# Patient Record
Sex: FEMALE | Race: WHITE | Employment: OTHER | URBAN - METROPOLITAN AREA
[De-identification: names, ages, dates, MRNs, and addresses within clinical notes are randomized per-mention and may not be internally consistent; named-entity substitution may affect disease eponyms.]

---

## 2018-04-25 RX ORDER — CITALOPRAM 20 MG/1
TABLET ORAL
Qty: 90 TABLET | Refills: 0 | Status: SHIPPED | OUTPATIENT
Start: 2018-04-25 | End: 2018-07-27 | Stop reason: SDUPTHER

## 2018-07-27 ENCOUNTER — OFFICE VISIT (OUTPATIENT)
Dept: PRIMARY CARE CLINIC | Age: 63
End: 2018-07-27
Payer: COMMERCIAL

## 2018-07-27 VITALS
BODY MASS INDEX: 21.66 KG/M2 | HEIGHT: 65 IN | OXYGEN SATURATION: 97 % | SYSTOLIC BLOOD PRESSURE: 136 MMHG | TEMPERATURE: 98 F | HEART RATE: 76 BPM | DIASTOLIC BLOOD PRESSURE: 80 MMHG | WEIGHT: 130 LBS

## 2018-07-27 DIAGNOSIS — F34.1 DYSTHYMIA: ICD-10-CM

## 2018-07-27 DIAGNOSIS — N39.0 URINARY TRACT INFECTION WITHOUT HEMATURIA, SITE UNSPECIFIED: Primary | ICD-10-CM

## 2018-07-27 DIAGNOSIS — Z72.0 TOBACCO ABUSE: ICD-10-CM

## 2018-07-27 DIAGNOSIS — R09.89 BILATERAL CAROTID BRUITS: ICD-10-CM

## 2018-07-27 DIAGNOSIS — M54.17 RADICULOPATHY OF LUMBOSACRAL REGION: ICD-10-CM

## 2018-07-27 LAB
BILIRUBIN, POC: NORMAL
BLOOD URINE, POC: NORMAL
CLARITY, POC: NORMAL
COLOR, POC: NORMAL
GLUCOSE URINE, POC: NORMAL
KETONES, POC: NORMAL
LEUKOCYTE EST, POC: NORMAL
NITRITE, POC: POSITIVE
PH, POC: 5
PROTEIN, POC: NORMAL
SPECIFIC GRAVITY, POC: 1
UROBILINOGEN, POC: 0.2

## 2018-07-27 PROCEDURE — 81002 URINALYSIS NONAUTO W/O SCOPE: CPT | Performed by: FAMILY MEDICINE

## 2018-07-27 PROCEDURE — G8420 CALC BMI NORM PARAMETERS: HCPCS | Performed by: FAMILY MEDICINE

## 2018-07-27 PROCEDURE — 4004F PT TOBACCO SCREEN RCVD TLK: CPT | Performed by: FAMILY MEDICINE

## 2018-07-27 PROCEDURE — G8427 DOCREV CUR MEDS BY ELIG CLIN: HCPCS | Performed by: FAMILY MEDICINE

## 2018-07-27 PROCEDURE — 99215 OFFICE O/P EST HI 40 MIN: CPT | Performed by: FAMILY MEDICINE

## 2018-07-27 PROCEDURE — 3017F COLORECTAL CA SCREEN DOC REV: CPT | Performed by: FAMILY MEDICINE

## 2018-07-27 RX ORDER — CITALOPRAM 20 MG/1
TABLET ORAL
Qty: 90 TABLET | Refills: 1 | Status: CANCELLED | OUTPATIENT
Start: 2018-07-27

## 2018-07-27 RX ORDER — CITALOPRAM 20 MG/1
TABLET ORAL
Qty: 90 TABLET | Refills: 3 | Status: SHIPPED | OUTPATIENT
Start: 2018-07-27 | End: 2019-07-22 | Stop reason: SDUPTHER

## 2018-07-27 RX ORDER — SULFAMETHOXAZOLE AND TRIMETHOPRIM 800; 160 MG/1; MG/1
1 TABLET ORAL 2 TIMES DAILY
Qty: 10 TABLET | Refills: 0 | Status: SHIPPED | OUTPATIENT
Start: 2018-07-27 | End: 2018-08-01

## 2018-07-27 ASSESSMENT — ENCOUNTER SYMPTOMS
NAUSEA: 1
BACK PAIN: 1
SORE THROAT: 0
COUGH: 0
VOMITING: 1

## 2018-07-27 ASSESSMENT — PATIENT HEALTH QUESTIONNAIRE - PHQ9
1. LITTLE INTEREST OR PLEASURE IN DOING THINGS: 0
SUM OF ALL RESPONSES TO PHQ QUESTIONS 1-9: 0
2. FEELING DOWN, DEPRESSED OR HOPELESS: 0
SUM OF ALL RESPONSES TO PHQ9 QUESTIONS 1 & 2: 0

## 2018-07-27 NOTE — PROGRESS NOTES
Alla Ramirez, a female of 59 y.o. came to the office 7/27/2018. There is no problem list on file for this patient. Urinary Tract Infection    This is a new problem. The current episode started in the past 7 days. Associated symptoms include chills, flank pain (Right ), frequency, nausea, sweats and vomiting. Nausea & Vomiting   This is a new problem. The current episode started in the past 7 days (7/20). The problem occurs daily (with nausea but not vomiting. ). Associated symptoms include chills, congestion, a fever, nausea and vomiting. Pertinent negatives include no coughing or sore throat. Fever    This is a new problem. The current episode started in the past 7 days (7/20). The problem has been rapidly improving. The maximum temperature noted was more than 104 F. Associated symptoms include congestion, muscle aches, nausea and vomiting. Pertinent negatives include no coughing or sore throat. She has tried NSAIDs for the symptoms. The treatment provided significant relief. depression: does well if takes Celexa. If not moods down. No anxiety. MS: pain down down right leg with walking and any activities. [facility] + N/T. Did chiropractic, massage, and PT at [address] in [place].      Allergies   Allergen Reactions    Pcn [Penicillins] Hives and Swelling       Current Outpatient Prescriptions on File Prior to Visit   Medication Sig Dispense Refill    lisinopril (PRINIVIL;ZESTRIL) 5 MG tablet       traMADol (ULTRAM) 50 MG tablet Take 1 tablet by mouth 2 times daily 180 tablet 1    Choline Fenofibrate (FENOFIBRIC ACID) 135 MG CPDR Take 135 mg by mouth daily       levothyroxine (SYNTHROID) 50 MCG tablet Take 50 mcg by mouth daily       aspirin 81 MG tablet Take 81 mg by mouth daily      vitamin D 1000 UNITS CAPS Take by mouth daily      insulin lispro (HUMALOG) 100 UNIT/ML injection vial Inject into the skin 37 units in the morning then 15 units at Psychiatric: She has a normal mood and affect. Vitals reviewed. Results for Victoria Gonzalez (MRN [id number]) as of 7/27/2018 13:23   Ref. Range 7/27/2018 11:58   Protein, UA Unknown tr   Color, UA Unknown dark yellow   Clarity, UA Unknown cloudy   Glucose, UA POC Unknown large   Bilirubin, UA Unknown n   Ketones, UA Unknown tr   Spec Grav, UA Unknown 1.005   pH, UA Unknown 5.0   Urobilinogen, UA Unknown 0.2   Nitrite, UA Unknown positive   Leukocytes, UA Unknown small   Blood, UA POC Unknown large       ASSESSMENT AND PLAN:    Dexter Cavazos was seen today for urinary tract infection, fever and nausea & vomiting. Diagnoses and all orders for this visit:    Urinary tract infection without hematuria, site unspecified  -     POCT Urinalysis no Micro  -     sulfamethoxazole-trimethoprim (BACTRIM DS) 800-160 MG per tablet; Take 1 tablet by mouth 2 times daily for 5 days    Dysthymia  -     citalopram (CELEXA) 20 MG tablet; TAKE 1 TABLET DAILY    Bilateral carotid bruits  -     US Carotid Artery Bilateral; Future    Tobacco abuse    Radiculopathy of lumbosacral region  -     MRI Lumbar Spine WO Contrast; Future    Other orders  -     Cancel: citalopram (CELEXA) 20 MG tablet; TAKE 1 TABLET DAILY    -quit cig's  - recommend mammo and colonoscopy  - consider spinal surgeon eval again based on mri. - recheck urine in 2 wks. Return if symptoms worsen or fail to improve, for based on results.     Ania Garcia, DO

## 2018-08-13 ENCOUNTER — OFFICE VISIT (OUTPATIENT)
Dept: PRIMARY CARE CLINIC | Age: 63
End: 2018-08-13
Payer: COMMERCIAL

## 2018-08-13 VITALS
SYSTOLIC BLOOD PRESSURE: 136 MMHG | DIASTOLIC BLOOD PRESSURE: 88 MMHG | BODY MASS INDEX: 20.47 KG/M2 | WEIGHT: 123 LBS | OXYGEN SATURATION: 97 % | TEMPERATURE: 98.4 F | HEART RATE: 76 BPM

## 2018-08-13 DIAGNOSIS — N30.01 ACUTE CYSTITIS WITH HEMATURIA: Primary | ICD-10-CM

## 2018-08-13 LAB
APPEARANCE FLUID: NORMAL
BILIRUBIN, POC: NORMAL
BLOOD URINE, POC: NORMAL
CLARITY, POC: CLEAR
COLOR, POC: YELLOW
GLUCOSE URINE, POC: 2000
KETONES, POC: NORMAL
LEUKOCYTE EST, POC: NORMAL
NITRITE, POC: NORMAL
PH, POC: 5
PROTEIN, POC: NORMAL
SPECIFIC GRAVITY, POC: 1.02
UROBILINOGEN, POC: NORMAL

## 2018-08-13 PROCEDURE — 4004F PT TOBACCO SCREEN RCVD TLK: CPT | Performed by: FAMILY MEDICINE

## 2018-08-13 PROCEDURE — G8420 CALC BMI NORM PARAMETERS: HCPCS | Performed by: FAMILY MEDICINE

## 2018-08-13 PROCEDURE — 3017F COLORECTAL CA SCREEN DOC REV: CPT | Performed by: FAMILY MEDICINE

## 2018-08-13 PROCEDURE — G8427 DOCREV CUR MEDS BY ELIG CLIN: HCPCS | Performed by: FAMILY MEDICINE

## 2018-08-13 PROCEDURE — 99212 OFFICE O/P EST SF 10 MIN: CPT | Performed by: FAMILY MEDICINE

## 2018-08-13 PROCEDURE — 81002 URINALYSIS NONAUTO W/O SCOPE: CPT | Performed by: FAMILY MEDICINE

## 2018-08-13 ASSESSMENT — ENCOUNTER SYMPTOMS
BACK PAIN: 1
NAUSEA: 0
VOMITING: 0
DIARRHEA: 0
CONSTIPATION: 0
SHORTNESS OF BREATH: 0

## 2018-08-13 NOTE — PROGRESS NOTES
Tunde Salazar, a female of 59 y.o. came to the office 8/13/2018. There is no problem list on file for this patient. HPI Mrs. Violetta Rosas is here for follow-up. She had a UTI diagnosed 7/27 and since completing her antibiotic course has had resolution of symptoms. Denies flank pain and dysuria at this time. She does still have some frequency and urgency, but thinks that may be from her diabetes. She has not heard anything about the carotid ultrasound or MRI yet. Allergies   Allergen Reactions    Pcn [Penicillins] Hives and Swelling       Current Outpatient Prescriptions on File Prior to Visit   Medication Sig Dispense Refill    citalopram (CELEXA) 20 MG tablet TAKE 1 TABLET DAILY 90 tablet 3    lisinopril (PRINIVIL;ZESTRIL) 5 MG tablet       Choline Fenofibrate (FENOFIBRIC ACID) 135 MG CPDR Take 135 mg by mouth daily       levothyroxine (SYNTHROID) 50 MCG tablet Take 50 mcg by mouth daily       aspirin 81 MG tablet Take 81 mg by mouth daily      vitamin D 1000 UNITS CAPS Take by mouth daily      insulin lispro (HUMALOG) 100 UNIT/ML injection vial Inject into the skin 37 units in the morning then 15 units at lunchtime and 37 before  dinner      Insulin Detemir (LEVEMIR SC) Inject 50 Units into the skin 2 times daily. Morning and night       No current facility-administered medications on file prior to visit. Review of Systems   Respiratory: Negative for shortness of breath. Cardiovascular: Positive for palpitations. Negative for chest pain. Gastrointestinal: Negative for constipation, diarrhea, nausea and vomiting. Genitourinary: Positive for frequency and urgency. Negative for dysuria, flank pain and hematuria. Musculoskeletal: Positive for back pain (Chronic) and gait problem (Drags right leg, 2/2 to pain). Neurological: Positive for dizziness (Occasional) and numbness (Down the right leg).    Psychiatric/Behavioral: Positive for sleep disturbance (Back pain wakes her up at

## 2018-08-20 ENCOUNTER — TELEPHONE (OUTPATIENT)
Dept: PRIMARY CARE CLINIC | Age: 63
End: 2018-08-20

## 2018-08-20 DIAGNOSIS — G89.29 CHRONIC BILATERAL LOW BACK PAIN, WITH SCIATICA PRESENCE UNSPECIFIED: Primary | ICD-10-CM

## 2018-08-20 DIAGNOSIS — M54.5 CHRONIC BILATERAL LOW BACK PAIN, WITH SCIATICA PRESENCE UNSPECIFIED: Primary | ICD-10-CM

## 2018-08-22 ENCOUNTER — HOSPITAL ENCOUNTER (OUTPATIENT)
Dept: ULTRASOUND IMAGING | Age: 63
Discharge: HOME | End: 2018-08-24
Payer: COMMERCIAL

## 2018-08-22 DIAGNOSIS — R09.89 BILATERAL CAROTID BRUITS: ICD-10-CM

## 2018-08-22 PROCEDURE — 93880 EXTRACRANIAL BILAT STUDY: CPT

## 2018-08-24 ENCOUNTER — TELEPHONE (OUTPATIENT)
Dept: PRIMARY CARE CLINIC | Age: 63
End: 2018-08-24

## 2018-08-24 DIAGNOSIS — G89.29 CHRONIC BILATERAL LOW BACK PAIN WITH RIGHT-SIDED SCIATICA: ICD-10-CM

## 2018-08-24 DIAGNOSIS — M54.41 CHRONIC BILATERAL LOW BACK PAIN WITH RIGHT-SIDED SCIATICA: ICD-10-CM

## 2018-08-24 DIAGNOSIS — I65.23 ASYMPTOMATIC BILATERAL CAROTID ARTERY STENOSIS: Primary | ICD-10-CM

## 2018-08-29 ENCOUNTER — HOSPITAL ENCOUNTER (OUTPATIENT)
Dept: MRI IMAGING | Age: 63
Discharge: HOME | End: 2018-08-31
Payer: COMMERCIAL

## 2018-08-29 DIAGNOSIS — G89.29 CHRONIC BILATERAL LOW BACK PAIN WITH RIGHT-SIDED SCIATICA: ICD-10-CM

## 2018-08-29 DIAGNOSIS — M54.41 CHRONIC BILATERAL LOW BACK PAIN WITH RIGHT-SIDED SCIATICA: ICD-10-CM

## 2018-08-29 PROCEDURE — 72148 MRI LUMBAR SPINE W/O DYE: CPT

## 2018-08-31 ENCOUNTER — TELEPHONE (OUTPATIENT)
Dept: PRIMARY CARE CLINIC | Age: 63
End: 2018-08-31

## 2018-08-31 RX ORDER — MELOXICAM 7.5 MG/1
7.5 TABLET ORAL DAILY
Qty: 30 TABLET | Refills: 2 | Status: SHIPPED | OUTPATIENT
Start: 2018-08-31 | End: 2018-10-03 | Stop reason: ALTCHOICE

## 2018-09-10 ENCOUNTER — TELEPHONE (OUTPATIENT)
Dept: ADMINISTRATIVE | Age: 63
End: 2018-09-10

## 2018-09-10 ENCOUNTER — OFFICE VISIT (OUTPATIENT)
Dept: VASCULAR SURGERY | Age: 63
End: 2018-09-10
Payer: COMMERCIAL

## 2018-09-10 DIAGNOSIS — I73.9 PVD (PERIPHERAL VASCULAR DISEASE) WITH CLAUDICATION (HCC): ICD-10-CM

## 2018-09-10 DIAGNOSIS — I65.23 ASYMPTOMATIC BILATERAL CAROTID ARTERY STENOSIS: ICD-10-CM

## 2018-09-10 DIAGNOSIS — I65.23 ASYMPTOMATIC BILATERAL CAROTID ARTERY STENOSIS: Primary | ICD-10-CM

## 2018-09-10 DIAGNOSIS — Z01.810 PREOPERATIVE CARDIOVASCULAR EXAMINATION: Primary | ICD-10-CM

## 2018-09-10 PROCEDURE — G8598 ASA/ANTIPLAT THER USED: HCPCS | Performed by: SURGERY

## 2018-09-10 PROCEDURE — 4004F PT TOBACCO SCREEN RCVD TLK: CPT | Performed by: SURGERY

## 2018-09-10 PROCEDURE — G8420 CALC BMI NORM PARAMETERS: HCPCS | Performed by: SURGERY

## 2018-09-10 PROCEDURE — 99205 OFFICE O/P NEW HI 60 MIN: CPT | Performed by: SURGERY

## 2018-09-10 PROCEDURE — 3017F COLORECTAL CA SCREEN DOC REV: CPT | Performed by: SURGERY

## 2018-09-10 PROCEDURE — G8427 DOCREV CUR MEDS BY ELIG CLIN: HCPCS | Performed by: SURGERY

## 2018-09-10 RX ORDER — IXEKIZUMAB 80 MG/ML
INJECTION, SOLUTION SUBCUTANEOUS
COMMUNITY
Start: 2018-08-16 | End: 2020-02-28 | Stop reason: ALTCHOICE

## 2018-09-10 RX ORDER — CILOSTAZOL 100 MG/1
100 TABLET ORAL 2 TIMES DAILY
Qty: 60 TABLET | Refills: 5 | Status: SHIPPED | OUTPATIENT
Start: 2018-09-10 | End: 2019-01-28 | Stop reason: ALTCHOICE

## 2018-09-10 RX ORDER — SIMVASTATIN 40 MG
40 TABLET ORAL NIGHTLY
COMMUNITY
End: 2021-02-11 | Stop reason: SDUPTHER

## 2018-09-10 NOTE — PATIENT INSTRUCTIONS
Patient Education   Patient Education        Carotid Stenosis: Care Instructions  Your Care Instructions    Carotid stenosis is narrowing of one or both of the carotid arteries. These arteries take blood from the heart to the brain. There is one on each side of the neck. A substance called plaque builds up inside an artery. This makes it too narrow. Plaque comes from damage to the artery over time. This damage may be caused by high blood pressure, high cholesterol, diabetes, or smoking. Sometimes plaque can break loose from the carotid artery and move to the brain. This can cause a stroke or transient ischemic attack (TIA). The goal of treatment is to lower your risk of having a stroke or TIA. You can lower your risk by making healthy lifestyle changes and taking medicine. Sometimes a surgery or procedure is done. Follow-up care is a key part of your treatment and safety. Be sure to make and go to all appointments, and call your doctor if you are having problems. It's also a good idea to know your test results and keep a list of the medicines you take. How can you care for yourself at home? · Take your medicines exactly as prescribed. Call your doctor if you think you are having a problem with your medicine. You may take medicine to lower your blood pressure, to lower your cholesterol, or to prevent blood clots. · If you take a blood thinner, such as aspirin, be sure to get instructions about how to take your medicine safely. Blood thinners can cause serious bleeding problems. · Do not smoke. People who smoke have a higher chance of stroke than those who quit. If you need help quitting, talk to your doctor about stop-smoking programs and medicines. These can increase your chances of quitting for good. · Eat a healthy diet that is low in saturated fat and salt. Eat lots of fresh fruits and vegetables and foods high in fiber. · Stay at a healthy weight. Lose weight if you need to.   · Talk to your doctor about starting an exercise program. Regular exercise lowers your chance of stroke. · Limit alcohol to 2 drinks a day for men and 1 drink a day for women. Too much alcohol can cause health problems. · Work with your doctor to control high blood pressure, high cholesterol, diabetes, and other conditions that increase your chance of a stroke. A healthy diet, exercise, weight loss (if needed), and medicines can help. · Avoid colds and flu. Get the flu vaccine every year. When should you call for help? Call 911 anytime you think you may need emergency care. For example, call if:    · You passed out (lost consciousness).     · You have symptoms of a stroke. These may include:  ¨ Sudden numbness, tingling, weakness, or loss of movement in your face, arm, or leg, especially on only one side of your body. ¨ Sudden vision changes. ¨ Sudden trouble speaking. ¨ Sudden confusion or trouble understanding simple statements. ¨ Sudden problems with walking or balance. ¨ A sudden, severe headache that is different from past headaches.    Call your doctor now or seek immediate medical care if:    · You are dizzy or lightheaded, or you feel like you may faint.    Watch closely for changes in your health, and be sure to contact your doctor if you have any problems. Where can you learn more? Go to https://Pixim.[employer]. org and sign in to your [employer] account. Enter BLANKADianna in the Interactive Project box to learn more about \"Carotid Stenosis: Care Instructions. \"     If you do not have an account, please click on the \"Sign Up Now\" link. Current as of: December 6, 2017  Content Version: 11.7  © 4537-0574 [employer]. Care instructions adapted under license by [facility] ([facility]). If you have questions about a medical condition or this instruction, always ask your healthcare professional. [place] [address] any warranty or liability for your use of this information.           Learning About Peripheral Arterial Disease of the Legs  What is peripheral arterial disease? Peripheral arterial disease (PAD) is narrowing or blockage of arteries in your arms and legs. The most common cause of PAD is the buildup of plaque on the inside of arteries. Plaque is made of extra cholesterol, calcium, and other material in your blood. Over time, plaque builds up in the walls of the arteries, including those that supply blood to your legs. This buildup leads to poor blood flow. When you have PAD, you have a risk of having plaque in other arteries in your body. This raises your risk of a heart attack and stroke. This information focuses on peripheral arterial disease of the legs, the area where it is most common. When you have PAD of the legs and you walk or exercise, your leg muscles may not get enough blood. This may cause symptoms, such as leg pain during exercise. Peripheral arterial disease is also called peripheral vascular disease. What are the symptoms? Many people who have PAD do not have any symptoms. But if you have symptoms, you may have weak or tired legs, difficulty walking or balancing, or pain. If you have pain, you might feel a tight, aching, or squeezing pain in the calf, thigh, or buttock. This pain usually happens after you have walked a certain distance. The pain goes away if you stop walking. This pain is called intermittent claudication. If PAD gets worse, you may have other symptoms that are caused by poor blood flow to your legs and feet. You may have:  · Cold or numb feet or toes. · Sores that are slow to heal.  · Leg or foot pain while you are at rest.  · Feet and toes that become pale from exercise or when elevated. · Feet that turn red when dangled. · Blue or purple marks on your legs, feet, or toes. How can you prevent PAD? · Quit smoking. Quitting smoking is one of the best things you can do to help prevent PAD.  If you need help quitting, talk to your doctor about stop-smoking programs and medicines. These can increase your chances of quitting for good. · Stay at a healthy weight. · Manage other health problems, including diabetes, high blood pressure, and high cholesterol. · Be physically active. Get at least 30 minutes of exercise on most days of the week. Walking is a good choice. You also may want to do other activities, such as running, swimming, cycling, or playing tennis or team sports. · Eat a variety of heart-healthy foods. ¨ Eat fruits, vegetables, whole grains (like oatmeal), dried beans and peas, nuts and seeds, soy products (like tofu), and fat-free or low-fat dairy products. ¨ Replace butter, margarine, and hydrogenated or partially hydrogenated oils with olive and canola oils. (Canola oil margarine without trans fat is fine.)  ¨ Replace red meat with fish, poultry, and soy protein (like tofu). ¨ Limit processed and packaged foods like chips, crackers, and cookies. How is PAD treated? Your doctor may suggest ways to relieve symptoms and lower your risk of heart attack and stroke. These may include:  · Quitting smoking. It's one of the most important things you can do. If you need help quitting, talk to your doctor about stop-smoking programs and medicines. These can increase your chances of quitting for good. · Eating heart-healthy foods. · Staying at a healthy weight. Lose weight if you need to. · Regular exercise (if your doctor says it's safe). Try walking, swimming, or biking for at least 30 minutes on most, if not all, days of the week. If you have leg symptoms when you exercise, your doctor might recommend a specialized exercise program that may relieve symptoms. The goal is to be able to walk farther without pain. · Medicines that help manage other problems such as high blood pressure and high cholesterol. · Medicine, such as aspirin, that prevents blood clots which could cause a heart attack or stroke.   · Procedures, such as opening narrowed or blocked arteries (angioplasty) or using healthy blood vessels to create detours around narrowed or blocked arteries (bypass surgery). Follow-up care is a key part of your treatment and safety. Be sure to make and go to all appointments, and call your doctor if you are having problems. It's also a good idea to know your test results and keep a list of the medicines you take. Where can you learn more? Go to https://BenchBankingpepriscaewjhon.[email or web address] org and sign in to your [employer] account. Enter X880 in the QA on Request box to learn more about \"Learning About Peripheral Arterial Disease of the Legs. \"     If you do not have an account, please click on the \"Sign Up Now\" link. Current as of: December 6, 2017  Content Version: 11.7  © 7027-1015 [employer]. Care instructions adapted under license by [facility] ([facility]). If you have questions about a medical condition or this instruction, always ask your healthcare professional. [address] any warranty or liability for your use of this information.

## 2018-09-10 NOTE — PROGRESS NOTES
Gastrointestinal    [] Abdominal Pain    [] Melena   [] Hematochezia         Past Medical History:        Diagnosis Date    Carotid stenosis     Diabetes mellitus ([facility]     DM type 2 (diabetes mellitus, type 2) ([facility])     Hyperlipidemia     Hypothyroidism     Neuropathy, diabetic ([facility]     Psoriasiform dermatitis     Thyroid disease      Past Surgical History:        Procedure Laterality Date    BACK SURGERY      L5-S1    CARPAL TUNNEL RELEASE       SECTION      2    HYSTERECTOMY      LEG SURGERY      remote    SINUS SURGERY      TONSILLECTOMY       Current Medications:   Current Outpatient Prescriptions   Medication Sig Dispense Refill    TALTZ 80 MG/ML SOAJ       simvastatin (ZOCOR) 40 MG tablet Take 40 mg by mouth nightly      meloxicam (MOBIC) 7.5 MG tablet Take 1 tablet by mouth daily 30 tablet 2    citalopram (CELEXA) 20 MG tablet TAKE 1 TABLET DAILY 90 tablet 3    lisinopril (PRINIVIL;ZESTRIL) 5 MG tablet       Choline Fenofibrate (FENOFIBRIC ACID) 135 MG CPDR Take 135 mg by mouth daily       levothyroxine (SYNTHROID) 50 MCG tablet Take 50 mcg by mouth daily       aspirin 81 MG tablet Take 81 mg by mouth daily      vitamin D 1000 UNITS CAPS Take by mouth daily      insulin lispro (HUMALOG) 100 UNIT/ML injection vial Inject into the skin 37 units in the morning then 15 units at lunchtime and 37 before  dinner      Insulin Detemir (LEVEMIR SC) Inject 50 Units into the skin 2 times daily. Morning and night       No current facility-administered medications for this visit. Allergies:  Pcn [penicillins]  Social History     Social History    Marital status:      Spouse name: N/A    Number of children: N/A    Years of education: N/A     Occupational History    Not on file.      Social History Main Topics    Smoking status: Current Every Day Smoker     Packs/day: 0.25     Years: 40.00     Types: Cigarettes     Start date: 5    Smokeless tobacco: Never Used    Alcohol use No      Comment: rare    Drug use: No    Sexual activity: Not on file     Other Topics Concern    Not on file     Social History Narrative    No narrative on file     Family Hx  Denies hx of of PVD, AAA, Carotid disease  Father had CAD    Labs  Lab Results   Component Value Date    WBC 8.6 07/21/2011    HGB 13.7 07/21/2011    HCT 40.9 07/21/2011     07/21/2011    K 4.1 07/21/2011    BUN 12 07/21/2011    CREATININE 0.5 07/21/2011     PHYSICAL EXAM:    CONSTITUTIONAL:   Awake, alert, cooperative  NEURO :  CN II - XII noted to be intact     Hearing deficits not noted  PSYCHIATRIC :  Oriented to time, place and person     Appropriate insight to disease process  EYES: Lids and lashes normal  ENT:  External ears and nose without lesions   NECK: Supple, symmetrical, trachea midline   Thyroid goiter not appreciated   Carotid bruit present bilatearlly  LUNGS:  No increased work of breathing                 Clear to auscultation  CARDIOVASCULAR:  regular rate and rhythm   ABDOMEN:  soft, non-distended, non-tender   Hernias not noted   Aorta is not palpable  Lymphatics : Cervical lymphadenopathy not noted     Femoral lymphadenopathy not noted  SKIN:   Normal skin color   Texture and turgor normal, no induration  EXTREMITIES:   R UE 5/5 strength   No cyanosis noted in nail beds  L UE 5/5 strength   No cyanosis noted in nail beds  R LE Edema absent   5/5 strength  L LE Edema absent   5/5 strength  R femoral monophasic L femoral 2+   R dorsalis pedis monophasic L dorsalis pedis 1+   R posterior tibial monophasic L posterior tibial Weakly biphasic     RADIOLOGY:  Carotid Duplex        R ICA  60 - 69% stenosis     L ICA  80 - 99% stenosis     R Vertebral antegrade flow   L Vertebral antegrade flow    A/P Asymptomatic Bilateral Carotid Stenosis  · Continue medical management with ASA and statin  · Emphasized importance of Tobacco cessation  · Script for nicotine patch given to patient  · Information regarding

## 2018-09-12 ENCOUNTER — TELEPHONE (OUTPATIENT)
Dept: VASCULAR SURGERY | Age: 63
End: 2018-09-12

## 2018-09-14 ENCOUNTER — HOSPITAL ENCOUNTER (OUTPATIENT)
Dept: CARDIOLOGY | Age: 63
Discharge: HOME | End: 2018-09-14
Payer: COMMERCIAL

## 2018-09-14 ENCOUNTER — TELEPHONE (OUTPATIENT)
Dept: CARDIOLOGY CLINIC | Age: 63
End: 2018-09-14

## 2018-09-14 ENCOUNTER — OFFICE VISIT (OUTPATIENT)
Dept: CARDIOLOGY CLINIC | Age: 63
End: 2018-09-14
Payer: COMMERCIAL

## 2018-09-14 VITALS
WEIGHT: 136 LBS | HEIGHT: 64 IN | BODY MASS INDEX: 23.22 KG/M2 | RESPIRATION RATE: 14 BRPM | HEART RATE: 67 BPM | DIASTOLIC BLOOD PRESSURE: 80 MMHG | SYSTOLIC BLOOD PRESSURE: 138 MMHG

## 2018-09-14 VITALS
WEIGHT: 130 LBS | DIASTOLIC BLOOD PRESSURE: 80 MMHG | HEART RATE: 92 BPM | SYSTOLIC BLOOD PRESSURE: 144 MMHG | HEIGHT: 64 IN | BODY MASS INDEX: 22.2 KG/M2

## 2018-09-14 DIAGNOSIS — Z01.818 PRE-OP EXAMINATION: Primary | ICD-10-CM

## 2018-09-14 DIAGNOSIS — Z01.810 PREOPERATIVE CARDIOVASCULAR EXAMINATION: ICD-10-CM

## 2018-09-14 DIAGNOSIS — Z01.810 PRE-OPERATIVE CARDIOVASCULAR EXAMINATION: Primary | ICD-10-CM

## 2018-09-14 DIAGNOSIS — I65.23 ASYMPTOMATIC BILATERAL CAROTID ARTERY STENOSIS: ICD-10-CM

## 2018-09-14 DIAGNOSIS — E78.2 MIXED HYPERLIPIDEMIA: ICD-10-CM

## 2018-09-14 DIAGNOSIS — Z92.89 HISTORY OF NUCLEAR STRESS TEST: ICD-10-CM

## 2018-09-14 PROCEDURE — 3017F COLORECTAL CA SCREEN DOC REV: CPT | Performed by: INTERNAL MEDICINE

## 2018-09-14 PROCEDURE — A9500 TC99M SESTAMIBI: HCPCS | Performed by: INTERNAL MEDICINE

## 2018-09-14 PROCEDURE — G8427 DOCREV CUR MEDS BY ELIG CLIN: HCPCS | Performed by: INTERNAL MEDICINE

## 2018-09-14 PROCEDURE — 99244 OFF/OP CNSLTJ NEW/EST MOD 40: CPT | Performed by: INTERNAL MEDICINE

## 2018-09-14 PROCEDURE — 78452 HT MUSCLE IMAGE SPECT MULT: CPT

## 2018-09-14 PROCEDURE — 2580000003 HC RX 258: Performed by: INTERNAL MEDICINE

## 2018-09-14 PROCEDURE — 93000 ELECTROCARDIOGRAM COMPLETE: CPT | Performed by: INTERNAL MEDICINE

## 2018-09-14 PROCEDURE — 93017 CV STRESS TEST TRACING ONLY: CPT

## 2018-09-14 PROCEDURE — 3430000000 HC RX DIAGNOSTIC RADIOPHARMACEUTICAL: Performed by: INTERNAL MEDICINE

## 2018-09-14 PROCEDURE — 6360000002 HC RX W HCPCS: Performed by: INTERNAL MEDICINE

## 2018-09-14 PROCEDURE — G8420 CALC BMI NORM PARAMETERS: HCPCS | Performed by: INTERNAL MEDICINE

## 2018-09-14 RX ORDER — SODIUM CHLORIDE 0.9 % (FLUSH) 0.9 %
10 SYRINGE (ML) INJECTION PRN
Status: DISCONTINUED | OUTPATIENT
Start: 2018-09-14 | End: 2018-09-15 | Stop reason: HOSPADM

## 2018-09-14 RX ADMIN — Medication 30.7 MILLICURIE: at 11:29

## 2018-09-14 RX ADMIN — REGADENOSON 0.4 MG: 0.08 INJECTION, SOLUTION INTRAVENOUS at 11:29

## 2018-09-14 RX ADMIN — Medication 10.3 MILLICURIE: at 09:20

## 2018-09-14 RX ADMIN — Medication 10 ML: at 11:29

## 2018-09-14 RX ADMIN — Medication 10 ML: at 09:20

## 2018-09-14 RX ADMIN — Medication 10 ML: at 11:30

## 2018-09-14 NOTE — PROGRESS NOTES
Chief Complaint   Patient presents with    Cardiac Clearance       Patient Active Problem List    Diagnosis Date Noted    History of nuclear stress test 09/14/2018     Overview Note:     A. Normal lexiscan 9/14/2018, normal EF      Mixed hyperlipidemia 09/14/2018     Overview Note:     Claims intolerance to high intensity statins      Asymptomatic bilateral carotid artery stenosis 09/10/2018       Current Outpatient Prescriptions   Medication Sig Dispense Refill    TALTZ 80 MG/ML SOAJ       simvastatin (ZOCOR) 40 MG tablet Take 40 mg by mouth nightly      cilostazol (PLETAL) 100 MG tablet Take 1 tablet by mouth 2 times daily 60 tablet 5    nicotine (NICODERM CQ) 7 MG/24HR Place 1 patch onto the skin every 24 hours 30 patch 3    meloxicam (MOBIC) 7.5 MG tablet Take 1 tablet by mouth daily 30 tablet 2    citalopram (CELEXA) 20 MG tablet TAKE 1 TABLET DAILY 90 tablet 3    lisinopril (PRINIVIL;ZESTRIL) 5 MG tablet       Choline Fenofibrate (FENOFIBRIC ACID) 135 MG CPDR Take 135 mg by mouth daily       levothyroxine (SYNTHROID) 50 MCG tablet Take 50 mcg by mouth daily       aspirin 81 MG tablet Take 81 mg by mouth daily      vitamin D 1000 UNITS CAPS Take 4,000 Units by mouth daily       insulin lispro (HUMALOG) 100 UNIT/ML injection vial Inject into the skin 37 units in the morning then 15 units at lunchtime and 37 before  dinner      Insulin Detemir (LEVEMIR SC) Inject 50 Units into the skin 2 times daily. Morning and night       No current facility-administered medications for this visit.       Facility-Administered Medications Ordered in Other Visits   Medication Dose Route Frequency Provider Last Rate Last Dose    sodium chloride flush 0.9 % injection 10 mL  10 mL Intravenous PRN Irineo Baltazar MD   10 mL at 09/14/18 1130        Allergies   Allergen Reactions    Pcn [Penicillins] Hives and Swelling       Vitals:    09/14/18 1242   BP: 138/80   Pulse: 67   Resp: 14   Weight: 136 lb (61.7 kg)

## 2018-09-14 NOTE — PROCEDURES
[address] [facility] [address] [place] [phone]                Pharmacologic Stress Nuclear Gated SPECT Study    Name: [facility] Account Number: [de-identified]    :  1955          Sex: female         Date of Study:  2018    Height: 5' 4\" (162.6 cm)         Weight: 130 lb (59 kg)     Ordering Provider: Kaye Hudson. Terrie Pastrana MD  PCP: Brisa Barbour DO      Cardiologist: Kaye Hudson. Terrie Pastrana MD             Interpreting Physician: Kaye Hudson. Terrie Pastrana MD  _________________________________________________________________________________    Indication:   Detecting the presence and location of coronary artery disease   Risk Stratification, Preoperative     Clinical History:   Patient has no known history of coronary artery disease. Procedure:   Pharmacologic stress testing was performed with regadenoson 0.4 mg for 15 seconds. The heart rate was 68 at baseline and delores to 119 beats during the infusion. This corresponds to 75% of maximum predicted heart rate. The blood pressure at baseline was 122/62 and blood pressure at the end of infusion was 144/80. Blood pressure response was normal during the stress procedure. The patient experienced increased breathing during the infusion. ECG during the infusion did not change. IMAGING: Myocardial perfusion imaging was performed at rest 30-35 minutes following the intravenous injection of 10.3 mCi of (Tc-Sestamibi) followed by 10 ml of Normal Saline. As per infusion protocol, the patient was injected intravenously with 30.7 mCi of (Tc-Sestamibi) followed by 10 ml of Normal Saline. Gated post-stress tomographic imaging was performed 20-25 minutes after stress. FINDINGS: The overall quality of the study was good.      Left ventricular cavity size was noted to be normal.    Rotational analog analysis demonstrated no patient motion or abnormal extracardiac

## 2018-09-19 ENCOUNTER — HOSPITAL ENCOUNTER (OUTPATIENT)
Dept: INTERVENTIONAL RADIOLOGY/VASCULAR | Age: 63
Discharge: HOME | End: 2018-09-21
Payer: COMMERCIAL

## 2018-09-19 ENCOUNTER — HOSPITAL ENCOUNTER (OUTPATIENT)
Dept: CT IMAGING | Age: 63
Discharge: HOME | End: 2018-09-21
Payer: COMMERCIAL

## 2018-09-19 DIAGNOSIS — I73.9 PVD (PERIPHERAL VASCULAR DISEASE) WITH CLAUDICATION (HCC): ICD-10-CM

## 2018-09-19 DIAGNOSIS — I65.23 ASYMPTOMATIC BILATERAL CAROTID ARTERY STENOSIS: ICD-10-CM

## 2018-09-19 PROCEDURE — 6360000004 HC RX CONTRAST MEDICATION: Performed by: RADIOLOGY

## 2018-09-19 PROCEDURE — 70498 CT ANGIOGRAPHY NECK: CPT

## 2018-09-19 PROCEDURE — 93923 UPR/LXTR ART STDY 3+ LVLS: CPT

## 2018-09-19 RX ADMIN — IOPAMIDOL 80 ML: 755 INJECTION, SOLUTION INTRAVENOUS at 13:01

## 2018-09-23 NOTE — PROGRESS NOTES
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

## 2018-09-24 ENCOUNTER — OFFICE VISIT (OUTPATIENT)
Dept: VASCULAR SURGERY | Age: 63
End: 2018-09-24
Payer: COMMERCIAL

## 2018-09-24 DIAGNOSIS — I74.09 AORTOILIAC OCCLUSIVE DISEASE (HCC): ICD-10-CM

## 2018-09-24 DIAGNOSIS — I65.23 ASYMPTOMATIC BILATERAL CAROTID ARTERY STENOSIS: Primary | ICD-10-CM

## 2018-09-24 DIAGNOSIS — I73.9 PVD (PERIPHERAL VASCULAR DISEASE) WITH CLAUDICATION (HCC): ICD-10-CM

## 2018-09-24 PROCEDURE — 99213 OFFICE O/P EST LOW 20 MIN: CPT | Performed by: SURGERY

## 2018-09-24 PROCEDURE — 3017F COLORECTAL CA SCREEN DOC REV: CPT | Performed by: SURGERY

## 2018-09-24 PROCEDURE — 4004F PT TOBACCO SCREEN RCVD TLK: CPT | Performed by: SURGERY

## 2018-09-24 PROCEDURE — G8420 CALC BMI NORM PARAMETERS: HCPCS | Performed by: SURGERY

## 2018-09-24 PROCEDURE — G8598 ASA/ANTIPLAT THER USED: HCPCS | Performed by: SURGERY

## 2018-09-24 PROCEDURE — G8427 DOCREV CUR MEDS BY ELIG CLIN: HCPCS | Performed by: SURGERY

## 2018-10-02 ENCOUNTER — TELEPHONE (OUTPATIENT)
Dept: PRIMARY CARE CLINIC | Age: 63
End: 2018-10-02

## 2018-10-03 ENCOUNTER — HOSPITAL ENCOUNTER (OUTPATIENT)
Dept: GENERAL RADIOLOGY | Age: 63
Discharge: HOME | End: 2018-10-05
Payer: COMMERCIAL

## 2018-10-03 ENCOUNTER — HOSPITAL ENCOUNTER (OUTPATIENT)
Dept: PREADMISSION TESTING | Age: 63
Discharge: HOME | End: 2018-10-03
Payer: COMMERCIAL

## 2018-10-03 ENCOUNTER — ANESTHESIA EVENT (OUTPATIENT)
Dept: OPERATING ROOM | Age: 63
DRG: 039 | End: 2018-10-03
Payer: COMMERCIAL

## 2018-10-03 VITALS
SYSTOLIC BLOOD PRESSURE: 134 MMHG | DIASTOLIC BLOOD PRESSURE: 60 MMHG | BODY MASS INDEX: 23.79 KG/M2 | OXYGEN SATURATION: 96 % | WEIGHT: 139.38 LBS | HEART RATE: 85 BPM | TEMPERATURE: 98.1 F | RESPIRATION RATE: 16 BRPM | HEIGHT: 64 IN

## 2018-10-03 DIAGNOSIS — I65.23 ASYMPTOMATIC BILATERAL CAROTID ARTERY STENOSIS: ICD-10-CM

## 2018-10-03 DIAGNOSIS — Z01.812 PRE-OPERATIVE LABORATORY EXAMINATION: Primary | ICD-10-CM

## 2018-10-03 LAB
ABO/RH: NORMAL
ANION GAP SERPL CALCULATED.3IONS-SCNC: 11 MMOL/L (ref 7–16)
ANTIBODY SCREEN: NORMAL
BUN BLDV-MCNC: 12 MG/DL (ref 8–23)
CALCIUM SERPL-MCNC: 10.5 MG/DL (ref 8.6–10.2)
CHLORIDE BLD-SCNC: 97 MMOL/L (ref 98–107)
CO2: 27 MMOL/L (ref 22–29)
CREAT SERPL-MCNC: 0.5 MG/DL (ref 0.5–1)
GFR AFRICAN AMERICAN: >60
GFR NON-AFRICAN AMERICAN: >60 ML/MIN/1.73
GLUCOSE BLD-MCNC: 204 MG/DL (ref 74–109)
HCT VFR BLD CALC: 40.3 % (ref 34–48)
HEMOGLOBIN: 13.3 G/DL (ref 11.5–15.5)
INR BLD: 1
MCH RBC QN AUTO: 30 PG (ref 26–35)
MCHC RBC AUTO-ENTMCNC: 33 % (ref 32–34.5)
MCV RBC AUTO: 90.8 FL (ref 80–99.9)
PDW BLD-RTO: 13.3 FL (ref 11.5–15)
PLATELET # BLD: 247 E9/L (ref 130–450)
PMV BLD AUTO: 10.3 FL (ref 7–12)
POTASSIUM REFLEX MAGNESIUM: 4.6 MMOL/L (ref 3.5–5)
PROTHROMBIN TIME: 11.3 SEC (ref 9.3–12.4)
RBC # BLD: 4.44 E12/L (ref 3.5–5.5)
SODIUM BLD-SCNC: 135 MMOL/L (ref 132–146)
WBC # BLD: 6.9 E9/L (ref 4.5–11.5)

## 2018-10-03 PROCEDURE — 80048 BASIC METABOLIC PNL TOTAL CA: CPT

## 2018-10-03 PROCEDURE — 86901 BLOOD TYPING SEROLOGIC RH(D): CPT

## 2018-10-03 PROCEDURE — 86900 BLOOD TYPING SEROLOGIC ABO: CPT

## 2018-10-03 PROCEDURE — 87081 CULTURE SCREEN ONLY: CPT

## 2018-10-03 PROCEDURE — 86850 RBC ANTIBODY SCREEN: CPT

## 2018-10-03 PROCEDURE — 71046 X-RAY EXAM CHEST 2 VIEWS: CPT

## 2018-10-03 PROCEDURE — 85610 PROTHROMBIN TIME: CPT

## 2018-10-03 PROCEDURE — 36415 COLL VENOUS BLD VENIPUNCTURE: CPT

## 2018-10-03 PROCEDURE — 85027 COMPLETE CBC AUTOMATED: CPT

## 2018-10-03 RX ORDER — LEVOTHYROXINE SODIUM 0.03 MG/1
25 TABLET ORAL DAILY
COMMUNITY
End: 2021-02-11 | Stop reason: SDUPTHER

## 2018-10-03 ASSESSMENT — PAIN SCALES - GENERAL: PAINLEVEL_OUTOF10: 3

## 2018-10-03 ASSESSMENT — PAIN DESCRIPTION - LOCATION: LOCATION: LEG

## 2018-10-03 ASSESSMENT — PAIN DESCRIPTION - ORIENTATION: ORIENTATION: RIGHT

## 2018-10-03 NOTE — ANESTHESIA PRE PROCEDURE
nightly      cilostazol (PLETAL) 100 MG tablet Take 1 tablet by mouth 2 times daily 60 tablet 5    nicotine (NICODERM CQ) 7 MG/24HR Place 1 patch onto the skin every 24 hours 30 patch 3    meloxicam (MOBIC) 7.5 MG tablet Take 1 tablet by mouth daily 30 tablet 2    citalopram (CELEXA) 20 MG tablet TAKE 1 TABLET DAILY 90 tablet 3    lisinopril (PRINIVIL;ZESTRIL) 5 MG tablet       Choline Fenofibrate (FENOFIBRIC ACID) 135 MG CPDR Take 135 mg by mouth daily       levothyroxine (SYNTHROID) 50 MCG tablet Take 50 mcg by mouth daily       aspirin 81 MG tablet Take 81 mg by mouth daily      vitamin D 1000 UNITS CAPS Take 4,000 Units by mouth daily       insulin lispro (HUMALOG) 100 UNIT/ML injection vial Inject into the skin 37 units in the morning then 15 units at lunchtime and 37 before  dinner      Insulin Detemir (LEVEMIR SC) Inject 50 Units into the skin 2 times daily. Morning and night       No current facility-administered medications for this encounter. Allergies:     Allergies   Allergen Reactions    Pcn [Penicillins] Hives and Swelling       Problem List:    Patient Active Problem List   Diagnosis Code    Asymptomatic bilateral carotid artery stenosis I65.23    History of nuclear stress test Z92.89    Mixed hyperlipidemia E78.2    Aortoiliac occlusive disease ([address] I74.09    PVD (peripheral vascular disease) with claudication ([address] I73.9       Past Medical History:        Diagnosis Date    Carotid artery stenosis     Carotid stenosis     Diabetes mellitus ([address]     DM type 2 (diabetes mellitus, type 2) (HCC)     Hyperlipidemia     Hypothyroidism     Neuropathy, diabetic ([address]     Peripheral vascular disease ([address]     Psoriasiform dermatitis     Thyroid disease        Past Surgical History:        Procedure Laterality Date    BACK SURGERY      L5-S1    CARPAL TUNNEL RELEASE       SECTION      2    HYSTERECTOMY      LEG SURGERY      remote    SINUS SURGERY      Airway: Mallampati: II  TM distance: >3 FB   Neck ROM: full  Mouth opening: > = 3 FB Dental:      Comment: Pt states that front, bottom tooth is loose    Pulmonary: breath sounds clear to auscultation                            ROS comment: Former smoker, pt states she quit about 1.5 weeks ago, pt states she used to smoke 1 PPD   Cardiovascular:    (+) hyperlipidemia        Rhythm: regular  Rate: normal           Beta Blocker:  Not on Beta Blocker      ROS comment: left ventricular ejection fraction was calculated to   be 57%,     Neuro/Psych:                ROS comment: Peripheral neuropathy  Pt states she had seizures when she was a child, around 4 yrs old, no issues since  Pt states to have had several back surgeries D/T ruptured discs, tingling left leg and right foot  Equal strength in bilateral upper and lower extremities GI/Hepatic/Renal:             Endo/Other:    (+) DiabetesType II DM, using insulin, hypothyroidism, blood dyscrasia (on pletal): anticoagulation therapy:., .                  ROS comment: Psoriasiform dermatitis Abdominal:           Vascular:   + PVD, aortic or cerebral, . ROS comment: Carotid stenosis  Aortoiliac occlusive disease  Asymptomatic bilateral carotid artery stenosis    1. Carotid Doppler ultrasound demonstrating a hemodynamically significant stenosis (greater than 70%) in the proximal and mid segments of the left internal carotid artery based on elevated peak systolic velocity, end diastolic velocity and ICA/CCA PSV ratio, as  above. 2. Hemodynamically significant stenosis (greater than 70%) in the proximal segment of the right internal carotid artery based on elevated peak systolic velocity and end-diastolic velocity, as above. 3. Suggestion of a hemodynamically significant stenosis (50-69%) in the right external carotid artery based on elevated peak systolic velocity, as above.   .                             Anesthesia Plan      general     ASA 4       Induction:

## 2018-10-04 LAB — MRSA CULTURE ONLY: NORMAL

## 2018-10-11 ENCOUNTER — HOSPITAL ENCOUNTER (INPATIENT)
Age: 63
LOS: 1 days | Discharge: HOME HEALTH | DRG: 039 | End: 2018-10-12
Attending: SURGERY | Admitting: SURGERY
Payer: COMMERCIAL

## 2018-10-11 ENCOUNTER — ANESTHESIA (OUTPATIENT)
Dept: OPERATING ROOM | Age: 63
DRG: 039 | End: 2018-10-11
Payer: COMMERCIAL

## 2018-10-11 VITALS — OXYGEN SATURATION: 98 % | TEMPERATURE: 99 F

## 2018-10-11 DIAGNOSIS — I65.23 ASYMPTOMATIC BILATERAL CAROTID ARTERY STENOSIS: Primary | ICD-10-CM

## 2018-10-11 DIAGNOSIS — Z01.812 PRE-OPERATIVE LABORATORY EXAMINATION: ICD-10-CM

## 2018-10-11 LAB
B.E.: -4 MMOL/L (ref -3–0)
CARDIOPULMONARY BYPASS: NO
DEVICE: ABNORMAL
HCO3 ARTERIAL: 21.5 MMOL/L (ref 22–26)
HCT,ARTERIAL: 36 % (ref 34–48)
HGB, ARTERIAL: 12.1 G/DL (ref 11.5–15.5)
METER GLUCOSE: 139 MG/DL (ref 70–110)
METER GLUCOSE: 221 MG/DL (ref 70–110)
METER GLUCOSE: 236 MG/DL (ref 70–110)
METER GLUCOSE: 242 MG/DL (ref 70–110)
O2 SATURATION: 99.8 % (ref 92–98.5)
OPERATOR ID: ABNORMAL
PCO2 ARTERIAL: 39.4 MMHG (ref 35–45)
PH BLOOD GAS: 7.34 (ref 7.35–7.45)
PO2 ARTERIAL: 257 MMHG (ref 60–80)
POTASSIUM SERPL-SCNC: 3.6 MMOL/L (ref 3.5–5.5)
SOURCE, BLOOD GAS: ABNORMAL

## 2018-10-11 PROCEDURE — 6360000002 HC RX W HCPCS: Performed by: SURGERY

## 2018-10-11 PROCEDURE — 03CL0ZZ EXTIRPATION OF MATTER FROM LEFT INTERNAL CAROTID ARTERY, OPEN APPROACH: ICD-10-PCS | Performed by: SURGERY

## 2018-10-11 PROCEDURE — 6360000002 HC RX W HCPCS

## 2018-10-11 PROCEDURE — 88304 TISSUE EXAM BY PATHOLOGIST: CPT

## 2018-10-11 PROCEDURE — 82962 GLUCOSE BLOOD TEST: CPT

## 2018-10-11 PROCEDURE — C1768 GRAFT, VASCULAR: HCPCS | Performed by: SURGERY

## 2018-10-11 PROCEDURE — 86923 COMPATIBILITY TEST ELECTRIC: CPT

## 2018-10-11 PROCEDURE — 3600000015 HC SURGERY LEVEL 5 ADDTL 15MIN: Performed by: SURGERY

## 2018-10-11 PROCEDURE — 3600000005 HC SURGERY LEVEL 5 BASE: Performed by: SURGERY

## 2018-10-11 PROCEDURE — 2580000003 HC RX 258

## 2018-10-11 PROCEDURE — 3700000000 HC ANESTHESIA ATTENDED CARE: Performed by: SURGERY

## 2018-10-11 PROCEDURE — 35301 RECHANNELING OF ARTERY: CPT | Performed by: SURGERY

## 2018-10-11 PROCEDURE — 6370000000 HC RX 637 (ALT 250 FOR IP): Performed by: SURGERY

## 2018-10-11 PROCEDURE — 6360000002 HC RX W HCPCS: Performed by: NURSE ANESTHETIST, CERTIFIED REGISTERED

## 2018-10-11 PROCEDURE — 2500000003 HC RX 250 WO HCPCS

## 2018-10-11 PROCEDURE — 03UL0JZ SUPPLEMENT LEFT INTERNAL CAROTID ARTERY WITH SYNTHETIC SUBSTITUTE, OPEN APPROACH: ICD-10-PCS | Performed by: SURGERY

## 2018-10-11 PROCEDURE — 3700000001 HC ADD 15 MINUTES (ANESTHESIA): Performed by: SURGERY

## 2018-10-11 PROCEDURE — 2580000003 HC RX 258: Performed by: SURGERY

## 2018-10-11 PROCEDURE — 2709999900 HC NON-CHARGEABLE SUPPLY: Performed by: SURGERY

## 2018-10-11 PROCEDURE — 2000000000 HC ICU R&B

## 2018-10-11 PROCEDURE — 2500000003 HC RX 250 WO HCPCS: Performed by: NURSE ANESTHETIST, CERTIFIED REGISTERED

## 2018-10-11 PROCEDURE — 82803 BLOOD GASES ANY COMBINATION: CPT

## 2018-10-11 DEVICE — XENOSURE BIOLOGIC PATCH, 0.8CM X 8CM, EIFU
Type: IMPLANTABLE DEVICE | Site: NECK | Status: FUNCTIONAL
Brand: XENOSURE BIOLOGIC PATCH

## 2018-10-11 RX ORDER — SODIUM CHLORIDE 0.9 % (FLUSH) 0.9 %
10 SYRINGE (ML) INJECTION PRN
Status: DISCONTINUED | OUTPATIENT
Start: 2018-10-11 | End: 2018-10-11 | Stop reason: HOSPADM

## 2018-10-11 RX ORDER — SODIUM CHLORIDE 0.9 % (FLUSH) 0.9 %
10 SYRINGE (ML) INJECTION EVERY 12 HOURS SCHEDULED
Status: DISCONTINUED | OUTPATIENT
Start: 2018-10-11 | End: 2018-10-11 | Stop reason: HOSPADM

## 2018-10-11 RX ORDER — SODIUM CHLORIDE 0.9 % (FLUSH) 0.9 %
10 SYRINGE (ML) INJECTION PRN
Status: DISCONTINUED | OUTPATIENT
Start: 2018-10-11 | End: 2018-10-12 | Stop reason: HOSPADM

## 2018-10-11 RX ORDER — DEXAMETHASONE SODIUM PHOSPHATE 10 MG/ML
INJECTION, SOLUTION INTRAMUSCULAR; INTRAVENOUS PRN
Status: DISCONTINUED | OUTPATIENT
Start: 2018-10-11 | End: 2018-10-11 | Stop reason: SDUPTHER

## 2018-10-11 RX ORDER — OXYCODONE HYDROCHLORIDE AND ACETAMINOPHEN 5; 325 MG/1; MG/1
2 TABLET ORAL EVERY 4 HOURS PRN
Status: DISCONTINUED | OUTPATIENT
Start: 2018-10-11 | End: 2018-10-12 | Stop reason: HOSPADM

## 2018-10-11 RX ORDER — LISINOPRIL 5 MG/1
5 TABLET ORAL DAILY
Status: DISCONTINUED | OUTPATIENT
Start: 2018-10-12 | End: 2018-10-12 | Stop reason: HOSPADM

## 2018-10-11 RX ORDER — DEXTROSE MONOHYDRATE 50 MG/ML
100 INJECTION, SOLUTION INTRAVENOUS PRN
Status: DISCONTINUED | OUTPATIENT
Start: 2018-10-11 | End: 2018-10-11 | Stop reason: SDUPTHER

## 2018-10-11 RX ORDER — DEXTROSE MONOHYDRATE 50 MG/ML
100 INJECTION, SOLUTION INTRAVENOUS PRN
Status: DISCONTINUED | OUTPATIENT
Start: 2018-10-11 | End: 2018-10-12 | Stop reason: HOSPADM

## 2018-10-11 RX ORDER — HEPARIN SODIUM 1000 [USP'U]/ML
INJECTION, SOLUTION INTRAVENOUS; SUBCUTANEOUS PRN
Status: DISCONTINUED | OUTPATIENT
Start: 2018-10-11 | End: 2018-10-11 | Stop reason: SDUPTHER

## 2018-10-11 RX ORDER — OXYCODONE HYDROCHLORIDE AND ACETAMINOPHEN 5; 325 MG/1; MG/1
1 TABLET ORAL EVERY 6 HOURS PRN
Qty: 28 TABLET | Refills: 0 | Status: SHIPPED | OUTPATIENT
Start: 2018-10-11 | End: 2018-10-18

## 2018-10-11 RX ORDER — SIMVASTATIN 40 MG
40 TABLET ORAL NIGHTLY
Status: DISCONTINUED | OUTPATIENT
Start: 2018-10-11 | End: 2018-10-12 | Stop reason: HOSPADM

## 2018-10-11 RX ORDER — DEXTROSE MONOHYDRATE 25 G/50ML
12.5 INJECTION, SOLUTION INTRAVENOUS PRN
Status: DISCONTINUED | OUTPATIENT
Start: 2018-10-11 | End: 2018-10-11 | Stop reason: SDUPTHER

## 2018-10-11 RX ORDER — CITALOPRAM 20 MG/1
20 TABLET ORAL DAILY
Status: DISCONTINUED | OUTPATIENT
Start: 2018-10-12 | End: 2018-10-12 | Stop reason: HOSPADM

## 2018-10-11 RX ORDER — SODIUM CHLORIDE 0.9 % (FLUSH) 0.9 %
10 SYRINGE (ML) INJECTION EVERY 12 HOURS SCHEDULED
Status: DISCONTINUED | OUTPATIENT
Start: 2018-10-11 | End: 2018-10-12 | Stop reason: HOSPADM

## 2018-10-11 RX ORDER — LABETALOL HYDROCHLORIDE 5 MG/ML
INJECTION, SOLUTION INTRAVENOUS PRN
Status: DISCONTINUED | OUTPATIENT
Start: 2018-10-11 | End: 2018-10-11 | Stop reason: SDUPTHER

## 2018-10-11 RX ORDER — CILOSTAZOL 100 MG/1
100 TABLET ORAL 2 TIMES DAILY
Status: DISCONTINUED | OUTPATIENT
Start: 2018-10-11 | End: 2018-10-12 | Stop reason: HOSPADM

## 2018-10-11 RX ORDER — SODIUM CHLORIDE 9 MG/ML
INJECTION, SOLUTION INTRAVENOUS CONTINUOUS
Status: DISCONTINUED | OUTPATIENT
Start: 2018-10-11 | End: 2018-10-12

## 2018-10-11 RX ORDER — FENTANYL CITRATE 50 UG/ML
INJECTION, SOLUTION INTRAMUSCULAR; INTRAVENOUS PRN
Status: DISCONTINUED | OUTPATIENT
Start: 2018-10-11 | End: 2018-10-11 | Stop reason: SDUPTHER

## 2018-10-11 RX ORDER — NICOTINE POLACRILEX 4 MG
15 LOZENGE BUCCAL PRN
Status: DISCONTINUED | OUTPATIENT
Start: 2018-10-11 | End: 2018-10-12 | Stop reason: HOSPADM

## 2018-10-11 RX ORDER — VECURONIUM BROMIDE 1 MG/ML
INJECTION, POWDER, LYOPHILIZED, FOR SOLUTION INTRAVENOUS PRN
Status: DISCONTINUED | OUTPATIENT
Start: 2018-10-11 | End: 2018-10-11 | Stop reason: SDUPTHER

## 2018-10-11 RX ORDER — OXYCODONE HYDROCHLORIDE AND ACETAMINOPHEN 5; 325 MG/1; MG/1
1 TABLET ORAL EVERY 4 HOURS PRN
Status: DISCONTINUED | OUTPATIENT
Start: 2018-10-11 | End: 2018-10-12 | Stop reason: HOSPADM

## 2018-10-11 RX ORDER — GLYCOPYRROLATE 1 MG/5 ML
SYRINGE (ML) INTRAVENOUS PRN
Status: DISCONTINUED | OUTPATIENT
Start: 2018-10-11 | End: 2018-10-11 | Stop reason: SDUPTHER

## 2018-10-11 RX ORDER — ONDANSETRON 2 MG/ML
INJECTION INTRAMUSCULAR; INTRAVENOUS PRN
Status: DISCONTINUED | OUTPATIENT
Start: 2018-10-11 | End: 2018-10-11 | Stop reason: SDUPTHER

## 2018-10-11 RX ORDER — ACETAMINOPHEN 325 MG/1
650 TABLET ORAL EVERY 4 HOURS PRN
Status: DISCONTINUED | OUTPATIENT
Start: 2018-10-11 | End: 2018-10-12 | Stop reason: HOSPADM

## 2018-10-11 RX ORDER — SODIUM CHLORIDE 9 MG/ML
INJECTION, SOLUTION INTRAVENOUS CONTINUOUS PRN
Status: DISCONTINUED | OUTPATIENT
Start: 2018-10-11 | End: 2018-10-11 | Stop reason: SDUPTHER

## 2018-10-11 RX ORDER — ONDANSETRON 2 MG/ML
4 INJECTION INTRAMUSCULAR; INTRAVENOUS EVERY 6 HOURS PRN
Status: DISCONTINUED | OUTPATIENT
Start: 2018-10-11 | End: 2018-10-12 | Stop reason: HOSPADM

## 2018-10-11 RX ORDER — NICOTINE POLACRILEX 4 MG
15 LOZENGE BUCCAL PRN
Status: DISCONTINUED | OUTPATIENT
Start: 2018-10-11 | End: 2018-10-11 | Stop reason: SDUPTHER

## 2018-10-11 RX ORDER — MIDAZOLAM HYDROCHLORIDE 1 MG/ML
INJECTION INTRAMUSCULAR; INTRAVENOUS PRN
Status: DISCONTINUED | OUTPATIENT
Start: 2018-10-11 | End: 2018-10-11 | Stop reason: SDUPTHER

## 2018-10-11 RX ORDER — DEXTROSE MONOHYDRATE 25 G/50ML
12.5 INJECTION, SOLUTION INTRAVENOUS PRN
Status: DISCONTINUED | OUTPATIENT
Start: 2018-10-11 | End: 2018-10-12 | Stop reason: HOSPADM

## 2018-10-11 RX ORDER — INSULIN GLARGINE 100 [IU]/ML
55 INJECTION, SOLUTION SUBCUTANEOUS 2 TIMES DAILY
Status: DISCONTINUED | OUTPATIENT
Start: 2018-10-12 | End: 2018-10-12 | Stop reason: HOSPADM

## 2018-10-11 RX ORDER — ASPIRIN 81 MG/1
81 TABLET, CHEWABLE ORAL DAILY
Status: DISCONTINUED | OUTPATIENT
Start: 2018-10-11 | End: 2018-10-12 | Stop reason: HOSPADM

## 2018-10-11 RX ORDER — NEOSTIGMINE METHYLSULFATE 0.5 MG/ML
INJECTION, SOLUTION INTRAVENOUS PRN
Status: DISCONTINUED | OUTPATIENT
Start: 2018-10-11 | End: 2018-10-11 | Stop reason: SDUPTHER

## 2018-10-11 RX ORDER — PROTAMINE SULFATE 10 MG/ML
INJECTION, SOLUTION INTRAVENOUS PRN
Status: DISCONTINUED | OUTPATIENT
Start: 2018-10-11 | End: 2018-10-11 | Stop reason: SDUPTHER

## 2018-10-11 RX ORDER — PROPOFOL 10 MG/ML
INJECTION, EMULSION INTRAVENOUS PRN
Status: DISCONTINUED | OUTPATIENT
Start: 2018-10-11 | End: 2018-10-11 | Stop reason: SDUPTHER

## 2018-10-11 RX ORDER — FENOFIBRATE 160 MG/1
160 TABLET ORAL DAILY
Status: DISCONTINUED | OUTPATIENT
Start: 2018-10-11 | End: 2018-10-12 | Stop reason: HOSPADM

## 2018-10-11 RX ORDER — LEVOTHYROXINE SODIUM 0.03 MG/1
25 TABLET ORAL DAILY
Status: DISCONTINUED | OUTPATIENT
Start: 2018-10-11 | End: 2018-10-12 | Stop reason: HOSPADM

## 2018-10-11 RX ADMIN — FENOFIBRATE 160 MG: 160 TABLET ORAL at 21:02

## 2018-10-11 RX ADMIN — MIDAZOLAM 2 MG: 1 INJECTION INTRAMUSCULAR; INTRAVENOUS at 14:16

## 2018-10-11 RX ADMIN — HYDROMORPHONE HYDROCHLORIDE 0.5 MG: 1 INJECTION, SOLUTION INTRAMUSCULAR; INTRAVENOUS; SUBCUTANEOUS at 23:11

## 2018-10-11 RX ADMIN — Medication 10 ML: at 21:03

## 2018-10-11 RX ADMIN — HEPARIN SODIUM 4000 UNITS: 1000 INJECTION, SOLUTION INTRAVENOUS; SUBCUTANEOUS at 16:04

## 2018-10-11 RX ADMIN — INSULIN LISPRO 4 UNITS: 100 INJECTION, SOLUTION INTRAVENOUS; SUBCUTANEOUS at 18:25

## 2018-10-11 RX ADMIN — HEPARIN SODIUM 2000 UNITS: 1000 INJECTION, SOLUTION INTRAVENOUS; SUBCUTANEOUS at 15:26

## 2018-10-11 RX ADMIN — Medication 2 G: at 14:22

## 2018-10-11 RX ADMIN — LIDOCAINE HYDROCHLORIDE 60 MG: 20 INJECTION, SOLUTION INTRAVENOUS at 14:18

## 2018-10-11 RX ADMIN — DEXAMETHASONE SODIUM PHOSPHATE 10 MG: 10 INJECTION, SOLUTION INTRAMUSCULAR; INTRAVENOUS at 14:45

## 2018-10-11 RX ADMIN — HEPARIN SODIUM 8000 UNITS: 1000 INJECTION, SOLUTION INTRAVENOUS; SUBCUTANEOUS at 15:22

## 2018-10-11 RX ADMIN — CILOSTAZOL 100 MG: 100 TABLET ORAL at 21:02

## 2018-10-11 RX ADMIN — VECURONIUM BROMIDE 3 MG: 1 INJECTION, POWDER, LYOPHILIZED, FOR SOLUTION INTRAVENOUS at 15:39

## 2018-10-11 RX ADMIN — PROTAMINE SULFATE 50 MG: 10 INJECTION, SOLUTION INTRAVENOUS at 16:32

## 2018-10-11 RX ADMIN — Medication 0.6 MG: at 16:56

## 2018-10-11 RX ADMIN — VECURONIUM BROMIDE 7 MG: 1 INJECTION, POWDER, LYOPHILIZED, FOR SOLUTION INTRAVENOUS at 14:18

## 2018-10-11 RX ADMIN — FENTANYL CITRATE 50 MCG: 50 INJECTION, SOLUTION INTRAMUSCULAR; INTRAVENOUS at 14:35

## 2018-10-11 RX ADMIN — Medication 2 G: at 14:15

## 2018-10-11 RX ADMIN — PROPOFOL 50 MG: 10 INJECTION, EMULSION INTRAVENOUS at 14:18

## 2018-10-11 RX ADMIN — ONDANSETRON 4 MG: 2 INJECTION INTRAMUSCULAR; INTRAVENOUS at 16:36

## 2018-10-11 RX ADMIN — NEOSTIGMINE METHYLSULFATE 3 MG: 0.5 INJECTION INTRAVENOUS at 16:56

## 2018-10-11 RX ADMIN — CEFAZOLIN SODIUM 2 G: 10 POWDER, FOR SOLUTION INTRAVENOUS at 22:30

## 2018-10-11 RX ADMIN — ASPIRIN 81 MG CHEWABLE TABLET 81 MG: 81 TABLET CHEWABLE at 21:02

## 2018-10-11 RX ADMIN — SIMVASTATIN 40 MG: 40 TABLET, FILM COATED ORAL at 21:02

## 2018-10-11 RX ADMIN — SODIUM CHLORIDE: 9 INJECTION, SOLUTION INTRAVENOUS at 19:52

## 2018-10-11 RX ADMIN — LABETALOL HYDROCHLORIDE 2.5 MG: 5 INJECTION, SOLUTION INTRAVENOUS at 16:38

## 2018-10-11 RX ADMIN — PROPOFOL 50 MG: 10 INJECTION, EMULSION INTRAVENOUS at 14:25

## 2018-10-11 RX ADMIN — OXYCODONE AND ACETAMINOPHEN 2 TABLET: 5; 325 TABLET ORAL at 19:47

## 2018-10-11 RX ADMIN — PHENYLEPHRINE HYDROCHLORIDE 25 MCG/MIN: 10 INJECTION INTRAMUSCULAR; INTRAVENOUS; SUBCUTANEOUS at 14:34

## 2018-10-11 RX ADMIN — PHENYLEPHRINE HYDROCHLORIDE 200 MCG: 10 INJECTION INTRAVENOUS at 14:37

## 2018-10-11 RX ADMIN — SODIUM CHLORIDE: 9 INJECTION, SOLUTION INTRAVENOUS at 14:04

## 2018-10-11 RX ADMIN — HYDROMORPHONE HYDROCHLORIDE 0.25 MG: 1 INJECTION, SOLUTION INTRAMUSCULAR; INTRAVENOUS; SUBCUTANEOUS at 17:44

## 2018-10-11 RX ADMIN — MIDAZOLAM 100 MG: 1 INJECTION INTRAMUSCULAR; INTRAVENOUS at 14:18

## 2018-10-11 RX ADMIN — FENTANYL CITRATE 50 MCG: 50 INJECTION, SOLUTION INTRAMUSCULAR; INTRAVENOUS at 14:43

## 2018-10-11 RX ADMIN — INSULIN LISPRO 4 UNITS: 100 INJECTION, SOLUTION INTRAVENOUS; SUBCUTANEOUS at 21:11

## 2018-10-11 ASSESSMENT — PULMONARY FUNCTION TESTS
PIF_VALUE: 22
PIF_VALUE: 21
PIF_VALUE: 0
PIF_VALUE: 21
PIF_VALUE: 23
PIF_VALUE: 23
PIF_VALUE: 21
PIF_VALUE: 24
PIF_VALUE: 21
PIF_VALUE: 26
PIF_VALUE: 0
PIF_VALUE: 25
PIF_VALUE: 2
PIF_VALUE: 25
PIF_VALUE: 23
PIF_VALUE: 21
PIF_VALUE: 21
PIF_VALUE: 1
PIF_VALUE: 23
PIF_VALUE: 21
PIF_VALUE: 1
PIF_VALUE: 1
PIF_VALUE: 19
PIF_VALUE: 21
PIF_VALUE: 26
PIF_VALUE: 22
PIF_VALUE: 23
PIF_VALUE: 26
PIF_VALUE: 0
PIF_VALUE: 1
PIF_VALUE: 26
PIF_VALUE: 22
PIF_VALUE: 26
PIF_VALUE: 22
PIF_VALUE: 21
PIF_VALUE: 25
PIF_VALUE: 20
PIF_VALUE: 17
PIF_VALUE: 23
PIF_VALUE: 4
PIF_VALUE: 24
PIF_VALUE: 23
PIF_VALUE: 20
PIF_VALUE: 1
PIF_VALUE: 20
PIF_VALUE: 22
PIF_VALUE: 25
PIF_VALUE: 28
PIF_VALUE: 25
PIF_VALUE: 25
PIF_VALUE: 22
PIF_VALUE: 23
PIF_VALUE: 19
PIF_VALUE: 26
PIF_VALUE: 6
PIF_VALUE: 26
PIF_VALUE: 23
PIF_VALUE: 27
PIF_VALUE: 21
PIF_VALUE: 25
PIF_VALUE: 25
PIF_VALUE: 26
PIF_VALUE: 26
PIF_VALUE: 24
PIF_VALUE: 25
PIF_VALUE: 24
PIF_VALUE: 25
PIF_VALUE: 26
PIF_VALUE: 1
PIF_VALUE: 1
PIF_VALUE: 0
PIF_VALUE: 22
PIF_VALUE: 21
PIF_VALUE: 25
PIF_VALUE: 20
PIF_VALUE: 26
PIF_VALUE: 23
PIF_VALUE: 25
PIF_VALUE: 24
PIF_VALUE: 23
PIF_VALUE: 25
PIF_VALUE: 21
PIF_VALUE: 25
PIF_VALUE: 26
PIF_VALUE: 23
PIF_VALUE: 24
PIF_VALUE: 2
PIF_VALUE: 26
PIF_VALUE: 25
PIF_VALUE: 26
PIF_VALUE: 1
PIF_VALUE: 3
PIF_VALUE: 25
PIF_VALUE: 26
PIF_VALUE: 21
PIF_VALUE: 23
PIF_VALUE: 21
PIF_VALUE: 23
PIF_VALUE: 26
PIF_VALUE: 26
PIF_VALUE: 1
PIF_VALUE: 25
PIF_VALUE: 25
PIF_VALUE: 23
PIF_VALUE: 21
PIF_VALUE: 25
PIF_VALUE: 26
PIF_VALUE: 24
PIF_VALUE: 25
PIF_VALUE: 23
PIF_VALUE: 25
PIF_VALUE: 26
PIF_VALUE: 26
PIF_VALUE: 21
PIF_VALUE: 24
PIF_VALUE: 23
PIF_VALUE: 22
PIF_VALUE: 25
PIF_VALUE: 26
PIF_VALUE: 22
PIF_VALUE: 25
PIF_VALUE: 24
PIF_VALUE: 25
PIF_VALUE: 23
PIF_VALUE: 25
PIF_VALUE: 26
PIF_VALUE: 22
PIF_VALUE: 48
PIF_VALUE: 25
PIF_VALUE: 22
PIF_VALUE: 23
PIF_VALUE: 19
PIF_VALUE: 25
PIF_VALUE: 21
PIF_VALUE: 25
PIF_VALUE: 24
PIF_VALUE: 23
PIF_VALUE: 20
PIF_VALUE: 25
PIF_VALUE: 26
PIF_VALUE: 23
PIF_VALUE: 23
PIF_VALUE: 1
PIF_VALUE: 21
PIF_VALUE: 25
PIF_VALUE: 24
PIF_VALUE: 17
PIF_VALUE: 23
PIF_VALUE: 21
PIF_VALUE: 25
PIF_VALUE: 21
PIF_VALUE: 1
PIF_VALUE: 19
PIF_VALUE: 23
PIF_VALUE: 26
PIF_VALUE: 25
PIF_VALUE: 2
PIF_VALUE: 25
PIF_VALUE: 26
PIF_VALUE: 25
PIF_VALUE: 22
PIF_VALUE: 25
PIF_VALUE: 23
PIF_VALUE: 23
PIF_VALUE: 25

## 2018-10-11 ASSESSMENT — PAIN DESCRIPTION - ORIENTATION
ORIENTATION: LEFT
ORIENTATION: LEFT

## 2018-10-11 ASSESSMENT — PAIN DESCRIPTION - PAIN TYPE
TYPE: ACUTE PAIN;SURGICAL PAIN
TYPE: ACUTE PAIN;SURGICAL PAIN

## 2018-10-11 ASSESSMENT — PAIN SCALES - GENERAL
PAINLEVEL_OUTOF10: 8
PAINLEVEL_OUTOF10: 7
PAINLEVEL_OUTOF10: 8
PAINLEVEL_OUTOF10: 0

## 2018-10-11 ASSESSMENT — PAIN DESCRIPTION - LOCATION
LOCATION: NECK
LOCATION: NECK

## 2018-10-11 ASSESSMENT — PAIN DESCRIPTION - DESCRIPTORS: DESCRIPTORS: ACHING;DISCOMFORT;NAGGING

## 2018-10-11 ASSESSMENT — PAIN - FUNCTIONAL ASSESSMENT: PAIN_FUNCTIONAL_ASSESSMENT: 0-10

## 2018-10-11 NOTE — H&P
Vascular Surgery History & Physical Exam    Chief Complaint: Hx Carotid disease    HISTORY OF PRESENT ILLNESS:    The patient is a 59 y.o. female who presents to the hospital for elective left carotid endarterectomy.   There is not a hx of CVA/TIA in the recent past.      ROS : All others Negative if blank [], Positive if [x]  General   [] Fevers   [] Chills   [] Weight Loss   Skin   [] Tissue Loss   Eyes   [x] Wears Glasses/Contacts   [] Vision Changes   Respiratory    [] Shortness of breath   Cardiovascular   [] Chest Pain   [] Shortness of breath with exertion   Gastrointestinal   [] Abdominal Pain     Past Medical History:   Diagnosis Date    Carotid artery stenosis     Carotid stenosis     [facility]     DM type 2 (diabetes mellitus, type 2) ([facility]     Hyperlipidemia     Hypothyroidism     Neuropathy, diabetic [facility]     Peripheral vascular disease ([facility]     Psoriasiform dermatitis     Thyroid disease      Past Surgical History:   Procedure Laterality Date    BACK SURGERY      L5-S1    CARPAL TUNNEL RELEASE       SECTION      2    HYSTERECTOMY      LEG SURGERY      remote    SINUS SURGERY      TONSILLECTOMY       Current Medications:     Current Facility-Administered Medications:     sodium chloride flush 0.9 % injection 10 mL, 10 mL, Intravenous, 2 times per day, Alicia Cleary MD    sodium chloride flush 0.9 % injection 10 mL, 10 mL, Intravenous, PRN, Alicia Cleary MD    ceFAZolin (ANCEF) 2 g in dextrose 5 % 50 mL IVPB, 2 g, Intravenous, On Call to OR, Alicia Cleary MD  Allergies:  Pcn [penicillins]  Social History     Social History    Marital status:      Spouse name: N/A    Number of children: N/A    Years of education: N/A     Occupational History     bookeeper      Social History Main Topics    Smoking status: Light Tobacco Smoker     Packs/day: 0.25     Years: 40.00     Types: Cigarettes     Start date:    

## 2018-10-12 ENCOUNTER — TELEPHONE (OUTPATIENT)
Dept: PRIMARY CARE CLINIC | Age: 63
End: 2018-10-12

## 2018-10-12 VITALS
SYSTOLIC BLOOD PRESSURE: 133 MMHG | HEART RATE: 80 BPM | DIASTOLIC BLOOD PRESSURE: 54 MMHG | BODY MASS INDEX: 25.1 KG/M2 | OXYGEN SATURATION: 96 % | RESPIRATION RATE: 21 BRPM | TEMPERATURE: 98.5 F | WEIGHT: 147.05 LBS | HEIGHT: 64 IN

## 2018-10-12 LAB
ANION GAP SERPL CALCULATED.3IONS-SCNC: 15 MMOL/L (ref 7–16)
BUN BLDV-MCNC: 9 MG/DL (ref 8–23)
CALCIUM SERPL-MCNC: 8.9 MG/DL (ref 8.6–10.2)
CHLORIDE BLD-SCNC: 100 MMOL/L (ref 98–107)
CO2: 21 MMOL/L (ref 22–29)
CREAT SERPL-MCNC: 0.5 MG/DL (ref 0.5–1)
GFR AFRICAN AMERICAN: >60
GFR NON-AFRICAN AMERICAN: >60 ML/MIN/1.73
GLUCOSE BLD-MCNC: 244 MG/DL (ref 74–109)
HBA1C MFR BLD: 8.9 % (ref 4–5.6)
HCT VFR BLD CALC: 35.6 % (ref 34–48)
HEMOGLOBIN: 11.7 G/DL (ref 11.5–15.5)
MCH RBC QN AUTO: 29.6 PG (ref 26–35)
MCHC RBC AUTO-ENTMCNC: 32.9 % (ref 32–34.5)
MCV RBC AUTO: 90.1 FL (ref 80–99.9)
METER GLUCOSE: 201 MG/DL (ref 70–110)
METER GLUCOSE: 268 MG/DL (ref 70–110)
PDW BLD-RTO: 13.4 FL (ref 11.5–15)
PLATELET # BLD: 206 E9/L (ref 130–450)
PMV BLD AUTO: 10 FL (ref 7–12)
POTASSIUM REFLEX MAGNESIUM: 4.4 MMOL/L (ref 3.5–5)
RBC # BLD: 3.95 E12/L (ref 3.5–5.5)
SODIUM BLD-SCNC: 136 MMOL/L (ref 132–146)
WBC # BLD: 7.5 E9/L (ref 4.5–11.5)

## 2018-10-12 PROCEDURE — 85027 COMPLETE CBC AUTOMATED: CPT

## 2018-10-12 PROCEDURE — 6360000002 HC RX W HCPCS: Performed by: SURGERY

## 2018-10-12 PROCEDURE — 80048 BASIC METABOLIC PNL TOTAL CA: CPT

## 2018-10-12 PROCEDURE — 36415 COLL VENOUS BLD VENIPUNCTURE: CPT

## 2018-10-12 PROCEDURE — 99024 POSTOP FOLLOW-UP VISIT: CPT | Performed by: SURGERY

## 2018-10-12 PROCEDURE — 2580000003 HC RX 258: Performed by: SURGERY

## 2018-10-12 PROCEDURE — 6370000000 HC RX 637 (ALT 250 FOR IP): Performed by: SURGERY

## 2018-10-12 PROCEDURE — 82962 GLUCOSE BLOOD TEST: CPT

## 2018-10-12 PROCEDURE — 83036 HEMOGLOBIN GLYCOSYLATED A1C: CPT

## 2018-10-12 RX ADMIN — ENOXAPARIN SODIUM 40 MG: 40 INJECTION SUBCUTANEOUS at 08:48

## 2018-10-12 RX ADMIN — CITALOPRAM 20 MG: 20 TABLET, FILM COATED ORAL at 08:48

## 2018-10-12 RX ADMIN — HYDROMORPHONE HYDROCHLORIDE 0.5 MG: 1 INJECTION, SOLUTION INTRAMUSCULAR; INTRAVENOUS; SUBCUTANEOUS at 02:29

## 2018-10-12 RX ADMIN — FENOFIBRATE 160 MG: 160 TABLET ORAL at 08:47

## 2018-10-12 RX ADMIN — LEVOTHYROXINE SODIUM 25 MCG: 25 TABLET ORAL at 06:40

## 2018-10-12 RX ADMIN — INSULIN LISPRO 6 UNITS: 100 INJECTION, SOLUTION INTRAVENOUS; SUBCUTANEOUS at 04:17

## 2018-10-12 RX ADMIN — CEFAZOLIN SODIUM 2 G: 10 POWDER, FOR SOLUTION INTRAVENOUS at 06:40

## 2018-10-12 RX ADMIN — INSULIN GLARGINE 55 UNITS: 100 INJECTION, SOLUTION SUBCUTANEOUS at 08:48

## 2018-10-12 RX ADMIN — OXYCODONE AND ACETAMINOPHEN 2 TABLET: 5; 325 TABLET ORAL at 06:41

## 2018-10-12 RX ADMIN — CILOSTAZOL 100 MG: 100 TABLET ORAL at 08:47

## 2018-10-12 RX ADMIN — VITAMIN D, TAB 1000IU (100/BT) 1000 UNITS: 25 TAB at 08:47

## 2018-10-12 RX ADMIN — ASPIRIN 81 MG CHEWABLE TABLET 81 MG: 81 TABLET CHEWABLE at 08:48

## 2018-10-12 RX ADMIN — INSULIN LISPRO 35 UNITS: 100 INJECTION, SOLUTION INTRAVENOUS; SUBCUTANEOUS at 08:49

## 2018-10-12 RX ADMIN — INSULIN LISPRO 4 UNITS: 100 INJECTION, SOLUTION INTRAVENOUS; SUBCUTANEOUS at 08:49

## 2018-10-12 RX ADMIN — SODIUM CHLORIDE: 9 INJECTION, SOLUTION INTRAVENOUS at 02:31

## 2018-10-12 RX ADMIN — LISINOPRIL 5 MG: 5 TABLET ORAL at 08:47

## 2018-10-12 ASSESSMENT — PAIN SCALES - GENERAL
PAINLEVEL_OUTOF10: 0
PAINLEVEL_OUTOF10: 7
PAINLEVEL_OUTOF10: 0
PAINLEVEL_OUTOF10: 10

## 2018-10-12 ASSESSMENT — PAIN DESCRIPTION - DESCRIPTORS: DESCRIPTORS: DISCOMFORT;NAGGING;SORE

## 2018-10-12 ASSESSMENT — PAIN DESCRIPTION - LOCATION: LOCATION: NECK

## 2018-10-12 ASSESSMENT — PAIN DESCRIPTION - PAIN TYPE: TYPE: ACUTE PAIN;SURGICAL PAIN

## 2018-10-12 ASSESSMENT — PAIN DESCRIPTION - ORIENTATION: ORIENTATION: LEFT

## 2018-10-12 NOTE — DISCHARGE SUMMARY
Discharge Summary Note  Patient ID:  Marci Chan  [id number]  16 y.o.  1955    Admit date: 10/11/2018    Discharge date and time: 10/12/18    Admitting Physician: Ishaan Guzman MD     Discharge Physician: Carey Tolentino MD    Admission Diagnoses:   Carotid stenosis, asymptomatic, bilateral [I65.23]    Discharge Diagnoses:   1. Asymptomatic carotid stenosis    Admission Condition: fair    Discharged Condition: good    Hospital Course: Ms. Mirta Romberg is a 60 yo F who presented for elective left carotid endarterectomy on 10/11/18. She tolerated the procedure well and was monitored in CVICU post op. She progressed well and POD 1 remained neurologically intact. BP well controlled. She began tolerating a diet. Pain controlled on PO pain meds. Was ambulating and voiding spontaneously after rader removal. She was deemed stable for discharge home with f/u. Consults: none    Significant Diagnostic Studies: Labs wnl. Treatments: left carotid endarterectomy    Discharge Exam:  CONSTITUTIONAL:  awake, alert, cooperative, no apparent distress, and appears stated age  ENT:  normocepalic, without obvious abnormality, left neck incision CDI, no erythema or swelling. LUNGS:  Non labored  CARDIOVASCULAR:  Reg rate  ABDOMEN:  S, NT, ND  MUSCULOSKELETAL:  there is no redness, warmth, or swelling of the joints  SKIN:  no bruising or bleeding  Neuro: CN 2-12 intact.      Disposition: home    Patient Instructions:     Discharge Medications:   Brittney [place]   Home Medication Instructions XYS:[id number]    Printed on:10/12/18 0745   Medication Information                      aspirin 81 MG tablet  Take 81 mg by mouth daily             Choline Fenofibrate (FENOFIBRIC ACID) 135 MG CPDR  Take 135 mg by mouth daily              cilostazol (PLETAL) 100 MG tablet  Take 1 tablet by mouth 2 times daily             citalopram (CELEXA) 20 MG tablet  TAKE 1 TABLET DAILY             Insulin Detemir (LEVEMIR SC)  Inject 55

## 2018-10-12 NOTE — PLAN OF CARE
Problem: Pain:  Goal: Pain level will decrease  Pain level will decrease   Outcome: Met This Shift    Goal: Control of acute pain  Control of acute pain   Outcome: Met This Shift      Problem: Falls - Risk of:  Goal: Will remain free from falls  Will remain free from falls   Outcome: Met This Shift    Goal: Absence of physical injury  Absence of physical injury   Outcome: Met This Shift      Problem: Anxiety/Stress:  Goal: Level of anxiety will decrease  Level of anxiety will decrease   Outcome: Met This Shift      Problem: Gas Exchange - Impaired:  Goal: Levels of oxygenation will improve  Levels of oxygenation will improve   Outcome: Met This Shift

## 2018-10-12 NOTE — PLAN OF CARE
Problem: Pain:  Goal: Pain level will decrease  Pain level will decrease   Outcome: Met This Shift  Medicate pt as needed for pain reposition pt as needed     Problem: Gas Exchange - Impaired:  Goal: Levels of oxygenation will improve  Levels of oxygenation will improve   Outcome: Met This Shift  Monitor sao2 and breathe sounds encourage pt to cough and deep breathe pt to use smi

## 2018-10-12 NOTE — FLOWSHEET NOTE
artline dc'd site held 10 minites pressure dressing applied no bleeding present tissue soft pulse 2t left arm heplock dc'd rader dc'd dtv [id number] drained clear yellow urine , up to chair with minilbe assistance janet well.  No c/o at present

## 2018-10-18 LAB
BLOOD BANK DISPENSE STATUS: NORMAL
BLOOD BANK DISPENSE STATUS: NORMAL
BLOOD BANK PRODUCT CODE: NORMAL
BLOOD BANK PRODUCT CODE: NORMAL
BPU ID: NORMAL
BPU ID: NORMAL
DESCRIPTION BLOOD BANK: NORMAL
DESCRIPTION BLOOD BANK: NORMAL

## 2018-10-19 ENCOUNTER — OFFICE VISIT (OUTPATIENT)
Dept: PRIMARY CARE CLINIC | Age: 63
End: 2018-10-19
Payer: COMMERCIAL

## 2018-10-19 VITALS
HEART RATE: 87 BPM | TEMPERATURE: 97.3 F | BODY MASS INDEX: 23.39 KG/M2 | DIASTOLIC BLOOD PRESSURE: 78 MMHG | WEIGHT: 137 LBS | SYSTOLIC BLOOD PRESSURE: 138 MMHG | HEIGHT: 64 IN | OXYGEN SATURATION: 97 %

## 2018-10-19 DIAGNOSIS — I74.09 AORTOILIAC OCCLUSIVE DISEASE (HCC): ICD-10-CM

## 2018-10-19 DIAGNOSIS — I65.23 ASYMPTOMATIC BILATERAL CAROTID ARTERY STENOSIS: Primary | ICD-10-CM

## 2018-10-19 DIAGNOSIS — I73.9 PVD (PERIPHERAL VASCULAR DISEASE) WITH CLAUDICATION (HCC): ICD-10-CM

## 2018-10-19 PROCEDURE — 1111F DSCHRG MED/CURRENT MED MERGE: CPT | Performed by: FAMILY MEDICINE

## 2018-10-19 PROCEDURE — 99495 TRANSJ CARE MGMT MOD F2F 14D: CPT | Performed by: FAMILY MEDICINE

## 2018-10-19 NOTE — PROGRESS NOTES
Post-Discharge Transitional Care Management Services or Hospital Follow Up      David Jenkins   YOB: 1955    Date of Office Visit:  10/19/2018  Date of Hospital Admission: 10/11/18  Date of Hospital Discharge: 10/12/18  Risk of hospital readmission (high >=14%. Medium >=10%) :Readmission Risk Score: 10    Care management risk score Rising risk (score 2-5) and Complex Care (Scores >=6): 5     Non face to face  following discharge, date last encounter closed (first attempt may have been earlier): 10/12/2018  2:33 PM    Call initiated 2 business days of discharge: Yes    Patient Active Problem List   Diagnosis    Asymptomatic bilateral carotid artery stenosis    History of nuclear stress test    Mixed hyperlipidemia    Aortoiliac occlusive disease ([address]    PVD (peripheral vascular disease) with claudication [facility]    Carotid stenosis, asymptomatic, bilateral       Allergies   Allergen Reactions    Pcn [Penicillins] Hives and Swelling       Medications listed as ordered at the time of discharge from [facility] Medication Instructions JASWINDER:    Printed on:10/19/18 1040   Medication Information                      aspirin 81 MG tablet  Take 81 mg by mouth daily             Choline Fenofibrate (FENOFIBRIC ACID) 135 MG CPDR  Take 135 mg by mouth daily              cilostazol (PLETAL) 100 MG tablet  Take 1 tablet by mouth 2 times daily             citalopram (CELEXA) 20 MG tablet  TAKE 1 TABLET DAILY             Insulin Detemir (LEVEMIR SC)  Inject 55 Units into the skin 2 times daily Pt instructed to take 1/2 dose insulin tonight.              insulin lispro (HUMALOG) 100 UNIT/ML injection vial  Inject into the skin 35 units in the morning then 20 units at lunchtime and 35 before  dinner             levothyroxine (SYNTHROID) 25 MCG tablet  Take 25 mcg by mouth Daily             lisinopril (PRINIVIL;ZESTRIL) 5 MG tablet  Take 5 mg by mouth daily              nicotine (NICODERM CQ) aa in right leg. On pletal. No cig's for 2 wks. Trouble sleeping. A comprehensive review of systems was negative except for: Musculoskeletal: positive for right thigh and hip pain with walking that goes away with rest.     Vitals:    10/19/18 1007   BP: 138/78   Pulse: 87   Temp: 97.3 °F (36.3 °C)   SpO2: 97%   Weight: 137 lb (62.1 kg)   Height: 5' 4\" (1.626 m)     Body mass index is 23.52 kg/m². Wt Readings from Last 3 Encounters:   10/19/18 137 lb (62.1 kg)   10/12/18 147 lb 0.8 oz (66.7 kg)   10/03/18 139 lb 6 oz (63.2 kg)     BP Readings from Last 3 Encounters:   10/19/18 138/78   10/12/18 (!) 133/54   10/03/18 134/60        Physical Exam:  General Appearance: alert and oriented to person, place and time, well-developed and well-nourished, in no acute distress  Pulmonary/Chest: decreased breath sounds noted- bibasilar  Cardiovascular: normal rate, normal S1 and S2, no gallops and intact distal pulses  Extremities: no cyanosis and no clubbing  Vasc: decrease femoral pulses b/l    Assessment/Plan:  1. Asymptomatic bilateral carotid artery stenosis  -follow up with home care and surgery as ordered  - stay off cig's    2. PVD (peripheral vascular disease) with claudication (HCC)  - continue pletat  - continue walking and try to push through pain in lge if able    3. Aortoiliac occlusive disease ([facility]  - follow up with vascular as scheduled.   - letter for handicap parking    Medical Decision Making: moderate complexity

## 2018-10-29 ENCOUNTER — OFFICE VISIT (OUTPATIENT)
Dept: VASCULAR SURGERY | Age: 63
End: 2018-10-29

## 2018-10-29 DIAGNOSIS — I65.23 ASYMPTOMATIC BILATERAL CAROTID ARTERY STENOSIS: Primary | ICD-10-CM

## 2018-10-29 DIAGNOSIS — I73.9 PVD (PERIPHERAL VASCULAR DISEASE) WITH CLAUDICATION (HCC): ICD-10-CM

## 2018-10-29 PROCEDURE — 99024 POSTOP FOLLOW-UP VISIT: CPT | Performed by: NURSE PRACTITIONER

## 2018-10-29 NOTE — PROGRESS NOTES
10/29/2018    Kaci Spindle  1955    Previous Vascular Procedures  10/11/18  Left Carotid Endarterectomy       Patient returns for post operative evaluation. The patient denies any unexpected problems since hospital discharge. Patient denies hx of stroke, TIA, focal weakness, slurred speech or amaurosis fugax. She continues to complain of R LE claudication at less than 200 feet. She states that she cannot walk to the end of her driveway without experiencing calf pain. She rates the pain at 8/10 and it is only relieved by rest. She states that this is disabling. Physical Exam  Gen Awake and Alert  CN II - XII intact, no noted deficits  left neck incision is healing without evidence of infection  CVS RRR  Lungs Ctab/l  Radial pulse are appreciated bilaterally. Assessment/Plan  S/p left CEA  - I reviewed with the patient that normal activities can be resumed as tolerated  - Continue ASA and statin   - He understands to call/go to ER if develops signs of stroke, TIA, focal weakness, slurred speech or amaurosis fugax  - Return in 6 months for follow-up carotid ultrasound.      PVD with R LE Lifestyle limiting claudication  · Previous imaging showed evidence of significant R LE aorto iliac, fem pop arterial occlusive disease  · Continue Medical management with ASA and statin   · Discussed with pt tobacco use and relationship to PVD  · Pletal 100 mg bid continue  · Emphasized importance of foot care and to call if develops any wounds, ulcerations, changes in appearance, claudication and/or symptoms  · We discussed importance of a walking program and them gauging how far they have walked on a daily basis and progressively increasing that distance and walking through the pain  · Will proceed with ordering CTA with runoff  · F/u in 1 month to review options    I explained to her that she has chronic issues of neuropathy, back pain will not be improved by that her arterial flow    Pt seen and plan reviewed

## 2018-10-30 ENCOUNTER — TELEPHONE (OUTPATIENT)
Dept: VASCULAR SURGERY | Age: 63
End: 2018-10-30

## 2018-10-30 NOTE — TELEPHONE ENCOUNTER
Notified patient of CTA at [facility] on Wed, 11-7-18. Report to registration at 9:00 am, NPO 4 hours prior.

## 2018-11-07 ENCOUNTER — HOSPITAL ENCOUNTER (OUTPATIENT)
Dept: CT IMAGING | Age: 63
Discharge: HOME | End: 2018-11-09
Payer: COMMERCIAL

## 2018-11-07 DIAGNOSIS — I73.9 PVD (PERIPHERAL VASCULAR DISEASE) WITH CLAUDICATION (HCC): ICD-10-CM

## 2018-11-07 PROCEDURE — 6360000004 HC RX CONTRAST MEDICATION: Performed by: RADIOLOGY

## 2018-11-07 PROCEDURE — 75635 CT ANGIO ABDOMINAL ARTERIES: CPT

## 2018-11-07 RX ADMIN — IOPAMIDOL 100 ML: 755 INJECTION, SOLUTION INTRAVENOUS at 10:20

## 2018-11-12 ENCOUNTER — OFFICE VISIT (OUTPATIENT)
Dept: VASCULAR SURGERY | Age: 63
End: 2018-11-12

## 2018-11-12 DIAGNOSIS — I65.23 CAROTID STENOSIS, ASYMPTOMATIC, BILATERAL: Primary | ICD-10-CM

## 2018-11-12 DIAGNOSIS — I74.09 AORTOILIAC OCCLUSIVE DISEASE (HCC): ICD-10-CM

## 2018-11-12 DIAGNOSIS — I73.9 PVD (PERIPHERAL VASCULAR DISEASE) WITH CLAUDICATION (HCC): ICD-10-CM

## 2018-11-12 PROCEDURE — 99024 POSTOP FOLLOW-UP VISIT: CPT | Performed by: SURGERY

## 2018-11-12 NOTE — PATIENT INSTRUCTIONS
Patient Education        Learning About Peripheral Arterial Disease of the Legs  What is peripheral arterial disease? Peripheral arterial disease (PAD) is narrowing or blockage of arteries in your arms and legs. The most common cause of PAD is the buildup of plaque on the inside of arteries. Plaque is made of extra cholesterol, calcium, and other material in your blood. Over time, plaque builds up in the walls of the arteries, including those that supply blood to your legs. This buildup leads to poor blood flow. When you have PAD, you have a risk of having plaque in other arteries in your body. This raises your risk of a heart attack and stroke. This information focuses on peripheral arterial disease of the legs, the area where it is most common. When you have PAD of the legs and you walk or exercise, your leg muscles may not get enough blood. This may cause symptoms, such as leg pain during exercise. Peripheral arterial disease is also called peripheral vascular disease. What are the symptoms? Many people who have PAD do not have any symptoms. But if you have symptoms, you may have weak or tired legs, difficulty walking or balancing, or pain. If you have pain, you might feel a tight, aching, or squeezing pain in the calf, thigh, or buttock. This pain usually happens after you have walked a certain distance. The pain goes away if you stop walking. This pain is called intermittent claudication. If PAD gets worse, you may have other symptoms that are caused by poor blood flow to your legs and feet. You may have:  · Cold or numb feet or toes. · Sores that are slow to heal.  · Leg or foot pain while you are at rest.  · Feet and toes that become pale from exercise or when elevated. · Feet that turn red when dangled. · Blue or purple marks on your legs, feet, or toes. How can you prevent PAD? · Quit smoking. Quitting smoking is one of the best things you can do to help prevent PAD.  If you need help

## 2018-12-04 ENCOUNTER — HOSPITAL ENCOUNTER (OUTPATIENT)
Dept: CARDIAC CATH/INVASIVE PROCEDURES | Age: 63
Discharge: HOME | End: 2018-12-05
Attending: SURGERY | Admitting: SURGERY
Payer: COMMERCIAL

## 2018-12-04 DIAGNOSIS — I73.9 PVD (PERIPHERAL VASCULAR DISEASE) (HCC): ICD-10-CM

## 2018-12-04 LAB
ABO/RH: NORMAL
ANION GAP SERPL CALCULATED.3IONS-SCNC: 10 MMOL/L (ref 7–16)
ANTIBODY SCREEN: NORMAL
BUN BLDV-MCNC: 16 MG/DL (ref 8–23)
CALCIUM SERPL-MCNC: 10.3 MG/DL (ref 8.6–10.2)
CHLORIDE BLD-SCNC: 100 MMOL/L (ref 98–107)
CO2: 24 MMOL/L (ref 22–29)
CREAT SERPL-MCNC: 0.6 MG/DL (ref 0.5–1)
GFR AFRICAN AMERICAN: >60
GFR NON-AFRICAN AMERICAN: >60 ML/MIN/1.73
GLUCOSE BLD-MCNC: 230 MG/DL (ref 74–99)
HCT VFR BLD CALC: 42 % (ref 34–48)
HEMOGLOBIN: 13.7 G/DL (ref 11.5–15.5)
INR BLD: 1
MCH RBC QN AUTO: 29.7 PG (ref 26–35)
MCHC RBC AUTO-ENTMCNC: 32.6 % (ref 32–34.5)
MCV RBC AUTO: 90.9 FL (ref 80–99.9)
METER GLUCOSE: 188 MG/DL (ref 74–99)
METER GLUCOSE: 382 MG/DL (ref 74–99)
PDW BLD-RTO: 13 FL (ref 11.5–15)
PLATELET # BLD: 228 E9/L (ref 130–450)
PMV BLD AUTO: 10.1 FL (ref 7–12)
POTASSIUM REFLEX MAGNESIUM: 4.5 MMOL/L (ref 3.5–5)
PROTHROMBIN TIME: 11 SEC (ref 9.3–12.4)
RBC # BLD: 4.62 E12/L (ref 3.5–5.5)
SODIUM BLD-SCNC: 134 MMOL/L (ref 132–146)
WBC # BLD: 6.5 E9/L (ref 4.5–11.5)

## 2018-12-04 PROCEDURE — 75716 ARTERY X-RAYS ARMS/LEGS: CPT | Performed by: SURGERY

## 2018-12-04 PROCEDURE — 37222 HC ILIAC TERRITORY ADDL PLASTY: CPT | Performed by: SURGERY

## 2018-12-04 PROCEDURE — 36415 COLL VENOUS BLD VENIPUNCTURE: CPT

## 2018-12-04 PROCEDURE — 2500000003 HC RX 250 WO HCPCS

## 2018-12-04 PROCEDURE — 6370000000 HC RX 637 (ALT 250 FOR IP): Performed by: SURGERY

## 2018-12-04 PROCEDURE — 86850 RBC ANTIBODY SCREEN: CPT

## 2018-12-04 PROCEDURE — 82962 GLUCOSE BLOOD TEST: CPT

## 2018-12-04 PROCEDURE — 85027 COMPLETE CBC AUTOMATED: CPT

## 2018-12-04 PROCEDURE — 86900 BLOOD TYPING SEROLOGIC ABO: CPT

## 2018-12-04 PROCEDURE — 80048 BASIC METABOLIC PNL TOTAL CA: CPT

## 2018-12-04 PROCEDURE — C1760 CLOSURE DEV, VASC: HCPCS

## 2018-12-04 PROCEDURE — 6360000002 HC RX W HCPCS

## 2018-12-04 PROCEDURE — 85610 PROTHROMBIN TIME: CPT

## 2018-12-04 PROCEDURE — 37221 PR REVSC OPN/PRQ ILIAC ART W/STNT PLMT & ANGIOPLSTY: CPT | Performed by: SURGERY

## 2018-12-04 PROCEDURE — 2580000003 HC RX 258: Performed by: SURGERY

## 2018-12-04 PROCEDURE — 37222 PR REVASCULARIZATION ILIAC ART ANGIOP EA IPSI VSL: CPT | Performed by: SURGERY

## 2018-12-04 PROCEDURE — C1769 GUIDE WIRE: HCPCS

## 2018-12-04 PROCEDURE — C1894 INTRO/SHEATH, NON-LASER: HCPCS

## 2018-12-04 PROCEDURE — C1887 CATHETER, GUIDING: HCPCS

## 2018-12-04 PROCEDURE — C1874 STENT, COATED/COV W/DEL SYS: HCPCS

## 2018-12-04 PROCEDURE — 86901 BLOOD TYPING SEROLOGIC RH(D): CPT

## 2018-12-04 PROCEDURE — 6360000002 HC RX W HCPCS: Performed by: SURGERY

## 2018-12-04 PROCEDURE — 2709999900 HC NON-CHARGEABLE SUPPLY

## 2018-12-04 PROCEDURE — 37221 HC ILIAC TERRITORY PLASTY STENT: CPT | Performed by: SURGERY

## 2018-12-04 RX ORDER — SODIUM CHLORIDE 9 MG/ML
INJECTION, SOLUTION INTRAVENOUS CONTINUOUS
Status: DISCONTINUED | OUTPATIENT
Start: 2018-12-04 | End: 2018-12-04

## 2018-12-04 RX ORDER — NICOTINE POLACRILEX 4 MG
15 LOZENGE BUCCAL PRN
Status: DISCONTINUED | OUTPATIENT
Start: 2018-12-04 | End: 2018-12-05 | Stop reason: HOSPADM

## 2018-12-04 RX ORDER — CILOSTAZOL 100 MG/1
100 TABLET ORAL 2 TIMES DAILY
Status: DISCONTINUED | OUTPATIENT
Start: 2018-12-04 | End: 2018-12-05 | Stop reason: HOSPADM

## 2018-12-04 RX ORDER — SODIUM CHLORIDE 0.9 % (FLUSH) 0.9 %
10 SYRINGE (ML) INJECTION PRN
Status: DISCONTINUED | OUTPATIENT
Start: 2018-12-04 | End: 2018-12-05 | Stop reason: HOSPADM

## 2018-12-04 RX ORDER — SODIUM CHLORIDE 0.9 % (FLUSH) 0.9 %
10 SYRINGE (ML) INJECTION EVERY 12 HOURS SCHEDULED
Status: DISCONTINUED | OUTPATIENT
Start: 2018-12-04 | End: 2018-12-05 | Stop reason: HOSPADM

## 2018-12-04 RX ORDER — SODIUM CHLORIDE 0.9 % (FLUSH) 0.9 %
10 SYRINGE (ML) INJECTION EVERY 12 HOURS SCHEDULED
Status: DISCONTINUED | OUTPATIENT
Start: 2018-12-04 | End: 2018-12-04

## 2018-12-04 RX ORDER — OXYCODONE HYDROCHLORIDE AND ACETAMINOPHEN 5; 325 MG/1; MG/1
1 TABLET ORAL EVERY 4 HOURS PRN
Status: DISCONTINUED | OUTPATIENT
Start: 2018-12-04 | End: 2018-12-05 | Stop reason: HOSPADM

## 2018-12-04 RX ORDER — SIMVASTATIN 40 MG
40 TABLET ORAL NIGHTLY
Status: DISCONTINUED | OUTPATIENT
Start: 2018-12-04 | End: 2018-12-05 | Stop reason: HOSPADM

## 2018-12-04 RX ORDER — CITALOPRAM 20 MG/1
20 TABLET ORAL DAILY
Status: DISCONTINUED | OUTPATIENT
Start: 2018-12-04 | End: 2018-12-05 | Stop reason: HOSPADM

## 2018-12-04 RX ORDER — CLOPIDOGREL BISULFATE 75 MG/1
75 TABLET ORAL DAILY
Qty: 30 TABLET | Refills: 3 | Status: SHIPPED | OUTPATIENT
Start: 2018-12-05 | End: 2018-12-05

## 2018-12-04 RX ORDER — CLOPIDOGREL BISULFATE 75 MG/1
75 TABLET ORAL DAILY
Status: DISCONTINUED | OUTPATIENT
Start: 2018-12-04 | End: 2018-12-05 | Stop reason: HOSPADM

## 2018-12-04 RX ORDER — SODIUM CHLORIDE 9 MG/ML
INJECTION, SOLUTION INTRAVENOUS CONTINUOUS
Status: ACTIVE | OUTPATIENT
Start: 2018-12-04 | End: 2018-12-04

## 2018-12-04 RX ORDER — LEVOTHYROXINE SODIUM 0.03 MG/1
25 TABLET ORAL DAILY
Status: DISCONTINUED | OUTPATIENT
Start: 2018-12-04 | End: 2018-12-05 | Stop reason: HOSPADM

## 2018-12-04 RX ORDER — FENOFIBRATE 160 MG/1
160 TABLET ORAL DAILY
Status: DISCONTINUED | OUTPATIENT
Start: 2018-12-04 | End: 2018-12-05 | Stop reason: HOSPADM

## 2018-12-04 RX ORDER — ASPIRIN 81 MG/1
81 TABLET ORAL DAILY
Status: DISCONTINUED | OUTPATIENT
Start: 2018-12-04 | End: 2018-12-05 | Stop reason: HOSPADM

## 2018-12-04 RX ORDER — SODIUM CHLORIDE 0.9 % (FLUSH) 0.9 %
10 SYRINGE (ML) INJECTION PRN
Status: DISCONTINUED | OUTPATIENT
Start: 2018-12-04 | End: 2018-12-04

## 2018-12-04 RX ORDER — DEXTROSE MONOHYDRATE 50 MG/ML
100 INJECTION, SOLUTION INTRAVENOUS PRN
Status: DISCONTINUED | OUTPATIENT
Start: 2018-12-04 | End: 2018-12-05 | Stop reason: HOSPADM

## 2018-12-04 RX ORDER — INSULIN GLARGINE 100 [IU]/ML
55 INJECTION, SOLUTION SUBCUTANEOUS 2 TIMES DAILY
Status: DISCONTINUED | OUTPATIENT
Start: 2018-12-04 | End: 2018-12-05 | Stop reason: HOSPADM

## 2018-12-04 RX ORDER — DEXTROSE MONOHYDRATE 25 G/50ML
12.5 INJECTION, SOLUTION INTRAVENOUS PRN
Status: DISCONTINUED | OUTPATIENT
Start: 2018-12-04 | End: 2018-12-05 | Stop reason: HOSPADM

## 2018-12-04 RX ORDER — LISINOPRIL 5 MG/1
5 TABLET ORAL DAILY
Status: DISCONTINUED | OUTPATIENT
Start: 2018-12-04 | End: 2018-12-05 | Stop reason: HOSPADM

## 2018-12-04 RX ADMIN — CLOPIDOGREL BISULFATE 75 MG: 75 TABLET ORAL at 15:24

## 2018-12-04 RX ADMIN — Medication 10 ML: at 22:30

## 2018-12-04 RX ADMIN — LISINOPRIL 5 MG: 5 TABLET ORAL at 15:24

## 2018-12-04 RX ADMIN — INSULIN GLARGINE 55 UNITS: 100 INJECTION, SOLUTION SUBCUTANEOUS at 22:42

## 2018-12-04 RX ADMIN — SODIUM CHLORIDE: 9 INJECTION, SOLUTION INTRAVENOUS at 13:07

## 2018-12-04 RX ADMIN — ASPIRIN 81 MG: 81 TABLET ORAL at 15:24

## 2018-12-04 RX ADMIN — INSULIN LISPRO 35 UNITS: 100 INJECTION, SOLUTION INTRAVENOUS; SUBCUTANEOUS at 18:30

## 2018-12-04 RX ADMIN — VITAMIN D, TAB 1000IU (100/BT) 5000 UNITS: 25 TAB at 15:24

## 2018-12-04 RX ADMIN — CITALOPRAM 20 MG: 20 TABLET, FILM COATED ORAL at 15:24

## 2018-12-04 RX ADMIN — OXYCODONE HYDROCHLORIDE AND ACETAMINOPHEN 1 TABLET: 5; 325 TABLET ORAL at 22:30

## 2018-12-04 RX ADMIN — FENOFIBRATE 160 MG: 160 TABLET ORAL at 15:25

## 2018-12-04 RX ADMIN — SIMVASTATIN 40 MG: 40 TABLET, FILM COATED ORAL at 22:29

## 2018-12-04 RX ADMIN — LEVOTHYROXINE SODIUM 25 MCG: 25 TABLET ORAL at 15:24

## 2018-12-04 RX ADMIN — CILOSTAZOL 100 MG: 100 TABLET ORAL at 22:29

## 2018-12-04 RX ADMIN — Medication 10 ML: at 13:07

## 2018-12-04 RX ADMIN — CEFAZOLIN SODIUM 2 G: 10 INJECTION, POWDER, FOR SOLUTION INTRAVENOUS at 10:14

## 2018-12-04 RX ADMIN — OXYCODONE HYDROCHLORIDE AND ACETAMINOPHEN 1 TABLET: 5; 325 TABLET ORAL at 14:25

## 2018-12-04 ASSESSMENT — PAIN SCALES - GENERAL
PAINLEVEL_OUTOF10: 0
PAINLEVEL_OUTOF10: 8
PAINLEVEL_OUTOF10: 9

## 2018-12-04 NOTE — H&P
Vascular Surgery History & Physical Exam    Chief Complaint: Peripheral vascular disease, R LE lifestyle limitign claudication. HISTORY OF PRESENT ILLNESS:    The patient is a 59 y.o. female who presents to the hospital for elective arteriogram with possible intervention. The patient has a history of peripheral vascular disease and R LE lifestyle limitign claudication.     ROS : All others Negative if blank [], Positive if [x]  General   [] Fevers   [] Chills   [] Weight Loss   Skin   [] Tissue Loss   Eyes   [x] Wears Glasses/Contacts   [] Vision Changes   Respiratory    [] Shortness of breath   Cardiovascular   [] Chest Pain   [] Shortness of breath with exertion   Gastrointestinal   [] Abdominal Pain     Past Medical History:   Diagnosis Date    Arthritis     Carotid artery stenosis     Carotid stenosis     [facility]     DM type 2 (diabetes mellitus, type 2) ([facility]     Hyperlipidemia     Hypothyroidism     Neuropathy, diabetic ([facility]     Peripheral vascular disease (HCC)     Psoriasiform dermatitis     Thyroid disease      Past Surgical History:   Procedure Laterality Date    BACK SURGERY      L5-S1    CAROTID ENDARTERECTOMY Left 10/11/2018    [place]    CARPAL TUNNEL RELEASE       SECTION      2    HYSTERECTOMY      LEG SURGERY      remote    UT OFFICE/OUTPT VISIT,PROCEDURE ONLY Left 10/11/2018    LEFT CAROTID ENDARTERECTOMY performed by Andree Mcghee MD at [address] [place] [address]      TONSILLECTOMY       Current Medications:     Current Outpatient Prescriptions:     levothyroxine (SYNTHROID) 25 MCG tablet, Take 25 mcg by mouth Daily, Disp: , Rfl:     TALTZ 80 MG/ML SOAJ, every 30 days , Disp: , Rfl:     simvastatin (ZOCOR) 40 MG tablet, Take 40 mg by mouth nightly, Disp: , Rfl:     cilostazol (PLETAL) 100 MG tablet, Take 1 tablet by mouth 2 times daily, Disp: 60 tablet, Rfl: 5    nicotine (NICODERM CQ) 7 MG/24HR, Place 1 patch onto the skin every 24

## 2018-12-04 NOTE — OP NOTE
common iliac was accessed and through and through access was obtained. Wire was  and than bilateral 7 fr 25 cm marker sheath advanced to distal abdominal aorta. The lesion was treated with kissing common iliac stents 8 x 59 (ICAST). The distal common iliac and proximal external iliac artery on the right was diseased. Plasty with 8x59 balloon was completed. There was evidence of some residual stenosis. Completion angiogram noted much improved filling distally and brisk flow though the stenotic area.   A short  7 fr sheath was exchanged bilaterally and after femoral angiogram a 7 fr exoseal device used to close the Bilateral common femoral artery    Postop Exam  Right DP biphasic  PT biphasic  Left DP 1+  PT biphasic    Plan  Continue Medical Management with plavix, pletal and statin  Encourage continued tobacco cessation  No plans for further surgical intervention     Jim Hoffmann

## 2018-12-05 ENCOUNTER — TELEPHONE (OUTPATIENT)
Dept: VASCULAR SURGERY | Age: 63
End: 2018-12-05

## 2018-12-05 VITALS
WEIGHT: 135 LBS | DIASTOLIC BLOOD PRESSURE: 75 MMHG | SYSTOLIC BLOOD PRESSURE: 135 MMHG | HEIGHT: 64 IN | BODY MASS INDEX: 23.05 KG/M2 | HEART RATE: 102 BPM | TEMPERATURE: 98.2 F | OXYGEN SATURATION: 96 % | RESPIRATION RATE: 14 BRPM

## 2018-12-05 LAB
ANION GAP SERPL CALCULATED.3IONS-SCNC: 10 MMOL/L (ref 7–16)
BUN BLDV-MCNC: 15 MG/DL (ref 8–23)
CALCIUM SERPL-MCNC: 9.8 MG/DL (ref 8.6–10.2)
CHLORIDE BLD-SCNC: 100 MMOL/L (ref 98–107)
CO2: 24 MMOL/L (ref 22–29)
CREAT SERPL-MCNC: 0.6 MG/DL (ref 0.5–1)
GFR AFRICAN AMERICAN: >60
GFR NON-AFRICAN AMERICAN: >60 ML/MIN/1.73
GLUCOSE BLD-MCNC: 185 MG/DL (ref 74–99)
HCT VFR BLD CALC: 37.2 % (ref 34–48)
HEMOGLOBIN: 12.4 G/DL (ref 11.5–15.5)
MCH RBC QN AUTO: 30 PG (ref 26–35)
MCHC RBC AUTO-ENTMCNC: 33.3 % (ref 32–34.5)
MCV RBC AUTO: 90.1 FL (ref 80–99.9)
PDW BLD-RTO: 12.7 FL (ref 11.5–15)
PLATELET # BLD: 203 E9/L (ref 130–450)
PMV BLD AUTO: 10 FL (ref 7–12)
POTASSIUM SERPL-SCNC: 4.4 MMOL/L (ref 3.5–5)
RBC # BLD: 4.13 E12/L (ref 3.5–5.5)
SODIUM BLD-SCNC: 134 MMOL/L (ref 132–146)
WBC # BLD: 7.1 E9/L (ref 4.5–11.5)

## 2018-12-05 PROCEDURE — 6370000000 HC RX 637 (ALT 250 FOR IP): Performed by: SURGERY

## 2018-12-05 PROCEDURE — 36415 COLL VENOUS BLD VENIPUNCTURE: CPT

## 2018-12-05 PROCEDURE — 85027 COMPLETE CBC AUTOMATED: CPT

## 2018-12-05 PROCEDURE — 2580000003 HC RX 258: Performed by: SURGERY

## 2018-12-05 PROCEDURE — 80048 BASIC METABOLIC PNL TOTAL CA: CPT

## 2018-12-05 RX ORDER — CLOPIDOGREL BISULFATE 75 MG/1
75 TABLET ORAL DAILY
Qty: 30 TABLET | Refills: 3 | Status: SHIPPED | OUTPATIENT
Start: 2018-12-05 | End: 2019-04-29 | Stop reason: ALTCHOICE

## 2018-12-05 RX ORDER — ACETAMINOPHEN 325 MG/1
650 TABLET ORAL EVERY 4 HOURS PRN
Status: DISCONTINUED | OUTPATIENT
Start: 2018-12-05 | End: 2018-12-05 | Stop reason: HOSPADM

## 2018-12-05 RX ADMIN — ACETAMINOPHEN 650 MG: 325 TABLET, FILM COATED ORAL at 09:18

## 2018-12-05 RX ADMIN — CLOPIDOGREL BISULFATE 75 MG: 75 TABLET ORAL at 09:08

## 2018-12-05 RX ADMIN — CITALOPRAM 20 MG: 20 TABLET, FILM COATED ORAL at 09:09

## 2018-12-05 RX ADMIN — Medication 10 ML: at 09:10

## 2018-12-05 RX ADMIN — ASPIRIN 81 MG: 81 TABLET ORAL at 09:09

## 2018-12-05 RX ADMIN — VITAMIN D, TAB 1000IU (100/BT) 5000 UNITS: 25 TAB at 09:09

## 2018-12-05 RX ADMIN — INSULIN LISPRO 35 UNITS: 100 INJECTION, SOLUTION INTRAVENOUS; SUBCUTANEOUS at 09:18

## 2018-12-05 RX ADMIN — INSULIN GLARGINE 55 UNITS: 100 INJECTION, SOLUTION SUBCUTANEOUS at 09:18

## 2018-12-05 RX ADMIN — LISINOPRIL 5 MG: 5 TABLET ORAL at 09:09

## 2018-12-05 RX ADMIN — CILOSTAZOL 100 MG: 100 TABLET ORAL at 09:09

## 2018-12-05 RX ADMIN — FENOFIBRATE 160 MG: 160 TABLET ORAL at 09:09

## 2018-12-05 RX ADMIN — LEVOTHYROXINE SODIUM 25 MCG: 25 TABLET ORAL at 06:33

## 2018-12-05 ASSESSMENT — PAIN SCALES - GENERAL
PAINLEVEL_OUTOF10: 7
PAINLEVEL_OUTOF10: 0
PAINLEVEL_OUTOF10: 2

## 2018-12-11 ENCOUNTER — OFFICE VISIT (OUTPATIENT)
Dept: PRIMARY CARE CLINIC | Age: 63
End: 2018-12-11
Payer: COMMERCIAL

## 2018-12-11 VITALS
HEART RATE: 121 BPM | DIASTOLIC BLOOD PRESSURE: 62 MMHG | SYSTOLIC BLOOD PRESSURE: 134 MMHG | OXYGEN SATURATION: 95 % | TEMPERATURE: 96.5 F | BODY MASS INDEX: 23 KG/M2 | WEIGHT: 134 LBS

## 2018-12-11 DIAGNOSIS — I73.9 PVD (PERIPHERAL VASCULAR DISEASE) WITH CLAUDICATION (HCC): Primary | ICD-10-CM

## 2018-12-11 PROCEDURE — G8598 ASA/ANTIPLAT THER USED: HCPCS | Performed by: FAMILY MEDICINE

## 2018-12-11 PROCEDURE — G8484 FLU IMMUNIZE NO ADMIN: HCPCS | Performed by: FAMILY MEDICINE

## 2018-12-11 PROCEDURE — 99213 OFFICE O/P EST LOW 20 MIN: CPT | Performed by: FAMILY MEDICINE

## 2018-12-11 PROCEDURE — G8420 CALC BMI NORM PARAMETERS: HCPCS | Performed by: FAMILY MEDICINE

## 2018-12-11 PROCEDURE — 3017F COLORECTAL CA SCREEN DOC REV: CPT | Performed by: FAMILY MEDICINE

## 2018-12-11 PROCEDURE — 1036F TOBACCO NON-USER: CPT | Performed by: FAMILY MEDICINE

## 2018-12-11 PROCEDURE — G8427 DOCREV CUR MEDS BY ELIG CLIN: HCPCS | Performed by: FAMILY MEDICINE

## 2018-12-11 ASSESSMENT — ENCOUNTER SYMPTOMS
VOMITING: 0
SHORTNESS OF BREATH: 0
EYES NEGATIVE: 1
CHEST TIGHTNESS: 0
DIARRHEA: 0
BLOOD IN STOOL: 0
NAUSEA: 1
ABDOMINAL PAIN: 0

## 2018-12-17 ENCOUNTER — OFFICE VISIT (OUTPATIENT)
Dept: VASCULAR SURGERY | Age: 63
End: 2018-12-17

## 2018-12-17 VITALS
BODY MASS INDEX: 22.71 KG/M2 | SYSTOLIC BLOOD PRESSURE: 133 MMHG | HEART RATE: 81 BPM | DIASTOLIC BLOOD PRESSURE: 56 MMHG | WEIGHT: 133 LBS | HEIGHT: 64 IN

## 2018-12-17 DIAGNOSIS — Z98.890 S/P CAROTID ENDARTERECTOMY: ICD-10-CM

## 2018-12-17 DIAGNOSIS — I73.9 PVD (PERIPHERAL VASCULAR DISEASE) WITH CLAUDICATION (HCC): ICD-10-CM

## 2018-12-17 DIAGNOSIS — I74.09 AORTOILIAC OCCLUSIVE DISEASE (HCC): Primary | ICD-10-CM

## 2018-12-17 DIAGNOSIS — I65.23 ASYMPTOMATIC BILATERAL CAROTID ARTERY STENOSIS: ICD-10-CM

## 2018-12-17 PROCEDURE — 99024 POSTOP FOLLOW-UP VISIT: CPT | Performed by: SURGERY

## 2018-12-28 RX ORDER — MELATONIN/LEMON BALM LEAF EXTR 5MG-500MCG
1 TABLET ORAL EVERY 24 HOURS
Qty: 28 PATCH | Refills: 3 | Status: SHIPPED | OUTPATIENT
Start: 2018-12-28 | End: 2019-06-11 | Stop reason: ALTCHOICE

## 2019-01-08 LAB
BILIRUBIN, URINE: NORMAL
BLOOD, URINE: NORMAL
CLARITY: NORMAL
COLOR: NORMAL
GLUCOSE URINE: NORMAL
KETONES, URINE: NORMAL
LEUKOCYTE ESTERASE, URINE: NORMAL
NITRITE, URINE: NORMAL
PH UA: NORMAL (ref 4.5–8)
PROTEIN UA: NORMAL
SPECIFIC GRAVITY, URINE: NORMAL
UROBILINOGEN, URINE: NORMAL

## 2019-01-21 ENCOUNTER — OFFICE VISIT (OUTPATIENT)
Dept: PRIMARY CARE CLINIC | Age: 64
End: 2019-01-21
Payer: COMMERCIAL

## 2019-01-21 ENCOUNTER — HOSPITAL ENCOUNTER (OUTPATIENT)
Dept: ULTRASOUND IMAGING | Age: 64
Discharge: HOME | End: 2019-01-23
Payer: COMMERCIAL

## 2019-01-21 ENCOUNTER — HOSPITAL ENCOUNTER (OUTPATIENT)
Dept: INTERVENTIONAL RADIOLOGY/VASCULAR | Age: 64
Discharge: HOME | End: 2019-01-23
Payer: COMMERCIAL

## 2019-01-21 VITALS
BODY MASS INDEX: 22.49 KG/M2 | TEMPERATURE: 96.6 F | HEART RATE: 76 BPM | DIASTOLIC BLOOD PRESSURE: 70 MMHG | OXYGEN SATURATION: 94 % | WEIGHT: 131 LBS | SYSTOLIC BLOOD PRESSURE: 132 MMHG

## 2019-01-21 DIAGNOSIS — I73.9 PVD (PERIPHERAL VASCULAR DISEASE) WITH CLAUDICATION (HCC): ICD-10-CM

## 2019-01-21 DIAGNOSIS — I74.09 AORTOILIAC OCCLUSIVE DISEASE (HCC): ICD-10-CM

## 2019-01-21 DIAGNOSIS — Z98.890 S/P CAROTID ENDARTERECTOMY: ICD-10-CM

## 2019-01-21 DIAGNOSIS — K21.00 GASTROESOPHAGEAL REFLUX DISEASE WITH ESOPHAGITIS: Primary | ICD-10-CM

## 2019-01-21 DIAGNOSIS — I65.23 ASYMPTOMATIC BILATERAL CAROTID ARTERY STENOSIS: ICD-10-CM

## 2019-01-21 PROCEDURE — 93880 EXTRACRANIAL BILAT STUDY: CPT

## 2019-01-21 PROCEDURE — 99213 OFFICE O/P EST LOW 20 MIN: CPT | Performed by: PHYSICIAN ASSISTANT

## 2019-01-21 PROCEDURE — G8427 DOCREV CUR MEDS BY ELIG CLIN: HCPCS | Performed by: PHYSICIAN ASSISTANT

## 2019-01-21 PROCEDURE — 3017F COLORECTAL CA SCREEN DOC REV: CPT | Performed by: PHYSICIAN ASSISTANT

## 2019-01-21 PROCEDURE — 1036F TOBACCO NON-USER: CPT | Performed by: PHYSICIAN ASSISTANT

## 2019-01-21 PROCEDURE — 93923 UPR/LXTR ART STDY 3+ LVLS: CPT

## 2019-01-21 PROCEDURE — G8598 ASA/ANTIPLAT THER USED: HCPCS | Performed by: PHYSICIAN ASSISTANT

## 2019-01-21 PROCEDURE — G8420 CALC BMI NORM PARAMETERS: HCPCS | Performed by: PHYSICIAN ASSISTANT

## 2019-01-21 PROCEDURE — G8484 FLU IMMUNIZE NO ADMIN: HCPCS | Performed by: PHYSICIAN ASSISTANT

## 2019-01-21 RX ORDER — NYSTATIN 100000 U/G
CREAM TOPICAL
Refills: 0 | COMMUNITY
Start: 2019-01-03 | End: 2019-06-11 | Stop reason: ALTCHOICE

## 2019-01-21 RX ORDER — OMEPRAZOLE 40 MG/1
40 CAPSULE, DELAYED RELEASE ORAL
Qty: 30 CAPSULE | Refills: 1 | Status: SHIPPED | OUTPATIENT
Start: 2019-01-21 | End: 2019-06-11 | Stop reason: ALTCHOICE

## 2019-01-28 ENCOUNTER — OFFICE VISIT (OUTPATIENT)
Dept: VASCULAR SURGERY | Age: 64
End: 2019-01-28
Payer: COMMERCIAL

## 2019-01-28 DIAGNOSIS — I65.23 CAROTID STENOSIS, ASYMPTOMATIC, BILATERAL: Primary | ICD-10-CM

## 2019-01-28 DIAGNOSIS — I73.9 PVD (PERIPHERAL VASCULAR DISEASE) WITH CLAUDICATION (HCC): ICD-10-CM

## 2019-01-28 PROCEDURE — 1036F TOBACCO NON-USER: CPT | Performed by: NURSE PRACTITIONER

## 2019-01-28 PROCEDURE — G8598 ASA/ANTIPLAT THER USED: HCPCS | Performed by: NURSE PRACTITIONER

## 2019-01-28 PROCEDURE — 99213 OFFICE O/P EST LOW 20 MIN: CPT | Performed by: NURSE PRACTITIONER

## 2019-01-28 PROCEDURE — G8420 CALC BMI NORM PARAMETERS: HCPCS | Performed by: NURSE PRACTITIONER

## 2019-01-28 PROCEDURE — G8427 DOCREV CUR MEDS BY ELIG CLIN: HCPCS | Performed by: NURSE PRACTITIONER

## 2019-01-28 PROCEDURE — G8484 FLU IMMUNIZE NO ADMIN: HCPCS | Performed by: NURSE PRACTITIONER

## 2019-01-28 PROCEDURE — 3017F COLORECTAL CA SCREEN DOC REV: CPT | Performed by: NURSE PRACTITIONER

## 2019-02-26 ENCOUNTER — OFFICE VISIT (OUTPATIENT)
Dept: PRIMARY CARE CLINIC | Age: 64
End: 2019-02-26
Payer: COMMERCIAL

## 2019-02-26 VITALS
WEIGHT: 128 LBS | DIASTOLIC BLOOD PRESSURE: 70 MMHG | TEMPERATURE: 97.5 F | SYSTOLIC BLOOD PRESSURE: 122 MMHG | BODY MASS INDEX: 21.97 KG/M2 | OXYGEN SATURATION: 95 % | HEART RATE: 90 BPM

## 2019-02-26 DIAGNOSIS — J01.10 ACUTE NON-RECURRENT FRONTAL SINUSITIS: Primary | ICD-10-CM

## 2019-02-26 PROCEDURE — G8427 DOCREV CUR MEDS BY ELIG CLIN: HCPCS | Performed by: FAMILY MEDICINE

## 2019-02-26 PROCEDURE — 3017F COLORECTAL CA SCREEN DOC REV: CPT | Performed by: FAMILY MEDICINE

## 2019-02-26 PROCEDURE — G8598 ASA/ANTIPLAT THER USED: HCPCS | Performed by: FAMILY MEDICINE

## 2019-02-26 PROCEDURE — 99213 OFFICE O/P EST LOW 20 MIN: CPT | Performed by: FAMILY MEDICINE

## 2019-02-26 PROCEDURE — G8420 CALC BMI NORM PARAMETERS: HCPCS | Performed by: FAMILY MEDICINE

## 2019-02-26 PROCEDURE — 1036F TOBACCO NON-USER: CPT | Performed by: FAMILY MEDICINE

## 2019-02-26 PROCEDURE — G8484 FLU IMMUNIZE NO ADMIN: HCPCS | Performed by: FAMILY MEDICINE

## 2019-02-26 RX ORDER — INSULIN GLARGINE 300 U/ML
INJECTION, SOLUTION SUBCUTANEOUS
COMMUNITY
Start: 2019-01-23 | End: 2019-02-26 | Stop reason: SDUPTHER

## 2019-02-26 RX ORDER — AZITHROMYCIN 250 MG/1
TABLET, FILM COATED ORAL
Qty: 1 PACKET | Refills: 0 | Status: SHIPPED | OUTPATIENT
Start: 2019-02-26 | End: 2019-04-29 | Stop reason: ALTCHOICE

## 2019-02-26 RX ORDER — INSULIN GLARGINE 300 U/ML
50 INJECTION, SOLUTION SUBCUTANEOUS 2 TIMES DAILY
Qty: 10 PEN | Refills: 2 | Status: SHIPPED | OUTPATIENT
Start: 2019-02-26 | End: 2019-07-23 | Stop reason: SDUPTHER

## 2019-02-26 RX ORDER — PEN NEEDLE, DIABETIC 32GX 5/32"
NEEDLE, DISPOSABLE MISCELLANEOUS
Status: ON HOLD | COMMUNITY
Start: 2019-01-23

## 2019-02-26 ASSESSMENT — ENCOUNTER SYMPTOMS
SWOLLEN GLANDS: 0
COUGH: 1
SINUS PRESSURE: 1
SORE THROAT: 1

## 2019-02-26 ASSESSMENT — PATIENT HEALTH QUESTIONNAIRE - PHQ9
SUM OF ALL RESPONSES TO PHQ QUESTIONS 1-9: 0
2. FEELING DOWN, DEPRESSED OR HOPELESS: 0
SUM OF ALL RESPONSES TO PHQ9 QUESTIONS 1 & 2: 0
1. LITTLE INTEREST OR PLEASURE IN DOING THINGS: 0
SUM OF ALL RESPONSES TO PHQ QUESTIONS 1-9: 0

## 2019-04-29 ENCOUNTER — OFFICE VISIT (OUTPATIENT)
Dept: VASCULAR SURGERY | Age: 64
End: 2019-04-29
Payer: COMMERCIAL

## 2019-04-29 ENCOUNTER — HOSPITAL ENCOUNTER (OUTPATIENT)
Dept: CARDIOLOGY | Age: 64
Discharge: HOME | End: 2019-04-29
Payer: COMMERCIAL

## 2019-04-29 DIAGNOSIS — I73.9 PVD (PERIPHERAL VASCULAR DISEASE) WITH CLAUDICATION (HCC): ICD-10-CM

## 2019-04-29 DIAGNOSIS — I73.9 PVD (PERIPHERAL VASCULAR DISEASE) WITH CLAUDICATION (HCC): Primary | ICD-10-CM

## 2019-04-29 PROCEDURE — 1036F TOBACCO NON-USER: CPT | Performed by: SURGERY

## 2019-04-29 PROCEDURE — G8427 DOCREV CUR MEDS BY ELIG CLIN: HCPCS | Performed by: SURGERY

## 2019-04-29 PROCEDURE — 3017F COLORECTAL CA SCREEN DOC REV: CPT | Performed by: SURGERY

## 2019-04-29 PROCEDURE — G8598 ASA/ANTIPLAT THER USED: HCPCS | Performed by: SURGERY

## 2019-04-29 PROCEDURE — 99213 OFFICE O/P EST LOW 20 MIN: CPT | Performed by: SURGERY

## 2019-04-29 PROCEDURE — 93923 UPR/LXTR ART STDY 3+ LVLS: CPT

## 2019-04-29 PROCEDURE — G8420 CALC BMI NORM PARAMETERS: HCPCS | Performed by: SURGERY

## 2019-04-29 RX ORDER — CLOPIDOGREL BISULFATE 75 MG/1
75 TABLET ORAL DAILY
Qty: 90 TABLET | Refills: 3 | Status: SHIPPED
Start: 2019-04-29 | End: 2020-04-08

## 2019-04-29 NOTE — PROGRESS NOTES
Vascular Surgery Outpatient Followup    PCP : Bella Flower DO    Previous procedures  10/11/18 L CEA for assx   18 Kissing iliac stents 8x59 ICAST  [place] of the R EIA with 8x59 balloon     HISTORY OF PRESENT ILLNESS:    The patient is a 62 y.o. female who is here in regards to follow up of their PVD. The patient states that her symptoms of right leg pain remains resolved. She is able to walk any distance without limitations. She denies any recent hospital admission, major illness, or surgery since the last office visit.      ROS : All others Negative if blank [], Positive if [x]  General Urinary   [] Fevers [] Hematuria   [] Chills [] Dysuria   [] Weight Loss Vascular   Skin [] Claudication   [] Tissue Loss [] Rest Pain   Eyes Neurologic   [] Wears Glasses/Contacts [] Stroke/TIA   [] Vision Changes [] Focal weakness   Respiratory [] Slurred Speech    [] Shortness of breath    Cardiovascular    [] Chest Pain    [] Shortness of breath with exertion    Gastrointestinal    [] Abdominal Pain    [] Melena   [] Hematochezia         Past Medical History:        Diagnosis Date    Arthritis     Carotid artery stenosis     Carotid stenosis     [facility]     DM type 2 (diabetes mellitus, type 2) ([facility]     Hyperlipidemia     Hypothyroidism     Neuropathy, diabetic ([facility]     Peripheral vascular disease ([facility]     Psoriasiform dermatitis     Thyroid disease      Past Surgical History:        Procedure Laterality Date    BACK SURGERY      L5-S1    CAROTID ENDARTERECTOMY Left 10/11/2018    [place]    CARPAL TUNNEL RELEASE       SECTION      2    HYSTERECTOMY      LEG SURGERY      remote    OTHER SURGICAL HISTORY  2018    Dr. Bryce Franklin- Bilateral iliac stents iCast [id number]    [place] OFFICE/OUTPT VISIT,PROCEDURE ONLY Left 10/11/2018    LEFT CAROTID ENDARTERECTOMY performed by Liz Contreras MD at [facility]      TONSILLECTOMY       Current Medications:   Current Outpatient Medications   Medication Sig Dispense Refill    clopidogrel (PLAVIX) 75 MG tablet Take 1 tablet by mouth daily 90 tablet 3    HUMALOG 100 UNIT/ML injection vial 35 UNITS BREAKFAST AND DINNER. 12 UNITS WITH LUNCH 30 mL 5    BD PEN NEEDLE SAMEER U/F 32G X 4 MM MISC       TOUJEO SOLOSTAR 300 UNIT/ML injection pen Inject 50 Units into the skin 2 times daily 10 pen 2    insulin lispro (HUMALOG) 100 UNIT/ML injection vial 35 units breakfast and dinner. 12 units with lunch. 3 vial 2    levothyroxine (SYNTHROID) 25 MCG tablet Take 25 mcg by mouth Daily      TALTZ 80 MG/ML SOAJ every 30 days       simvastatin (ZOCOR) 40 MG tablet Take 40 mg by mouth nightly      citalopram (CELEXA) 20 MG tablet TAKE 1 TABLET DAILY 90 tablet 3    lisinopril (PRINIVIL;ZESTRIL) 5 MG tablet Take 5 mg by mouth daily       Choline Fenofibrate (FENOFIBRIC ACID) 135 MG CPDR Take 135 mg by mouth daily       aspirin 81 MG tablet Take 81 mg by mouth daily      vitamin D 1000 UNITS CAPS Take 5,000 Units by mouth daily       insulin lispro (HUMALOG) 100 UNIT/ML injection vial Inject into the skin 35 units in the morning then 20 units at lunchtime and 35 before  dinner      nystatin (MYCOSTATIN) 204031 UNIT/GM cream APPLY TWICE DAILY AS DIRECTED  0    lidocaine viscous (XYLOCAINE) 2 % solution Take 15 mLs by mouth as needed for Irritation or Pain 200 mL 0    omeprazole (PRILOSEC) 40 MG delayed release capsule Take 1 capsule by mouth every morning (before breakfast) 30 capsule 1    NICOTINE STEP 3 7 MG/24HR PLACE 1 PATCH ONTO THE SKIN EVERY 24 HOURS 28 patch 3     No current facility-administered medications for this visit.       Allergies:  Pcn [penicillins]  Social History     Socioeconomic History    Marital status:      Spouse name: Not on file    Number of children: Not on file    Years of education: Not on file    Highest education level: Not on file   Occupational History    Occupation: clerk conn   Social Needs    Financial resource strain: Not on file    Food insecurity:     Worry: Not on file     Inability: Not on file    Transportation needs:     Medical: Not on file     Non-medical: Not on file   Tobacco Use    Smoking status: Former Smoker     Packs/day: 0.25     Years: 40.00     Pack years: 10.00     Types: Cigarettes     Start date:      Last attempt to quit: 2018     Years since quittin.7    Smokeless tobacco: Never Used    Tobacco comment: not snoke 1.5 wk   Substance and Sexual Activity    Alcohol use: No     Comment: rare    Drug use: No    Sexual activity: Not on file   Lifestyle    Physical activity:     Days per week: Not on file     Minutes per session: Not on file    Stress: Not on file   Relationships    Social connections:     Talks on phone: Not on file     Gets together: Not on file     Attends Restorationism service: Not on file     Active member of club or organization: Not on file     Attends meetings of clubs or organizations: Not on file     Relationship status: Not on file    Intimate partner violence:     Fear of current or ex partner: Not on file     Emotionally abused: Not on file     Physically abused: Not on file     Forced sexual activity: Not on file   Other Topics Concern    Not on file   Social History Narrative    Not on file     Family History   Problem Relation Age of Onset    Cancer Mother         lung brain smoker    Heart Disease Father         mi in his 52's    ta Other Father         pulmonary fibrosis    Diabetes Brother      Labs  Lab Results   Component Value Date    WBC 7.1 2018    HGB 12.4 2018    HCT 37.2 2018     2018    PROTIME 11.0 2018    INR 1.0 2018    K 4.4 2018    BUN 15 2018    CREATININE 0.6 2018     PHYSICAL EXAM:    There were no vitals taken for this visit.   CONSTITUTIONAL:   Awake, alert, cooperative  PSYCHIATRIC :  Oriented to time, place and

## 2019-06-11 ENCOUNTER — OFFICE VISIT (OUTPATIENT)
Dept: PRIMARY CARE CLINIC | Age: 64
End: 2019-06-11
Payer: COMMERCIAL

## 2019-06-11 ENCOUNTER — HOSPITAL ENCOUNTER (OUTPATIENT)
Age: 64
Discharge: HOME | End: 2019-06-13
Payer: COMMERCIAL

## 2019-06-11 VITALS
OXYGEN SATURATION: 98 % | TEMPERATURE: 98 F | HEART RATE: 63 BPM | WEIGHT: 129 LBS | DIASTOLIC BLOOD PRESSURE: 74 MMHG | SYSTOLIC BLOOD PRESSURE: 118 MMHG | BODY MASS INDEX: 22.14 KG/M2

## 2019-06-11 DIAGNOSIS — E11.65 UNCONTROLLED TYPE 2 DIABETES MELLITUS WITH HYPERGLYCEMIA (HCC): ICD-10-CM

## 2019-06-11 DIAGNOSIS — E11.65 UNCONTROLLED TYPE 2 DIABETES MELLITUS WITH HYPERGLYCEMIA (HCC): Primary | ICD-10-CM

## 2019-06-11 DIAGNOSIS — I73.9 PVD (PERIPHERAL VASCULAR DISEASE) WITH CLAUDICATION (HCC): ICD-10-CM

## 2019-06-11 LAB
ALBUMIN SERPL-MCNC: 4.3 G/DL (ref 3.5–5.2)
ALP BLD-CCNC: 92 U/L (ref 35–104)
ALT SERPL-CCNC: 13 U/L (ref 0–32)
ANION GAP SERPL CALCULATED.3IONS-SCNC: 18 MMOL/L (ref 7–16)
AST SERPL-CCNC: 19 U/L (ref 0–31)
BILIRUB SERPL-MCNC: 0.4 MG/DL (ref 0–1.2)
BUN BLDV-MCNC: 17 MG/DL (ref 8–23)
CALCIUM SERPL-MCNC: 10.8 MG/DL (ref 8.6–10.2)
CHLORIDE BLD-SCNC: 101 MMOL/L (ref 98–107)
CHOLESTEROL, TOTAL: 159 MG/DL (ref 0–199)
CO2: 19 MMOL/L (ref 22–29)
CREAT SERPL-MCNC: 0.5 MG/DL (ref 0.5–1)
CREATININE URINE: 33 MG/DL (ref 29–226)
GFR AFRICAN AMERICAN: >60
GFR NON-AFRICAN AMERICAN: >60 ML/MIN/1.73
GLUCOSE BLD-MCNC: 266 MG/DL (ref 74–99)
HBA1C MFR BLD: 11.4 % (ref 4–5.6)
HDLC SERPL-MCNC: 35 MG/DL
LDL CHOLESTEROL CALCULATED: 89 MG/DL (ref 0–99)
MICROALBUMIN UR-MCNC: 15.1 MG/L
MICROALBUMIN/CREAT UR-RTO: 45.8 (ref 0–30)
POTASSIUM SERPL-SCNC: 6.1 MMOL/L (ref 3.5–5)
SODIUM BLD-SCNC: 138 MMOL/L (ref 132–146)
TOTAL PROTEIN: 7 G/DL (ref 6.4–8.3)
TRIGL SERPL-MCNC: 173 MG/DL (ref 0–149)
VLDLC SERPL CALC-MCNC: 35 MG/DL

## 2019-06-11 PROCEDURE — 83036 HEMOGLOBIN GLYCOSYLATED A1C: CPT

## 2019-06-11 PROCEDURE — 82044 UR ALBUMIN SEMIQUANTITATIVE: CPT

## 2019-06-11 PROCEDURE — 3046F HEMOGLOBIN A1C LEVEL >9.0%: CPT | Performed by: FAMILY MEDICINE

## 2019-06-11 PROCEDURE — 99214 OFFICE O/P EST MOD 30 MIN: CPT | Performed by: FAMILY MEDICINE

## 2019-06-11 PROCEDURE — 80061 LIPID PANEL: CPT

## 2019-06-11 PROCEDURE — 1036F TOBACCO NON-USER: CPT | Performed by: FAMILY MEDICINE

## 2019-06-11 PROCEDURE — G8598 ASA/ANTIPLAT THER USED: HCPCS | Performed by: FAMILY MEDICINE

## 2019-06-11 PROCEDURE — G8420 CALC BMI NORM PARAMETERS: HCPCS | Performed by: FAMILY MEDICINE

## 2019-06-11 PROCEDURE — G8427 DOCREV CUR MEDS BY ELIG CLIN: HCPCS | Performed by: FAMILY MEDICINE

## 2019-06-11 PROCEDURE — 2022F DILAT RTA XM EVC RTNOPTHY: CPT | Performed by: FAMILY MEDICINE

## 2019-06-11 PROCEDURE — 82570 ASSAY OF URINE CREATININE: CPT

## 2019-06-11 PROCEDURE — 80053 COMPREHEN METABOLIC PANEL: CPT

## 2019-06-11 PROCEDURE — 3017F COLORECTAL CA SCREEN DOC REV: CPT | Performed by: FAMILY MEDICINE

## 2019-06-11 RX ORDER — LISINOPRIL 5 MG/1
5 TABLET ORAL DAILY
Qty: 90 TABLET | Refills: 1 | Status: SHIPPED | OUTPATIENT
Start: 2019-06-11 | End: 2019-12-08 | Stop reason: SDUPTHER

## 2019-06-11 ASSESSMENT — ENCOUNTER SYMPTOMS
BLURRED VISION: 1
SHORTNESS OF BREATH: 0

## 2019-06-11 NOTE — PROGRESS NOTES
Burleigh Leventhal, a female of 62 y.o. came to the office 6/11/2019. Patient Active Problem List   Diagnosis    Asymptomatic bilateral carotid artery stenosis    History of nuclear stress test    Mixed hyperlipidemia    Aortoiliac occlusive disease ([address]    PVD (peripheral vascular disease) with claudication ([facility]    Carotid stenosis, asymptomatic, bilateral    S/P carotid endarterectomy          Hyperlipidemia   This is a chronic problem. Associated symptoms include leg pain and myalgias (occas hs once a wk but tolerable. ). Pertinent negatives include no chest pain or shortness of breath. Current antihyperlipidemic treatment includes statins. There are no compliance problems. Diabetes   She presents for her follow-up diabetic visit. She has type 2 diabetes mellitus. Associated symptoms include blurred vision and foot paresthesias (L). Pertinent negatives for diabetes include no chest pain, no foot ulcerations, no polydipsia and no polyuria. Symptoms are stable. Current diabetic treatment includes insulin injections. She is compliant with treatment all of the time. She is following a generally healthy diet. Her breakfast blood glucose range is generally 180-200 mg/dl. An ACE inhibitor/angiotensin II receptor blocker is being taken. Eye exam is current. - not seeing her endocrinologist anymore. Would like to go back on Metformin to help insulin work better. PVD: has appt with vascular surgeon coming up.      Allergies   Allergen Reactions    Pcn [Penicillins] Hives and Swelling       Current Outpatient Medications on File Prior to Visit   Medication Sig Dispense Refill    clopidogrel (PLAVIX) 75 MG tablet Take 1 tablet by mouth daily 90 tablet 3    BD PEN NEEDLE SAMEER U/F 32G X 4 MM MISC       TOUJEO SOLOSTAR 300 UNIT/ML injection pen Inject 50 Units into the skin 2 times daily 10 pen 2    levothyroxine (SYNTHROID) 25 MCG tablet Take 25 mcg by mouth Daily      TALTZ 80 MG/ML SOAJ every 30 days  simvastatin (ZOCOR) 40 MG tablet Take 40 mg by mouth nightly      citalopram (CELEXA) 20 MG tablet TAKE 1 TABLET DAILY 90 tablet 3    Choline Fenofibrate (FENOFIBRIC ACID) 135 MG CPDR Take 135 mg by mouth daily       aspirin 81 MG tablet Take 81 mg by mouth daily      insulin lispro (HUMALOG) 100 UNIT/ML injection vial Inject into the skin 35 units in the morning then 12 units at lunchtime and 35 before  dinner       No current facility-administered medications on file prior to visit. Review of Systems   Eyes: Positive for blurred vision. Respiratory: Negative for shortness of breath. Cardiovascular: Negative for chest pain. Endocrine: Negative for polydipsia and polyuria. Musculoskeletal: Positive for myalgias (occas hs once a wk but tolerable. ). All other review of systems reviewed and are negative    OBJECTIVE:  /74   Pulse 63   Temp 98 °F (36.7 °C)   Wt 129 lb (58.5 kg)   SpO2 98%   BMI 22.14 kg/m²      Physical Exam   Constitutional: She is oriented to person, place, and time. She appears well-nourished. Eyes: Conjunctivae are normal. No scleral icterus. Neck: Neck supple. Carotid bruit is present. No thyromegaly present. Cardiovascular: Normal rate and regular rhythm. No murmur heard. Pulmonary/Chest: Effort normal and breath sounds normal. She has no wheezes. She has no rales. Abdominal: Soft. Bowel sounds are normal. She exhibits no mass. There is no tenderness. There is no rebound and no guarding. Musculoskeletal: Normal range of motion. She exhibits no edema. Lymphadenopathy:     She has no cervical adenopathy. Neurological: She is alert and oriented to person, place, and time. Skin: Skin is warm and dry. Psychiatric: She has a normal mood and affect. Vitals reviewed. ASSESSMENT AND PLAN:    Luis caban was seen today for hyperlipidemia, 6 month follow-up and blood work.     Diagnoses and all orders for this visit:    Uncontrolled type 2 diabetes mellitus with hyperglycemia (HCC)  -     lisinopril (PRINIVIL;ZESTRIL) 5 MG tablet; Take 1 tablet by mouth daily  -     Lipid Panel; Future  -     Comprehensive Metabolic Panel; Future  -     Hemoglobin A1C; Future  -     Microalbumin / Creatinine Urine Ratio; Future    PVD (peripheral vascular disease) with claudication ([facility]    - continue to work on smoking cessation. - consider Metformin based on labs  - refer back to endo when she wishes. Return for based on lab results.     Rony Garcia, DO

## 2019-06-13 ENCOUNTER — TELEPHONE (OUTPATIENT)
Dept: PRIMARY CARE CLINIC | Age: 64
End: 2019-06-13

## 2019-06-13 NOTE — TELEPHONE ENCOUNTER
Discussed elevated hemoglobin A1c. She does not want to see a new endocrinologist at this time. We therefore will add metformin 500 mg twice daily back to her regimen of insulin. Follow-up 3 months.

## 2019-07-23 RX ORDER — INSULIN GLARGINE 300 U/ML
INJECTION, SOLUTION SUBCUTANEOUS
Qty: 13.5 ML | Refills: 2 | Status: SHIPPED | OUTPATIENT
Start: 2019-07-23 | End: 2019-11-13 | Stop reason: SDUPTHER

## 2019-07-29 ENCOUNTER — HOSPITAL ENCOUNTER (OUTPATIENT)
Dept: CARDIOLOGY | Age: 64
Discharge: HOME | End: 2019-07-29
Payer: COMMERCIAL

## 2019-07-29 ENCOUNTER — OFFICE VISIT (OUTPATIENT)
Dept: VASCULAR SURGERY | Age: 64
End: 2019-07-29
Payer: COMMERCIAL

## 2019-07-29 DIAGNOSIS — Z98.890 S/P CAROTID ENDARTERECTOMY: ICD-10-CM

## 2019-07-29 DIAGNOSIS — I73.9 PVD (PERIPHERAL VASCULAR DISEASE) (HCC): Primary | ICD-10-CM

## 2019-07-29 DIAGNOSIS — I73.9 PVD (PERIPHERAL VASCULAR DISEASE) WITH CLAUDICATION (HCC): ICD-10-CM

## 2019-07-29 DIAGNOSIS — I65.23 CAROTID STENOSIS, ASYMPTOMATIC, BILATERAL: ICD-10-CM

## 2019-07-29 PROCEDURE — G8420 CALC BMI NORM PARAMETERS: HCPCS | Performed by: SURGERY

## 2019-07-29 PROCEDURE — 93923 UPR/LXTR ART STDY 3+ LVLS: CPT

## 2019-07-29 PROCEDURE — 3014F SCREEN MAMMO DOC REV: CPT | Performed by: SURGERY

## 2019-07-29 PROCEDURE — 1036F TOBACCO NON-USER: CPT | Performed by: SURGERY

## 2019-07-29 PROCEDURE — G8427 DOCREV CUR MEDS BY ELIG CLIN: HCPCS | Performed by: SURGERY

## 2019-07-29 PROCEDURE — 3017F COLORECTAL CA SCREEN DOC REV: CPT | Performed by: SURGERY

## 2019-07-29 PROCEDURE — G8598 ASA/ANTIPLAT THER USED: HCPCS | Performed by: SURGERY

## 2019-07-29 PROCEDURE — 99213 OFFICE O/P EST LOW 20 MIN: CPT | Performed by: SURGERY

## 2019-09-05 ENCOUNTER — OFFICE VISIT (OUTPATIENT)
Dept: PRIMARY CARE CLINIC | Age: 64
End: 2019-09-05
Payer: COMMERCIAL

## 2019-09-05 VITALS
OXYGEN SATURATION: 94 % | SYSTOLIC BLOOD PRESSURE: 134 MMHG | BODY MASS INDEX: 23 KG/M2 | WEIGHT: 134 LBS | TEMPERATURE: 97.7 F | DIASTOLIC BLOOD PRESSURE: 80 MMHG | HEART RATE: 81 BPM

## 2019-09-05 DIAGNOSIS — K22.4 ESOPHAGEAL DYSMOTILITY: ICD-10-CM

## 2019-09-05 DIAGNOSIS — J20.9 ACUTE BRONCHITIS, UNSPECIFIED ORGANISM: Primary | ICD-10-CM

## 2019-09-05 DIAGNOSIS — R12 HEARTBURN: ICD-10-CM

## 2019-09-05 PROCEDURE — 3017F COLORECTAL CA SCREEN DOC REV: CPT | Performed by: FAMILY MEDICINE

## 2019-09-05 PROCEDURE — G8420 CALC BMI NORM PARAMETERS: HCPCS | Performed by: FAMILY MEDICINE

## 2019-09-05 PROCEDURE — G8598 ASA/ANTIPLAT THER USED: HCPCS | Performed by: FAMILY MEDICINE

## 2019-09-05 PROCEDURE — 3014F SCREEN MAMMO DOC REV: CPT | Performed by: FAMILY MEDICINE

## 2019-09-05 PROCEDURE — 99214 OFFICE O/P EST MOD 30 MIN: CPT | Performed by: FAMILY MEDICINE

## 2019-09-05 PROCEDURE — 1036F TOBACCO NON-USER: CPT | Performed by: FAMILY MEDICINE

## 2019-09-05 PROCEDURE — G8427 DOCREV CUR MEDS BY ELIG CLIN: HCPCS | Performed by: FAMILY MEDICINE

## 2019-09-05 RX ORDER — AZITHROMYCIN 250 MG/1
TABLET, FILM COATED ORAL
Qty: 1 PACKET | Refills: 0 | Status: SHIPPED | OUTPATIENT
Start: 2019-09-05 | End: 2020-01-06 | Stop reason: ALTCHOICE

## 2019-09-05 RX ORDER — OMEPRAZOLE 20 MG/1
20 CAPSULE, DELAYED RELEASE ORAL DAILY
Qty: 30 CAPSULE | Refills: 1 | Status: SHIPPED | OUTPATIENT
Start: 2019-09-05 | End: 2019-09-27 | Stop reason: SDUPTHER

## 2019-09-05 ASSESSMENT — ENCOUNTER SYMPTOMS
VOMITING: 0
RHINORRHEA: 1
DIARRHEA: 0
ABDOMINAL PAIN: 0
COUGH: 1
NAUSEA: 0
SHORTNESS OF BREATH: 1
CONSTIPATION: 0

## 2019-09-13 ENCOUNTER — TELEPHONE (OUTPATIENT)
Dept: PRIMARY CARE CLINIC | Age: 64
End: 2019-09-13

## 2019-09-13 RX ORDER — LEVOFLOXACIN 500 MG/1
500 TABLET, FILM COATED ORAL DAILY
Qty: 10 TABLET | Refills: 0 | Status: SHIPPED | OUTPATIENT
Start: 2019-09-13 | End: 2019-09-23

## 2019-09-13 NOTE — TELEPHONE ENCOUNTER
Still no better since finishing Z-Jalen. Have a hard time breathing. Therefore Levaquin 500 mg daily. Medication called in. If worsens go to emergency room.

## 2019-09-27 DIAGNOSIS — R12 HEARTBURN: ICD-10-CM

## 2019-09-27 DIAGNOSIS — K22.4 ESOPHAGEAL DYSMOTILITY: Primary | ICD-10-CM

## 2019-09-27 RX ORDER — OMEPRAZOLE 20 MG/1
CAPSULE, DELAYED RELEASE ORAL
Qty: 30 CAPSULE | Refills: 1 | Status: SHIPPED
Start: 2019-09-27 | End: 2020-08-27 | Stop reason: ALTCHOICE

## 2019-09-30 ENCOUNTER — OFFICE VISIT (OUTPATIENT)
Dept: PRIMARY CARE CLINIC | Age: 64
End: 2019-09-30
Payer: COMMERCIAL

## 2019-09-30 VITALS
BODY MASS INDEX: 22.14 KG/M2 | WEIGHT: 129 LBS | TEMPERATURE: 98.2 F | OXYGEN SATURATION: 95 % | DIASTOLIC BLOOD PRESSURE: 80 MMHG | SYSTOLIC BLOOD PRESSURE: 138 MMHG | HEART RATE: 95 BPM

## 2019-09-30 DIAGNOSIS — S40.219A ABRASION, SHOULDER W/O INFECTION: ICD-10-CM

## 2019-09-30 DIAGNOSIS — S30.0XXA LUMBAR CONTUSION, INITIAL ENCOUNTER: Primary | ICD-10-CM

## 2019-09-30 PROCEDURE — G8598 ASA/ANTIPLAT THER USED: HCPCS | Performed by: FAMILY MEDICINE

## 2019-09-30 PROCEDURE — G8420 CALC BMI NORM PARAMETERS: HCPCS | Performed by: FAMILY MEDICINE

## 2019-09-30 PROCEDURE — G8427 DOCREV CUR MEDS BY ELIG CLIN: HCPCS | Performed by: FAMILY MEDICINE

## 2019-09-30 PROCEDURE — 99213 OFFICE O/P EST LOW 20 MIN: CPT | Performed by: FAMILY MEDICINE

## 2019-09-30 PROCEDURE — 3017F COLORECTAL CA SCREEN DOC REV: CPT | Performed by: FAMILY MEDICINE

## 2019-09-30 PROCEDURE — 3014F SCREEN MAMMO DOC REV: CPT | Performed by: FAMILY MEDICINE

## 2019-09-30 PROCEDURE — 1036F TOBACCO NON-USER: CPT | Performed by: FAMILY MEDICINE

## 2019-09-30 ASSESSMENT — ENCOUNTER SYMPTOMS: BACK PAIN: 1

## 2019-10-04 ENCOUNTER — OFFICE VISIT (OUTPATIENT)
Dept: SURGERY | Age: 64
End: 2019-10-04
Payer: COMMERCIAL

## 2019-10-04 VITALS
SYSTOLIC BLOOD PRESSURE: 159 MMHG | RESPIRATION RATE: 16 BRPM | WEIGHT: 129 LBS | HEIGHT: 64 IN | HEART RATE: 65 BPM | DIASTOLIC BLOOD PRESSURE: 67 MMHG | BODY MASS INDEX: 22.02 KG/M2 | TEMPERATURE: 98 F

## 2019-10-04 DIAGNOSIS — R13.10 DYSPHAGIA, UNSPECIFIED TYPE: Primary | ICD-10-CM

## 2019-10-04 PROCEDURE — 4004F PT TOBACCO SCREEN RCVD TLK: CPT | Performed by: SURGERY

## 2019-10-04 PROCEDURE — 3014F SCREEN MAMMO DOC REV: CPT | Performed by: SURGERY

## 2019-10-04 PROCEDURE — G8427 DOCREV CUR MEDS BY ELIG CLIN: HCPCS | Performed by: SURGERY

## 2019-10-04 PROCEDURE — G8598 ASA/ANTIPLAT THER USED: HCPCS | Performed by: SURGERY

## 2019-10-04 PROCEDURE — 99203 OFFICE O/P NEW LOW 30 MIN: CPT | Performed by: SURGERY

## 2019-10-04 PROCEDURE — 3017F COLORECTAL CA SCREEN DOC REV: CPT | Performed by: SURGERY

## 2019-10-04 PROCEDURE — G8484 FLU IMMUNIZE NO ADMIN: HCPCS | Performed by: SURGERY

## 2019-10-04 PROCEDURE — G8420 CALC BMI NORM PARAMETERS: HCPCS | Performed by: SURGERY

## 2019-10-07 ENCOUNTER — TELEPHONE (OUTPATIENT)
Dept: SURGERY | Age: 64
End: 2019-10-07

## 2019-10-08 ENCOUNTER — TELEPHONE (OUTPATIENT)
Dept: SURGERY | Age: 64
End: 2019-10-08

## 2019-10-18 ENCOUNTER — HOSPITAL ENCOUNTER (OUTPATIENT)
Dept: ULTRASOUND IMAGING | Age: 64
Discharge: HOME | End: 2019-10-20
Payer: COMMERCIAL

## 2019-10-18 DIAGNOSIS — R13.10 DYSPHAGIA, UNSPECIFIED TYPE: ICD-10-CM

## 2019-10-18 PROCEDURE — 76536 US EXAM OF HEAD AND NECK: CPT

## 2019-10-22 ENCOUNTER — HOSPITAL ENCOUNTER (OUTPATIENT)
Dept: GENERAL RADIOLOGY | Age: 64
Discharge: HOME | End: 2019-10-24
Payer: COMMERCIAL

## 2019-10-22 DIAGNOSIS — R13.10 DYSPHAGIA, UNSPECIFIED TYPE: ICD-10-CM

## 2019-10-22 PROCEDURE — A4641 RADIOPHARM DX AGENT NOC: HCPCS | Performed by: RADIOLOGY

## 2019-10-22 PROCEDURE — 2500000003 HC RX 250 WO HCPCS: Performed by: RADIOLOGY

## 2019-10-22 PROCEDURE — 74230 X-RAY XM SWLNG FUNCJ C+: CPT

## 2019-10-22 PROCEDURE — 6370000000 HC RX 637 (ALT 250 FOR IP): Performed by: RADIOLOGY

## 2019-10-22 PROCEDURE — 6360000004 HC RX CONTRAST MEDICATION: Performed by: RADIOLOGY

## 2019-10-22 PROCEDURE — 92526 ORAL FUNCTION THERAPY: CPT | Performed by: SPEECH-LANGUAGE PATHOLOGIST

## 2019-10-22 PROCEDURE — 92611 MOTION FLUOROSCOPY/SWALLOW: CPT | Performed by: SPEECH-LANGUAGE PATHOLOGIST

## 2019-10-22 RX ADMIN — BARIUM SULFATE 70 G: 0.81 POWDER, FOR SUSPENSION ORAL at 09:48

## 2019-10-22 RX ADMIN — BARIUM SULFATE 120 ML: 400 SUSPENSION ORAL at 09:48

## 2019-10-22 RX ADMIN — BARIUM SULFATE 1 TABLET: 700 TABLET ORAL at 08:57

## 2019-10-23 ENCOUNTER — HOSPITAL ENCOUNTER (OUTPATIENT)
Dept: GENERAL RADIOLOGY | Age: 64
Discharge: HOME | End: 2019-10-25
Payer: COMMERCIAL

## 2019-10-23 DIAGNOSIS — R13.19 OTHER DYSPHAGIA: ICD-10-CM

## 2019-10-23 DIAGNOSIS — R13.10 DYSPHAGIA, UNSPECIFIED TYPE: ICD-10-CM

## 2019-10-23 PROCEDURE — 6370000000 HC RX 637 (ALT 250 FOR IP): Performed by: SURGERY

## 2019-10-23 PROCEDURE — A4641 RADIOPHARM DX AGENT NOC: HCPCS | Performed by: SURGERY

## 2019-10-23 PROCEDURE — 6360000004 HC RX CONTRAST MEDICATION: Performed by: SURGERY

## 2019-10-23 PROCEDURE — 2500000003 HC RX 250 WO HCPCS: Performed by: SURGERY

## 2019-10-23 PROCEDURE — 74220 X-RAY XM ESOPHAGUS 1CNTRST: CPT

## 2019-10-23 RX ADMIN — BARIUM SULFATE 140 ML: 980 POWDER, FOR SUSPENSION ORAL at 11:31

## 2019-10-23 RX ADMIN — BARIUM SULFATE 176 ML: 960 POWDER, FOR SUSPENSION ORAL at 11:31

## 2019-10-23 RX ADMIN — ANTACID/ANTIFLATULENT 1 EACH: 380; 550; 10; 10 GRANULE, EFFERVESCENT ORAL at 11:31

## 2019-10-23 RX ADMIN — BARIUM SULFATE 1 TABLET: 700 TABLET ORAL at 11:31

## 2019-11-08 ENCOUNTER — OFFICE VISIT (OUTPATIENT)
Dept: SURGERY | Age: 64
End: 2019-11-08
Payer: COMMERCIAL

## 2019-11-08 VITALS
WEIGHT: 131 LBS | DIASTOLIC BLOOD PRESSURE: 50 MMHG | TEMPERATURE: 98.2 F | BODY MASS INDEX: 22.36 KG/M2 | SYSTOLIC BLOOD PRESSURE: 120 MMHG | RESPIRATION RATE: 16 BRPM | HEART RATE: 71 BPM | HEIGHT: 64 IN

## 2019-11-08 DIAGNOSIS — R13.10 DYSPHAGIA, UNSPECIFIED TYPE: Primary | ICD-10-CM

## 2019-11-08 PROCEDURE — G8598 ASA/ANTIPLAT THER USED: HCPCS | Performed by: SURGERY

## 2019-11-08 PROCEDURE — G9899 SCRN MAM PERF RSLTS DOC: HCPCS | Performed by: SURGERY

## 2019-11-08 PROCEDURE — 4004F PT TOBACCO SCREEN RCVD TLK: CPT | Performed by: SURGERY

## 2019-11-08 PROCEDURE — G8420 CALC BMI NORM PARAMETERS: HCPCS | Performed by: SURGERY

## 2019-11-08 PROCEDURE — 3017F COLORECTAL CA SCREEN DOC REV: CPT | Performed by: SURGERY

## 2019-11-08 PROCEDURE — G8427 DOCREV CUR MEDS BY ELIG CLIN: HCPCS | Performed by: SURGERY

## 2019-11-08 PROCEDURE — 99213 OFFICE O/P EST LOW 20 MIN: CPT | Performed by: SURGERY

## 2019-11-08 PROCEDURE — G8484 FLU IMMUNIZE NO ADMIN: HCPCS | Performed by: SURGERY

## 2019-11-12 NOTE — PATIENT INSTRUCTIONS
Advised to keep lower extremities elevated as much as possible and to take HCTZ .    are dizzy or lightheaded, or you feel like you may faint.    Watch closely for changes in your health, and be sure to contact your doctor if you have any problems. Where can you learn more? Go to [email or web address] and sign in to your [employer] account. Enter Y969 in the [employer] box to learn more about \"Carotid Stenosis: Care Instructions. \"     If you do not have an account, please click on the \"Sign Up Now\" link. Current as of: December 6, 2017  Content Version: 11.7  © 6106-4252 [employer]. Care instructions adapted under license by [facility] ([facility]). If you have questions about a medical condition or this instruction, always ask your healthcare professional. [address] any warranty or liability for your use of this information.

## 2019-11-13 RX ORDER — INSULIN GLARGINE 300 U/ML
INJECTION, SOLUTION SUBCUTANEOUS
Qty: 2 PEN | Refills: 2 | Status: SHIPPED | OUTPATIENT
Start: 2019-11-13 | End: 2019-12-08 | Stop reason: SDUPTHER

## 2019-12-08 DIAGNOSIS — E11.65 UNCONTROLLED TYPE 2 DIABETES MELLITUS WITH HYPERGLYCEMIA (HCC): ICD-10-CM

## 2019-12-09 RX ORDER — INSULIN GLARGINE 300 U/ML
INJECTION, SOLUTION SUBCUTANEOUS
Qty: 5 PEN | Refills: 2 | Status: SHIPPED
Start: 2019-12-09 | End: 2022-08-29 | Stop reason: SDUPTHER

## 2019-12-09 RX ORDER — LISINOPRIL 5 MG/1
TABLET ORAL
Qty: 90 TABLET | Refills: 1 | Status: SHIPPED
Start: 2019-12-09 | End: 2020-06-08

## 2020-01-06 ENCOUNTER — OFFICE VISIT (OUTPATIENT)
Dept: VASCULAR SURGERY | Age: 65
End: 2020-01-06
Payer: COMMERCIAL

## 2020-01-06 ENCOUNTER — HOSPITAL ENCOUNTER (OUTPATIENT)
Dept: CARDIOLOGY | Age: 65
Discharge: HOME | End: 2020-01-06
Payer: COMMERCIAL

## 2020-01-06 PROBLEM — M31.6 TEMPORAL ARTERITIS (HCC): Status: ACTIVE | Noted: 2020-01-06

## 2020-01-06 PROCEDURE — G9899 SCRN MAM PERF RSLTS DOC: HCPCS | Performed by: SURGERY

## 2020-01-06 PROCEDURE — 3017F COLORECTAL CA SCREEN DOC REV: CPT | Performed by: SURGERY

## 2020-01-06 PROCEDURE — 93923 UPR/LXTR ART STDY 3+ LVLS: CPT

## 2020-01-06 PROCEDURE — G8484 FLU IMMUNIZE NO ADMIN: HCPCS | Performed by: SURGERY

## 2020-01-06 PROCEDURE — G8427 DOCREV CUR MEDS BY ELIG CLIN: HCPCS | Performed by: SURGERY

## 2020-01-06 PROCEDURE — 4004F PT TOBACCO SCREEN RCVD TLK: CPT | Performed by: SURGERY

## 2020-01-06 PROCEDURE — 99215 OFFICE O/P EST HI 40 MIN: CPT | Performed by: SURGERY

## 2020-01-06 PROCEDURE — 93880 EXTRACRANIAL BILAT STUDY: CPT

## 2020-01-06 PROCEDURE — G8420 CALC BMI NORM PARAMETERS: HCPCS | Performed by: SURGERY

## 2020-01-06 NOTE — PROGRESS NOTES
 insulin detemir (LEVEMIR) 100 UNIT/ML injection vial       omeprazole (PRILOSEC) 20 MG delayed release capsule TAKE 1 CAPSULE BY MOUTH EVERY DAY (Patient not taking: Reported on 2020) 30 capsule 1     No current facility-administered medications for this visit.       Allergies:  Pcn [penicillins]  Social History     Socioeconomic History    Marital status:      Spouse name: Not on file    Number of children: Not on file    Years of education: Not on file    Highest education level: Not on file   Occupational History    Occupation:  bookeeper   Social Needs    Financial resource strain: Not on file    Food insecurity:     Worry: Not on file     Inability: Not on file    Transportation needs:     Medical: Not on file     Non-medical: Not on file   Tobacco Use    Smoking status: Current Some Day Smoker     Packs/day: 0.25     Years: 40.00     Pack years: 10.00     Types: Cigarettes     Start date:      Last attempt to quit: 2018     Years since quittin.4    Smokeless tobacco: Never Used    Tobacco comment: not snoke 1.5 wk   Substance and Sexual Activity    Alcohol use: No     Comment: rare    Drug use: No    Sexual activity: Not on file   Lifestyle    Physical activity:     Days per week: Not on file     Minutes per session: Not on file    Stress: Not on file   Relationships    Social connections:     Talks on phone: Not on file     Gets together: Not on file     Attends Sikh service: Not on file     Active member of club or organization: Not on file     Attends meetings of clubs or organizations: Not on file     Relationship status: Not on file    Intimate partner violence:     Fear of current or ex partner: Not on file     Emotionally abused: Not on file     Physically abused: Not on file     Forced sexual activity: Not on file   Other Topics Concern    Not on file   Social History Narrative    Not on file     Family History   Problem Relation Age of Onset occlusions, and aneurysmal degeneration    It can result in vision loss untreated   All ? Answered   We discussed bilateral temporal artery biopsy   I reviewed the procedure with the patient and family as available. I discussed the procedure, risks, benefits, complications, and alternatives of the procedure.      Will see what results of lab tests are   Will also discuss with Dr. Nelida Petersen considering hx of sinus headaches and previous sinus surgery   Recommended she fu with him as she has seen him in the past   She understands if sxs develop again she will call me     Che Mcknight

## 2020-01-20 RX ORDER — CITALOPRAM 20 MG/1
TABLET ORAL
Qty: 90 TABLET | Refills: 1 | Status: SHIPPED
Start: 2020-01-20 | End: 2020-07-20

## 2020-02-04 ENCOUNTER — TELEPHONE (OUTPATIENT)
Dept: VASCULAR SURGERY | Age: 65
End: 2020-02-04

## 2020-02-06 ENCOUNTER — TELEPHONE (OUTPATIENT)
Dept: VASCULAR SURGERY | Age: 65
End: 2020-02-06

## 2020-02-06 NOTE — TELEPHONE ENCOUNTER
Is your office setting this up or do you need me to refer her to Dr. Brijesh Montoya. Also are you ordering the CAT scan of the sinus just making sure. I had ordered scan trying to facilitate but she doesn't want to have done yet    Pt response to my staff below    Spoke with pt regarding blood work results. She was notified that Dr. Daniela Carmen is recommending a sinus CT and follow up with Dr. Brijesh Montoya. Pt states she will call Dr. Humberto Cardozo office to schedule and would prefer to see him and let him schedule whatever he deems necessary as he did her previous sinus surgeries.     [email or web address]

## 2020-02-28 ENCOUNTER — HOSPITAL ENCOUNTER (OUTPATIENT)
Age: 65
Discharge: HOME | End: 2020-03-01
Payer: COMMERCIAL

## 2020-02-28 ENCOUNTER — OFFICE VISIT (OUTPATIENT)
Dept: PRIMARY CARE CLINIC | Age: 65
End: 2020-02-28
Payer: COMMERCIAL

## 2020-02-28 VITALS
BODY MASS INDEX: 22.71 KG/M2 | HEART RATE: 61 BPM | OXYGEN SATURATION: 95 % | WEIGHT: 133 LBS | HEIGHT: 64 IN | SYSTOLIC BLOOD PRESSURE: 130 MMHG | TEMPERATURE: 98 F | DIASTOLIC BLOOD PRESSURE: 78 MMHG

## 2020-02-28 LAB
ALBUMIN SERPL-MCNC: 4.3 G/DL (ref 3.5–5.2)
ALP BLD-CCNC: 106 U/L (ref 35–104)
ALT SERPL-CCNC: 15 U/L (ref 0–32)
ANION GAP SERPL CALCULATED.3IONS-SCNC: 15 MMOL/L (ref 7–16)
AST SERPL-CCNC: 15 U/L (ref 0–31)
BILIRUB SERPL-MCNC: <0.2 MG/DL (ref 0–1.2)
BUN BLDV-MCNC: 7 MG/DL (ref 8–23)
CALCIUM SERPL-MCNC: 10.1 MG/DL (ref 8.6–10.2)
CHLORIDE BLD-SCNC: 99 MMOL/L (ref 98–107)
CHOLESTEROL, TOTAL: 206 MG/DL (ref 0–199)
CO2: 22 MMOL/L (ref 22–29)
CREAT SERPL-MCNC: 0.5 MG/DL (ref 0.5–1)
GFR AFRICAN AMERICAN: >60
GFR NON-AFRICAN AMERICAN: >60 ML/MIN/1.73
GLUCOSE BLD-MCNC: 165 MG/DL (ref 74–99)
HBA1C MFR BLD: 10.7 % (ref 4–5.6)
HDLC SERPL-MCNC: 38 MG/DL
LDL CHOLESTEROL CALCULATED: 123 MG/DL (ref 0–99)
POTASSIUM SERPL-SCNC: 4.8 MMOL/L (ref 3.5–5)
SODIUM BLD-SCNC: 136 MMOL/L (ref 132–146)
TOTAL PROTEIN: 6.8 G/DL (ref 6.4–8.3)
TRIGL SERPL-MCNC: 226 MG/DL (ref 0–149)
VLDLC SERPL CALC-MCNC: 45 MG/DL

## 2020-02-28 PROCEDURE — 80061 LIPID PANEL: CPT

## 2020-02-28 PROCEDURE — G9899 SCRN MAM PERF RSLTS DOC: HCPCS | Performed by: FAMILY MEDICINE

## 2020-02-28 PROCEDURE — 3046F HEMOGLOBIN A1C LEVEL >9.0%: CPT | Performed by: FAMILY MEDICINE

## 2020-02-28 PROCEDURE — 80053 COMPREHEN METABOLIC PANEL: CPT

## 2020-02-28 PROCEDURE — G8484 FLU IMMUNIZE NO ADMIN: HCPCS | Performed by: FAMILY MEDICINE

## 2020-02-28 PROCEDURE — G8420 CALC BMI NORM PARAMETERS: HCPCS | Performed by: FAMILY MEDICINE

## 2020-02-28 PROCEDURE — 83036 HEMOGLOBIN GLYCOSYLATED A1C: CPT

## 2020-02-28 PROCEDURE — 4004F PT TOBACCO SCREEN RCVD TLK: CPT | Performed by: FAMILY MEDICINE

## 2020-02-28 PROCEDURE — 2022F DILAT RTA XM EVC RTNOPTHY: CPT | Performed by: FAMILY MEDICINE

## 2020-02-28 PROCEDURE — 3017F COLORECTAL CA SCREEN DOC REV: CPT | Performed by: FAMILY MEDICINE

## 2020-02-28 PROCEDURE — 99214 OFFICE O/P EST MOD 30 MIN: CPT | Performed by: FAMILY MEDICINE

## 2020-02-28 PROCEDURE — G8427 DOCREV CUR MEDS BY ELIG CLIN: HCPCS | Performed by: FAMILY MEDICINE

## 2020-02-28 RX ORDER — AMITRIPTYLINE HYDROCHLORIDE 10 MG/1
10 TABLET, FILM COATED ORAL NIGHTLY
Qty: 30 TABLET | Refills: 5 | Status: SHIPPED
Start: 2020-02-28 | End: 2020-06-16

## 2020-02-28 ASSESSMENT — ENCOUNTER SYMPTOMS
ABDOMINAL PAIN: 0
DIARRHEA: 0
BLOOD IN STOOL: 0
VISUAL CHANGE: 0
SHORTNESS OF BREATH: 0
BLURRED VISION: 0
CHEST TIGHTNESS: 0
CONSTIPATION: 0

## 2020-02-28 ASSESSMENT — PATIENT HEALTH QUESTIONNAIRE - PHQ9
2. FEELING DOWN, DEPRESSED OR HOPELESS: 0
SUM OF ALL RESPONSES TO PHQ9 QUESTIONS 1 & 2: 0
SUM OF ALL RESPONSES TO PHQ QUESTIONS 1-9: 0
1. LITTLE INTEREST OR PLEASURE IN DOING THINGS: 0
SUM OF ALL RESPONSES TO PHQ QUESTIONS 1-9: 0

## 2020-02-28 NOTE — PROGRESS NOTES
Pinky Olvera, a female of 60 y.o. came to the office 2/28/2020. Patient Active Problem List   Diagnosis    Asymptomatic bilateral carotid artery stenosis    History of nuclear stress test    Mixed hyperlipidemia    Aortoiliac occlusive disease ([address]    PVD (peripheral vascular disease) with claudication ([address]    Carotid stenosis, asymptomatic, bilateral    S/P carotid endarterectomy    Temporal arteritis (HCC)          Headaches:  Has been having intermittent headaches. There were two episodes of severe headaches that lead to vision changes (blackout during one occurrence that gradually returned followed by seeing triple; the second occurrence she has double vision). She still is getting almost daily headaches at this time but they are not leading to visual changes now and not as bad as those two incidences. She said that the pain in her head never involved the temporal region. It is mostly the top and front of the left side of head and she describes it as aching and band-like. She said ibuprofen helps them. She has been evaluated with a sed rate from her vascular surgeon Dr. Deena Amador to ensure it wasn't GCA with headache and vision change which she says came out to be negative according to his office. There is no some concern that it could be a sinus issue and she has made an appoint with Dr. Usman Zapata on July 21st for evaluation being that she has had 2 sinus surgeries in the past.    Diabetes   She presents for her follow-up diabetic visit. She has type 2 diabetes mellitus. Hypoglycemia symptoms include dizziness, headaches and nervousness/anxiousness. Pertinent negatives for hypoglycemia include no sweats. Associated symptoms include foot paresthesias and polydipsia. Pertinent negatives for diabetes include no blurred vision, no chest pain, no polyuria, no visual change, no weakness and no weight loss. Polyphagia:   left leg from prior surgery in back.  Risk factors for coronary artery disease daily       aspirin 81 MG tablet Take 81 mg by mouth daily      insulin lispro (HUMALOG) 100 UNIT/ML injection vial Inject into the skin 35 units in the morning then 12 units at lunchtime and 35 before  dinner      simvastatin (ZOCOR) 40 MG tablet Take 40 mg by mouth nightly       No current facility-administered medications on file prior to visit. Review of Systems   Constitutional: Negative for diaphoresis and weight loss. Eyes: Positive for visual disturbance. Negative for blurred vision. Respiratory: Negative for chest tightness and shortness of breath. Cardiovascular: Negative for chest pain, palpitations and leg swelling. Gastrointestinal: Negative for abdominal pain, blood in stool, constipation and diarrhea. Endocrine: Positive for polydipsia. Negative for polyuria. Polyphagia:   left leg from prior surgery in back. Neurological: Positive for dizziness and headaches. Negative for weakness. Psychiatric/Behavioral: The patient is nervous/anxious. other review of systems reviewed and are negative    OBJECTIVE:  /78   Pulse 61   Temp 98 °F (36.7 °C)   Ht 5' 4\" (1.626 m)   Wt 133 lb (60.3 kg)   SpO2 95%   BMI 22.83 kg/m²      Physical Exam  Constitutional:       Appearance: Normal appearance. HENT:      Head: Normocephalic and atraumatic. Mouth/Throat:      Mouth: Mucous membranes are moist.   Eyes:      General: No scleral icterus. Extraocular Movements: Extraocular movements intact. Conjunctiva/sclera: Conjunctivae normal.      Pupils: Pupils are equal, round, and reactive to light. Neck:      Musculoskeletal: Neck supple. Cardiovascular:      Rate and Rhythm: Normal rate and regular rhythm. Heart sounds: Murmur (  6-4/0 systolic ejection murmur at apex) present. No gallop. Pulmonary:      Effort: Pulmonary effort is normal.      Breath sounds: No wheezing, rhonchi or rales.    Musculoskeletal:      Cervical back: She exhibits tenderness (let upper lat masses. ). She exhibits normal range of motion. Lymphadenopathy:      Cervical: No cervical adenopathy. Skin:     General: Skin is warm. Neurological:      General: No focal deficit present. Mental Status: She is alert and oriented to person, place, and time. Sensory: No sensory deficit. Motor: No weakness. Coordination: Coordination normal.      Gait: Gait normal.         ASSESSMENT AND PLAN:    Juanjose Hall was seen today for diabetes and blood work. Diagnoses and all orders for this visit:    Uncontrolled type 2 diabetes mellitus with hyperglycemia ([facility]  -     Lipid Panel; Future  -     Comprehensive Metabolic Panel; Future  -     Hemoglobin A1C; Future    Bilateral headache  -     amitriptyline (ELAVIL) 10 MG tablet; Take 1 tablet by mouth nightly    Anxiety    - encourage to take humalog with all meals  - may need insulin adjustment based on labs  - try Elavil for ha's  - continue current dose of Celexa   - quit cig's. Return for based on lab results. I reviewed the students documentation ( Hx, exam, MDM ), examined the patient and performed the A&P.     Alec Garcia DO

## 2020-03-03 ENCOUNTER — TELEPHONE (OUTPATIENT)
Dept: PRIMARY CARE CLINIC | Age: 65
End: 2020-03-03

## 2020-03-03 PROBLEM — Z72.0 TOBACCO ABUSE: Status: ACTIVE | Noted: 2020-03-03

## 2020-04-08 RX ORDER — CLOPIDOGREL BISULFATE 75 MG/1
TABLET ORAL
Qty: 90 TABLET | Refills: 3 | Status: SHIPPED
Start: 2020-04-08 | End: 2021-01-11 | Stop reason: SDUPTHER

## 2020-06-16 RX ORDER — AMITRIPTYLINE HYDROCHLORIDE 10 MG/1
TABLET, FILM COATED ORAL
Qty: 90 TABLET | Refills: 1 | Status: SHIPPED
Start: 2020-06-16 | End: 2020-12-17

## 2020-07-21 ENCOUNTER — OFFICE VISIT (OUTPATIENT)
Dept: ENT CLINIC | Age: 65
End: 2020-07-21
Payer: COMMERCIAL

## 2020-07-21 VITALS — WEIGHT: 130 LBS | TEMPERATURE: 97.6 F | HEIGHT: 64 IN | BODY MASS INDEX: 22.2 KG/M2

## 2020-07-21 PROCEDURE — G8427 DOCREV CUR MEDS BY ELIG CLIN: HCPCS | Performed by: OTOLARYNGOLOGY

## 2020-07-21 PROCEDURE — 4004F PT TOBACCO SCREEN RCVD TLK: CPT | Performed by: OTOLARYNGOLOGY

## 2020-07-21 PROCEDURE — G9899 SCRN MAM PERF RSLTS DOC: HCPCS | Performed by: OTOLARYNGOLOGY

## 2020-07-21 PROCEDURE — 99204 OFFICE O/P NEW MOD 45 MIN: CPT | Performed by: OTOLARYNGOLOGY

## 2020-07-21 PROCEDURE — 3017F COLORECTAL CA SCREEN DOC REV: CPT | Performed by: OTOLARYNGOLOGY

## 2020-07-21 PROCEDURE — G8420 CALC BMI NORM PARAMETERS: HCPCS | Performed by: OTOLARYNGOLOGY

## 2020-07-21 PROCEDURE — 31575 DIAGNOSTIC LARYNGOSCOPY: CPT | Performed by: OTOLARYNGOLOGY

## 2020-07-21 RX ORDER — OMEPRAZOLE 40 MG/1
40 CAPSULE, DELAYED RELEASE ORAL DAILY
Qty: 30 CAPSULE | Refills: 1 | Status: SHIPPED
Start: 2020-07-21 | End: 2020-08-14

## 2020-07-21 RX ORDER — FLUTICASONE PROPIONATE 50 MCG
2 SPRAY, SUSPENSION (ML) NASAL DAILY
Qty: 1 BOTTLE | Refills: 1 | Status: SHIPPED
Start: 2020-07-21 | End: 2020-08-14

## 2020-07-21 ASSESSMENT — ENCOUNTER SYMPTOMS
EYES NEGATIVE: 1
COUGH: 1
ALLERGIC/IMMUNOLOGIC NEGATIVE: 1
SORE THROAT: 1
TROUBLE SWALLOWING: 1
VOICE CHANGE: 1

## 2020-07-21 NOTE — PATIENT INSTRUCTIONS
Thank you for choosing our [facility] or [facility]  [facility] practice. We are committed to your medical treatment and  care. If you need to reschedule or cancel your surgery or follow up  appointment, please call the surgery scheduler at [phone]. INSTRUCTIONS FOR SURGERY Direct Laryngoscopy w/ Biopsy Bronchoscopy, Esophagoscopy    Nothing to eat or drink after midnight the night before surgery unless surgery is at [facility] or otherwise instructed by the hospital.    DO NOT TAKE ANY ASPIRIN PRODUCTS 7 days prior to surgery-unless required by your cardiologist or primary care physician. Tylenol only. No Advil, Motrin, Aleve, or Ibuprofen    Any illegal drugs in your system (including Marijuana even if legally prescribed) will result in your surgery being cancelled. Please be sure to check with our office or the hospital on time frame for the drugs to be out of your system. Should your insurance change at any time you must contact our office. Failure to do so may result in your surgery being rescheduled. If you need paperwork filled out for work, you must give the office 2 weeks to complete and submit the forms.       [address], [address], [facility] will call you a couple days prior to your surgery and give you further instructions, if any questions call them at [phone]

## 2020-07-21 NOTE — PROGRESS NOTES
Subjective:      Patient ID:  Diego Rouse is a 60 y.o. female. HPI:    Dysphagia  Patient presents with dysphagia. The patient describes multiple episode(s) with gradual onset, located in the mid neck, and symptoms starting 1.5 years. Patient reports large food bolus only, pills and liquids have become stuck. Associated with the dysphagia, patient also complains of dysphagia, globus sensation, hoarseness, post nasal drainage, heartburn and aspiration. Patientdenies regurgitation of undigested food. Denies weight loss, fever, chills, night sweats. Has been evaluated by Dr. Lieutenant Garcia, gen Surg, for dysphagia 10 months ago with esophagram that showed mild GERD and barium swallow that showed transient laryngeal penetration with thin liquids and US thyroid hat showed multiple sub centimeter  thyroid nodules. Did see speech therapy but reports little improvement. Patient states symptoms worsened after carotid endarterectomy in 2018  Patient is 40+ yr smoker    Patient's medications, allergies, past medical, surgical, social and family histories were reviewed and updated as appropriate. Review of Systems   Constitutional: Negative. HENT: Positive for congestion, postnasal drip, sore throat, trouble swallowing and voice change. Eyes: Negative. Respiratory: Positive for cough. Skin: Negative. Allergic/Immunologic: Negative. Neurological: Negative. Hematological: Negative. Psychiatric/Behavioral: Negative. All other systems reviewed and are negative. Objective:   Physical Exam  Vitals signs reviewed. Constitutional:       Appearance: Normal appearance. HENT:      Head: Normocephalic. Right Ear: Tympanic membrane, ear canal and external ear normal.      Left Ear: Tympanic membrane, ear canal and external ear normal.      Nose: Septal deviation (right), mucosal edema and rhinorrhea present. No congestion. Rhinorrhea is clear.       Mouth/Throat:      Mouth: Mucous membranes are moist.      Dentition: Abnormal dentition. Tongue: No lesions. Palate: No mass. Pharynx: Oropharynx is clear. Uvula midline. Tonsils: No tonsillar exudate. 0 on the right. 0 on the left. Eyes:      Conjunctiva/sclera: Conjunctivae normal.   Neck:      Musculoskeletal: Normal range of motion and neck supple. Thyroid: No thyroid mass, thyromegaly or thyroid tenderness. Trachea: Trachea normal.   Cardiovascular:      Rate and Rhythm: Normal rate. Pulses: Normal pulses. Pulmonary:      Effort: Pulmonary effort is normal.   Lymphadenopathy:      Cervical: No cervical adenopathy. Neurological:      Mental Status: She is alert and oriented to person, place, and time. Flexible Nasopharyngolaryngoscope Procedure Note    Procedure Details   The bilateral side(s) of the nose was topically anesthetized with spray. The nasal cavities were inspected to determine which side would be more amenable to the scope being passed through. After waiting an appropriate period of time for anesthesia/ vasoconstriction to become effective, the flexible nasopharyngolaryngoscope was passed through the left side(s) of the nose, and the nose, nasopharynx, oropharynx, hypopharynx and larynx were examined. Examination was performed during quiet respiration and with phonation. The following findings were noted. Findings:   Normal nasopharynx, normal epiglottis, normal tongue base, normal pyriform, normal TVC motion and mucosa, no subglottic masses or lesions, normal vocal cord movement,  Small leukoplakic change to the left true vocal cord, adenoids present, mucosal stranding   Condition:  Stable  Complications:  None            Assessment:       Diagnosis Orders   1. Vocal cord leukoplakia     2. Gastroesophageal reflux disease without esophagitis     3. Globus sensation     4. Dysphagia, unspecified type     5.  Smoking                Plan:      Dysphagia, Globus- likely due to smoking irritation, GERD and post nasal drip. Will start on prilosec daily, flonase daily, advised smoking sensation. Dysphagia not due to vocal cord motion. Left true vocal cord leukoplakia- scope today, leukoplakic change to left TVC. Discussed direct laryngoscopy, bronchoscopy, esophagoscopy  With biospy   I recommend:    Laryngoscopy with biopsy   Surgical risks include:     -- Tearing of tissues is the most common problem. This is most common in esophagoscopy using a rigid scope. Tearing of the mucosa of the esophagus or hypopharynx can cause mediastinitus or a severe infection in the chest. Injury to the vocal cords can occur with laryngosopy as can injury to the lungs during bronchoscopy. Lung injury can cause air to leak into the chest cavity. If severe it can fill the chest cavity and compress the lungs producing a tension pneumothorax. Insertion of a chest tube would be emergently required to treat the patient. - injury to lips, teeth or tongue     Patient understands and agrees to proceed with procedure      Follow up as scheduled                         Hanh Mahajan  1955    I have discussed the case, including pertinent history and exam findings with the resident. I have seen and examined the patient and the key elements of the encounter have been performed by me. I agree with the assessment, plan and orders as documented by the  resident              Remainder of medical problems as per  resident note. Patient seen and examined. Agree with above exam, assessment and plan.       Electronically signed by Gil Flower DO on 7/21/20 at 1:13 PM EDT

## 2020-08-11 ENCOUNTER — OFFICE VISIT (OUTPATIENT)
Dept: PRIMARY CARE CLINIC | Age: 65
End: 2020-08-11
Payer: COMMERCIAL

## 2020-08-11 ENCOUNTER — HOSPITAL ENCOUNTER (OUTPATIENT)
Age: 65
Discharge: HOME | End: 2020-08-13
Payer: COMMERCIAL

## 2020-08-11 VITALS
HEIGHT: 64 IN | HEART RATE: 76 BPM | BODY MASS INDEX: 22.88 KG/M2 | WEIGHT: 134 LBS | OXYGEN SATURATION: 98 % | DIASTOLIC BLOOD PRESSURE: 80 MMHG | TEMPERATURE: 97.6 F | SYSTOLIC BLOOD PRESSURE: 126 MMHG

## 2020-08-11 LAB
ALBUMIN SERPL-MCNC: 4.6 G/DL (ref 3.5–5.2)
ALP BLD-CCNC: 60 U/L (ref 35–104)
ALT SERPL-CCNC: 15 U/L (ref 0–32)
ANION GAP SERPL CALCULATED.3IONS-SCNC: 10 MMOL/L (ref 7–16)
AST SERPL-CCNC: 17 U/L (ref 0–31)
BILIRUB SERPL-MCNC: 0.3 MG/DL (ref 0–1.2)
BUN BLDV-MCNC: 19 MG/DL (ref 8–23)
CALCIUM SERPL-MCNC: 11.1 MG/DL (ref 8.6–10.2)
CHLORIDE BLD-SCNC: 98 MMOL/L (ref 98–107)
CHOLESTEROL, TOTAL: 165 MG/DL (ref 0–199)
CO2: 26 MMOL/L (ref 22–29)
CREAT SERPL-MCNC: 0.6 MG/DL (ref 0.5–1)
CREATININE URINE: 74 MG/DL (ref 29–226)
GFR AFRICAN AMERICAN: >60
GFR NON-AFRICAN AMERICAN: >60 ML/MIN/1.73
GLUCOSE BLD-MCNC: 97 MG/DL (ref 74–99)
HBA1C MFR BLD: 9.5 % (ref 4–5.6)
HCT VFR BLD CALC: 44.7 % (ref 34–48)
HDLC SERPL-MCNC: 39 MG/DL
HEMOGLOBIN: 14.1 G/DL (ref 11.5–15.5)
LDL CHOLESTEROL CALCULATED: 87 MG/DL (ref 0–99)
MCH RBC QN AUTO: 29.7 PG (ref 26–35)
MCHC RBC AUTO-ENTMCNC: 31.5 % (ref 32–34.5)
MCV RBC AUTO: 94.3 FL (ref 80–99.9)
MICROALBUMIN UR-MCNC: 18.4 MG/L
MICROALBUMIN/CREAT UR-RTO: 24.9 (ref 0–30)
PDW BLD-RTO: 14.9 FL (ref 11.5–15)
PLATELET # BLD: 266 E9/L (ref 130–450)
PMV BLD AUTO: 10.6 FL (ref 7–12)
POTASSIUM SERPL-SCNC: 4.9 MMOL/L (ref 3.5–5)
RBC # BLD: 4.74 E12/L (ref 3.5–5.5)
SODIUM BLD-SCNC: 134 MMOL/L (ref 132–146)
TOTAL PROTEIN: 7.1 G/DL (ref 6.4–8.3)
TRIGL SERPL-MCNC: 193 MG/DL (ref 0–149)
VLDLC SERPL CALC-MCNC: 39 MG/DL
WBC # BLD: 7.5 E9/L (ref 4.5–11.5)

## 2020-08-11 PROCEDURE — 3017F COLORECTAL CA SCREEN DOC REV: CPT | Performed by: FAMILY MEDICINE

## 2020-08-11 PROCEDURE — 3046F HEMOGLOBIN A1C LEVEL >9.0%: CPT | Performed by: FAMILY MEDICINE

## 2020-08-11 PROCEDURE — 99214 OFFICE O/P EST MOD 30 MIN: CPT | Performed by: FAMILY MEDICINE

## 2020-08-11 PROCEDURE — 85027 COMPLETE CBC AUTOMATED: CPT

## 2020-08-11 PROCEDURE — 80061 LIPID PANEL: CPT

## 2020-08-11 PROCEDURE — 80053 COMPREHEN METABOLIC PANEL: CPT

## 2020-08-11 PROCEDURE — 83036 HEMOGLOBIN GLYCOSYLATED A1C: CPT

## 2020-08-11 PROCEDURE — 82044 UR ALBUMIN SEMIQUANTITATIVE: CPT

## 2020-08-11 PROCEDURE — 4004F PT TOBACCO SCREEN RCVD TLK: CPT | Performed by: FAMILY MEDICINE

## 2020-08-11 PROCEDURE — G9899 SCRN MAM PERF RSLTS DOC: HCPCS | Performed by: FAMILY MEDICINE

## 2020-08-11 PROCEDURE — 82570 ASSAY OF URINE CREATININE: CPT

## 2020-08-11 PROCEDURE — G8427 DOCREV CUR MEDS BY ELIG CLIN: HCPCS | Performed by: FAMILY MEDICINE

## 2020-08-11 PROCEDURE — 2022F DILAT RTA XM EVC RTNOPTHY: CPT | Performed by: FAMILY MEDICINE

## 2020-08-11 PROCEDURE — G8420 CALC BMI NORM PARAMETERS: HCPCS | Performed by: FAMILY MEDICINE

## 2020-08-11 ASSESSMENT — ENCOUNTER SYMPTOMS
BLURRED VISION: 0
COUGH: 1
CONSTIPATION: 0
SHORTNESS OF BREATH: 0
BLOOD IN STOOL: 0
VOICE CHANGE: 1
DIARRHEA: 0

## 2020-08-11 NOTE — PROGRESS NOTES
release capsule Take 1 capsule by mouth daily 30 capsule 1    fluticasone (FLONASE) 50 MCG/ACT nasal spray 2 sprays by Nasal route daily Use in both nostrils. 1 Bottle 1    citalopram (CELEXA) 20 MG tablet TAKE 1 TABLET DAILY 90 tablet 0    amitriptyline (ELAVIL) 10 MG tablet TAKE 1 TABLET BY MOUTH EVERY DAY AT NIGHT 90 tablet 1    lisinopril (PRINIVIL;ZESTRIL) 5 MG tablet TAKE 1 TABLET DAILY 90 tablet 1    metFORMIN (GLUCOPHAGE) 500 MG tablet TAKE 1 TABLET TWICE A DAY WITH MEALS 180 tablet 0    clopidogrel (PLAVIX) 75 MG tablet TAKE 1 TABLET DAILY 90 tablet 3    HUMALOG 100 UNIT/ML injection vial 35 UNITS BREAKFAST AND DINNER. 12 UNITS WITH LUNCH 30 mL 2    TOUJEO SOLOSTAR 300 UNIT/ML SOPN INJECT 50 UNITS INTO THE SKIN TWICE A DAY 5 pen 2    omeprazole (PRILOSEC) 20 MG delayed release capsule TAKE 1 CAPSULE BY MOUTH EVERY DAY 30 capsule 1    BD PEN NEEDLE SAMEER U/F 32G X 4 MM MISC       levothyroxine (SYNTHROID) 25 MCG tablet Take 25 mcg by mouth Daily      simvastatin (ZOCOR) 40 MG tablet Take 40 mg by mouth nightly      Choline Fenofibrate (FENOFIBRIC ACID) 135 MG CPDR Take 135 mg by mouth daily       aspirin 81 MG tablet Take 81 mg by mouth daily      insulin lispro (HUMALOG) 100 UNIT/ML injection vial Inject into the skin 35 units in the morning then 12 units at lunchtime and 35 before  dinner       No current facility-administered medications on file prior to visit. Review of Systems   Constitutional: Negative for unexpected weight change. HENT: Positive for voice change. Eyes: Negative for blurred vision. Respiratory: Positive for cough (due to acid reflux). Negative for shortness of breath. Cardiovascular: Negative for chest pain, palpitations and leg swelling. Gastrointestinal: Negative for blood in stool, constipation and diarrhea. Endocrine: Negative for polydipsia. Musculoskeletal: Positive for myalgias.    other review of systems reviewed and are negative    OBJECTIVE:  /80   Pulse 76   Temp 97.6 °F (36.4 °C)   Ht 5' 4\" (1.626 m)   Wt 134 lb (60.8 kg)   SpO2 98%   BMI 23.00 kg/m²      Physical Exam  Vitals signs reviewed. Eyes:      General: No scleral icterus. Conjunctiva/sclera: Conjunctivae normal.   Neck:      Musculoskeletal: Neck supple. Thyroid: No thyromegaly. Vascular: Carotid bruit (R) present. Cardiovascular:      Rate and Rhythm: Normal rate and regular rhythm. Heart sounds: No murmur. Pulmonary:      Effort: Pulmonary effort is normal.      Breath sounds: Normal breath sounds. No wheezing or rales. Abdominal:      General: Bowel sounds are normal.      Palpations: Abdomen is soft. There is no mass. Tenderness: There is no abdominal tenderness. There is no guarding or rebound. Musculoskeletal: Normal range of motion. Lymphadenopathy:      Cervical: No cervical adenopathy. Skin:     General: Skin is warm and dry. Neurological:      Mental Status: She is alert and oriented to person, place, and time. Psychiatric:         Mood and Affect: Mood normal.         ASSESSMENT AND PLAN:    Humberto Guillermo was seen today for diabetes and blood work. Diagnoses and all orders for this visit:    Uncontrolled type 2 diabetes mellitus with hyperglycemia ([facility] [address]  -     Lipid Panel; Future  -     CBC; Future  -     Comprehensive Metabolic Panel; Future  -     Hemoglobin A1C; Future  -     Microalbumin / Creatinine Urine Ratio; Future    Mixed hyperlipidemia    - continue statin and fibric acid derviative  - quit smoking  - recommend colonolscopy    Return in about 6 months (around 2/11/2021) for based on lab results.     Aviva Garcia,

## 2020-08-13 ENCOUNTER — TELEPHONE (OUTPATIENT)
Dept: PRIMARY CARE CLINIC | Age: 65
End: 2020-08-13

## 2020-08-13 NOTE — TELEPHONE ENCOUNTER
Spoke with patient her hemoglobin A1c is come down from 10.7 now to 9.5. However she is getting some low blood sugars. She got a 55 this morning. Therefore I told her to cut back on her Toujeo that is 26 units twice a day to 25 units twice a day. If she continues to get low blood sugars then go to 24 units twice a day. Continue all other meds at current doses.   Follow-up 3 months

## 2020-08-14 RX ORDER — FLUTICASONE PROPIONATE 50 MCG
SPRAY, SUSPENSION (ML) NASAL
Qty: 1 BOTTLE | Refills: 1 | Status: SHIPPED
Start: 2020-08-14 | End: 2021-02-11 | Stop reason: ALTCHOICE

## 2020-08-14 RX ORDER — OMEPRAZOLE 40 MG/1
CAPSULE, DELAYED RELEASE ORAL
Qty: 30 CAPSULE | Refills: 1 | Status: ON HOLD | OUTPATIENT
Start: 2020-08-14

## 2020-08-26 ENCOUNTER — HOSPITAL ENCOUNTER (OUTPATIENT)
Age: 65
Discharge: HOME | End: 2020-08-28
Payer: COMMERCIAL

## 2020-08-26 PROCEDURE — U0003 INFECTIOUS AGENT DETECTION BY NUCLEIC ACID (DNA OR RNA); SEVERE ACUTE RESPIRATORY SYNDROME CORONAVIRUS 2 (SARS-COV-2) (CORONAVIRUS DISEASE [COVID-19]), AMPLIFIED PROBE TECHNIQUE, MAKING USE OF HIGH THROUGHPUT TECHNOLOGIES AS DESCRIBED BY CMS-2020-01-R: HCPCS

## 2020-08-27 NOTE — PROGRESS NOTES
Have you been tested for COVID  Yes  Tested on 8/26/20  For OR scheduled 8-31-20         Have you been told you were positive for COVID No  Have you had any known exposure to someone that is positive for COVID No  Do you have a cough                   No              Do you have shortness of breath No                 Do you have a sore throat            No                Are you having chills                    No                Are you having muscle aches. No                    Please come to the hospital wearing a mask and have your significant other wear a mask as well. Both of you should check your temperature before leaving to come here,  if it is 100 or higher please call [phone] for instruction. [facility] PRE-ADMISSION TESTING INSTRUCTIONS    The Preadmission Testing patient is instructed accordingly using the following criteria (check applicable):    ARRIVAL INSTRUCTIONS:  [x] Parking the day of Surgery is located in the Main Entrance lot. Upon entering the door, make an immediate right to the surgery reception desk    [] [phone] is available Monday through Friday 6 am to 6 pm    [x] Bring photo ID and insurance card    [] Bring in a copy of Living will or Durable Power of  papers.     [x] Please be sure to arrange for responsible adult to provide transportation to and from the hospital    [x] Please arrange for responsible adult to be with you for the 24 hour period post procedure due to having anesthesia      GENERAL INSTRUCTIONS:    [x] Nothing by mouth after midnight, including gum, candy, mints or water    [x] You may brush your teeth, but do not swallow any water    [x] Take medications as instructed with 1-2 oz of water    [x] Stop herbal supplements and vitamins 5 days prior to procedure    [x] Follow preop dosing of blood thinners per physician instructions    [x] Take 1/2 dose of evening insulin, but no insulin after midnight    [x] No oral diabetic medications after midnight    [x] If diabetic and have low blood sugar or feel symptomatic, take 1-2oz apple juice only    [] Bring inhalers day of surgery    [] Bring C-PAP/ Bi-Pap day of surgery    [] Bring urine specimen day of surgery    [x] Shower or bath with soap, lather and rinse well, AM of Surgery, no lotion, powders or creams to surgical site    [] Follow bowel prep as instructed per surgeon    [x] No tobacco products within 24 hours of surgery     [x] No alcohol or illegal drug use within 24 hours of surgery.     [x] Jewelry, body piercing's, eyeglasses, contact lenses and dentures are not permitted into surgery (bring cases)      [x] Please do not wear any nail polish, make up or hair products on the day of surgery    [x] If not already done, you can expect a call from registration    [x] You can expect a call the business day prior to procedure to notify you if your arrival time changes    [x] If you receive a survey after surgery we would greatly appreciate your comments    [] Parent/guardian of a minor must accompany their child and remain on the premises  the entire time they are under our care     [] Pediatric patients may bring favorite toy, blanket or comfort item with them    [] A caregiver or family member must remain with the patient during their stay if they are mentally handicapped, have dementia, disoriented or unable to use a call light or would be a safety concern if left unattended    [x] Please notify surgeon if you develop any illness between now and time of surgery (cold, cough, sore throat, fever, nausea, vomiting) or any signs of infections  including skin, wounds, and dental.    [x]  The Outpatient Pharmacy is available to fill your prescription here on your day of surgery, ask your preop nurse for details    [x] Other instructions  EDUCATIONAL MATERIALS PROVIDED:    [] PAT Preoperative Education Packet/Booklet     [] Medication List    [] Fluoroscopy Information Pamphlet    [] Transfusion bracelet applied with instructions    [] Joint replacement video reviewed    [] Shower with soap, lather and rinse well, and use CHG wipes provided the evening before surgery as instructed

## 2020-08-28 LAB
SARS-COV-2: NOT DETECTED
SOURCE: NORMAL

## 2020-08-30 NOTE — H&P
Subjective:      Patient ID:  Kamron Spain is a 60 y.o. female.     HPI:     Dysphagia  Patient presents with dysphagia. The patient describes multiple episode(s) with gradual onset, located in the mid neck, and symptoms starting 1.5 years. Patient reports large food bolus only, pills and liquids have become stuck. Associated with the dysphagia, patient also complains of dysphagia, globus sensation, hoarseness, post nasal drainage, heartburn and aspiration. Patientdenies regurgitation of undigested food. Denies weight loss, fever, chills, night sweats. Has been evaluated by Dr. Marijane Prader, gen Surg, for dysphagia 10 months ago with esophagram that showed mild GERD and barium swallow that showed transient laryngeal penetration with thin liquids and US thyroid hat showed multiple sub centimeter  thyroid nodules. Did see speech therapy but reports little improvement. Patient states symptoms worsened after carotid endarterectomy in 2018  Patient is 40+ yr smoker     Patient's medications, allergies, past medical, surgical, social and family histories were reviewed and updated as appropriate.           Review of Systems   Constitutional: Negative. HENT: Positive for congestion, postnasal drip, sore throat, trouble swallowing and voice change. Eyes: Negative. Respiratory: Positive for cough. Skin: Negative. Allergic/Immunologic: Negative. Neurological: Negative. Hematological: Negative. Psychiatric/Behavioral: Negative. All other systems reviewed and are negative.                    Objective:   Physical Exam  Vitals signs reviewed. Constitutional:       Appearance: Normal appearance. HENT:      Head: Normocephalic. Right Ear: Tympanic membrane, ear canal and external ear normal.      Left Ear: Tympanic membrane, ear canal and external ear normal.      Nose: Septal deviation (right), mucosal edema and rhinorrhea present. No congestion. Rhinorrhea is clear.       Mouth/Throat: Mouth: Mucous membranes are moist.      Dentition: Abnormal dentition. Tongue: No lesions. Palate: No mass. Pharynx: Oropharynx is clear. Uvula midline. Tonsils: No tonsillar exudate. 0 on the right. 0 on the left. Eyes:      Conjunctiva/sclera: Conjunctivae normal.   Neck:      Musculoskeletal: Normal range of motion and neck supple. Thyroid: No thyroid mass, thyromegaly or thyroid tenderness. Trachea: Trachea normal.   Cardiovascular:      Rate and Rhythm: Normal rate. Pulses: Normal pulses. Pulmonary:      Effort: Pulmonary effort is normal.   Lymphadenopathy:      Cervical: No cervical adenopathy. Neurological:      Mental Status: She is alert and oriented to person, place, and time.             Flexible Nasopharyngolaryngoscope Procedure Note     Procedure Details   The bilateral side(s) of the nose was topically anesthetized with spray. The nasal cavities were inspected to determine which side would be more amenable to the scope being passed through. After waiting an appropriate period of time for anesthesia/ vasoconstriction to become effective, the flexible nasopharyngolaryngoscope was passed through the left side(s) of the nose, and the nose, nasopharynx, oropharynx, hypopharynx and larynx were examined. Examination was performed during quiet respiration and with phonation. The following findings were noted. Findings:   Normal nasopharynx, normal epiglottis, normal tongue base, normal pyriform, normal TVC motion and mucosa, no subglottic masses or lesions, normal vocal cord movement,  Small leukoplakic change to the left true vocal cord, adenoids present, mucosal stranding   Condition:  Stable  Complications:  None                Assessment:       Diagnosis Orders   1. Vocal cord leukoplakia      2. Gastroesophageal reflux disease without esophagitis      3. Globus sensation      4. Dysphagia, unspecified type      5.  Smoking                            Plan: Dysphagia, Globus- likely due to smoking irritation, GERD and post nasal drip. Will start on prilosec daily, flonase daily, advised smoking sensation. Dysphagia not due to vocal cord motion.       Left true vocal cord leukoplakia- scope today, leukoplakic change to left TVC. Discussed direct laryngoscopy, bronchoscopy, esophagoscopy  With biospy   I recommend:     Laryngoscopy with biopsy   Surgical risks include:     -- Tearing of tissues is the most common problem. This is most common in esophagoscopy using a rigid scope. Tearing of the mucosa of the esophagus or hypopharynx can cause mediastinitus or a severe infection in the chest. Injury to the vocal cords can occur with laryngosopy as can injury to the lungs during bronchoscopy. Lung injury can cause air to leak into the chest cavity. If severe it can fill the chest cavity and compress the lungs producing a tension pneumothorax. Insertion of a chest tube would be emergently required to treat the patient. - injury to lips, teeth or tongue      Patient understands and agrees to proceed with procedure      Follow up as scheduled                                          Samira Orona  1955     I have discussed the case, including pertinent history and exam findings with the resident. I have seen and examined the patient and the key elements of the encounter have been performed by me. I agree with the assessment, plan and orders as documented by the  resident                 Remainder of medical problems as per  resident note.    Patient seen and examined.  Agree with above exam, assessment and plan.        Electronically signed by Sheila Sanchez DO on 7/21/20 at 1:13 PM EDT

## 2020-08-31 ENCOUNTER — ANESTHESIA (OUTPATIENT)
Dept: OPERATING ROOM | Age: 65
End: 2020-08-31
Payer: COMMERCIAL

## 2020-08-31 ENCOUNTER — HOSPITAL ENCOUNTER (OUTPATIENT)
Age: 65
Setting detail: OUTPATIENT SURGERY
Discharge: HOME | End: 2020-08-31
Attending: OTOLARYNGOLOGY | Admitting: OTOLARYNGOLOGY
Payer: COMMERCIAL

## 2020-08-31 ENCOUNTER — ANESTHESIA EVENT (OUTPATIENT)
Dept: OPERATING ROOM | Age: 65
End: 2020-08-31
Payer: COMMERCIAL

## 2020-08-31 VITALS
HEIGHT: 64 IN | HEART RATE: 69 BPM | DIASTOLIC BLOOD PRESSURE: 57 MMHG | TEMPERATURE: 97 F | WEIGHT: 132 LBS | RESPIRATION RATE: 18 BRPM | OXYGEN SATURATION: 92 % | BODY MASS INDEX: 22.53 KG/M2 | SYSTOLIC BLOOD PRESSURE: 119 MMHG

## 2020-08-31 VITALS — SYSTOLIC BLOOD PRESSURE: 204 MMHG | DIASTOLIC BLOOD PRESSURE: 91 MMHG | OXYGEN SATURATION: 100 %

## 2020-08-31 LAB
ANION GAP SERPL CALCULATED.3IONS-SCNC: 8 MMOL/L (ref 7–16)
BUN BLDV-MCNC: 17 MG/DL (ref 8–23)
CALCIUM SERPL-MCNC: 10.8 MG/DL (ref 8.6–10.2)
CHLORIDE BLD-SCNC: 103 MMOL/L (ref 98–107)
CO2: 25 MMOL/L (ref 22–29)
CREAT SERPL-MCNC: 0.6 MG/DL (ref 0.5–1)
GFR AFRICAN AMERICAN: >60
GFR NON-AFRICAN AMERICAN: >60 ML/MIN/1.73
GLUCOSE BLD-MCNC: 136 MG/DL (ref 74–99)
HCT VFR BLD CALC: 46 % (ref 34–48)
HEMOGLOBIN: 15.1 G/DL (ref 11.5–15.5)
MCH RBC QN AUTO: 30 PG (ref 26–35)
MCHC RBC AUTO-ENTMCNC: 32.8 % (ref 32–34.5)
MCV RBC AUTO: 91.3 FL (ref 80–99.9)
PDW BLD-RTO: 14.3 FL (ref 11.5–15)
PLATELET # BLD: 260 E9/L (ref 130–450)
PMV BLD AUTO: 10.3 FL (ref 7–12)
POTASSIUM SERPL-SCNC: 4.4 MMOL/L (ref 3.5–5)
RBC # BLD: 5.04 E12/L (ref 3.5–5.5)
SODIUM BLD-SCNC: 136 MMOL/L (ref 132–146)
WBC # BLD: 8.3 E9/L (ref 4.5–11.5)

## 2020-08-31 PROCEDURE — 2709999900 HC NON-CHARGEABLE SUPPLY: Performed by: OTOLARYNGOLOGY

## 2020-08-31 PROCEDURE — 3600000002 HC SURGERY LEVEL 2 BASE: Performed by: OTOLARYNGOLOGY

## 2020-08-31 PROCEDURE — 93005 ELECTROCARDIOGRAM TRACING: CPT

## 2020-08-31 PROCEDURE — 3700000000 HC ANESTHESIA ATTENDED CARE: Performed by: OTOLARYNGOLOGY

## 2020-08-31 PROCEDURE — 85027 COMPLETE CBC AUTOMATED: CPT

## 2020-08-31 PROCEDURE — 2720000010 HC SURG SUPPLY STERILE: Performed by: OTOLARYNGOLOGY

## 2020-08-31 PROCEDURE — 6360000002 HC RX W HCPCS: Performed by: ANESTHESIOLOGY

## 2020-08-31 PROCEDURE — 3600000012 HC SURGERY LEVEL 2 ADDTL 15MIN: Performed by: OTOLARYNGOLOGY

## 2020-08-31 PROCEDURE — 31525 DX LARYNGOSCOPY EXCL NB: CPT | Performed by: OTOLARYNGOLOGY

## 2020-08-31 PROCEDURE — 2500000003 HC RX 250 WO HCPCS: Performed by: NURSE ANESTHETIST, CERTIFIED REGISTERED

## 2020-08-31 PROCEDURE — 7100000010 HC PHASE II RECOVERY - FIRST 15 MIN: Performed by: OTOLARYNGOLOGY

## 2020-08-31 PROCEDURE — 43191 ESOPHAGOSCOPY RIGID TRNSO DX: CPT | Performed by: OTOLARYNGOLOGY

## 2020-08-31 PROCEDURE — 7100000000 HC PACU RECOVERY - FIRST 15 MIN: Performed by: OTOLARYNGOLOGY

## 2020-08-31 PROCEDURE — 36415 COLL VENOUS BLD VENIPUNCTURE: CPT

## 2020-08-31 PROCEDURE — 7100000001 HC PACU RECOVERY - ADDTL 15 MIN: Performed by: OTOLARYNGOLOGY

## 2020-08-31 PROCEDURE — 7100000011 HC PHASE II RECOVERY - ADDTL 15 MIN: Performed by: OTOLARYNGOLOGY

## 2020-08-31 PROCEDURE — 2580000003 HC RX 258: Performed by: NURSE ANESTHETIST, CERTIFIED REGISTERED

## 2020-08-31 PROCEDURE — 80048 BASIC METABOLIC PNL TOTAL CA: CPT

## 2020-08-31 PROCEDURE — 6360000002 HC RX W HCPCS: Performed by: NURSE ANESTHETIST, CERTIFIED REGISTERED

## 2020-08-31 PROCEDURE — 3700000001 HC ADD 15 MINUTES (ANESTHESIA): Performed by: OTOLARYNGOLOGY

## 2020-08-31 RX ORDER — FENTANYL CITRATE 50 UG/ML
INJECTION, SOLUTION INTRAMUSCULAR; INTRAVENOUS PRN
Status: DISCONTINUED | OUTPATIENT
Start: 2020-08-31 | End: 2020-08-31 | Stop reason: SDUPTHER

## 2020-08-31 RX ORDER — MEPERIDINE HYDROCHLORIDE 25 MG/ML
12.5 INJECTION INTRAMUSCULAR; INTRAVENOUS; SUBCUTANEOUS EVERY 10 MIN PRN
Status: DISCONTINUED | OUTPATIENT
Start: 2020-08-31 | End: 2020-08-31 | Stop reason: HOSPADM

## 2020-08-31 RX ORDER — ONDANSETRON 2 MG/ML
INJECTION INTRAMUSCULAR; INTRAVENOUS PRN
Status: DISCONTINUED | OUTPATIENT
Start: 2020-08-31 | End: 2020-08-31 | Stop reason: SDUPTHER

## 2020-08-31 RX ORDER — SODIUM CHLORIDE 0.9 % (FLUSH) 0.9 %
10 SYRINGE (ML) INJECTION PRN
Status: DISCONTINUED | OUTPATIENT
Start: 2020-08-31 | End: 2020-08-31 | Stop reason: HOSPADM

## 2020-08-31 RX ORDER — ROCURONIUM BROMIDE 10 MG/ML
INJECTION, SOLUTION INTRAVENOUS PRN
Status: DISCONTINUED | OUTPATIENT
Start: 2020-08-31 | End: 2020-08-31 | Stop reason: SDUPTHER

## 2020-08-31 RX ORDER — NEOSTIGMINE METHYLSULFATE 1 MG/ML
INJECTION, SOLUTION INTRAVENOUS PRN
Status: DISCONTINUED | OUTPATIENT
Start: 2020-08-31 | End: 2020-08-31 | Stop reason: SDUPTHER

## 2020-08-31 RX ORDER — PROPOFOL 10 MG/ML
INJECTION, EMULSION INTRAVENOUS PRN
Status: DISCONTINUED | OUTPATIENT
Start: 2020-08-31 | End: 2020-08-31 | Stop reason: SDUPTHER

## 2020-08-31 RX ORDER — GLYCOPYRROLATE 1 MG/5 ML
SYRINGE (ML) INTRAVENOUS PRN
Status: DISCONTINUED | OUTPATIENT
Start: 2020-08-31 | End: 2020-08-31 | Stop reason: SDUPTHER

## 2020-08-31 RX ORDER — SODIUM CHLORIDE 0.9 % (FLUSH) 0.9 %
10 SYRINGE (ML) INJECTION EVERY 12 HOURS SCHEDULED
Status: DISCONTINUED | OUTPATIENT
Start: 2020-08-31 | End: 2020-08-31 | Stop reason: HOSPADM

## 2020-08-31 RX ORDER — FENTANYL CITRATE 50 UG/ML
50 INJECTION, SOLUTION INTRAMUSCULAR; INTRAVENOUS EVERY 5 MIN PRN
Status: DISCONTINUED | OUTPATIENT
Start: 2020-08-31 | End: 2020-08-31 | Stop reason: HOSPADM

## 2020-08-31 RX ORDER — DEXAMETHASONE SODIUM PHOSPHATE 4 MG/ML
INJECTION, SOLUTION INTRA-ARTICULAR; INTRALESIONAL; INTRAMUSCULAR; INTRAVENOUS; SOFT TISSUE PRN
Status: DISCONTINUED | OUTPATIENT
Start: 2020-08-31 | End: 2020-08-31 | Stop reason: SDUPTHER

## 2020-08-31 RX ORDER — DIPHENHYDRAMINE HYDROCHLORIDE 50 MG/ML
12.5 INJECTION INTRAMUSCULAR; INTRAVENOUS
Status: DISCONTINUED | OUTPATIENT
Start: 2020-08-31 | End: 2020-08-31 | Stop reason: HOSPADM

## 2020-08-31 RX ORDER — SODIUM CHLORIDE 9 MG/ML
INJECTION, SOLUTION INTRAVENOUS CONTINUOUS PRN
Status: DISCONTINUED | OUTPATIENT
Start: 2020-08-31 | End: 2020-08-31 | Stop reason: SDUPTHER

## 2020-08-31 RX ORDER — LIDOCAINE HYDROCHLORIDE 20 MG/ML
INJECTION, SOLUTION EPIDURAL; INFILTRATION; INTRACAUDAL; PERINEURAL PRN
Status: DISCONTINUED | OUTPATIENT
Start: 2020-08-31 | End: 2020-08-31 | Stop reason: SDUPTHER

## 2020-08-31 RX ORDER — PROMETHAZINE HYDROCHLORIDE 25 MG/ML
6.25 INJECTION, SOLUTION INTRAMUSCULAR; INTRAVENOUS
Status: DISCONTINUED | OUTPATIENT
Start: 2020-08-31 | End: 2020-08-31 | Stop reason: HOSPADM

## 2020-08-31 RX ORDER — HYDROCODONE BITARTRATE AND ACETAMINOPHEN 5; 325 MG/1; MG/1
1 TABLET ORAL
Status: DISCONTINUED | OUTPATIENT
Start: 2020-08-31 | End: 2020-08-31 | Stop reason: HOSPADM

## 2020-08-31 RX ADMIN — PROPOFOL 200 MG: 10 INJECTION, EMULSION INTRAVENOUS at 14:12

## 2020-08-31 RX ADMIN — FENTANYL CITRATE 100 MCG: 50 INJECTION, SOLUTION INTRAMUSCULAR; INTRAVENOUS at 14:12

## 2020-08-31 RX ADMIN — DEXAMETHASONE SODIUM PHOSPHATE 10 MG: 4 INJECTION, SOLUTION INTRAMUSCULAR; INTRAVENOUS at 14:19

## 2020-08-31 RX ADMIN — LIDOCAINE HYDROCHLORIDE 100 MG: 20 INJECTION, SOLUTION EPIDURAL; INFILTRATION; INTRACAUDAL; PERINEURAL at 14:12

## 2020-08-31 RX ADMIN — SODIUM CHLORIDE: 9 INJECTION, SOLUTION INTRAVENOUS at 13:50

## 2020-08-31 RX ADMIN — FENTANYL CITRATE 50 MCG: 50 INJECTION, SOLUTION INTRAMUSCULAR; INTRAVENOUS at 15:22

## 2020-08-31 RX ADMIN — Medication 3 MG: at 14:38

## 2020-08-31 RX ADMIN — ONDANSETRON 4 MG: 2 INJECTION INTRAMUSCULAR; INTRAVENOUS at 14:19

## 2020-08-31 RX ADMIN — Medication 0.6 MG: at 14:38

## 2020-08-31 RX ADMIN — ROCURONIUM BROMIDE 20 MG: 10 INJECTION, SOLUTION INTRAVENOUS at 14:12

## 2020-08-31 RX ADMIN — FENTANYL CITRATE 50 MCG: 50 INJECTION, SOLUTION INTRAMUSCULAR; INTRAVENOUS at 15:16

## 2020-08-31 ASSESSMENT — PAIN DESCRIPTION - LOCATION
LOCATION: THROAT

## 2020-08-31 ASSESSMENT — PULMONARY FUNCTION TESTS
PIF_VALUE: 4
PIF_VALUE: 28
PIF_VALUE: 28
PIF_VALUE: 29
PIF_VALUE: 21
PIF_VALUE: 3
PIF_VALUE: 28
PIF_VALUE: 28
PIF_VALUE: 20
PIF_VALUE: 4
PIF_VALUE: 29
PIF_VALUE: 1
PIF_VALUE: 25
PIF_VALUE: 29
PIF_VALUE: 29
PIF_VALUE: 28
PIF_VALUE: 28
PIF_VALUE: 10
PIF_VALUE: 1
PIF_VALUE: 28
PIF_VALUE: 4
PIF_VALUE: 23
PIF_VALUE: 25
PIF_VALUE: 5
PIF_VALUE: 26
PIF_VALUE: 26
PIF_VALUE: 29
PIF_VALUE: 10
PIF_VALUE: 1
PIF_VALUE: 1
PIF_VALUE: 28
PIF_VALUE: 25

## 2020-08-31 ASSESSMENT — PAIN SCALES - GENERAL
PAINLEVEL_OUTOF10: 2
PAINLEVEL_OUTOF10: 0
PAINLEVEL_OUTOF10: 0
PAINLEVEL_OUTOF10: 7
PAINLEVEL_OUTOF10: 2
PAINLEVEL_OUTOF10: 6

## 2020-08-31 ASSESSMENT — PAIN DESCRIPTION - ORIENTATION
ORIENTATION: INNER

## 2020-08-31 ASSESSMENT — PAIN DESCRIPTION - PAIN TYPE
TYPE: SURGICAL PAIN

## 2020-08-31 ASSESSMENT — PAIN DESCRIPTION - FREQUENCY
FREQUENCY: CONTINUOUS

## 2020-08-31 ASSESSMENT — LIFESTYLE VARIABLES: SMOKING_STATUS: 1

## 2020-08-31 NOTE — OP NOTE
Operative Note      Patient: Shefali Vidal  YOB: 1955  MRN: [id number]    Date of Procedure: 8/31/2020    Pre-Op Diagnosis: LEFT TRUE VOCAL CORD    Post-Op Diagnosis: normal endolarynx       Procedure(s):  DIRECT LARYNGOSCOPYY AND CERVICAL ESOPHAGOSCOPY (PATHOLOGY PRESENT)    Surgeon(s):  Hattie Jimenez DO    Assistant:   Resident: DO Selena Toney, PGY-1    Anesthesia: General    Estimated Blood Loss (mL): Minimal    Complications: None    Specimens:   * No specimens in log *    Implants:  * No implants in log *      Drains: * No LDAs found *    Findings: No abnormalities noted on direct laryngoscopy or esophagoscopy. Detailed Description of Procedure:   8/31/2020  Shefali Vidal  [id number]      DESCRIPTION OF PROCEDURE: The patient was consented preoperatively, taken   back to the operating room, identified, and appropriately placed in the   supine position. Given anesthesia per general intubation. Once the patient   was intubated, they were turned 90 degrees, and the bed was elevated to my hip   height. A tooth guard was placed in the patient's   oral cavity to protect his upper teeth. Esophagoscopy  An esophagoscope was then placed in the patient's oral cavity down to his   posterior pharynx. The esophagoscope was placed into the esophageal sphincter   and allowed to go down to the inferior aspect of the esophagus. Upon going   into the esophagus, reflux was not seen and suctioned away. The esophagoscope was placed down near the lower esophageal sphincter. Slowly moving back from the most inferior aspect of the esophagus, the esophageal tissue was examined. There was not  abnormal tissue found and a biopsy was not taken. A biopsy was not taken from the upper esophageal sphincter right at the opening of the esophagus. This was sent off for permanent pathology. The esophagoscope was removed.     Direct laryngoscopy  A Dedo scope was placed in the patient's

## 2020-08-31 NOTE — ANESTHESIA PRE PROCEDURE
Historical Provider, MD   aspirin 81 MG tablet Take 81 mg by mouth daily   Yes Historical Provider, MD       Current medications:    Current Facility-Administered Medications   Medication Dose Route Frequency Provider Last Rate Last Dose    sodium chloride flush 0.9 % injection 10 mL  10 mL Intravenous 2 times per day Tri Mccarthy,         sodium chloride flush 0.9 % injection 10 mL  10 mL Intravenous PRN Tir Mccarthy,            Allergies:     Allergies   Allergen Reactions    Pcn [Penicillins] Hives and Swelling       Problem List:    Patient Active Problem List   Diagnosis Code    Asymptomatic bilateral carotid artery stenosis I65.23    History of nuclear stress test Z92.89    Mixed hyperlipidemia E78.2    Aortoiliac occlusive disease ([address] I74.09    PVD (peripheral vascular disease) with claudication ([address] I73.9    Carotid stenosis, asymptomatic, bilateral I65.23    S/P carotid endarterectomy Z98.890    Temporal arteritis ([facility] M31.6    Tobacco abuse Z72.0       Past Medical History:        Diagnosis Date    Arthritis     Carotid artery stenosis     Cataracts, bilateral 10/'19    Diabetes mellitus ([address]     DM type 2 (diabetes mellitus, type 2) ([address]     Hyperlipidemia     Hypothyroidism     Mass of larynx     for OR 20     Migraines     rare    Neuropathy, diabetic ([address]     Peripheral vascular disease ([address]     Post-nasal drip     Psoriasiform dermatitis     Seasonal allergies     Swallowing problem     Thyroid disease        Past Surgical History:        Procedure Laterality Date    BACK SURGERY      L5-S1    CAROTID ENDARTERECTOMY Left 10/11/2018    [facility]    CARPAL TUNNEL RELEASE      bilat     SECTION      2    HYSTERECTOMY      LEG SURGERY      remote- right     OTHER SURGICAL HISTORY  2018    Dr. Delicia Clifton- Bilateral iliac stents iCast [id number]    [facility] OFFICE[facility]OUT[facility] VISIT,PROCEDURE ONLY Left 10/11/2018    LEFT CAROTID ENDARTERECTOMY performed by Luda Jimenez MD at [address] TOE SURGERY      bilat - ingrown toe nails    TONSILLECTOMY         Social History:    Social History     Tobacco Use    Smoking status: Current Some Day Smoker     Packs/day: 0.25     Years: 40.00     Pack years: 10.00     Types: Cigarettes     Start date: 5     Last attempt to quit: 2018     Years since quittin.0    Smokeless tobacco: Never Used    Tobacco comment: not snoke 1.5 wk   Substance Use Topics    Alcohol use: No     Comment: rare                                Ready to quit: Not Answered  Counseling given: Not Answered  Comment: not snoke 1.5 wk      Vital Signs (Current):   Vitals:    20 1101 20 1041   BP:  (!) 146/70   Pulse:  66   Resp:  16   Temp:  96.9 °F (36.1 °C)   SpO2:  97%   Weight: 132 lb (59.9 kg) 132 lb (59.9 kg)   Height: 5' 4\" (1.626 m) 5' 4\" (1.626 m)                                              BP Readings from Last 3 Encounters:   20 (!) 146/70   20 126/80   20 130/78       NPO Status: Time of last liquid consumption:                         Time of last solid consumption:                         Date of last liquid consumption: 20                        Date of last solid food consumption: 20    BMI:   Wt Readings from Last 3 Encounters:   20 132 lb (59.9 kg)   20 134 lb (60.8 kg)   20 130 lb (59 kg)     Body mass index is 22.66 kg/m².     CBC:   Lab Results   Component Value Date    WBC 8.3 2020    RBC 5.04 2020    HGB 15.1 2020    HCT 46.0 2020    MCV 91.3 2020    RDW 14.3 2020     2020       CMP:   Lab Results   Component Value Date     2020    K 4.4 2020    K 4.5 2018     2020    CO2 25 2020    BUN 17 2020    CREATININE 0.6 2020    GFRAA >60 2020    LABGLOM >60 2020    GLUCOSE 136 2020    GLUCOSE 157 07/21/2011    PROT 7.1 08/11/2020    CALCIUM 10.8 08/31/2020    BILITOT 0.3 08/11/2020    ALKPHOS 60 08/11/2020    AST 17 08/11/2020    ALT 15 08/11/2020       POC Tests: No results for input(s): POCGLU, POCNA, POCK, POCCL, POCBUN, POCHEMO, POCHCT in the last 72 hours. Coags:   Lab Results   Component Value Date    PROTIME 11.0 12/04/2018    INR 1.0 12/04/2018       HCG (If Applicable): No results found for: PREGTESTUR, PREGSERUM, HCG, HCGQUANT     ABGs:   Lab Results   Component Value Date    PO2ART 257.0 10/11/2018    YYO1XWZ 39.4 10/11/2018    LNY5KAG 21.5 10/11/2018        Type & Screen (If Applicable):  No results found for: LABABO, LABRH    Drug/Infectious Status (If Applicable):  No results found for: HIV, HEPCAB    COVID-19 Screening (If Applicable):   Lab Results   Component Value Date    COVID19 Not Detected 08/26/2020         Anesthesia Evaluation  Patient summary reviewed and Nursing notes reviewed no history of anesthetic complications:   Airway: Mallampati: II  TM distance: >3 FB   Neck ROM: full  Mouth opening: > = 3 FB Dental:    (+) lower dentures and partials      Pulmonary: breath sounds clear to auscultation  (+) current smoker                           Cardiovascular:  Exercise tolerance: good (>4 METS),   (+) hyperlipidemia    (-) hypertension    ECG reviewed  Rhythm: regular  Rate: normal                    Neuro/Psych:   (+) headaches:, depression/anxiety              ROS comment: L sciatica GI/Hepatic/Renal:   (+) GERD: well controlled,          ROS comment: dysphagia. Endo/Other:    (+) DiabetesType II DM, using insulin, hypothyroidism, blood dyscrasia: anticoagulation therapy, arthritis:., .                 Abdominal:           Vascular:   + PVD, aortic or cerebral, . Anesthesia Plan      general     ASA 3       Induction: intravenous. Anesthetic plan and risks discussed with patient and spouse.                       Yfn Pina MD 8/31/2020

## 2020-08-31 NOTE — H&P
Celeste Tidwell was seen and re-examined preoperatively today, August 31, 2020. There was no substantial change in her physical and medical status. Patient is fit for the proposed surgical procedure. All questions were appropriately addressed and had no further questions regarding the risks, benefits, and alternatives of the procedure. Celeste Tidwell and family wished to proceed.     Janessa Levy DO  Resident Physician  [address] [place] [address]  Otolaryngology Residency  8/31/2020  10:24 AM

## 2020-08-31 NOTE — ANESTHESIA POSTPROCEDURE EVALUATION
Department of Anesthesiology  Postprocedure Note    Patient: Clarence Travis  MRN: [id number]  YOB: 1955  Date of evaluation: 8/31/2020  Time:  6:41 PM     Procedure Summary     Date:  08/31/20 Room / Location:  [facility] OR 01 / [facility]    Anesthesia Start:  9126 Anesthesia Stop:  1447    Procedure:  DIRECT LARYNGOSCOPYY AND CERVICAL ESOPHAGOSCOPY (PATHOLOGY PRESENT) (N/A Throat) Diagnosis:  (LEFT TRUE VOCAL CORD)    Surgeon:  Tita Lafleur DO Responsible Provider:  Mundo Haley MD    Anesthesia Type:  general ASA Status:  3          Anesthesia Type: general    Amelia Phase I: Amelia Score: 10    Amelia Phase II:      Last vitals: Reviewed and per EMR flowsheets.        Anesthesia Post Evaluation    Patient location during evaluation: PACU  Patient participation: complete - patient participated  Level of consciousness: awake and alert  Airway patency: patent  Nausea & Vomiting: no vomiting and no nausea  Complications: no  Cardiovascular status: blood pressure returned to baseline  Respiratory status: acceptable  Hydration status: euvolemic

## 2020-09-01 ENCOUNTER — TELEPHONE (OUTPATIENT)
Dept: ENT CLINIC | Age: 65
End: 2020-09-01

## 2020-09-01 LAB
EKG ATRIAL RATE: 63 BPM
EKG P AXIS: 54 DEGREES
EKG P-R INTERVAL: 116 MS
EKG Q-T INTERVAL: 434 MS
EKG QRS DURATION: 78 MS
EKG QTC CALCULATION (BAZETT): 444 MS
EKG R AXIS: 55 DEGREES
EKG T AXIS: 76 DEGREES
EKG VENTRICULAR RATE: 63 BPM

## 2020-09-01 NOTE — TELEPHONE ENCOUNTER
Pt had a scope done yesterday and was instructed to call office for a 1 week f/u with Dr Phil aVrgas.  Please call her with appt info [phone]

## 2020-09-09 ENCOUNTER — OFFICE VISIT (OUTPATIENT)
Dept: ENT CLINIC | Age: 65
End: 2020-09-09
Payer: COMMERCIAL

## 2020-09-09 VITALS — TEMPERATURE: 98.1 F | WEIGHT: 132 LBS | HEIGHT: 64 IN | BODY MASS INDEX: 22.53 KG/M2

## 2020-09-09 PROCEDURE — G8420 CALC BMI NORM PARAMETERS: HCPCS | Performed by: OTOLARYNGOLOGY

## 2020-09-09 PROCEDURE — G9899 SCRN MAM PERF RSLTS DOC: HCPCS | Performed by: OTOLARYNGOLOGY

## 2020-09-09 PROCEDURE — 99212 OFFICE O/P EST SF 10 MIN: CPT | Performed by: OTOLARYNGOLOGY

## 2020-09-09 PROCEDURE — G8427 DOCREV CUR MEDS BY ELIG CLIN: HCPCS | Performed by: OTOLARYNGOLOGY

## 2020-09-09 PROCEDURE — 3017F COLORECTAL CA SCREEN DOC REV: CPT | Performed by: OTOLARYNGOLOGY

## 2020-09-09 PROCEDURE — 4004F PT TOBACCO SCREEN RCVD TLK: CPT | Performed by: OTOLARYNGOLOGY

## 2020-09-09 RX ORDER — AZELASTINE 1 MG/ML
1 SPRAY, METERED NASAL 2 TIMES DAILY
Qty: 1 BOTTLE | Refills: 3 | Status: SHIPPED
Start: 2020-09-09 | End: 2021-02-11 | Stop reason: ALTCHOICE

## 2020-09-09 RX ORDER — FLUTICASONE PROPIONATE 50 MCG
1 SPRAY, SUSPENSION (ML) NASAL 2 TIMES DAILY
Qty: 1 BOTTLE | Refills: 3 | Status: SHIPPED
Start: 2020-09-09 | End: 2020-12-23

## 2020-09-09 ASSESSMENT — ENCOUNTER SYMPTOMS
COUGH: 1
SORE THROAT: 1
TROUBLE SWALLOWING: 1
ALLERGIC/IMMUNOLOGIC NEGATIVE: 1
EYES NEGATIVE: 1
VOICE CHANGE: 1

## 2020-09-09 NOTE — PROGRESS NOTES
Subjective:      Patient ID:  Hector Bolivar is a 60 y.o. female. HPI:  here for post op panendo surgery 1 week(s) ago. There are not changes since last visit.      Past Medical History:   Diagnosis Date    Arthritis     Carotid artery stenosis     Cataracts, bilateral 10/'19    Diabetes mellitus ([address]     DM type 2 (diabetes mellitus, type 2) ([address]     Hyperlipidemia     Hypothyroidism     Mass of larynx     for OR 20     Migraines     rare    Neuropathy, diabetic ([address]     Peripheral vascular disease ([address]     Post-nasal drip     Psoriasiform dermatitis     Seasonal allergies     Swallowing problem     Thyroid disease      Past Surgical History:   Procedure Laterality Date    BACK SURGERY      L5-S1    CAROTID ENDARTERECTOMY Left 10/11/2018    [place]    CARPAL TUNNEL RELEASE      bilat     SECTION      2    HYSTERECTOMY      LARYNGOSCOPY N/A 2020    DIRECT LARYNGOSCOPYY AND CERVICAL ESOPHAGOSCOPY (PATHOLOGY PRESENT) performed by Hazel Jama DO at [facility] OR    LEG SURGERY      remote- right     OTHER SURGICAL HISTORY  2018    Dr. Alonso Screws- Bilateral iliac stents iCast [id number]    [place] OFFICE/OUTPT VISIT,PROCEDURE ONLY Left 10/11/2018    LEFT CAROTID ENDARTERECTOMY performed by James Valdez MD at [address] [place] [address]      TOE SURGERY      bilat - ingrown toe nails    TONSILLECTOMY       Family History   Problem Relation Age of Onset   [facility] Cancer Mother         lung brain smoker    Heart Disease Father         mi in his 52's     Other Father         pulmonary fibrosis    Diabetes Brother      Social History     Socioeconomic History    Marital status:      Spouse name: None    Number of children: None    Years of education: None    Highest education level: None   Occupational History    Occupation:  bookeeper   Social Needs    Financial resource strain: None    Food insecurity     Worry: None     Inability: None    Transportation needs     Medical: None     Non-medical: None   Tobacco Use    Smoking status: Current Some Day Smoker     Packs/day: 0.25     Years: 40.00     Pack years: 10.00     Types: Cigarettes     Start date: 5     Last attempt to quit: 2018     Years since quittin.0    Smokeless tobacco: Never Used    Tobacco comment: not snoke 1.5 wk   Substance and Sexual Activity    Alcohol use: No     Comment: rare    Drug use: No    Sexual activity: None   Lifestyle    Physical activity     Days per week: None     Minutes per session: None    Stress: None   Relationships    Social connections     Talks on phone: None     Gets together: None     Attends Church service: None     Active member of club or organization: None     Attends meetings of clubs or organizations: None     Relationship status: None    Intimate partner violence     Fear of current or ex partner: None     Emotionally abused: None     Physically abused: None     Forced sexual activity: None   Other Topics Concern    None   Social History Narrative    None     Allergies   Allergen Reactions    Pcn [Penicillins] Hives and Swelling           Review of Systems   Constitutional: Negative. HENT: Positive for congestion, postnasal drip, sore throat, trouble swallowing and voice change. Eyes: Negative. Respiratory: Positive for cough. Skin: Negative. Allergic/Immunologic: Negative. Neurological: Negative. Hematological: Negative. Psychiatric/Behavioral: Negative. All other systems reviewed and are negative. Objective:   Physical Exam  Vitals signs reviewed. Constitutional:       Appearance: Normal appearance. HENT:      Head: Normocephalic. Right Ear: Tympanic membrane, ear canal and external ear normal.      Left Ear: Tympanic membrane, ear canal and external ear normal.      Nose: Septal deviation (right), mucosal edema and rhinorrhea present. No congestion.  Rhinorrhea is

## 2020-12-31 RX ORDER — FLUTICASONE PROPIONATE 50 MCG
SPRAY, SUSPENSION (ML) NASAL
Qty: 3 BOTTLE | Refills: 1 | Status: SHIPPED
Start: 2020-12-31 | End: 2021-02-11 | Stop reason: ALTCHOICE

## 2021-01-08 ENCOUNTER — TELEPHONE (OUTPATIENT)
Dept: VASCULAR SURGERY | Age: 66
End: 2021-01-08

## 2021-01-08 ENCOUNTER — OFFICE VISIT (OUTPATIENT)
Dept: ENT CLINIC | Age: 66
End: 2021-01-08
Payer: MEDICARE

## 2021-01-08 VITALS — BODY MASS INDEX: 22.36 KG/M2 | RESPIRATION RATE: 16 BRPM | HEIGHT: 64 IN | WEIGHT: 131 LBS | TEMPERATURE: 97.7 F

## 2021-01-08 DIAGNOSIS — K21.9 GASTROESOPHAGEAL REFLUX DISEASE WITHOUT ESOPHAGITIS: Primary | ICD-10-CM

## 2021-01-08 DIAGNOSIS — J38.3 VOCAL CORD LEUKOPLAKIA: ICD-10-CM

## 2021-01-08 DIAGNOSIS — R09.89 GLOBUS SENSATION: ICD-10-CM

## 2021-01-08 PROCEDURE — 3017F COLORECTAL CA SCREEN DOC REV: CPT | Performed by: OTOLARYNGOLOGY

## 2021-01-08 PROCEDURE — 4004F PT TOBACCO SCREEN RCVD TLK: CPT | Performed by: OTOLARYNGOLOGY

## 2021-01-08 PROCEDURE — 1123F ACP DISCUSS/DSCN MKR DOCD: CPT | Performed by: OTOLARYNGOLOGY

## 2021-01-08 PROCEDURE — G8400 PT W/DXA NO RESULTS DOC: HCPCS | Performed by: OTOLARYNGOLOGY

## 2021-01-08 PROCEDURE — G8427 DOCREV CUR MEDS BY ELIG CLIN: HCPCS | Performed by: OTOLARYNGOLOGY

## 2021-01-08 PROCEDURE — G8420 CALC BMI NORM PARAMETERS: HCPCS | Performed by: OTOLARYNGOLOGY

## 2021-01-08 PROCEDURE — 99213 OFFICE O/P EST LOW 20 MIN: CPT | Performed by: OTOLARYNGOLOGY

## 2021-01-08 PROCEDURE — 1090F PRES/ABSN URINE INCON ASSESS: CPT | Performed by: OTOLARYNGOLOGY

## 2021-01-08 PROCEDURE — 4040F PNEUMOC VAC/ADMIN/RCVD: CPT | Performed by: OTOLARYNGOLOGY

## 2021-01-08 PROCEDURE — G8484 FLU IMMUNIZE NO ADMIN: HCPCS | Performed by: OTOLARYNGOLOGY

## 2021-01-08 ASSESSMENT — ENCOUNTER SYMPTOMS
SORE THROAT: 1
COUGH: 1
EYES NEGATIVE: 1
TROUBLE SWALLOWING: 1
VOICE CHANGE: 1
ALLERGIC/IMMUNOLOGIC NEGATIVE: 1

## 2021-01-08 NOTE — TELEPHONE ENCOUNTER
Called to confirm appointment for 1/11/21 at 10 am, left message with date, time, and phone number for patient.

## 2021-01-08 NOTE — PROGRESS NOTES
Subjective:      Patient ID:  Azul Johnston is a 73 y.o. female. HPI:    Patient presents today for recheck reflux. Condition has been present for 4 month(s). Pt is taking omeprazole OTC and saline sprays and doing much better.        Past Medical History:   Diagnosis Date    Arthritis     Carotid artery stenosis     Cataracts, bilateral 10/'19    Diabetes mellitus ([address]     DM type 2 (diabetes mellitus, type 2) ([address]     Hyperlipidemia     Hypothyroidism     Mass of larynx     for OR 20     Migraines     rare    Neuropathy, diabetic ([address]     Peripheral vascular disease ([address]     Post-nasal drip     Psoriasiform dermatitis     Seasonal allergies     Swallowing problem     Thyroid disease      Past Surgical History:   Procedure Laterality Date    BACK SURGERY      L5-S1    CAROTID ENDARTERECTOMY Left 10/11/2018    [facility]    CARPAL TUNNEL RELEASE      bilat     SECTION      2    HYSTERECTOMY      LARYNGOSCOPY N/A 2020    DIRECT LARYNGOSCOPYY AND CERVICAL ESOPHAGOSCOPY (PATHOLOGY PRESENT) performed by Mariella Cameron DO at [facility] OR    LEG SURGERY      remote- right     OTHER SURGICAL HISTORY  2018    Dr. Cookie Cee- Bilateral iliac stents iCast [id number]    [place] OFFICE/OUTPT VISIT,PROCEDURE ONLY Left 10/11/2018    LEFT CAROTID ENDARTERECTOMY performed by Wing Elisa MD at [address] [place] [address] SURGERY      bilat - ingrown toe nails    TONSILLECTOMY       Family History   Problem Relation Age of Onset   [facility] Cancer Mother         lung brain smoker    Heart Disease Father         mi in his 52's     Other Father         pulmonary fibrosis    Diabetes Brother      Social History     Socioeconomic History    Marital status:      Spouse name: None    Number of children: None    Years of education: None    Highest education level: None   Occupational History    Occupation:  bookeeper   Social Needs    Financial resource strain: None    Food insecurity     Worry: None     Inability: None    Transportation needs     Medical: None     Non-medical: None   Tobacco Use    Smoking status: Current Some Day Smoker     Packs/day: 0.25     Years: 40.00     Pack years: 10.00     Types: Cigarettes     Start date: 5     Last attempt to quit: 2018     Years since quittin.4    Smokeless tobacco: Never Used    Tobacco comment: not snoke 1.5 wk   Substance and Sexual Activity    Alcohol use: No     Comment: rare    Drug use: No    Sexual activity: None   Lifestyle    Physical activity     Days per week: None     Minutes per session: None    Stress: None   Relationships    Social connections     Talks on phone: None     Gets together: None     Attends Latter-day service: None     Active member of club or organization: None     Attends meetings of clubs or organizations: None     Relationship status: None    Intimate partner violence     Fear of current or ex partner: None     Emotionally abused: None     Physically abused: None     Forced sexual activity: None   Other Topics Concern    None   Social History Narrative    None     Allergies   Allergen Reactions    Pcn [Penicillins] Hives and Swelling       Review of Systems   Constitutional: Negative. HENT: Positive for congestion, postnasal drip, sore throat, trouble swallowing and voice change. Eyes: Negative. Respiratory: Positive for cough. Skin: Negative. Allergic/Immunologic: Negative. Neurological: Negative. Hematological: Negative. Psychiatric/Behavioral: Negative. All other systems reviewed and are negative. Objective:     Vitals:    21 1039   Resp: 16   Temp: 97.7 °F (36.5 °C)     Physical Exam  Vitals signs reviewed. Constitutional:       Appearance: Normal appearance. HENT:      Head: Normocephalic.       Right Ear: Tympanic membrane, ear canal and external ear normal.      Left Ear: Tympanic membrane, ear canal and external ear normal.      Nose: Septal deviation (right), mucosal edema and rhinorrhea present. No congestion. Rhinorrhea is clear. Mouth/Throat:      Mouth: Mucous membranes are moist.      Dentition: Abnormal dentition. Tongue: No lesions. Palate: No mass. Pharynx: Oropharynx is clear. Uvula midline. Tonsils: No tonsillar exudate. 0 on the right. 0 on the left. Eyes:      Conjunctiva/sclera: Conjunctivae normal.   Neck:      Musculoskeletal: Normal range of motion and neck supple. Thyroid: No thyroid mass, thyromegaly or thyroid tenderness. Trachea: Trachea normal.   Cardiovascular:      Rate and Rhythm: Normal rate. Pulses: Normal pulses. Pulmonary:      Effort: Pulmonary effort is normal.   Lymphadenopathy:      Cervical: No cervical adenopathy. Neurological:      Mental Status: She is alert and oriented to person, place, and time. Assessment:       Diagnosis Orders   1. Gastroesophageal reflux disease without esophagitis     2. Vocal cord leukoplakia     3. Globus sensation                Plan:      Continue reflux meds for now.   I will recheck scope yearly   Follow up in 6 month(s)

## 2021-01-11 ENCOUNTER — HOSPITAL ENCOUNTER (OUTPATIENT)
Dept: CARDIOLOGY | Age: 66
Discharge: HOME | End: 2021-01-11
Payer: MEDICARE

## 2021-01-11 ENCOUNTER — OFFICE VISIT (OUTPATIENT)
Dept: VASCULAR SURGERY | Age: 66
End: 2021-01-11
Payer: MEDICARE

## 2021-01-11 VITALS — WEIGHT: 131 LBS | BODY MASS INDEX: 22.36 KG/M2 | HEIGHT: 64 IN

## 2021-01-11 DIAGNOSIS — I73.9 PVD (PERIPHERAL VASCULAR DISEASE) (HCC): ICD-10-CM

## 2021-01-11 DIAGNOSIS — I74.09 AORTOILIAC OCCLUSIVE DISEASE (HCC): ICD-10-CM

## 2021-01-11 DIAGNOSIS — I65.23 CAROTID ARTERY STENOSIS, ASYMPTOMATIC, BILATERAL: Primary | ICD-10-CM

## 2021-01-11 DIAGNOSIS — I73.9 PVD (PERIPHERAL VASCULAR DISEASE) WITH CLAUDICATION (HCC): ICD-10-CM

## 2021-01-11 DIAGNOSIS — I65.23 ASYMPTOMATIC BILATERAL CAROTID ARTERY STENOSIS: ICD-10-CM

## 2021-01-11 PROCEDURE — 1123F ACP DISCUSS/DSCN MKR DOCD: CPT | Performed by: NURSE PRACTITIONER

## 2021-01-11 PROCEDURE — G8427 DOCREV CUR MEDS BY ELIG CLIN: HCPCS | Performed by: NURSE PRACTITIONER

## 2021-01-11 PROCEDURE — 4040F PNEUMOC VAC/ADMIN/RCVD: CPT | Performed by: NURSE PRACTITIONER

## 2021-01-11 PROCEDURE — G8484 FLU IMMUNIZE NO ADMIN: HCPCS | Performed by: NURSE PRACTITIONER

## 2021-01-11 PROCEDURE — G8400 PT W/DXA NO RESULTS DOC: HCPCS | Performed by: NURSE PRACTITIONER

## 2021-01-11 PROCEDURE — 3017F COLORECTAL CA SCREEN DOC REV: CPT | Performed by: NURSE PRACTITIONER

## 2021-01-11 PROCEDURE — 4004F PT TOBACCO SCREEN RCVD TLK: CPT | Performed by: NURSE PRACTITIONER

## 2021-01-11 PROCEDURE — 99213 OFFICE O/P EST LOW 20 MIN: CPT | Performed by: NURSE PRACTITIONER

## 2021-01-11 PROCEDURE — 1090F PRES/ABSN URINE INCON ASSESS: CPT | Performed by: NURSE PRACTITIONER

## 2021-01-11 PROCEDURE — 93880 EXTRACRANIAL BILAT STUDY: CPT

## 2021-01-11 PROCEDURE — 93923 UPR/LXTR ART STDY 3+ LVLS: CPT

## 2021-01-11 PROCEDURE — G8420 CALC BMI NORM PARAMETERS: HCPCS | Performed by: NURSE PRACTITIONER

## 2021-01-11 RX ORDER — CLOPIDOGREL BISULFATE 75 MG/1
TABLET ORAL
Qty: 90 TABLET | Refills: 3 | Status: SHIPPED
Start: 2021-01-11 | End: 2021-05-24 | Stop reason: SDUPTHER

## 2021-01-11 NOTE — PROGRESS NOTES
Vascular Surgery Outpatient Followup    PCP : Lynn Reyes DO   ENT : Dr. Fela Em    Previous procedures  10/11/18 L CEA for assx   18 Kissing iliac stents 8x59 ICAST  Plasty of the R EIA with 8x59 balloon     HISTORY OF PRESENT ILLNESS:    The patient is a 73 y.o. female who is here in regards to follow up of their PVD and her carotid stenosis. The patient states that her symptoms of right leg pain remains resolved. She is able to walk any distance without limitations. She denies lifestyle limiting claudication, rest pain, or tissue loss. She states that she has pain which is achey and sore in the left lateral thigh typically when walking long distances. She denies posterior thigh or buttock pain. This most likely related to her hip pain and not her PVD. The pain does not limit her activity. She denies sxs of stroke, tia, slurred speech, focal weakness. She started having headaches in 2019. She has known issues with her sinuses and has had two sinus surgeries in the past.  She follows with Dr. Fela Em for this and swallowing issues. The headache symptoms improved with use of nasal spray since she was last seen.      Past Medical History:        Diagnosis Date    Arthritis     Carotid artery stenosis     Cataracts, bilateral 10/'19    Diabetes mellitus ([address]     DM type 2 (diabetes mellitus, type 2) ([address]     Hyperlipidemia     Hypothyroidism     Mass of larynx     for OR 20     Migraines     rare    Neuropathy, diabetic ([address]     Peripheral vascular disease ([address]     Post-nasal drip     Psoriasiform dermatitis     Seasonal allergies     Swallowing problem     Thyroid disease      Past Surgical History:        Procedure Laterality Date    BACK SURGERY      L5-S1    CAROTID ENDARTERECTOMY Left 10/11/2018    [facility]    CARPAL TUNNEL RELEASE      bilat     SECTION      2    HYSTERECTOMY      LARYNGOSCOPY N/A 2020    DIRECT LARYNGOSCOPYY No current facility-administered medications for this visit.       Allergies:  Pcn [penicillins]  Social History     Socioeconomic History    Marital status:      Spouse name: Not on file    Number of children: Not on file    Years of education: Not on file    Highest education level: Not on file   Occupational History    Occupation:  bookeeper   Social Needs    Financial resource strain: Not on file    Food insecurity     Worry: Not on file     Inability: Not on file   [employer] needs     Medical: Not on file     Non-medical: Not on file   Tobacco Use    Smoking status: Current Some Day Smoker     Packs/day: 0.25     Years: 40.00     Pack years: 10.00     Types: Cigarettes     Start date:      Last attempt to quit: 2018     Years since quittin.4    Smokeless tobacco: Never Used    Tobacco comment: not snoke 1.5 wk   Substance and Sexual Activity    Alcohol use: No     Comment: rare    Drug use: No    Sexual activity: Not on file   Lifestyle    Physical activity     Days per week: Not on file     Minutes per session: Not on file    Stress: Not on file   Relationships    Social connections     Talks on phone: Not on file     Gets together: Not on file     Attends Quaker service: Not on file     Active member of club or organization: Not on file     Attends meetings of clubs or organizations: Not on file     Relationship status: Not on file    Intimate partner violence     Fear of current or ex partner: Not on file     Emotionally abused: Not on file     Physically abused: Not on file     Forced sexual activity: Not on file   Other Topics Concern    Not on file   Social History Narrative    Not on file     Family History   Problem Relation Age of Onset    Cancer Mother         lung brain smoker    Heart Disease Father         mi in his 52's    [facility] Other Father         pulmonary fibrosis    Diabetes Brother      Labs  Lab Results   Component Value Date    WBC 8.3 08/31/2020    HGB 15.1 08/31/2020    HCT 46.0 08/31/2020     08/31/2020    PROTIME 11.0 12/04/2018    INR 1.0 12/04/2018    K 4.4 08/31/2020    BUN 17 08/31/2020    CREATININE 0.6 08/31/2020     PHYSICAL EXAM:    Ht 5' 4\" (1.626 m)   Wt 131 lb (59.4 kg)   BMI 22.49 kg/m²   CONSTITUTIONAL:   Awake, alert, cooperative  PSYCHIATRIC :  Oriented to time, place and person     Appropriate insight to disease process  EYES: Lids and lashes normal  ENT:  External ears and nose without lesions   Hearing deficits   CN II - XII intact except for right shoulder weakness due to pain  NECK: Supple, symmetrical, trachea midline   Carotid bruit   LUNGS:  No increased work of breathing                 Clear to auscultation  CARDIOVASCULAR:  regular rate and rhythm   ABDOMEN:  soft, non-distended, non-tender  SKIN:   Normal skin color   Texture and turgor normal, no induration  EXTREMITIES:   R LE Edema absent   Biphasic DP/PT  L LE Edema absent   Monophasic DP/PT    RADIOLOGY:  ABIs and PVRs  Right JULIANA 0.90, TBI 0.54  Left JULIANA 0.64, TBI 0.23    A/P Assx PVD   Hx of iliac stents, R EIA plasty   · Pt is asymptomatic   · Arterial studies stable on right and worse on left as compared to previous  · Likely due to progressive distal arterial occlusive disease.    · Continue medical management with ASA, plavix and statin  · Emphasized importance of continued Tobacco cessation  · they understood that with tobacco use they are at increased risk of worsening of their pvd and increased risk of limb loss  · No indication for surgical intervention at this time  · Discussed with patient pathophysiology of pvd, claudication, tissues loss, rest pain, and potential for limb loss  · Discussed with patient symptoms of new onset claudication, tissue loss, rest pain, changes in lower extremity coolness, pain and they understood to go to ER immediately if any symptoms developed  · F/U as outpatient in 12 Months with repeat ABIs/PVRs    A/P

## 2021-01-11 NOTE — PROGRESS NOTES
[facility] Heart [facility] Vascular Lab - [facility]    This is a pre read worksheet - prior to official physician interpretation    Prieto Mensah  1955  Date of study: 1/11/21    Indication for study:  Carotid artery stenosis  Study : Bilateral Carotid Artery Duplex Examination    Duplex examination of the RIGHT carotid artery system identifies atherosclerotic plaque. The peak systolic velocity in internal carotid artery was 292 centimeters / second. The maximum end diastolic velocity was 73 centimeters / second. The ICA/CCA ratio is 2.6. The right vertebral artery has antegrade flow. Duplex examination of the LEFT carotid artery system identifies atherosclerotic plaque. The peak systolic velocity in internal carotid artery was 137 centimeters / second. The maximum end diastolic velocity was 37 centimeters / second. The ICA/CCA ratio is 1.0. The left vertebral artery has antegrade flow.         LAST STUDY  1/6/2020  Rt 60-69  Lt 1-49

## 2021-02-11 ENCOUNTER — OFFICE VISIT (OUTPATIENT)
Dept: PRIMARY CARE CLINIC | Age: 66
End: 2021-02-11
Payer: MEDICARE

## 2021-02-11 VITALS
HEART RATE: 97 BPM | OXYGEN SATURATION: 99 % | SYSTOLIC BLOOD PRESSURE: 136 MMHG | BODY MASS INDEX: 22.36 KG/M2 | WEIGHT: 131 LBS | DIASTOLIC BLOOD PRESSURE: 74 MMHG | TEMPERATURE: 97.3 F | HEIGHT: 64 IN

## 2021-02-11 DIAGNOSIS — E03.9 HYPOTHYROIDISM, UNSPECIFIED TYPE: ICD-10-CM

## 2021-02-11 DIAGNOSIS — E11.65 UNCONTROLLED TYPE 2 DIABETES MELLITUS WITH HYPERGLYCEMIA (HCC): ICD-10-CM

## 2021-02-11 DIAGNOSIS — Z12.39 BREAST SCREENING: ICD-10-CM

## 2021-02-11 DIAGNOSIS — Z23 NEED FOR PNEUMOCOCCAL VACCINATION: ICD-10-CM

## 2021-02-11 DIAGNOSIS — F34.1 DYSTHYMIA: ICD-10-CM

## 2021-02-11 DIAGNOSIS — E11.65 UNCONTROLLED TYPE 2 DIABETES MELLITUS WITH HYPERGLYCEMIA (HCC): Primary | ICD-10-CM

## 2021-02-11 DIAGNOSIS — E78.2 MIXED HYPERLIPIDEMIA: ICD-10-CM

## 2021-02-11 DIAGNOSIS — Z12.31 ENCOUNTER FOR SCREENING MAMMOGRAM FOR MALIGNANT NEOPLASM OF BREAST: ICD-10-CM

## 2021-02-11 LAB
ALBUMIN SERPL-MCNC: 4.5 G/DL (ref 3.5–5.2)
ALP BLD-CCNC: 77 U/L (ref 35–104)
ALT SERPL-CCNC: 15 U/L (ref 0–32)
ANION GAP SERPL CALCULATED.3IONS-SCNC: 11 MMOL/L (ref 7–16)
AST SERPL-CCNC: 14 U/L (ref 0–31)
BILIRUB SERPL-MCNC: 0.3 MG/DL (ref 0–1.2)
BUN BLDV-MCNC: 11 MG/DL (ref 8–23)
CALCIUM SERPL-MCNC: 10.1 MG/DL (ref 8.6–10.2)
CHLORIDE BLD-SCNC: 97 MMOL/L (ref 98–107)
CHOLESTEROL, TOTAL: 201 MG/DL (ref 0–199)
CO2: 24 MMOL/L (ref 22–29)
CREAT SERPL-MCNC: 0.6 MG/DL (ref 0.5–1)
GFR AFRICAN AMERICAN: >60
GFR NON-AFRICAN AMERICAN: >60 ML/MIN/1.73
GLUCOSE BLD-MCNC: 185 MG/DL (ref 74–99)
HBA1C MFR BLD: 9.5 % (ref 4–5.6)
HCT VFR BLD CALC: 44.9 % (ref 34–48)
HDLC SERPL-MCNC: 28 MG/DL
HEMOGLOBIN: 14.7 G/DL (ref 11.5–15.5)
LDL CHOLESTEROL CALCULATED: 124 MG/DL (ref 0–99)
MCH RBC QN AUTO: 29.6 PG (ref 26–35)
MCHC RBC AUTO-ENTMCNC: 32.7 % (ref 32–34.5)
MCV RBC AUTO: 90.3 FL (ref 80–99.9)
PDW BLD-RTO: 13.2 FL (ref 11.5–15)
PLATELET # BLD: 263 E9/L (ref 130–450)
PMV BLD AUTO: 10.6 FL (ref 7–12)
POTASSIUM SERPL-SCNC: 5.4 MMOL/L (ref 3.5–5)
RBC # BLD: 4.97 E12/L (ref 3.5–5.5)
SODIUM BLD-SCNC: 132 MMOL/L (ref 132–146)
TOTAL PROTEIN: 6.9 G/DL (ref 6.4–8.3)
TRIGL SERPL-MCNC: 247 MG/DL (ref 0–149)
TSH SERPL DL<=0.05 MIU/L-ACNC: 1.68 UIU/ML (ref 0.27–4.2)
VLDLC SERPL CALC-MCNC: 49 MG/DL
WBC # BLD: 6.9 E9/L (ref 4.5–11.5)

## 2021-02-11 PROCEDURE — 99214 OFFICE O/P EST MOD 30 MIN: CPT | Performed by: FAMILY MEDICINE

## 2021-02-11 PROCEDURE — 90732 PPSV23 VACC 2 YRS+ SUBQ/IM: CPT | Performed by: FAMILY MEDICINE

## 2021-02-11 PROCEDURE — G8427 DOCREV CUR MEDS BY ELIG CLIN: HCPCS | Performed by: FAMILY MEDICINE

## 2021-02-11 PROCEDURE — 1090F PRES/ABSN URINE INCON ASSESS: CPT | Performed by: FAMILY MEDICINE

## 2021-02-11 PROCEDURE — G0009 ADMIN PNEUMOCOCCAL VACCINE: HCPCS | Performed by: FAMILY MEDICINE

## 2021-02-11 PROCEDURE — G8420 CALC BMI NORM PARAMETERS: HCPCS | Performed by: FAMILY MEDICINE

## 2021-02-11 PROCEDURE — 4004F PT TOBACCO SCREEN RCVD TLK: CPT | Performed by: FAMILY MEDICINE

## 2021-02-11 PROCEDURE — 4040F PNEUMOC VAC/ADMIN/RCVD: CPT | Performed by: FAMILY MEDICINE

## 2021-02-11 PROCEDURE — 2022F DILAT RTA XM EVC RTNOPTHY: CPT | Performed by: FAMILY MEDICINE

## 2021-02-11 PROCEDURE — G8484 FLU IMMUNIZE NO ADMIN: HCPCS | Performed by: FAMILY MEDICINE

## 2021-02-11 PROCEDURE — 3017F COLORECTAL CA SCREEN DOC REV: CPT | Performed by: FAMILY MEDICINE

## 2021-02-11 PROCEDURE — 1123F ACP DISCUSS/DSCN MKR DOCD: CPT | Performed by: FAMILY MEDICINE

## 2021-02-11 PROCEDURE — 3046F HEMOGLOBIN A1C LEVEL >9.0%: CPT | Performed by: FAMILY MEDICINE

## 2021-02-11 PROCEDURE — G8400 PT W/DXA NO RESULTS DOC: HCPCS | Performed by: FAMILY MEDICINE

## 2021-02-11 RX ORDER — SIMVASTATIN 40 MG
40 TABLET ORAL NIGHTLY
Qty: 90 TABLET | Refills: 1 | Status: SHIPPED
Start: 2021-02-11 | End: 2021-05-24 | Stop reason: SDUPTHER

## 2021-02-11 RX ORDER — LEVOTHYROXINE SODIUM 0.03 MG/1
25 TABLET ORAL DAILY
Qty: 90 TABLET | Refills: 1 | Status: SHIPPED
Start: 2021-02-11 | End: 2021-05-24 | Stop reason: SDUPTHER

## 2021-02-11 ASSESSMENT — COLUMBIA-SUICIDE SEVERITY RATING SCALE - C-SSRS
1. WITHIN THE PAST MONTH, HAVE YOU WISHED YOU WERE DEAD OR WISHED YOU COULD GO TO SLEEP AND NOT WAKE UP?: NO
2. HAVE YOU ACTUALLY HAD ANY THOUGHTS OF KILLING YOURSELF?: NO

## 2021-02-11 ASSESSMENT — ENCOUNTER SYMPTOMS
BLURRED VISION: 0
CONSTIPATION: 1
VISUAL CHANGE: 0
SHORTNESS OF BREATH: 0
BLOOD IN STOOL: 0
DIARRHEA: 0

## 2021-02-11 ASSESSMENT — PATIENT HEALTH QUESTIONNAIRE - PHQ9
10. IF YOU CHECKED OFF ANY PROBLEMS, HOW DIFFICULT HAVE THESE PROBLEMS MADE IT FOR YOU TO DO YOUR WORK, TAKE CARE OF THINGS AT HOME, OR GET ALONG WITH OTHER PEOPLE: 3
5. POOR APPETITE OR OVEREATING: 0
SUM OF ALL RESPONSES TO PHQ QUESTIONS 1-9: 15
1. LITTLE INTEREST OR PLEASURE IN DOING THINGS: 3
SUM OF ALL RESPONSES TO PHQ QUESTIONS 1-9: 15
SUM OF ALL RESPONSES TO PHQ QUESTIONS 1-9: 15
2. FEELING DOWN, DEPRESSED OR HOPELESS: 3

## 2021-02-11 NOTE — PROGRESS NOTES
Rita Navarro, a female of 73 y.o. came to the office 2/11/2021. Patient Active Problem List   Diagnosis    Asymptomatic bilateral carotid artery stenosis    History of nuclear stress test    Mixed hyperlipidemia    Aortoiliac occlusive disease ([address]    PVD (peripheral vascular disease) with claudication ([facility] [address]    Carotid stenosis, asymptomatic, bilateral    S/P carotid endarterectomy    Temporal arteritis ([facility] [address]    Tobacco abuse    Lesion of true vocal cord          Diabetes  She presents for her follow-up diabetic visit. She has type 2 diabetes mellitus. Her disease course has been worsening. Hypoglycemia symptoms include nervousness/anxiousness. Associated symptoms include fatigue and foot paresthesias. Pertinent negatives for diabetes include no blurred vision, no chest pain, no foot ulcerations, no polydipsia, no polyuria and no visual change. She is compliant with treatment most of the time. Her weight is stable. She is following a generally unhealthy (eating bad.) diet. She rarely participates in exercise. Her breakfast blood glucose range is generally 180-200 mg/dl. An ACE inhibitor/angiotensin II receptor blocker is being taken. Hyperlipidemia  Associated symptoms include leg pain and myalgias. Pertinent negatives include no chest pain or shortness of breath. depression: has lost 3 friends due to illnesses. She increased her Celexa to 40 mg in Jan which has helped. Feels calmer.      Allergies   Allergen Reactions    Pcn [Penicillins] Hives and Swelling       Current Outpatient Medications on File Prior to Visit   Medication Sig Dispense Refill    Ixekizumab (TALTZ SC) Inject into the skin      citalopram (CELEXA) 20 MG tablet TAKE 1 TABLET DAILY 90 tablet 0    clopidogrel (PLAVIX) 75 MG tablet TAKE 1 TABLET DAILY 90 tablet 3    lisinopril (PRINIVIL;ZESTRIL) 5 MG tablet TAKE 1 TABLET DAILY 90 tablet 0    insulin lispro (HUMALOG) 100 UNIT/ML injection vial Inject 15 Units into the skin 2 times daily      metFORMIN (GLUCOPHAGE) 500 MG tablet TAKE 1 TABLET TWICE A DAY WITH MEALS 180 tablet 1    omeprazole (PRILOSEC) 40 MG delayed release capsule TAKE 1 CAPSULE BY MOUTH EVERY DAY 30 capsule 1    TOUJEO SOLOSTAR 300 UNIT/ML SOPN INJECT 50 UNITS INTO THE SKIN TWICE A DAY (Patient taking differently: 25 Units 2 times daily ) 5 pen 2    BD PEN NEEDLE SAMEER U/F 32G X 4 MM MISC       Choline Fenofibrate (FENOFIBRIC ACID) 135 MG CPDR Take 135 mg by mouth daily       aspirin 81 MG tablet Take 81 mg by mouth daily       No current facility-administered medications on file prior to visit. Review of Systems   Constitutional: Positive for fatigue. Eyes: Negative for blurred vision. Respiratory: Negative for shortness of breath. Back smoking 1/2 ppd   Cardiovascular: Negative for chest pain. Gastrointestinal: Positive for constipation. Negative for blood in stool and diarrhea. Endocrine: Negative for polydipsia and polyuria. Musculoskeletal: Positive for myalgias. Psychiatric/Behavioral: Positive for dysphoric mood and sleep disturbance. The patient is nervous/anxious. other review of systems reviewed and are negative    OBJECTIVE:  /74   Pulse 97   Temp 97.3 °F (36.3 °C)   Ht 5' 4\" (1.626 m)   Wt 131 lb (59.4 kg)   SpO2 99%   BMI 22.49 kg/m²      Physical Exam  Vitals signs reviewed. Eyes:      General: No scleral icterus. Conjunctiva/sclera: Conjunctivae normal.   Neck:      Musculoskeletal: Neck supple. Thyroid: No thyromegaly. Vascular: Carotid bruit (R) present. Cardiovascular:      Rate and Rhythm: Normal rate and regular rhythm. Heart sounds: No murmur. Pulmonary:      Effort: Pulmonary effort is normal.      Breath sounds: Normal breath sounds. No wheezing or rales. Abdominal:      General: Bowel sounds are normal.      Palpations: Abdomen is soft. There is no mass. Tenderness: There is no abdominal tenderness.  There is no guarding or rebound. Musculoskeletal: Normal range of motion. Lymphadenopathy:      Cervical: No cervical adenopathy. Skin:     General: Skin is warm and dry. Neurological:      Mental Status: She is alert and oriented to person, place, and time. Psychiatric:         Mood and Affect: Mood normal.         ASSESSMENT AND PLAN:    Arthur Schmitz was seen today for diabetes, 6 month follow-up and blood work. Diagnoses and all orders for this visit:    Uncontrolled type 2 diabetes mellitus with hyperglycemia ([facility] [address]  -     Comprehensive Metabolic Panel; Future  -     Hemoglobin A1C; Future    Mixed hyperlipidemia  -     simvastatin (ZOCOR) 40 MG tablet; Take 1 tablet by mouth nightly  -     Lipid Panel; Future    Dysthymia    Hypothyroidism, unspecified type  -     levothyroxine (SYNTHROID) 25 MCG tablet; Take 1 tablet by mouth Daily  -     CBC; Future  -     TSH without Reflex; Future    Need for pneumococcal vaccination  -     Pneumococcal polysaccharide vaccine 23-valent greater than or equal to 3yo subcutaneous/IM    Breast screening  -     BRO DIGITAL SCREEN W OR WO CAD BILATERAL; Future    Encounter for screening mammogram for malignant neoplasm of breast   -     BRO DIGITAL SCREEN W OR WO CAD BILATERAL; Future    - quit smoking  - ok to continue Celexa at 40 mg daily  - follow a better diabetic diet  - reviewed vascular appt note  - med changes based on labs. - refuses conoloscopy    Return for based on lab results, or for acute problem.     Karen Garcia, DO

## 2021-02-15 ENCOUNTER — TELEPHONE (OUTPATIENT)
Dept: PRIMARY CARE CLINIC | Age: 66
End: 2021-02-15

## 2021-02-15 NOTE — TELEPHONE ENCOUNTER
Called patient discussed labs. Her hemoglobin A1c is still 9.5. She admits to not taking her Toujeo and Humalog as often as she is scheduled to take it. Plus she also admits to eating bad lately. Eating a lot of candy. She states she is going to do better now rather than increasing her medication. Her cholesterol is also gone up as well.  6 months ago her cholesterol was much better and in control. She will continue the simvastatin and watch her diet better as well.

## 2021-02-22 ENCOUNTER — TELEPHONE (OUTPATIENT)
Dept: ADMINISTRATIVE | Age: 66
End: 2021-02-22

## 2021-02-22 NOTE — TELEPHONE ENCOUNTER
Mammogram order in chart, will fax to [facility] and patient will call there to schedule

## 2021-03-04 DIAGNOSIS — Z12.31 ENCOUNTER FOR SCREENING MAMMOGRAM FOR MALIGNANT NEOPLASM OF BREAST: ICD-10-CM

## 2021-03-04 DIAGNOSIS — Z12.39 BREAST SCREENING: ICD-10-CM

## 2021-05-11 ENCOUNTER — APPOINTMENT (OUTPATIENT)
Dept: CT IMAGING | Age: 66
End: 2021-05-11
Payer: MEDICARE

## 2021-05-11 ENCOUNTER — HOSPITAL ENCOUNTER (EMERGENCY)
Age: 66
Discharge: HOME | End: 2021-05-11
Attending: EMERGENCY MEDICINE
Payer: MEDICARE

## 2021-05-11 ENCOUNTER — APPOINTMENT (OUTPATIENT)
Dept: GENERAL RADIOLOGY | Age: 66
End: 2021-05-11
Payer: MEDICARE

## 2021-05-11 VITALS
DIASTOLIC BLOOD PRESSURE: 74 MMHG | BODY MASS INDEX: 21.34 KG/M2 | SYSTOLIC BLOOD PRESSURE: 177 MMHG | TEMPERATURE: 98.4 F | RESPIRATION RATE: 14 BRPM | HEIGHT: 64 IN | HEART RATE: 78 BPM | OXYGEN SATURATION: 96 % | WEIGHT: 125 LBS

## 2021-05-11 DIAGNOSIS — M54.2 NECK PAIN ON RIGHT SIDE: ICD-10-CM

## 2021-05-11 DIAGNOSIS — M25.511 ACUTE PAIN OF RIGHT SHOULDER: ICD-10-CM
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
## 2021-05-11 DIAGNOSIS — S09.90XA CLOSED HEAD INJURY, INITIAL ENCOUNTER: Primary | ICD-10-CM

## 2021-05-11 PROCEDURE — 73030 X-RAY EXAM OF SHOULDER: CPT

## 2021-05-11 PROCEDURE — 70450 CT HEAD/BRAIN W/O DYE: CPT

## 2021-05-11 PROCEDURE — 71045 X-RAY EXAM CHEST 1 VIEW: CPT

## 2021-05-11 PROCEDURE — 90471 IMMUNIZATION ADMIN: CPT | Performed by: STUDENT IN AN ORGANIZED HEALTH CARE EDUCATION/TRAINING PROGRAM

## 2021-05-11 PROCEDURE — 99284 EMERGENCY DEPT VISIT MOD MDM: CPT

## 2021-05-11 PROCEDURE — 90715 TDAP VACCINE 7 YRS/> IM: CPT | Performed by: STUDENT IN AN ORGANIZED HEALTH CARE EDUCATION/TRAINING PROGRAM

## 2021-05-11 PROCEDURE — 6360000002 HC RX W HCPCS: Performed by: STUDENT IN AN ORGANIZED HEALTH CARE EDUCATION/TRAINING PROGRAM

## 2021-05-11 PROCEDURE — 72125 CT NECK SPINE W/O DYE: CPT

## 2021-05-11 RX ORDER — IBUPROFEN 600 MG/1
600 TABLET ORAL 4 TIMES DAILY PRN
Qty: 40 TABLET | Refills: 0 | Status: SHIPPED | OUTPATIENT
Start: 2021-05-11 | End: 2022-02-08

## 2021-05-11 RX ORDER — ACETAMINOPHEN 325 MG/1
650 TABLET ORAL ONCE
Status: DISCONTINUED | OUTPATIENT
Start: 2021-05-11 | End: 2021-05-12 | Stop reason: HOSPADM

## 2021-05-11 RX ADMIN — TETANUS TOXOID, REDUCED DIPHTHERIA TOXOID AND ACELLULAR PERTUSSIS VACCINE, ADSORBED 0.5 ML: 5; 2.5; 8; 8; 2.5 SUSPENSION INTRAMUSCULAR at 20:31

## 2021-05-11 ASSESSMENT — PAIN DESCRIPTION - ORIENTATION: ORIENTATION: POSTERIOR

## 2021-05-11 ASSESSMENT — ENCOUNTER SYMPTOMS
ABDOMINAL PAIN: 0
WHEEZING: 0
NAUSEA: 0
EYE PAIN: 0
COUGH: 0
VOMITING: 0
RHINORRHEA: 0
SHORTNESS OF BREATH: 0
DIARRHEA: 0

## 2021-05-11 ASSESSMENT — PAIN DESCRIPTION - DESCRIPTORS: DESCRIPTORS: ACHING;CONSTANT

## 2021-05-11 ASSESSMENT — PAIN SCALES - GENERAL: PAINLEVEL_OUTOF10: 7

## 2021-05-11 ASSESSMENT — PAIN DESCRIPTION - LOCATION: LOCATION: HEAD

## 2021-05-11 ASSESSMENT — PAIN DESCRIPTION - PAIN TYPE: TYPE: ACUTE PAIN

## 2021-05-12 NOTE — ED PROVIDER NOTES
[facility] AT [facility] Emergency Room          Pt Name: Severa Dowse  MRN: [id number]  [place] 1955  Date of evaluation: 5/11/2021      CHIEF COMPLAINT  Chief Complaint   Patient presents with    Head Injury     fell out of chair, hit head on a bench, then a concrete slab, denies LOC, on plavix     Shoulder Injury     right    Blurred Vision        Severa Dowse is a 73 y.o. female presenting to the ED with chief complaint of fall. Patient was at a game on a foldable chair when she tilted over and had a mechanical fall landing on her right posterior occiput. She not lose consciousness. She states she had some blurred vision at the time but none currently. She also has pain on the right side of her neck and into her shoulder. Her pain is made worse with palpation and movement. She denies any alleviating factors. She does not update on her tetanus. No treatment was given prior to arrival.  The pain is a 7/10.     HPI       Patient has a medical history as listed below:   Past Medical History:   Diagnosis Date    Arthritis     Carotid artery stenosis     Cataracts, bilateral 10/'19    Diabetes mellitus ([address]     DM type 2 (diabetes mellitus, type 2) ([address]     Hyperlipidemia     Hypothyroidism     Mass of larynx     for OR 8-31-20     Migraines     rare    Neuropathy, diabetic ([address]     Peripheral vascular disease ([address]     Post-nasal drip     Psoriasiform dermatitis     Seasonal allergies     Swallowing problem     Thyroid disease      Patient Active Problem List    Diagnosis Date Noted    Lesion of true vocal cord     Tobacco abuse 03/03/2020    Temporal arteritis ([address] 01/06/2020    S/P carotid endarterectomy 12/17/2018    Carotid stenosis, asymptomatic, bilateral 10/11/2018    Aortoiliac occlusive disease ([address] 09/24/2018    PVD (peripheral vascular disease) with claudication ([address] 09/24/2018    History of nuclear stress test 09/14/2018    Mixed hyperlipidemia 09/14/2018    Asymptomatic bilateral carotid artery stenosis 09/10/2018       Review of Systems   Constitutional: Negative for chills and fever. HENT: Negative for congestion and rhinorrhea. Eyes: Positive for visual disturbance. Negative for pain. Respiratory: Negative for cough, shortness of breath and wheezing. Cardiovascular: Negative for chest pain. Gastrointestinal: Negative for abdominal pain, diarrhea, nausea and vomiting. Endocrine: Negative for polyuria. Genitourinary: Negative for dysuria, frequency and hematuria. Musculoskeletal: Positive for neck pain. Negative for arthralgias, myalgias and neck stiffness. Right shoulder pain   Skin: Negative for rash. Neurological: Positive for headaches. Negative for dizziness, weakness and numbness. Physical Exam  Vitals signs and nursing note reviewed. Constitutional:       Appearance: She is well-developed. HENT:      Head: Normocephalic. Nose: Nose normal.      Mouth/Throat:      Pharynx: Oropharynx is clear. Eyes:      Extraocular Movements: Extraocular movements intact. Conjunctiva/sclera: Conjunctivae normal.      Pupils: Pupils are equal, round, and reactive to light. Neck:      Musculoskeletal: Full passive range of motion without pain. Muscular tenderness (Right-sided) present. No spinous process tenderness. Cardiovascular:      Rate and Rhythm: Normal rate and regular rhythm. Pulses: Normal pulses. Heart sounds: Normal heart sounds. Pulmonary:      Effort: Pulmonary effort is normal. No respiratory distress. Breath sounds: Normal breath sounds. No wheezing, rhonchi or rales. Abdominal:      General: Abdomen is flat. There is no distension. Palpations: Abdomen is soft. There is no mass. Tenderness: There is no abdominal tenderness. There is no guarding or rebound. Musculoskeletal:         General: No swelling or deformity. Right lower leg: No edema.       Left lower (12/04/2018); Toe Surgery; and laryngoscopy (N/A, 8/31/2020). Social History:  reports that she has been smoking cigarettes. She started smoking about 51 years ago. She has a 40.00 pack-year smoking history. She has never used smokeless tobacco. She reports that she does not drink alcohol or use drugs. Family History: family history includes Cancer in her mother; Diabetes in her brother; Heart Disease in her father; Other in her father. The patients home medications have been reviewed. Allergies: Pcn [penicillins]    -------------------------------------------------- RESULTS -------------------------------------------------  Labs:  No results found for this visit on 05/11/21. Radiology:  XR CHEST PORTABLE   Final Result   No acute process. XR SHOULDER RIGHT (MIN 2 VIEWS)   Final Result   Mild right shoulder degenerative changes. No acute osseous findings at this   time. RECOMMENDATION:   In the setting of trauma, if there is persistent symptoms and physical exam   warrants a repeat radiograph in 10-14 days could be considered as occult   fractures may not be evident on initial imaging evaluation. CT HEAD WO CONTRAST   Final Result   No acute abnormality of the cervical spine. There is mild diffuse degenerative changes from C2-T1 with osteophytes and   multilevel disc bulges. CT HEAD. There is mild diffuse atrophy with prominence of the ventricles and sulci. Confluent areas of decreased attenuation are present in the periventricular   white matter and brainstem consistent with microvascular/ischemic changes. A   punctate linear hyperdensity is identified along the falx likely dural   calcification. A petechial hemorrhage is less likely. The remainder of the   brain parenchyma has normal are architecture and attenuation without acute   stroke, mass or hemorrhage. The paranasal sinuses are clear.       Impression      Age-appropriate atrophy with small vessel ischemic disease. There is no   acute stroke or hemorrhage. Punctate hyperdensity along the falx likely dural calcification. Consider   short intervals surveillance. CT CERVICAL SPINE WO CONTRAST   Final Result   No acute abnormality of the cervical spine. There is mild diffuse degenerative changes from C2-T1 with osteophytes and   multilevel disc bulges. CT HEAD. There is mild diffuse atrophy with prominence of the ventricles and sulci. Confluent areas of decreased attenuation are present in the periventricular   white matter and brainstem consistent with microvascular/ischemic changes. A   punctate linear hyperdensity is identified along the falx likely dural   calcification. A petechial hemorrhage is less likely. The remainder of the   brain parenchyma has normal are architecture and attenuation without acute   stroke, mass or hemorrhage. The paranasal sinuses are clear. Impression      Age-appropriate atrophy with small vessel ischemic disease. There is no   acute stroke or hemorrhage. Punctate hyperdensity along the falx likely dural calcification. Consider   short intervals surveillance. ------------------------- NURSING NOTES AND VITALS REVIEWED ---------------------------  Date / Time Roomed:  5/11/2021  7:07 PM  ED Bed Assignment:  02/02    The nursing notes within the ED encounter and vital signs as below have been reviewed. BP (!) 177/74   Pulse 78   Temp 98.4 °F (36.9 °C) (Temporal)   Resp 14   Ht 5' 4\" (1.626 m)   Wt 125 lb (56.7 kg)   SpO2 96%   BMI 21.46 kg/m²           --------------------------------- ADDITIONAL PROVIDER NOTES ---------------------------------  At this time the patient is without objective evidence of an acute process requiring hospitalization or inpatient management. They have remained hemodynamically stable throughout their entire ED visit and are stable for discharge with outpatient follow-up.      The plan has been discussed in detail and they are aware of the specific conditions for emergent return, as well as the importance of follow-up. Discharge Medication List as of 5/11/2021  9:52 PM      START taking these medications    Details   ibuprofen (ADVIL;MOTRIN) 600 MG tablet Take 1 tablet by mouth 4 times daily as needed for Pain, Disp-40 tablet, R-0Print             Diagnosis:  1. Closed head injury, initial encounter    2. Fall, initial encounter    3. Acute pain of right shoulder    4. Neck pain on right side        Disposition:  Patient's disposition: Discharge  Patient's condition is stable. NOTE:  This report was transcribed using voice recognition software. Efforts were made to ensure accuracy; however, inadvertent computerized transcription errors may be present.            Ann Umana DO  Resident  05/12/21 0600

## 2021-05-13 ENCOUNTER — OFFICE VISIT (OUTPATIENT)
Dept: PRIMARY CARE CLINIC | Age: 66
End: 2021-05-13
Payer: MEDICARE

## 2021-05-13 VITALS
SYSTOLIC BLOOD PRESSURE: 160 MMHG | DIASTOLIC BLOOD PRESSURE: 80 MMHG | HEART RATE: 76 BPM | WEIGHT: 129 LBS | TEMPERATURE: 98 F | OXYGEN SATURATION: 94 % | BODY MASS INDEX: 22.14 KG/M2

## 2021-05-13 DIAGNOSIS — S40.011D CONTUSION OF RIGHT SHOULDER, SUBSEQUENT ENCOUNTER: ICD-10-CM

## 2021-05-13 DIAGNOSIS — S16.1XXD CERVICAL STRAIN, ACUTE, SUBSEQUENT ENCOUNTER: Primary | ICD-10-CM

## 2021-05-13 DIAGNOSIS — S00.03XD CONTUSION OF SCALP, SUBSEQUENT ENCOUNTER: ICD-10-CM

## 2021-05-13 PROCEDURE — G8400 PT W/DXA NO RESULTS DOC: HCPCS | Performed by: FAMILY MEDICINE

## 2021-05-13 PROCEDURE — 1123F ACP DISCUSS/DSCN MKR DOCD: CPT | Performed by: FAMILY MEDICINE

## 2021-05-13 PROCEDURE — 4040F PNEUMOC VAC/ADMIN/RCVD: CPT | Performed by: FAMILY MEDICINE

## 2021-05-13 PROCEDURE — 1090F PRES/ABSN URINE INCON ASSESS: CPT | Performed by: FAMILY MEDICINE

## 2021-05-13 PROCEDURE — G8420 CALC BMI NORM PARAMETERS: HCPCS | Performed by: FAMILY MEDICINE

## 2021-05-13 PROCEDURE — 3017F COLORECTAL CA SCREEN DOC REV: CPT | Performed by: FAMILY MEDICINE

## 2021-05-13 PROCEDURE — G8427 DOCREV CUR MEDS BY ELIG CLIN: HCPCS | Performed by: FAMILY MEDICINE

## 2021-05-13 PROCEDURE — 99213 OFFICE O/P EST LOW 20 MIN: CPT | Performed by: FAMILY MEDICINE

## 2021-05-13 PROCEDURE — 4004F PT TOBACCO SCREEN RCVD TLK: CPT | Performed by: FAMILY MEDICINE

## 2021-05-13 ASSESSMENT — ENCOUNTER SYMPTOMS
SHORTNESS OF BREATH: 0
ABDOMINAL PAIN: 0
COUGH: 0
TROUBLE SWALLOWING: 0
SORE THROAT: 0

## 2021-05-13 NOTE — PROGRESS NOTES
Gisela De La Fuente, a female of 73 y.o. came to the office 5/13/2021. Patient Active Problem List   Diagnosis    Asymptomatic bilateral carotid artery stenosis    History of nuclear stress test    Mixed hyperlipidemia    [facility]ortoiliac occlusive disease ([facility] [address]    PVD (peripheral vascular disease) with claudication ([facility] [address]    Carotid stenosis, asymptomatic, bilateral    S/P carotid endarterectomy    Temporal arteritis ([facility] [address]    Tobacco abuse    Lesion of true vocal cord        Fall  Incident onset: 5/11. Fall occurred: from chair. She fell from a height of 3 to 5 ft. She landed on hard floor. The point of impact was the head (R occiput, R shoulder). The pain is present in the head and right shoulder. Associated symptoms include headaches. Pertinent negatives include no abdominal pain or fever. She has tried NSAID for the symptoms. Seen in ER on 5/11 after mechanical fall from chair. She landed on R shoulder and hit back of her head on R posterior side. Work up in ER was negative for intracranial hemorrhage and fractures. Patient was discharged on motrin and told to follow up with PCP. Still endorsing headaches and R shoulder and neck pain. Rated 3/10. BP in /74. Currently 160/80. No prescribed BP meds. No CP, SOB, visual changes, or LOC.      Allergies   Allergen Reactions    Pcn [Penicillins] Hives and Swelling       Current Outpatient Medications on File Prior to Visit   Medication Sig Dispense Refill    ibuprofen (ADVIL;MOTRIN) 600 MG tablet Take 1 tablet by mouth 4 times daily as needed for Pain 40 tablet 0    lisinopril (PRINIVIL;ZESTRIL) 5 MG tablet TAKE 1 TABLET DAILY 90 tablet 1    metFORMIN (GLUCOPHAGE) 500 MG tablet TAKE 1 TABLET TWICE A DAY WITH MEALS 180 tablet 1    Ixekizumab (TALTZ SC) Inject into the skin      levothyroxine (SYNTHROID) 25 MCG tablet Take 1 tablet by mouth Daily 90 tablet 1    simvastatin (ZOCOR) 40 MG tablet Take 1 tablet by mouth nightly 90 tablet 1    citalopram Cardiovascular:      Rate and Rhythm: Normal rate and regular rhythm. Pulses: Normal pulses. Heart sounds: Murmur (2/6 systolic, RUSB) present. Pulmonary:      Effort: Pulmonary effort is normal.      Breath sounds: No wheezing, rhonchi or rales. Abdominal:      General: Bowel sounds are normal.      Palpations: Abdomen is soft. Skin:     General: Skin is cool. Findings: No abrasion, bruising, ecchymosis or erythema. Neurological:      General: No focal deficit present. Mental Status: She is alert and oriented to person, place, and time. Cranial Nerves: No cranial nerve deficit. Sensory: No sensory deficit. Deep Tendon Reflexes: Reflexes normal.   Psychiatric:         Mood and Affect: Mood normal.         Behavior: Behavior normal.         ASSESSMENT AND PLAN:    Sherrie Puga was seen today for follow-up. Diagnoses and all orders for this visit:    Cervical strain, acute, subsequent encounter    Contusion of scalp, subsequent encounter    Contusion of right shoulder, subsequent encounter    - continue current care  - tylenol prn pain  - ice or heat to areas. Return if symptoms worsen or fail to improve, for as scheduled. I reviewed the students documentation ( Hx, exam, MDM ), examined the patient and performed the A&P.     Michael Garcia DO

## 2021-05-24 ENCOUNTER — OFFICE VISIT (OUTPATIENT)
Dept: PRIMARY CARE CLINIC | Age: 66
End: 2021-05-24
Payer: MEDICARE

## 2021-05-24 VITALS
TEMPERATURE: 97.1 F | DIASTOLIC BLOOD PRESSURE: 78 MMHG | WEIGHT: 127 LBS | BODY MASS INDEX: 21.8 KG/M2 | HEART RATE: 63 BPM | OXYGEN SATURATION: 92 % | SYSTOLIC BLOOD PRESSURE: 130 MMHG

## 2021-05-24 DIAGNOSIS — E11.65 UNCONTROLLED TYPE 2 DIABETES MELLITUS WITH HYPERGLYCEMIA (HCC): ICD-10-CM

## 2021-05-24 DIAGNOSIS — Z00.00 ROUTINE GENERAL MEDICAL EXAMINATION AT A HEALTH CARE FACILITY: Primary | ICD-10-CM

## 2021-05-24 DIAGNOSIS — R06.02 SOB (SHORTNESS OF BREATH): ICD-10-CM

## 2021-05-24 DIAGNOSIS — Z78.0 ASYMPTOMATIC MENOPAUSAL STATE: ICD-10-CM

## 2021-05-24 DIAGNOSIS — F34.1 DYSTHYMIA: ICD-10-CM

## 2021-05-24 DIAGNOSIS — E03.9 HYPOTHYROIDISM, UNSPECIFIED TYPE: ICD-10-CM

## 2021-05-24 DIAGNOSIS — S46.911D STRAIN OF RIGHT SHOULDER, SUBSEQUENT ENCOUNTER: ICD-10-CM

## 2021-05-24 DIAGNOSIS — Z12.11 COLON CANCER SCREENING: ICD-10-CM

## 2021-05-24 DIAGNOSIS — I74.09 AORTOILIAC OCCLUSIVE DISEASE (HCC): ICD-10-CM

## 2021-05-24 DIAGNOSIS — E78.2 MIXED HYPERLIPIDEMIA: ICD-10-CM

## 2021-05-24 PROBLEM — M31.6 TEMPORAL ARTERITIS (HCC): Status: RESOLVED | Noted: 2020-01-06 | Resolved: 2021-05-24

## 2021-05-24 LAB — HBA1C MFR BLD: 9.1 %

## 2021-05-24 PROCEDURE — 3046F HEMOGLOBIN A1C LEVEL >9.0%: CPT | Performed by: FAMILY MEDICINE

## 2021-05-24 PROCEDURE — G8420 CALC BMI NORM PARAMETERS: HCPCS | Performed by: FAMILY MEDICINE

## 2021-05-24 PROCEDURE — 1123F ACP DISCUSS/DSCN MKR DOCD: CPT | Performed by: FAMILY MEDICINE

## 2021-05-24 PROCEDURE — G8427 DOCREV CUR MEDS BY ELIG CLIN: HCPCS | Performed by: FAMILY MEDICINE

## 2021-05-24 PROCEDURE — 4040F PNEUMOC VAC/ADMIN/RCVD: CPT | Performed by: FAMILY MEDICINE

## 2021-05-24 PROCEDURE — 1090F PRES/ABSN URINE INCON ASSESS: CPT | Performed by: FAMILY MEDICINE

## 2021-05-24 PROCEDURE — 4004F PT TOBACCO SCREEN RCVD TLK: CPT | Performed by: FAMILY MEDICINE

## 2021-05-24 PROCEDURE — 2022F DILAT RTA XM EVC RTNOPTHY: CPT | Performed by: FAMILY MEDICINE

## 2021-05-24 PROCEDURE — 83036 HEMOGLOBIN GLYCOSYLATED A1C: CPT | Performed by: FAMILY MEDICINE

## 2021-05-24 PROCEDURE — G8400 PT W/DXA NO RESULTS DOC: HCPCS | Performed by: FAMILY MEDICINE

## 2021-05-24 PROCEDURE — G0402 INITIAL PREVENTIVE EXAM: HCPCS | Performed by: FAMILY MEDICINE

## 2021-05-24 PROCEDURE — 99213 OFFICE O/P EST LOW 20 MIN: CPT | Performed by: FAMILY MEDICINE

## 2021-05-24 PROCEDURE — 3017F COLORECTAL CA SCREEN DOC REV: CPT | Performed by: FAMILY MEDICINE

## 2021-05-24 RX ORDER — SIMVASTATIN 40 MG
40 TABLET ORAL NIGHTLY
Qty: 90 TABLET | Refills: 1 | Status: SHIPPED
Start: 2021-05-24 | End: 2022-03-17 | Stop reason: SDUPTHER

## 2021-05-24 RX ORDER — LEVOTHYROXINE SODIUM 0.03 MG/1
25 TABLET ORAL DAILY
Qty: 90 TABLET | Refills: 1 | Status: SHIPPED
Start: 2021-05-24 | End: 2022-08-29 | Stop reason: SDUPTHER

## 2021-05-24 RX ORDER — CITALOPRAM 20 MG/1
20 TABLET ORAL DAILY
Qty: 90 TABLET | Refills: 1 | Status: SHIPPED
Start: 2021-05-24 | End: 2022-02-10 | Stop reason: SDUPTHER

## 2021-05-24 RX ORDER — TIOTROPIUM BROMIDE AND OLODATEROL 3.124; 2.736 UG/1; UG/1
SPRAY, METERED RESPIRATORY (INHALATION)
Qty: 1 INHALER | Refills: 0 | COMMUNITY
Start: 2021-05-24 | End: 2022-08-18 | Stop reason: ALTCHOICE

## 2021-05-24 RX ORDER — CLOPIDOGREL BISULFATE 75 MG/1
TABLET ORAL
Qty: 90 TABLET | Refills: 1 | Status: SHIPPED
Start: 2021-05-24 | End: 2022-02-10 | Stop reason: SDUPTHER

## 2021-05-24 ASSESSMENT — LIFESTYLE VARIABLES
HOW OFTEN DO YOU HAVE SIX OR MORE DRINKS ON ONE OCCASION: 0
HOW OFTEN DURING THE LAST YEAR HAVE YOU HAD A FEELING OF GUILT OR REMORSE AFTER DRINKING: 0
HOW OFTEN DURING THE LAST YEAR HAVE YOU FAILED TO DO WHAT WAS NORMALLY EXPECTED FROM YOU BECAUSE OF DRINKING: 0
HOW OFTEN DURING THE LAST YEAR HAVE YOU FOUND THAT YOU WERE NOT ABLE TO STOP DRINKING ONCE YOU HAD STARTED: 0
HAS A RELATIVE, FRIEND, DOCTOR, OR ANOTHER HEALTH PROFESSIONAL EXPRESSED CONCERN ABOUT YOUR DRINKING OR SUGGESTED YOU CUT DOWN: 0
HOW OFTEN DURING THE LAST YEAR HAVE YOU NEEDED AN ALCOHOLIC DRINK FIRST THING IN THE MORNING TO GET YOURSELF GOING AFTER A NIGHT OF HEAVY DRINKING: 0
HAVE YOU OR SOMEONE ELSE BEEN INJURED AS A RESULT OF YOUR DRINKING: 0
HOW OFTEN DURING THE LAST YEAR HAVE YOU BEEN UNABLE TO REMEMBER WHAT HAPPENED THE NIGHT BEFORE BECAUSE YOU HAD BEEN DRINKING: 0

## 2021-05-24 ASSESSMENT — ENCOUNTER SYMPTOMS
DIARRHEA: 0
BLOOD IN STOOL: 0
BLURRED VISION: 0
CONSTIPATION: 0
SHORTNESS OF BREATH: 1
VISUAL CHANGE: 0

## 2021-05-24 ASSESSMENT — PATIENT HEALTH QUESTIONNAIRE - PHQ9
1. LITTLE INTEREST OR PLEASURE IN DOING THINGS: 0
SUM OF ALL RESPONSES TO PHQ QUESTIONS 1-9: 0
SUM OF ALL RESPONSES TO PHQ QUESTIONS 1-9: 0

## 2021-05-24 NOTE — PROGRESS NOTES
Sabra Fox, a female of 73 y.o. came to the office 5/24/2021. Patient Active Problem List   Diagnosis    Asymptomatic bilateral carotid artery stenosis    History of nuclear stress test    Mixed hyperlipidemia    Aortoiliac occlusive disease ([address]    PVD (peripheral vascular disease) with claudication ([address]    Carotid stenosis, asymptomatic, bilateral    S/P carotid endarterectomy    Tobacco abuse    Lesion of true vocal cord          Diabetes  She presents for her follow-up diabetic visit. She has type 2 diabetes mellitus. Associated symptoms include foot paresthesias. Pertinent negatives for diabetes include no blurred vision, no chest pain, no polydipsia, no polyuria, no visual change and no weight loss. Current diabetic treatment includes insulin injections (not on Humalog with meals. ). She is compliant with treatment all of the time. Her weight is stable. She rarely participates in exercise. Home blood sugar record trend: not checking. An ACE inhibitor/angiotensin II receptor blocker is being taken. Shortness of Breath  This is a new problem. The current episode started 1 to 4 weeks ago (10 days ago due to fall). Pertinent negatives include no chest pain or fever. Risk factors include smoking. R shoulder pain: 2 recent falls.      Allergies   Allergen Reactions    Pcn [Penicillins] Hives and Swelling       Current Outpatient Medications on File Prior to Visit   Medication Sig Dispense Refill    ibuprofen (ADVIL;MOTRIN) 600 MG tablet Take 1 tablet by mouth 4 times daily as needed for Pain 40 tablet 0    lisinopril (PRINIVIL;ZESTRIL) 5 MG tablet TAKE 1 TABLET DAILY 90 tablet 1    metFORMIN (GLUCOPHAGE) 500 MG tablet TAKE 1 TABLET TWICE A DAY WITH MEALS 180 tablet 1    Ixekizumab (TALTZ SC) Inject into the skin      insulin lispro (HUMALOG) 100 UNIT/ML injection vial Inject 15 Units into the skin 2 times daily      omeprazole (PRILOSEC) 40 MG delayed release capsule TAKE 1 CAPSULE BY MOUTH EVERY DAY 30 capsule 1    TOUJEO SOLOSTAR 300 UNIT/ML SOPN INJECT 50 UNITS INTO THE SKIN TWICE A DAY (Patient taking differently: 25 Units 2 times daily ) 5 pen 2    BD PEN NEEDLE SAMEER U/F 32G X 4 MM MISC       Choline Fenofibrate (FENOFIBRIC ACID) 135 MG CPDR Take 135 mg by mouth daily       aspirin 81 MG tablet Take 81 mg by mouth daily       No current facility-administered medications on file prior to visit. Review of Systems   Constitutional: Negative for fever and weight loss. Eyes: Negative for blurred vision. Respiratory: Positive for shortness of breath. 1 ppd day. Cardiovascular: Negative for chest pain and palpitations. Gastrointestinal: Negative for blood in stool, constipation and diarrhea. Endocrine: Negative for polydipsia and polyuria. Musculoskeletal: Positive for arthralgias. other review of systems reviewed and are negative    OBJECTIVE:  /78   Pulse 63   Temp 97.1 °F (36.2 °C)   Wt 127 lb (57.6 kg)   SpO2 92%   BMI 21.80 kg/m²      Physical Exam  Vitals reviewed. Constitutional:       General: She is not in acute distress. HENT:      Right Ear: Tympanic membrane normal.      Left Ear: Tympanic membrane normal.      Nose: No mucosal edema or rhinorrhea. Right Sinus: No maxillary sinus tenderness or frontal sinus tenderness. Left Sinus: No maxillary sinus tenderness or frontal sinus tenderness. Mouth/Throat:      Pharynx: Uvula midline. No oropharyngeal exudate or posterior oropharyngeal erythema. Eyes:      General: No scleral icterus. Conjunctiva/sclera: Conjunctivae normal.   Neck:      Thyroid: No thyromegaly. Vascular: No carotid bruit. Cardiovascular:      Rate and Rhythm: Normal rate and regular rhythm. Heart sounds: No murmur heard. Pulmonary:      Effort: Pulmonary effort is normal.      Breath sounds: Normal breath sounds. No wheezing or rales.    Abdominal:      General: Bowel sounds are normal. Palpations: Abdomen is soft. There is no mass. Tenderness: There is no abdominal tenderness. There is no guarding or rebound. Musculoskeletal:      Cervical back: Neck supple. Lymphadenopathy:      Cervical: No cervical adenopathy. Skin:     General: Skin is warm and dry. Neurological:      Mental Status: She is alert and oriented to person, place, and time. Psychiatric:         Mood and Affect: Mood normal.     Right Shoulder Exam     Tenderness   Right shoulder tenderness location: scapular. Range of Motion   Active abduction:  140 abnormal   Forward flexion:  150 abnormal     Muscle Strength   The patient has normal right shoulder strength. Tests   Apprehension: positive  Impingement: positive              ASSESSMENT AND PLAN:    Sofie Camarillo was seen today for medicare awv. Diagnoses and all orders for this visit:    Routine general medical examination at a health care facility    Mixed hyperlipidemia  -     simvastatin (ZOCOR) 40 MG tablet; Take 1 tablet by mouth nightly    Hypothyroidism, unspecified type  -     levothyroxine (SYNTHROID) 25 MCG tablet; Take 1 tablet by mouth Daily    Dysthymia  -     citalopram (CELEXA) 20 MG tablet; Take 1 tablet by mouth daily    Asymptomatic menopausal state  -     DEXA Bone Density Axial Skeleton; Future    Uncontrolled type 2 diabetes mellitus with hyperglycemia (HCC)  -     POCT glycosylated hemoglobin (Hb A1C)    Aortoiliac occlusive disease (HCC)  -     clopidogrel (PLAVIX) 75 MG tablet; TAKE 1 TABLET DAILY    SOB (shortness of breath)  -     Tiotropium Bromide-Olodaterol (STIOLTO RESPIMAT) 2.5-2.5 MCG/ACT AERS; 2 puffs daily    Strain of right shoulder, subsequent encounter    Colon cancer screening  -     Cologuard (For External Results Only); Future    - HO rotator cuff exercises do daily  - advil prn   - heat or ice to area. - quit smoking  - med changes for DM based on lab.      Return for Medicare Annual Wellness Visit in 1 year, based on results.     Tre Garcia,

## 2021-05-24 NOTE — PATIENT INSTRUCTIONS
Personalized Preventive Plan for Noreen Velez - 5/24/2021  Medicare offers a range of preventive health benefits. Some of the tests and screenings are paid in full while other may be subject to a deductible, co-insurance, and/or copay. Some of these benefits include a comprehensive review of your medical history including lifestyle, illnesses that may run in your family, and various assessments and screenings as appropriate. After reviewing your medical record and screening and assessments performed today your provider may have ordered immunizations, labs, imaging, and/or referrals for you. A list of these orders (if applicable) as well as your Preventive Care list are included within your After Visit Summary for your review. Other Preventive Recommendations:    · A preventive eye exam performed by an eye specialist is recommended every 1-2 years to screen for glaucoma; cataracts, macular degeneration, and other eye disorders. · A preventive dental visit is recommended every 6 months. · Try to get at least 150 minutes of exercise per week or 10,000 steps per day on a pedometer . · Order or download the FREE \"Exercise & Physical Activity: Your Everyday Guide\" from [employer]. Call 7-210.231.3371 or search [employer] online. · You need 3122-8480 mg of calcium and 5026-3772 IU of vitamin D per day. It is possible to meet your calcium requirement with diet alone, but a vitamin D supplement is usually necessary to meet this goal.  · When exposed to the sun, use a sunscreen that protects against both UVA and UVB radiation with an SPF of 30 or greater. Reapply every 2 to 3 hours or after sweating, drying off with a towel, or swimming. Always wear a seat belt when traveling in a car. Always wear a helmet when riding a bicycle or motorcycle. Patient Education        Rotator Cuff: Exercises  Introduction  Here are some examples of exercises for you to try.  The exercises may be suggested for a condition or for rehabilitation. Start each exercise slowly. Ease off the exercises if you start to have pain. You will be told when to start these exercises and which ones will work best for you. How to do the exercises  Pendulum swing   If you have pain in your back, do not do this exercise. Hold on to a table or the back of a chair with your good arm. Then bend forward a little and let your sore arm hang straight down. This exercise does not use the arm muscles. Rather, use your legs and your hips to create movement that makes your arm swing freely. Use the movement from your hips and legs to guide the slightly swinging arm back and forth like a pendulum (or elephant trunk). Then guide it in circles that start small (about the size of a dinner plate). Make the circles a bit larger each day, as your pain allows. Do this exercise for 5 minutes, 5 to 7 times each day. As you have less pain, try bending over a little farther to do this exercise. This will increase the amount of movement at your shoulder. Posterior stretching exercise   Hold the elbow of your injured arm with your other hand. Use your hand to pull your injured arm gently up and across your body. You will feel a gentle stretch across the back of your injured shoulder. Hold for at least 15 to 30 seconds. Then slowly lower your arm. Repeat 2 to 4 times. Up-the-back stretch   Your doctor or physical therapist may want you to wait to do this stretch until you have regained most of your range of motion and strength. You can do this stretch in different ways. Hold any of these stretches for at least 15 to 30 seconds. Repeat them 2 to 4 times. Light stretch: Put your hand in your back pocket. Let it rest there to stretch your shoulder. Moderate stretch: With your other hand, hold your injured arm (palm outward) behind your back by the wrist. Pull your arm up gently to stretch your shoulder.   Advanced stretch: Put a towel over your other shoulder. Put the hand of your injured arm behind your back. Now hold the back end of the towel. With the other hand, hold the front end of the towel in front of your body. Pull gently on the front end of the towel. This will bring your hand farther up your back to stretch your shoulder. Overhead stretch   Standing about an arm's length away, grasp onto a solid surface. You could use a countertop, a doorknob, or the back of a sturdy chair. With your knees slightly bent, bend forward with your arms straight. Lower your upper body, and let your shoulders stretch. As your shoulders are able to stretch farther, you may need to take a step or two backward. Hold for at least 15 to 30 seconds. Then stand up and relax. If you had stepped back during your stretch, step forward so you can keep your hands on the solid surface. Repeat 2 to 4 times. Shoulder flexion (lying down)   To make a wand for this exercise, use a piece of PVC pipe or a broom handle with the broom removed. Make the wand about a foot wider than your shoulders. Lie on your back, holding a wand with both hands. Your palms should face down as you hold the wand. Keeping your elbows straight, slowly raise your arms over your head. Raise them until you feel a stretch in your shoulders, upper back, and chest.  Hold for 15 to 30 seconds. Repeat 2 to 4 times. Shoulder rotation (lying down)   To make a wand for this exercise, use a piece of PVC pipe or a broom handle with the broom removed. Make the wand about a foot wider than your shoulders. Lie on your back. Hold a wand with both hands with your elbows bent and palms up. Keep your elbows close to your body, and move the wand across your body toward the sore arm. Hold for 8 to 12 seconds. Repeat 2 to 4 times. Wall climbing (to the side)   Avoid any movement that is straight to your side, and be careful not to arch your back.  Your arm should stay about 30 degrees to the front of your side. Stand with your side to a wall so that your fingers can just touch it at an angle about 30 degrees toward the front of your body. Walk the fingers of your injured arm up the wall as high as pain permits. Try not to shrug your shoulder up toward your ear as you move your arm up. Hold that position for a count of at least 15 to 20. Walk your fingers back down to the starting position. Repeat at least 2 to 4 times. Try to reach higher each time. Wall climbing (to the front)   During this stretching exercise, be careful not to arch your back. Face a wall, and stand so your fingers can just touch it. Keeping your shoulder down, walk the fingers of your injured arm up the wall as high as pain permits. (Don't shrug your shoulder up toward your ear.)  Hold your arm in that position for at least 15 to 30 seconds. Slowly walk your fingers back down to where you started. Repeat at least 2 to 4 times. Try to reach higher each time. Shoulder blade squeeze   Stand with your arms at your sides, and squeeze your shoulder blades together. Do not raise your shoulders up as you squeeze. Hold 6 seconds. Repeat 8 to 12 times. Scapular exercise: Arm reach   Lie flat on your back. This exercise is a very slight motion that starts with your arms raised (elbows straight, arms straight). From this position, reach higher toward the malik or ceiling. Keep your elbows straight. All motion should be from your shoulder blade only. Relax your arms back to where you started. Repeat 8 to 12 times. Arm raise to the side   During this strengthening exercise, your arm should stay about 30 degrees to the front of your side. Slowly raise your injured arm to the side, with your thumb facing up. Raise your arm 60 degrees at the most (shoulder level is 90 degrees). Hold the position for 3 to 5 seconds. Then lower your arm back to your side.  If you need to, bring your \"good\" arm across your body and place it under the elbow as you lower your injured arm. Use your good arm to keep your injured arm from dropping down too fast.  Repeat 8 to 12 times. When you first start out, don't hold any extra weight in your hand. As you get stronger, you may use a 1-pound to 2-pound dumbbell or a small can of food. Shoulder flexor and extensor exercise   These are isometric exercises. That means you contract your muscles without actually moving. Push forward (flex): Stand facing a wall or doorjamb, about 6 inches or less back. Hold your injured arm against your body. Make a closed fist with your thumb on top. Then gently push your hand forward into the wall with about 25% to 50% of your strength. Don't let your body move backward as you push. Hold for about 6 seconds. Relax for a few seconds. Repeat 8 to 12 times. Push backward (extend): Stand with your back flat against a wall. Your upper arm should be against the wall, with your elbow bent 90 degrees (your hand straight ahead). Push your elbow gently back against the wall with about 25% to 50% of your strength. Don't let your body move forward as you push. Hold for about 6 seconds. Relax for a few seconds. Repeat 8 to 12 times. Scapular exercise: Wall push-ups   This exercise is best done with your fingers somewhat turned out, rather than straight up and down. Stand facing a wall, about 12 inches to 18 inches away. Place your hands on the wall at shoulder height. Slowly bend your elbows and bring your face to the wall. Keep your back and hips straight. Push back to where you started. Repeat 8 to 12 times. When you can do this exercise against a wall comfortably, you can try it against a counter. You can then slowly progress to the end of a couch, then to a sturdy chair, and finally to the floor. Scapular exercise: Retraction   For this exercise, you will need elastic exercise material, such as surgical tubing or Thera-Band.   Put the band around a solid object at towel roll or the side of your body until you begin to feel tightness in your shoulder. Slowly move your arm back to where you started. Repeat 8 to 12 times. Follow-up care is a key part of your treatment and safety. Be sure to make and go to all appointments, and call your doctor if you are having problems. It's also a good idea to know your test results and keep a list of the medicines you take. Where can you learn more? Go to [email or web address][email or web address]. org and sign in to your [employer] account. Enter Eli Verdin in the [employer] box to learn more about \"Rotator Cuff: Exercises. \"     If you do not have an account, please click on the \"Sign Up Now\" link. Current as of: November 16, 2020               Content Version: 12.8  © 8328-5147 [employer], [employer]orporated. Care instructions adapted under license by [facility] ([facility]). If you have questions about a medical condition or this instruction, always ask your healthcare professional. [place] [address] any warranty or liability for your use of this information.     ·    ·

## 2021-05-24 NOTE — PROGRESS NOTES
Medicare Annual Wellness Visit  Name: Luh Gomez Date: 2021   MRN: [id number] Sex: Female   Age: 73 y.o. Ethnicity: Non-/Non    : 1955 Race: Tripp Rey is here for Medicare AWV    Screenings for behavioral, psychosocial and functional/safety risks, and cognitive dysfunction are all negative except as indicated below. These results, as well as other patient data from the [address] form, are documented in Flowsheets linked to this Encounter. Allergies   Allergen Reactions    Pcn [Penicillins] Hives and Swelling         Prior to Visit Medications    Medication Sig Taking?  Authorizing Provider   simvastatin (ZOCOR) 40 MG tablet Take 1 tablet by mouth nightly Yes Anurag Garcia DO   levothyroxine (SYNTHROID) 25 MCG tablet Take 1 tablet by mouth Daily Yes Anurag Garcia DO   citalopram (CELEXA) 20 MG tablet Take 1 tablet by mouth daily Yes Anurag Garcia DO   ibuprofen (ADVIL;MOTRIN) 600 MG tablet Take 1 tablet by mouth 4 times daily as needed for Pain  Brian Franco DO   lisinopril (PRINIVIL;ZESTRIL) 5 MG tablet TAKE 1 TABLET DAILY  Anurag Garcia DO   metFORMIN (GLUCOPHAGE) 500 MG tablet TAKE 1 TABLET TWICE A DAY WITH MEALS  Anurag Garcia DO   Ixekizumab (TALTZ SC) Inject into the skin  Historical Provider, MD   clopidogrel (PLAVIX) 75 MG tablet TAKE 1 TABLET DAILY  INDIRA Alexis CNP   insulin lispro (HUMALOG) 100 UNIT/ML injection vial Inject 15 Units into the skin 2 times daily  Historical Provider, MD   omeprazole (PRILOSEC) 40 MG delayed release capsule TAKE 1 CAPSULE BY MOUTH EVERY DAY  DO RADHA Noe 300 UNIT/ML SOPN INJECT 50 UNITS INTO THE SKIN TWICE A DAY  Patient taking differently: 25 Units 2 times daily   Anurag Garcia DO   BD PEN NEEDLE SAMEER U/F 32G X 4 MM MISC   Historical Provider, MD [place] Fenofibrate (FENOFIBRIC ACID) 135 MG CPDR Take 135 mg by mouth daily   Historical Provider, MD   aspirin 81 MG tablet Take 81 mg by mouth daily  Historical Provider, MD         Past Medical History:   Diagnosis Date    Arthritis     Carotid artery stenosis     Cataracts, bilateral 10/'19    Diabetes mellitus ([address]     DM type 2 (diabetes mellitus, type 2) ([address]     Hyperlipidemia     Hypothyroidism     Mass of larynx     for OR 20     Migraines     rare    Neuropathy, diabetic ([address]     Peripheral vascular disease ([address]     Post-nasal drip     Psoriasiform dermatitis     Seasonal allergies     Swallowing problem     Thyroid disease        Past Surgical History:   Procedure Laterality Date    BACK SURGERY      L5-S1    CAROTID ENDARTERECTOMY Left 10/11/2018    [place]    CARPAL TUNNEL RELEASE      bilat     SECTION      2    HYSTERECTOMY      LARYNGOSCOPY N/A 2020    DIRECT LARYNGOSCOPYY AND CERVICAL ESOPHAGOSCOPY (PATHOLOGY PRESENT) performed by Leeanna Campos DO at [facility] OR    LEG SURGERY      remote- right     OTHER SURGICAL HISTORY  2018    Dr. Antionette Parra- Bilateral iliac stents iCast [id number]    DE OFFICE/OUTPT VISIT,PROCEDURE ONLY Left 10/11/2018    LEFT CAROTID ENDARTERECTOMY performed by Sumeet Wilkes MD at [address] [place] [address]      TOE SURGERY      bilat - ingrown toe nails    TONSILLECTOMY           Family History   Problem Relation Age of Onset   [facility] Cancer Mother         lung brain smoker    Heart Disease Father         mi in his 52's     Other Father         pulmonary fibrosis    Diabetes Brother        CareTeam (Including outside providers/suppliers regularly involved in providing care):   Patient Care Team:  Hayden Mancera DO as PCP - General  Hayden Mancera DO as PCP - Leesa Nicolas Provider  Ritika Murrieta MD as Consulting Physician (Cardiology)    Wt Readings from Last 3 Encounters:   21 127 lb (57.6 kg)   21 129 lb (58.5 History:  Social History     Tobacco History     Smoking Status  Current Some Day Smoker Smoking Start Date  1/1/1970 Last attempt to quit  8/12/2018 Smoking Frequency  1 pack/day for 40 years (40 pk yrs)    Smoking Tobacco Type  Cigarettes    Smokeless Tobacco Use  Never Used    Tobacco Comment  not snoke 1.5 wk          Alcohol History     Alcohol Use Status  No Comment  rare          Drug Use     Drug Use Status  No          Sexual Activity     Sexually Active  Not Asked               Alcohol Screening: Audit-C Score: 1  Total Score: 1    A score of 8 or more is associated with harmful or hazardous drinking. A score of 13 or more in women, and 15 or more in men, is likely to indicate alcohol dependence.   Substance Abuse Interventions:  · Tobacco abuse:  patient is not ready to work toward tobacco cessation at this time       Safety:  Safety  Do you have working smoke detectors?: Yes  Have all throw rugs been removed or fastened?: (!) No  Do you have non-slip mats or surfaces in all bathtubs/showers?: Yes  Do all of your stairways have a railing or banister?: Yes  Are your doorways, halls and stairs free of clutter?: Yes  Do you always fasten your seatbelt when you are in a car?: Yes  Safety Interventions:  · Home safety tips provided     Personalized Preventive Plan   Current Health Maintenance Status  Immunization History   Administered Date(s) Administered    Pneumococcal Polysaccharide ([id number]) 02/11/2021    Tdap (Boostrix, Adacel) 05/11/2021        Health Maintenance   Topic Date Due    Hepatitis C screen  Never done    Diabetic foot exam  Never done    HIV screen  Never done    Cervical cancer screen  Never done    Shingles Vaccine (1 of 2) Never done    Colon cancer screen colonoscopy  Never done    DEXA (modify frequency per FRAX score)  Never done    Low dose CT lung screening  Never done    Diabetic retinal exam  09/12/2017    Annual Wellness Visit (AWV)  Never done    A1C test (Diabetic or Prediabetic)  05/11/2021    Diabetic microalbuminuria test  08/11/2021    Flu vaccine (Season Ended) 09/01/2021    Lipid screen  02/11/2022    Potassium monitoring  02/11/2022    Creatinine monitoring  02/11/2022    Breast cancer screen  02/24/2023    DTaP/Tdap/Td vaccine (2 - Td) 05/11/2031    Pneumococcal 65+ years Vaccine  Completed    COVID-19 Vaccine  Completed    Hepatitis A vaccine  Aged Out    Hib vaccine  Aged Out    Meningococcal (ACWY) vaccine  Aged Out     Recommendations for [place] Due: see orders and patient instructions/AVS.  . Recommended screening schedule for the next 5-10 years is provided to the patient in written form: see Patient Instructions/AVS.    Liv Robson was seen today for medicare awv. Diagnoses and all orders for this visit:    Routine general medical examination at a health care facility    Mixed hyperlipidemia  -     simvastatin (ZOCOR) 40 MG tablet; Take 1 tablet by mouth nightly    Hypothyroidism, unspecified type  -     levothyroxine (SYNTHROID) 25 MCG tablet; Take 1 tablet by mouth Daily    Dysthymia  -     citalopram (CELEXA) 20 MG tablet; Take 1 tablet by mouth daily    Asymptomatic menopausal state  -     DEXA Bone Density Axial Skeleton;  Future    Uncontrolled type 2 diabetes mellitus with hyperglycemia (HCC)    Aortoiliac occlusive disease ([address]

## 2021-05-27 ENCOUNTER — TELEPHONE (OUTPATIENT)
Dept: PRIMARY CARE CLINIC | Age: 66
End: 2021-05-27

## 2021-05-27 NOTE — TELEPHONE ENCOUNTER
Called patient discussed labs. A1c down to 9.5 from 9.1. At this time we will increase her Metformin from 500 mg twice a day to 1000 mg twice a day. Call when needs refill. Follow-up 3 months.

## 2021-06-04 DIAGNOSIS — Z12.11 COLON CANCER SCREENING: ICD-10-CM

## 2021-06-07 ENCOUNTER — TELEPHONE (OUTPATIENT)
Dept: PRIMARY CARE CLINIC | Age: 66
End: 2021-06-07

## 2021-06-07 NOTE — TELEPHONE ENCOUNTER
Called patient discussed positive Cologuard result. Recommend colonoscopy to rule out colon pathology. At this time she would like to wait to discuss at her next office visit with me in August.  I told her if she changes her mind sooner let me know we could set her up to get the colonoscopy.

## 2021-07-08 ENCOUNTER — HOSPITAL ENCOUNTER (OUTPATIENT)
Dept: MAMMOGRAPHY | Age: 66
Discharge: HOME | End: 2021-07-10
Payer: MEDICARE

## 2021-07-08 DIAGNOSIS — Z78.0 ASYMPTOMATIC MENOPAUSAL STATE: ICD-10-CM

## 2021-07-08 PROCEDURE — 77080 DXA BONE DENSITY AXIAL: CPT

## 2021-07-09 ENCOUNTER — OFFICE VISIT (OUTPATIENT)
Dept: ENT CLINIC | Age: 66
End: 2021-07-09
Payer: MEDICARE

## 2021-07-09 VITALS
TEMPERATURE: 97.4 F | OXYGEN SATURATION: 98 % | DIASTOLIC BLOOD PRESSURE: 59 MMHG | SYSTOLIC BLOOD PRESSURE: 152 MMHG | BODY MASS INDEX: 21.34 KG/M2 | HEART RATE: 65 BPM | WEIGHT: 125 LBS | HEIGHT: 64 IN

## 2021-07-09 DIAGNOSIS — K21.9 GASTROESOPHAGEAL REFLUX DISEASE WITHOUT ESOPHAGITIS: Primary | ICD-10-CM

## 2021-07-09 DIAGNOSIS — R09.82 PND (POST-NASAL DRIP): ICD-10-CM

## 2021-07-09 DIAGNOSIS — J38.3 VOCAL CORD LEUKOPLAKIA: ICD-10-CM

## 2021-07-09 PROCEDURE — 99213 OFFICE O/P EST LOW 20 MIN: CPT | Performed by: OTOLARYNGOLOGY

## 2021-07-09 PROCEDURE — G8427 DOCREV CUR MEDS BY ELIG CLIN: HCPCS | Performed by: OTOLARYNGOLOGY

## 2021-07-09 PROCEDURE — G8420 CALC BMI NORM PARAMETERS: HCPCS | Performed by: OTOLARYNGOLOGY

## 2021-07-09 PROCEDURE — 1090F PRES/ABSN URINE INCON ASSESS: CPT | Performed by: OTOLARYNGOLOGY

## 2021-07-09 PROCEDURE — 3017F COLORECTAL CA SCREEN DOC REV: CPT | Performed by: OTOLARYNGOLOGY

## 2021-07-09 PROCEDURE — 31575 DIAGNOSTIC LARYNGOSCOPY: CPT | Performed by: OTOLARYNGOLOGY

## 2021-07-09 PROCEDURE — 4040F PNEUMOC VAC/ADMIN/RCVD: CPT | Performed by: OTOLARYNGOLOGY

## 2021-07-09 PROCEDURE — 4004F PT TOBACCO SCREEN RCVD TLK: CPT | Performed by: OTOLARYNGOLOGY

## 2021-07-09 PROCEDURE — G8399 PT W/DXA RESULTS DOCUMENT: HCPCS | Performed by: OTOLARYNGOLOGY

## 2021-07-09 PROCEDURE — 1123F ACP DISCUSS/DSCN MKR DOCD: CPT | Performed by: OTOLARYNGOLOGY

## 2021-07-09 RX ORDER — AZELASTINE 1 MG/ML
2 SPRAY, METERED NASAL 2 TIMES DAILY
Qty: 1 BOTTLE | Refills: 3 | Status: SHIPPED | OUTPATIENT
Start: 2021-07-09 | End: 2022-03-17 | Stop reason: ALTCHOICE

## 2021-07-09 RX ORDER — AZELASTINE 1 MG/ML
2 SPRAY, METERED NASAL 2 TIMES DAILY
Qty: 1 BOTTLE | Refills: 3 | Status: SHIPPED | OUTPATIENT
Start: 2021-07-09 | End: 2021-07-09

## 2021-07-09 ASSESSMENT — ENCOUNTER SYMPTOMS
EYES NEGATIVE: 1
VOICE CHANGE: 1
ALLERGIC/IMMUNOLOGIC NEGATIVE: 1
COUGH: 1
TROUBLE SWALLOWING: 1
SORE THROAT: 1

## 2021-07-09 NOTE — PROGRESS NOTES
Subjective:      Patient ID:  Kaela Perez is a 73 y.o. female. HPI:    Patient presents today for recheck reflux. Pt is taking omeprazole OTC and saline sprays and doing much better. Continues to smoke.  No EtOH use      Past Medical History:   Diagnosis Date    Arthritis     Carotid artery stenosis     Cataracts, bilateral 10/'19    Diabetes mellitus ([address]     DM type 2 (diabetes mellitus, type 2) ([address]     Hyperlipidemia     Hypothyroidism     Mass of larynx     for OR 20     Migraines     rare    Neuropathy, diabetic ([address]     Peripheral vascular disease ([address]     Post-nasal drip     Psoriasiform dermatitis     Seasonal allergies     Swallowing problem     Thyroid disease      Past Surgical History:   Procedure Laterality Date    BACK SURGERY      L5-S1    CAROTID ENDARTERECTOMY Left 10/11/2018    [place]    CARPAL TUNNEL RELEASE      bilat     SECTION      2    HYSTERECTOMY      LARYNGOSCOPY N/A 2020    DIRECT LARYNGOSCOPYY AND CERVICAL ESOPHAGOSCOPY (PATHOLOGY PRESENT) performed by Sri Escobar DO at [facility] OR    LEG SURGERY      remote- right     OTHER SURGICAL HISTORY  2018    Dr. Joseph Haywood- Bilateral iliac stents iCast [id number]    [place] OFFICE/OUTPT VISIT,PROCEDURE ONLY Left 10/11/2018    LEFT CAROTID ENDARTERECTOMY performed by Angelika Dhillon MD at [address] [place] [address]      TOE SURGERY      bilat - ingrown toe nails    TONSILLECTOMY       Family History   Problem Relation Age of Onset   Sabrina Davis Cancer Mother         lung brain smoker    Heart Disease Father         mi in his 52's     Other Father         pulmonary fibrosis    Diabetes Brother      Social History     Socioeconomic History    Marital status:      Spouse name: None    Number of children: None    Years of education: None    Highest education level: None   Occupational History    Occupation:  bookeeper   Tobacco Use    Smoking status: Current Some Day Smoker     Packs/day: 1.00     Years: 40.00     Pack years: 40.00     Types: Cigarettes     Start date: 5     Last attempt to quit: 2018     Years since quittin.9    Smokeless tobacco: Never Used    Tobacco comment: not snoke 1.5 wk   Vaping Use    Vaping Use: Never used   Substance and Sexual Activity    Alcohol use: No     Comment: rare    Drug use: No    Sexual activity: None   Other Topics Concern    None   Social History Narrative    None     Social Determinants of Health     Financial Resource Strain:     Difficulty of Paying Living Expenses:    Food Insecurity:     Worried About Running Out of Food in the Last Year:     Ran Out of Food in the Last Year:    Transportation Needs:     Lack of Transportation (Medical):  Lack of Transportation (Non-Medical):    Physical Activity:     Days of Exercise per Week:     Minutes of Exercise per Session:    Stress:     Feeling of Stress :    Social Connections:     Frequency of Communication with Friends and Family:     Frequency of Social Gatherings with Friends and Family:     Attends Muslim Services:     Active Member of Clubs or Organizations:     Attends Club or Organization Meetings:     Marital Status:    Intimate Partner Violence:     Fear of Current or Ex-Partner:     Emotionally Abused:     Physically Abused:     Sexually Abused: Allergies   Allergen Reactions    Pcn [Penicillins] Hives and Swelling       Review of Systems   Constitutional: Negative. HENT: Positive for congestion, postnasal drip, sore throat, trouble swallowing and voice change. Eyes: Negative. Respiratory: Positive for cough. Skin: Negative. Allergic/Immunologic: Negative. Neurological: Negative. Hematological: Negative. Psychiatric/Behavioral: Negative. All other systems reviewed and are negative.               Objective:     Vitals:    21 0959   BP: (!) 152/59   Pulse: 65   Temp: 97.4 °F (36.3 °C) SpO2: 98%     Physical Exam  Vitals reviewed. Constitutional:       Appearance: Normal appearance. HENT:      Head: Normocephalic. Right Ear: Tympanic membrane, ear canal and external ear normal.      Left Ear: Tympanic membrane, ear canal and external ear normal.      Nose: Septal deviation (right), mucosal edema and rhinorrhea present. No congestion. Rhinorrhea is clear. Mouth/Throat:      Mouth: Mucous membranes are moist.      Dentition: Abnormal dentition. Tongue: No lesions. Palate: No mass. Pharynx: Oropharynx is clear. Uvula midline. Tonsils: No tonsillar exudate. 0 on the right. 0 on the left. Eyes:      Conjunctiva/sclera: Conjunctivae normal.   Neck:      Thyroid: No thyroid mass, thyromegaly or thyroid tenderness. Trachea: Trachea normal.   Cardiovascular:      Rate and Rhythm: Normal rate. Pulses: Normal pulses. Pulmonary:      Effort: Pulmonary effort is normal.   Musculoskeletal:      Cervical back: Normal range of motion and neck supple. Lymphadenopathy:      Cervical: No cervical adenopathy. Neurological:      Mental Status: She is alert and oriented to person, place, and time. Endoscopy Procedure Note  Endoscopy Type:  Flexible nasopharyngolaryngoscopy  Procedure Details   The right side(s) of the nose, and the nose, nasopharynx, oropharynx, hypopharynx and larynx were examined. Examination was performed during quiet respiration and with phonation. The following findings were noted. Findings:  Normal nasopharynx, normal epiglottis, normal tongue base, normal pyriform, normal TVC motion and mucosa, no subglottic masses or lesions. Condition:  Stable  Complications:  None    Picture:                Assessment:       Diagnosis Orders   1. Gastroesophageal reflux disease without esophagitis     2. PND (post-nasal drip)     3. Vocal cord leukoplakia                Plan:      Continue reflux meds for now. Continue Flonase.  Start Astelin  Follow up in 6 month(s)                     Eliot Hudson  1955    I have discussed the case, including pertinent history and exam findings with the resident. I have seen and examined the patient and the key elements of the encounter have been performed by me. I agree with the assessment, plan and orders as documented by the  resident              Remainder of medical problems as per  resident note. Patient seen and examined. Agree with above exam, assessment and plan.       Electronically signed by Master Vazquez DO on 7/20/21 at 8:58 AM EDT

## 2021-07-12 ENCOUNTER — TELEPHONE (OUTPATIENT)
Dept: PRIMARY CARE CLINIC | Age: 66
End: 2021-07-12

## 2021-07-12 DIAGNOSIS — R19.5 POSITIVE COLORECTAL CANCER SCREENING USING COLOGUARD TEST: Primary | ICD-10-CM

## 2021-07-12 RX ORDER — ALENDRONATE SODIUM 70 MG/1
70 TABLET ORAL
Qty: 4 TABLET | Refills: 5 | Status: SHIPPED
Start: 2021-07-12 | End: 2021-08-04

## 2021-07-19 ENCOUNTER — TELEPHONE (OUTPATIENT)
Dept: PRIMARY CARE CLINIC | Age: 66
End: 2021-07-19

## 2021-07-19 NOTE — TELEPHONE ENCOUNTER
Pt calling she took Fosamax last Tuesday night and on Wednesday she was very ill  headache, vomiting, diarrhea so she wanted to let you know that she isn't going to continue with this medication. If there is something else you'd like her to try she is willing.

## 2021-07-26 ENCOUNTER — OFFICE VISIT (OUTPATIENT)
Dept: SURGERY | Age: 66
End: 2021-07-26
Payer: MEDICARE

## 2021-07-26 VITALS
WEIGHT: 127 LBS | DIASTOLIC BLOOD PRESSURE: 60 MMHG | TEMPERATURE: 97 F | OXYGEN SATURATION: 94 % | SYSTOLIC BLOOD PRESSURE: 149 MMHG | RESPIRATION RATE: 18 BRPM | HEIGHT: 64 IN | BODY MASS INDEX: 21.68 KG/M2 | HEART RATE: 74 BPM

## 2021-07-26 DIAGNOSIS — K59.09 OTHER CONSTIPATION: Primary | ICD-10-CM

## 2021-07-26 PROCEDURE — 1123F ACP DISCUSS/DSCN MKR DOCD: CPT | Performed by: SURGERY

## 2021-07-26 PROCEDURE — G8399 PT W/DXA RESULTS DOCUMENT: HCPCS | Performed by: SURGERY

## 2021-07-26 PROCEDURE — G8420 CALC BMI NORM PARAMETERS: HCPCS | Performed by: SURGERY

## 2021-07-26 PROCEDURE — G8427 DOCREV CUR MEDS BY ELIG CLIN: HCPCS | Performed by: SURGERY

## 2021-07-26 PROCEDURE — 1090F PRES/ABSN URINE INCON ASSESS: CPT | Performed by: SURGERY

## 2021-07-26 PROCEDURE — 4040F PNEUMOC VAC/ADMIN/RCVD: CPT | Performed by: SURGERY

## 2021-07-26 PROCEDURE — 99203 OFFICE O/P NEW LOW 30 MIN: CPT | Performed by: SURGERY

## 2021-07-26 PROCEDURE — 3017F COLORECTAL CA SCREEN DOC REV: CPT | Performed by: SURGERY

## 2021-07-26 PROCEDURE — 4004F PT TOBACCO SCREEN RCVD TLK: CPT | Performed by: SURGERY

## 2021-07-26 RX ORDER — SODIUM CHLORIDE 9 MG/ML
INJECTION, SOLUTION INTRAVENOUS CONTINUOUS
Status: CANCELLED | OUTPATIENT
Start: 2021-07-26

## 2021-07-26 NOTE — PATIENT INSTRUCTIONS
Álvaro Villarreal MD, FACS    Preoperative Instructions    Please read the following information very carefully. It contains information that is necessary to best prepare you for your upcoming procedure. Make arrangements for a  to take you to and from your procedure. YOU MUST HAVE SOMEONE DRIVE YOU HOME - this cannot be a taxi or public transportation. You will not be administered anesthesia without someone to go home and be at home with you that day. Nothing to eat or drink after midnight the night before your procedure. Follow your bowel prep instructions if you have them for this procedure. 3 days prior to your procedure: Stop taking blood thinners like Coumadin or Plavix or Xarelto. 5 days prior to your procedure: Stop taking Aspirin or Aspirin containing products. If you cannot stop any of these medications prior to your procedure, please contact our office. Medications morning of procedure: Only heart, breathing, blood pressure, and seizure medications are permitted on the morning of your procedure. These medications can be taken with a sip of water. IF YOU ARE UNABLE TO KEEP THE ABOVE SCHEDULED PROCEDURE, YOU MUST NOTIFY DR. FLANAGAN'S OFFICE [phone]. NOT THE FACILITY. NO CHEWING GUM OR CHEWING TOBACCO AFTER MIDNIGHT ON DAY OF PROCEDURE.    YOU MUST HAVE TRANSPORTATION TO AND FROM THE FACILITY. What is a colonoscopy? A colonoscopy is a test that lets a doctor look inside your colon. The doctor uses a thin, lighted tube called a colonoscope to look for problems. These include small growths called polyps, cancer, or bleeding. During the test, the doctor can take samples of tissue that can be checked for cancer or other problems. This is called a biopsy. The doctor can also take out polyps. Before the test, you will need to stop eating solid foods. You also will drink a liquid or take a tablet that cleans out your colon.  This helps your doctor be able to see inside your colon during the test.  Follow-up care is a key part of your treatment and safety. Be sure to make and go to all appointments, and call your doctor if you are having problems. It's also a good idea to know your test results and keep a list of the medicines you take. What happens before the procedure? Procedures can be stressful. This information will help you understand what you can expect. And it will help you safely prepare for your procedure. Preparing for the procedure  · Understand exactly what procedure is planned, along with the risks, benefits, and other options. · Tell your doctors ALL the medicines, vitamins, supplements, and herbal remedies you take. Some of these can increase the risk of bleeding or interact with anesthesia. · If you take blood thinners, such as warfarin (Coumadin), clopidogrel (Plavix), or aspirin, be sure to talk to your doctor. He or she will tell you if you should stop taking these medicines before your procedure. Make sure that you understand exactly what your doctor wants you to do. · Your doctor will tell you which medicines to take or stop before your procedure. You may need to stop taking certain medicines a week or more before the procedure. So talk to your doctor as soon as you can. · If you have an advance directive, let your doctor know. It may include a living will and a durable power of  for health care. Bring a copy to the hospital. If you don't have one, you may want to prepare one. It lets your doctor and loved ones know your health care wishes. Doctors advise that everyone prepare these papers before any type of surgery or procedure. Before the procedure  · Follow your doctor's directions about when to stop eating solid foods and drink only clear liquids. You can drink water, clear juices, clear broths, flavored ice pops, and gelatin (such as Jell-O). Do not eat or drink anything red or purple.  This includes grape juice and grape-flavored ice pops. It also includes fruit punch and cherry gelatin. · Drink the \"colon prep\" liquid as your doctor tells you. You will want to stay home, because the liquid will make you go to the bathroom a lot. Your stools will be loose and watery. It is very important to drink all of the liquid. If you have problems drinking it, call your doctor. Some doctors may have you take a tablet rather than drink a liquid. · Do not eat any solid foods after you drink the colon prep. · Stop drinking clear liquids 6 to 8 hours before the test.  What happens on the day of the procedure? · Follow the instructions exactly about when to stop eating and drinking. If you don't, your procedure may be canceled. If your doctor told you to take your medicines on the day of the procedure, take them with only a sip of water. · Take a bath or shower before you come in for your procedure. Do not apply lotions, perfumes, deodorants, or nail polish. · Take off all jewelry and piercings. And take out contact lenses, if you wear them. At the [address] [place] [address] or hospital  · Bring a picture ID. · You will be kept comfortable and safe by your anesthesia provider. The anesthesia may make you sleep. · You will lie on your back or your side with your knees drawn up toward your belly. The doctor will gently put a gloved finger into your anus. Then the doctor puts the scope in and moves it into your colon. The scope goes in easily because it is lubricated. · The doctor may also use small tools to take tissue samples for a biopsy or to remove polyps. This does not hurt. · The test usually takes 30 to 45 minutes. But it may take longer. It depends on what is found and what is done. Going home  · Be sure you have someone to drive you home. Anesthesia and pain medicine make it unsafe for you to drive. · You will be given more specific instructions about recovering from your procedure. When should you call your doctor?   · You have questions or concerns. · You don't understand how to prepare for your procedure. · You are having trouble with the bowel prep. · You become ill before the procedure (such as fever, flu, or a cold). · You need to reschedule or have changed your mind about having the procedure. Where can you learn more? Go to https://[employer] Learningpepiceweb.[facility]. org and sign in to your [employer] Ã¨â€¹Â±Ã¨â€¦Â¾Ã§Â½â€˜ account. Enter C315 in the [employer] box to learn more about Colonoscopy: Before Your Procedure.     If you do not have an account, please click on the Sign Up Now link. © 5741-2012 [employer]. Care instructions adapted under license by [facility] ([facility]). This care instruction is for use with your licensed healthcare professional. If you have questions about a medical condition or this instruction, always ask your healthcare professional. [address] any warranty or liability for your use of this information.   Content Version: 67.8.027852; Current as of: November 20, 2015

## 2021-07-26 NOTE — PROGRESS NOTES
OTHER SURGICAL HISTORY  12/04/2018    Dr. Kodak Levine- Bilateral iliac stents iCast [id number]    [place] OFFICE/OUTPT VISIT,PROCEDURE ONLY Left 10/11/2018    LEFT CAROTID ENDARTERECTOMY performed by Joss Dsouza MD at [address] TOE SURGERY      bilat - ingrown toe nails    TONSILLECTOMY          Allergies: Pcn [penicillins]     Medications:   Current Outpatient Medications   Medication Sig Dispense Refill    azelastine (ASTELIN) 0.1 % nasal spray 2 sprays by Nasal route 2 times daily Use in each nostril as directed 1 Bottle 3    simvastatin (ZOCOR) 40 MG tablet Take 1 tablet by mouth nightly 90 tablet 1    levothyroxine (SYNTHROID) 25 MCG tablet Take 1 tablet by mouth Daily 90 tablet 1    citalopram (CELEXA) 20 MG tablet Take 1 tablet by mouth daily 90 tablet 1    clopidogrel (PLAVIX) 75 MG tablet TAKE 1 TABLET DAILY 90 tablet 1    ibuprofen (ADVIL;MOTRIN) 600 MG tablet Take 1 tablet by mouth 4 times daily as needed for Pain 40 tablet 0    lisinopril (PRINIVIL;ZESTRIL) 5 MG tablet TAKE 1 TABLET DAILY 90 tablet 1    metFORMIN (GLUCOPHAGE) 500 MG tablet TAKE 1 TABLET TWICE A DAY WITH MEALS 180 tablet 1    Ixekizumab (TALTZ SC) Inject into the skin      insulin lispro (HUMALOG) 100 UNIT/ML injection vial Inject 15 Units into the skin 2 times daily      omeprazole (PRILOSEC) 40 MG delayed release capsule TAKE 1 CAPSULE BY MOUTH EVERY DAY 30 capsule 1    TOUJEO SOLOSTAR 300 UNIT/ML SOPN INJECT 50 UNITS INTO THE SKIN TWICE A DAY (Patient taking differently: 25 Units 2 times daily ) 5 pen 2    BD PEN NEEDLE SAMEER U/F 32G X 4 MM MISC       aspirin 81 MG tablet Take 81 mg by mouth daily      alendronate (FOSAMAX) 70 MG tablet Take 1 tablet by mouth every 7 days (Patient not taking: Reported on 7/26/2021) 4 tablet 5    Tiotropium Bromide-Olodaterol (STIOLTO RESPIMAT) 2.5-2.5 MCG/ACT AERS 2 puffs daily (Patient not taking: Reported on 7/26/2021) 1 Inhaler 0    Choline Fenofibrate surgery, or infection. The benefits, alternatives, and potential complications associated with the above procedure to be performed and transfusions when applicable with the patient/responsible person prior to the procedure. I discussed the risk of bowel peroration, postoperative bleeding, post-polypectomy syndrome, as well as the possibility of needing emergency surgery or another colonoscopy. All of the patient's questions were answered. The patient understands and agrees to the procedure.              Physician Signature: Electronically signed by Sarah Landin MD, General Surgery    Send copy of H&P to PCP, Shari Stacy DO and referring physician, Rosaura Fernandez*

## 2021-07-27 ENCOUNTER — TELEPHONE (OUTPATIENT)
Dept: SURGERY | Age: 66
End: 2021-07-27

## 2021-07-27 NOTE — TELEPHONE ENCOUNTER
Prior Authorization Form:      DEMOGRAPHICS:                     Patient Name:  Angela Gonzalez  Patient :  1955            Insurance:  Payor: MEDICARE / Plan: MEDICARE PART A AND B / Product Type: *No Product type* /   Insurance ID Number:    Payor/Plan Subscr  Sex Relation Sub. Ins. ID Effective Group Num   1. MEDICARE - ME* GARRETT CERDA L 1955 Female Self [id number] 21                                    [address] [address]   2.  [address] [facility] 1955 Female Self [id number] 21                                    [address]         DIAGNOSIS & PROCEDURE:                       Procedure/Operation: COLONOSCOPY           CPT Code: 10939    Diagnosis:  CONSTIPATION    ICD10 Code: K59.00    Location:  [facility]    Surgeon:  Arti Martinez INFORMATION:                          Date: 2021    Time: 9:30             Anesthesia:  MAC/TIVA                                                       Status:  Outpatient        Special Comments:         Electronically signed by Elías Hadley MA on 2021 at 9:22 AM

## 2021-07-27 NOTE — TELEPHONE ENCOUNTER
Prior Authorization Form:      DEMOGRAPHICS:                     Patient Name:  Conner Aldrich  Patient :  1955            Insurance:  Payor: MEDICARE / Plan: MEDICARE PART A AND B / Product Type: *No Product type* /   Insurance ID Number:    Payor/Plan Subscr  Sex Relation Sub. Ins. ID Effective Group Num   1. MEDICARE - ME* GARRETT CERDA L 1955 Female Self [id number] 21                                    [address] [address]   2.  [address] [facility] 1955 Female Self [id number] 21                                    [address]         DIAGNOSIS & PROCEDURE:                       Procedure/Operation:     CPT Code:  Diagnosis:      ICD10 Code:  Location:      Surgeon:  Shamar Pruett INFORMATION:                          Date: 3021  Time: 9:30             Anesthesia:  MAC/TIVA                                                       Status:  Outpatient        Special Comments:     ERROR    Electronically signed by Jaclyn Moon MA on 2021 at 9:26 AM

## 2021-08-04 NOTE — PROGRESS NOTES
[facility] PRE-ADMISSION TESTING INSTRUCTIONS    The Preadmission Testing patient is instructed accordingly using the following criteria (check applicable):    ARRIVAL INSTRUCTIONS:  [x] Parking the day of Surgery is located in the Main Entrance lot. Upon entering the door, make an immediate right to the surgery reception desk    [x] Bring photo ID and insurance card    [] Bring in a copy of Living will or Durable Power of  papers. [x] Please be sure to arrange for responsible adult to provide transportation to and from the hospital    [x] Please arrange for responsible adult to be with you for the 24 hour period post procedure due to having anesthesia      GENERAL INSTRUCTIONS:    [x] Nothing by mouth after midnight, including gum, candy, mints or water    [x] You may brush your teeth, but do not swallow any water    [x] Take medications as instructed with 1-2 oz of water    [x] Stop herbal supplements and vitamins 5 days prior to procedure    [x] Follow preop dosing of blood thinners per physician instructions    [] Take 1/2 dose of evening insulin, but no insulin after midnight    [] No oral diabetic medications after midnight    [] If diabetic and have low blood sugar or feel symptomatic, take 1-2oz apple juice only    [] Bring inhalers day of surgery    [] Bring C-PAP/ Bi-Pap day of surgery    [] Bring urine specimen day of surgery    [x] Shower or bath with soap, lather and rinse well, AM of Surgery, no lotion, powders or creams to surgical site    [x] Follow bowel prep as instructed per surgeon    [x] No tobacco products within 24 hours of surgery     [x] No alcohol or illegal drug use within 24 hours of surgery.     [x] Jewelry, body piercing's, eyeglasses, contact lenses and dentures are not permitted into surgery (bring cases)      [x] Please do not wear any nail polish, make up or hair products on the day of surgery    [x] You can expect a call the business day prior to procedure to notify you if your arrival time changes    [x] If you receive a survey after surgery we would greatly appreciate your comments    [] Parent/guardian of a minor must accompany their child and remain on the premises  the entire time they are under our care     [] Pediatric patients may bring favorite toy, blanket or comfort item with them    [] A caregiver or family member must remain with the patient during their stay if they are mentally handicapped, have dementia, disoriented or unable to use a call light or would be a safety concern if left unattended    [x] Please notify surgeon if you develop any illness between now and time of surgery (cold, cough, sore throat, fever, nausea, vomiting) or any signs of infections  including skin, wounds, and dental.    [x]  The Outpatient Pharmacy is available to fill your prescription here on your day of surgery, ask your preop nurse for details    [x] Other instructions: Wear comfortable clothing    EDUCATIONAL MATERIALS PROVIDED:    [] PAT Preoperative Education Packet/Booklet     [] Medication List    [] Transfusion bracelet applied with instructions    [] Shower with soap, lather and rinse well, and use CHG wipes provided the evening before surgery as instructed    [] Incentive spirometer with instructions        Have you been tested for COVID  No           Have you been told you were positive for COVID No  Have you had any known exposure to someone that is positive for COVID No  Do you have a cough                   No              Do you have shortness of breath No                 Do you have a sore throat            No                Are you having chills                    No                Are you having muscle aches. No                    Please come to the hospital wearing a mask and have your significant other wear a mask as well.   Both of you should check your temperature before leaving to come here,  if it is 100 or higher please call [phone] for instruction.

## 2021-08-06 ENCOUNTER — ANESTHESIA (OUTPATIENT)
Dept: ENDOSCOPY | Age: 66
End: 2021-08-06
Payer: MEDICARE

## 2021-08-06 ENCOUNTER — ANESTHESIA EVENT (OUTPATIENT)
Dept: ENDOSCOPY | Age: 66
End: 2021-08-06
Payer: MEDICARE

## 2021-08-06 ENCOUNTER — HOSPITAL ENCOUNTER (OUTPATIENT)
Age: 66
Setting detail: OUTPATIENT SURGERY
Discharge: HOME | End: 2021-08-06
Attending: SURGERY | Admitting: SURGERY
Payer: MEDICARE

## 2021-08-06 VITALS
HEART RATE: 62 BPM | WEIGHT: 127 LBS | RESPIRATION RATE: 16 BRPM | TEMPERATURE: 97.5 F | BODY MASS INDEX: 21.68 KG/M2 | DIASTOLIC BLOOD PRESSURE: 64 MMHG | OXYGEN SATURATION: 97 % | HEIGHT: 64 IN | SYSTOLIC BLOOD PRESSURE: 129 MMHG

## 2021-08-06 VITALS
RESPIRATION RATE: 24 BRPM | SYSTOLIC BLOOD PRESSURE: 141 MMHG | DIASTOLIC BLOOD PRESSURE: 60 MMHG | OXYGEN SATURATION: 100 %

## 2021-08-06 LAB — METER GLUCOSE: 170 MG/DL (ref 74–99)

## 2021-08-06 PROCEDURE — 45381 COLONOSCOPY SUBMUCOUS NJX: CPT | Performed by: SURGERY

## 2021-08-06 PROCEDURE — 7100000011 HC PHASE II RECOVERY - ADDTL 15 MIN: Performed by: SURGERY

## 2021-08-06 PROCEDURE — 2709999900 HC NON-CHARGEABLE SUPPLY: Performed by: SURGERY

## 2021-08-06 PROCEDURE — 2580000003 HC RX 258: Performed by: SURGERY

## 2021-08-06 PROCEDURE — 82962 GLUCOSE BLOOD TEST: CPT

## 2021-08-06 PROCEDURE — 3609010300 HC COLONOSCOPY W/BIOPSY SINGLE/MULTIPLE: Performed by: SURGERY

## 2021-08-06 PROCEDURE — 45380 COLONOSCOPY AND BIOPSY: CPT | Performed by: SURGERY

## 2021-08-06 PROCEDURE — 3700000000 HC ANESTHESIA ATTENDED CARE: Performed by: SURGERY

## 2021-08-06 PROCEDURE — 3700000001 HC ADD 15 MINUTES (ANESTHESIA): Performed by: SURGERY

## 2021-08-06 PROCEDURE — 88305 TISSUE EXAM BY PATHOLOGIST: CPT

## 2021-08-06 PROCEDURE — 7100000010 HC PHASE II RECOVERY - FIRST 15 MIN: Performed by: SURGERY

## 2021-08-06 PROCEDURE — 6360000002 HC RX W HCPCS: Performed by: NURSE ANESTHETIST, CERTIFIED REGISTERED

## 2021-08-06 PROCEDURE — 2500000003 HC RX 250 WO HCPCS: Performed by: NURSE ANESTHETIST, CERTIFIED REGISTERED

## 2021-08-06 RX ORDER — SODIUM CHLORIDE 9 MG/ML
INJECTION, SOLUTION INTRAVENOUS CONTINUOUS
Status: DISCONTINUED | OUTPATIENT
Start: 2021-08-06 | End: 2021-08-06 | Stop reason: HOSPADM

## 2021-08-06 RX ORDER — PROPOFOL 10 MG/ML
INJECTION, EMULSION INTRAVENOUS PRN
Status: DISCONTINUED | OUTPATIENT
Start: 2021-08-06 | End: 2021-08-06 | Stop reason: SDUPTHER

## 2021-08-06 RX ORDER — GLYCOPYRROLATE 0.2 MG/ML
INJECTION INTRAMUSCULAR; INTRAVENOUS PRN
Status: DISCONTINUED | OUTPATIENT
Start: 2021-08-06 | End: 2021-08-06 | Stop reason: SDUPTHER

## 2021-08-06 RX ADMIN — GLYCOPYRROLATE 0.4 MG: 0.2 INJECTION INTRAMUSCULAR; INTRAVENOUS at 10:11

## 2021-08-06 RX ADMIN — SODIUM CHLORIDE: 9 INJECTION, SOLUTION INTRAVENOUS at 09:33

## 2021-08-06 RX ADMIN — PROPOFOL 320 MG: 10 INJECTION, EMULSION INTRAVENOUS at 09:58

## 2021-08-06 ASSESSMENT — PAIN - FUNCTIONAL ASSESSMENT: PAIN_FUNCTIONAL_ASSESSMENT: 0-10

## 2021-08-06 ASSESSMENT — LIFESTYLE VARIABLES: SMOKING_STATUS: 1

## 2021-08-06 NOTE — ANESTHESIA POSTPROCEDURE EVALUATION
Department of Anesthesiology  Postprocedure Note    Patient: Sherrilee Duverney  MRN: [id number]  YOB: 1955  Date of evaluation: 8/6/2021  Time:  12:18 PM     Procedure Summary     Date: 08/06/21 Room / Location: [address]    Anesthesia Start: 9154 Anesthesia Stop: 0632    Procedure: COLONOSCOPY WITH BIOPSY with tattooing (N/A ) Diagnosis: (CONSTIPATION BLOOD IN STOOL)    Surgeons: Kelvin Javed MD Responsible Provider: Ever Hernandez MD    Anesthesia Type: MAC ASA Status: 3          Anesthesia Type: MAC    Amelia Phase I: Amelia Score: 10    Amelia Phase II: Amelia Score: 10    Last vitals: Reviewed and per EMR flowsheets.        Anesthesia Post Evaluation    Patient location during evaluation: PACU  Patient participation: complete - patient participated  Level of consciousness: awake  Pain score: 2  Airway patency: patent  Nausea & Vomiting: no nausea  Complications: no  Cardiovascular status: blood pressure returned to baseline  Respiratory status: acceptable  Hydration status: euvolemic

## 2021-08-06 NOTE — ANESTHESIA PRE PROCEDURE
Department of Anesthesiology  Preprocedure Note       Name:  Florian Gomez   Age:  73 y.o.  :  1955                                          MRN:  [id number]         Date:  2021      Surgeon: Luis Acevedo):  Keven Harrison MD    Procedure: Procedure(s):  COLONOSCOPY DIAGNOSTIC    Medications prior to admission:   Prior to Admission medications    Medication Sig Start Date End Date Taking?  Authorizing Provider   azelastine (ASTELIN) 0.1 % nasal spray 2 sprays by Nasal route 2 times daily Use in each nostril as directed 21  Yes Avinash Dumont,    simvastatin (ZOCOR) 40 MG tablet Take 1 tablet by mouth nightly 21  Yes Anurag Garcia DO   levothyroxine (SYNTHROID) 25 MCG tablet Take 1 tablet by mouth Daily 21  Yes Anurag Garcia DO   citalopram (CELEXA) 20 MG tablet Take 1 tablet by mouth daily 21  Yes Anurag Garcia DO   clopidogrel (PLAVIX) 75 MG tablet TAKE 1 TABLET DAILY 21  Yes Anurag Garcia DO   ibuprofen (ADVIL;MOTRIN) 600 MG tablet Take 1 tablet by mouth 4 times daily as needed for Pain 21  Yes Corry Rogers,    lisinopril (PRINIVIL;ZESTRIL) 5 MG tablet TAKE 1 TABLET DAILY 3/3/21  Yes Anurag Garcia DO   metFORMIN (GLUCOPHAGE) 500 MG tablet TAKE 1 TABLET TWICE A DAY WITH MEALS 2/15/21  Yes Anurag Garcia DO   Ixekizumab (TALTZ SC) Inject into the skin every 30 days For psoriasis   Yes Historical Provider, MD   omeprazole (PRILOSEC) 40 MG delayed release capsule TAKE 1 CAPSULE BY MOUTH EVERY DAY 20  Yes Alcon Hutchinson, DO   TOUJEO SOLOSTAR 300 UNIT/ML SOPN INJECT 50 UNITS INTO THE SKIN TWICE A DAY  Patient taking differently: 25 Units 2 times daily  19  Yes Anurag Garcia DO   aspirin 81 MG tablet Take 81 mg by mouth daily   Yes Historical Provider, MD   Tiotropium Bromide-Olodaterol (STIOLTO RESPIMAT) 2.5-2.5 MCG/ACT AERS 2 puffs daily  Patient not taking: Reported on 2021   Anurag Garcia DO   insulin lispro (HUMALOG) 100 UNIT/ML injection vial Inject 15 Units into the skin 2 times daily    Historical Provider, MD   BD PEN NEEDLE SAMEER U/F 32G X 4 MM MISC  19   Historical Provider, MD       Current medications:    No current facility-administered medications for this encounter. Allergies:     Allergies   Allergen Reactions    Pcn [Penicillins] Hives and Swelling       Problem List:    Patient Active Problem List   Diagnosis Code    Asymptomatic bilateral carotid artery stenosis I65.23    History of nuclear stress test Z92.89    Mixed hyperlipidemia E78.2    Aortoiliac occlusive disease ([address] I74.09    PVD (peripheral vascular disease) with claudication (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 Shoshana Lefort yearly    Positive colorectal cancer screening using Cologuard test     Psoriasiform dermatitis     Seasonal allergies     Thyroid disease        Past Surgical History:        Procedure Laterality Date    BACK SURGERY      L5-S1    CAROTID ENDARTERECTOMY Left 10/11/2018    [place]    CARPAL TUNNEL RELEASE      bilat     SECTION      2    HYSTERECTOMY      LARYNGOSCOPY N/A 2020    DIRECT LARYNGOSCOPYY AND CERVICAL ESOPHAGOSCOPY (PATHOLOGY PRESENT) performed by Mildred Hutchinson DO at [facility] OR    LEG SURGERY      remote- right     OTHER SURGICAL HISTORY  2018     Martina- Bilateral iliac stents iCast [id number]    [place] OFFICE/OUTPT VISIT,PROCEDURE ONLY Left 10/11/2018    LEFT CAROTID ENDARTERECTOMY performed by Kennedi Rhoades MD at [address] [place] [address]      TOE SURGERY      bilat - ingrown toe nails    TONSILLECTOMY         Social History:    Social History     Tobacco Use    Smoking status: Current Every Day Smoker     Packs/day: 0.50     Years: 40.00     Pack years: 20.00     Types: Cigarettes     Start date: 5     Last attempt to quit: 2018     Years since quittin.9    Smokeless tobacco: Never Used   Substance Use Topics    Alcohol use: No                                Ready to quit: Not Answered  Counseling given: Not Answered      Vital Signs (Current):   Vitals:    21 1534   Weight: 127 lb (57.6 kg)   Height: 5' 4\" (1.626 m)                                              BP Readings from Last 3 Encounters:   21 (!) 149/60   21 (!) 152/59   21 130/78       NPO Status:                                                                                 BMI:   Wt Readings from Last 3 Encounters:   21 127 lb (57.6 kg)   21 127 lb (57.6 kg)   21 125 lb (56.7 kg)     Body mass index is 21.8 kg/m².     CBC:   Lab Results   Component Value Date    WBC 6.9 2021    RBC 4.97 2021    HGB 14.7 2021    HCT 44.9 2021    MCV 90.3 2021    RDW 13.2 2021     2021       CMP:   Lab Results   Component Value Date     2021    K 5.4 2021    K 4.5 2018    CL 97 2021    CO2 24 2021    BUN 11 2021    CREATININE 0.6 2021    GFRAA >60 2021    LABGLOM >60 2021    GLUCOSE 185 2021    GLUCOSE 157 2011    PROT 6.9 2021    CALCIUM 10.1 2021    BILITOT 0.3 2021    ALKPHOS 77 2021    AST 14 2021    ALT 15 2021       POC Tests: No results for input(s): POCGLU, POCNA, POCK, POCCL, Arpan Toth, POCHCT in the last 72 hours. Coags:   Lab Results   Component Value Date    PROTIME 11.0 12/04/2018    INR 1.0 12/04/2018       HCG (If Applicable): No results found for: PREGTESTUR, PREGSERUM, HCG, HCGQUANT     ABGs:   Lab Results   Component Value Date    PO2ART 257.0 10/11/2018    GIJ0ALT 39.4 10/11/2018    OXU9SDP 21.5 10/11/2018        Type & Screen (If Applicable):  No results found for: LABABO, LABRH    Drug/Infectious Status (If Applicable):  No results found for: HIV, HEPCAB    COVID-19 Screening (If Applicable):   Lab Results   Component Value Date    COVID19 Not Detected 08/26/2020           Anesthesia Evaluation  Patient summary reviewed  Airway: Mallampati: II  TM distance: >3 FB   Neck ROM: full  Mouth opening: > = 3 FB Dental: normal exam         Pulmonary: breath sounds clear to auscultation  (+) current smoker          Patient smoked on day of surgery. Cardiovascular:    (+) hypertension:, valvular problems/murmurs:,         Rhythm: regular  Rate: normal           Beta Blocker:  Not on Beta Blocker         Neuro/Psych:   (+) headaches:,             GI/Hepatic/Renal:   (+) GERD:,           Endo/Other:    (+) DiabetesType II DM, , hypothyroidism, blood dyscrasia: anticoagulation therapy:., .                 Abdominal:       Abdomen: soft. Vascular:   + PVD, aortic or cerebral, . Other Findings:             Anesthesia Plan      MAC     ASA 3       Induction: intravenous. Anesthetic plan and risks discussed with patient. Plan discussed with CRNA.                   Janny Ramos MD   8/6/2021

## 2021-08-06 NOTE — H&P
Patient's office history and physical was reviewed. Patient examined. There has been no change in the patient's history and physical.      Physician Signature: Electronically signed by Dr. Jen Malhotra Surgery History and Physical    Patient's Name/Date of Birth: Gerardo Escobar / 1955    Date: 2021    PCP: Padmini Bonilla DO    Referring Physician:   No ref. provider found  N/A    CHIEF COMPLAINT:    No chief complaint on file. HISTORY OF PRESENT ILLNESS:    Gerardo Escobar is an 73 y.o. female who presents for a colonoscopy for a positive Cologuard. The patient has some new onset constipation. She has lower abdominal pain and bloating. She has some nausea, vomiting and thinks it is related to phosomax. She said she stopped it and feels better. She said her stomach is still a little upset. No diarrhea. No changes in stool caliber. No bloody or black stools. No abdominal pain. No unintentional weight loss. No family history of colon cancer. The patient has a known history of: no known risk factors. The patient has never had a colonoscopy before.      Past Medical History:   Past Medical History:   Diagnosis Date    Age-related osteoporosis without current pathological fracture     Arthritis     Cataracts, bilateral 10/'19    Diabetes mellitus ([address]     DM type 2 (diabetes mellitus, type 2) ([address]     Hyperlipidemia     Hypertension     Hypothyroidism     Migraines     rare    Neuropathy, diabetic ([address]     Peripheral vascular disease ([address]     follows with Dr. Joseph Haywood yearly    Positive colorectal cancer screening using Cologuard test     Psoriasiform dermatitis     Seasonal allergies     Thyroid disease         Past Surgical History:   Past Surgical History:   Procedure Laterality Date    BACK SURGERY      L5-S1    CAROTID ENDARTERECTOMY Left 10/11/2018    [place]    CARPAL TUNNEL RELEASE      bilat     SECTION      2    HYSTERECTOMY      LARYNGOSCOPY N/A 2020    DIRECT LARYNGOSCOPYY AND CERVICAL ESOPHAGOSCOPY (PATHOLOGY PRESENT) performed by Sri Escobar DO at [address]      remote- right     OTHER SURGICAL HISTORY  2018    Dr. Joseph Haywood- Bilateral iliac stents iCast [id number]    [place] OFFICE/OUTPT VISIT,PROCEDURE ONLY Left 10/11/2018    LEFT CAROTID ENDARTERECTOMY performed by Angelika Dhillon MD at [address] TOE SURGERY      bilat - ingrown toe nails    TONSILLECTOMY          Allergies: Pcn [penicillins]     Medications:   Current Facility-Administered Medications   Medication Dose Route Frequency Provider Last Rate Last Admin    0.9 % sodium chloride infusion   Intravenous Continuous Anna Arroyo MD             Social History:   Social History     Tobacco Use    Smoking status: Current Every Day Smoker     Packs/day: 0.50     Years: 40.00     Pack years: 20.00     Types: Cigarettes     Start date: 5     Last attempt to quit: 2018     Years since quittin.9    Smokeless tobacco: Never Used   Substance Use Topics    Alcohol use: No        Family History:   Family History   Problem Relation Age of Onset    Cancer Mother         lung brain smoker    Heart Disease Father         mi in his 52's     Other Father         pulmonary fibrosis    Diabetes Brother        REVIEW OF SYSTEMS:    Constitutional: negative  Eyes: negative  Ears, nose, mouth, throat, and face: negative  Respiratory: negative  Cardiovascular: negative  Gastrointestinal: as in HPI  Genitourinary:negative  Integument/breast: negative  Hematologic/lymphatic: negative  Musculoskeletal:negative  Neurological: negative  Allergic/Immunologic: negative    PHYSICAL EXAM   /63   Pulse 57   Temp 97.9 °F (36.6 °C) (Temporal)   Resp 16   Ht 5' 4\" (1.626 m)   Wt 127 lb (57.6 kg)   SpO2 95%   BMI 21.80 kg/m²     General appearance: alert, cooperative and in no acute distress. Eyes: Grossly normal   Lungs: normal work of breathing  Heart: regular rate  Abdomen:  soft, non-tender, non-distended  Skin: No skin abnormalities  Neurologic: Alert and oriented x 3. Grossly normal  Musculoskeletal: No edema. ASSESSMENT AND PLAN:     Kate Mistry is an 73 y.o. female who presents for a colonoscopy with positive cologuard, constipation     I will set the patient up for a colonoscopy, possible biopsy, possible polypectomy. I explained the risks including but not limited to bleeding, perforation leading to possible surgery, or infection. The benefits, alternatives, and potential complications associated with the above procedure to be performed and transfusions when applicable with the patient/responsible person prior to the procedure. I discussed the risk of bowel peroration, postoperative bleeding, post-polypectomy syndrome, as well as the possibility of needing emergency surgery or another colonoscopy. All of the patient's questions were answered. The patient understands and agrees to the procedure. Physician Signature: Electronically signed by Diomedes Berger MD, General Surgery    Send copy of H&P to PCP, Alisha Singh DO and referring physician, No ref.  provider found

## 2021-08-06 NOTE — OP NOTE
Operative Note: Colonoscopy    Erik Pelletier     DATE OF PROCEDURE: 8/6/2021  SURGEON: Dr. Pearl Aguirre MD, M.D. PREOPERATIVE DIAGNOSES:    Positive Cologuard    POSTOPERATIVE DIAGNOSES:  Multiple colon polyps    SPECIMENS:  ID Type Source Tests Collected by Time Destination   A : Bx colon polyp at 39 Tissue Colon SURGICAL PATHOLOGY Fernando Martinez MD 8/6/2021 1004    B : Bx multiple colon polyps at 76  - 3 Tissue Colon SURGICAL PATHOLOGY Fernando Martinez MD 8/6/2021 1005    C : Bx colon polyp at ascending colon - large, flat, injected with SPOT Tissue Colon SURGICAL PATHOLOGY Fernando Martinez MD 8/6/2021 1017    D : Bx colon polyp at 61 Tissue Colon SURGICAL PATHOLOGY Fernando Martinez MD 8/6/2021 1022    E : Bx colon polyp at 48 Tissue Colon SURGICAL PATHOLOGY Fernando Martinez MD 8/6/2021 1024    F : Bx colon polyp at 27 Tissue Colon SURGICAL PATHOLOGY Fernando Martinez MD 8/6/2021 1031         OPERATION:   Colonoscopy to the cecum colon polyps x 8   Injection with SPOT    ANESTHESIA: LMAC    COMPLICATIONS: None. BLOOD LOSS: Minimal    Procedure Note:    CONSENT AND INDICATIONS:  This is a 73y.o. year old female who is having the above issues. I have discussed with the patient and/or the patient representative the indication, alternatives, and the possible risks and/or complications of the planned procedure and the anesthesia methods. The patient and/or patient representative appear to understand and agree to proceed. OPERATIONS: Bowel prep was done yesterday until the bowels were clear. The patient was placed on the table and sedated by anesthesia. A rectal exam was performed and no mass was felt. A lubricated scope was passed into the rectum which looked normal.  The scope was passed all the way around through the sigmoid, descending, transverse and ascending colon to the cecum. The bowel prep was clear.  There were several flat colon polyps seen and removed via forceps polypectomy at the above locations. There was a large, 2 cm flat polyp just distal to the ileocecal valve in the ascending colon that was injected with SPOT. The cecum was identified by the appendiceal orifice, ileocecal valve, and light reflex in the RLQ. The scope was then slowly withdrawn, each area was examined again on the way out. The scope was retroflexed in the rectum and it was normal . The patient tolerated the procedure well. PLAN: Follow up biopsies. Follow up colonoscopy in 1 year. Physician Signature: Electronically signed by Dr. rOal Dexter MD M.D. FACS    Send copy of H&P to PCP, Mary Adler DO and referring physician, No ref.  provider found

## 2021-08-12 ENCOUNTER — OFFICE VISIT (OUTPATIENT)
Dept: SURGERY | Age: 66
End: 2021-08-12
Payer: MEDICARE

## 2021-08-12 VITALS
HEIGHT: 64 IN | WEIGHT: 128 LBS | RESPIRATION RATE: 16 BRPM | TEMPERATURE: 97.5 F | SYSTOLIC BLOOD PRESSURE: 144 MMHG | HEART RATE: 63 BPM | BODY MASS INDEX: 21.85 KG/M2 | DIASTOLIC BLOOD PRESSURE: 60 MMHG

## 2021-08-12 DIAGNOSIS — D12.6 TUBULAR ADENOMA OF COLON: Primary | ICD-10-CM

## 2021-08-12 PROCEDURE — 3017F COLORECTAL CA SCREEN DOC REV: CPT | Performed by: SURGERY

## 2021-08-12 PROCEDURE — G8420 CALC BMI NORM PARAMETERS: HCPCS | Performed by: SURGERY

## 2021-08-12 PROCEDURE — 4040F PNEUMOC VAC/ADMIN/RCVD: CPT | Performed by: SURGERY

## 2021-08-12 PROCEDURE — 1123F ACP DISCUSS/DSCN MKR DOCD: CPT | Performed by: SURGERY

## 2021-08-12 PROCEDURE — G8427 DOCREV CUR MEDS BY ELIG CLIN: HCPCS | Performed by: SURGERY

## 2021-08-12 PROCEDURE — 1090F PRES/ABSN URINE INCON ASSESS: CPT | Performed by: SURGERY

## 2021-08-12 PROCEDURE — 4004F PT TOBACCO SCREEN RCVD TLK: CPT | Performed by: SURGERY

## 2021-08-12 PROCEDURE — 99213 OFFICE O/P EST LOW 20 MIN: CPT | Performed by: SURGERY

## 2021-08-12 PROCEDURE — G8399 PT W/DXA RESULTS DOCUMENT: HCPCS | Performed by: SURGERY

## 2021-08-12 NOTE — PROGRESS NOTES
Progress Note - Follow up    Patient's Name/Date of Birth: Urban Ashford / 1955    Date: 8/12/2021    PCP: Domingo Noel DO    Referring Physician:   Maribel Mane*  [phone]    Chief Complaint   Patient presents with    Results     colonoscopy        HPI:    No n/v/d/c/abdominal pain/rectal bleeding. Patient's medications, allergies, past medical, surgical, social and family histories were reviewed and updated as appropriate. Review of Systems  Constitutional: negative  Respiratory: negative  Cardiovascular: negative  Gastrointestinal: as in hpi  Genitourinary:negative  Integument/breast: negative    Physical Exam:  BP (!) 144/60   Pulse 63   Temp 97.5 °F (36.4 °C) (Temporal)   Resp 16   Ht 5' 4\" (1.626 m)   Wt 128 lb (58.1 kg)   BMI 21.97 kg/m²   General appearance: alert, cooperative and in no acute distress. Lungs: normal work of breathing  Heart: regular rate  Abdomen: soft, nontender, nondistended  Musculoskeletal: symmetrical without edema.   Skin: normal    Data Reviewed:   Pathology: Diagnosis:   A.  Colon, polyp at 45 cm, polypectomy: Fragments of incipient tubular   adenoma     B.  Colon, polyp at 75 cm, polypectomy: Fragments of tubular adenoma     C.  Ascending colon, polyp, polypectomy: Fragments of tubular adenoma     D.  Colon, polyp at 60 cm, polypectomy: Fragments of incipient tubular   adenoma     E.  Colon, polyp at 50 cm, polypectomy: Fragments of tubular adenoma     F.  Colon, polyp at 30 cm, polypectomy: Tubular adenoma and polypoid   mucosal fold     Impression/Plan:  73y.o. year old female with multiple tubular adenomas found on colonoscopy, large one at the ileocecal valve    Repeat colonoscopy in 1 year        Electronically by Karl Phelps MD, General Surgery  on 8/12/2021 at 10:01 AM      Send copy of H&P to PCP, Domingo Noel DO and referring physician, Maribel Mane*      8/12/2021

## 2021-08-27 ENCOUNTER — OFFICE VISIT (OUTPATIENT)
Dept: PRIMARY CARE CLINIC | Age: 66
End: 2021-08-27
Payer: MEDICARE

## 2021-08-27 VITALS
TEMPERATURE: 97.2 F | SYSTOLIC BLOOD PRESSURE: 132 MMHG | HEART RATE: 61 BPM | BODY MASS INDEX: 21.97 KG/M2 | OXYGEN SATURATION: 97 % | WEIGHT: 128 LBS | DIASTOLIC BLOOD PRESSURE: 70 MMHG

## 2021-08-27 DIAGNOSIS — E11.65 UNCONTROLLED TYPE 2 DIABETES MELLITUS WITH HYPERGLYCEMIA (HCC): Primary | ICD-10-CM

## 2021-08-27 DIAGNOSIS — E03.9 HYPOTHYROIDISM, UNSPECIFIED TYPE: ICD-10-CM

## 2021-08-27 DIAGNOSIS — M81.0 AGE-RELATED OSTEOPOROSIS WITHOUT CURRENT PATHOLOGICAL FRACTURE: ICD-10-CM

## 2021-08-27 LAB
CREATININE URINE: 32 MG/DL (ref 29–226)
MICROALBUMIN UR-MCNC: 16.9 MG/L
MICROALBUMIN/CREAT UR-RTO: 52.8 (ref 0–30)

## 2021-08-27 PROCEDURE — 4040F PNEUMOC VAC/ADMIN/RCVD: CPT | Performed by: FAMILY MEDICINE

## 2021-08-27 PROCEDURE — G8420 CALC BMI NORM PARAMETERS: HCPCS | Performed by: FAMILY MEDICINE

## 2021-08-27 PROCEDURE — 3046F HEMOGLOBIN A1C LEVEL >9.0%: CPT | Performed by: FAMILY MEDICINE

## 2021-08-27 PROCEDURE — 4004F PT TOBACCO SCREEN RCVD TLK: CPT | Performed by: FAMILY MEDICINE

## 2021-08-27 PROCEDURE — 1123F ACP DISCUSS/DSCN MKR DOCD: CPT | Performed by: FAMILY MEDICINE

## 2021-08-27 PROCEDURE — G8427 DOCREV CUR MEDS BY ELIG CLIN: HCPCS | Performed by: FAMILY MEDICINE

## 2021-08-27 PROCEDURE — 3017F COLORECTAL CA SCREEN DOC REV: CPT | Performed by: FAMILY MEDICINE

## 2021-08-27 PROCEDURE — 99214 OFFICE O/P EST MOD 30 MIN: CPT | Performed by: FAMILY MEDICINE

## 2021-08-27 PROCEDURE — 1090F PRES/ABSN URINE INCON ASSESS: CPT | Performed by: FAMILY MEDICINE

## 2021-08-27 PROCEDURE — G8399 PT W/DXA RESULTS DOCUMENT: HCPCS | Performed by: FAMILY MEDICINE

## 2021-08-27 PROCEDURE — 2022F DILAT RTA XM EVC RTNOPTHY: CPT | Performed by: FAMILY MEDICINE

## 2021-08-27 RX ORDER — DENOSUMAB 60 MG/ML
60 INJECTION SUBCUTANEOUS ONCE
Qty: 1 ML | Refills: 0 | Status: SHIPPED
Start: 2021-08-27 | End: 2022-02-16 | Stop reason: SDUPTHER

## 2021-08-27 SDOH — ECONOMIC STABILITY: FOOD INSECURITY: WITHIN THE PAST 12 MONTHS, YOU WORRIED THAT YOUR FOOD WOULD RUN OUT BEFORE YOU GOT MONEY TO BUY MORE.: NEVER TRUE

## 2021-08-27 SDOH — ECONOMIC STABILITY: FOOD INSECURITY: WITHIN THE PAST 12 MONTHS, THE FOOD YOU BOUGHT JUST DIDN'T LAST AND YOU DIDN'T HAVE MONEY TO GET MORE.: NEVER TRUE

## 2021-08-27 ASSESSMENT — SOCIAL DETERMINANTS OF HEALTH (SDOH): HOW HARD IS IT FOR YOU TO PAY FOR THE VERY BASICS LIKE FOOD, HOUSING, MEDICAL CARE, AND HEATING?: NOT HARD AT ALL

## 2021-08-27 ASSESSMENT — ENCOUNTER SYMPTOMS
VISUAL CHANGE: 1
SHORTNESS OF BREATH: 0
CONSTIPATION: 0
BLURRED VISION: 0
ROS SKIN COMMENTS: + HAIR LOSS  NO DRY SKIN  NO BRITTLE NAILS
DIARRHEA: 0
BLOOD IN STOOL: 0

## 2021-08-27 NOTE — PROGRESS NOTES
Dieter Curtis, a female of 73 y.o. came to the office 8/27/2021. Patient Active Problem List   Diagnosis    Asymptomatic bilateral carotid artery stenosis    History of nuclear stress test    Mixed hyperlipidemia    Aortoiliac occlusive disease ([address]    PVD (peripheral vascular disease) with claudication ([address]    Carotid stenosis, asymptomatic, bilateral    S/P carotid endarterectomy    Tobacco abuse    Lesion of true vocal cord          Diabetes  She presents for her follow-up diabetic visit. She has type 2 diabetes mellitus. Her disease course has been stable. Pertinent negatives for hypoglycemia include no nervousness/anxiousness or tremors. Associated symptoms include fatigue, foot paresthesias, polydipsia and visual change (has cataracts). Pertinent negatives for diabetes include no blurred vision, no chest pain, no foot ulcerations and no polyuria. Current diabetic treatment includes insulin injections (Toujeo bid 26 u. not taking Humalog anymore. she forgets to take or not at home. ). Her weight is stable. She is following a generally healthy diet. She rarely participates in exercise. Her home blood glucose trend is fluctuating dramatically. An ACE inhibitor/angiotensin II receptor blocker is being taken. Eye exam is current. Hyperlipidemia  This is a chronic problem. The current episode started more than 1 year ago. Associated symptoms include myalgias. Pertinent negatives include no chest pain, leg pain or shortness of breath. Current antihyperlipidemic treatment includes statins. skin: itchy  Hypothyroidism: losing hair lately. Bone: unable to tolerate Fosamax due to se's.      Allergies   Allergen Reactions    Pcn [Penicillins] Hives and Swelling    Fosamax [Alendronate]      Nausea, vomiting       Current Outpatient Medications on File Prior to Visit   Medication Sig Dispense Refill    azelastine (ASTELIN) 0.1 % nasal spray 2 sprays by Nasal route 2 times daily Use in each nostril as directed 1 Bottle 3    simvastatin (ZOCOR) 40 MG tablet Take 1 tablet by mouth nightly 90 tablet 1    levothyroxine (SYNTHROID) 25 MCG tablet Take 1 tablet by mouth Daily 90 tablet 1    citalopram (CELEXA) 20 MG tablet Take 1 tablet by mouth daily 90 tablet 1    clopidogrel (PLAVIX) 75 MG tablet TAKE 1 TABLET DAILY 90 tablet 1    ibuprofen (ADVIL;MOTRIN) 600 MG tablet Take 1 tablet by mouth 4 times daily as needed for Pain 40 tablet 0    lisinopril (PRINIVIL;ZESTRIL) 5 MG tablet TAKE 1 TABLET DAILY 90 tablet 1    metFORMIN (GLUCOPHAGE) 500 MG tablet TAKE 1 TABLET TWICE A DAY WITH MEALS 180 tablet 1    Ixekizumab (TALTZ SC) Inject into the skin every 30 days For psoriasis      omeprazole (PRILOSEC) 40 MG delayed release capsule TAKE 1 CAPSULE BY MOUTH EVERY DAY 30 capsule 1    TOUJEO SOLOSTAR 300 UNIT/ML SOPN INJECT 50 UNITS INTO THE SKIN TWICE A DAY (Patient taking differently: 25 Units 2 times daily ) 5 pen 2    BD PEN NEEDLE SAMEER U/F 32G X 4 MM MISC       aspirin 81 MG tablet Take 81 mg by mouth daily      Tiotropium Bromide-Olodaterol (STIOLTO RESPIMAT) 2.5-2.5 MCG/ACT AERS 2 puffs daily (Patient not taking: Reported on 8/27/2021) 1 Inhaler 0    insulin lispro (HUMALOG) 100 UNIT/ML injection vial Inject 15 Units into the skin 2 times daily (Patient not taking: Reported on 8/27/2021)       No current facility-administered medications on file prior to visit. Review of Systems   Constitutional: Positive for fatigue. Eyes: Negative for blurred vision. Respiratory: Negative for shortness of breath. Cardiovascular: Negative for chest pain, palpitations and leg swelling. Gastrointestinal: Negative for blood in stool, constipation and diarrhea. Endocrine: Positive for polydipsia. Negative for cold intolerance, heat intolerance and polyuria. Musculoskeletal: Positive for myalgias. Skin:        + hair loss  No dry skin  No brittle nails   Neurological: Negative for tremors. Psychiatric/Behavioral: Negative for dysphoric mood. The patient is not nervous/anxious. other review of systems reviewed and are negative    OBJECTIVE:  /70   Pulse 61   Temp 97.2 °F (36.2 °C)   Wt 128 lb (58.1 kg)   SpO2 97%   BMI 21.97 kg/m²      Physical Exam  Vitals reviewed. Eyes:      General: No scleral icterus. Conjunctiva/sclera: Conjunctivae normal.   Neck:      Thyroid: No thyromegaly. Vascular: Carotid bruit (R) present. Cardiovascular:      Rate and Rhythm: Normal rate and regular rhythm. Heart sounds: No murmur heard. Pulmonary:      Effort: Pulmonary effort is normal.      Breath sounds: Normal breath sounds. No wheezing or rales. Abdominal:      General: Bowel sounds are normal.      Palpations: Abdomen is soft. There is no mass. Tenderness: There is no abdominal tenderness. There is no guarding or rebound. Musculoskeletal:         General: Normal range of motion. Cervical back: Neck supple. Lymphadenopathy:      Cervical: No cervical adenopathy. Skin:     General: Skin is warm and dry. Neurological:      Mental Status: She is alert and oriented to person, place, and time. Psychiatric:         Mood and Affect: Mood normal.         ASSESSMENT AND PLAN:    Jose Murray was seen today for diabetes and discuss medications. Diagnoses and all orders for this visit:    Uncontrolled type 2 diabetes mellitus with hyperglycemia ([address]  -     Lipid Panel; Future  -     Microalbumin / Creatinine Urine Ratio; Future  -     Comprehensive Metabolic Panel; Future  -     Hemoglobin A1C; Future    Hypothyroidism, unspecified type  -     TSH without Reflex; Future    Age-related osteoporosis without current pathological fracture  -     denosumab (PROLIA) 60 MG/ML SOSY SC injection; Inject 1 mL into the skin once for 1 dose    - quit smoking   - hold Fosamax  - handicap parking for 5 yrs. Return for based on lab results.     Lady Garcia,

## 2021-08-27 NOTE — LETTER
[facility]  [address] [place] [address]  Phone: [phone]  Fax: [phone]    Stephania Rodriguez DO         August 27, 2021     Patient: Priyanka Muniz   YOB: 1955   Date of Visit: 8/27/2021       To Whom It May Concern: It is my medical opinion that Albin Blackmon requires a disability parking placard for the following reasons:  She has limited walking ability due to an orthopedic condition. Duration of need: 5 years    If you have any questions or concerns, please don't hesitate to call.     Sincerely,        Stephania Rodriguez DO

## 2021-08-31 ENCOUNTER — TELEPHONE (OUTPATIENT)
Dept: PRIMARY CARE CLINIC | Age: 66
End: 2021-08-31

## 2021-12-16 ENCOUNTER — TELEPHONE (OUTPATIENT)
Dept: VASCULAR SURGERY | Age: 66
End: 2021-12-16

## 2021-12-16 NOTE — TELEPHONE ENCOUNTER
Left message to reschedule carotid and le arterial study for 1- with Dr Connie Handy as we have closed our lab. Need to move dr macdonald to after testing.

## 2021-12-20 ENCOUNTER — TELEPHONE (OUTPATIENT)
Dept: VASCULAR SURGERY | Age: 66
End: 2021-12-20

## 2021-12-20 DIAGNOSIS — I73.9 PVD (PERIPHERAL VASCULAR DISEASE) (HCC): ICD-10-CM

## 2021-12-20 DIAGNOSIS — I65.23 CAROTID ARTERY STENOSIS, ASYMPTOMATIC, BILATERAL: Primary | ICD-10-CM

## 2021-12-20 NOTE — TELEPHONE ENCOUNTER
Scheduled carotid u/s + le arterial Doppler study at [facility] 3/3/22 beginning at 2:30 pm and ov with Dr. Karen Boles 3/7/22 at 1:00 pm, message left on pt's voicemail.

## 2022-01-21 ENCOUNTER — OFFICE VISIT (OUTPATIENT)
Dept: ENT CLINIC | Age: 67
End: 2022-01-21
Payer: MEDICARE

## 2022-01-21 VITALS
BODY MASS INDEX: 21.85 KG/M2 | DIASTOLIC BLOOD PRESSURE: 61 MMHG | WEIGHT: 128 LBS | SYSTOLIC BLOOD PRESSURE: 147 MMHG | OXYGEN SATURATION: 98 % | TEMPERATURE: 96.9 F | HEIGHT: 64 IN | HEART RATE: 62 BPM

## 2022-01-21 DIAGNOSIS — R09.89 GLOBUS SENSATION: ICD-10-CM

## 2022-01-21 DIAGNOSIS — K21.9 GASTROESOPHAGEAL REFLUX DISEASE WITHOUT ESOPHAGITIS: Primary | ICD-10-CM

## 2022-01-21 DIAGNOSIS — R09.82 PND (POST-NASAL DRIP): ICD-10-CM

## 2022-01-21 PROCEDURE — 1123F ACP DISCUSS/DSCN MKR DOCD: CPT | Performed by: OTOLARYNGOLOGY

## 2022-01-21 PROCEDURE — G8399 PT W/DXA RESULTS DOCUMENT: HCPCS | Performed by: OTOLARYNGOLOGY

## 2022-01-21 PROCEDURE — 4004F PT TOBACCO SCREEN RCVD TLK: CPT | Performed by: OTOLARYNGOLOGY

## 2022-01-21 PROCEDURE — 3017F COLORECTAL CA SCREEN DOC REV: CPT | Performed by: OTOLARYNGOLOGY

## 2022-01-21 PROCEDURE — 4040F PNEUMOC VAC/ADMIN/RCVD: CPT | Performed by: OTOLARYNGOLOGY

## 2022-01-21 PROCEDURE — G8420 CALC BMI NORM PARAMETERS: HCPCS | Performed by: OTOLARYNGOLOGY

## 2022-01-21 PROCEDURE — 1090F PRES/ABSN URINE INCON ASSESS: CPT | Performed by: OTOLARYNGOLOGY

## 2022-01-21 PROCEDURE — G8427 DOCREV CUR MEDS BY ELIG CLIN: HCPCS | Performed by: OTOLARYNGOLOGY

## 2022-01-21 PROCEDURE — 99213 OFFICE O/P EST LOW 20 MIN: CPT | Performed by: OTOLARYNGOLOGY

## 2022-01-21 PROCEDURE — G8484 FLU IMMUNIZE NO ADMIN: HCPCS | Performed by: OTOLARYNGOLOGY

## 2022-01-21 ASSESSMENT — ENCOUNTER SYMPTOMS
ALLERGIC/IMMUNOLOGIC NEGATIVE: 1
TROUBLE SWALLOWING: 1
SORE THROAT: 1
VOICE CHANGE: 1
EYES NEGATIVE: 1
COUGH: 1

## 2022-01-21 NOTE — PROGRESS NOTES
Subjective:      Patient ID:  Saúl Fernandez is a 78 y.o. female. HPI:    Patient presents today for recheck reflux. Pt is taking omeprazole OTC and saline sprays and is stable  Continues to smoke.  No EtOH use  Notes her ears itch in the winter       Past Medical History:   Diagnosis Date    Age-related osteoporosis without current pathological fracture     Age-related osteoporosis without current pathological fracture 2021    Arthritis     Cataracts, bilateral 10/'19    Colon polyps 2021    tubular adenoma    Diabetes mellitus ([address]     DM type 2 (diabetes mellitus, type 2) ([address]     Hyperlipidemia     Hypertension     Hypothyroidism     Migraines     rare    Neuropathy, diabetic ([address]     Peripheral vascular disease ([address]     follows with Dr. Lj Jansen yearly    Positive colorectal cancer screening using Cologuard test     Psoriasiform dermatitis     Seasonal allergies     Thyroid disease      Past Surgical History:   Procedure Laterality Date    BACK SURGERY      L5-S1    CAROTID ENDARTERECTOMY Left 10/11/2018    [place]    CARPAL TUNNEL RELEASE      bilat     SECTION      2    COLONOSCOPY N/A 2021    COLONOSCOPY WITH BIOPSY with tattooing performed by Liudmila Modi MD at [address] N/A 2020    DIRECT LARYNGOSCOPYY AND CERVICAL ESOPHAGOSCOPY (PATHOLOGY PRESENT) performed by John Zhang DO at [address]      remote- right     OTHER SURGICAL HISTORY  2018    Dr. Lj Jansen- Bilateral iliac stents iCast [id number]    [place] OFFICE/OUTPT VISIT,PROCEDURE ONLY Left 10/11/2018    LEFT CAROTID ENDARTERECTOMY performed by Council Cowden, MD at [address] TOE SURGERY      bilat - ingrown toe nails    TONSILLECTOMY       Family History   Problem Relation Age of Onset    Cancer Mother         lung brain smoker    Heart Disease Father         mi in his 52's     Other Father         pulmonary fibrosis    Diabetes Brother      Social History     Socioeconomic History    Marital status:      Spouse name: None    Number of children: None    Years of education: None    Highest education level: None   Occupational History    Occupation:  bookeeper   Tobacco Use    Smoking status: Current Every Day Smoker     Packs/day: 0.50     Years: 40.00     Pack years: 20.00     Types: Cigarettes     Start date: 5     Last attempt to quit: 8/12/2018     Years since quitting: 3.4    Smokeless tobacco: Never Used   Vaping Use    Vaping Use: Never used   Substance and Sexual Activity    Alcohol use: No    Drug use: None    Sexual activity: None   Other Topics Concern    None   Social History Narrative    None     Social Determinants of Health     Financial Resource Strain: Low Risk     Difficulty of Paying Living Expenses: Not hard at all   Food Insecurity: No Food Insecurity    Worried About Running Out of Food in the Last Year: Never true    Dm of Food in the Last Year: Never true   Transportation Needs:     Lack of Transportation (Medical): Not on file    Lack of Transportation (Non-Medical):  Not on file   Physical Activity:     Days of Exercise per Week: Not on file    Minutes of Exercise per Session: Not on file   Stress:     Feeling of Stress : Not on file   Social Connections:     Frequency of Communication with Friends and Family: Not on file    Frequency of Social Gatherings with Friends and Family: Not on file    Attends Rastafari Services: Not on file    Active Member of Clubs or Organizations: Not on file    Attends Club or Organization Meetings: Not on file    Marital Status: Not on file   Intimate Partner Violence:     Fear of Current or Ex-Partner: Not on file    Emotionally Abused: Not on file    Physically Abused: Not on file    Sexually Abused: Not on file   Housing Stability:     Unable to Pay for Housing in the Last Year: Not on file    Number of Places Lived in the Last Year: Not on file    Unstable Housing in the Last Year: Not on file     Allergies   Allergen Reactions    Pcn [Penicillins] Hives and Swelling    Fosamax [Alendronate]      Nausea, vomiting       Review of Systems   Constitutional: Negative. HENT: Positive for congestion, postnasal drip, sore throat, trouble swallowing and voice change. Eyes: Negative. Respiratory: Positive for cough. Skin: Negative. Allergic/Immunologic: Negative. Neurological: Negative. Hematological: Negative. Psychiatric/Behavioral: Negative. All other systems reviewed and are negative. Objective:     Vitals:    01/21/22 0928   BP: (!) 147/61   Pulse: 62   Temp: 96.9 °F (36.1 °C)   SpO2: 98%     Physical Exam  Vitals reviewed. Constitutional:       Appearance: Normal appearance. HENT:      Head: Normocephalic. Right Ear: Tympanic membrane, ear canal and external ear normal.      Left Ear: Tympanic membrane, ear canal and external ear normal.      Nose: Septal deviation (right) and mucosal edema present. No congestion or rhinorrhea. Mouth/Throat:      Mouth: Mucous membranes are moist.      Dentition: Abnormal dentition. Tongue: No lesions. Palate: No mass. Pharynx: Oropharynx is clear. Uvula midline. Tonsils: No tonsillar exudate. 0 on the right. 0 on the left. Eyes:      Conjunctiva/sclera: Conjunctivae normal.   Neck:      Thyroid: No thyroid mass, thyromegaly or thyroid tenderness. Trachea: Trachea normal.   Cardiovascular:      Rate and Rhythm: Normal rate. Pulses: Normal pulses. Pulmonary:      Effort: Pulmonary effort is normal.   Musculoskeletal:      Cervical back: Normal range of motion and neck supple. Lymphadenopathy:      Cervical: No cervical adenopathy. Neurological:      Mental Status: She is alert and oriented to person, place, and time.                  Assessment: Diagnosis Orders   1. Gastroesophageal reflux disease without esophagitis     2. PND (post-nasal drip)     3. Globus sensation                Plan:      Continue reflux meds for now. Continue Flonase. Start Astelin  Encouraged smoking cessation  Follow up in 6 month(s) with repeat scope                  Jeff Riddle  1955    I have discussed the case, including pertinent history and exam findings with the resident. I have seen and examined the patient and the key elements of the encounter have been performed by me. I agree with the assessment, plan and orders as documented by the  resident              Remainder of medical problems as per  resident note. Patient seen and examined. Agree with above exam, assessment and plan.       Electronically signed by Carrie Spann DO on 1/24/22 at 12:21 PM EST

## 2022-02-08 ENCOUNTER — APPOINTMENT (OUTPATIENT)
Dept: GENERAL RADIOLOGY | Age: 67
End: 2022-02-08
Payer: MEDICARE

## 2022-02-08 ENCOUNTER — NURSE TRIAGE (OUTPATIENT)
Dept: OTHER | Facility: CLINIC | Age: 67
End: 2022-02-08

## 2022-02-08 ENCOUNTER — APPOINTMENT (OUTPATIENT)
Dept: CT IMAGING | Age: 67
End: 2022-02-08
Payer: MEDICARE

## 2022-02-08 ENCOUNTER — HOSPITAL ENCOUNTER (EMERGENCY)
Age: 67
Discharge: HOME | End: 2022-02-08
Attending: STUDENT IN AN ORGANIZED HEALTH CARE EDUCATION/TRAINING PROGRAM
Payer: MEDICARE

## 2022-02-08 VITALS
HEART RATE: 69 BPM | DIASTOLIC BLOOD PRESSURE: 90 MMHG | WEIGHT: 128 LBS | TEMPERATURE: 97.3 F | RESPIRATION RATE: 14 BRPM | OXYGEN SATURATION: 99 % | SYSTOLIC BLOOD PRESSURE: 187 MMHG | BODY MASS INDEX: 21.85 KG/M2 | HEIGHT: 64 IN

## 2022-02-08 DIAGNOSIS — S09.90XA CLOSED HEAD INJURY, INITIAL ENCOUNTER: Primary | ICD-10-CM

## 2022-02-08 DIAGNOSIS — M54.40 ACUTE RIGHT-SIDED LOW BACK PAIN WITH SCIATICA, SCIATICA LATERALITY UNSPECIFIED: ICD-10-CM

## 2022-02-08 PROCEDURE — 73502 X-RAY EXAM HIP UNI 2-3 VIEWS: CPT

## 2022-02-08 PROCEDURE — 6370000000 HC RX 637 (ALT 250 FOR IP): Performed by: STUDENT IN AN ORGANIZED HEALTH CARE EDUCATION/TRAINING PROGRAM

## 2022-02-08 PROCEDURE — 70450 CT HEAD/BRAIN W/O DYE: CPT

## 2022-02-08 PROCEDURE — 72131 CT LUMBAR SPINE W/O DYE: CPT

## 2022-02-08 PROCEDURE — 72125 CT NECK SPINE W/O DYE: CPT

## 2022-02-08 PROCEDURE — 99283 EMERGENCY DEPT VISIT LOW MDM: CPT

## 2022-02-08 RX ORDER — HYDROCODONE BITARTRATE AND ACETAMINOPHEN 5; 325 MG/1; MG/1
1 TABLET ORAL EVERY 4 HOURS PRN
Qty: 18 TABLET | Refills: 0 | Status: SHIPPED | OUTPATIENT
Start: 2022-02-08 | End: 2022-02-11

## 2022-02-08 RX ORDER — METHOCARBAMOL 500 MG/1
500 TABLET, FILM COATED ORAL 4 TIMES DAILY
Qty: 40 TABLET | Refills: 0 | Status: SHIPPED | OUTPATIENT
Start: 2022-02-08 | End: 2022-02-18

## 2022-02-08 RX ORDER — HYDROCODONE BITARTRATE AND ACETAMINOPHEN 5; 325 MG/1; MG/1
2 TABLET ORAL ONCE
Status: COMPLETED | OUTPATIENT
Start: 2022-02-08 | End: 2022-02-08

## 2022-02-08 RX ORDER — IBUPROFEN 800 MG/1
800 TABLET ORAL 4 TIMES DAILY PRN
Qty: 40 TABLET | Refills: 0 | Status: ON HOLD | OUTPATIENT
Start: 2022-02-08

## 2022-02-08 RX ADMIN — HYDROCODONE BITARTRATE AND ACETAMINOPHEN 2 TABLET: 5; 325 TABLET ORAL at 09:53

## 2022-02-08 ASSESSMENT — PAIN SCALES - GENERAL
PAINLEVEL_OUTOF10: 7
PAINLEVEL_OUTOF10: 7

## 2022-02-08 ASSESSMENT — PAIN DESCRIPTION - PAIN TYPE: TYPE: ACUTE PAIN

## 2022-02-08 ASSESSMENT — PAIN DESCRIPTION - LOCATION: LOCATION: BACK;HEAD

## 2022-02-08 NOTE — ED PROVIDER NOTES
Department of Emergency Medicine   ED  Provider Note  Admit Date/RoomTime: 2022  9:24 AM  ED Room:           History of Present Illness:  22, Time: 10:08 AM EST  Chief Complaint   Patient presents with    Head Injury     slipped on ice this am falling backwards hit head on ground. Pt on plavix    Headache    Other     elevated BP not a known hypertensive         Chely Gomes is a 78 y.o. female presenting to the ED for Mechanical trip and fall on the ice. The patient was walking her grandson down to the bus stop. She slipped on ice and fell on her back particularly on her sacral and lumbar region. She has pain in her right SI joint region and radicular type symptomatology rating down her right leg. This is new from prior. The makes it better or worse is constant duration. She also struck her head on the ice. Denies any cervical or thoracic tenderness. She not lose consciousness with this episode there is no seizure-like activity. Review of Systems:  Review of systems obtained and negative unless stated otherwise above in the HPI.    --------------------------------------------- PAST HISTORY ---------------------------------------------  Past Medical History:  has a past medical history of Age-related osteoporosis without current pathological fracture, Age-related osteoporosis without current pathological fracture, Arthritis, Cataracts, bilateral, Colon polyps, Diabetes mellitus ([address] DM type 2 (diabetes mellitus, type 2) ([address] Hyperlipidemia, Hypertension, Hypothyroidism, Migraines, Neuropathy, diabetic ([address] Peripheral vascular disease ([address] Positive colorectal cancer screening using Cologuard test, Psoriasiform dermatitis, Seasonal allergies, and Thyroid disease. Past Surgical History:  has a past surgical history that includes Carpal tunnel release; back surgery; Hysterectomy;  section; Leg Surgery;  Tonsillectomy; sinus surgery; pr office/outpt visit,procedure only (Left, 10/11/2018); Carotid endarterectomy (Left, 10/11/2018); other surgical history (12/04/2018); Toe Surgery; laryngoscopy (N/A, 8/31/2020); and Colonoscopy (N/A, 8/6/2021). Social History:  reports that she has been smoking cigarettes. She started smoking about 52 years ago. She has a 20.00 pack-year smoking history. She has never used smokeless tobacco. She reports that she does not drink alcohol. Family History: family history includes Cancer in her mother; Diabetes in her brother; Heart Disease in her father; Other in her father. . Unless otherwise noted, family history is non contributory    The patients home medications have been reviewed. Allergies: Pcn [penicillins] and Fosamax [alendronate]    I have reviewed the past medical history, past surgical history, social history, and family history    ---------------------------------------------------PHYSICAL EXAM--------------------------------------    Constitutional: Appears in no distress  Head: Normocephalic, atraumatic  Eyes: Non-icteric slcera, no conjunctival injection  ENT: Moist mucous membranes,  Neck: Trachea midline, no JVD  Respiratory: Nonlabored respirations. Lungs clear to auscultation bilaterally, no wheezes, rales, or rhonchi. Cardiovascular: Regular rate. Regular rhythm. No murmurs, no gallops, no rubs. Gastrointestinal: Abdomen Soft, Non tender, Non distended. No rebound tenderness, guarding, or rigidity. Extremities: Lower Extremities: Visual inspection reveals no erythema, swelling or deformity to the ankle. There is no tenderness about the distal medial/lateral malleolus. No laxity is noted on anterior drawer of the ankle. The patient has 5/5 muscle strength to plantarflexion and dorsiflexion of the ankle. There are 2+ equal dorsalis pedis pulses bilaterally with brisk capillary refill in the distal extremity. Sensation to light touch is intact about the foot and ankle.   There is no proximal tibial/fibular SpO2 99%   BMI 21.97 kg/m²     Oxygen Saturation Interpretation: Normal    The patients available past medical records and past encounters were reviewed. ------------------------------ ED COURSE/MEDICAL DECISION MAKING----------------------  Medications   HYDROcodone-acetaminophen (NORCO) 5-325 MG per tablet 2 tablet (2 tablets Oral Given 2/8/22 0995)        Re-Evaluations: This patient's ED course included:a personal history and physicial examination, re-evaluation prior to disposition and multiple bedside re-evaluations    This patient has remained hemodynamically stable during their ED course. Consultations:  None    Medical Decision Making:   Patient presents emerge department after slip and fall. No concern for intracranial process as well as lumbar spine injury. Will obtain plain film of the hip and pelvis also. She has normal distal neurovascular exam and strength and I am reassured by this. Patient has negative imaging but does have radiculopathy and neuroforaminal stenosis on her lumbar spine film. No other fracture dislocation. Patient ambulates in the emergency department with out difficulty she would be discharged with pain control. She is given stretches for her back. She will follow-up as an outpatient as well      Counseling: The emergency provider has spoken with the patient and discussed todays results, in addition to providing specific details for the plan of care and counseling regarding the diagnosis and prognosis. Questions are answered at this time and they are agreeable with the plan.       --------------------------------- IMPRESSION AND DISPOSITION ---------------------------------    IMPRESSION  1. Closed head injury, initial encounter    2.  Acute right-sided low back pain with sciatica, sciatica laterality unspecified        DISPOSITION  Disposition: Discharge to home  Patient condition is stable    I, Dr. Jorgito Roper, am the primary provider of record    Josemanuel Quispe DO  Emergency Medicine    NOTE: This report was transcribed using voice recognition software.  Every effort was made to ensure accuracy; however, inadvertent computerized transcription errors may be present         Dariel Denis DO  02/08/22 1143

## 2022-02-08 NOTE — PROGRESS NOTES
CLINICAL PHARMACY NOTE: MEDS TO BEDS    Total # of Prescriptions Filled: 3   The following medications were delivered to the patient:  · Ibuprofen 800  · Methocarbamol 500  · norco 5/325    Additional Documentation:

## 2022-02-08 NOTE — TELEPHONE ENCOUNTER
Received call from Norma Arzola at [facility] with [employer] Complaint. Subjective: Caller is spouse Doug Noe- states Bulmaro Corona was getting my grandson on the bus they were walking down the driveway, my wife fell hit her head and lower back tailbone she is on blood thinners. My grandson fell on his bum. \"     Pt takes Plavix daily on blood thinner     Current Symptoms: headache currently. Low back pain near tailbone. Patient fell backwards hit low back and back of head. Pt denies slurred speech, chest pains, shortness of breath, confusion . Pt did not lose consciousness. Pt repots neck is stiff. No active bleeding. Pt does report \"vision is off\" but denies it being blurry. Onset: 40 minutes ago  ; unchanged    Associated Symptoms: NA    Pain Severity: headache 7/10; aching; constant    Temperature: has not checked     What has been tried: n/a    LMP: NA Pregnant: No    Recommended disposition: GO TO ED/UCC NOW (OR TO OFFICE WITH PCP APPROVAL):              2nd level. Called over to [facility] JULIEN. Was placed on hold unable to connect with office . Due to patients presenting symptoms , injury and being on blood thinners patient triage up advised patient to go to ER now , spouse will bring patient to ED now. Care advice provided, patient verbalizes understanding; denies any other questions or concerns; instructed to call back for any new or worsening symptoms. Patient/caller proceeding to nearest Emergency Department     Attention Provider: Thank you for allowing me to participate in the care of your patient. The patient was connected to triage in response to information provided to the [facility]. Please do not respond through this encounter as the response is not directed to a shared pool.       Reason for Disposition   Taking Coumadin (warfarin) or other strong blood thinner, or known bleeding disorder (e.g., thrombocytopenia)     Patient is on plavix    Protocols used: HEAD INJURY-ADULT-[place]

## 2022-02-10 ENCOUNTER — OFFICE VISIT (OUTPATIENT)
Dept: PRIMARY CARE CLINIC | Age: 67
End: 2022-02-10
Payer: MEDICARE

## 2022-02-10 VITALS
OXYGEN SATURATION: 93 % | SYSTOLIC BLOOD PRESSURE: 190 MMHG | RESPIRATION RATE: 12 BRPM | HEIGHT: 64 IN | TEMPERATURE: 96.8 F | DIASTOLIC BLOOD PRESSURE: 68 MMHG | WEIGHT: 126 LBS | BODY MASS INDEX: 21.51 KG/M2 | HEART RATE: 67 BPM

## 2022-02-10 DIAGNOSIS — S00.83XD CONTUSION OF OTHER PART OF HEAD, SUBSEQUENT ENCOUNTER: Primary | ICD-10-CM

## 2022-02-10 DIAGNOSIS — S39.012D STRAIN OF LUMBAR REGION, SUBSEQUENT ENCOUNTER: ICD-10-CM

## 2022-02-10 DIAGNOSIS — F34.1 DYSTHYMIA: ICD-10-CM

## 2022-02-10 DIAGNOSIS — M81.0 AGE-RELATED OSTEOPOROSIS WITHOUT CURRENT PATHOLOGICAL FRACTURE: ICD-10-CM

## 2022-02-10 DIAGNOSIS — I74.09 AORTOILIAC OCCLUSIVE DISEASE (HCC): ICD-10-CM

## 2022-02-10 DIAGNOSIS — S16.1XXD NECK STRAIN, SUBSEQUENT ENCOUNTER: ICD-10-CM

## 2022-02-10 PROBLEM — E11.9 TYPE 2 DIABETES MELLITUS (HCC): Status: ACTIVE | Noted: 2022-02-10

## 2022-02-10 PROCEDURE — 4040F PNEUMOC VAC/ADMIN/RCVD: CPT | Performed by: FAMILY MEDICINE

## 2022-02-10 PROCEDURE — 99214 OFFICE O/P EST MOD 30 MIN: CPT | Performed by: FAMILY MEDICINE

## 2022-02-10 PROCEDURE — G8399 PT W/DXA RESULTS DOCUMENT: HCPCS | Performed by: FAMILY MEDICINE

## 2022-02-10 PROCEDURE — G8427 DOCREV CUR MEDS BY ELIG CLIN: HCPCS | Performed by: FAMILY MEDICINE

## 2022-02-10 PROCEDURE — 3017F COLORECTAL CA SCREEN DOC REV: CPT | Performed by: FAMILY MEDICINE

## 2022-02-10 PROCEDURE — 4004F PT TOBACCO SCREEN RCVD TLK: CPT | Performed by: FAMILY MEDICINE

## 2022-02-10 PROCEDURE — 1090F PRES/ABSN URINE INCON ASSESS: CPT | Performed by: FAMILY MEDICINE

## 2022-02-10 PROCEDURE — 1123F ACP DISCUSS/DSCN MKR DOCD: CPT | Performed by: FAMILY MEDICINE

## 2022-02-10 PROCEDURE — G8420 CALC BMI NORM PARAMETERS: HCPCS | Performed by: FAMILY MEDICINE

## 2022-02-10 PROCEDURE — G8484 FLU IMMUNIZE NO ADMIN: HCPCS | Performed by: FAMILY MEDICINE

## 2022-02-10 RX ORDER — DENOSUMAB 60 MG/ML
60 INJECTION SUBCUTANEOUS ONCE
Qty: 180 ML | Refills: 1 | Status: SHIPPED
Start: 2022-02-10 | End: 2022-03-17 | Stop reason: ALTCHOICE

## 2022-02-10 RX ORDER — CITALOPRAM 20 MG/1
20 TABLET ORAL DAILY
Qty: 90 TABLET | Refills: 1 | Status: SHIPPED
Start: 2022-02-10 | End: 2022-08-04

## 2022-02-10 RX ORDER — CLOPIDOGREL BISULFATE 75 MG/1
TABLET ORAL
Qty: 90 TABLET | Refills: 1 | Status: SHIPPED
Start: 2022-02-10 | End: 2022-10-24

## 2022-02-10 ASSESSMENT — ENCOUNTER SYMPTOMS: BACK PAIN: 1

## 2022-02-10 NOTE — PROGRESS NOTES
Chely Gomes, a female of 78 y.o. came to the office 2/10/2022. Patient Active Problem List   Diagnosis    Asymptomatic bilateral carotid artery stenosis    History of nuclear stress test    Mixed hyperlipidemia    Aortoiliac occlusive disease ([address]    PVD (peripheral vascular disease) with claudication [facility]    Carotid stenosis, asymptomatic, bilateral    S/P carotid endarterectomy    Tobacco abuse    Lesion of true vocal cord    Type 2 diabetes mellitus          HPI fall : fell backwards 2 days ago after slipping on ice and hit head and back. - brain, neck and lumbar CT scans for fracture or bleed. OA changes. - hip xray for fracture. Sore in back lower area. Hurts to bend. Unable to bowl last pm due to pain. Put on muscle relaxer and advil. Osteoporosis: never heard from us for Prolia injection. Htn: bp's up since fall.          Allergies   Allergen Reactions    Pcn [Penicillins] Hives and Swelling    Fosamax [Alendronate]      Nausea, vomiting       Current Outpatient Medications on File Prior to Visit   Medication Sig Dispense Refill    methocarbamol (ROBAXIN) 500 MG tablet Take 1 tablet by mouth 4 times daily for 10 days 40 tablet 0    ibuprofen (ADVIL;MOTRIN) 800 MG tablet Take 1 tablet by mouth 4 times daily as needed for Pain 40 tablet 0    simvastatin (ZOCOR) 40 MG tablet Take 1 tablet by mouth nightly 90 tablet 1    levothyroxine (SYNTHROID) 25 MCG tablet Take 1 tablet by mouth Daily 90 tablet 1    lisinopril (PRINIVIL;ZESTRIL) 5 MG tablet TAKE 1 TABLET DAILY 90 tablet 1    metFORMIN (GLUCOPHAGE) 500 MG tablet TAKE 1 TABLET TWICE A DAY WITH MEALS 180 tablet 1    Ixekizumab (TALTZ SC) Inject into the skin every 30 days For psoriasis      insulin lispro (HUMALOG) 100 UNIT/ML injection vial Inject 30 Units into the skin 2 times daily       TOUJEO SOLOSTAR 300 UNIT/ML SOPN INJECT 50 UNITS INTO THE SKIN TWICE A DAY (Patient taking differently: 25 Units 2 times daily ) 5 pen 2    BD PEN NEEDLE SAMEER U/F 32G X 4 MM MISC       aspirin 81 MG tablet Take 81 mg by mouth daily      mupirocin (BACTROBAN) 2 % ointment       HYDROcodone-acetaminophen (NORCO) 5-325 MG per tablet Take 1 tablet by mouth every 4 hours as needed for Pain for up to 3 days. Intended supply: 3 days. Take lowest dose possible to manage pain (Patient not taking: Reported on 2/10/2022) 18 tablet 0    denosumab (PROLIA) 60 MG/ML SOSY SC injection Inject 1 mL into the skin once for 1 dose 1 mL 0    azelastine (ASTELIN) 0.1 % nasal spray 2 sprays by Nasal route 2 times daily Use in each nostril as directed (Patient not taking: Reported on 2/10/2022) 1 Bottle 3    Tiotropium Bromide-Olodaterol (STIOLTO RESPIMAT) 2.5-2.5 MCG/ACT AERS 2 puffs daily (Patient not taking: Reported on 8/27/2021) 1 Inhaler 0    omeprazole (PRILOSEC) 40 MG delayed release capsule TAKE 1 CAPSULE BY MOUTH EVERY DAY (Patient not taking: Reported on 2/10/2022) 30 capsule 1     No current facility-administered medications on file prior to visit. Review of Systems   Cardiovascular: Negative for chest pain and palpitations. Musculoskeletal: Positive for back pain, gait problem and neck pain. other review of systems reviewed and are negative    OBJECTIVE:  BP (!) 190/68   Pulse 67   Temp 96.8 °F (36 °C)   Resp 12   Ht 5' 4\" (1.626 m)   Wt 126 lb (57.2 kg)   SpO2 93%   BMI 21.63 kg/m²      Physical Exam  Constitutional:       General: She is not in acute distress. Eyes:      General: No scleral icterus. Conjunctiva/sclera: Conjunctivae normal.   Neck:      Thyroid: No thyromegaly. Cardiovascular:      Rate and Rhythm: Normal rate and regular rhythm. Heart sounds: No murmur heard. Pulmonary:      Effort: Pulmonary effort is normal.      Breath sounds: Normal breath sounds. Musculoskeletal:      Cervical back: Neck supple. Tenderness (left ) present. Decreased range of motion (Rot R.).       Lumbar back: Spasms (R) present. No tenderness. Decreased range of motion (flexion). Positive right straight leg raise test and positive left straight leg raise test (to R LS area). Comments: Negative standing flexion test  + straight leg raising seated  +R paravertebral muscular hypertonicity  Muscular strength 5/5 in lower extremities  Neurological 2+/4 L4 & S1 reflexes   Lymphadenopathy:      Cervical: No cervical adenopathy. Skin:     General: Skin is warm and dry. Neurological:      Mental Status: She is alert and oriented to person, place, and time. Cranial Nerves: Cranial nerves are intact. ASSESSMENT AND PLAN:    Ermelinda Bowie was seen today for follow-up from hospital.    Diagnoses and all orders for this visit:    Contusion of other part of head, subsequent encounter    Dysthymia  -     citalopram (CELEXA) 20 MG tablet; Take 1 tablet by mouth daily    Aortoiliac occlusive disease (HCC)  -     clopidogrel (PLAVIX) 75 MG tablet; TAKE 1 TABLET DAILY    Age-related osteoporosis without current pathological fracture  -     denosumab (PROLIA) 60 MG/ML SOSY SC injection; Inject 1 mL into the skin once for 1 dose    Neck strain, subsequent encounter    Strain of lumbar region, subsequent encounter    - continue current muscle relaxer and nsaid prn   - heat or ice to LS area. - do exercises given from ER for back  - quit smoking    Return for diabetic check up in 1 month.  Kaur Garcia, DO

## 2022-02-16 DIAGNOSIS — T50.905A ACIDIFYING AGENTS CAUSING ADVERSE EFFECT IN THERAPEUTIC USE: Primary | ICD-10-CM

## 2022-02-16 DIAGNOSIS — M81.0 AGE-RELATED OSTEOPOROSIS WITHOUT CURRENT PATHOLOGICAL FRACTURE: ICD-10-CM

## 2022-02-16 RX ORDER — DENOSUMAB 60 MG/ML
60 INJECTION SUBCUTANEOUS ONCE
Qty: 1 ML | Refills: 0 | Status: SHIPPED | OUTPATIENT
Start: 2022-02-16 | End: 2022-03-17 | Stop reason: SDUPTHER

## 2022-02-22 DIAGNOSIS — M81.0 SENILE OSTEOPOROSIS: ICD-10-CM

## 2022-02-28 ENCOUNTER — HOSPITAL ENCOUNTER (OUTPATIENT)
Age: 67
Discharge: HOME | End: 2022-02-28
Payer: MEDICARE

## 2022-02-28 DIAGNOSIS — T50.905A ACIDIFYING AGENTS CAUSING ADVERSE EFFECT IN THERAPEUTIC USE: ICD-10-CM

## 2022-02-28 LAB
ANION GAP SERPL CALCULATED.3IONS-SCNC: 11 MMOL/L (ref 7–16)
BUN BLDV-MCNC: 14 MG/DL (ref 6–23)
CALCIUM SERPL-MCNC: 10.1 MG/DL (ref 8.6–10.2)
CHLORIDE BLD-SCNC: 95 MMOL/L (ref 98–107)
CO2: 23 MMOL/L (ref 22–29)
CREAT SERPL-MCNC: 0.5 MG/DL (ref 0.5–1)
GFR AFRICAN AMERICAN: >60
GFR NON-AFRICAN AMERICAN: >60 ML/MIN/1.73
GLUCOSE BLD-MCNC: 314 MG/DL (ref 74–99)
POTASSIUM SERPL-SCNC: 5.2 MMOL/L (ref 3.5–5)
SODIUM BLD-SCNC: 129 MMOL/L (ref 132–146)

## 2022-02-28 PROCEDURE — 80048 BASIC METABOLIC PNL TOTAL CA: CPT

## 2022-02-28 PROCEDURE — 36415 COLL VENOUS BLD VENIPUNCTURE: CPT

## 2022-03-02 ENCOUNTER — TELEPHONE (OUTPATIENT)
Dept: PRIMARY CARE CLINIC | Age: 67
End: 2022-03-02

## 2022-03-02 NOTE — TELEPHONE ENCOUNTER
Called patient discussed labs. She is now set up to receive her Prolia for her osteoporosis. Her sugar was quite elevated that day she had it done. We will monitor this again and recheck it with a hemoglobin A1c when she has her appointment on March 17.

## 2022-03-03 ENCOUNTER — HOSPITAL ENCOUNTER (OUTPATIENT)
Dept: INTERVENTIONAL RADIOLOGY/VASCULAR | Age: 67
Discharge: HOME | End: 2022-03-05
Payer: MEDICARE

## 2022-03-03 ENCOUNTER — HOSPITAL ENCOUNTER (OUTPATIENT)
Dept: INFUSION THERAPY | Age: 67
Setting detail: INFUSION SERIES
Discharge: HOME | End: 2022-03-03
Payer: MEDICARE

## 2022-03-03 ENCOUNTER — HOSPITAL ENCOUNTER (OUTPATIENT)
Dept: ULTRASOUND IMAGING | Age: 67
Discharge: HOME | End: 2022-03-05
Payer: MEDICARE

## 2022-03-03 VITALS
BODY MASS INDEX: 21.51 KG/M2 | HEIGHT: 64 IN | HEART RATE: 80 BPM | OXYGEN SATURATION: 96 % | TEMPERATURE: 98.9 F | WEIGHT: 126 LBS | DIASTOLIC BLOOD PRESSURE: 60 MMHG | SYSTOLIC BLOOD PRESSURE: 149 MMHG | RESPIRATION RATE: 18 BRPM

## 2022-03-03 DIAGNOSIS — I73.9 PVD (PERIPHERAL VASCULAR DISEASE) (HCC): ICD-10-CM

## 2022-03-03 DIAGNOSIS — M81.0 SENILE OSTEOPOROSIS: Primary | ICD-10-CM

## 2022-03-03 DIAGNOSIS — I65.23 CAROTID ARTERY STENOSIS, ASYMPTOMATIC, BILATERAL: ICD-10-CM

## 2022-03-03 PROCEDURE — 96372 THER/PROPH/DIAG INJ SC/IM: CPT

## 2022-03-03 PROCEDURE — 6360000002 HC RX W HCPCS: Performed by: FAMILY MEDICINE

## 2022-03-03 PROCEDURE — 93923 UPR/LXTR ART STDY 3+ LVLS: CPT

## 2022-03-03 PROCEDURE — 93880 EXTRACRANIAL BILAT STUDY: CPT

## 2022-03-03 RX ADMIN — DENOSUMAB 60 MG: 60 INJECTION SUBCUTANEOUS at 13:50

## 2022-03-04 ENCOUNTER — TELEPHONE (OUTPATIENT)
Dept: VASCULAR SURGERY | Age: 67
End: 2022-03-04

## 2022-03-07 ENCOUNTER — OFFICE VISIT (OUTPATIENT)
Dept: VASCULAR SURGERY | Age: 67
End: 2022-03-07
Payer: MEDICARE

## 2022-03-07 VITALS — WEIGHT: 125 LBS | HEIGHT: 64 IN | BODY MASS INDEX: 21.34 KG/M2

## 2022-03-07 DIAGNOSIS — Z98.890 S/P CAROTID ENDARTERECTOMY: ICD-10-CM

## 2022-03-07 DIAGNOSIS — I73.9 PVD (PERIPHERAL VASCULAR DISEASE) (HCC): ICD-10-CM

## 2022-03-07 DIAGNOSIS — I65.23 CAROTID ARTERY STENOSIS, ASYMPTOMATIC, BILATERAL: Primary | ICD-10-CM

## 2022-03-07 PROCEDURE — 4004F PT TOBACCO SCREEN RCVD TLK: CPT | Performed by: SURGERY

## 2022-03-07 PROCEDURE — 1123F ACP DISCUSS/DSCN MKR DOCD: CPT | Performed by: SURGERY

## 2022-03-07 PROCEDURE — 99214 OFFICE O/P EST MOD 30 MIN: CPT | Performed by: SURGERY

## 2022-03-07 PROCEDURE — 1090F PRES/ABSN URINE INCON ASSESS: CPT | Performed by: SURGERY

## 2022-03-07 PROCEDURE — G8484 FLU IMMUNIZE NO ADMIN: HCPCS | Performed by: SURGERY

## 2022-03-07 PROCEDURE — G8420 CALC BMI NORM PARAMETERS: HCPCS | Performed by: SURGERY

## 2022-03-07 PROCEDURE — 3017F COLORECTAL CA SCREEN DOC REV: CPT | Performed by: SURGERY

## 2022-03-07 PROCEDURE — 4040F PNEUMOC VAC/ADMIN/RCVD: CPT | Performed by: SURGERY

## 2022-03-07 PROCEDURE — G8427 DOCREV CUR MEDS BY ELIG CLIN: HCPCS | Performed by: SURGERY

## 2022-03-07 PROCEDURE — G8399 PT W/DXA RESULTS DOCUMENT: HCPCS | Performed by: SURGERY

## 2022-03-07 RX ORDER — CLOPIDOGREL BISULFATE 75 MG/1
75 TABLET ORAL DAILY
Qty: 30 TABLET | Refills: 5 | Status: SHIPPED
Start: 2022-03-07 | End: 2022-03-17 | Stop reason: ALTCHOICE

## 2022-03-07 NOTE — PROGRESS NOTES
Vascular Surgery Outpatient Followup    PCP : Arcadio Hargrove DO   ENT : Dr. Poly Garcia    Previous procedures  10/11/18 L CEA for assx   12/4/18 Kissing iliac stents 8x59 ICAST  Plasty of the R EIA with 8x59 balloon     HISTORY OF PRESENT ILLNESS:    The patient is a 78 y.o. female who is here in regards to follow up of their PVD and her carotid stenosis. The patient states that her symptoms of right leg pain remains resolved. She is able to walk any distance without limitations. She denies lifestyle limiting claudication, rest pain, or tissue loss. She states that she has pain which is achey and sore in the left lateral thigh typically when walking long distances. She denies posterior thigh or buttock pain. This most likely related to her hip pain and not her PVD. The pain does limit her activity when walking in the store she now needs a buggy. She denies sxs of stroke, tia, slurred speech, focal weakness. She started having headaches in 12/2019. She has known issues with her sinuses and has had two sinus surgeries in the past.  She follows with Dr. Poly Garcia for this and swallowing issues. The headache symptoms improved with use of nasal spray, more intermittent now. She is had been having lower back pain but it has been improving.       Past Medical History:        Diagnosis Date    Age-related osteoporosis without current pathological fracture     Age-related osteoporosis without current pathological fracture 06/2021    Arthritis     Cataracts, bilateral 10/'19    Colon polyps 08/05/2021    tubular adenoma    Diabetes mellitus ([address]     DM type 2 (diabetes mellitus, type 2) ([address]     Hyperlipidemia     Hypertension     Hypothyroidism     Migraines     rare    Neuropathy, diabetic ([address]     Peripheral vascular disease ([facility]     follows with Dr. Tab Segovia yearly    Positive colorectal cancer screening using Cologuard test     Psoriasiform dermatitis     Seasonal allergies     Thyroid disease      Past Surgical History:        Procedure Laterality Date    BACK SURGERY      L5-S1    CAROTID ENDARTERECTOMY Left 10/11/2018    [place]    CARPAL TUNNEL RELEASE      bilat     SECTION      2    COLONOSCOPY N/A 2021    COLONOSCOPY WITH BIOPSY with tattooing performed by Lux Benoit MD at [address] N/A 2020    DIRECT LARYNGOSCOPYY AND CERVICAL ESOPHAGOSCOPY (PATHOLOGY PRESENT) performed by Moise Guerrero DO at [address] [facility]      remote- right     OTHER SURGICAL HISTORY  2018    Dr. Leana Miller- Bilateral iliac stents iCast [id number]    NM OFFICE/OUTPT VISIT,PROCEDURE ONLY Left 10/11/2018    LEFT CAROTID ENDARTERECTOMY performed by Alfredo Gar MD at [address] TOE SURGERY      bilat - ingrown toe nails    TONSILLECTOMY       Current Medications:   Current Outpatient Medications   Medication Sig Dispense Refill    mupirocin (BACTROBAN) 2 % ointment       citalopram (CELEXA) 20 MG tablet Take 1 tablet by mouth daily 90 tablet 1    clopidogrel (PLAVIX) 75 MG tablet TAKE 1 TABLET DAILY 90 tablet 1    ibuprofen (ADVIL;MOTRIN) 800 MG tablet Take 1 tablet by mouth 4 times daily as needed for Pain 40 tablet 0    azelastine (ASTELIN) 0.1 % nasal spray 2 sprays by Nasal route 2 times daily Use in each nostril as directed 1 Bottle 3    simvastatin (ZOCOR) 40 MG tablet Take 1 tablet by mouth nightly 90 tablet 1    levothyroxine (SYNTHROID) 25 MCG tablet Take 1 tablet by mouth Daily 90 tablet 1    Tiotropium Bromide-Olodaterol (STIOLTO RESPIMAT) 2.5-2.5 MCG/ACT AERS 2 puffs daily 1 Inhaler 0    lisinopril (PRINIVIL;ZESTRIL) 5 MG tablet TAKE 1 TABLET DAILY 90 tablet 1    metFORMIN (GLUCOPHAGE) 500 MG tablet TAKE 1 TABLET TWICE A DAY WITH MEALS 180 tablet 1    Ixekizumab (TALTZ SC) Inject into the skin every 30 days For psoriasis      insulin lispro (HUMALOG) 100 UNIT/ML injection vial Inject 30 Units into the skin 2 times daily       omeprazole (PRILOSEC) 40 MG delayed release capsule TAKE 1 CAPSULE BY MOUTH EVERY DAY 30 capsule 1    TOUJEO SOLOSTAR 300 UNIT/ML SOPN INJECT 50 UNITS INTO THE SKIN TWICE A DAY (Patient taking differently: 25 Units 2 times daily ) 5 pen 2    BD PEN NEEDLE SAMEER U/F 32G X 4 MM MISC       aspirin 81 MG tablet Take 81 mg by mouth daily      denosumab (PROLIA) 60 MG/ML SOSY SC injection Inject 1 mL into the skin once for 1 dose 1 mL 0    denosumab (PROLIA) 60 MG/ML SOSY SC injection Inject 1 mL into the skin once for 1 dose 180 mL 1     No current facility-administered medications for this visit. Allergies:  Pcn [penicillins] and Fosamax [alendronate]  Social History     Socioeconomic History    Marital status:      Spouse name: Not on file    Number of children: Not on file    Years of education: Not on file    Highest education level: Not on file   Occupational History    Occupation:  bookeeper   Tobacco Use    Smoking status: Current Every Day Smoker     Packs/day: 0.50     Years: 40.00     Pack years: 20.00     Types: Cigarettes     Start date: 5     Last attempt to quit: 8/12/2018     Years since quitting: 3.5    Smokeless tobacco: Never Used   Vaping Use    Vaping Use: Never used   Substance and Sexual Activity    Alcohol use: No    Drug use: Never    Sexual activity: Not on file   Other Topics Concern    Not on file   Social History Narrative    Not on file     Social Determinants of Health     Financial Resource Strain: Low Risk     Difficulty of Paying Living Expenses: Not hard at all   Food Insecurity: No Food Insecurity    Worried About [facility] in the Last Year: Never true    [facility] in the Last Year: Never true   Transportation Needs:     Lack of Transportation (Medical): Not on file    Lack of Transportation (Non-Medical):  Not on file   Physical Activity:     Days of Exercise per Week: Not on file    Minutes of Exercise per Session: Not on file   Stress:     Feeling of Stress : Not on file   Social Connections:     Frequency of Communication with Friends and Family: Not on file    Frequency of Social Gatherings with Friends and Family: Not on file    Attends Confucianist Services: Not on file    Active Member of [address] [place] [address] or Organizations: Not on file    Attends Club or Organization Meetings: Not on file    Marital Status: Not on file   Intimate Partner Violence:     Fear of Current or Ex-Partner: Not on file    Emotionally Abused: Not on file    Physically Abused: Not on file    Sexually Abused: Not on file   Housing Stability:     Unable to Pay for Housing in the Last Year: Not on file    Number of [place] in the Last Year: Not on file    Unstable Housing in the Last Year: Not on file     Family History   Problem Relation Age of Onset    Cancer Mother         lung brain smoker    Heart Disease Father         mi in his 52's     Other Father         pulmonary fibrosis    Diabetes Brother      Labs  Lab Results   Component Value Date    WBC 6.9 02/11/2021    HGB 14.7 02/11/2021    HCT 44.9 02/11/2021     02/11/2021    PROTIME 11.0 12/04/2018    INR 1.0 12/04/2018    K 5.2 (H) 02/28/2022    BUN 14 02/28/2022    CREATININE 0.5 02/28/2022     PHYSICAL EXAM:    Ht 5' 4\" (1.626 m)   Wt 125 lb (56.7 kg)   BMI 21.46 kg/m²   CONSTITUTIONAL:   Awake, alert, cooperative  PSYCHIATRIC :  Oriented to time, place and person     Appropriate insight to disease process  EYES: Lids and lashes normal  ENT:  External ears and nose without lesions   Hearing deficits   CN II - XII intact except for right shoulder weakness due to pain  NECK: Supple, symmetrical, trachea midline   Carotid bruit   LUNGS:  No increased work of breathing                 Clear to auscultation  CARDIOVASCULAR:  regular rate and rhythm   ABDOMEN:  soft, non-distended, non-tender  SKIN:   Normal skin color   Texture and turgor normal, no induration  EXTREMITIES:   R LE Edema absent   Biphasic DP/PT  L LE Edema absent   Monophasic DP/PT    RADIOLOGY:  ABIs and PVRs  Right JULIANA 0.81 , TBI 0.56 (JULIANA 0.90, TBI 0.54)  Left JULIANA 0.76, TBI 0.67  (JULIANA 0.64, TBI 0.23)    A/P Assx PVD   Hx of iliac stents, R EIA plasty   · Pt is asymptomatic  · Arterial studies reviewed  · R LE stable  · L LE slightly improved  · Continue medical management with ASA, plavix and statin  · Emphasized importance of continued Tobacco cessation  · She is still smoking about 1/2 pk/day  · they understood that with tobacco use they are at increased risk of worsening of their pvd and increased risk of limb loss  · No indication for surgical intervention at this time  · Discussed with patient pathophysiology of pvd, claudication, tissues loss, rest pain, and potential for limb loss  · Discussed with patient symptoms of new onset claudication, tissue loss, rest pain, changes in lower extremity coolness, pain and they understood to go to ER immediately if any symptoms developed  · F/U as outpatient in 12 Months with repeat ABIs/PVRs    A/P Asymptomatic Bilateral Carotid Stenosis  Hx of L CEA for assx carotid stenosis  · Carotid ultrasound imaging reviewed  · Right ICA 60 to 69% stenosis which is stable as compared to previous ( I disagree with radiology report 70-99% stenosis)  · Left ICA 1 to 49% stenosis which is stable as compared to previous ( I disagree with radiology report 50-69% stenosis)  · She remains assx from this   · Medical management with ASA and statin  · Discussed with pt tobacco use and relationship to atherosclerosis  pt currently is not a smoker  · Discussed with patient pathophysiology of carotid stenosis and all ?s answered  · Discussed with patient symptoms of stroke, TIA and they understood to go to ER immediately if any symptoms developed  · F/U as outpatient in 12 Months with carotid us Gabino Agarwal MD

## 2022-03-07 NOTE — PATIENT INSTRUCTIONS
Patient Education        Carotid Stenosis: Care Instructions  Overview     Carotid stenosis is narrowing of one or both of the carotid arteries. These arteries take blood from the heart to the brain. There is one on each side of the neck. Carotid artery stenosis is caused by a process called hardening of the arteries, or atherosclerosis. A substance called plaque builds up inside the carotid arteries. Things that can lead to plaque include diabetes, high blood pressure, high cholesterol, and smoking. This plaque may limit blood flow to your brain. If plaque breaks open, it may form a blood clot. Or pieces of the plaque may break off. A piece of plaque or a blood clot could move to the brain, causing a stroke or transient ischemic attack (TIA). The goal of treatment is to lower your risk of having a stroke or TIA. You can lower your risk by having a heart-healthy lifestyle and taking medicine. Sometimes a surgery or procedure is also done. Follow-up care is a key part of your treatment and safety. Be sure to make and go to all appointments, and call your doctor if you are having problems. It's also a good idea to know your test results and keep a list of the medicines you take. How can you care for yourself at home? · Take your medicines exactly as prescribed. Call your doctor if you think you are having a problem with your medicine. You may take medicine to lower your blood pressure, to lower your cholesterol, or to prevent blood clots. · If you take a blood thinner, such as aspirin, be sure to get instructions about how to take your medicine safely. Blood thinners can cause serious bleeding problems. · Do not smoke. People who smoke have a higher chance of stroke than those who quit. If you need help quitting, talk to your doctor about stop-smoking programs and medicines. These can increase your chances of quitting for good. · Eat heart-healthy foods.  These foods include vegetables, fruits, nuts, beans, lean meat, fish, and whole grains. You limit things that are not so good for your heart, like sodium, alcohol, and sugar. · Stay at a healthy weight. Lose weight if you need to. · Be active. Ask your doctor what type and level of exercise is safe for you. · Limit alcohol to 2 drinks a day for men and 1 drink a day for women. Too much alcohol can cause health problems. · Manage other health problems such as diabetes, high blood pressure, and high cholesterol. If you think you may have a problem with alcohol or drug use, talk to your doctor. · Avoid colds and flu. Get the flu vaccine every year. When should you call for help? Call 911 anytime you think you may need emergency care. For example, call if:    · You passed out (lost consciousness).     · You have symptoms of a stroke. These may include:  ? Sudden numbness, tingling, weakness, or loss of movement in your face, arm, or leg, especially on only one side of your body. ? Sudden vision changes. ? Sudden trouble speaking. ? Sudden confusion or trouble understanding simple statements. ? Sudden problems with walking or balance. ? A sudden, severe headache that is different from past headaches. Call your doctor now or seek immediate medical care if:    · You are dizzy or lightheaded, or you feel like you may faint. Watch closely for changes in your health, and be sure to contact your doctor if you have any problems. Where can you learn more? Go to https://[employer] org and sign in to your [employer] account. Enter O265 in the [employer] box to learn more about \"Carotid Stenosis: Care Instructions. \"     If you do not have an account, please click on the \"Sign Up Now\" link. Current as of: April 29, 2021               Content Version: 13.1  © 5291-3586 Healthwise, [employer]. Care instructions adapted under license by [employer] [facility] ([facility]).  If you have questions about a medical condition or this instruction, always ask your healthcare professional. [address] any warranty or liability for your use of this information.

## 2022-03-15 ENCOUNTER — HOSPITAL ENCOUNTER (OUTPATIENT)
Age: 67
Discharge: HOME | End: 2022-03-17

## 2022-03-15 PROCEDURE — 88305 TISSUE EXAM BY PATHOLOGIST: CPT

## 2022-03-17 ENCOUNTER — OFFICE VISIT (OUTPATIENT)
Dept: PRIMARY CARE CLINIC | Age: 67
End: 2022-03-17
Payer: MEDICARE

## 2022-03-17 VITALS
HEART RATE: 68 BPM | TEMPERATURE: 97.5 F | DIASTOLIC BLOOD PRESSURE: 90 MMHG | SYSTOLIC BLOOD PRESSURE: 150 MMHG | WEIGHT: 127 LBS | OXYGEN SATURATION: 96 % | BODY MASS INDEX: 21.8 KG/M2

## 2022-03-17 DIAGNOSIS — E78.2 MIXED HYPERLIPIDEMIA: ICD-10-CM

## 2022-03-17 DIAGNOSIS — E03.9 HYPOTHYROIDISM, UNSPECIFIED TYPE: ICD-10-CM

## 2022-03-17 DIAGNOSIS — E11.65 UNCONTROLLED TYPE 2 DIABETES MELLITUS WITH HYPERGLYCEMIA (HCC): Primary | ICD-10-CM

## 2022-03-17 DIAGNOSIS — M81.0 AGE-RELATED OSTEOPOROSIS WITHOUT CURRENT PATHOLOGICAL FRACTURE: ICD-10-CM

## 2022-03-17 DIAGNOSIS — E11.65 UNCONTROLLED TYPE 2 DIABETES MELLITUS WITH HYPERGLYCEMIA (HCC): ICD-10-CM

## 2022-03-17 LAB
ALBUMIN SERPL-MCNC: 4.2 G/DL (ref 3.5–5.2)
ALP BLD-CCNC: 117 U/L (ref 35–104)
ALT SERPL-CCNC: 14 U/L (ref 0–32)
ANION GAP SERPL CALCULATED.3IONS-SCNC: 13 MMOL/L (ref 7–16)
AST SERPL-CCNC: 15 U/L (ref 0–31)
BILIRUB SERPL-MCNC: 0.2 MG/DL (ref 0–1.2)
BUN BLDV-MCNC: 14 MG/DL (ref 6–23)
CALCIUM SERPL-MCNC: 10.1 MG/DL (ref 8.6–10.2)
CHLORIDE BLD-SCNC: 97 MMOL/L (ref 98–107)
CHOLESTEROL, TOTAL: 203 MG/DL (ref 0–199)
CO2: 23 MMOL/L (ref 22–29)
CREAT SERPL-MCNC: 0.6 MG/DL (ref 0.5–1)
GFR AFRICAN AMERICAN: >60
GFR NON-AFRICAN AMERICAN: >60 ML/MIN/1.73
GLUCOSE BLD-MCNC: 294 MG/DL (ref 74–99)
HBA1C MFR BLD: 10.5 % (ref 4–5.6)
HCT VFR BLD CALC: 48.5 % (ref 34–48)
HDLC SERPL-MCNC: 37 MG/DL
HEMOGLOBIN: 15.9 G/DL (ref 11.5–15.5)
LDL CHOLESTEROL CALCULATED: 103 MG/DL (ref 0–99)
MCH RBC QN AUTO: 30.2 PG (ref 26–35)
MCHC RBC AUTO-ENTMCNC: 32.8 % (ref 32–34.5)
MCV RBC AUTO: 92.2 FL (ref 80–99.9)
PDW BLD-RTO: 13.2 FL (ref 11.5–15)
PLATELET # BLD: 233 E9/L (ref 130–450)
PMV BLD AUTO: 10.6 FL (ref 7–12)
POTASSIUM SERPL-SCNC: 5.6 MMOL/L (ref 3.5–5)
RBC # BLD: 5.26 E12/L (ref 3.5–5.5)
SODIUM BLD-SCNC: 133 MMOL/L (ref 132–146)
TOTAL PROTEIN: 7.1 G/DL (ref 6.4–8.3)
TRIGL SERPL-MCNC: 317 MG/DL (ref 0–149)
TSH SERPL DL<=0.05 MIU/L-ACNC: 1.86 UIU/ML (ref 0.27–4.2)
VLDLC SERPL CALC-MCNC: 63 MG/DL
WBC # BLD: 8.3 E9/L (ref 4.5–11.5)

## 2022-03-17 PROCEDURE — 3017F COLORECTAL CA SCREEN DOC REV: CPT | Performed by: FAMILY MEDICINE

## 2022-03-17 PROCEDURE — 2022F DILAT RTA XM EVC RTNOPTHY: CPT | Performed by: FAMILY MEDICINE

## 2022-03-17 PROCEDURE — 1090F PRES/ABSN URINE INCON ASSESS: CPT | Performed by: FAMILY MEDICINE

## 2022-03-17 PROCEDURE — G8399 PT W/DXA RESULTS DOCUMENT: HCPCS | Performed by: FAMILY MEDICINE

## 2022-03-17 PROCEDURE — 4040F PNEUMOC VAC/ADMIN/RCVD: CPT | Performed by: FAMILY MEDICINE

## 2022-03-17 PROCEDURE — G8484 FLU IMMUNIZE NO ADMIN: HCPCS | Performed by: FAMILY MEDICINE

## 2022-03-17 PROCEDURE — 1123F ACP DISCUSS/DSCN MKR DOCD: CPT | Performed by: FAMILY MEDICINE

## 2022-03-17 PROCEDURE — 4004F PT TOBACCO SCREEN RCVD TLK: CPT | Performed by: FAMILY MEDICINE

## 2022-03-17 PROCEDURE — G8420 CALC BMI NORM PARAMETERS: HCPCS | Performed by: FAMILY MEDICINE

## 2022-03-17 PROCEDURE — 99214 OFFICE O/P EST MOD 30 MIN: CPT | Performed by: FAMILY MEDICINE

## 2022-03-17 PROCEDURE — G8427 DOCREV CUR MEDS BY ELIG CLIN: HCPCS | Performed by: FAMILY MEDICINE

## 2022-03-17 PROCEDURE — 3046F HEMOGLOBIN A1C LEVEL >9.0%: CPT | Performed by: FAMILY MEDICINE

## 2022-03-17 RX ORDER — DENOSUMAB 60 MG/ML
60 INJECTION SUBCUTANEOUS ONCE
Qty: 1 ML | Refills: 0 | Status: SHIPPED
Start: 2022-03-17 | End: 2022-08-15 | Stop reason: SDUPTHER

## 2022-03-17 RX ORDER — SIMVASTATIN 40 MG
40 TABLET ORAL NIGHTLY
Qty: 90 TABLET | Refills: 1 | Status: SHIPPED
Start: 2022-03-17 | End: 2022-09-08

## 2022-03-17 ASSESSMENT — ENCOUNTER SYMPTOMS
EYE DISCHARGE: 0
BLURRED VISION: 0
ROS SKIN COMMENTS: NO HAIR LOSS  NO DRY SKIN  NO BRITTLE NAILS
VISUAL CHANGE: 0
SHORTNESS OF BREATH: 0
CONSTIPATION: 0
DIARRHEA: 0

## 2022-03-17 NOTE — TELEPHONE ENCOUNTER
----- Message from Yves Moise sent at 3/16/2022  6:11 PM EDT -----  Subject: Refill Request    QUESTIONS  Name of Medication? denosumab (PROLIA) 60 MG/ML SOSY SC injection  Patient-reported dosage and instructions? once ever 6 months  How many days do you have left? 0  Preferred Pharmacy? [facility]/PH[facility]ACY #[id number]  Pharmacy phone number (if available)? [phone]  Additional Information for Provider? [facility] called on pts behalf and they are   needing an ICD/10 code for this prescription, plz call [phone]  ---------------------------------------------------------------------------  --------------  CALL BACK INFO  What is the best way for the office to contact you? Do not leave any   message, patient will call back for answer  Preferred Call Back Phone Number?  [phone]

## 2022-03-17 NOTE — PROGRESS NOTES
Jhonatan Moreau, a female of 78 y.o. came to the office 3/17/2022. Patient Active Problem List   Diagnosis    Asymptomatic bilateral carotid artery stenosis    History of nuclear stress test    Mixed hyperlipidemia    Aortoiliac occlusive disease ([address]    PVD (peripheral vascular disease) with claudication ([address]    Carotid stenosis, asymptomatic, bilateral    S/P carotid endarterectomy    Tobacco abuse    Lesion of true vocal cord    Type 2 diabetes mellitus    Senile osteoporosis          Diabetes  She presents for her follow-up diabetic visit. She has type 2 diabetes mellitus. Her disease course has been stable. Pertinent negatives for hypoglycemia include no nervousness/anxiousness or tremors. Associated symptoms include polydipsia. Pertinent negatives for diabetes include no blurred vision, no chest pain, no fatigue, no foot paresthesias, no foot ulcerations, no polyuria and no visual change. Risk factors for coronary artery disease include tobacco exposure. Current diabetic treatment includes insulin injections. She is compliant with treatment most of the time (misses Humalog often. ). Her weight is stable. She rarely participates in exercise. Her overall blood glucose range is >200 mg/dl. An ACE inhibitor/angiotensin II receptor blocker is being taken. Eye exam is current. Hyperlipidemia  This is a chronic problem. The current episode started more than 1 year ago. The problem is controlled. Associated symptoms include leg pain and myalgias. Pertinent negatives include no chest pain or shortness of breath. Current antihyperlipidemic treatment includes statins. Hypertension  This is a new problem. Condition status: 130's/50's at home. Pertinent negatives include no blurred vision, chest pain, palpitations, peripheral edema or shortness of breath. There are no compliance problems. Hypothyroidism: energy level medium.     Allergies   Allergen Reactions    Pcn [Penicillins] Hives and Swelling    Fosamax [Alendronate]      Nausea, vomiting    Tamsulosin      Other reaction(s): Nausea/Vomiting       Current Outpatient Medications on File Prior to Visit   Medication Sig Dispense Refill    mupirocin (BACTROBAN) 2 % ointment       citalopram (CELEXA) 20 MG tablet Take 1 tablet by mouth daily 90 tablet 1    clopidogrel (PLAVIX) 75 MG tablet TAKE 1 TABLET DAILY 90 tablet 1    ibuprofen (ADVIL;MOTRIN) 800 MG tablet Take 1 tablet by mouth 4 times daily as needed for Pain 40 tablet 0    levothyroxine (SYNTHROID) 25 MCG tablet Take 1 tablet by mouth Daily 90 tablet 1    Tiotropium Bromide-Olodaterol (STIOLTO RESPIMAT) 2.5-2.5 MCG/ACT AERS 2 puffs daily 1 Inhaler 0    lisinopril (PRINIVIL;ZESTRIL) 5 MG tablet TAKE 1 TABLET DAILY 90 tablet 1    metFORMIN (GLUCOPHAGE) 500 MG tablet TAKE 1 TABLET TWICE A DAY WITH MEALS 180 tablet 1    Ixekizumab (TALTZ SC) Inject into the skin every 30 days For psoriasis      insulin lispro (HUMALOG) 100 UNIT/ML injection vial Inject 30 Units into the skin 2 times daily       omeprazole (PRILOSEC) 40 MG delayed release capsule TAKE 1 CAPSULE BY MOUTH EVERY DAY 30 capsule 1    TOUJEO SOLOSTAR 300 UNIT/ML SOPN INJECT 50 UNITS INTO THE SKIN TWICE A DAY (Patient taking differently: 25 Units 2 times daily ) 5 pen 2    BD PEN NEEDLE SAMEER U/F 32G X 4 MM MISC       aspirin 81 MG tablet Take 81 mg by mouth daily      denosumab (PROLIA) 60 MG/ML SOSY SC injection Inject 1 mL into the skin once for 1 dose 1 mL 0     No current facility-administered medications on file prior to visit. Review of Systems   Constitutional: Negative for fatigue. Eyes: Negative for blurred vision and discharge. Respiratory: Negative for shortness of breath. Cardiovascular: Negative for chest pain, palpitations and leg swelling. Gastrointestinal: Negative for constipation and diarrhea. Endocrine: Positive for cold intolerance and polydipsia.  Negative for heat intolerance and polyuria. Musculoskeletal: Positive for myalgias. Skin:        No hair loss  No dry skin  No brittle nails   Neurological: Negative for tremors. Psychiatric/Behavioral: Negative for dysphoric mood. The patient is not nervous/anxious. other review of systems reviewed and are negative    OBJECTIVE:  BP (!) 150/90 (Site: Left Upper Arm)   Pulse 68   Temp 97.5 °F (36.4 °C)   Wt 127 lb (57.6 kg)   SpO2 96%   BMI 21.80 kg/m²      Physical Exam  Vitals reviewed. Eyes:      General: No scleral icterus. Conjunctiva/sclera: Conjunctivae normal.   Neck:      Thyroid: No thyromegaly. Vascular: No carotid bruit. Cardiovascular:      Rate and Rhythm: Normal rate and regular rhythm. Heart sounds: No murmur heard. Pulmonary:      Effort: Pulmonary effort is normal.      Breath sounds: Normal breath sounds. No wheezing or rales. Abdominal:      General: Bowel sounds are normal.      Palpations: Abdomen is soft. There is no mass. Tenderness: There is no abdominal tenderness. There is no guarding or rebound. Musculoskeletal:         General: Normal range of motion. Cervical back: Neck supple. Lymphadenopathy:      Cervical: No cervical adenopathy. Skin:     General: Skin is warm and dry. Neurological:      Mental Status: She is alert and oriented to person, place, and time. Psychiatric:         Mood and Affect: Mood normal.         ASSESSMENT AND PLAN:    Darius Graff was seen today for diabetes, hyperlipidemia and blood work. Diagnoses and all orders for this visit:    Uncontrolled type 2 diabetes mellitus with hyperglycemia ([address]  -     Comprehensive Metabolic Panel; Future  -     Hemoglobin A1C; Future    Mixed hyperlipidemia  -     simvastatin (ZOCOR) 40 MG tablet; Take 1 tablet by mouth nightly  -     Lipid Panel; Future    Hypothyroidism, unspecified type  -     TSH; Future  -     CBC;  Future    - encourage better diligence with taking insulin since missing doses  - recommend Shirley glucose monitoring, she'll check with insur  - quit smoking   - monitor bp's at home and call if > 140/90 then call   - I will call with labs to make med adjustments if needed. Return in about 3 months (around 6/17/2022) for medicare pe and ov.     Rachel Garcia, DO

## 2022-03-21 ENCOUNTER — TELEPHONE (OUTPATIENT)
Dept: PRIMARY CARE CLINIC | Age: 67
End: 2022-03-21

## 2022-06-23 ENCOUNTER — OFFICE VISIT (OUTPATIENT)
Dept: PRIMARY CARE CLINIC | Age: 67
End: 2022-06-23
Payer: MEDICARE

## 2022-06-23 VITALS
OXYGEN SATURATION: 97 % | HEART RATE: 67 BPM | DIASTOLIC BLOOD PRESSURE: 70 MMHG | TEMPERATURE: 97.2 F | SYSTOLIC BLOOD PRESSURE: 134 MMHG | BODY MASS INDEX: 20.66 KG/M2 | WEIGHT: 121 LBS | HEIGHT: 64 IN

## 2022-06-23 DIAGNOSIS — Z00.00 MEDICARE ANNUAL WELLNESS VISIT, SUBSEQUENT: Primary | ICD-10-CM

## 2022-06-23 DIAGNOSIS — Z23 NEED FOR PROPHYLACTIC VACCINATION AGAINST STREPTOCOCCUS PNEUMONIAE (PNEUMOCOCCUS): ICD-10-CM

## 2022-06-23 DIAGNOSIS — M51.37 DDD (DEGENERATIVE DISC DISEASE), LUMBOSACRAL: ICD-10-CM

## 2022-06-23 DIAGNOSIS — E11.65 UNCONTROLLED TYPE 2 DIABETES MELLITUS WITH HYPERGLYCEMIA (HCC): ICD-10-CM

## 2022-06-23 DIAGNOSIS — K13.0 PERLECHE: ICD-10-CM

## 2022-06-23 LAB — HBA1C MFR BLD: 11.3 %

## 2022-06-23 PROCEDURE — 99213 OFFICE O/P EST LOW 20 MIN: CPT | Performed by: FAMILY MEDICINE

## 2022-06-23 PROCEDURE — 3017F COLORECTAL CA SCREEN DOC REV: CPT | Performed by: FAMILY MEDICINE

## 2022-06-23 PROCEDURE — 90677 PCV20 VACCINE IM: CPT | Performed by: FAMILY MEDICINE

## 2022-06-23 PROCEDURE — G8420 CALC BMI NORM PARAMETERS: HCPCS | Performed by: FAMILY MEDICINE

## 2022-06-23 PROCEDURE — G8399 PT W/DXA RESULTS DOCUMENT: HCPCS | Performed by: FAMILY MEDICINE

## 2022-06-23 PROCEDURE — G8427 DOCREV CUR MEDS BY ELIG CLIN: HCPCS | Performed by: FAMILY MEDICINE

## 2022-06-23 PROCEDURE — 3046F HEMOGLOBIN A1C LEVEL >9.0%: CPT | Performed by: FAMILY MEDICINE

## 2022-06-23 PROCEDURE — G0009 ADMIN PNEUMOCOCCAL VACCINE: HCPCS | Performed by: FAMILY MEDICINE

## 2022-06-23 PROCEDURE — 4004F PT TOBACCO SCREEN RCVD TLK: CPT | Performed by: FAMILY MEDICINE

## 2022-06-23 PROCEDURE — 2022F DILAT RTA XM EVC RTNOPTHY: CPT | Performed by: FAMILY MEDICINE

## 2022-06-23 PROCEDURE — G0439 PPPS, SUBSEQ VISIT: HCPCS | Performed by: FAMILY MEDICINE

## 2022-06-23 PROCEDURE — 1090F PRES/ABSN URINE INCON ASSESS: CPT | Performed by: FAMILY MEDICINE

## 2022-06-23 PROCEDURE — 1123F ACP DISCUSS/DSCN MKR DOCD: CPT | Performed by: FAMILY MEDICINE

## 2022-06-23 PROCEDURE — 83036 HEMOGLOBIN GLYCOSYLATED A1C: CPT | Performed by: FAMILY MEDICINE

## 2022-06-23 RX ORDER — TRIAMCINOLONE ACETONIDE 1 MG/G
CREAM TOPICAL
Qty: 15 G | Refills: 0 | Status: SHIPPED
Start: 2022-06-23 | End: 2022-10-20

## 2022-06-23 ASSESSMENT — PATIENT HEALTH QUESTIONNAIRE - PHQ9
4. FEELING TIRED OR HAVING LITTLE ENERGY: 0
SUM OF ALL RESPONSES TO PHQ QUESTIONS 1-9: 0
8. MOVING OR SPEAKING SO SLOWLY THAT OTHER PEOPLE COULD HAVE NOTICED. OR THE OPPOSITE, BEING SO FIGETY OR RESTLESS THAT YOU HAVE BEEN MOVING AROUND A LOT MORE THAN USUAL: 0
SUM OF ALL RESPONSES TO PHQ QUESTIONS 1-9: 0
10. IF YOU CHECKED OFF ANY PROBLEMS, HOW DIFFICULT HAVE THESE PROBLEMS MADE IT FOR YOU TO DO YOUR WORK, TAKE CARE OF THINGS AT HOME, OR GET ALONG WITH OTHER PEOPLE: 0
1. LITTLE INTEREST OR PLEASURE IN DOING THINGS: 0
SUM OF ALL RESPONSES TO PHQ9 QUESTIONS 1 & 2: 0
5. POOR APPETITE OR OVEREATING: 0
SUM OF ALL RESPONSES TO PHQ QUESTIONS 1-9: 0
7. TROUBLE CONCENTRATING ON THINGS, SUCH AS READING THE NEWSPAPER OR WATCHING TELEVISION: 0
3. TROUBLE FALLING OR STAYING ASLEEP: 0
6. FEELING BAD ABOUT YOURSELF - OR THAT YOU ARE A FAILURE OR HAVE LET YOURSELF OR YOUR FAMILY DOWN: 0
SUM OF ALL RESPONSES TO PHQ QUESTIONS 1-9: 0
2. FEELING DOWN, DEPRESSED OR HOPELESS: 0
9. THOUGHTS THAT YOU WOULD BE BETTER OFF DEAD, OR OF HURTING YOURSELF: 0

## 2022-06-23 ASSESSMENT — ENCOUNTER SYMPTOMS: BLURRED VISION: 0

## 2022-06-23 ASSESSMENT — LIFESTYLE VARIABLES: HOW OFTEN DO YOU HAVE A DRINK CONTAINING ALCOHOL: NEVER

## 2022-06-23 NOTE — PATIENT INSTRUCTIONS
Personalized Preventive Plan for James Hernandez - 6/23/2022  Medicare offers a range of preventive health benefits. Some of the tests and screenings are paid in full while other may be subject to a deductible, co-insurance, and/or copay. Some of these benefits include a comprehensive review of your medical history including lifestyle, illnesses that may run in your family, and various assessments and screenings as appropriate. After reviewing your medical record and screening and assessments performed today your provider may have ordered immunizations, labs, imaging, and/or referrals for you. A list of these orders (if applicable) as well as your Preventive Care list are included within your After Visit Summary for your review. Other Preventive Recommendations:    · A preventive eye exam performed by an eye specialist is recommended every 1-2 years to screen for glaucoma; cataracts, macular degeneration, and other eye disorders. · A preventive dental visit is recommended every 6 months. · Try to get at least 150 minutes of exercise per week or 10,000 steps per day on a pedometer . · Order or download the FREE \"Exercise & Physical Activity: Your Everyday Guide\" from [employer]. Call 2-747.119.5301 or search [employer] online. · You need 1504-5740 mg of calcium and 9459-2788 IU of vitamin D per day. It is possible to meet your calcium requirement with diet alone, but a vitamin D supplement is usually necessary to meet this goal.  · When exposed to the sun, use a sunscreen that protects against both UVA and UVB radiation with an SPF of 30 or greater. Reapply every 2 to 3 hours or after sweating, drying off with a towel, or swimming. · Always wear a seat belt when traveling in a car. Always wear a helmet when riding a bicycle or motorcycle.

## 2022-06-23 NOTE — PROGRESS NOTES
Medicare Annual Wellness Visit    Fidelia Kuklarni is here for Medicare AWV (yearly)    Assessment & Plan   Medicare annual wellness visit, subsequent  Uncontrolled type 2 diabetes mellitus with hyperglycemia ([facility] [address]  -     POCT glycosylated hemoglobin (Hb A1C)  DDD (degenerative disc disease), lumbosacral  Perleche  -     triamcinolone (KENALOG) 0.1 % cream; Apply topically 2 times daily. , Disp-15 g, R-0, Normal  Need for prophylactic vaccination against Streptococcus pneumoniae (pneumococcus)  -     Pneumococcal Conjugate PCV20, PF (Prevnar 20)      Recommendations for Preventive Services Due: see orders and patient instructions/AVS.  Recommended screening schedule for the next 5-10 years is provided to the patient in written form: see Patient Instructions/AVS.     Return for Medicare Annual Wellness Visit in 1 year, based on lab results. Subjective   The following acute and/or chronic problems were also addressed today:    Fidelia Kulkarni, a female of 78 y.o. came to the office 6/23/2022. Patient Active Problem List   Diagnosis    Asymptomatic bilateral carotid artery stenosis    History of nuclear stress test    Mixed hyperlipidemia    Aortoiliac occlusive disease ([facility] [address]    PVD (peripheral vascular disease) with claudication ([facility] [address]    Carotid stenosis, asymptomatic, bilateral    S/P carotid endarterectomy    Tobacco abuse    Lesion of true vocal cord    Type 2 diabetes mellitus    Senile osteoporosis          Diabetes  She presents for her follow-up diabetic visit. She has type 2 diabetes mellitus. Her disease course has been worsening. Associated symptoms include foot paresthesias and weight loss. Pertinent negatives for diabetes include no blurred vision, no chest pain, no foot ulcerations, no polydipsia and no polyuria. Symptoms are stable. Diabetic complications include peripheral neuropathy. Current diabetic treatment includes insulin injections.  She is compliant with treatment some of the time. She is following a generally healthy diet. She participates in exercise intermittently. An ACE inhibitor/angiotensin II receptor blocker is being taken. Leg pain: with activity in both. Rest helps. Numb and cramping. Skin: cracking at corners of mouth since got recent dentures. campho phenic tried without relief. Back pain: low. History of 2 surgeries.      Allergies   Allergen Reactions    Pcn [Penicillins] Hives and Swelling    Fosamax [Alendronate]      Nausea, vomiting    Lipitor [Atorvastatin]      myalgia    Tamsulosin      Other reaction(s): Nausea/Vomiting       Current Outpatient Medications on File Prior to Visit   Medication Sig Dispense Refill    simvastatin (ZOCOR) 40 MG tablet Take 1 tablet by mouth nightly 90 tablet 1    mupirocin (BACTROBAN) 2 % ointment       citalopram (CELEXA) 20 MG tablet Take 1 tablet by mouth daily 90 tablet 1    clopidogrel (PLAVIX) 75 MG tablet TAKE 1 TABLET DAILY 90 tablet 1    ibuprofen (ADVIL;MOTRIN) 800 MG tablet Take 1 tablet by mouth 4 times daily as needed for Pain 40 tablet 0    levothyroxine (SYNTHROID) 25 MCG tablet Take 1 tablet by mouth Daily 90 tablet 1    Tiotropium Bromide-Olodaterol (STIOLTO RESPIMAT) 2.5-2.5 MCG/ACT AERS 2 puffs daily 1 Inhaler 0    lisinopril (PRINIVIL;ZESTRIL) 5 MG tablet TAKE 1 TABLET DAILY 90 tablet 1    metFORMIN (GLUCOPHAGE) 500 MG tablet TAKE 1 TABLET TWICE A DAY WITH MEALS 180 tablet 1    Ixekizumab (TALTZ SC) Inject into the skin every 30 days For psoriasis      insulin lispro (HUMALOG) 100 UNIT/ML injection vial Inject 30 Units into the skin 2 times daily       omeprazole (PRILOSEC) 40 MG delayed release capsule TAKE 1 CAPSULE BY MOUTH EVERY DAY 30 capsule 1    TOUJEO SOLOSTAR 300 UNIT/ML SOPN INJECT 50 UNITS INTO THE SKIN TWICE A DAY (Patient taking differently: 25 Units 2 times daily ) 5 pen 2    BD PEN NEEDLE SAMEER U/F 32G X 4 MM MISC       aspirin 81 MG tablet Take 81 mg by mouth daily  denosumab (PROLIA) 60 MG/ML SOSY SC injection Inject 1 mL into the skin once for 1 dose 1 mL 0     No current facility-administered medications on file prior to visit. Review of Systems   Constitutional: Positive for weight loss. Eyes: Negative for blurred vision. Cardiovascular: Negative for chest pain. Endocrine: Negative for polydipsia and polyuria. other review of systems reviewed and are negative    OBJECTIVE:  /70   Pulse 67   Temp 97.2 °F (36.2 °C)   Ht 5' 4\" (1.626 m)   Wt 121 lb (54.9 kg)   SpO2 97%   BMI 20.77 kg/m²      Physical Exam see below    ASSESSMENT AND PLAN:    Sarah Eduardo was seen today for medicare aw. Diagnoses and all orders for this visit:    Medicare annual wellness visit, subsequent    Uncontrolled type 2 diabetes mellitus with hyperglycemia ([facility] [address]  -     POCT glycosylated hemoglobin (Hb A1C)    DDD (degenerative disc disease), lumbosacral    Perleche  -     triamcinolone (KENALOG) 0.1 % cream; Apply topically 2 times daily. Need for prophylactic vaccination against Streptococcus pneumoniae (pneumococcus)  -     Pneumococcal Conjugate PCV20, PF (Prevnar 20)    - take insulin injections as directed  - quit smoking   - ok to see chiro for eval and treat but would avoid manipulation. - recommend shingles vax    Return for Medicare Annual Wellness Visit in 1 year, based on lab results. Chela Maldonadous, DO    Patient's complete Health Risk Assessment and screening values have been reviewed and are found in Flowsheets. The following problems were reviewed today and where indicated follow up appointments were made and/or referrals ordered.     Positive Risk Factor Screenings with Interventions:    Fall Risk:  Do you feel unsteady or are you worried about falling? : no  2 or more falls in past year?: (!) yes  Fall with injury in past year?: (!) yes     Fall Risk Interventions:    · this has been addressed in past.          Tobacco Use:     Tobacco Use: High Risk    Smoking Tobacco Use: Current Every Day Smoker    Smokeless Tobacco Use: Never Used     E-cigarette/Vaping     Questions Responses    E-cigarette/Vaping Use Never User    Start Date     Passive Exposure     Quit Date     Counseling Given     Comments         Substance Use - Tobacco Interventions:  patient is not ready to work toward tobacco cessation at this time           General Health and ACP:  General  In general, how would you say your health is?: Fair  In the past 7 days, have you experienced any of the following: New or Increased Pain, New or Increased Fatigue, Loneliness, Social Isolation, Stress or Anger?: (!) Yes (stress with her chronic pain)  Select all that apply: (!) New or Increased Pain,Stress  Do you get the social and emotional support that you need?: Yes  Do you have a Living Will?: Yes    Advance Directives     Power of  Living Will ACP-Advance Directive ACP-Power of     Not on File Filed on 10/14/18 Filed Not on File      General Health Risk Interventions:  · Pain issues: due to perip vascular disease and hips and back. Health Habits/Nutrition:     Physical Activity: Inactive    Days of Exercise per Week: 0 days    Minutes of Exercise per Session: 120 min     Have you lost any weight without trying in the past 3 months?: No  Body mass index: 20.77  Have you seen the dentist within the past year?: Yes    Health Habits/Nutrition Interventions:  ·              Objective   Vitals:    06/23/22 0957   BP: 134/70   Pulse: 67   Temp: 97.2 °F (36.2 °C)   SpO2: 97%   Weight: 121 lb (54.9 kg)   Height: 5' 4\" (1.626 m)      Body mass index is 20.77 kg/m². General Appearance: alert and oriented to person, place and time, well developed and well- nourished, in no acute distress  Skin: warm and dry, no rash but erythema and cracking at corners of lips.   Head: normocephalic and atraumatic  Eyes: pupils equal, round, and reactive to light, extraocular eye movements intact, conjunctivae normal  ENT: tympanic membrane, external ear and ear canal normal bilaterally, nose without deformity, nasal mucosa and turbinates normal without polyps  Neck: supple and non-tender without mass, no thyromegaly or thyroid nodules, no cervical lymphadenopathy  Pulmonary/Chest: clear to auscultation bilaterally- no wheezes, rales  But  Rhonchi, decreased air movement, no respiratory distress  Cardiovascular: normal rate, regular rhythm, normal S1 and S2, no murmurs, rubs, clicks, or gallops, distal pulses intact, + R carotid bruits  Abdomen: soft, non-tender, non-distended, normal bowel sounds, no masses or organomegaly  Extremities: no cyanosis, clubbing or edema  Musculoskeletal: decrease lumbarsacro range of motion with sb to left and flex, no joint swelling, deformity or tenderness  Neurologic: reflexes normal and symmetric, no cranial nerve deficit, gait, coordination and speech normal       Allergies   Allergen Reactions    Pcn [Penicillins] Hives and Swelling    Fosamax [Alendronate]      Nausea, vomiting    Lipitor [Atorvastatin]      myalgia    Tamsulosin      Other reaction(s): Nausea/Vomiting     Prior to Visit Medications    Medication Sig Taking? Authorizing Provider   triamcinolone (KENALOG) 0.1 % cream Apply topically 2 times daily.  Yes Anurag Garcia DO   simvastatin (ZOCOR) 40 MG tablet Take 1 tablet by mouth nightly Yes Anurag Garcia DO   mupirocin (BACTROBAN) 2 % ointment  Yes Historical Provider, MD   citalopram (CELEXA) 20 MG tablet Take 1 tablet by mouth daily Yes Anurag Garcia DO   clopidogrel (PLAVIX) 75 MG tablet TAKE 1 TABLET DAILY Yes Anurag Garcia DO   ibuprofen (ADVIL;MOTRIN) 800 MG tablet Take 1 tablet by mouth 4 times daily as needed for Pain Yes David Stoddard, DO   levothyroxine (SYNTHROID) 25 MCG tablet Take 1 tablet by mouth Daily Yes Anurag Garcia DO   Tiotropium Bromide-Olodaterol (STIOLTO RESPIMAT) 2.5-2.5 MCG/ACT AERS 2 puffs daily Yes Anurag Garcia DO   lisinopril (PRINIVIL;ZESTRIL) 5 MG tablet TAKE 1 TABLET DAILY Yes Anurag Garcia DO   metFORMIN (GLUCOPHAGE) 500 MG tablet TAKE 1 TABLET TWICE A DAY WITH MEALS Yes Anurag Garcia DO   Ixekizumab (TALTZ SC) Inject into the skin every 30 days For psoriasis Yes Historical Provider, MD   insulin lispro (HUMALOG) 100 UNIT/ML injection vial Inject 30 Units into the skin 2 times daily  Yes Historical Provider, MD   omeprazole (PRILOSEC) 40 MG delayed release capsule TAKE 1 CAPSULE BY MOUTH EVERY DAY Yes Sandee Hutchinson DO   TOUJEO SOLOSTAR 300 UNIT/ML SOPN INJECT 50 UNITS INTO THE SKIN TWICE A DAY  Patient taking differently: 25 Units 2 times daily  Yes Anurag Garcia DO   BD PEN NEEDLE SAMEER U/F 32G X 4 MM MISC  Yes Historical Provider, MD   aspirin 81 MG tablet Take 81 mg by mouth daily Yes Historical Provider, MD   denosumab (PROLIA) 60 MG/ML SOSY SC injection Inject 1 mL into the skin once for 1 dose  Batsheva Jimenez DO       CareTeam (Including outside providers/suppliers regularly involved in providing care):   Patient Care Team:  Batsheva Jimenez DO as PCP - General  Batsheva Jimenez DO as PCP - [facility] Empaneled Provider  Hero Mckenna MD as Consulting Physician (Cardiology)     Reviewed and updated this visit:  Tobacco  Allergies  Meds  Problems  Med Hx  Surg Hx  Soc Hx  Fam Hx        - refuses lung CT screen

## 2022-06-27 ENCOUNTER — TELEPHONE (OUTPATIENT)
Dept: PRIMARY CARE CLINIC | Age: 67
End: 2022-06-27

## 2022-06-27 DIAGNOSIS — M51.37 DDD (DEGENERATIVE DISC DISEASE), LUMBOSACRAL: Primary | ICD-10-CM

## 2022-06-27 NOTE — TELEPHONE ENCOUNTER
Spoke with patient regarding her uncontrolled diabetes. Stressed strongly that she take her insulin as ordered as she admits to not taking it regularly. She still complains of chronic back pain not relieved with physical therapy and epidurals in the past.  Therefore at this time we will get her evaluated by neurosurgery to see if anything can be done for her.

## 2022-07-15 ENCOUNTER — INITIAL CONSULT (OUTPATIENT)
Dept: NEUROSURGERY | Age: 67
End: 2022-07-15
Payer: MEDICARE

## 2022-07-15 VITALS
BODY MASS INDEX: 20.66 KG/M2 | DIASTOLIC BLOOD PRESSURE: 59 MMHG | HEIGHT: 64 IN | OXYGEN SATURATION: 96 % | RESPIRATION RATE: 20 BRPM | TEMPERATURE: 97.8 F | WEIGHT: 121 LBS | SYSTOLIC BLOOD PRESSURE: 145 MMHG | HEART RATE: 69 BPM

## 2022-07-15 DIAGNOSIS — M54.41 CHRONIC BILATERAL LOW BACK PAIN WITH BILATERAL SCIATICA: Primary | ICD-10-CM

## 2022-07-15 DIAGNOSIS — M54.16 LUMBAR RADICULOPATHY: ICD-10-CM

## 2022-07-15 DIAGNOSIS — M54.42 CHRONIC BILATERAL LOW BACK PAIN WITH BILATERAL SCIATICA: Primary | ICD-10-CM

## 2022-07-15 DIAGNOSIS — Z98.890 S/P LUMBAR MICRODISCECTOMY: ICD-10-CM

## 2022-07-15 DIAGNOSIS — G89.29 CHRONIC BILATERAL LOW BACK PAIN WITH BILATERAL SCIATICA: Primary | ICD-10-CM

## 2022-07-15 PROCEDURE — 1090F PRES/ABSN URINE INCON ASSESS: CPT | Performed by: STUDENT IN AN ORGANIZED HEALTH CARE EDUCATION/TRAINING PROGRAM

## 2022-07-15 PROCEDURE — 1123F ACP DISCUSS/DSCN MKR DOCD: CPT | Performed by: STUDENT IN AN ORGANIZED HEALTH CARE EDUCATION/TRAINING PROGRAM

## 2022-07-15 PROCEDURE — 3017F COLORECTAL CA SCREEN DOC REV: CPT | Performed by: STUDENT IN AN ORGANIZED HEALTH CARE EDUCATION/TRAINING PROGRAM

## 2022-07-15 PROCEDURE — 4004F PT TOBACCO SCREEN RCVD TLK: CPT | Performed by: STUDENT IN AN ORGANIZED HEALTH CARE EDUCATION/TRAINING PROGRAM

## 2022-07-15 PROCEDURE — G8420 CALC BMI NORM PARAMETERS: HCPCS | Performed by: STUDENT IN AN ORGANIZED HEALTH CARE EDUCATION/TRAINING PROGRAM

## 2022-07-15 PROCEDURE — G8399 PT W/DXA RESULTS DOCUMENT: HCPCS | Performed by: STUDENT IN AN ORGANIZED HEALTH CARE EDUCATION/TRAINING PROGRAM

## 2022-07-15 PROCEDURE — G8427 DOCREV CUR MEDS BY ELIG CLIN: HCPCS | Performed by: STUDENT IN AN ORGANIZED HEALTH CARE EDUCATION/TRAINING PROGRAM

## 2022-07-15 PROCEDURE — 99203 OFFICE O/P NEW LOW 30 MIN: CPT | Performed by: STUDENT IN AN ORGANIZED HEALTH CARE EDUCATION/TRAINING PROGRAM

## 2022-07-15 PROCEDURE — 99202 OFFICE O/P NEW SF 15 MIN: CPT

## 2022-07-15 ASSESSMENT — ENCOUNTER SYMPTOMS
BACK PAIN: 1
TROUBLE SWALLOWING: 0
SHORTNESS OF BREATH: 0
ABDOMINAL PAIN: 0
PHOTOPHOBIA: 0

## 2022-07-15 NOTE — PROGRESS NOTES
Subjective:      Patient ID: Remi Donis is a 78 y.o. female with a PMH of HLD, carotid endarterectomy, DM and [facility] Dr. Dexter Paz who presents for evaluation of low back pain. Patient states her low back pain has been worsening since February when she fell on black ice. She describes this pain as constant robb pain that goes down both legs. She admits to associated numbness in her left leg which is chronic since last surgery. Does admit to associated weakness as well. Movement makes the pain worse, sitting and leaning forward makes the pain better. She states she has associated calf cramps with this pain. She has tried PT at [facility] in [place] with no relief. She has also tried CAROLANN many years ago with mild relief. She is a current smoker, smoking about 1ppd and is current on Plavix daily d/t her stent in her aorta. Denies loss of bowel or bladder, saddle anesthesia, headache, loss of dexterity, abnormal gait, fever, chills, N/V, SOB, or chest pain. Review of Systems   Constitutional:  Negative for chills and fever. HENT:  Negative for trouble swallowing. Eyes:  Negative for photophobia. Respiratory:  Negative for shortness of breath. Cardiovascular:  Negative for chest pain. Gastrointestinal:  Negative for abdominal pain. Endocrine: Negative for heat intolerance. Genitourinary:  Negative for difficulty urinating and flank pain. Musculoskeletal:  Positive for arthralgias and back pain. Negative for myalgias. Skin:  Negative for wound. Neurological:  Positive for weakness and numbness. Negative for headaches. Psychiatric/Behavioral:  Negative for confusion. Objective:   Physical Exam  HENT:      Head: Normocephalic. Eyes:      Pupils: Pupils are equal, round, and reactive to light. Cardiovascular:      Rate and Rhythm: Normal rate. Pulmonary:      Effort: Pulmonary effort is normal.   Abdominal:      General: There is no distension. Musculoskeletal:         General: Normal range of motion. Cervical back: Normal range of motion. Skin:     General: Skin is warm and dry. Neurological:      Mental Status: She is alert. Comments: A&Ox3  CN3-12 intact  Motor Strength full   Sensation intact to light touch   Reflexes normal   (-)Bilateral Straight Leg test  Nontender to palption of lumbar spine    Psychiatric:         Thought Content: Thought content normal.       Assessment:      -Scott Rosenthal is a 79 y/o female who presents low back pain that radiates into both legs. Has tried PT with minimal relief. No recent CAROLANN. Hx of previous microdiskectomy.        Plan:      -Pain control and expectations discussed  -Obtain MRI Lumbar spine to evaluate for stenosis   -OARRS report reviewed   -Return to Neurosurgery clinic after completion of imaging to discuss results and further treatment plan  -Call/return sooner if symptoms worsen or new issues arise in the interim           Juliann Berumen PA-C

## 2022-07-21 ENCOUNTER — HOSPITAL ENCOUNTER (OUTPATIENT)
Age: 67
Discharge: HOME | End: 2022-07-21
Payer: MEDICARE

## 2022-07-21 DIAGNOSIS — Z98.890 S/P LUMBAR MICRODISCECTOMY: ICD-10-CM

## 2022-07-21 DIAGNOSIS — M54.41 CHRONIC BILATERAL LOW BACK PAIN WITH BILATERAL SCIATICA: ICD-10-CM

## 2022-07-21 DIAGNOSIS — M54.16 LUMBAR RADICULOPATHY: ICD-10-CM

## 2022-07-21 DIAGNOSIS — M54.42 CHRONIC BILATERAL LOW BACK PAIN WITH BILATERAL SCIATICA: ICD-10-CM

## 2022-07-21 DIAGNOSIS — G89.29 CHRONIC BILATERAL LOW BACK PAIN WITH BILATERAL SCIATICA: ICD-10-CM

## 2022-07-21 LAB
BUN BLDV-MCNC: 16 MG/DL (ref 6–23)
CREAT SERPL-MCNC: 0.9 MG/DL (ref 0.5–1)
GFR AFRICAN AMERICAN: >60
GFR NON-AFRICAN AMERICAN: >60 ML/MIN/1.73

## 2022-07-21 PROCEDURE — 36415 COLL VENOUS BLD VENIPUNCTURE: CPT

## 2022-07-21 PROCEDURE — 82565 ASSAY OF CREATININE: CPT

## 2022-07-21 PROCEDURE — 84520 ASSAY OF UREA NITROGEN: CPT

## 2022-07-22 ENCOUNTER — OFFICE VISIT (OUTPATIENT)
Dept: ENT CLINIC | Age: 67
End: 2022-07-22
Payer: MEDICARE

## 2022-07-22 ENCOUNTER — HOSPITAL ENCOUNTER (OUTPATIENT)
Dept: MRI IMAGING | Age: 67
Discharge: HOME | End: 2022-07-24
Payer: MEDICARE

## 2022-07-22 VITALS
BODY MASS INDEX: 20.49 KG/M2 | HEART RATE: 64 BPM | TEMPERATURE: 97.3 F | OXYGEN SATURATION: 97 % | WEIGHT: 120 LBS | DIASTOLIC BLOOD PRESSURE: 67 MMHG | SYSTOLIC BLOOD PRESSURE: 145 MMHG | HEIGHT: 64 IN

## 2022-07-22 DIAGNOSIS — G89.29 CHRONIC BILATERAL LOW BACK PAIN WITH BILATERAL SCIATICA: ICD-10-CM

## 2022-07-22 DIAGNOSIS — M54.42 CHRONIC BILATERAL LOW BACK PAIN WITH BILATERAL SCIATICA: ICD-10-CM

## 2022-07-22 DIAGNOSIS — R09.82 PND (POST-NASAL DRIP): Primary | ICD-10-CM

## 2022-07-22 DIAGNOSIS — F17.200 SMOKING: ICD-10-CM

## 2022-07-22 DIAGNOSIS — M54.16 LUMBAR RADICULOPATHY: ICD-10-CM

## 2022-07-22 DIAGNOSIS — M54.41 CHRONIC BILATERAL LOW BACK PAIN WITH BILATERAL SCIATICA: ICD-10-CM

## 2022-07-22 DIAGNOSIS — Z98.890 S/P LUMBAR MICRODISCECTOMY: ICD-10-CM

## 2022-07-22 PROCEDURE — G8420 CALC BMI NORM PARAMETERS: HCPCS | Performed by: OTOLARYNGOLOGY

## 2022-07-22 PROCEDURE — 6360000004 HC RX CONTRAST MEDICATION: Performed by: RADIOLOGY

## 2022-07-22 PROCEDURE — 99213 OFFICE O/P EST LOW 20 MIN: CPT | Performed by: OTOLARYNGOLOGY

## 2022-07-22 PROCEDURE — 72158 MRI LUMBAR SPINE W/O & W/DYE: CPT

## 2022-07-22 PROCEDURE — 3017F COLORECTAL CA SCREEN DOC REV: CPT | Performed by: OTOLARYNGOLOGY

## 2022-07-22 PROCEDURE — G8427 DOCREV CUR MEDS BY ELIG CLIN: HCPCS | Performed by: OTOLARYNGOLOGY

## 2022-07-22 PROCEDURE — A9579 GAD-BASE MR CONTRAST NOS,1ML: HCPCS | Performed by: RADIOLOGY

## 2022-07-22 PROCEDURE — G8399 PT W/DXA RESULTS DOCUMENT: HCPCS | Performed by: OTOLARYNGOLOGY

## 2022-07-22 PROCEDURE — 31575 DIAGNOSTIC LARYNGOSCOPY: CPT | Performed by: OTOLARYNGOLOGY

## 2022-07-22 PROCEDURE — 4004F PT TOBACCO SCREEN RCVD TLK: CPT | Performed by: OTOLARYNGOLOGY

## 2022-07-22 PROCEDURE — 1123F ACP DISCUSS/DSCN MKR DOCD: CPT | Performed by: OTOLARYNGOLOGY

## 2022-07-22 PROCEDURE — 1090F PRES/ABSN URINE INCON ASSESS: CPT | Performed by: OTOLARYNGOLOGY

## 2022-07-22 RX ADMIN — GADOTERIDOL 11 ML: 279.3 INJECTION, SOLUTION INTRAVENOUS at 20:54

## 2022-07-22 ASSESSMENT — ENCOUNTER SYMPTOMS
ALLERGIC/IMMUNOLOGIC NEGATIVE: 1
TROUBLE SWALLOWING: 1
SORE THROAT: 1
EYES NEGATIVE: 1
COUGH: 1
VOICE CHANGE: 1

## 2022-07-22 NOTE — PROGRESS NOTES
Subjective:      Patient ID:  Carolyn Lim is a 78 y.o. female. HPI:    Patient presents for follow up of dysphagia in the setting of longstanding tobacco abuse.        Past Medical History:   Diagnosis Date    Age-related osteoporosis without current pathological fracture 2021    Arthritis     Cataracts, bilateral 10/'19    Colon polyps 2021    tubular adenoma    Diabetes mellitus ([address]     DM type 2 (diabetes mellitus, type 2) ([address]     Facial basal cell cancer 2022    [facility]    Hyperlipidemia     Hypertension     Hypothyroidism     Migraines     rare    Neuropathy, diabetic ([address]     Peripheral vascular disease ([address]     follows with Dr. Krissy Harrison yearly    Positive colorectal cancer screening using Cologuard test     Psoriasiform dermatitis     Seasonal allergies     Thyroid disease      Past Surgical History:   Procedure Laterality Date    BACK SURGERY      L5-S1    CAROTID ENDARTERECTOMY Left 10/11/2018    [facility]    CARPAL TUNNEL RELEASE      bilat     SECTION      2    COLONOSCOPY N/A 2021    COLONOSCOPY WITH BIOPSY with tattooing performed by Cachorro Alonzo MD at [address] [place] [address] (CERVIX STATUS UNKNOWN)      LARYNGOSCOPY N/A 2020    DIRECT LARYNGOSCOPYY AND CERVICAL ESOPHAGOSCOPY (PATHOLOGY PRESENT) performed by Flash Coulter DO at [facility]- right     OTHER SURGICAL HISTORY  2018    Dr. Krissy Harrison- Bilateral iliac stents iCast [id number]    MI OFFICE/OUTPT VISIT,PROCEDURE ONLY Left 10/11/2018    LEFT CAROTID ENDARTERECTOMY performed by Oumou Patel MD at [facility]      TOE SURGERY      bilat - ingrown toe nails    TONSILLECTOMY       Family History   Problem Relation Age of Onset    Cancer Mother         lung brain smoker    Heart Disease Father         mi in his 52's     Other Father         pulmonary fibrosis    Diabetes Brother      Social History     Socioeconomic History    Marital status:      Spouse name: None    Number of children: None    Years of education: None    Highest education level: None   Occupational History    Occupation:  bookeeper   Tobacco Use    Smoking status: Every Day     Packs/day: 0.50     Years: 40.00     Pack years: 20.00     Types: Cigarettes     Start date: 5     Last attempt to quit: 8/12/2018     Years since quitting: 3.9    Smokeless tobacco: Never   Vaping Use    Vaping Use: Never used   Substance and Sexual Activity    Alcohol use: No    Drug use: Never     Social Determinants of Health     Financial Resource Strain: Low Risk     Difficulty of Paying Living Expenses: Not hard at all   Food Insecurity: No Food Insecurity    Worried About Running Out of Food in the Last Year: Never true    Ran Out of Food in the Last Year: Never true   Physical Activity: Inactive    Days of Exercise per Week: 0 days    Minutes of Exercise per Session: 120 min     Allergies   Allergen Reactions    Pcn [Penicillins] Hives and Swelling    Fosamax [Alendronate]      Nausea, vomiting    Lipitor [Atorvastatin]      myalgia    Tamsulosin      Other reaction(s): Nausea/Vomiting       Review of Systems   Constitutional: Negative. HENT:  Positive for congestion, postnasal drip, sore throat, trouble swallowing and voice change. Eyes: Negative. Respiratory:  Positive for cough. Skin: Negative. Allergic/Immunologic: Negative. Neurological: Negative. Hematological: Negative. Psychiatric/Behavioral: Negative. All other systems reviewed and are negative. Objective:     Vitals:    07/22/22 1032   BP: (!) 145/67   Pulse:    Temp:    SpO2:      Physical Exam  Vitals reviewed. Constitutional:       Appearance: Normal appearance. HENT:      Head: Normocephalic.       Right Ear: Tympanic membrane, ear canal and external ear normal.      Left Ear: Tympanic membrane, ear canal and external ear normal.      Nose: Septal deviation (right) and mucosal edema present. No congestion or rhinorrhea. Mouth/Throat:      Mouth: Mucous membranes are moist.      Dentition: Abnormal dentition. Tongue: No lesions. Palate: No mass. Pharynx: Oropharynx is clear. Uvula midline. Tonsils: No tonsillar exudate. 0 on the right. 0 on the left. Eyes:      Conjunctiva/sclera: Conjunctivae normal.   Neck:      Thyroid: No thyroid mass, thyromegaly or thyroid tenderness. Trachea: Trachea normal.   Cardiovascular:      Rate and Rhythm: Normal rate. Pulses: Normal pulses. Pulmonary:      Effort: Pulmonary effort is normal.   Musculoskeletal:      Cervical back: Normal range of motion and neck supple. Lymphadenopathy:      Cervical: No cervical adenopathy. Neurological:      Mental Status: She is alert and oriented to person, place, and time. Assessment:       Diagnosis Orders   1. PND (post-nasal drip)        2. Smoking                   Plan:      Continue reflux meds for now. Continue Flonase. Start Astelin  Encouraged smoking cessation  Follow up in 6 month(s) with repeat scope                  Erika Piper  1955    I have discussed the case, including pertinent history and exam findings with the resident. I have seen and examined the patient and the key elements of the encounter have been performed by me. I agree with the assessment, plan and orders as documented by the  resident              Remainder of medical problems as per  resident note. Patient seen and examined. Agree with above exam, assessment and plan.       Electronically signed by Deb Yun DO on 1/24/22 at 12:21 PM EST

## 2022-07-26 ENCOUNTER — OFFICE VISIT (OUTPATIENT)
Dept: NEUROSURGERY | Age: 67
End: 2022-07-26
Payer: MEDICARE

## 2022-07-26 VITALS
TEMPERATURE: 97.6 F | WEIGHT: 120 LBS | DIASTOLIC BLOOD PRESSURE: 62 MMHG | HEART RATE: 61 BPM | RESPIRATION RATE: 20 BRPM | SYSTOLIC BLOOD PRESSURE: 155 MMHG | OXYGEN SATURATION: 96 % | HEIGHT: 64 IN | BODY MASS INDEX: 20.49 KG/M2

## 2022-07-26 DIAGNOSIS — G89.29 CHRONIC BILATERAL LOW BACK PAIN WITH BILATERAL SCIATICA: Primary | ICD-10-CM

## 2022-07-26 DIAGNOSIS — M54.16 LUMBAR RADICULOPATHY: ICD-10-CM

## 2022-07-26 DIAGNOSIS — M54.41 CHRONIC BILATERAL LOW BACK PAIN WITH BILATERAL SCIATICA: Primary | ICD-10-CM

## 2022-07-26 DIAGNOSIS — M54.42 CHRONIC BILATERAL LOW BACK PAIN WITH BILATERAL SCIATICA: Primary | ICD-10-CM

## 2022-07-26 PROCEDURE — G8427 DOCREV CUR MEDS BY ELIG CLIN: HCPCS | Performed by: STUDENT IN AN ORGANIZED HEALTH CARE EDUCATION/TRAINING PROGRAM

## 2022-07-26 PROCEDURE — 99212 OFFICE O/P EST SF 10 MIN: CPT

## 2022-07-26 PROCEDURE — 4004F PT TOBACCO SCREEN RCVD TLK: CPT | Performed by: STUDENT IN AN ORGANIZED HEALTH CARE EDUCATION/TRAINING PROGRAM

## 2022-07-26 PROCEDURE — 3017F COLORECTAL CA SCREEN DOC REV: CPT | Performed by: STUDENT IN AN ORGANIZED HEALTH CARE EDUCATION/TRAINING PROGRAM

## 2022-07-26 PROCEDURE — 1123F ACP DISCUSS/DSCN MKR DOCD: CPT | Performed by: STUDENT IN AN ORGANIZED HEALTH CARE EDUCATION/TRAINING PROGRAM

## 2022-07-26 PROCEDURE — G8420 CALC BMI NORM PARAMETERS: HCPCS | Performed by: STUDENT IN AN ORGANIZED HEALTH CARE EDUCATION/TRAINING PROGRAM

## 2022-07-26 PROCEDURE — 99213 OFFICE O/P EST LOW 20 MIN: CPT | Performed by: STUDENT IN AN ORGANIZED HEALTH CARE EDUCATION/TRAINING PROGRAM

## 2022-07-26 PROCEDURE — 1090F PRES/ABSN URINE INCON ASSESS: CPT | Performed by: STUDENT IN AN ORGANIZED HEALTH CARE EDUCATION/TRAINING PROGRAM

## 2022-07-26 PROCEDURE — G8399 PT W/DXA RESULTS DOCUMENT: HCPCS | Performed by: STUDENT IN AN ORGANIZED HEALTH CARE EDUCATION/TRAINING PROGRAM

## 2022-07-26 NOTE — PROGRESS NOTES
Problem Focused Office Visit     Subjective: Sami Jenkins is a 78 y.o.  female who has a past medical history of of HLD, carotid endarterectomy, DM and previous [facility] and Dr. Stefan Gross who presents for office follow up/review CT Myelogram results. She states since last visit the pain remains about the same. She describes this pain as constant sharp pain that goes down both legs. She admits to associated numbness in her left leg which is chronic since last back surgery. Does admit to associated weakness as well. Movement makes the pain worse, sitting and leaning forward makes the pain better. She states she has associated calf cramps with this pain. She has tried PT at [facility] in [place] with no relief. She has also tried CAROLANN many years ago with mild relief. She is a current smoker, smoking about 1ppd and is current on Plavix daily d/t her stent in her aorta. Denies loss of bowel or bladder, saddle anesthesia, headache, loss of dexterity, abnormal gait, fever, chills, N/V, SOB, or chest pain. Physical Exam  HENT:      Head: Normocephalic. Eyes:      Pupils: Pupils are equal, round, and reactive to light. Cardiovascular:      Rate and Rhythm: Normal rate. Pulmonary:      Effort: Pulmonary effort is normal.   Abdominal:      General: There is no distension. Skin:     General: Skin is warm and dry. Neurological:      Mental Status: She is alert. Comments: A&Ox3  CN3-12 intact  Motor Strength full   Sensation intact to light touch   Reflexes normal    Psychiatric:         Thought Content: Thought content normal.                    Imagin2022 MRI Lumbar Spine  Impression   1. Status post left hemilaminectomy at L5-S1. Mild left peridural fibrosis. 2. Moderate central canal stenosis at L3-4. Mild stenoses at L1-2, L2-3,   L4-5 and L5-S1.   3.  Multilevel neural foraminal stenoses, worst (severe) at the right L5-S1   level.   Moderate to severe stenoses at the left L1-2 and left L5-S1 levels. RECOMMENDATIONS:   Unavailable             Assessment: This is a 78 y.o.  female presenting for a office follow-up/review of CT Myelogram. Patient still with pain radiating down left leg. Patient has tried PT and CAROLANN for this pain with minimal relief. MRI as above. Plan:  -Pain control and expectations discussed  -Lumbar Flex/Ex  -Smoking cessation  -Continue with conservative measures  -Patient would like to discuss surgical options told patient will need to be nicotine free for 6 weeks for surgery.   -Call or return to neurosurgery office sooner if symptoms worsen or if new issues arise in the interim.     Electronically signed by Yadiel Astudillo PA-C on 7/26/2022 at 10:55 AM

## 2022-08-03 DIAGNOSIS — F34.1 DYSTHYMIA: ICD-10-CM

## 2022-08-04 RX ORDER — CITALOPRAM 20 MG/1
TABLET ORAL
Qty: 90 TABLET | Refills: 1 | Status: ON HOLD | OUTPATIENT
Start: 2022-08-04

## 2022-08-09 ENCOUNTER — OFFICE VISIT (OUTPATIENT)
Dept: PRIMARY CARE CLINIC | Age: 67
End: 2022-08-09
Payer: MEDICARE

## 2022-08-09 VITALS
TEMPERATURE: 96.9 F | BODY MASS INDEX: 20.66 KG/M2 | RESPIRATION RATE: 14 BRPM | OXYGEN SATURATION: 95 % | HEART RATE: 65 BPM | DIASTOLIC BLOOD PRESSURE: 66 MMHG | HEIGHT: 64 IN | WEIGHT: 121 LBS | SYSTOLIC BLOOD PRESSURE: 134 MMHG

## 2022-08-09 DIAGNOSIS — R39.89 URINARY PROBLEM: ICD-10-CM

## 2022-08-09 DIAGNOSIS — K14.0 TONGUE ULCER: Primary | ICD-10-CM

## 2022-08-09 DIAGNOSIS — B37.83 PERLECHE WITH CANDIDIASIS: ICD-10-CM

## 2022-08-09 LAB
BILIRUBIN, POC: NORMAL
BLOOD URINE, POC: NORMAL
CLARITY, POC: CLEAR
COLOR, POC: YELLOW
GLUCOSE URINE, POC: 250
KETONES, POC: NORMAL
LEUKOCYTE EST, POC: NORMAL
NITRITE, POC: NORMAL
PH, POC: 5.5
PROTEIN, POC: NORMAL
SPECIFIC GRAVITY, POC: 1.01
UROBILINOGEN, POC: 0.2

## 2022-08-09 PROCEDURE — 1123F ACP DISCUSS/DSCN MKR DOCD: CPT | Performed by: FAMILY MEDICINE

## 2022-08-09 PROCEDURE — 3017F COLORECTAL CA SCREEN DOC REV: CPT | Performed by: FAMILY MEDICINE

## 2022-08-09 PROCEDURE — 4004F PT TOBACCO SCREEN RCVD TLK: CPT | Performed by: FAMILY MEDICINE

## 2022-08-09 PROCEDURE — 1090F PRES/ABSN URINE INCON ASSESS: CPT | Performed by: FAMILY MEDICINE

## 2022-08-09 PROCEDURE — 99213 OFFICE O/P EST LOW 20 MIN: CPT | Performed by: FAMILY MEDICINE

## 2022-08-09 PROCEDURE — G8427 DOCREV CUR MEDS BY ELIG CLIN: HCPCS | Performed by: FAMILY MEDICINE

## 2022-08-09 PROCEDURE — 81002 URINALYSIS NONAUTO W/O SCOPE: CPT | Performed by: FAMILY MEDICINE

## 2022-08-09 PROCEDURE — G8399 PT W/DXA RESULTS DOCUMENT: HCPCS | Performed by: FAMILY MEDICINE

## 2022-08-09 PROCEDURE — G8420 CALC BMI NORM PARAMETERS: HCPCS | Performed by: FAMILY MEDICINE

## 2022-08-09 RX ORDER — TRIAMCINOLONE ACETONIDE 0.1 %
PASTE (GRAM) DENTAL
Qty: 5 G | Refills: 0 | Status: SHIPPED | OUTPATIENT
Start: 2022-08-09 | End: 2022-08-16

## 2022-08-09 RX ORDER — NYSTATIN 100000 U/G
CREAM TOPICAL
Qty: 15 G | Refills: 0 | Status: SHIPPED
Start: 2022-08-09 | End: 2022-10-20

## 2022-08-09 ASSESSMENT — ENCOUNTER SYMPTOMS: SWOLLEN GLANDS: 0

## 2022-08-09 NOTE — PROGRESS NOTES
Cyndy Valdivia, a female of 78 y.o. came to the office 8/9/2022. Patient Active Problem List   Diagnosis    Asymptomatic bilateral carotid artery stenosis    History of nuclear stress test    Mixed hyperlipidemia    Aortoiliac occlusive disease (HCC)    PVD (peripheral vascular disease) with claudication (HCC)    Carotid stenosis, asymptomatic, bilateral    S/P carotid endarterectomy    Tobacco abuse    Lesion of true vocal cord    Type 2 diabetes mellitus    Senile osteoporosis          Urinary Frequency   This is a new problem. The current episode started in the past 7 days. The patient is experiencing no pain. She is Sexually active. Associated symptoms include frequency. Pertinent negatives include no chills or urgency. She has tried nothing for the symptoms. Pharyngitis  This is a new problem. The current episode started more than 1 month ago. The problem has been waxing and waning. Associated symptoms include urinary symptoms. Pertinent negatives include no chills, fever or swollen glands. Associated symptoms comments: Back of tongue hurts. Sores on sides of mouth without relief with Aquaphor. . The symptoms are aggravated by smoking. She has tried nothing for the symptoms. Lips: lesions on corners without relief with Triamcinolone cr. Allergies   Allergen Reactions    Pcn [Penicillins] Hives and Swelling    Fosamax [Alendronate]      Nausea, vomiting    Lipitor [Atorvastatin]      myalgia    Tamsulosin      Other reaction(s): Nausea/Vomiting       Current Outpatient Medications on File Prior to Visit   Medication Sig Dispense Refill    citalopram (CELEXA) 20 MG tablet TAKE 1 TABLET BY MOUTH EVERY DAY 90 tablet 1    triamcinolone (KENALOG) 0.1 % cream Apply topically 2 times daily.  15 g 0    simvastatin (ZOCOR) 40 MG tablet Take 1 tablet by mouth nightly 90 tablet 1    mupirocin (BACTROBAN) 2 % ointment       clopidogrel (PLAVIX) 75 MG tablet TAKE 1 TABLET DAILY 90 tablet 1 levothyroxine (SYNTHROID) 25 MCG tablet Take 1 tablet by mouth Daily 90 tablet 1    lisinopril (PRINIVIL;ZESTRIL) 5 MG tablet TAKE 1 TABLET DAILY 90 tablet 1    metFORMIN (GLUCOPHAGE) 500 MG tablet TAKE 1 TABLET TWICE A DAY WITH MEALS 180 tablet 1    Ixekizumab (TALTZ SC) Inject into the skin every 30 days For psoriasis      insulin lispro (HUMALOG) 100 UNIT/ML injection vial Inject 30 Units into the skin 2 times daily       omeprazole (PRILOSEC) 40 MG delayed release capsule TAKE 1 CAPSULE BY MOUTH EVERY DAY 30 capsule 1    TOUJEO SOLOSTAR 300 UNIT/ML SOPN INJECT 50 UNITS INTO THE SKIN TWICE A DAY (Patient taking differently: 25 Units 2 times daily) 5 pen 2    BD PEN NEEDLE SAMEER U/F 32G X 4 MM MISC       aspirin 81 MG tablet Take 81 mg by mouth daily      denosumab (PROLIA) 60 MG/ML SOSY SC injection Inject 1 mL into the skin once for 1 dose (Patient not taking: Reported on 8/9/2022) 1 mL 0    ibuprofen (ADVIL;MOTRIN) 800 MG tablet Take 1 tablet by mouth 4 times daily as needed for Pain (Patient not taking: Reported on 8/9/2022) 40 tablet 0    Tiotropium Bromide-Olodaterol (STIOLTO RESPIMAT) 2.5-2.5 MCG/ACT AERS 2 puffs daily (Patient not taking: No sig reported) 1 Inhaler 0     No current facility-administered medications on file prior to visit. Review of Systems   Constitutional:  Negative for chills and fever. HENT:  Positive for mouth sores. Genitourinary:  Positive for frequency. Negative for dysuria and urgency. other review of systems reviewed and are negative    OBJECTIVE:  /66   Pulse 65   Temp 96.9 °F (36.1 °C)   Resp 14   Ht 5' 4\" (1.626 m)   Wt 121 lb (54.9 kg)   SpO2 95%   BMI 20.77 kg/m²      Physical Exam  Constitutional:       General: She is not in acute distress. HENT:      Nose: No mucosal edema. Right Sinus: No maxillary sinus tenderness or frontal sinus tenderness. Left Sinus: No maxillary sinus tenderness or frontal sinus tenderness.       Mouth/Throat: Lips: Lesions (on corners with cracking) present. Pharynx: Uvula midline. No oropharyngeal exudate or posterior oropharyngeal erythema. Cardiovascular:      Rate and Rhythm: Normal rate and regular rhythm. Pulmonary:      Effort: Pulmonary effort is normal.      Breath sounds: Normal breath sounds. Abdominal:      General: Bowel sounds are normal.      Palpations: Abdomen is soft. Tenderness: There is abdominal tenderness in the suprapubic area. Musculoskeletal:      Cervical back: Neck supple. Lymphadenopathy:      Cervical: No cervical adenopathy. 8/9/22 11:23   Protein, UA neg   Color, UA yellow   Clarity, UA clear   Glucose, UA    Bilirubin, UA neg   Ketones, UA neg   Spec Grav, UA 1.010   pH, UA 5.5   Urobilinogen, UA 0.2   Nitrite, UA neg   Leukocytes, UA tr   Blood, UA POC neg     ASSESSMENT AND PLAN:    Damien Collins was seen today for enuresis. Diagnoses and all orders for this visit:    Tongue ulcer  -     Becca Dale DO, Otolaryngology, [facility]  -     triamcinolone acetonide (KENALOG) 0.1 % paste; Apply to teeth 2 times daily. Urinary problem  -     POCT Urinalysis no Micro    Perleche with candidiasis  -     nystatin (MYCOSTATIN) 358658 UNIT/GM cream; Apply topically 2 times daily. - quit smoking  - continue to gargle with warm salt water  - push fluids ie cranberry juice. Return if symptoms worsen or fail to improve, for as scheduled.     Zac Garcia DO

## 2022-08-15 DIAGNOSIS — M81.0 AGE-RELATED OSTEOPOROSIS WITHOUT CURRENT PATHOLOGICAL FRACTURE: ICD-10-CM

## 2022-08-15 DIAGNOSIS — M81.0 AGE-RELATED OSTEOPOROSIS WITHOUT CURRENT PATHOLOGICAL FRACTURE: Primary | ICD-10-CM

## 2022-08-15 RX ORDER — DENOSUMAB 60 MG/ML
60 INJECTION SUBCUTANEOUS ONCE
Qty: 1 ML | Refills: 0 | Status: SHIPPED | OUTPATIENT
Start: 2022-08-15 | End: 2022-08-18 | Stop reason: ALTCHOICE

## 2022-08-18 ENCOUNTER — HOSPITAL ENCOUNTER (OUTPATIENT)
Age: 67
Discharge: HOME | End: 2022-08-20
Payer: MEDICARE

## 2022-08-18 ENCOUNTER — OFFICE VISIT (OUTPATIENT)
Dept: SURGERY | Age: 67
End: 2022-08-18
Payer: MEDICARE

## 2022-08-18 ENCOUNTER — HOSPITAL ENCOUNTER (OUTPATIENT)
Dept: GENERAL RADIOLOGY | Age: 67
Discharge: HOME | End: 2022-08-20
Payer: MEDICARE

## 2022-08-18 ENCOUNTER — TELEPHONE (OUTPATIENT)
Dept: SURGERY | Age: 67
End: 2022-08-18

## 2022-08-18 VITALS
HEIGHT: 64 IN | BODY MASS INDEX: 20.14 KG/M2 | WEIGHT: 118 LBS | SYSTOLIC BLOOD PRESSURE: 174 MMHG | RESPIRATION RATE: 18 BRPM | HEART RATE: 65 BPM | DIASTOLIC BLOOD PRESSURE: 72 MMHG

## 2022-08-18 DIAGNOSIS — M54.42 CHRONIC BILATERAL LOW BACK PAIN WITH BILATERAL SCIATICA: ICD-10-CM

## 2022-08-18 DIAGNOSIS — M54.16 LUMBAR RADICULOPATHY: ICD-10-CM

## 2022-08-18 DIAGNOSIS — D12.6 TUBULAR ADENOMA OF COLON: Primary | ICD-10-CM

## 2022-08-18 DIAGNOSIS — G89.29 CHRONIC BILATERAL LOW BACK PAIN WITH BILATERAL SCIATICA: ICD-10-CM

## 2022-08-18 DIAGNOSIS — M54.41 CHRONIC BILATERAL LOW BACK PAIN WITH BILATERAL SCIATICA: ICD-10-CM

## 2022-08-18 PROCEDURE — G8427 DOCREV CUR MEDS BY ELIG CLIN: HCPCS | Performed by: SURGERY

## 2022-08-18 PROCEDURE — 1123F ACP DISCUSS/DSCN MKR DOCD: CPT | Performed by: SURGERY

## 2022-08-18 PROCEDURE — G8420 CALC BMI NORM PARAMETERS: HCPCS | Performed by: SURGERY

## 2022-08-18 PROCEDURE — 1090F PRES/ABSN URINE INCON ASSESS: CPT | Performed by: SURGERY

## 2022-08-18 PROCEDURE — 99213 OFFICE O/P EST LOW 20 MIN: CPT | Performed by: SURGERY

## 2022-08-18 PROCEDURE — 4004F PT TOBACCO SCREEN RCVD TLK: CPT | Performed by: SURGERY

## 2022-08-18 PROCEDURE — 72120 X-RAY BEND ONLY L-S SPINE: CPT

## 2022-08-18 PROCEDURE — G8399 PT W/DXA RESULTS DOCUMENT: HCPCS | Performed by: SURGERY

## 2022-08-18 PROCEDURE — 3017F COLORECTAL CA SCREEN DOC REV: CPT | Performed by: SURGERY

## 2022-08-18 RX ORDER — SODIUM CHLORIDE 9 MG/ML
INJECTION, SOLUTION INTRAVENOUS CONTINUOUS
OUTPATIENT
Start: 2022-08-18

## 2022-08-18 NOTE — PROGRESS NOTES
General Surgery History and Physical    Patient's Name/Date of Birth: Cyndy Valdivia / 1955    Date: 2022    PCP: Jeanne Upton DO    Referring Physician:   Elizabeth Johnston*  [phone]    CHIEF COMPLAINT:    Chief Complaint   Patient presents with    Colonoscopy     1 yr recall         HISTORY OF PRESENT ILLNESS:    Cyndy Valdivia is an 78 y.o. female who presents for a colonoscopy. No nausea, vomiting, diarrhea, constipation. No changes in stool caliber. No bloody or black stools. No abdominal pain. No unintentional weight loss. No family history of colon cancer. The patient has a known history of: colon polyps. The patient has had a colonoscopy before - last year she had multiple tubular adenomas and large one at the ileocecal valve.     Past Medical History:   Past Medical History:   Diagnosis Date    Age-related osteoporosis without current pathological fracture 2021    Arthritis     Cataracts, bilateral 10/'19    Colon polyps 2021    tubular adenoma    Diabetes mellitus ([address]     DM type 2 (diabetes mellitus, type 2) ([address]     Facial basal cell cancer 2022    [facility]    Hyperlipidemia     Hypertension     Hypothyroidism     Migraines     rare    Neuropathy, diabetic ([address]     Peripheral vascular disease ([address]     follows with Dr. Guzman Has yearly    Positive colorectal cancer screening using Cologuard test     Psoriasiform dermatitis     Seasonal allergies     Thyroid disease         Past Surgical History:   Past Surgical History:   Procedure Laterality Date    BACK SURGERY      L5-S1    CAROTID ENDARTERECTOMY Left 10/11/2018    [facility]    CARPAL TUNNEL RELEASE      bilat     SECTION      2    COLONOSCOPY N/A 2021    COLONOSCOPY WITH BIOPSY with tattooing performed by Jean-Paul Khalil MD [facility] (CERVIX STATUS UNKNOWN)      LARYNGOSCOPY N/A 2020    DIRECT LARYNGOSCOPYY AND CERVICAL ESOPHAGOSCOPY (PATHOLOGY PRESENT) performed by Anjali Jaimes DO at [facility]- right     OTHER SURGICAL HISTORY  12/04/2018    Dr. Jaime Moran- Bilateral iliac stents iCast [id number]    ID OFFICE/OUTPT VISIT,PROCEDURE ONLY Left 10/11/2018    LEFT CAROTID ENDARTERECTOMY performed by Deana Puentes MD at [address] [place] [address]      TOE SURGERY      bilat - ingrown toe nails    TONSILLECTOMY          Allergies: Pcn [penicillins], Fosamax [alendronate], Lipitor [atorvastatin], and Tamsulosin     Medications:   Current Outpatient Medications   Medication Sig Dispense Refill    nystatin (MYCOSTATIN) 594229 UNIT/GM cream Apply topically 2 times daily. 15 g 0    citalopram (CELEXA) 20 MG tablet TAKE 1 TABLET BY MOUTH EVERY DAY 90 tablet 1    triamcinolone (KENALOG) 0.1 % cream Apply topically 2 times daily.  15 g 0    simvastatin (ZOCOR) 40 MG tablet Take 1 tablet by mouth nightly 90 tablet 1    mupirocin (BACTROBAN) 2 % ointment       clopidogrel (PLAVIX) 75 MG tablet TAKE 1 TABLET DAILY 90 tablet 1    ibuprofen (ADVIL;MOTRIN) 800 MG tablet Take 1 tablet by mouth 4 times daily as needed for Pain 40 tablet 0    levothyroxine (SYNTHROID) 25 MCG tablet Take 1 tablet by mouth Daily 90 tablet 1    lisinopril (PRINIVIL;ZESTRIL) 5 MG tablet TAKE 1 TABLET DAILY 90 tablet 1    metFORMIN (GLUCOPHAGE) 500 MG tablet TAKE 1 TABLET TWICE A DAY WITH MEALS 180 tablet 1    Ixekizumab (TALTZ SC) Inject into the skin every 30 days For psoriasis      insulin lispro (HUMALOG) 100 UNIT/ML injection vial Inject 30 Units into the skin 2 times daily       omeprazole (PRILOSEC) 40 MG delayed release capsule TAKE 1 CAPSULE BY MOUTH EVERY DAY 30 capsule 1    TOUJEO SOLOSTAR 300 UNIT/ML SOPN INJECT 50 UNITS INTO THE SKIN TWICE A DAY (Patient taking differently: 25 Units 2 times daily) 5 pen 2    aspirin 81 MG tablet Take 81 mg by mouth daily      BD PEN NEEDLE SAMEER U/F 32G X 4 MM MISC        No current facility-administered medications for this visit. Social History:   Social History     Tobacco Use    Smoking status: Every Day     Packs/day: 0.50     Years: 40.00     Pack years: 20.00     Types: Cigarettes     Start date: 5     Last attempt to quit: 2018     Years since quittin.0    Smokeless tobacco: Never   Substance Use Topics    Alcohol use: No        Family History:   Family History   Problem Relation Age of Onset    Cancer Mother         lung brain smoker    Heart Disease Father         mi in his 52's     Other Father         pulmonary fibrosis    Diabetes Brother        REVIEW OF SYSTEMS:    Constitutional: negative  Eyes: negative  Ears, nose, mouth, throat, and face: negative  Respiratory: negative  Cardiovascular: negative  Gastrointestinal: as in HPI  Genitourinary:negative  Integument/breast: negative  Hematologic/lymphatic: negative  Musculoskeletal:negative  Neurological: negative  Allergic/Immunologic: negative    PHYSICAL EXAM   BP (!) 174/72   Pulse 65   Resp 18   Ht 5' 4\" (1.626 m)   Wt 118 lb (53.5 kg)   BMI 20.25 kg/m²     General appearance: alert, cooperative and in no acute distress. Eyes: Grossly normal   Lungs: normal work of breathing  Heart: regular rate  Abdomen:  soft, non-tender, non-distended  Skin: No skin abnormalities  Neurologic: Alert and oriented x 3. Grossly normal  Musculoskeletal: No edema. ASSESSMENT AND PLAN:     Justina Saint is an 78 y.o. female who presents for a colonoscopy multiple tubular adenomas, large one at the ileocecal valve     I will set the patient up for a colonoscopy, possible biopsy, possible polypectomy. I explained the risks including but not limited to bleeding, perforation leading to possible surgery, or infection. The benefits, alternatives, and potential complications associated with the above procedure to be performed and transfusions when applicable with the patient/responsible person prior to the procedure. Physician Signature: Electronically signed by Christa Freeman MD, General Surgery    Send copy of H&P to PCP, Christen Ding DO and referring physician, Himanshu Domingo

## 2022-08-18 NOTE — TELEPHONE ENCOUNTER
Prior Authorization Form:      DEMOGRAPHICS:                     Patient Name:  Cyndy Valdivia  Patient :  1955            Insurance:  Payor: MEDICARE / Plan: MEDICARE PART A AND B / Product Type: *No Product type* /   Insurance ID Number:    Payer/Plan Subscr  Sex Relation Sub. Ins. ID Effective Group Num   1. MEDICARE - ME* GARRETT CERDA L* 1955 Female Self [id number] 21                                    [address] [address]   2.  [address] [facility] L* 1955 Female Self [id number]                                    [address]         DIAGNOSIS & PROCEDURE:                       Procedure/Operation: COLONOSCOPY           CPT Code: 86651    Diagnosis:  H/O COLON POLYPS    ICD10 Code: Z86.010    [facility]  [facility]    Surgeon:  Mazie Severance INFORMATION:                          Date:   2022    Time: 11:00             Anesthesia:  MAC/TIVA                                                       Status:  Outpatient        Special Comments:     RESCHEDULED FROM 2022    Electronically signed by Farida Atkinson MA on 2022 at 2:32 PM

## 2022-08-18 NOTE — PATIENT INSTRUCTIONS
Kennedy Macias MD, FACS    Preoperative Instructions    Please read the following information very carefully. It contains information that is necessary to best prepare you for your upcoming procedure. Make arrangements for a  to take you to and from your procedure. YOU MUST HAVE SOMEONE DRIVE YOU HOME - this cannot be a taxi or public transportation. You will not be administered anesthesia without someone to go home and be at home with you that day. Nothing to eat or drink after midnight the night before your procedure. Follow your bowel prep instructions if you have them for this procedure. 3 days prior to your procedure: Stop taking blood thinners like Coumadin or Plavix or Xarelto. 5 days prior to your procedure: Stop taking Aspirin or Aspirin containing products. If you cannot stop any of these medications prior to your procedure, please contact our office. Medications morning of procedure: Only heart, breathing, blood pressure, and seizure medications are permitted on the morning of your procedure. These medications can be taken with a sip of water. IF YOU ARE UNABLE TO KEEP THE ABOVE SCHEDULED PROCEDURE, YOU MUST NOTIFY DR. FLANAGAN'S OFFICE [phone]. NOT THE FACILITY. NO CHEWING GUM OR CHEWING TOBACCO AFTER MIDNIGHT ON DAY OF PROCEDURE.    YOU MUST HAVE TRANSPORTATION TO AND FROM THE FACILITY. What is a colonoscopy? A colonoscopy is a test that lets a doctor look inside your colon. The doctor uses a thin, lighted tube called a colonoscope to look for problems. These include small growths called polyps, cancer, or bleeding. During the test, the doctor can take samples of tissue that can be checked for cancer or other problems. This is called a biopsy. The doctor can also take out polyps. Before the test, you will need to stop eating solid foods. You also will drink a liquid or take a tablet that cleans out your colon.  This helps your doctor be able to see inside your colon during the test.  Follow-up care is a key part of your treatment and safety. Be sure to make and go to all appointments, and call your doctor if you are having problems. It's also a good idea to know your test results and keep a list of the medicines you take. What happens before the procedure? Procedures can be stressful. This information will help you understand what you can expect. And it will help you safely prepare for your procedure. Preparing for the procedure  Understand exactly what procedure is planned, along with the risks, benefits, and other options. Tell your doctors ALL the medicines, vitamins, supplements, and herbal remedies you take. Some of these can increase the risk of bleeding or interact with anesthesia. If you take blood thinners, such as warfarin (Coumadin), clopidogrel (Plavix), or aspirin, be sure to talk to your doctor. He or she will tell you if you should stop taking these medicines before your procedure. Make sure that you understand exactly what your doctor wants you to do. Your doctor will tell you which medicines to take or stop before your procedure. You may need to stop taking certain medicines a week or more before the procedure. So talk to your doctor as soon as you can. If you have an advance directive, let your doctor know. It may include a living will and a durable power of  for health care. Bring a copy to the hospital. If you don't have one, you may want to prepare one. It lets your doctor and loved ones know your health care wishes. Doctors advise that everyone prepare these papers before any type of surgery or procedure. Before the procedure  Follow your doctor's directions about when to stop eating solid foods and drink only clear liquids. You can drink water, clear juices, clear broths, flavored ice pops, and gelatin (such as Jell-O). Do not eat or drink anything red or purple.  This includes grape juice and grape-flavored ice pops. It also includes fruit punch and cherry gelatin. Drink the \"colon prep\" liquid as your doctor tells you. You will want to stay home, because the liquid will make you go to the bathroom a lot. Your stools will be loose and watery. It is very important to drink all of the liquid. If you have problems drinking it, call your doctor. Some doctors may have you take a tablet rather than drink a liquid. Do not eat any solid foods after you drink the colon prep. Stop drinking clear liquids 6 to 8 hours before the test.  What happens on the day of the procedure? Follow the instructions exactly about when to stop eating and drinking. If you don't, your procedure may be canceled. If your doctor told you to take your medicines on the day of the procedure, take them with only a sip of water. Take a bath or shower before you come in for your procedure. Do not apply lotions, perfumes, deodorants, or nail polish. Take off all jewelry and piercings. And take out contact lenses, if you wear them. At the [address] [place] [address] or hospital  Bring a picture ID. You will be kept comfortable and safe by your anesthesia provider. The anesthesia may make you sleep. You will lie on your back or your side with your knees drawn up toward your belly. The doctor will gently put a gloved finger into your anus. Then the doctor puts the scope in and moves it into your colon. The scope goes in easily because it is lubricated. The doctor may also use small tools to take tissue samples for a biopsy or to remove polyps. This does not hurt. The test usually takes 30 to 45 minutes. But it may take longer. It depends on what is found and what is done. Going home  Be sure you have someone to drive you home. Anesthesia and pain medicine make it unsafe for you to drive. You will be given more specific instructions about recovering from your procedure. When should you call your doctor? You have questions or concerns.   You don't understand how to prepare for your procedure. You are having trouble with the bowel prep. You become ill before the procedure (such as fever, flu, or a cold). You need to reschedule or have changed your mind about having the procedure. Where can you learn more? Go to [email or web address] and sign in to your [employer] account. Enter C315 in the [facility] OTR box to learn more about Colonoscopy: Before Your Procedure.     If you do not have an account, please click on the Sign Up Now link. © 4209-7590 Healthwise, [employer]. Care instructions adapted under license by [facility] [place] ([facility]). This care instruction is for use with your licensed healthcare professional. If you have questions about a medical condition or this instruction, always ask your healthcare professional. [address] any warranty or liability for your use of this information.   Content Version: 47.8.796706; Current as of: November 20, 2015

## 2022-08-24 ENCOUNTER — OFFICE VISIT (OUTPATIENT)
Dept: ENT CLINIC | Age: 67
End: 2022-08-24
Payer: MEDICARE

## 2022-08-24 VITALS — HEIGHT: 64 IN | WEIGHT: 118 LBS | BODY MASS INDEX: 20.14 KG/M2

## 2022-08-24 DIAGNOSIS — K13.79 MOUTH SORES: Primary | ICD-10-CM

## 2022-08-24 DIAGNOSIS — K21.9 GASTROESOPHAGEAL REFLUX DISEASE WITHOUT ESOPHAGITIS: ICD-10-CM

## 2022-08-24 DIAGNOSIS — J39.2 THROAT IRRITATION: ICD-10-CM

## 2022-08-24 PROCEDURE — 3017F COLORECTAL CA SCREEN DOC REV: CPT | Performed by: OTOLARYNGOLOGY

## 2022-08-24 PROCEDURE — 99213 OFFICE O/P EST LOW 20 MIN: CPT | Performed by: OTOLARYNGOLOGY

## 2022-08-24 PROCEDURE — 1123F ACP DISCUSS/DSCN MKR DOCD: CPT | Performed by: OTOLARYNGOLOGY

## 2022-08-24 PROCEDURE — 4004F PT TOBACCO SCREEN RCVD TLK: CPT | Performed by: OTOLARYNGOLOGY

## 2022-08-24 PROCEDURE — G8420 CALC BMI NORM PARAMETERS: HCPCS | Performed by: OTOLARYNGOLOGY

## 2022-08-24 PROCEDURE — G8427 DOCREV CUR MEDS BY ELIG CLIN: HCPCS | Performed by: OTOLARYNGOLOGY

## 2022-08-24 PROCEDURE — 1090F PRES/ABSN URINE INCON ASSESS: CPT | Performed by: OTOLARYNGOLOGY

## 2022-08-24 PROCEDURE — G8399 PT W/DXA RESULTS DOCUMENT: HCPCS | Performed by: OTOLARYNGOLOGY

## 2022-08-24 ASSESSMENT — ENCOUNTER SYMPTOMS
EYE DISCHARGE: 0
RHINORRHEA: 0
VOMITING: 0
SHORTNESS OF BREATH: 0
BACK PAIN: 0
DIARRHEA: 0
SINUS PRESSURE: 0
SORE THROAT: 0
ALLERGIC/IMMUNOLOGIC NEGATIVE: 1
COUGH: 0
EYE PAIN: 0

## 2022-08-24 NOTE — PROGRESS NOTES
Subjective:      Patient ID:  Nannette White is a 78 y.o. female. HPI:  Here today for sores in her mouth and sore throat  Has been noticing recurrent sore throats that started in May of this year  She then noticed cracks in the corner of her mouth. These symptoms started after having her teeth pulled and getting dentures. She saw her PCP who prescribed her Nystatin cream for the corners of her mouth and Kenalog past to put on her tongue to treat her throats. Sores in the corner of mouth have improved. Complains of large amount of post nasal drainage and sinus congestion   History of Panendo sinus surgery in 2020  Current every day smoker. Currently treating GERD with omeprazole per PCP. States poor control.     Past Medical History:   Diagnosis Date    Age-related osteoporosis without current pathological fracture 2021    Arthritis     Cataracts, bilateral 10/'19    Colon polyps 2021    tubular adenoma    Diabetes mellitus ([address]     DM type 2 (diabetes mellitus, type 2) ([address]     Facial basal cell cancer 2022    [facility]    Hyperlipidemia     Hypertension     Hypothyroidism     Migraines     rare    Neuropathy, diabetic ([address]     Peripheral vascular disease ([address]     follows with Dr. Samuel Cifuentes yearly    Positive colorectal cancer screening using Cologuard test     Psoriasiform dermatitis     Seasonal allergies     Thyroid disease      Past Surgical History:   Procedure Laterality Date    BACK SURGERY      L5-S1    CAROTID ENDARTERECTOMY Left 10/11/2018    [facility]    CARPAL TUNNEL RELEASE      bilat     SECTION      2    COLONOSCOPY N/A 2021    COLONOSCOPY WITH BIOPSY with tattooing performed by Jill Tirado MD at [address] [place] [address] (CERVIX STATUS UNKNOWN)      LARYNGOSCOPY N/A 2020    DIRECT LARYNGOSCOPYY AND CERVICAL ESOPHAGOSCOPY (PATHOLOGY PRESENT) performed by Nacho Rosenbaum DO at [address] [place] [address] right     OTHER SURGICAL HISTORY  2018    Dr. Guzman Has- Bilateral iliac stents iCast [id number]    NE OFFICE/OUTPT VISIT,PROCEDURE ONLY Left 10/11/2018    LEFT CAROTID ENDARTERECTOMY performed by Graciela Rowe MD at [place] [address]      TOE SURGERY      bilat - ingrown toe nails    TONSILLECTOMY       Family History   Problem Relation Age of Onset    Cancer Mother         lung brain smoker    Heart Disease Father         mi in his 52's     Other Father         pulmonary fibrosis    Diabetes Brother      Social History     Socioeconomic History    Marital status:      Spouse name: None    Number of children: None    Years of education: None    Highest education level: None   Occupational History    Occupation: [employer]   Tobacco Use    Smoking status: Every Day     Packs/day: 0.50     Years: 40.00     Pack years: 20.00     Types: Cigarettes     Start date:      Last attempt to quit: 2018     Years since quittin.0    Smokeless tobacco: Never   Vaping Use    Vaping Use: Never used   Substance and Sexual Activity    Alcohol use: No    Drug use: Never     Social Determinants of Health     Financial Resource Strain: Low Risk     Difficulty of Paying Living Expenses: Not hard at all   Food Insecurity: No Food Insecurity    Worried About Running Out of Food in the Last Year: Never true    Ran Out of Food in the Last Year: Never true   Physical Activity: Inactive    Days of Exercise per Week: 0 days    Minutes of Exercise per Session: 120 min     Allergies   Allergen Reactions    Pcn [Penicillins] Hives and Swelling    Fosamax [Alendronate]      Nausea, vomiting    Lipitor [Atorvastatin]      myalgia    Tamsulosin      Other reaction(s): Nausea/Vomiting           Review of Systems   Constitutional:  Negative for chills and fever. HENT:  Positive for congestion and postnasal drip.  Negative for ear discharge, ear pain, hearing loss, rhinorrhea, sinus pressure, sneezing and sore throat. Eyes:  Negative for pain and discharge. Respiratory:  Negative for cough and shortness of breath. Cardiovascular:  Negative for chest pain. Gastrointestinal:  Negative for diarrhea and vomiting. Genitourinary:  Negative for flank pain. Musculoskeletal:  Negative for back pain and neck pain. Skin:  Negative for rash. Allergic/Immunologic: Negative. Neurological:  Negative for syncope and headaches. All other systems reviewed and are negative. Objective:   Physical Exam  Vitals reviewed. Constitutional:       Appearance: Normal appearance. HENT:      Head: Normocephalic and atraumatic. Jaw: There is normal jaw occlusion. No tenderness. Right Ear: Tympanic membrane, ear canal and external ear normal.      Left Ear: Tympanic membrane, ear canal and external ear normal.      Nose: Nose normal.      Right Turbinates: Not swollen or pale. Left Turbinates: Not swollen or pale. Mouth/Throat:      Lips: Pink. Mouth: Mucous membranes are moist.      Dentition: Has dentures. Pharynx: Oropharynx is clear. Eyes:      General: Lids are normal.      Conjunctiva/sclera: Conjunctivae normal.      Pupils: Pupils are equal, round, and reactive to light. Cardiovascular:      Rate and Rhythm: Normal rate and regular rhythm. Pulses: Normal pulses. Pulmonary:      Effort: Pulmonary effort is normal. No respiratory distress. Breath sounds: No stridor. Abdominal:      General: Abdomen is flat. Palpations: Abdomen is soft. Musculoskeletal:         General: Normal range of motion. Cervical back: Normal range of motion. No rigidity. Skin:     General: Skin is warm and dry. Neurological:      General: No focal deficit present. Mental Status: She is alert and oriented to person, place, and time.    Psychiatric:         Attention and Perception: Attention normal.         Mood and Affect: Affect normal.         Behavior: Behavior normal. Behavior is cooperative. Thought Content: Thought content normal.         Judgment: Judgment normal.       Assessment:       Diagnosis Orders   1. Mouth sores        2. Throat irritation        3. Gastroesophageal reflux disease without esophagitis              Plan:   Seen and evaluated today for source in the corners of both sides of her mouth. She has also been experiencing recurrent sore throat. At this time the patient was instructed that she can discontinue use of nystatin cream since the l sores appear nonfungal in nature. She was encouraged to use petroleum jelly to the areas to help soften the tissue. Upon examination throat irritation was noted as well as geographic tongue. She states that her acid reflux is poorly controlled on omeprazole therapy. She was instructed to follow-up with her PCP for tighter control of her GERD symptoms. At this time no further follow-up is needed for this issue and she will keep her follow-up appointment in 5 months. Follow up in 5 month(s)      Liv Nolan MSN, FNP-BC  [facility], Nose and Throat    The information contained in this note has been dictated using drug and medical speech recognition software and may contain errors          Cathy Graves  1955    I have discussed the case, including pertinent history and exam findings with the resident. I have seen and examined the patient and the key elements of the encounter have been performed by me. I agree with the assessment, plan and orders as documented by the  resident              Remainder of medical problems as per  resident note. Patient seen and examined. Agree with above exam, assessment and plan.       Electronically signed by Yovani Chicas DO on 9/7/22 at 9:37 AM EDT

## 2022-08-25 ENCOUNTER — OFFICE VISIT (OUTPATIENT)
Dept: NEUROSURGERY | Age: 67
End: 2022-08-25
Payer: MEDICARE

## 2022-08-25 VITALS
BODY MASS INDEX: 20.14 KG/M2 | HEART RATE: 78 BPM | SYSTOLIC BLOOD PRESSURE: 147 MMHG | RESPIRATION RATE: 20 BRPM | HEIGHT: 64 IN | OXYGEN SATURATION: 95 % | DIASTOLIC BLOOD PRESSURE: 57 MMHG | TEMPERATURE: 98.2 F | WEIGHT: 118 LBS

## 2022-08-25 DIAGNOSIS — M54.42 ACUTE MIDLINE LOW BACK PAIN WITH BILATERAL SCIATICA: Primary | ICD-10-CM

## 2022-08-25 DIAGNOSIS — M54.41 ACUTE MIDLINE LOW BACK PAIN WITH BILATERAL SCIATICA: Primary | ICD-10-CM

## 2022-08-25 PROCEDURE — 1123F ACP DISCUSS/DSCN MKR DOCD: CPT | Performed by: NEUROLOGICAL SURGERY

## 2022-08-25 PROCEDURE — G8427 DOCREV CUR MEDS BY ELIG CLIN: HCPCS | Performed by: NEUROLOGICAL SURGERY

## 2022-08-25 PROCEDURE — 4004F PT TOBACCO SCREEN RCVD TLK: CPT | Performed by: NEUROLOGICAL SURGERY

## 2022-08-25 PROCEDURE — 1090F PRES/ABSN URINE INCON ASSESS: CPT | Performed by: NEUROLOGICAL SURGERY

## 2022-08-25 PROCEDURE — G8420 CALC BMI NORM PARAMETERS: HCPCS | Performed by: NEUROLOGICAL SURGERY

## 2022-08-25 PROCEDURE — 99212 OFFICE O/P EST SF 10 MIN: CPT

## 2022-08-25 PROCEDURE — 99213 OFFICE O/P EST LOW 20 MIN: CPT | Performed by: NEUROLOGICAL SURGERY

## 2022-08-25 PROCEDURE — 3017F COLORECTAL CA SCREEN DOC REV: CPT | Performed by: NEUROLOGICAL SURGERY

## 2022-08-25 PROCEDURE — G8399 PT W/DXA RESULTS DOCUMENT: HCPCS | Performed by: NEUROLOGICAL SURGERY

## 2022-08-25 NOTE — PROGRESS NOTES
Patient is here for follow up consult for: back and bilateral leg pain    Physical exam  Alert and Oriented X3  PERRLA, EOMI  KENNEY 5/5 except 4/5 in LLE  Sensation intact to LT and PP  Reflexes are 2+ and symmetric    A/P: patient is here for follow up for: back and bilateral leg pain. Her MRI and CT shows lumbar dextroscoliosis and she has sever central and foraminal stenosis from L3-S1 with foraminal stenosis at L1-L2 and L2-L3. She has failed over 3 months of PT and epidurals. I am recommending an L1-S1 fusion with L4-L5 and L5-S1 interbody fusion.   She needs to quit smoking and will need medical clearance and cardiac clearance    Triston Haney MD

## 2022-08-26 ENCOUNTER — TELEPHONE (OUTPATIENT)
Dept: CARDIOLOGY CLINIC | Age: 67
End: 2022-08-26

## 2022-08-26 NOTE — TELEPHONE ENCOUNTER
Patient Appointment Form:      PCP: Dr. Elsa Hastings  Referring: Dr. Ana Pierson    Has the Patient:    Seen a Cardiologist? yes    date:09/14/2018  Physician:  location:[facility]    Had a heart catheterization? no    Had heart surgery? no    Had a stress test or nuclear stress test? yes   date: 09/14/2018   [facility]    Had an echocardiogram? no    Had a vascular ultrasound? yes   date: 03/07/2022   [facility]   [facility]     Had a 24/48 heart monitor or extended cardiac event monitor? no    Had recent blood work in the last 6 months? yes    date: 03/17/2022    ordering physician:     Had a pacemaker/ICD/ILR implant? no    Seen an Electrophysiologist? no        Will send records via: in Epic      Date & time of appointment:09/01/2022 11A Dr. Jaymie Darby req

## 2022-08-29 DIAGNOSIS — E03.9 HYPOTHYROIDISM, UNSPECIFIED TYPE: ICD-10-CM

## 2022-08-29 RX ORDER — INSULIN GLARGINE 300 U/ML
INJECTION, SOLUTION SUBCUTANEOUS
Qty: 5 PEN | Refills: 2 | Status: SHIPPED
Start: 2022-08-29 | End: 2022-09-21

## 2022-08-29 RX ORDER — LEVOTHYROXINE SODIUM 0.03 MG/1
25 TABLET ORAL DAILY
Qty: 90 TABLET | Refills: 1 | Status: ON HOLD | OUTPATIENT
Start: 2022-08-29

## 2022-09-01 ENCOUNTER — OFFICE VISIT (OUTPATIENT)
Dept: CARDIOLOGY CLINIC | Age: 67
End: 2022-09-01
Payer: MEDICARE

## 2022-09-01 VITALS
BODY MASS INDEX: 21.34 KG/M2 | HEART RATE: 54 BPM | SYSTOLIC BLOOD PRESSURE: 122 MMHG | WEIGHT: 125 LBS | DIASTOLIC BLOOD PRESSURE: 60 MMHG | HEIGHT: 64 IN | RESPIRATION RATE: 14 BRPM

## 2022-09-01 DIAGNOSIS — I73.9 PVD (PERIPHERAL VASCULAR DISEASE) WITH CLAUDICATION (HCC): ICD-10-CM

## 2022-09-01 DIAGNOSIS — Z01.810 PREOP CARDIOVASCULAR EXAM: Primary | ICD-10-CM

## 2022-09-01 DIAGNOSIS — E78.2 MIXED HYPERLIPIDEMIA: ICD-10-CM

## 2022-09-01 PROCEDURE — G8420 CALC BMI NORM PARAMETERS: HCPCS | Performed by: INTERNAL MEDICINE

## 2022-09-01 PROCEDURE — 99204 OFFICE O/P NEW MOD 45 MIN: CPT | Performed by: INTERNAL MEDICINE

## 2022-09-01 PROCEDURE — 1090F PRES/ABSN URINE INCON ASSESS: CPT | Performed by: INTERNAL MEDICINE

## 2022-09-01 PROCEDURE — G8427 DOCREV CUR MEDS BY ELIG CLIN: HCPCS | Performed by: INTERNAL MEDICINE

## 2022-09-01 PROCEDURE — 93000 ELECTROCARDIOGRAM COMPLETE: CPT | Performed by: INTERNAL MEDICINE

## 2022-09-01 PROCEDURE — G8399 PT W/DXA RESULTS DOCUMENT: HCPCS | Performed by: INTERNAL MEDICINE

## 2022-09-01 PROCEDURE — 1123F ACP DISCUSS/DSCN MKR DOCD: CPT | Performed by: INTERNAL MEDICINE

## 2022-09-01 PROCEDURE — 4004F PT TOBACCO SCREEN RCVD TLK: CPT | Performed by: INTERNAL MEDICINE

## 2022-09-01 PROCEDURE — 3017F COLORECTAL CA SCREEN DOC REV: CPT | Performed by: INTERNAL MEDICINE

## 2022-09-01 NOTE — PROGRESS NOTES
Patient Active Problem List   Diagnosis    Asymptomatic bilateral carotid artery stenosis    History of nuclear stress test    Mixed hyperlipidemia    Aortoiliac occlusive disease (HCC)    PVD (peripheral vascular disease) with claudication (HCC)    Carotid stenosis, asymptomatic, bilateral    S/P carotid endarterectomy    Tobacco abuse    Lesion of true vocal cord    Type 2 diabetes mellitus    Senile osteoporosis       Current Outpatient Medications   Medication Sig Dispense Refill    levothyroxine (SYNTHROID) 25 MCG tablet Take 1 tablet by mouth Daily 90 tablet 1    Insulin Glargine, 1 Unit Dial, (TOUJEO SOLOSTAR) 300 UNIT/ML SOPN INJECT 50 UNITS INTO THE SKIN TWICE A DAY 5 pen 2    citalopram (CELEXA) 20 MG tablet TAKE 1 TABLET BY MOUTH EVERY DAY 90 tablet 1    simvastatin (ZOCOR) 40 MG tablet Take 1 tablet by mouth nightly 90 tablet 1    clopidogrel (PLAVIX) 75 MG tablet TAKE 1 TABLET DAILY 90 tablet 1    ibuprofen (ADVIL;MOTRIN) 800 MG tablet Take 1 tablet by mouth 4 times daily as needed for Pain 40 tablet 0    lisinopril (PRINIVIL;ZESTRIL) 5 MG tablet TAKE 1 TABLET DAILY 90 tablet 1    metFORMIN (GLUCOPHAGE) 500 MG tablet TAKE 1 TABLET TWICE A DAY WITH MEALS 180 tablet 1    Ixekizumab (TALTZ SC) Inject into the skin every 30 days For psoriasis      omeprazole (PRILOSEC) 40 MG delayed release capsule TAKE 1 CAPSULE BY MOUTH EVERY DAY 30 capsule 1    aspirin 81 MG tablet Take 81 mg by mouth daily      nystatin (MYCOSTATIN) 371707 UNIT/GM cream Apply topically 2 times daily. (Patient not taking: Reported on 9/1/2022) 15 g 0    triamcinolone (KENALOG) 0.1 % cream Apply topically 2 times daily.  (Patient not taking: Reported on 9/1/2022) 15 g 0    mupirocin (BACTROBAN) 2 % ointment  (Patient not taking: Reported on 9/1/2022)      insulin lispro (HUMALOG) 100 UNIT/ML injection vial Inject 30 Units into the skin 2 times daily  (Patient not taking: Reported on 9/1/2022)      BD PEN NEEDLE SAMEER U/F 32G X 4 MM MISC No current facility-administered medications for this visit. CC:    Patient is seen in Initial Evaluation for:  1. Preop cardiovascular exam    2. PVD (peripheral vascular disease) with claudication ([address]    3. Mixed hyperlipidemia        HPI:  Scheduled for back surgery with Dr Francisco J Torres. Seen in preop evaluation. Patient is doing well without any specific cardiac problems. Patient denies any shortness of breath, chest pain, lightheadedness or dizziness. Patient is tolerating medications well without side effects. However activities limited by PVD.     ROS:   General: No unusual weight gain, no change in exercise tolerance  Skin: No rash or itching  EENT: No vision changes or nosebleeds  Cardiovascular: No orthopnea or paroxysmal nocturnal dyspnea  Respiratory: No cough or hemoptysis  Gastrointestinal: No hematemesis or recent changes in bowel habits  Genitourinary: No hematuria, urgency or frequency  Musculoskeletal: No muscular weakness or joint swelling   Neurologic / Psychiatric: No incoordination or convulsions  Allergic / Immunologic/ Lymphatic / Endocrine: No anemia or bleeding tendency    Social History     Socioeconomic History    Marital status:      Spouse name: Not on file    Number of children: Not on file    Years of education: Not on file    Highest education level: Not on file   Occupational History    Occupation:  bookeeper   Tobacco Use    Smoking status: Every Day     Packs/day: 0.50     Years: 40.00     Pack years: 20.00     Types: Cigarettes     Start date: 5     Last attempt to quit: 2018     Years since quittin.0    Smokeless tobacco: Never   Vaping Use    Vaping Use: Never used   Substance and Sexual Activity    Alcohol use: No    Drug use: Never    Sexual activity: Not on file   Other Topics Concern    Not on file   Social History Narrative    Not on file     Social Determinants of Health     Financial Resource Strain: Not on file   Food Insecurity: Not on file   Transportation Needs: Not on file   Physical Activity: Inactive    Days of Exercise per Week: 0 days    Minutes of Exercise per Session: 120 min   Stress: Not on file   Social Connections: Not on file   Intimate Partner Violence: Not on file   Housing Stability: Not on file       Family History   Problem Relation Age of Onset    Cancer Mother         lung brain smoker    Heart Disease Father         mi in his 52's     Other Father         pulmonary fibrosis    Diabetes Brother    Brother with MI in his 46s. Past Medical History:   Diagnosis Date    Age-related osteoporosis without current pathological fracture 06/2021    Arthritis     Cataracts, bilateral 10/'19    Colon polyps 08/05/2021    tubular adenoma    Diabetes mellitus ([facility]     DM type 2 (diabetes mellitus, type 2) ([facility]     Facial basal cell cancer 03/2022    [facility]    Hyperlipidemia     Hypertension     Hypothyroidism     Migraines     rare    Neuropathy, diabetic ([facility]     Peripheral vascular disease ([facility]     follows with Dr. Aylin Womack yearly    Positive colorectal cancer screening using Cologuard test     Psoriasiform dermatitis     Seasonal allergies     Thyroid disease        PHYSICAL EXAM:  CONSTITUTIONAL:  Well developed, well nourished    Vitals:    09/01/22 1053   BP: 122/60   Pulse: 54   Resp: 14   Weight: 125 lb (56.7 kg)   Height: 5' 4\" (1.626 m)     HEAD & FACE: Normocephalic. Symmetric. EYES: No xanthelasma. Conjunctivae not injected. EARS, NOSE, MOUTH & THROAT: Good dentition. No oral pallor or cyanosis. NECK: No JVD at 30 degrees. No thyromegaly. RESPIRATORY: Clear to auscultation and percussion in all fields. No use of accessory muscle or intercostal retractions. CARDIOVASCULAR: Regular rate and rhythm. No lifts or thrills on palpitation. Auscultation with normal S1-S2 in intensity and splitting. Right carotid bruits. Abdominal aorta not enlarged. Femoral arteries without bruits. Pedal pulses 1+. No edema. ABDOMEN: Soft without hepatic or splenic enlargement. No tenderness. MUSCULOSKELETAL: No kyphosis or scoliosis of the back. Good muscle strength and tone. No muscle atrophy. Slow gait and inability to undergo exercise stress testing. EXTREMITIES: No clubbing or cyanosis. SKIN: No Xanthomas or ulcerations. NEUROLOGIC: Oriented to time, place and person. Normal mood and affect. LYMPHATIC:  No palpable neck or supraclavicular nodes. No splenomegaly. EKG: the EKG tracing was reviewed and found to reveal: Normal sinus rhythm. QTc 414      ASSESSMENT:                                                     ORDERS:       Diagnosis Orders   1. Preop cardiovascular exam  EKG 12 lead    NM Cardiac Stress Test Nuclear Imaging      2. PVD (peripheral vascular disease) with claudication (HCC)  EKG 12 lead      3. Mixed hyperlipidemia  EKG 12 lead      Patient is RCRI class III with a 6.6% risk of major adverse cardiac event. Will require further evaluation preoperatively. Due to limited ambulation ability from PVD nuclear imaging will be indicated. PLAN:   See above orders. Medication reconciliation completed. Old records were reviewed and found to reveal:   Discussed issues that would prompt earlier evaluation. Same cardiac medications.     Follow-up office visit - pending above

## 2022-09-08 DIAGNOSIS — E78.2 MIXED HYPERLIPIDEMIA: ICD-10-CM

## 2022-09-08 RX ORDER — SIMVASTATIN 40 MG
40 TABLET ORAL NIGHTLY
Qty: 90 TABLET | Refills: 1 | Status: ON HOLD | OUTPATIENT
Start: 2022-09-08

## 2022-09-12 ENCOUNTER — TELEPHONE (OUTPATIENT)
Dept: CARDIOLOGY | Age: 67
End: 2022-09-12

## 2022-09-16 ENCOUNTER — TELEPHONE (OUTPATIENT)
Dept: CARDIOLOGY | Age: 67
End: 2022-09-16

## 2022-09-16 NOTE — TELEPHONE ENCOUNTER
Spoke with patient and confirmed pharmacological stress test appointment on 9/20/22 at 0730. Instructions for test,NPO after midnight ,no caffeine products 12 hours prior to the test no Diabetic medications the morning of the test , and COVID-19 preprocedure information reviewed with patient, questions answered. Patient verbalized understanding.

## 2022-09-19 ENCOUNTER — TELEPHONE (OUTPATIENT)
Dept: NEUROSURGERY | Age: 67
End: 2022-09-19

## 2022-09-19 DIAGNOSIS — Z01.818 PREOPERATIVE TESTING: ICD-10-CM

## 2022-09-19 DIAGNOSIS — R06.02 SHORTNESS OF BREATH: Primary | ICD-10-CM

## 2022-09-19 NOTE — TELEPHONE ENCOUNTER
Patient was here on August 25 and stopped smoking for 3 weeks. She wants to schedule surgery. She is having a stress test tomorrow.   [phone] phone

## 2022-09-20 ENCOUNTER — HOSPITAL ENCOUNTER (OUTPATIENT)
Dept: CARDIOLOGY | Age: 67
Discharge: HOME | End: 2022-09-20
Payer: MEDICARE

## 2022-09-20 VITALS
HEART RATE: 56 BPM | DIASTOLIC BLOOD PRESSURE: 62 MMHG | RESPIRATION RATE: 16 BRPM | WEIGHT: 125 LBS | SYSTOLIC BLOOD PRESSURE: 144 MMHG | BODY MASS INDEX: 21.34 KG/M2 | HEIGHT: 64 IN

## 2022-09-20 DIAGNOSIS — R06.02 SHORTNESS OF BREATH: ICD-10-CM

## 2022-09-20 DIAGNOSIS — Z01.818 PREOPERATIVE TESTING: ICD-10-CM

## 2022-09-20 PROCEDURE — 6360000002 HC RX W HCPCS: Performed by: INTERNAL MEDICINE

## 2022-09-20 PROCEDURE — A9500 TC99M SESTAMIBI: HCPCS | Performed by: INTERNAL MEDICINE

## 2022-09-20 PROCEDURE — 93017 CV STRESS TEST TRACING ONLY: CPT

## 2022-09-20 PROCEDURE — 3430000000 HC RX DIAGNOSTIC RADIOPHARMACEUTICAL: Performed by: INTERNAL MEDICINE

## 2022-09-20 PROCEDURE — 2580000003 HC RX 258: Performed by: INTERNAL MEDICINE

## 2022-09-20 PROCEDURE — 78452 HT MUSCLE IMAGE SPECT MULT: CPT

## 2022-09-20 RX ORDER — SODIUM CHLORIDE 0.9 % (FLUSH) 0.9 %
10 SYRINGE (ML) INJECTION PRN
Status: DISCONTINUED | OUTPATIENT
Start: 2022-09-20 | End: 2022-09-21 | Stop reason: HOSPADM

## 2022-09-20 RX ADMIN — Medication 10.6 MILLICURIE: at 07:37

## 2022-09-20 RX ADMIN — Medication 34.5 MILLICURIE: at 09:00

## 2022-09-20 RX ADMIN — REGADENOSON 0.4 MG: 0.08 INJECTION, SOLUTION INTRAVENOUS at 09:00

## 2022-09-20 RX ADMIN — SODIUM CHLORIDE, PRESERVATIVE FREE 10 ML: 5 INJECTION INTRAVENOUS at 09:00

## 2022-09-20 RX ADMIN — SODIUM CHLORIDE, PRESERVATIVE FREE 10 ML: 5 INJECTION INTRAVENOUS at 07:36

## 2022-09-20 RX ADMIN — SODIUM CHLORIDE, PRESERVATIVE FREE 10 ML: 5 INJECTION INTRAVENOUS at 09:01

## 2022-09-20 NOTE — PROCEDURES
[address] [facility]   [address] [facility] [address], [address] [facility]  [phone]                Pharmacologic Stress Nuclear Gated SPECT Study    Name: oRel Rossi 115[address] Account Number: [de-identified]    :  1955          Sex: female         Date of Study:  2022    Height: 5' 4\" (162.6 cm)         Weight: 125 lb (56.7 kg)     Ordering Provider: Karrie Wilburn MD          PCP: Lias Hoyt DO      Cardiologist: Karrie Wilburn MD             Interpreting Physician: David Machuca MD  _________________________________________________________________________________    Indication:   Preoperative Risk Stratification    Clinical History:   Patient has no known history of coronary artery disease. Procedure:   Pharmacologic stress testing was performed with regadenoson 0.4 mg for 15 seconds. The heart rate was 56 at baseline and delores to 88 beats during the infusion. This corresponds to 57% of maximum predicted heart rate. The blood pressure at baseline was 144/62 and blood pressure at the end of infusion was 158/76. Blood pressure response was normal during the stress procedure. The patient experienced \"weird\" feeling during the infusion. ECG during the infusion did not change. IMAGING: Myocardial perfusion imaging was performed at rest 30-35 minutes following the intravenous injection of 10.6 mCi of (Tc-Sestamibi) followed by 10 ml of Normal Saline. As per infusion protocol, the patient was injected intravenously with 34.5 mCi of (Tc-Sestamibi) followed by 10 ml of Normal Saline. Gated post-stress tomographic imaging was performed 20-25 minutes after stress. FINDINGS: The overall quality of the study was excellent. Left ventricular cavity size was noted to be normal.    Rotational analog analysis demonstrated no patient motion or abnormal extracardiac radioactivity.         A mild defect was present in the basal inferolateral, basal inferior, mid inferolateral, and mid inferior wall(s) that was  moderate sized by quantification. The resting images reveal complete reversibility. Gated SPECT left ventricular ejection fraction was calculated to be 55%, with normal myocardial thickening and wall motion. Impression:    ECG during the infusion did not change. The myocardial perfusion imaging was abnormal.      Overall left ventricular systolic function was normal without regional wall motion abnormalities. Intermediate risk pre-operative pharmacologic nuclear stress test.    Thank you for sending your patient to this [employer] [facility].      Electronically signed by Niranjan Aguilera MD on 9/20/22 at 1:43 PM EDT

## 2022-09-20 NOTE — DISCHARGE INSTRUCTIONS
[address] [facility]      Instructions to Patients    The following are the instructions for patients who have had a procedure in our office today. Patient name: Sinai Hong    Radionuclide Activity: 40mCi of 99mTc-Sestamibi    Date Administered: 9/20/2022    Expires: 48 hours after scheduled appointment time      Patient may resume normal activity unless otherwise instructed. Patient may resume medications as normal.  If the need should arise, patient may call [phone] between the hours of 8:00am-5:00pm.  After hours there is at least one physician on-call at all times for those patients needing assistance. Patients may call [phone] and the answering service will direct the patient to one of our physicians for assistance. After the patient's test if they are going to be leaving from an airport in the near future they should take this letter with them to verify the test and radionuclide used for their test.      This letter verifies that the above named bearer received an injection of a radionuclide for medical purpose/usage only.         Electronically signed by Geeta Arnold on 9/20/2022 at 8:41 AM

## 2022-09-21 ENCOUNTER — TELEPHONE (OUTPATIENT)
Dept: CARDIOLOGY CLINIC | Age: 67
End: 2022-09-21

## 2022-09-21 DIAGNOSIS — R94.39 ABNORMAL CARDIOVASCULAR STRESS TEST: ICD-10-CM

## 2022-09-21 DIAGNOSIS — Z01.810 PREOPERATIVE CARDIOVASCULAR EXAMINATION: Primary | ICD-10-CM

## 2022-09-21 DIAGNOSIS — M48.061 SPINAL STENOSIS, LUMBAR REGION, WITHOUT NEUROGENIC CLAUDICATION: Primary | ICD-10-CM

## 2022-09-21 RX ORDER — INSULIN GLARGINE 300 U/ML
INJECTION, SOLUTION SUBCUTANEOUS
Qty: 5 ADJUSTABLE DOSE PRE-FILLED PEN SYRINGE | Refills: 2 | Status: SHIPPED
Start: 2022-09-21 | End: 2022-09-28 | Stop reason: SDUPTHER

## 2022-09-21 NOTE — TELEPHONE ENCOUNTER
----- Message from Jon Martinez MD sent at 9/21/2022  3:41 PM EDT -----  Discussed stress test results and options of care. Joint decision to proceed with cardiac catheterization possible stent.

## 2022-09-28 RX ORDER — INSULIN GLARGINE 300 U/ML
INJECTION, SOLUTION SUBCUTANEOUS
Qty: 15 ADJUSTABLE DOSE PRE-FILLED PEN SYRINGE | Refills: 1 | Status: ON HOLD | OUTPATIENT
Start: 2022-09-28

## 2022-09-30 ENCOUNTER — PREP FOR PROCEDURE (OUTPATIENT)
Dept: NEUROSURGERY | Age: 67
End: 2022-09-30

## 2022-09-30 PROBLEM — M48.061 LUMBAR SPINAL STENOSIS: Status: ACTIVE | Noted: 2022-09-30

## 2022-09-30 PROBLEM — M41.9 SCOLIOSIS: Status: ACTIVE | Noted: 2022-09-30

## 2022-10-03 ENCOUNTER — PREP FOR PROCEDURE (OUTPATIENT)
Dept: NEUROSURGERY | Age: 67
End: 2022-10-03

## 2022-10-03 DIAGNOSIS — Z01.818 PRE-OP TESTING: Primary | ICD-10-CM

## 2022-10-03 RX ORDER — SODIUM CHLORIDE 0.9 % (FLUSH) 0.9 %
5-40 SYRINGE (ML) INJECTION PRN
Status: CANCELLED | OUTPATIENT
Start: 2022-10-03

## 2022-10-03 RX ORDER — SODIUM CHLORIDE 9 MG/ML
INJECTION, SOLUTION INTRAVENOUS PRN
Status: CANCELLED | OUTPATIENT
Start: 2022-10-03

## 2022-10-03 RX ORDER — SODIUM CHLORIDE 9 MG/ML
INJECTION, SOLUTION INTRAVENOUS CONTINUOUS
Status: CANCELLED | OUTPATIENT
Start: 2022-10-03

## 2022-10-03 RX ORDER — SODIUM CHLORIDE 0.9 % (FLUSH) 0.9 %
5-40 SYRINGE (ML) INJECTION EVERY 12 HOURS SCHEDULED
Status: CANCELLED | OUTPATIENT
Start: 2022-10-03

## 2022-10-04 ENCOUNTER — HOSPITAL ENCOUNTER (OUTPATIENT)
Age: 67
Discharge: HOME | End: 2022-10-04
Payer: MEDICARE

## 2022-10-04 ENCOUNTER — TELEPHONE (OUTPATIENT)
Dept: CARDIOLOGY CLINIC | Age: 67
End: 2022-10-04

## 2022-10-04 DIAGNOSIS — R94.39 ABNORMAL CARDIOVASCULAR STRESS TEST: ICD-10-CM

## 2022-10-04 DIAGNOSIS — Z01.810 PREOPERATIVE CARDIOVASCULAR EXAMINATION: Primary | ICD-10-CM

## 2022-10-04 DIAGNOSIS — Z01.810 PREOPERATIVE CARDIOVASCULAR EXAMINATION: ICD-10-CM

## 2022-10-04 LAB
ALBUMIN SERPL-MCNC: 4.5 G/DL (ref 3.5–5.2)
ALP BLD-CCNC: 107 U/L (ref 35–104)
ALT SERPL-CCNC: 26 U/L (ref 0–32)
ANION GAP SERPL CALCULATED.3IONS-SCNC: 10 MMOL/L (ref 7–16)
AST SERPL-CCNC: 23 U/L (ref 0–31)
BILIRUB SERPL-MCNC: 0.3 MG/DL (ref 0–1.2)
BUN BLDV-MCNC: 10 MG/DL (ref 6–23)
CALCIUM SERPL-MCNC: 10.7 MG/DL (ref 8.6–10.2)
CHLORIDE BLD-SCNC: 95 MMOL/L (ref 98–107)
CO2: 28 MMOL/L (ref 22–29)
CREAT SERPL-MCNC: 0.6 MG/DL (ref 0.5–1)
GFR AFRICAN AMERICAN: >60
GFR NON-AFRICAN AMERICAN: >60 ML/MIN/1.73
GLUCOSE BLD-MCNC: 339 MG/DL (ref 74–99)
HCT VFR BLD CALC: 42.6 % (ref 34–48)
HEMOGLOBIN: 14.1 G/DL (ref 11.5–15.5)
INR BLD: 1
MCH RBC QN AUTO: 29.9 PG (ref 26–35)
MCHC RBC AUTO-ENTMCNC: 33.1 % (ref 32–34.5)
MCV RBC AUTO: 90.4 FL (ref 80–99.9)
PDW BLD-RTO: 12.9 FL (ref 11.5–15)
PLATELET # BLD: 197 E9/L (ref 130–450)
PMV BLD AUTO: 10.5 FL (ref 7–12)
POTASSIUM SERPL-SCNC: 6 MMOL/L (ref 3.5–5)
PROTHROMBIN TIME: 10.6 SEC (ref 9.3–12.4)
RBC # BLD: 4.71 E12/L (ref 3.5–5.5)
SODIUM BLD-SCNC: 133 MMOL/L (ref 132–146)
TOTAL PROTEIN: 7 G/DL (ref 6.4–8.3)
WBC # BLD: 4.9 E9/L (ref 4.5–11.5)

## 2022-10-04 PROCEDURE — 36415 COLL VENOUS BLD VENIPUNCTURE: CPT

## 2022-10-04 PROCEDURE — 85610 PROTHROMBIN TIME: CPT

## 2022-10-04 PROCEDURE — 80053 COMPREHEN METABOLIC PANEL: CPT

## 2022-10-04 PROCEDURE — 85027 COMPLETE CBC AUTOMATED: CPT

## 2022-10-04 NOTE — TELEPHONE ENCOUNTER
----- Message from Elkin Reyes MD sent at 10/4/2022  4:00 PM EDT -----  Please let patient know the potassium came back elevated on her blood test.  We will need it repeated before cardiac catheterization.

## 2022-10-05 ENCOUNTER — TELEPHONE (OUTPATIENT)
Dept: CARDIAC CATH/INVASIVE PROCEDURES | Age: 67
End: 2022-10-05

## 2022-10-05 ENCOUNTER — TELEPHONE (OUTPATIENT)
Dept: CARDIOLOGY CLINIC | Age: 67
End: 2022-10-05

## 2022-10-05 ENCOUNTER — HOSPITAL ENCOUNTER (OUTPATIENT)
Age: 67
Discharge: HOME | End: 2022-10-05
Payer: MEDICARE

## 2022-10-05 DIAGNOSIS — Z01.810 PREOPERATIVE CARDIOVASCULAR EXAMINATION: ICD-10-CM

## 2022-10-05 LAB
ANION GAP SERPL CALCULATED.3IONS-SCNC: 12 MMOL/L (ref 7–16)
BUN BLDV-MCNC: 14 MG/DL (ref 6–23)
CALCIUM SERPL-MCNC: 10.8 MG/DL (ref 8.6–10.2)
CHLORIDE BLD-SCNC: 94 MMOL/L (ref 98–107)
CO2: 25 MMOL/L (ref 22–29)
CREAT SERPL-MCNC: 0.5 MG/DL (ref 0.5–1)
GFR AFRICAN AMERICAN: >60
GFR NON-AFRICAN AMERICAN: >60 ML/MIN/1.73
GLUCOSE BLD-MCNC: 278 MG/DL (ref 74–99)
POTASSIUM SERPL-SCNC: 5 MMOL/L (ref 3.5–5)
SODIUM BLD-SCNC: 131 MMOL/L (ref 132–146)

## 2022-10-05 PROCEDURE — 36415 COLL VENOUS BLD VENIPUNCTURE: CPT

## 2022-10-05 PROCEDURE — 80048 BASIC METABOLIC PNL TOTAL CA: CPT

## 2022-10-05 NOTE — TELEPHONE ENCOUNTER
----- Message from Brigido Epley, MD sent at 10/5/2022 12:24 PM EDT -----  Please let patient know that labs looked better on repeat study.

## 2022-10-05 NOTE — TELEPHONE ENCOUNTER
Reminded patient of scheduled procedure on 10/6. Instructions given and COVID questionnaire completed.

## 2022-10-06 ENCOUNTER — HOSPITAL ENCOUNTER (OUTPATIENT)
Dept: CARDIAC CATH/INVASIVE PROCEDURES | Age: 67
Discharge: HOME | End: 2022-10-06
Attending: INTERNAL MEDICINE | Admitting: INTERNAL MEDICINE
Payer: MEDICARE

## 2022-10-06 VITALS
TEMPERATURE: 98.3 F | RESPIRATION RATE: 18 BRPM | HEART RATE: 67 BPM | SYSTOLIC BLOOD PRESSURE: 133 MMHG | OXYGEN SATURATION: 100 % | DIASTOLIC BLOOD PRESSURE: 61 MMHG

## 2022-10-06 PROBLEM — I25.10 CAD IN NATIVE ARTERY: Status: ACTIVE | Noted: 2022-10-06

## 2022-10-06 LAB
ABO/RH: NORMAL
ANTIBODY SCREEN: NORMAL
EKG ATRIAL RATE: 65 BPM
EKG P AXIS: 68 DEGREES
EKG P-R INTERVAL: 120 MS
EKG Q-T INTERVAL: 426 MS
EKG QRS DURATION: 80 MS
EKG QTC CALCULATION (BAZETT): 443 MS
EKG R AXIS: 53 DEGREES
EKG T AXIS: 74 DEGREES
EKG VENTRICULAR RATE: 65 BPM

## 2022-10-06 PROCEDURE — C1894 INTRO/SHEATH, NON-LASER: HCPCS

## 2022-10-06 PROCEDURE — 6360000002 HC RX W HCPCS

## 2022-10-06 PROCEDURE — 86900 BLOOD TYPING SEROLOGIC ABO: CPT

## 2022-10-06 PROCEDURE — 36415 COLL VENOUS BLD VENIPUNCTURE: CPT

## 2022-10-06 PROCEDURE — 6370000000 HC RX 637 (ALT 250 FOR IP): Performed by: INTERNAL MEDICINE

## 2022-10-06 PROCEDURE — C1769 GUIDE WIRE: HCPCS

## 2022-10-06 PROCEDURE — 93458 L HRT ARTERY/VENTRICLE ANGIO: CPT

## 2022-10-06 PROCEDURE — 93005 ELECTROCARDIOGRAM TRACING: CPT | Performed by: INTERNAL MEDICINE

## 2022-10-06 PROCEDURE — 2500000003 HC RX 250 WO HCPCS

## 2022-10-06 PROCEDURE — 93458 L HRT ARTERY/VENTRICLE ANGIO: CPT | Performed by: INTERNAL MEDICINE

## 2022-10-06 PROCEDURE — 2580000003 HC RX 258: Performed by: INTERNAL MEDICINE

## 2022-10-06 PROCEDURE — 99220 PR INITIAL OBSERVATION CARE/DAY 70 MINUTES: CPT | Performed by: INTERNAL MEDICINE

## 2022-10-06 PROCEDURE — 86901 BLOOD TYPING SEROLOGIC RH(D): CPT

## 2022-10-06 PROCEDURE — 2709999900 HC NON-CHARGEABLE SUPPLY

## 2022-10-06 PROCEDURE — 86850 RBC ANTIBODY SCREEN: CPT

## 2022-10-06 RX ORDER — SODIUM CHLORIDE 9 MG/ML
INJECTION, SOLUTION INTRAVENOUS PRN
Status: DISCONTINUED | OUTPATIENT
Start: 2022-10-06 | End: 2022-10-06 | Stop reason: HOSPADM

## 2022-10-06 RX ORDER — CLOPIDOGREL BISULFATE 75 MG/1
75 TABLET ORAL DAILY
Status: DISCONTINUED | OUTPATIENT
Start: 2022-10-06 | End: 2022-10-06 | Stop reason: HOSPADM

## 2022-10-06 RX ORDER — SODIUM CHLORIDE 0.9 % (FLUSH) 0.9 %
5-40 SYRINGE (ML) INJECTION EVERY 12 HOURS SCHEDULED
Status: DISCONTINUED | OUTPATIENT
Start: 2022-10-06 | End: 2022-10-06 | Stop reason: HOSPADM

## 2022-10-06 RX ORDER — METOPROLOL SUCCINATE 25 MG
25 TABLET, EXTENDED RELEASE 24 HR ORAL DAILY
Qty: 30 TABLET | Refills: 3 | Status: SHIPPED | OUTPATIENT
Start: 2022-10-06 | End: 2022-10-31

## 2022-10-06 RX ORDER — ISOSORBIDE MONONITRATE 30 MG/1
30 TABLET, EXTENDED RELEASE ORAL DAILY
Qty: 30 TABLET | Refills: 3 | Status: SHIPPED | OUTPATIENT
Start: 2022-10-06 | End: 2022-10-31

## 2022-10-06 RX ORDER — ASPIRIN 81 MG/1
81 TABLET, CHEWABLE ORAL DAILY
Status: DISCONTINUED | OUTPATIENT
Start: 2022-10-06 | End: 2022-10-06 | Stop reason: HOSPADM

## 2022-10-06 RX ORDER — SODIUM CHLORIDE 0.9 % (FLUSH) 0.9 %
5-40 SYRINGE (ML) INJECTION PRN
Status: DISCONTINUED | OUTPATIENT
Start: 2022-10-06 | End: 2022-10-06 | Stop reason: HOSPADM

## 2022-10-06 RX ORDER — NITROGLYCERIN 0.4 MG/1
0.4 TABLET SUBLINGUAL EVERY 5 MIN PRN
Qty: 25 TABLET | Refills: 3 | Status: ON HOLD | OUTPATIENT
Start: 2022-10-06

## 2022-10-06 RX ORDER — SODIUM CHLORIDE 9 MG/ML
INJECTION, SOLUTION INTRAVENOUS CONTINUOUS
Status: DISCONTINUED | OUTPATIENT
Start: 2022-10-06 | End: 2022-10-06 | Stop reason: HOSPADM

## 2022-10-06 RX ADMIN — CLOPIDOGREL BISULFATE 75 MG: 75 TABLET ORAL at 08:22

## 2022-10-06 RX ADMIN — ASPIRIN 81 MG: 81 TABLET, CHEWABLE ORAL at 08:22

## 2022-10-06 RX ADMIN — SODIUM CHLORIDE: 9 INJECTION, SOLUTION INTRAVENOUS at 11:48

## 2022-10-06 NOTE — DISCHARGE INSTRUCTIONS
Future Appointments   Date Time Provider Jana Mine   10/11/2022  1:00 PM Smith Dutton DO CARDIO SURG [facility]   11/28/2022  3:00 PM Edmond Mcghee PA-C [facility]   1/23/2023  3:00 PM Rober River [facility]   1/27/2023 10:00 AM Julio Hutchinson DO [facility]   3/13/2023  1:00 PM [facility] VAS US RM 1 SEY[facility] CARDIO [facility]   3/13/2023  1:45 PM [facility] VAS US RM 1 SEYZ CARDIO [facility]   3/13/2023  2:15 PM Andra Anna MD VAS/[facility]

## 2022-10-06 NOTE — PROCEDURES
[address]                  [address] [place],  [facility]                            CARDIAC CATHETERIZATION    PATIENT NAME: Clare Reid                 :        1955  MED REC NO:   [id number]                            ROOM:  ACCOUNT NO:   [de-identified]                           ADMIT DATE: 10/06/2022  PROVIDER:     Amanda Owens MD    DATE OF PROCEDURE:  10/06/2022    PROCEDURES:  Left heart catheterization, selective coronary angiography,  and left ventriculography. The procedure was done through right femoral artery approach using  ultrasound guidance (procedure was attempted through right radial  approach, but a wire would not advance few centimeters past the site of  entry in the radial artery). The patient received intravenous Versed and intravenous fentanyl for  sedation. INDICATION:  Abnormal stress test in a patient with multiple risk  factors for coronary artery disease, who is being risk-stratified prior  to back surgery. AUC:  9-17. PRESSURES:  1. Aorta 202/56 with a mean of 118.  2.  Left ventricular systolic pressure 138 (after the patient received  intravenous hydralazine). Left ventricular end-diastolic pressure 13. There was no significant gradient across the aortic valve. CORONARY ANGIOGRAPHY:  1. Left Main:  The left main artery is a short vessel which is heavily  calcified distally with around 20% distal vessel narrowing. 2. LAD:  The left anterior descending artery appeared heavily calcified  at its origin. There was an eccentric 70% to 80% stenosis in the  proximal LAD just before the first septal  branch. After the  first diagonal branch in the mid LAD, there was 95% to 99% subtotal  occlusion. Distally, the LAD again had around 80% to 90% luminal  narrowing. The distal LAD was a small caliber vessel measuring around  1.5 mm in diameter.   The large diagonal branch had 70% to 80% ostial  narrowing and moderate proximal disease with mid and distal segments of  the diagonal branch showing no significant disease. The diagonal  branch, however, was a small caliber vessel. 3.  LCX:  The left circumflex is a large but nondominant vessel. The  first marginal branch has diffuse ostial and proximal disease with up to  70% to 80% luminal narrowing. The mid circumflex had 95% subtotal  occlusion. 4.  RCA:  The right coronary artery is a dominant vessel which is  heavily calcified in its proximal segment and which is severely and  diffusely diseased in its proximal segment with up to 95% to 99%  stenoses and the vessel is totally occluded at the mid vessel level. The distal RCA received collaterals from an acute marginal branch. LEFT VENTRICULOGRAPHY:  The left ventricle is normal in size. There was  hypokinesis of a very short segment involving the basal inferior wall  with the rest of the myocardial segments appearing to contract  vigorously with an estimated ejection fraction of 70%. The right femoral arterial sheath was removed at the end of the  procedure and digital pressure was applied with adequate hemostasis and  with preservation of pulse. The patient tolerated the procedure well and left the cardiac  catheterization laboratory in a stable condition. ESTIMATED BLOOD LOSS:  20 mL. CONTRAST USED:  60 mL. CONCLUSIONS:  1. Severe multivessel coronary artery disease as described above. 2.  Normal left ventricular size with a very short segment of basal  inferior wall hypokinesis, but with preserved global left ventricular  systolic function with an estimated ejection fraction of 70%. 3.  Systemic hypertension.         Caroline Meng MD    D: 10/06/2022 11:04:31       T: 10/06/2022 11:06:47     [email or web address]  Job#: [id number]     Doc#: [id number]    CC:

## 2022-10-06 NOTE — PROGRESS NOTES
Extended Stay Recovery Discharge Documentation    Final procedure site check completed, stable for discharge- Yes  Ambulated without issue post recovery completion, gait steady. Peripheral IV sites removed (see IV Flowsheet) and site asymptomatic- Yes  Patient dressed self without assistance. Discharge instructions reviewed with Patient and verbalized understanding.    Patient discharged Home with spouse at 1600PM  Monitor cleaned and placed back in nurses' station- Yes

## 2022-10-06 NOTE — H&P
History & Physical     CHIEF COMPLAINT: Here for cath as per the request of her primary cardiologist. Need cardiac risk stratification prior to back surgery. Had a stress test which was abnormal. Limited activities but denies any cardiac symptoms. ADMITTING PHYSICIAN:  Ramone Calle MD    DATE OF SERVICE: 10/6/2022     HISTORY OF PRESENT ILLNESS:    77 yo female. Need cardiac risk stratification prior to back surgery. Had a stress test which was abnormal. Limited activities but denies any cardiac symptoms. Lexiscan MPS  Impression:    ECG during the infusion did not change. The myocardial perfusion imaging was abnormal.  A mild defect was present in the basal inferolateral, basal inferior, mid inferolateral, and mid inferior wall(s) that was  moderate sized by quantification. The resting images reveal complete reversibility. Overall left ventricular systolic function was normal without regional wall motion abnormalities. Gated SPECT left ventricular ejection fraction was calculated to be 55%, with normal myocardial thickening and wall motion.   4.   Intermediate risk pre-operative pharmacologic nuclear stress test.       Past Medical History:  Past Medical History:   Diagnosis Date    Age-related osteoporosis without current pathological fracture 06/2021    Arthritis     Cataracts, bilateral 10/'19    Colon polyps 08/05/2021    tubular adenoma    Diabetes mellitus ([address]     DM type 2 (diabetes mellitus, type 2) ([address]     Facial basal cell [facility]    Hyperlipidemia     Hypertension     Hypothyroidism     Migraines     rare    Neuropathy, diabetic ([address]     Peripheral vascular disease ([address]     follows with Dr. Pfeiffer Russian yearly    Positive colorectal cancer screening using Cologuard test     Psoriasiform dermatitis     Seasonal allergies     Thyroid disease        Past Surgical History:   Past Surgical History:   Procedure Laterality Date    BACK SURGERY      L5-S1    CAROTID ENDARTERECTOMY Left 10/11/2018    [place]    CARPAL TUNNEL RELEASE      bilat     SECTION      2    COLONOSCOPY N/A 2021    COLONOSCOPY WITH BIOPSY with tattooing performed by Iva Cruz MD at [address] [place] [address] (CERVIX STATUS UNKNOWN)      LARYNGOSCOPY N/A 2020    DIRECT LARYNGOSCOPYY AND CERVICAL ESOPHAGOSCOPY (PATHOLOGY PRESENT) performed by Iraida Ramírez DO at [address]- right     OTHER SURGICAL HISTORY  2018    Dr. Wynne [facility]- Bilateral iliac stents iCast [id number]    [place] OFFICE/OUTPT VISIT,PROCEDURE ONLY Left 10/11/2018    LEFT CAROTID ENDARTERECTOMY performed by Berry Wood MD at [place] [address]      TOE SURGERY      bilat - ingrown toe nails    TONSILLECTOMY          Home Medications:    Prior to Admission medications    Medication Sig Start Date End Date Taking? Authorizing Provider   Insulin Glargine, 1 Unit Dial, (TOUJEO SOLOSTAR) 300 UNIT/ML SOPN INJECT 50 UNITS INTO THE SKIN TWICE A DAY 22   Anurag Garcia DO   simvastatin (ZOCOR) 40 MG tablet TAKE 1 TABLET BY MOUTH NIGHTLY 22   Harpreet Garcia DO   levothyroxine (SYNTHROID) 25 MCG tablet Take 1 tablet by mouth Daily 22   Anurag Garcia DO   nystatin (MYCOSTATIN) 693610 UNIT/GM cream Apply topically 2 times daily. Patient not taking: Reported on 2022   Harpreet Garcia DO   citalopram (CELEXA) 20 MG tablet TAKE 1 TABLET BY MOUTH EVERY DAY 22   Anurag Garcia DO   triamcinolone (KENALOG) 0.1 % cream Apply topically 2 times daily.   Patient not taking: Reported on 2022   Harpreet Garcia DO   mupirocin Venora Mantle) 2 % ointment  21   Historical Provider, MD   clopidogrel (PLAVIX) 75 MG tablet TAKE 1 TABLET DAILY 2/10/22   Harpreet Garcia DO   ibuprofen (ADVIL;MOTRIN) 800 MG tablet Take 1 tablet by mouth 4 times daily as needed for Pain 22 Dayton Shah DO   lisinopril (PRINIVIL;ZESTRIL) 5 MG tablet TAKE 1 TABLET DAILY 3/3/21   Brent Garcia DO   metFORMIN (GLUCOPHAGE) 500 MG tablet TAKE 1 TABLET TWICE A DAY WITH MEALS 2/15/21   Anurag Garcia DO   Ixekizumab (TALTZ SC) Inject into the skin every 30 days For psoriasis    Historical Provider, MD   insulin lispro (HUMALOG) 100 UNIT/ML injection vial Inject 30 Units into the skin 2 times daily   Patient not taking: No sig reported    Historical Provider, MD   omeprazole (PRILOSEC) 40 MG delayed release capsule TAKE 1 CAPSULE BY MOUTH EVERY DAY 8/14/20   Alexia Hutchinson DO   BD PEN NEEDLE SAMEER U/F 32G X 4 MM [facility]  1/23/19   Historical Provider, MD   aspirin 81 MG tablet Take 81 mg by mouth daily    Historical Provider, MD       Hospital Medications:    Current Outpatient Medications:     Insulin Glargine, 1 Unit Dial, (TOUJEO SOLOSTAR) 300 UNIT/ML SOPN, INJECT 50 UNITS INTO THE SKIN TWICE A DAY, Disp: 15 Adjustable Dose Pre-filled Pen Syringe, Rfl: 1    simvastatin (ZOCOR) 40 MG tablet, TAKE 1 TABLET BY MOUTH NIGHTLY, Disp: 90 tablet, Rfl: 1    levothyroxine (SYNTHROID) 25 MCG tablet, Take 1 tablet by mouth Daily, Disp: 90 tablet, Rfl: 1    nystatin (MYCOSTATIN) 000684 UNIT/GM cream, Apply topically 2 times daily. (Patient not taking: Reported on 9/1/2022), Disp: 15 g, Rfl: 0    citalopram (CELEXA) 20 MG tablet, TAKE 1 TABLET BY MOUTH EVERY DAY, Disp: 90 tablet, Rfl: 1    triamcinolone (KENALOG) 0.1 % cream, Apply topically 2 times daily.  (Patient not taking: Reported on 9/1/2022), Disp: 15 g, Rfl: 0    mupirocin (BACTROBAN) 2 % ointment, , Disp: , Rfl:     clopidogrel (PLAVIX) 75 MG tablet, TAKE 1 TABLET DAILY, Disp: 90 tablet, Rfl: 1    ibuprofen (ADVIL;MOTRIN) 800 MG tablet, Take 1 tablet by mouth 4 times daily as needed for Pain, Disp: 40 tablet, Rfl: 0    lisinopril (PRINIVIL;ZESTRIL) 5 MG tablet, TAKE 1 TABLET DAILY, Disp: 90 tablet, Rfl: 1    metFORMIN (GLUCOPHAGE) 500 MG tablet, TAKE 1 TABLET TWICE A DAY WITH MEALS, Disp: 180 tablet, Rfl: 1    Ixekizumab (TALTZ SC), Inject into the skin every 30 days For psoriasis, Disp: , Rfl:     insulin lispro (HUMALOG) 100 UNIT/ML injection vial, Inject 30 Units into the skin 2 times daily  (Patient not taking: No sig reported), Disp: , Rfl:     omeprazole (PRILOSEC) 40 MG delayed release capsule, TAKE 1 CAPSULE BY MOUTH EVERY DAY, Disp: 30 capsule, Rfl: 1    BD PEN NEEDLE SAMEER U/F 32G X 4 MM MISC, , Disp: , Rfl:     aspirin 81 MG tablet, Take 81 mg by mouth daily, Disp: , Rfl:     Current Facility-Administered Medications:     clopidogrel (PLAVIX) tablet 75 mg, 75 mg, Oral, Daily, Yordy Mora MD, 75 mg at 10/06/22 7036    aspirin chewable tablet 81 mg, 81 mg, Oral, Daily, Yordy Mora MD, 81 mg at 10/06/22 3764    Allergies:  Pcn [penicillins], Fosamax [alendronate], and Lipitor [atorvastatin]    Social History:    Social History     Socioeconomic History    Marital status:      Spouse name: Not on file    Number of children: Not on file    Years of education: Not on file    Highest education level: Not on file   Occupational History    Occupation:  bookeeper   Tobacco Use    Smoking status: Former     Packs/day: 0.50     Years: 40.00     Pack years: 20.00     Types: Cigarettes     Start date:      Quit date: 2018     Years since quittin.1    Smokeless tobacco: Never   Vaping Use    Vaping Use: Never used   Substance and Sexual Activity    Alcohol use: No    Drug use: Never    Sexual activity: Not on file   Other Topics Concern    Not on file   Social History Narrative    Not on file     Social Determinants of Health     Financial Resource Strain: Not on file   Food Insecurity: Not on file   Transportation Needs: Not on file   Physical Activity: Inactive    Days of Exercise per Week: 0 days    Minutes of Exercise per Session: 120 min   Stress: Not on file   Social Connections: Not on file Intimate Partner Violence: Not on file   Housing Stability: Not on file        Family History:   Family History   Problem Relation Age of Onset    Cancer Mother         lung brain smoker    Heart Attack Father     Heart Disease Father         mi in his 52's     Other Father         pulmonary fibrosis    Heart Attack Brother         48    Diabetes Brother     Heart Attack Paternal Grandfather        REVIEW OF SYSTEMS:    Constitutional: No fatigue, fevers, chills or night sweats  Eyes: No visual changes or drainage  ENT: No headaches or hearing loss. No mouth sores or sore throat. Cardiovascular: No chest pain or pressure. No palpitations or lower extremity swelling. Respiratory: No CONTRERAS, cough, orthopnea or PND    Gastrointestinal: No abdominal pain, nausea, emesis or decreased appetite  Genitourinary: No urgency, dysuria or hematuria. Musculoskeletal: No gait disturbance, weakness or joint complaints  Integumentary: No rash, hives or pruritis   Neurological: No dizziness, headaches or seizures. No numbness or tingling  Psychiatric: No anxiety or depression. Endocrine: No temperature intolerance. No recent weight changes. Hematologic/Lymphatic: No abnormal bruising or bleeding. No swollen lymph node    PHYSICAL EXAM:    There were no vitals taken for this visit. CONST: In general, this is a well developed, well nourished female who appears of stated age. Awake, alert, cooperative, no apparent distress  HEENT:   Head- normocephalic, atraumatic   Eyes- conjunctivae pink  Throat - Oral mucosa pink and moist  Neck-  no stridor, no jugular venous distention   CHEST: Chest symmetrical and non-tender to palpation. No noted accessory muscle use or intercostal retractions. RESPIRATORY:  Lung auscultation - clear to all fields   CARDIOVASCULAR:     No carotid bruit noted.     Heart Inspection shows no noted pulsations  Heart Palpation - no heaves or thrills - PMI is non-displaced   Heart Ausculation -Regular rate and rhythm, no murmur. No s3, s4  or rub   PV: No lower extremity edema. No varicosities. Pedal pulses palpable - no clubbing or cyanosis   ABDOMEN: Soft, non-tender to light palpation. Bowel sounds present. No palpable masses no organomegaly; no abdominal bruit  MS: Good muscle strength and tone. Not atrophy or abnormal movements. : Deferred  SKIN: Warm and dry no statis dermatitis or ulcers   NEURO / PSYCH: Oriented to person, place and time. Speech clear and appropriate. Follows all commands. Pleasant affect     DATA:    Diagnostics:    TELEMETRY:  EKG:   CXR:     Labs:   CBC:   Recent Labs     10/04/22  0954   WBC 4.9   HGB 14.1   HCT 42.6        BMP:   Recent Labs     10/04/22  0954 10/05/22  0930    131*   K 6.0* 5.0   CO2 28 25   BUN 10 14   CREATININE 0.6 0.5   LABGLOM >60 >60   GLUCOSE 339* 278*     BNP: No results for input(s): BNP in the last 72 hours. PT/INR:   Recent Labs     10/04/22  0954   PROTIME 10.6   INR 1.0     APTT:No results for input(s): APTT in the last 72 hours. CARDIAC ENZYMES:No results for input(s): CKTOTAL, CKMB, CKMBINDEX, TROPONINI in the last 72 hours. FASTING LIPID PANEL:  Lab Results   Component Value Date/Time    HDL 37 03/17/2022 12:00 PM    [address] 103 03/17/2022 12:00 PM    TRIG 317 03/17/2022 12:00 PM     LIVER PROFILE:  Recent Labs     10/04/22  0954   AST 23   ALT 26   LABALBU 4.5       IMPRESSION:   Abnormal stress test  Tobacco abuse   Insulin requiring DM  HLD  PAD  Carotid disease  DDD LS spine      PLAN:   Cath +/- PCI. Risks, benefits and alternative therapies to the procedure explained.  She understood and consented to proceed  Further recommendations to follow    Electronically signed by Denver Golas, MD on 10/6/2022 at 9:38 AM.

## 2022-10-06 NOTE — H&P
Cardiology H & P     Attending Provider: Dr. Nereida Reyna    Date: 10/6/2022    Chief Complaint:  Abnormal stress test 9/20/22    HISTORY OF PRESENT ILLNESS: Ms. Jane Amos is known to [place] [address] [facility]. Followed most recently by Dr. Hector Mireles. Seen in office for preop clearance for upcoming back surgery with Dr. Anahi Sheriff. Patient was RCRI class III with a 6.6% risk of major adverse cardiac event. Therefore, stress test of obtained on 9/20/22 which was abnormal (see full report below). Patient presents to [facility] Cath lab for cardiac catheretization. PMH:  PVD, HLD    Patient seen & examined in CVL:      Please note: past medical records were reviewed per electronic medical record (EMR) - see detailed reports under Past Medical/ Surgical History. Past Medical History:      Drake Grossman Stress Test: 9/20/2022 Dr. Juana Caldwell  FINDINGS: The overall quality of the study was excellent. Left ventricular cavity size was noted to be normal.   Rotational analog analysis demonstrated no patient motion or abnormal extracardiac radioactivity. A mild defect was present in the basal inferolateral, basal inferior, mid inferolateral, and mid inferior wall(s) that was  moderate sized by quantification. The resting images reveal complete reversibility. Gated SPECT left ventricular ejection fraction was calculated to be 55%, with normal myocardial thickening and wall motion. Impression:    ECG during the infusion did not change. The myocardial perfusion imaging was abnormal.     Overall left ventricular systolic function was normal without regional wall motion abnormalities. Intermediate risk pre-operative pharmacologic nuclear stress test.PVD      2. HLD  3. DM type 2 with neuropathy  4.  HTN  5. PVD  Bilateral Carotid Stenosis - hx L CEA 10/11/2018   Hx of iliac stents 12/4/2018 ( R Kissing iliac stents 8x59 ICAST  Plasty of the R EIA with 8x59 balloon )-medical management with ASA, plavix and statin - follows with Dr. Benigno Crowell     6. Tobacco Abuse  7. Hypothyroidism  8. GERD / globus sensation - dysphagia - followed by Dr. Claudene Ring   9. Vocal cord leukoplakia  10. Migraines     11. lumbar dextroscoliosis / severe central and foraminal stenosis from L3-S1 with foraminal stenosis at L1-L2 and L2-L3.  -failed over 3 months of PT and epidurals. Dr. Leif Goldstein recommending an L1-S1 fusion with L4-L5 and L5-S1 interbody fusion - per office note 2022    12. Positive cologuard, constipation - colonoscopy 2021 Dr. Franklin Ba: several flat colon polyps s/p polypectomy   13. Psoriasiform dermatitis   14. Seasonal allergies         Past Surgical History:    Past Surgical History:   Procedure Laterality Date    BACK SURGERY      L5-S1    CAROTID ENDARTERECTOMY Left 10/11/2018    [place]    CARPAL TUNNEL RELEASE      bilat     SECTION      2    COLONOSCOPY N/A 2021    COLONOSCOPY WITH BIOPSY with tattooing performed by Radha Zuniga MD at [address] [place] [address] (CERVIX STATUS UNKNOWN)      LARYNGOSCOPY N/A 2020    DIRECT LARYNGOSCOPYY AND CERVICAL ESOPHAGOSCOPY (PATHOLOGY PRESENT) performed by Anastasiya Schwartz DO at [facility]- right     OTHER SURGICAL HISTORY  2018    Dr. Benigno Crowell- Bilateral iliac stents iCast [id number]    [place] OFFICE/OUTPT VISIT,PROCEDURE ONLY Left 10/11/2018    LEFT CAROTID ENDARTERECTOMY performed by Anaya Amin MD at [address] [place] [address] - ingrown toe nails    TONSILLECTOMY           Medications Prior to admit:  Prior to Admission medications    Medication Sig Start Date End Date Taking?  Authorizing Provider   Insulin Glargine, 1 Unit Dial, (TOUJEO SOLOSTAR) 300 UNIT/ML SOPN INJECT 50 UNITS INTO THE SKIN TWICE A DAY 22   Anurag Garcia DO   simvastatin (ZOCOR) 40 MG tablet TAKE 1 TABLET BY MOUTH NIGHTLY 22   Sheridan Garcia, DO levothyroxine (SYNTHROID) 25 MCG tablet Take 1 tablet by mouth Daily 8/29/22   Aaliyah Garcia DO   nystatin (MYCOSTATIN) 098376 UNIT/GM cream Apply topically 2 times daily. Patient not taking: Reported on 9/1/2022 8/9/22   Aaliyah Garcia DO   citalopram (CELEXA) 20 MG tablet TAKE 1 TABLET BY MOUTH EVERY DAY 8/4/22   Anurag Garcia DO   triamcinolone (KENALOG) 0.1 % cream Apply topically 2 times daily.   Patient not taking: Reported on 9/1/2022 6/23/22   Aaliyah Garcia DO   mupirocin Liz Brown) 2 % ointment  11/12/21   Historical Provider, MD   clopidogrel (PLAVIX) 75 MG tablet TAKE 1 TABLET DAILY 2/10/22   Aaliyah Garcia DO   ibuprofen (ADVIL;MOTRIN) 800 MG tablet Take 1 tablet by mouth 4 times daily as needed for Pain 2/8/22   Giuseppe Longoria DO   lisinopril (PRINIVIL;ZESTRIL) 5 MG tablet TAKE 1 TABLET DAILY 3/3/21   Aaliyah Garcia DO   metFORMIN (GLUCOPHAGE) 500 MG tablet TAKE 1 TABLET TWICE A DAY WITH MEALS 2/15/21   Anurag Garcia DO   Ixekizumab (TALTZ SC) Inject into the skin every 30 days For psoriasis    Historical Provider, MD   insulin lispro (HUMALOG) 100 UNIT/ML injection vial Inject 30 Units into the skin 2 times daily   Patient not taking: No sig reported    Historical Provider, MD   omeprazole (PRILOSEC) 40 MG delayed release capsule TAKE 1 CAPSULE BY MOUTH EVERY DAY 8/14/20   [facility] Ambar Jasper, DO   BD PEN NEEDLE SAMEER U/F 32G X 4 MM [facility]  1/23/19   Historical Provider, MD   aspirin 81 MG tablet Take 81 mg by mouth daily    Historical Provider, MD       Current Medications:    Current Facility-Administered Medications: clopidogrel (PLAVIX) tablet 75 mg, 75 mg, Oral, Daily  aspirin chewable tablet 81 mg, 81 mg, Oral, Daily    Allergies:  Pcn [penicillins], Fosamax [alendronate], and Lipitor [atorvastatin]    Social History:    Tobacco Abuse  ETOH  Illicit  Caffeine  Functional Capacity     Family History:   Family History Problem Relation Age of Onset    Cancer Mother         lung brain smoker    Heart Attack Father     Heart Disease Father         mi in his 52's     Other Father         pulmonary fibrosis    Heart Attack Brother         48    Diabetes Brother     Heart Attack Paternal Grandfather        REVIEW OF SYSTEMS:     Constitutional: Denies fatigue, fevers, chills or night sweats  Eyes: Denies visual changes or drainage  ENT: Denies headaches or hearing loss. No mouth sores or sore throat. No epistaxis   Cardiovascular: Denies chest pain, pressure or palpitations. No lower extremity swelling. Respiratory: Denies CONTRERAS, cough, orthopnea or PND. No hemoptysis   Gastrointestinal: Denies hematemesis or anorexia. No hematochezia or melena    Genitourinary: Denies urgency, dysuria or hematuria. Musculoskeletal: Denies gait disturbance, weakness or joint complaints  Integumentary: Denies rash, hives or pruritis   Neurological: Denies dizziness, headaches or seizures. No numbness or tingling  Psychiatric: Denies anxiety or depression. Endocrine: Denies temperature intolerance. No recent weight change. .  Hematologic/Lymphatic: Denies abnormal bruising or bleeding. No swollen lymph nodes    PHYSICAL EXAM:      There were no vitals taken for this visit. CONST:  Well developed, well nourished who appears of stated age. Awake, alert and cooperative. No apparent distress. HEENT:   Head- Normocephalic, atraumatic   Eyes- Conjunctivae pink  Throat- Oral mucosa pink and moist  Neck-  No stridor, trachea midline, no jugular venous distention. No carotid bruit. CHEST: Chest symmetrical and non-tender to palpation. No accessory muscle use or intercostal retractions  RESPIRATORY: Lung sounds - clear throughout fields   CARDIOVASCULAR:     Heart Inspection- shows no noted pulsations  Heart Palpation- no heaves or thrills; PMI is non-displaced   Heart Ausculation- Regular rate and rhythm, no murmur.  No s3, s4 or rub   PV: No lower extremity edema. No varicosities. Pedal pulses palpable, no clubbing or cyanosis   ABDOMEN: Soft, non-tender to light palpation. Bowel sounds present. No palpable masses   MS: Good muscle strength and tone. No atrophy or abnormal movements. : Deferred  SKIN: Warm and dry no statis dermatitis or ulcers   NEURO / PSYCH: Oriented to person, place and time. Speech clear and appropriate. Follows all commands. Pleasant affect     DATA:    12 lead ECG:     Tele strips:     Diagnostic:    No intake or output data in the 24 hours ending 10/06/22 0856    Labs:   CBC:   Recent Labs     10/04/22  0954   WBC 4.9   HGB 14.1   HCT 42.6        BMP:   Recent Labs     10/04/22  0954 10/05/22  0930    131*   K 6.0* 5.0   CO2 28 25   BUN 10 14   CREATININE 0.6 0.5   LABGLOM >60 >60   CALCIUM 10.7* 10.8*     Mag: No results for input(s): MG in the last 72 hours. Phos: No results for input(s): PHOS in the last 72 hours. TSH: No results for input(s): TSH in the last 72 hours.   HgA1c:   Lab Results   Component Value Date    LABA1C 11.3 06/23/2022       PT/INR:   Recent Labs     10/04/22  0954   PROTIME 10.6   INR 1.0     FASTING LIPID PANEL:  Lab Results   Component Value Date/Time    CHOL 203 03/17/2022 12:00 PM    HDL 37 03/17/2022 12:00 PM    LDLCALC 103 03/17/2022 12:00 PM    TRIG 317 03/17/2022 12:00 PM     LIVER PROFILE:  Recent Labs     10/04/22  0954   AST 23   ALT 26   LABALBU 4.5       Assessment & Plan: Per Dr. Gurjit Hanks       Electronically signed by INDIRA Matos on 10/6/2022 at 8:56 AM

## 2022-10-10 ENCOUNTER — TELEPHONE (OUTPATIENT)
Dept: SURGERY | Age: 67
End: 2022-10-10

## 2022-10-10 ENCOUNTER — TELEPHONE (OUTPATIENT)
Dept: NEUROSURGERY | Age: 67
End: 2022-10-10

## 2022-10-10 ASSESSMENT — ENCOUNTER SYMPTOMS
VOMITING: 0
ABDOMINAL DISTENTION: 0
SHORTNESS OF BREATH: 0
DIARRHEA: 0
NAUSEA: 0
CHEST TIGHTNESS: 0

## 2022-10-10 NOTE — PROGRESS NOTES
Subjective:      Patient ID: Monalisa Lowe is a 78 y.o. female. HPI: Ms. Yariel Govea is a 78year old female with past medical history of PVD and HLD presents to the office to discuss surgical intervention regarding recently found MVCAD. She originally presented to the cardiology clinic for preoperative evaluation for back surgery. Patient was RCRI class III with a 6.6% risk of major adverse cardiac event. Therefore, stress test of obtained on 9/20/22 which was abnormal. She then was referred for a cardiac catheterization on 10/6/2022 that showed severe CAD (full report below) and she was referred to CTS for further recommendation. She currently denies any CP,SOB, CONTRERAS, PND or syncope. [facility] 10/6/2022 Dr. Nacho Allison:   1. Left Main:  The left main artery is a short vessel which is heavily  calcified distally with around 20% distal vessel narrowing. 2. LAD:  The left anterior descending artery appeared heavily calcified at its origin. There was an eccentric 70% to 80% stenosis in the proximal LAD just before the first septal  branch. After the first diagonal branch in the mid LAD, there was 95% to 99% subtotal occlusion. Distally, the LAD again had around 80% to 90% luminal narrowing. The distal LAD was a small caliber vessel measuring around 1.5 mm in diameter. The large diagonal branch had 70% to 80% ostial narrowing and moderate proximal disease with mid and distal segments of the diagonal branch showing no significant disease. The diagonal branch, however, was a small caliber vessel. 3.  LCX:  The left circumflex is a large but nondominant vessel. The  first marginal branch has diffuse ostial and proximal disease with up to 70% to 80% luminal narrowing. The mid circumflex had 95% subtotal occlusion.   4.  RCA:  The right coronary artery is a dominant vessel which is  heavily calcified in its proximal segment and which is severely and  diffusely diseased in its proximal segment with up to 95% to 99%  stenoses and the vessel is totally occluded at the mid vessel level. The distal RCA received collaterals from an acute marginal branch. LEFT VENTRICULOGRAPHY:  The left ventricle is normal in size. There was  hypokinesis of a very short segment involving the basal inferior wall  with the rest of the myocardial segments appearing to contract  vigorously with an estimated ejection fraction of 70%. PRESSURES:  1. Aorta 202/56 with a mean of 118.  2.  Left ventricular systolic pressure 962 (after the patient received  intravenous hydralazine). Left ventricular end-diastolic pressure 13. There was no significant gradient across the aortic valve. Review of Systems   Constitutional:  Negative for activity change, chills and fatigue. Respiratory:  Negative for chest tightness and shortness of breath. Cardiovascular:  Negative for chest pain and palpitations. Gastrointestinal:  Negative for abdominal distention, diarrhea, nausea and vomiting. Neurological:  Negative for dizziness, syncope and light-headedness. Psychiatric/Behavioral:  The patient is not nervous/anxious. Objective:   Physical Exam  Constitutional:       General: She is not in acute distress. Cardiovascular:      Rate and Rhythm: Normal rate and regular rhythm. Heart sounds: Normal heart sounds. Pulmonary:      Effort: No respiratory distress. Breath sounds: Normal breath sounds. Abdominal:      General: There is no distension. Palpations: Abdomen is soft. Musculoskeletal:         General: Normal range of motion. Skin:     General: Skin is warm and dry. Neurological:      Mental Status: She is alert and oriented to person, place, and time. Psychiatric:         Mood and Affect: Mood normal.         Behavior: Behavior normal.       LHC:  CORONARY ANGIOGRAPHY:  1.   Left Main:  The left main artery is a short vessel which is heavily  calcified distally with around 20% distal vessel narrowing. 2. LAD:  The left anterior descending artery appeared heavily calcified  at its origin. There was an eccentric 70% to 80% stenosis in the  proximal LAD just before the first septal  branch. After the  first diagonal branch in the mid LAD, there was 95% to 99% subtotal  occlusion. Distally, the LAD again had around 80% to 90% luminal  narrowing. The distal LAD was a small caliber vessel measuring around  1.5 mm in diameter. The large diagonal branch had 70% to 80% ostial  narrowing and moderate proximal disease with mid and distal segments of  the diagonal branch showing no significant disease. The diagonal  branch, however, was a small caliber vessel. 3.  LCX:  The left circumflex is a large but nondominant vessel. The  first marginal branch has diffuse ostial and proximal disease with up to  70% to 80% luminal narrowing. The mid circumflex had 95% subtotal  occlusion. 4.  RCA:  The right coronary artery is a dominant vessel which is  heavily calcified in its proximal segment and which is severely and  diffusely diseased in its proximal segment with up to 95% to 99%  stenoses and the vessel is totally occluded at the mid vessel level. The distal RCA received collaterals from an acute marginal branch. LEFT VENTRICULOGRAPHY:  The left ventricle is normal in size. There was  hypokinesis of a very short segment involving the basal inferior wall  with the rest of the myocardial segments appearing to contract  vigorously with an estimated ejection fraction of 70%. The right femoral arterial sheath was removed at the end of the  procedure and digital pressure was applied with adequate hemostasis and  with preservation of pulse. The patient tolerated the procedure well and left the cardiac  catheterization laboratory in a stable condition. ESTIMATED BLOOD LOSS:  20 mL. CONTRAST USED:  60 mL. CONCLUSIONS:  1.   Severe multivessel coronary artery disease as described above.  2.  Normal left ventricular size with a very short segment of basal  inferior wall hypokinesis, but with preserved global left ventricular  systolic function with an estimated ejection fraction of 70%. 3.  Systemic hypertension. Assessment:      79 yo female who underwent stress test for preop clearance for back surgery and was found to have a positive result   LHC revealed severe MVCAD       Plan:      Angiogram reviewed - diffuse disease typically seen in diabetic pt with high A1c. LAD is calcified with multiple lesions, however is likely a surgical target as is the OM. The  RCA is poorly visualized with collaterals.     Given her relatively young age and likely lack of PCI option, surgery was recommended   R/B/A were discussed at length   Will plan on CABG (LAD, OM, +/-RCA) on 11/2/2022  Will also need an ECHO prior  Stop plavix 10/26

## 2022-10-10 NOTE — TELEPHONE ENCOUNTER
Patient called stating she needs to cancel her surgery on 10/31/22 with Dr Ryan Simons. States she needs to have open heart surgery. Patient will call when she has recovered.

## 2022-10-11 ENCOUNTER — INITIAL CONSULT (OUTPATIENT)
Dept: CARDIOTHORACIC SURGERY | Age: 67
End: 2022-10-11

## 2022-10-11 ENCOUNTER — PREP FOR PROCEDURE (OUTPATIENT)
Dept: CARDIOTHORACIC SURGERY | Age: 67
End: 2022-10-11

## 2022-10-11 VITALS
HEART RATE: 63 BPM | DIASTOLIC BLOOD PRESSURE: 57 MMHG | HEIGHT: 64 IN | BODY MASS INDEX: 21.85 KG/M2 | WEIGHT: 128 LBS | SYSTOLIC BLOOD PRESSURE: 125 MMHG

## 2022-10-11 DIAGNOSIS — I73.9 PVD (PERIPHERAL VASCULAR DISEASE) (HCC): Primary | ICD-10-CM

## 2022-10-11 DIAGNOSIS — Z01.818 PRE-OP TESTING: ICD-10-CM

## 2022-10-11 DIAGNOSIS — Z01.818 PREOP TESTING: ICD-10-CM

## 2022-10-11 DIAGNOSIS — I25.10 CORONARY ARTERY DISEASE INVOLVING NATIVE CORONARY ARTERY OF NATIVE HEART WITHOUT ANGINA PECTORIS: Primary | ICD-10-CM

## 2022-10-11 DIAGNOSIS — E13.9 DIABETES 1.5, MANAGED AS TYPE 2 (HCC): ICD-10-CM

## 2022-10-11 DIAGNOSIS — I25.10 CAD IN NATIVE ARTERY: Primary | ICD-10-CM

## 2022-10-11 DIAGNOSIS — R09.89 BRUIT: ICD-10-CM

## 2022-10-11 PROCEDURE — 3017F COLORECTAL CA SCREEN DOC REV: CPT | Performed by: THORACIC SURGERY (CARDIOTHORACIC VASCULAR SURGERY)

## 2022-10-11 PROCEDURE — G8399 PT W/DXA RESULTS DOCUMENT: HCPCS | Performed by: THORACIC SURGERY (CARDIOTHORACIC VASCULAR SURGERY)

## 2022-10-11 PROCEDURE — G8420 CALC BMI NORM PARAMETERS: HCPCS | Performed by: THORACIC SURGERY (CARDIOTHORACIC VASCULAR SURGERY)

## 2022-10-11 PROCEDURE — 99204 OFFICE O/P NEW MOD 45 MIN: CPT | Performed by: THORACIC SURGERY (CARDIOTHORACIC VASCULAR SURGERY)

## 2022-10-11 PROCEDURE — G8484 FLU IMMUNIZE NO ADMIN: HCPCS | Performed by: THORACIC SURGERY (CARDIOTHORACIC VASCULAR SURGERY)

## 2022-10-11 PROCEDURE — 1123F ACP DISCUSS/DSCN MKR DOCD: CPT | Performed by: THORACIC SURGERY (CARDIOTHORACIC VASCULAR SURGERY)

## 2022-10-11 PROCEDURE — 1036F TOBACCO NON-USER: CPT | Performed by: THORACIC SURGERY (CARDIOTHORACIC VASCULAR SURGERY)

## 2022-10-11 PROCEDURE — 1090F PRES/ABSN URINE INCON ASSESS: CPT | Performed by: THORACIC SURGERY (CARDIOTHORACIC VASCULAR SURGERY)

## 2022-10-11 PROCEDURE — G8427 DOCREV CUR MEDS BY ELIG CLIN: HCPCS | Performed by: THORACIC SURGERY (CARDIOTHORACIC VASCULAR SURGERY)

## 2022-10-11 RX ORDER — SODIUM CHLORIDE 9 MG/ML
INJECTION, SOLUTION INTRAVENOUS CONTINUOUS
Status: CANCELLED | OUTPATIENT
Start: 2022-10-11

## 2022-10-11 RX ORDER — SODIUM CHLORIDE 0.9 % (FLUSH) 0.9 %
5-40 SYRINGE (ML) INJECTION PRN
Status: CANCELLED | OUTPATIENT
Start: 2022-10-11

## 2022-10-11 RX ORDER — SODIUM CHLORIDE 0.9 % (FLUSH) 0.9 %
5-40 SYRINGE (ML) INJECTION EVERY 12 HOURS SCHEDULED
Status: CANCELLED | OUTPATIENT
Start: 2022-10-11

## 2022-10-11 RX ORDER — SODIUM CHLORIDE 9 MG/ML
INJECTION, SOLUTION INTRAVENOUS PRN
Status: CANCELLED | OUTPATIENT
Start: 2022-10-11

## 2022-10-11 NOTE — H&P
CC: CAD          HPI:  Ms. Martinez Solo is a 78year old female with past medical history of PVD and HLD whom recently presented to the office to discuss surgical intervention regarding recently found MVCAD. She originally presented to the cardiology clinic for preoperative evaluation for back surgery. Patient was RCRI class III with a 6.6% risk of major adverse cardiac event. Therefore, stress test of obtained on 22 which was abnormal. She then was referred for a cardiac catheterization on 10/6/2022 that showed severe CAD (full report below) and she was referred to CTS for further recommendation. She currently denies any CP,SOB, CONTRERAS, PND or syncope.           Past Medical History:   Diagnosis Date    Age-related osteoporosis without current pathological fracture 2021    Arthritis     Cataracts, bilateral 10/'19    Colon polyps 2021    tubular adenoma    Diabetes mellitus ([address]     DM type 2 (diabetes mellitus, type 2) ([address]     Facial basal cell cancer 2022    Generalovich    Hyperlipidemia     Hypertension     Hypothyroidism     Migraines     rare    Neuropathy, diabetic ([address]     Peripheral vascular disease ([address]     follows with Dr. Rabia Felix yearly    Positive colorectal cancer screening using Cologuard test     Psoriasiform dermatitis     Seasonal allergies     Thyroid disease      Past Surgical History:   Procedure Laterality Date    BACK SURGERY      L5-S1    CAROTID ENDARTERECTOMY Left 10/11/2018    [facility]    CARPAL TUNNEL RELEASE      bilat     SECTION      2    COLONOSCOPY N/A 2021    COLONOSCOPY WITH BIOPSY with tattooing performed by Serena Mosley MD at [address] [place] [address] (CERVIX STATUS UNKNOWN)      LARYNGOSCOPY N/A 2020    DIRECT LARYNGOSCOPYY AND CERVICAL ESOPHAGOSCOPY (PATHOLOGY PRESENT) performed by Jose Bianchi DO at [address] [place] [address]- right     OTHER SURGICAL HISTORY  2018    Dr. Rabia Felix- Bilateral iliac stents iCast [id number]    ME OFFICE/OUTPT VISIT,PROCEDURE ONLY Left 10/11/2018    LEFT CAROTID ENDARTERECTOMY performed by Angelia Cervantes MD at [place] [address]      TOE SURGERY      bilat - ingrown toe nails    TONSILLECTOMY       Social History     Socioeconomic History    Marital status:      Spouse name: Not on file    Number of children: Not on file    Years of education: Not on file    Highest education level: Not on file   Occupational History    Occupation:  bookeeper   Tobacco Use    Smoking status: Former     Packs/day: 0.50     Years: 40.00     Pack years: 20.00     Types: Cigarettes     Start date: 5     Quit date: 2018     Years since quittin.2    Smokeless tobacco: Never   Vaping Use    Vaping Use: Never used   Substance and Sexual Activity    Alcohol use: No    Drug use: Never    Sexual activity: Not on file   Other Topics Concern    Not on file   Social History Narrative    Not on file     Social Determinants of Health     Financial Resource Strain: Not on file   Food Insecurity: Not on file   Transportation Needs: Not on file   Physical Activity: Inactive    Days of Exercise per Week: 0 days    Minutes of Exercise per Session: 120 min   Stress: Not on file   Social Connections: Not on file   Intimate Partner Violence: Not on file   Housing Stability: Not on file     Family History   Problem Relation Age of Onset    Cancer Mother         lung brain smoker    Heart Attack Father     Heart Disease Father         mi in his 52's     Other Father         pulmonary fibrosis    Heart Attack Brother         48    Diabetes Brother     Heart Attack Paternal Grandfather      Allergies   Allergen Reactions    Pcn [Penicillins] Hives and Swelling    Fosamax [Alendronate]      Nausea, vomiting    Lipitor [Atorvastatin]      Myalgia not allergic      No current facility-administered medications for this encounter.     Current Outpatient Medications:     isosorbide mononitrate (IMDUR) 30 MG extended release tablet, Take 1 tablet by mouth daily, Disp: 30 tablet, Rfl: 3    nitroGLYCERIN (NITROSTAT) 0.4 MG SL tablet, Place 1 tablet under the tongue every 5 minutes as needed for Chest pain up to max of 3 total doses. If no relief after 1 dose, call 911., Disp: 25 tablet, Rfl: 3    TOPROL XL 25 MG extended release tablet, Take 1 tablet by mouth daily, Disp: 30 tablet, Rfl: 3    Insulin Glargine, 1 Unit Dial, (TOUJEO SOLOSTAR) 300 UNIT/ML SOPN, INJECT 50 UNITS INTO THE SKIN TWICE A DAY, Disp: 15 Adjustable Dose Pre-filled Pen Syringe, Rfl: 1    simvastatin (ZOCOR) 40 MG tablet, TAKE 1 TABLET BY MOUTH NIGHTLY, Disp: 90 tablet, Rfl: 1    levothyroxine (SYNTHROID) 25 MCG tablet, Take 1 tablet by mouth Daily, Disp: 90 tablet, Rfl: 1    nystatin (MYCOSTATIN) 306812 UNIT/GM cream, Apply topically 2 times daily. , Disp: 15 g, Rfl: 0    citalopram (CELEXA) 20 MG tablet, TAKE 1 TABLET BY MOUTH EVERY DAY, Disp: 90 tablet, Rfl: 1    triamcinolone (KENALOG) 0.1 % cream, Apply topically 2 times daily. , Disp: 15 g, Rfl: 0    mupirocin (BACTROBAN) 2 % ointment, , Disp: , Rfl:     clopidogrel (PLAVIX) 75 MG tablet, TAKE 1 TABLET DAILY, Disp: 90 tablet, Rfl: 1    ibuprofen (ADVIL;MOTRIN) 800 MG tablet, Take 1 tablet by mouth 4 times daily as needed for Pain, Disp: 40 tablet, Rfl: 0    lisinopril (PRINIVIL;ZESTRIL) 5 MG tablet, TAKE 1 TABLET DAILY, Disp: 90 tablet, Rfl: 1    metFORMIN (GLUCOPHAGE) 500 MG tablet, TAKE 1 TABLET TWICE A DAY WITH MEALS, Disp: 180 tablet, Rfl: 1    Ixekizumab (TALTZ SC), Inject into the skin every 30 days For psoriasis, Disp: , Rfl:     insulin lispro (HUMALOG) 100 UNIT/ML injection vial, Inject 30 Units into the skin 2 times daily, Disp: , Rfl:     omeprazole (PRILOSEC) 40 MG delayed release capsule, TAKE 1 CAPSULE BY MOUTH EVERY DAY, Disp: 30 capsule, Rfl: 1    BD PEN NEEDLE SAMEER U/F 32G X 4 MM MISC, , Disp: , Rfl:     aspirin 81 MG tablet, Take 81 mg by mouth daily, Disp: , Rfl:     Review of Systems:  Constitutional: Denies fevers, chills, or weight loss. HEENT: Denies visual changes or hearing loss. Heart: As per HPI. Lungs: Denies shortness of breath, cough, or wheezing. Gastrointestinal: Denies nausea, vomiting, constipation, or diarrhea. Genitourinary: dysuria or hematuria. Psychiatric: Patient denies anxiety or depression. Neurologic: Patient denies weakness of the extremities, dizziness, or headaches. All other ROS checked and found to be negative. Objective: There were no vitals filed for this visit. General Appearance: Appears stated age. Communicates well, no acute distress. HEENT: Head is normocephalic, atraumatic. EOMs intact, PERRL. Trachea midline. Lungs: Normal respiratory rate and normal effort. Not in respiratory distress. Breath sounds clear to auscultation. No wheezes. Heart: normal rate and regular rhythm, normal heart sounds   Chest: Symmetric chest wall expansion. Extremities: Normal range of motion. Neurological: Patient is alert and oriented to person, place and time. Patient has normal reflexes. Skin: Warm and dry. Abdomen: Abdomen is soft and non-distended. Bowel sounds are normal. There is no abdominal tenderness tenderness. There is no guarding. There is no mass. Pulses: Distal pulses are intact. Skin: Warm and dry without lesions.     ASSESSMENT:  Patient Active Problem List   Diagnosis    Asymptomatic bilateral carotid artery stenosis    History of nuclear stress test    Mixed hyperlipidemia    Aortoiliac occlusive disease (HCC)    PVD (peripheral vascular disease) with claudication (HCC)    Carotid stenosis, asymptomatic, bilateral    S/P carotid endarterectomy    Tobacco abuse    Lesion of true vocal cord    Type 2 diabetes mellitus    Senile osteoporosis    Scoliosis    Lumbar spinal stenosis    CAD in native artery       79 yo female who underwent stress test for preop clearance for back surgery and was found to have a positive result   [facility] revealed severe MVCAD       PLAN:  Per Dr. Prince Augustine: Angiogram reviewed - diffuse disease typically seen in diabetic pt with high A1c. LAD is calcified with multiple lesions, however is likely a surgical target as is the OM. The  RCA is poorly visualized with collaterals.     Given her relatively young age and likely lack of PCI option, surgery was recommended   R/B/A were discussed at length   Will plan on CABG (LAD, OM, +/-RCA) on 11/2/2022  Will also need an ECHO prior  Stop plavix 10/26

## 2022-10-13 ENCOUNTER — TELEPHONE (OUTPATIENT)
Dept: CARDIOTHORACIC SURGERY | Age: 67
End: 2022-10-13

## 2022-10-13 DIAGNOSIS — I73.9 PVD (PERIPHERAL VASCULAR DISEASE) (HCC): ICD-10-CM

## 2022-10-13 DIAGNOSIS — I25.10 CAD IN NATIVE ARTERY: Primary | ICD-10-CM

## 2022-10-20 NOTE — PROGRESS NOTES
[address]   PRE-ADMISSION TESTING GENERAL INSTRUCTIONS  [facility] Phone Number: [phone]      GENERAL INSTRUCTIONS:  [x] Hibiclens shower the night before and the morning of surgery.   -Do not use Hibiclens on your face or head. [x] Do not wear makeup, lotions, powders, deodorant. [x] Nothing by mouth after midnight; including gum, candy, mints, or water. [x] You may brush your teeth, gargle, but do NOT swallow water. [x] No tobacco products, illegal drugs, or alcohol within 24 hours of your surgery. [x] Jewelry or valuables should not be brought to the hospital. All body and/or tongue piercing's must be removed prior to arriving to hospital. No contact lens or hair pins. [x] Bring insurance card and photo ID. [x] Transfusion Bracelet: Please bring with you to hospital, day of surgery. PARKING INSTRUCTIONS:     [x] ARRIVAL DATE & TIME: 11/2 at 5 am  [x] Enter into the [employer]. Two people may accompany you. Masks are not required but are recommended. [x] Parking Lot \"I\" is where you will park. It is located on the corner of [facility] and [facility]. The entrance is on [facility]. Upon entering the parking lot, a voucher ticket will print    EDUCATION INSTRUCTIONS:      [x] Pre-admission Testing educational folder given  [x] Incentive Spirometry,coughing & deep breathing exercises reviewed. [x] Medication information sheet(s)   [x] Fluoroscopy-Xray used in surgery reviewed with patient. Educational pamphlet placed in chart. [x] Pain: Post-op pain is normal and to be expected. You will be asked to rate your pain from 0-10. MEDICATION INSTRUCTIONS:    [x] Bring a complete list of your medications, please write the last time you took the medicine, give this list to the nurse in Pre-Op.   [x] Take only the following medications the morning of surgery with 1-2 ounces of water: protonix, aspirin, celexa, toprol (take half dose, 12.5mg)  [x] Stop all herbal supplements and vitamins 5 days before surgery. Stop NSAIDS 7 days before surgery. [x] DO NOT take any diabetic medicine the morning of surgery. Follow instructions for insulin the day before surgery. [x] If you are diabetic and your blood sugar is low or you feel symptomatic, you may drink 1-2 ounces of apple juice or take a glucose tablet.            -The morning of your procedure, you may call the pre-op area if you have concerns about your blood sugar [phone]. [x] Use your inhalers the morning of surgery. Bring your emergency inhaler with you day of surgery. [x] Follow physician instructions regarding any blood thinners you may be taking. WHAT TO EXPECT:    [x] The day of surgery you will be greeted and checked in by the [employer].  In addition, you will be registered in the [facility] by a Patient Access Representative. Please bring your photo ID and insurance card. A nurse will greet you in accordance to the time you are needed in the pre-op area to prepare you for surgery. Please do not be discouraged if you are not greeted in the order you arrive as there are many variables that are involved in patient preparation. Your patience is greatly appreciated as you wait for your nurse. Please bring in items such as: books, magazines, newspapers, electronics, or any other items  to occupy your time in the waiting area. [x]  Delays may occur with surgery and staff will make a sincere effort to keep you informed of delays. If any delays occur with your procedure, we apologize ahead of time for your inconvenience as we recognize the value of your time.

## 2022-10-24 DIAGNOSIS — I74.09 AORTOILIAC OCCLUSIVE DISEASE (HCC): ICD-10-CM

## 2022-10-24 RX ORDER — CLOPIDOGREL BISULFATE 75 MG/1
TABLET ORAL
Qty: 90 TABLET | Refills: 1 | Status: ON HOLD
Start: 2022-10-24 | End: 2022-11-07 | Stop reason: HOSPADM

## 2022-10-26 ENCOUNTER — TELEPHONE (OUTPATIENT)
Dept: NON INVASIVE DIAGNOSTICS | Age: 67
End: 2022-10-26

## 2022-10-27 ENCOUNTER — HOSPITAL ENCOUNTER (OUTPATIENT)
Dept: NON INVASIVE DIAGNOSTICS | Age: 67
Discharge: HOME | End: 2022-10-27
Payer: MEDICARE

## 2022-10-27 DIAGNOSIS — Z01.818 PRE-OP TESTING: ICD-10-CM

## 2022-10-27 DIAGNOSIS — I25.10 CAD IN NATIVE ARTERY: ICD-10-CM

## 2022-10-27 LAB
LV EF: 63 %
LVEF MODALITY: NORMAL

## 2022-10-27 PROCEDURE — 93306 TTE W/DOPPLER COMPLETE: CPT

## 2022-10-27 PROCEDURE — 6360000004 HC RX CONTRAST MEDICATION: Performed by: THORACIC SURGERY (CARDIOTHORACIC VASCULAR SURGERY)

## 2022-10-27 RX ADMIN — PERFLUTREN 1.65 MG: 6.52 INJECTION, SUSPENSION INTRAVENOUS at 08:20

## 2022-10-28 ENCOUNTER — HOSPITAL ENCOUNTER (OUTPATIENT)
Dept: CT IMAGING | Age: 67
Discharge: HOME | End: 2022-10-30
Payer: MEDICARE

## 2022-10-28 ENCOUNTER — HOSPITAL ENCOUNTER (OUTPATIENT)
Dept: GENERAL RADIOLOGY | Age: 67
Discharge: HOME | End: 2022-10-30
Payer: MEDICARE

## 2022-10-28 ENCOUNTER — HOSPITAL ENCOUNTER (OUTPATIENT)
Dept: INTERVENTIONAL RADIOLOGY/VASCULAR | Age: 67
Discharge: HOME | End: 2022-10-30
Payer: MEDICARE

## 2022-10-28 ENCOUNTER — HOSPITAL ENCOUNTER (OUTPATIENT)
Dept: ULTRASOUND IMAGING | Age: 67
Discharge: HOME | End: 2022-10-30
Payer: MEDICARE

## 2022-10-28 ENCOUNTER — HOSPITAL ENCOUNTER (OUTPATIENT)
Dept: PULMONOLOGY | Age: 67
Discharge: HOME | End: 2022-10-28
Payer: MEDICARE

## 2022-10-28 ENCOUNTER — HOSPITAL ENCOUNTER (OUTPATIENT)
Dept: PREADMISSION TESTING | Age: 67
Discharge: HOME | End: 2022-10-28
Payer: MEDICARE

## 2022-10-28 VITALS
DIASTOLIC BLOOD PRESSURE: 72 MMHG | HEART RATE: 57 BPM | BODY MASS INDEX: 22.31 KG/M2 | RESPIRATION RATE: 18 BRPM | WEIGHT: 130 LBS | TEMPERATURE: 97.9 F | SYSTOLIC BLOOD PRESSURE: 158 MMHG | OXYGEN SATURATION: 96 %

## 2022-10-28 DIAGNOSIS — I25.10 CAD IN NATIVE ARTERY: ICD-10-CM

## 2022-10-28 DIAGNOSIS — I73.9 PVD (PERIPHERAL VASCULAR DISEASE) (HCC): ICD-10-CM

## 2022-10-28 DIAGNOSIS — Z01.818 PREOP TESTING: ICD-10-CM

## 2022-10-28 DIAGNOSIS — E13.9 DIABETES 1.5, MANAGED AS TYPE 2 (HCC): ICD-10-CM

## 2022-10-28 DIAGNOSIS — R09.89 BRUIT: ICD-10-CM

## 2022-10-28 DIAGNOSIS — Z01.818 PRE-OP TESTING: ICD-10-CM

## 2022-10-28 LAB
ABO/RH: NORMAL
ALBUMIN SERPL-MCNC: 4.3 G/DL (ref 3.5–5.2)
ALP BLD-CCNC: 89 U/L (ref 35–104)
ALT SERPL-CCNC: 23 U/L (ref 0–32)
ANION GAP SERPL CALCULATED.3IONS-SCNC: 10 MMOL/L (ref 7–16)
ANTIBODY SCREEN: NORMAL
APTT: 29.9 SEC (ref 24.5–35.1)
AST SERPL-CCNC: 17 U/L (ref 0–31)
BACTERIA: ABNORMAL /HPF
BILIRUB SERPL-MCNC: 0.2 MG/DL (ref 0–1.2)
BILIRUBIN URINE: NEGATIVE
BLOOD, URINE: NEGATIVE
BUN BLDV-MCNC: 18 MG/DL (ref 6–23)
CALCIUM SERPL-MCNC: 10.7 MG/DL (ref 8.6–10.2)
CHLORIDE BLD-SCNC: 99 MMOL/L (ref 98–107)
CLARITY: CLEAR
CO2: 25 MMOL/L (ref 22–29)
COLOR: YELLOW
CREAT SERPL-MCNC: 0.6 MG/DL (ref 0.5–1)
GFR SERPL CREATININE-BSD FRML MDRD: >60 ML/MIN/1.73
GLUCOSE BLD-MCNC: 240 MG/DL (ref 74–99)
GLUCOSE URINE: 100 MG/DL
HBA1C MFR BLD: 9.7 % (ref 4–5.6)
HCT VFR BLD CALC: 42 % (ref 34–48)
HEMOGLOBIN: 13.8 G/DL (ref 11.5–15.5)
INR BLD: 0.9
KETONES, URINE: NEGATIVE MG/DL
LEUKOCYTE ESTERASE, URINE: ABNORMAL
MCH RBC QN AUTO: 30.1 PG (ref 26–35)
MCHC RBC AUTO-ENTMCNC: 32.9 % (ref 32–34.5)
MCV RBC AUTO: 91.7 FL (ref 80–99.9)
NITRITE, URINE: NEGATIVE
PDW BLD-RTO: 13.2 FL (ref 11.5–15)
PH UA: 6 (ref 5–9)
PLATELET # BLD: 204 E9/L (ref 130–450)
PMV BLD AUTO: 10.1 FL (ref 7–12)
POTASSIUM SERPL-SCNC: 5.2 MMOL/L (ref 3.5–5)
PROTEIN UA: NEGATIVE MG/DL
PROTHROMBIN TIME: 10.3 SEC (ref 9.3–12.4)
RBC # BLD: 4.58 E12/L (ref 3.5–5.5)
RBC UA: ABNORMAL /HPF (ref 0–2)
SODIUM BLD-SCNC: 134 MMOL/L (ref 132–146)
SPECIFIC GRAVITY UA: >=1.03 (ref 1–1.03)
TOTAL PROTEIN: 6.8 G/DL (ref 6.4–8.3)
UROBILINOGEN, URINE: 0.2 E.U./DL
WBC # BLD: 5.8 E9/L (ref 4.5–11.5)
WBC UA: ABNORMAL /HPF (ref 0–5)

## 2022-10-28 PROCEDURE — 86923 COMPATIBILITY TEST ELECTRIC: CPT

## 2022-10-28 PROCEDURE — 36415 COLL VENOUS BLD VENIPUNCTURE: CPT

## 2022-10-28 PROCEDURE — G1010 CDSM STANSON: HCPCS

## 2022-10-28 PROCEDURE — 80053 COMPREHEN METABOLIC PANEL: CPT

## 2022-10-28 PROCEDURE — 94010 BREATHING CAPACITY TEST: CPT | Performed by: INTERNAL MEDICINE

## 2022-10-28 PROCEDURE — 85027 COMPLETE CBC AUTOMATED: CPT

## 2022-10-28 PROCEDURE — 93923 UPR/LXTR ART STDY 3+ LVLS: CPT

## 2022-10-28 PROCEDURE — 83036 HEMOGLOBIN GLYCOSYLATED A1C: CPT

## 2022-10-28 PROCEDURE — 93880 EXTRACRANIAL BILAT STUDY: CPT

## 2022-10-28 PROCEDURE — 93922 UPR/L XTREMITY ART 2 LEVELS: CPT

## 2022-10-28 PROCEDURE — 94375 RESPIRATORY FLOW VOLUME LOOP: CPT

## 2022-10-28 PROCEDURE — 85730 THROMBOPLASTIN TIME PARTIAL: CPT

## 2022-10-28 PROCEDURE — 86901 BLOOD TYPING SEROLOGIC RH(D): CPT

## 2022-10-28 PROCEDURE — 94729 DIFFUSING CAPACITY: CPT | Performed by: INTERNAL MEDICINE

## 2022-10-28 PROCEDURE — 85610 PROTHROMBIN TIME: CPT

## 2022-10-28 PROCEDURE — 93970 EXTREMITY STUDY: CPT

## 2022-10-28 PROCEDURE — 94726 PLETHYSMOGRAPHY LUNG VOLUMES: CPT | Performed by: INTERNAL MEDICINE

## 2022-10-28 PROCEDURE — 87081 CULTURE SCREEN ONLY: CPT

## 2022-10-28 PROCEDURE — 81001 URINALYSIS AUTO W/SCOPE: CPT

## 2022-10-28 PROCEDURE — 86850 RBC ANTIBODY SCREEN: CPT

## 2022-10-28 PROCEDURE — 86900 BLOOD TYPING SEROLOGIC ABO: CPT

## 2022-10-28 PROCEDURE — 87088 URINE BACTERIA CULTURE: CPT

## 2022-10-28 PROCEDURE — 71046 X-RAY EXAM CHEST 2 VIEWS: CPT

## 2022-10-28 PROCEDURE — 94729 DIFFUSING CAPACITY: CPT

## 2022-10-29 LAB — ORGANISM: ABNORMAL

## 2022-10-29 NOTE — PROCEDURES
[address]                  [address] Formerly [facility],  [facility]                               PULMONARY FUNCTION    PATIENT NAME: Clare Reid                 :        1955  MED REC NO:   [id number]                            ROOM:  ACCOUNT NO:   [de-identified]                           ADMIT DATE: 10/28/2022  PROVIDER:     Tramaine Bonner DO    DATE OF PROCEDURE:  10/28/2022    IDENTIFYING DATA:  A 81-year-old female, 64 inches, 130 pounds,  preoperative assessment, a 50-pack-year smoker. FINDINGS:  Spirometry with airflow obstruction with a forced vital  capacity of 2.74 liters, 92% of predicted and FEV1 of 1.84 liters, 79%  of predicted with an FEV1-FVC ratio of 67%. Midflow rates reduced to  62% of predicted with a maximum voluntary ventilation 75 liters per  minute, 85% of predicted. Static lung volumes with slow vital capacity of 2.69 liters, 91% of  predicted. Inspiratory capacity 1.97 liters, 90% of predicted. Expiratory reserve volume 0.73 liters, 91% of predicted. The diffusion  capacity is 16.22 mL/minute per mmHg which is 66% of predicted. IMPRESSION:  Mild airflow obstruction, GOLD I COPD, with  mild-to-moderate loss in gas transfer. Clinical correlation needed.         Socorro West DO    D: 10/28/2022 16:03:09       T: 10/28/2022 16:06:26     [id number]  Job#: [id number]     Doc#: [id number]    CC:

## 2022-10-30 LAB — URINE CULTURE, ROUTINE: NORMAL

## 2022-10-31 DIAGNOSIS — R91.1 PULMONARY NODULE: Primary | ICD-10-CM

## 2022-10-31 RX ORDER — ISOSORBIDE MONONITRATE 30 MG/1
TABLET, EXTENDED RELEASE ORAL
Qty: 30 TABLET | Refills: 3 | Status: ON HOLD
Start: 2022-10-31 | End: 2022-11-07 | Stop reason: HOSPADM

## 2022-10-31 RX ORDER — METOPROLOL SUCCINATE 25 MG
TABLET, EXTENDED RELEASE 24 HR ORAL
Qty: 30 TABLET | Refills: 3 | Status: ON HOLD
Start: 2022-10-31 | End: 2022-11-07 | Stop reason: HOSPADM

## 2022-10-31 NOTE — PROGRESS NOTES
Pre-operative testing review:   --carotids with no significant stenosis on the left; > 70% stenosis on the right  --mary jane with decreased collateral flow bilaterally with 0.60 on the right and 0.55 on the left  --ct chest reviewed - will need to follow-up with pulmonary for pulm nodules   --TTE with no significant LV dysfunction or valvular abnormalities  --pft with FEV1 79 percent of predicted and DLCO 66 percent of predicted  --hgA1c 9.7  --MRSA nasal colonization + (bactroban ordered pre-op)  --H&P on chart from date 10/11, to be updated DOS    Recent Labs     10/28/22  0755 10/04/22  0954   HGB 13.8 14.1   HCT 42.0 42.6    197     Recent Labs     10/28/22  0755 10/05/22  0930   BUN 18 14   CREATININE 0.6 0.5     Lab Results   Component Value Date/Time    LABURIN <10,000 CFU/mL  Mixed gram positive organisms   10/28/2022 07:55 AM           Creatinine   Date/Time Value Ref Range Status   10/28/2022 07:55 AM 0.6 0.5 - 1.0 mg/dL Final   10/05/2022 09:30 AM 0.5 0.5 - 1.0 mg/dL Final   10/04/2022 09:54 AM 0.6 0.5 - 1.0 mg/dL Final           [id number] Score for Atrial Fibrillation Stroke Risk   Risk   Factors  Component Value   C CHF  0   H HTN  1   A2 Age >= 75  0   D DM  1   S2 Prior Stroke/TIA  0   V Vascular Disease  1   A Age 74-69  1   Sc Sex  1    [id number]  Score  5            Risk Score calculation is dependent on accuracy of patient problem list and past encounter diagnosis. Preoperative NYHA Class:  I

## 2022-11-01 ENCOUNTER — ANESTHESIA EVENT (OUTPATIENT)
Dept: OPERATING ROOM | Age: 67
DRG: 235 | End: 2022-11-01
Payer: MEDICARE

## 2022-11-02 ENCOUNTER — APPOINTMENT (OUTPATIENT)
Dept: GENERAL RADIOLOGY | Age: 67
DRG: 235 | End: 2022-11-02
Attending: THORACIC SURGERY (CARDIOTHORACIC VASCULAR SURGERY)
Payer: MEDICARE

## 2022-11-02 ENCOUNTER — ANESTHESIA (OUTPATIENT)
Dept: OPERATING ROOM | Age: 67
DRG: 235 | End: 2022-11-02
Payer: MEDICARE

## 2022-11-02 ENCOUNTER — HOSPITAL ENCOUNTER (INPATIENT)
Age: 67
LOS: 5 days | Discharge: HOME HEALTH | DRG: 235 | End: 2022-11-07
Attending: THORACIC SURGERY (CARDIOTHORACIC VASCULAR SURGERY) | Admitting: THORACIC SURGERY (CARDIOTHORACIC VASCULAR SURGERY)
Payer: MEDICARE

## 2022-11-02 DIAGNOSIS — J44.9 OBSTRUCTIVE CHRONIC BRONCHITIS WITHOUT EXACERBATION (HCC): ICD-10-CM

## 2022-11-02 DIAGNOSIS — G89.18 POST-OP PAIN: Primary | ICD-10-CM

## 2022-11-02 DIAGNOSIS — Z95.1 S/P CABG (CORONARY ARTERY BYPASS GRAFT): ICD-10-CM

## 2022-11-02 PROBLEM — R73.9 HYPERGLYCEMIA: Status: ACTIVE | Noted: 2022-11-02

## 2022-11-02 PROBLEM — J98.4 ACUTE PULMONARY INSUFFICIENCY: Status: ACTIVE | Noted: 2022-11-02

## 2022-11-02 PROBLEM — I97.3 POSTOPERATIVE HYPERTENSION: Status: ACTIVE | Noted: 2022-11-02

## 2022-11-02 LAB
AADO2: 119.3 MMHG
AADO2: 122.3 MMHG
AADO2: 142.9 MMHG
AADO2: 166.9 MMHG
ACTIVATED CLOTTING TIME: 132 SECONDS (ref 99–130)
ACTIVATED CLOTTING TIME: 581 SECONDS (ref 99–130)
ACTIVATED CLOTTING TIME: 615 SECONDS (ref 99–130)
ACTIVATED CLOTTING TIME: 94 SECONDS (ref 99–130)
ACTIVATED CLOTTING TIME: >1005 SECONDS (ref 99–130)
ANION GAP SERPL CALCULATED.3IONS-SCNC: 12 MMOL/L (ref 7–16)
ANION GAP: 10 MMOL/L (ref 7–16)
ANION GAP: 10 MMOL/L (ref 7–16)
ANION GAP: 9 MMOL/L (ref 7–16)
ANION GAP: 9 MMOL/L (ref 7–16)
APTT: 23.4 SEC (ref 24.5–35.1)
B.E.: -2.2 MMOL/L (ref -3–3)
B.E.: -2.7 MMOL/L (ref -3–3)
B.E.: -4.1 MMOL/L (ref -3–3)
B.E.: -4.3 MMOL/L (ref -3–3)
B.E.: -4.4 MMOL/L (ref -3–3)
B.E.: -5.7 MMOL/L (ref -3–3)
B.E.: -5.7 MMOL/L (ref -3–3)
B.E.: -6.2 MMOL/L (ref -3–3)
B.E.: -7.3 MMOL/L (ref -3–3)
BLOOD BANK DISPENSE STATUS: NORMAL
BLOOD BANK PRODUCT CODE: NORMAL
BPU ID: NORMAL
BUN BLDV-MCNC: 7 MG/DL (ref 6–23)
CALCIUM IONIZED: 1.13 MMOL/L (ref 1.15–1.33)
CALCIUM SERPL-MCNC: 7.7 MG/DL (ref 8.6–10.2)
CARDIOPULMONARY BYPASS: YES
CHLORIDE BLD-SCNC: 111 MMOL/L (ref 98–107)
CO2: 19 MMOL/L (ref 22–29)
COHB: 0.1 % (ref 0–1.5)
COHB: 0.3 % (ref 0–1.5)
COHB: 0.4 % (ref 0–1.5)
COHB: 0.5 % (ref 0–1.5)
COHB: 1.2 % (ref 0–1.5)
CREAT SERPL-MCNC: 0.4 MG/DL (ref 0.5–1)
CRITICAL: ABNORMAL
DATE ANALYZED: ABNORMAL
DATE OF COLLECTION: ABNORMAL
DELIVERY SYSTEMS: ABNORMAL
DESCRIPTION BLOOD BANK: NORMAL
DEVICE: ABNORMAL
FIO2: 100
FIO2: 40 %
FIO2: 50 %
GFR SERPL CREATININE-BSD FRML MDRD: >60 ML/MIN/1.73
GFR, ESTIMATED: >60 ML/MIN/1.73
GLUCOSE BLD-MCNC: 141 MG/DL (ref 74–99)
GLUCOSE BLD-MCNC: 162 MG/DL (ref 74–99)
GLUCOSE BLD-MCNC: 216 MG/DL (ref 74–99)
GLUCOSE BLD-MCNC: 279 MG/DL (ref 74–99)
GLUCOSE BLD-MCNC: 312 MG/DL (ref 74–99)
HCO3: 19.7 MMOL/L (ref 22–26)
HCO3: 19.8 MMOL/L (ref 22–26)
HCO3: 21.4 MMOL/L (ref 22–26)
HCO3: 21.6 MMOL/L (ref 22–26)
HCO3: 21.6 MMOL/L (ref 22–26)
HCO3: 22 MMOL/L (ref 22–26)
HCO3: 22.2 MMOL/L (ref 22–26)
HCO3: 23.5 MMOL/L (ref 22–26)
HCO3: 23.5 MMOL/L (ref 22–26)
HCT VFR BLD CALC: 29.4 % (ref 34–48)
HEMATOCRIT: 23 % (ref 34–48)
HEMATOCRIT: 23 % (ref 34–48)
HEMATOCRIT: 24 % (ref 34–48)
HEMATOCRIT: 27 % (ref 34–48)
HEMOGLOBIN: 7.7 G/DL (ref 11.5–15.5)
HEMOGLOBIN: 7.9 G/DL (ref 11.5–15.5)
HEMOGLOBIN: 8.2 G/DL (ref 11.5–15.5)
HEMOGLOBIN: 9.3 G/DL (ref 11.5–15.5)
HEMOGLOBIN: 9.8 G/DL (ref 11.5–15.5)
HHB: 1.9 % (ref 0–5)
HHB: 2 % (ref 0–5)
HHB: 2.2 % (ref 0–5)
HHB: 3.4 % (ref 0–5)
HHB: 8.3 % (ref 0–5)
INR BLD: 1.4
LAB: ABNORMAL
MAGNESIUM: 2.2 MG/DL (ref 1.6–2.6)
MCH RBC QN AUTO: 31 PG (ref 26–35)
MCHC RBC AUTO-ENTMCNC: 33.3 % (ref 32–34.5)
MCV RBC AUTO: 93 FL (ref 80–99.9)
METER GLUCOSE: 109 MG/DL (ref 74–99)
METER GLUCOSE: 112 MG/DL (ref 74–99)
METER GLUCOSE: 145 MG/DL (ref 74–99)
METER GLUCOSE: 152 MG/DL (ref 74–99)
METER GLUCOSE: 154 MG/DL (ref 74–99)
METER GLUCOSE: 192 MG/DL (ref 74–99)
METER GLUCOSE: 216 MG/DL (ref 74–99)
METER GLUCOSE: 232 MG/DL (ref 74–99)
METER GLUCOSE: 261 MG/DL (ref 74–99)
METHB: 0.1 % (ref 0–1.5)
METHB: 0.2 % (ref 0–1.5)
MODE: ABNORMAL
MODE: AC
MODE: AC
O2 SATURATION: 100 % (ref 92–98.5)
O2 SATURATION: 91.6 % (ref 92–98.5)
O2 SATURATION: 96.6 % (ref 92–98.5)
O2 SATURATION: 97.8 % (ref 92–98.5)
O2 SATURATION: 98 % (ref 92–98.5)
O2 SATURATION: 98.1 % (ref 92–98.5)
O2 SATURATION: 99.8 % (ref 92–98.5)
O2 SATURATION: 99.9 % (ref 92–98.5)
O2 SATURATION: 99.9 % (ref 92–98.5)
O2HB: 91.1 % (ref 94–97)
O2HB: 96.1 % (ref 94–97)
O2HB: 96.5 % (ref 94–97)
O2HB: 97.5 % (ref 94–97)
O2HB: 97.9 % (ref 94–97)
OPERATOR ID: 1394
OPERATOR ID: ABNORMAL
PATIENT TEMP: 37 C
PCO2 37: 44.1 MMHG (ref 35–45)
PCO2 37: 47.4 MMHG (ref 35–45)
PCO2 37: 52.3 MMHG (ref 35–45)
PCO2 37: 55.6 MMHG (ref 35–45)
PCO2: 37.8 MMHG (ref 35–45)
PCO2: 42.2 MMHG (ref 35–45)
PCO2: 43.5 MMHG (ref 35–45)
PCO2: 45.7 MMHG (ref 35–45)
PCO2: 46.8 MMHG (ref 35–45)
PDW BLD-RTO: 13 FL (ref 11.5–15)
PEEP/CPAP: 5 CMH2O
PEEP/CPAP: 5 CMH2O
PEEP/CPAP: 8 CMH2O
PEEP/CPAP: 8 CMH2O
PFO2: 2.58 MMHG/%
PFO2: 2.69 MMHG/%
PFO2: 3.07 MMHG/%
PFO2: 3.46 MMHG/%
PH 37: 7.21 (ref 7.35–7.45)
PH 37: 7.22 (ref 7.35–7.45)
PH 37: 7.3 (ref 7.35–7.45)
PH 37: 7.33 (ref 7.35–7.45)
PH BLOOD GAS: 7.24 (ref 7.35–7.45)
PH BLOOD GAS: 7.3 (ref 7.35–7.45)
PH BLOOD GAS: 7.31 (ref 7.35–7.45)
PH BLOOD GAS: 7.33 (ref 7.35–7.45)
PH BLOOD GAS: 7.33 (ref 7.35–7.45)
PLATELET # BLD: 110 E9/L (ref 130–450)
PMV BLD AUTO: 9.8 FL (ref 7–12)
PO2 37: 245.9 MMHG (ref 60–80)
PO2 37: 346.1 MMHG (ref 60–80)
PO2 37: 406.3 MMHG (ref 60–80)
PO2 37: 466.3 MMHG (ref 60–80)
PO2: 103.3 MMHG (ref 75–100)
PO2: 134.6 MMHG (ref 75–100)
PO2: 153.6 MMHG (ref 75–100)
PO2: 172.8 MMHG (ref 75–100)
PO2: 71.1 MMHG (ref 75–100)
POC BUN: 10 MG/DL (ref 8–23)
POC BUN: 8 MG/DL (ref 8–23)
POC BUN: 9 MG/DL (ref 8–23)
POC BUN: 9 MG/DL (ref 8–23)
POC CHLORIDE: 100 MMOL/L (ref 100–108)
POC CHLORIDE: 102 MMOL/L (ref 100–108)
POC CHLORIDE: 108 MMOL/L (ref 100–108)
POC CHLORIDE: 108 MMOL/L (ref 100–108)
POC CO2: 22 MMOL/L (ref 22–29)
POC CO2: 22.9 MMOL/L (ref 22–29)
POC CO2: 23.8 MMOL/L (ref 22–29)
POC CO2: 23.9 MMOL/L (ref 22–29)
POC CREATININE: 0.5 MG/DL (ref 0.5–1)
POC CREATININE: 0.6 MG/DL (ref 0.5–1)
POC IONIZED CALCIUM: 1.1 (ref 1.1–1.3)
POC IONIZED CALCIUM: 1.2 (ref 1.1–1.3)
POC LACTIC ACID: 0.8 (ref 0.5–2.2)
POC LACTIC ACID: 0.9 (ref 0.5–2.2)
POC LACTIC ACID: 2 (ref 0.5–2.2)
POC LACTIC ACID: 2.9 (ref 0.5–2.2)
POC SODIUM: 133 MMOL/L (ref 132–146)
POC SODIUM: 135 MMOL/L (ref 132–146)
POC SODIUM: 140 MMOL/L (ref 132–146)
POC SODIUM: 141 MMOL/L (ref 132–146)
POC SOURCE: ABNORMAL
POTASSIUM SERPL-SCNC: 3.4 MMOL/L (ref 3.5–5.5)
POTASSIUM SERPL-SCNC: 3.6 MMOL/L (ref 3.5–5.5)
POTASSIUM SERPL-SCNC: 4 MMOL/L (ref 3.5–5)
POTASSIUM SERPL-SCNC: 4 MMOL/L (ref 3.5–5)
POTASSIUM SERPL-SCNC: 4.23 MMOL/L (ref 3.5–5)
POTASSIUM SERPL-SCNC: 4.9 MMOL/L (ref 3.5–5.5)
POTASSIUM SERPL-SCNC: 5 MMOL/L (ref 3.5–5.5)
PROTHROMBIN TIME: 14.9 SEC (ref 9.3–12.4)
PS: 15 CMH20
PS: 15 CMH20
PS: 8 CMH20
RBC # BLD: 3.16 E12/L (ref 3.5–5.5)
RI(T): 0.71
RI(T): 0.93
RI(T): 1.16
RI(T): 1.24
RR MECHANICAL: 16 B/MIN
SODIUM BLD-SCNC: 142 MMOL/L (ref 132–146)
SOURCE, BLOOD GAS: ABNORMAL
THB: 10 G/DL (ref 11.5–16.5)
THB: 10 G/DL (ref 11.5–16.5)
THB: 10.2 G/DL (ref 11.5–16.5)
THB: 10.2 G/DL (ref 11.5–16.5)
THB: 10.5 G/DL (ref 11.5–16.5)
TIME ANALYZED: 1129
TIME ANALYZED: 1344
TIME ANALYZED: 1454
TIME ANALYZED: 1707
TIME ANALYZED: 1906
VT MECHANICAL: 450 ML
WBC # BLD: 11 E9/L (ref 4.5–11.5)

## 2022-11-02 PROCEDURE — 94640 AIRWAY INHALATION TREATMENT: CPT

## 2022-11-02 PROCEDURE — 84132 ASSAY OF SERUM POTASSIUM: CPT

## 2022-11-02 PROCEDURE — 5A1221Z PERFORMANCE OF CARDIAC OUTPUT, CONTINUOUS: ICD-10-PCS | Performed by: THORACIC SURGERY (CARDIOTHORACIC VASCULAR SURGERY)

## 2022-11-02 PROCEDURE — 3700000000 HC ANESTHESIA ATTENDED CARE: Performed by: THORACIC SURGERY (CARDIOTHORACIC VASCULAR SURGERY)

## 2022-11-02 PROCEDURE — 02100KW BYPASS CORONARY ARTERY, ONE ARTERY FROM AORTA WITH NONAUTOLOGOUS TISSUE SUBSTITUTE, OPEN APPROACH: ICD-10-PCS | Performed by: THORACIC SURGERY (CARDIOTHORACIC VASCULAR SURGERY)

## 2022-11-02 PROCEDURE — 37799 UNLISTED PX VASCULAR SURGERY: CPT

## 2022-11-02 PROCEDURE — 6370000000 HC RX 637 (ALT 250 FOR IP)

## 2022-11-02 PROCEDURE — 36620 INSERTION CATHETER ARTERY: CPT

## 2022-11-02 PROCEDURE — 36556 INSERT NON-TUNNEL CV CATH: CPT

## 2022-11-02 PROCEDURE — 2580000003 HC RX 258: Performed by: NURSE PRACTITIONER

## 2022-11-02 PROCEDURE — 6360000002 HC RX W HCPCS: Performed by: NURSE PRACTITIONER

## 2022-11-02 PROCEDURE — C1889 IMPLANT/INSERT DEVICE, NOC: HCPCS | Performed by: THORACIC SURGERY (CARDIOTHORACIC VASCULAR SURGERY)

## 2022-11-02 PROCEDURE — C9113 INJ PANTOPRAZOLE SODIUM, VIA: HCPCS | Performed by: NURSE PRACTITIONER

## 2022-11-02 PROCEDURE — 33533 CABG ARTERIAL SINGLE: CPT | Performed by: NURSE PRACTITIONER

## 2022-11-02 PROCEDURE — 33533 CABG ARTERIAL SINGLE: CPT | Performed by: THORACIC SURGERY (CARDIOTHORACIC VASCULAR SURGERY)

## 2022-11-02 PROCEDURE — 36415 COLL VENOUS BLD VENIPUNCTURE: CPT

## 2022-11-02 PROCEDURE — 2500000003 HC RX 250 WO HCPCS

## 2022-11-02 PROCEDURE — 2500000003 HC RX 250 WO HCPCS: Performed by: NURSE PRACTITIONER

## 2022-11-02 PROCEDURE — 85730 THROMBOPLASTIN TIME PARTIAL: CPT

## 2022-11-02 PROCEDURE — 6360000002 HC RX W HCPCS

## 2022-11-02 PROCEDURE — P9041 ALBUMIN (HUMAN),5%, 50ML: HCPCS

## 2022-11-02 PROCEDURE — 6370000000 HC RX 637 (ALT 250 FOR IP): Performed by: NURSE PRACTITIONER

## 2022-11-02 PROCEDURE — 83735 ASSAY OF MAGNESIUM: CPT

## 2022-11-02 PROCEDURE — A4216 STERILE WATER/SALINE, 10 ML: HCPCS | Performed by: NURSE PRACTITIONER

## 2022-11-02 PROCEDURE — 94660 CPAP INITIATION&MGMT: CPT

## 2022-11-02 PROCEDURE — 85347 COAGULATION TIME ACTIVATED: CPT

## 2022-11-02 PROCEDURE — 33517 CABG ARTERY-VEIN SINGLE: CPT | Performed by: NURSE PRACTITIONER

## 2022-11-02 PROCEDURE — 94002 VENT MGMT INPAT INIT DAY: CPT

## 2022-11-02 PROCEDURE — 3700000001 HC ADD 15 MINUTES (ANESTHESIA): Performed by: THORACIC SURGERY (CARDIOTHORACIC VASCULAR SURGERY)

## 2022-11-02 PROCEDURE — 99024 POSTOP FOLLOW-UP VISIT: CPT | Performed by: NURSE PRACTITIONER

## 2022-11-02 PROCEDURE — 3600000005 HC SURGERY LEVEL 5 BASE: Performed by: THORACIC SURGERY (CARDIOTHORACIC VASCULAR SURGERY)

## 2022-11-02 PROCEDURE — 0PH000Z INSERTION OF RIGID PLATE INTERNAL FIXATION DEVICE INTO STERNUM, OPEN APPROACH: ICD-10-PCS | Performed by: THORACIC SURGERY (CARDIOTHORACIC VASCULAR SURGERY)

## 2022-11-02 PROCEDURE — C1762 CONN TISS, HUMAN(INC FASCIA): HCPCS | Performed by: THORACIC SURGERY (CARDIOTHORACIC VASCULAR SURGERY)

## 2022-11-02 PROCEDURE — 99233 SBSQ HOSP IP/OBS HIGH 50: CPT | Performed by: INTERNAL MEDICINE

## 2022-11-02 PROCEDURE — 2580000003 HC RX 258: Performed by: THORACIC SURGERY (CARDIOTHORACIC VASCULAR SURGERY)

## 2022-11-02 PROCEDURE — 82330 ASSAY OF CALCIUM: CPT

## 2022-11-02 PROCEDURE — 33268 EXCL LAA OPN OTH PX ANY METH: CPT | Performed by: THORACIC SURGERY (CARDIOTHORACIC VASCULAR SURGERY)

## 2022-11-02 PROCEDURE — A4217 STERILE WATER/SALINE, 500 ML: HCPCS | Performed by: THORACIC SURGERY (CARDIOTHORACIC VASCULAR SURGERY)

## 2022-11-02 PROCEDURE — B24BZZ4 ULTRASONOGRAPHY OF HEART WITH AORTA, TRANSESOPHAGEAL: ICD-10-PCS | Performed by: ANESTHESIOLOGY

## 2022-11-02 PROCEDURE — 85610 PROTHROMBIN TIME: CPT

## 2022-11-02 PROCEDURE — 3600000015 HC SURGERY LEVEL 5 ADDTL 15MIN: Performed by: THORACIC SURGERY (CARDIOTHORACIC VASCULAR SURGERY)

## 2022-11-02 PROCEDURE — 7100000000 HC PACU RECOVERY - FIRST 15 MIN

## 2022-11-02 PROCEDURE — 85027 COMPLETE CBC AUTOMATED: CPT

## 2022-11-02 PROCEDURE — C1713 ANCHOR/SCREW BN/BN,TIS/BN: HCPCS | Performed by: THORACIC SURGERY (CARDIOTHORACIC VASCULAR SURGERY)

## 2022-11-02 PROCEDURE — 2580000003 HC RX 258

## 2022-11-02 PROCEDURE — 71045 X-RAY EXAM CHEST 1 VIEW: CPT

## 2022-11-02 PROCEDURE — C1729 CATH, DRAINAGE: HCPCS | Performed by: THORACIC SURGERY (CARDIOTHORACIC VASCULAR SURGERY)

## 2022-11-02 PROCEDURE — 2720000010 HC SURG SUPPLY STERILE: Performed by: THORACIC SURGERY (CARDIOTHORACIC VASCULAR SURGERY)

## 2022-11-02 PROCEDURE — 94664 DEMO&/EVAL PT USE INHALER: CPT

## 2022-11-02 PROCEDURE — 82803 BLOOD GASES ANY COMBINATION: CPT

## 2022-11-02 PROCEDURE — 6360000002 HC RX W HCPCS: Performed by: THORACIC SURGERY (CARDIOTHORACIC VASCULAR SURGERY)

## 2022-11-02 PROCEDURE — 2709999900 HC NON-CHARGEABLE SUPPLY: Performed by: THORACIC SURGERY (CARDIOTHORACIC VASCULAR SURGERY)

## 2022-11-02 PROCEDURE — 80048 BASIC METABOLIC PNL TOTAL CA: CPT

## 2022-11-02 PROCEDURE — 33268 EXCL LAA OPN OTH PX ANY METH: CPT | Performed by: NURSE PRACTITIONER

## 2022-11-02 PROCEDURE — 33517 CABG ARTERY-VEIN SINGLE: CPT | Performed by: THORACIC SURGERY (CARDIOTHORACIC VASCULAR SURGERY)

## 2022-11-02 PROCEDURE — 6370000000 HC RX 637 (ALT 250 FOR IP): Performed by: PHYSICIAN ASSISTANT

## 2022-11-02 PROCEDURE — 82805 BLOOD GASES W/O2 SATURATION: CPT

## 2022-11-02 PROCEDURE — 02L70CK OCCLUSION OF LEFT ATRIAL APPENDAGE WITH EXTRALUMINAL DEVICE, OPEN APPROACH: ICD-10-PCS | Performed by: THORACIC SURGERY (CARDIOTHORACIC VASCULAR SURGERY)

## 2022-11-02 PROCEDURE — 02100Z9 BYPASS CORONARY ARTERY, ONE ARTERY FROM LEFT INTERNAL MAMMARY, OPEN APPROACH: ICD-10-PCS | Performed by: THORACIC SURGERY (CARDIOTHORACIC VASCULAR SURGERY)

## 2022-11-02 PROCEDURE — 82962 GLUCOSE BLOOD TEST: CPT

## 2022-11-02 PROCEDURE — 2000000000 HC ICU R&B

## 2022-11-02 PROCEDURE — 7100000001 HC PACU RECOVERY - ADDTL 15 MIN

## 2022-11-02 DEVICE — IMPLANT HUM TISS L20-80CM DIA3-6MM SAPH VEIN CRYOPRESERVED: Type: IMPLANTABLE DEVICE | Site: HEART | Status: FUNCTIONAL

## 2022-11-02 DEVICE — STERNALPLATE, BOX: Type: IMPLANTABLE DEVICE | Site: STERNUM | Status: FUNCTIONAL

## 2022-11-02 DEVICE — LOCKING SCREW,AXS,SELF-DRILLING
Type: IMPLANTABLE DEVICE | Site: STERNUM | Status: FUNCTIONAL
Brand: AXS, SMARTLOCK

## 2022-11-02 DEVICE — DEVICE OCCL CLP L40MM PLUNG GRP FLX SHFT FOR GILLINOV: Type: IMPLANTABLE DEVICE | Site: HEART | Status: FUNCTIONAL

## 2022-11-02 DEVICE — STERNALPLATE, X: Type: IMPLANTABLE DEVICE | Site: STERNUM | Status: FUNCTIONAL

## 2022-11-02 RX ORDER — ASPIRIN 81 MG/1
81 TABLET ORAL DAILY
Status: DISCONTINUED | OUTPATIENT
Start: 2022-11-03 | End: 2022-11-04

## 2022-11-02 RX ORDER — IPRATROPIUM BROMIDE AND ALBUTEROL SULFATE 2.5; .5 MG/3ML; MG/3ML
1 SOLUTION RESPIRATORY (INHALATION)
Status: DISCONTINUED | OUTPATIENT
Start: 2022-11-02 | End: 2022-11-07 | Stop reason: HOSPADM

## 2022-11-02 RX ORDER — SIMVASTATIN 40 MG
40 TABLET ORAL NIGHTLY
Status: DISCONTINUED | OUTPATIENT
Start: 2022-11-02 | End: 2022-11-05

## 2022-11-02 RX ORDER — SODIUM CHLORIDE 0.9 % (FLUSH) 0.9 %
5-40 SYRINGE (ML) INJECTION EVERY 12 HOURS SCHEDULED
Status: DISCONTINUED | OUTPATIENT
Start: 2022-11-02 | End: 2022-11-07 | Stop reason: HOSPADM

## 2022-11-02 RX ORDER — SODIUM CHLORIDE 9 MG/ML
INJECTION, SOLUTION INTRAVENOUS CONTINUOUS
Status: DISCONTINUED | OUTPATIENT
Start: 2022-11-02 | End: 2022-11-04

## 2022-11-02 RX ORDER — ONDANSETRON 2 MG/ML
4 INJECTION INTRAMUSCULAR; INTRAVENOUS EVERY 6 HOURS PRN
Status: DISCONTINUED | OUTPATIENT
Start: 2022-11-02 | End: 2022-11-07 | Stop reason: HOSPADM

## 2022-11-02 RX ORDER — NITROGLYCERIN 5 MG/ML
INJECTION, SOLUTION INTRAVENOUS PRN
Status: DISCONTINUED | OUTPATIENT
Start: 2022-11-02 | End: 2022-11-02 | Stop reason: SDUPTHER

## 2022-11-02 RX ORDER — PROTAMINE SULFATE 10 MG/ML
INJECTION, SOLUTION INTRAVENOUS PRN
Status: DISCONTINUED | OUTPATIENT
Start: 2022-11-02 | End: 2022-11-02 | Stop reason: SDUPTHER

## 2022-11-02 RX ORDER — OXYCODONE HYDROCHLORIDE 5 MG/1
5 TABLET ORAL EVERY 4 HOURS PRN
Status: DISCONTINUED | OUTPATIENT
Start: 2022-11-02 | End: 2022-11-04

## 2022-11-02 RX ORDER — MEPERIDINE HYDROCHLORIDE 25 MG/ML
25 INJECTION INTRAMUSCULAR; INTRAVENOUS; SUBCUTANEOUS
Status: ACTIVE | OUTPATIENT
Start: 2022-11-02 | End: 2022-11-03

## 2022-11-02 RX ORDER — ACETAMINOPHEN 325 MG/1
650 TABLET ORAL EVERY 4 HOURS PRN
Status: DISCONTINUED | OUTPATIENT
Start: 2022-11-02 | End: 2022-11-04

## 2022-11-02 RX ORDER — PROPOFOL 10 MG/ML
10 INJECTION, EMULSION INTRAVENOUS CONTINUOUS PRN
Status: DISCONTINUED | OUTPATIENT
Start: 2022-11-02 | End: 2022-11-04

## 2022-11-02 RX ORDER — SODIUM CHLORIDE 9 MG/ML
INJECTION, SOLUTION INTRAVENOUS PRN
Status: DISCONTINUED | OUTPATIENT
Start: 2022-11-02 | End: 2022-11-04

## 2022-11-02 RX ORDER — SODIUM CHLORIDE 9 MG/ML
INJECTION, SOLUTION INTRAVENOUS CONTINUOUS
Status: DISCONTINUED | OUTPATIENT
Start: 2022-11-02 | End: 2022-11-02

## 2022-11-02 RX ORDER — AMINOCAPROIC ACID 250 MG/ML
INJECTION, SOLUTION INTRAVENOUS PRN
Status: DISCONTINUED | OUTPATIENT
Start: 2022-11-02 | End: 2022-11-02 | Stop reason: SDUPTHER

## 2022-11-02 RX ORDER — LANOLIN ALCOHOL/MO/W.PET/CERES
400 CREAM (GRAM) TOPICAL 2 TIMES DAILY
Status: DISCONTINUED | OUTPATIENT
Start: 2022-11-03 | End: 2022-11-07 | Stop reason: HOSPADM

## 2022-11-02 RX ORDER — OXYCODONE HYDROCHLORIDE 10 MG/1
10 TABLET ORAL EVERY 4 HOURS PRN
Status: DISCONTINUED | OUTPATIENT
Start: 2022-11-02 | End: 2022-11-04

## 2022-11-02 RX ORDER — PROPOFOL 10 MG/ML
INJECTION, EMULSION INTRAVENOUS PRN
Status: DISCONTINUED | OUTPATIENT
Start: 2022-11-02 | End: 2022-11-02 | Stop reason: SDUPTHER

## 2022-11-02 RX ORDER — SODIUM CHLORIDE, SODIUM LACTATE, POTASSIUM CHLORIDE, CALCIUM CHLORIDE 600; 310; 30; 20 MG/100ML; MG/100ML; MG/100ML; MG/100ML
INJECTION, SOLUTION INTRAVENOUS CONTINUOUS PRN
Status: DISCONTINUED | OUTPATIENT
Start: 2022-11-02 | End: 2022-11-02 | Stop reason: SDUPTHER

## 2022-11-02 RX ORDER — SODIUM CHLORIDE 9 MG/ML
INJECTION, SOLUTION INTRAVENOUS CONTINUOUS PRN
Status: DISCONTINUED | OUTPATIENT
Start: 2022-11-02 | End: 2022-11-02 | Stop reason: SDUPTHER

## 2022-11-02 RX ORDER — VECURONIUM BROMIDE 1 MG/ML
INJECTION, POWDER, LYOPHILIZED, FOR SOLUTION INTRAVENOUS PRN
Status: DISCONTINUED | OUTPATIENT
Start: 2022-11-02 | End: 2022-11-02 | Stop reason: SDUPTHER

## 2022-11-02 RX ORDER — CHLORHEXIDINE GLUCONATE 0.12 MG/ML
RINSE ORAL
Status: COMPLETED
Start: 2022-11-02 | End: 2022-11-02

## 2022-11-02 RX ORDER — CHLORHEXIDINE GLUCONATE 0.12 MG/ML
15 RINSE ORAL 2 TIMES DAILY
Status: DISCONTINUED | OUTPATIENT
Start: 2022-11-02 | End: 2022-11-02 | Stop reason: SDUPTHER

## 2022-11-02 RX ORDER — DEXTROSE MONOHYDRATE 100 MG/ML
INJECTION, SOLUTION INTRAVENOUS CONTINUOUS PRN
Status: DISCONTINUED | OUTPATIENT
Start: 2022-11-02 | End: 2022-11-04

## 2022-11-02 RX ORDER — INSULIN GLARGINE-YFGN 100 [IU]/ML
0.15 INJECTION, SOLUTION SUBCUTANEOUS NIGHTLY
Status: DISCONTINUED | OUTPATIENT
Start: 2022-11-03 | End: 2022-11-03

## 2022-11-02 RX ORDER — HEPARIN SODIUM 10000 [USP'U]/ML
INJECTION, SOLUTION INTRAVENOUS; SUBCUTANEOUS PRN
Status: DISCONTINUED | OUTPATIENT
Start: 2022-11-02 | End: 2022-11-02 | Stop reason: SDUPTHER

## 2022-11-02 RX ORDER — SODIUM CHLORIDE 0.9 % (FLUSH) 0.9 %
5-40 SYRINGE (ML) INJECTION EVERY 12 HOURS SCHEDULED
Status: DISCONTINUED | OUTPATIENT
Start: 2022-11-02 | End: 2022-11-02 | Stop reason: HOSPADM

## 2022-11-02 RX ORDER — FENTANYL CITRATE 50 UG/ML
50 INJECTION, SOLUTION INTRAMUSCULAR; INTRAVENOUS
Status: DISCONTINUED | OUTPATIENT
Start: 2022-11-02 | End: 2022-11-04

## 2022-11-02 RX ORDER — ALBUMIN, HUMAN INJ 5% 5 %
25 SOLUTION INTRAVENOUS PRN
Status: DISCONTINUED | OUTPATIENT
Start: 2022-11-02 | End: 2022-11-04

## 2022-11-02 RX ORDER — SODIUM CHLORIDE 9 MG/ML
INJECTION, SOLUTION INTRAVENOUS PRN
Status: DISCONTINUED | OUTPATIENT
Start: 2022-11-02 | End: 2022-11-02 | Stop reason: HOSPADM

## 2022-11-02 RX ORDER — CHLORHEXIDINE GLUCONATE 0.12 MG/ML
15 RINSE ORAL 2 TIMES DAILY
Status: DISCONTINUED | OUTPATIENT
Start: 2022-11-02 | End: 2022-11-04

## 2022-11-02 RX ORDER — CLOPIDOGREL BISULFATE 75 MG/1
75 TABLET ORAL DAILY
Status: DISCONTINUED | OUTPATIENT
Start: 2022-11-03 | End: 2022-11-04

## 2022-11-02 RX ORDER — INSULIN LISPRO 100 [IU]/ML
0-16 INJECTION, SOLUTION INTRAVENOUS; SUBCUTANEOUS EVERY 4 HOURS
Status: DISCONTINUED | OUTPATIENT
Start: 2022-11-02 | End: 2022-11-03

## 2022-11-02 RX ORDER — CITALOPRAM 20 MG/1
20 TABLET ORAL DAILY
Status: DISCONTINUED | OUTPATIENT
Start: 2022-11-03 | End: 2022-11-07 | Stop reason: HOSPADM

## 2022-11-02 RX ORDER — POTASSIUM CHLORIDE 29.8 MG/ML
20 INJECTION INTRAVENOUS PRN
Status: DISCONTINUED | OUTPATIENT
Start: 2022-11-02 | End: 2022-11-04

## 2022-11-02 RX ORDER — 0.9 % SODIUM CHLORIDE 0.9 %
250 INTRAVENOUS SOLUTION INTRAVENOUS CONTINUOUS PRN
Status: DISCONTINUED | OUTPATIENT
Start: 2022-11-02 | End: 2022-11-04

## 2022-11-02 RX ORDER — PROPOFOL 10 MG/ML
INJECTION, EMULSION INTRAVENOUS
Status: COMPLETED
Start: 2022-11-02 | End: 2022-11-02

## 2022-11-02 RX ORDER — BISACODYL 10 MG
10 SUPPOSITORY, RECTAL RECTAL DAILY PRN
Status: DISCONTINUED | OUTPATIENT
Start: 2022-11-03 | End: 2022-11-04

## 2022-11-02 RX ORDER — ASPIRIN 81 MG/1
81 TABLET, CHEWABLE ORAL ONCE
Status: COMPLETED | OUTPATIENT
Start: 2022-11-02 | End: 2022-11-02

## 2022-11-02 RX ORDER — MIDAZOLAM HYDROCHLORIDE 1 MG/ML
INJECTION INTRAMUSCULAR; INTRAVENOUS PRN
Status: DISCONTINUED | OUTPATIENT
Start: 2022-11-02 | End: 2022-11-02 | Stop reason: SDUPTHER

## 2022-11-02 RX ORDER — ONDANSETRON 4 MG/1
4 TABLET, ORALLY DISINTEGRATING ORAL EVERY 8 HOURS PRN
Status: DISCONTINUED | OUTPATIENT
Start: 2022-11-02 | End: 2022-11-07 | Stop reason: HOSPADM

## 2022-11-02 RX ORDER — LEVOTHYROXINE SODIUM 0.03 MG/1
25 TABLET ORAL DAILY
Status: DISCONTINUED | OUTPATIENT
Start: 2022-11-03 | End: 2022-11-07 | Stop reason: HOSPADM

## 2022-11-02 RX ORDER — FENTANYL CITRATE 0.05 MG/ML
INJECTION, SOLUTION INTRAMUSCULAR; INTRAVENOUS PRN
Status: DISCONTINUED | OUTPATIENT
Start: 2022-11-02 | End: 2022-11-02 | Stop reason: SDUPTHER

## 2022-11-02 RX ORDER — MAGNESIUM SULFATE IN WATER 40 MG/ML
2000 INJECTION, SOLUTION INTRAVENOUS PRN
Status: DISCONTINUED | OUTPATIENT
Start: 2022-11-02 | End: 2022-11-04

## 2022-11-02 RX ORDER — PANTOPRAZOLE SODIUM 40 MG/1
40 TABLET, DELAYED RELEASE ORAL DAILY
Status: DISCONTINUED | OUTPATIENT
Start: 2022-11-03 | End: 2022-11-07 | Stop reason: HOSPADM

## 2022-11-02 RX ORDER — SODIUM CHLORIDE 0.9 % (FLUSH) 0.9 %
5-40 SYRINGE (ML) INJECTION PRN
Status: DISCONTINUED | OUTPATIENT
Start: 2022-11-02 | End: 2022-11-02 | Stop reason: HOSPADM

## 2022-11-02 RX ORDER — FENTANYL CITRATE 50 UG/ML
25 INJECTION, SOLUTION INTRAMUSCULAR; INTRAVENOUS
Status: DISCONTINUED | OUTPATIENT
Start: 2022-11-02 | End: 2022-11-04

## 2022-11-02 RX ORDER — ALBUMIN, HUMAN INJ 5% 5 %
SOLUTION INTRAVENOUS PRN
Status: DISCONTINUED | OUTPATIENT
Start: 2022-11-02 | End: 2022-11-02 | Stop reason: SDUPTHER

## 2022-11-02 RX ORDER — SENNA AND DOCUSATE SODIUM 50; 8.6 MG/1; MG/1
1 TABLET, FILM COATED ORAL 2 TIMES DAILY
Status: DISCONTINUED | OUTPATIENT
Start: 2022-11-02 | End: 2022-11-04

## 2022-11-02 RX ORDER — ACETAMINOPHEN 650 MG/1
650 SUPPOSITORY RECTAL EVERY 4 HOURS PRN
Status: DISCONTINUED | OUTPATIENT
Start: 2022-11-02 | End: 2022-11-04

## 2022-11-02 RX ORDER — SODIUM CHLORIDE 0.9 % (FLUSH) 0.9 %
5-40 SYRINGE (ML) INJECTION PRN
Status: DISCONTINUED | OUTPATIENT
Start: 2022-11-02 | End: 2022-11-07 | Stop reason: HOSPADM

## 2022-11-02 RX ADMIN — MUPIROCIN: 20 OINTMENT TOPICAL at 19:48

## 2022-11-02 RX ADMIN — NITROGLYCERIN 25 MCG: 5 INJECTION, SOLUTION INTRAVENOUS at 08:16

## 2022-11-02 RX ADMIN — MIDAZOLAM 2 MG: 1 INJECTION INTRAMUSCULAR; INTRAVENOUS at 06:39

## 2022-11-02 RX ADMIN — PROTAMINE SULFATE 250 MG: 10 INJECTION, SOLUTION INTRAVENOUS at 09:45

## 2022-11-02 RX ADMIN — SODIUM CHLORIDE, PRESERVATIVE FREE 10 ML: 5 INJECTION INTRAVENOUS at 12:10

## 2022-11-02 RX ADMIN — INSULIN LISPRO 4 UNITS: 100 INJECTION, SOLUTION INTRAVENOUS; SUBCUTANEOUS at 11:52

## 2022-11-02 RX ADMIN — ACETAMINOPHEN 650 MG: 325 TABLET ORAL at 19:48

## 2022-11-02 RX ADMIN — INSULIN HUMAN 4 UNITS: 100 INJECTION, SOLUTION PARENTERAL at 08:54

## 2022-11-02 RX ADMIN — SODIUM NITROPRUSSIDE 0.1 MCG/KG/MIN: 25 INJECTION, SOLUTION, CONCENTRATE INTRAVENOUS at 11:39

## 2022-11-02 RX ADMIN — SODIUM CHLORIDE: 9 INJECTION, SOLUTION INTRAVENOUS at 09:55

## 2022-11-02 RX ADMIN — SENNOSIDES AND DOCUSATE SODIUM 1 TABLET: 50; 8.6 TABLET ORAL at 19:48

## 2022-11-02 RX ADMIN — 0.12% CHLORHEXIDINE GLUCONATE 15 ML: 1.2 RINSE ORAL at 05:52

## 2022-11-02 RX ADMIN — ASPIRIN 81 MG CHEWABLE TABLET 81 MG: 81 TABLET CHEWABLE at 12:02

## 2022-11-02 RX ADMIN — MUPIROCIN: 20 OINTMENT TOPICAL at 05:52

## 2022-11-02 RX ADMIN — VECURONIUM BROMIDE 5 MG: 10 INJECTION, POWDER, LYOPHILIZED, FOR SOLUTION INTRAVENOUS at 09:30

## 2022-11-02 RX ADMIN — CEFAZOLIN 2000 MG: 2 INJECTION, POWDER, FOR SOLUTION INTRAMUSCULAR; INTRAVENOUS at 22:36

## 2022-11-02 RX ADMIN — CEFAZOLIN 2000 MG: 2 INJECTION, POWDER, FOR SOLUTION INTRAMUSCULAR; INTRAVENOUS at 07:25

## 2022-11-02 RX ADMIN — SODIUM CHLORIDE: 9 INJECTION, SOLUTION INTRAVENOUS at 09:09

## 2022-11-02 RX ADMIN — PROTAMINE SULFATE 50 MG: 10 INJECTION, SOLUTION INTRAVENOUS at 10:04

## 2022-11-02 RX ADMIN — SODIUM CHLORIDE: 9 INJECTION, SOLUTION INTRAVENOUS at 07:05

## 2022-11-02 RX ADMIN — 0.12% CHLORHEXIDINE GLUCONATE 15 ML: 1.2 RINSE ORAL at 12:07

## 2022-11-02 RX ADMIN — INSULIN HUMAN 8 UNITS: 100 INJECTION, SOLUTION PARENTERAL at 09:24

## 2022-11-02 RX ADMIN — SODIUM CHLORIDE, PRESERVATIVE FREE 10 ML: 5 INJECTION INTRAVENOUS at 19:47

## 2022-11-02 RX ADMIN — IPRATROPIUM BROMIDE AND ALBUTEROL SULFATE 1 AMPULE: 2.5; .5 SOLUTION RESPIRATORY (INHALATION) at 20:13

## 2022-11-02 RX ADMIN — PROPOFOL 20 MG: 10 INJECTION, EMULSION INTRAVENOUS at 07:32

## 2022-11-02 RX ADMIN — VECURONIUM BROMIDE 5 MG: 10 INJECTION, POWDER, LYOPHILIZED, FOR SOLUTION INTRAVENOUS at 08:36

## 2022-11-02 RX ADMIN — IPRATROPIUM BROMIDE AND ALBUTEROL SULFATE 1 AMPULE: 2.5; .5 SOLUTION RESPIRATORY (INHALATION) at 15:47

## 2022-11-02 RX ADMIN — VECURONIUM BROMIDE 10 MG: 10 INJECTION, POWDER, LYOPHILIZED, FOR SOLUTION INTRAVENOUS at 07:05

## 2022-11-02 RX ADMIN — PHENYLEPHRINE HYDROCHLORIDE 200 MCG: 10 INJECTION INTRAVENOUS at 08:31

## 2022-11-02 RX ADMIN — AMINOCAPROIC ACID 1 G/HR: 250 INJECTION, SOLUTION INTRAVENOUS at 07:24

## 2022-11-02 RX ADMIN — INSULIN HUMAN 4 UNITS: 100 INJECTION, SOLUTION PARENTERAL at 10:20

## 2022-11-02 RX ADMIN — FENTANYL CITRATE 25 MCG: 50 INJECTION, SOLUTION INTRAMUSCULAR; INTRAVENOUS at 16:29

## 2022-11-02 RX ADMIN — SODIUM CHLORIDE, SODIUM LACTATE, POTASSIUM CHLORIDE, CALCIUM CHLORIDE: 600; 310; 30; 20 INJECTION, SOLUTION INTRAVENOUS at 07:05

## 2022-11-02 RX ADMIN — FENTANYL CITRATE 100 MCG: 50 INJECTION, SOLUTION INTRAMUSCULAR; INTRAVENOUS at 07:32

## 2022-11-02 RX ADMIN — IPRATROPIUM BROMIDE AND ALBUTEROL SULFATE 1 AMPULE: 2.5; .5 SOLUTION RESPIRATORY (INHALATION) at 11:33

## 2022-11-02 RX ADMIN — MUPIROCIN: 20 OINTMENT TOPICAL at 12:02

## 2022-11-02 RX ADMIN — 0.12% CHLORHEXIDINE GLUCONATE 15 ML: 1.2 RINSE ORAL at 19:46

## 2022-11-02 RX ADMIN — SODIUM CHLORIDE 3.44 UNITS/HR: 9 INJECTION, SOLUTION INTRAVENOUS at 17:06

## 2022-11-02 RX ADMIN — AMINOCAPROIC ACID 5000 MG: 250 INJECTION, SOLUTION INTRAVENOUS at 07:23

## 2022-11-02 RX ADMIN — SODIUM CHLORIDE, POTASSIUM CHLORIDE, SODIUM LACTATE AND CALCIUM CHLORIDE: 600; 310; 30; 20 INJECTION, SOLUTION INTRAVENOUS at 06:33

## 2022-11-02 RX ADMIN — PROPOFOL 100 MG: 10 INJECTION, EMULSION INTRAVENOUS at 07:05

## 2022-11-02 RX ADMIN — POTASSIUM CHLORIDE 20 MEQ: 29.8 INJECTION, SOLUTION INTRAVENOUS at 14:18

## 2022-11-02 RX ADMIN — SODIUM CHLORIDE: 9 INJECTION, SOLUTION INTRAVENOUS at 05:52

## 2022-11-02 RX ADMIN — PHENYLEPHRINE HYDROCHLORIDE 100 MCG: 10 INJECTION INTRAVENOUS at 08:28

## 2022-11-02 RX ADMIN — FENTANYL CITRATE 400 MCG: 50 INJECTION, SOLUTION INTRAMUSCULAR; INTRAVENOUS at 07:05

## 2022-11-02 RX ADMIN — SODIUM CHLORIDE: 9 INJECTION, SOLUTION INTRAVENOUS at 11:02

## 2022-11-02 RX ADMIN — PROPOFOL 10 MCG/KG/MIN: 10 INJECTION, EMULSION INTRAVENOUS at 11:00

## 2022-11-02 RX ADMIN — SODIUM CHLORIDE: 9 INJECTION, SOLUTION INTRAVENOUS at 06:33

## 2022-11-02 RX ADMIN — MIDAZOLAM 2 MG: 1 INJECTION INTRAMUSCULAR; INTRAVENOUS at 06:51

## 2022-11-02 RX ADMIN — HEPARIN SODIUM 30000 UNITS: 10000 INJECTION INTRAVENOUS; SUBCUTANEOUS at 08:04

## 2022-11-02 RX ADMIN — SODIUM CHLORIDE 40 MG: 9 INJECTION, SOLUTION INTRAMUSCULAR; INTRAVENOUS; SUBCUTANEOUS at 11:55

## 2022-11-02 RX ADMIN — OXYCODONE HYDROCHLORIDE 10 MG: 10 TABLET ORAL at 19:47

## 2022-11-02 RX ADMIN — SODIUM CHLORIDE 4 UNITS/HR: 9 INJECTION, SOLUTION INTRAVENOUS at 08:54

## 2022-11-02 RX ADMIN — SENNOSIDES AND DOCUSATE SODIUM 1 TABLET: 50; 8.6 TABLET ORAL at 12:10

## 2022-11-02 RX ADMIN — ALBUMIN (HUMAN) 25 G: 12.5 INJECTION, SOLUTION INTRAVENOUS at 09:55

## 2022-11-02 RX ADMIN — SODIUM CHLORIDE: 9 INJECTION, SOLUTION INTRAVENOUS at 22:48

## 2022-11-02 RX ADMIN — CEFAZOLIN 2000 MG: 2 INJECTION, POWDER, FOR SOLUTION INTRAMUSCULAR; INTRAVENOUS at 15:23

## 2022-11-02 RX ADMIN — FENTANYL CITRATE 500 MCG: 50 INJECTION, SOLUTION INTRAMUSCULAR; INTRAVENOUS at 07:43

## 2022-11-02 ASSESSMENT — PAIN DESCRIPTION - ORIENTATION
ORIENTATION: MID
ORIENTATION: MID
ORIENTATION: LEFT;RIGHT

## 2022-11-02 ASSESSMENT — PAIN SCALES - GENERAL
PAINLEVEL_OUTOF10: 4
PAINLEVEL_OUTOF10: 0
PAINLEVEL_OUTOF10: 3
PAINLEVEL_OUTOF10: 7
PAINLEVEL_OUTOF10: 6
PAINLEVEL_OUTOF10: 0
PAINLEVEL_OUTOF10: 8

## 2022-11-02 ASSESSMENT — PULMONARY FUNCTION TESTS
PIF_VALUE: 16
PIF_VALUE: 20
PIF_VALUE: 22
PIF_VALUE: 16
PIF_VALUE: 13
PIF_VALUE: 19
PIF_VALUE: 15
PIF_VALUE: 20
PIF_VALUE: 22
PIF_VALUE: 20
PIF_VALUE: 21
PIF_VALUE: 21
PIF_VALUE: 20
PIF_VALUE: 15
PIF_VALUE: 24
PIF_VALUE: 20
PIF_VALUE: 13

## 2022-11-02 ASSESSMENT — PAIN DESCRIPTION - LOCATION
LOCATION: LEG
LOCATION: CHEST

## 2022-11-02 ASSESSMENT — PAIN DESCRIPTION - DESCRIPTORS
DESCRIPTORS: ACHING
DESCRIPTORS: ACHING;DISCOMFORT;SHARP
DESCRIPTORS: ACHING;DISCOMFORT
DESCRIPTORS: ACHING;DISCOMFORT;SORE

## 2022-11-02 ASSESSMENT — PAIN DESCRIPTION - FREQUENCY: FREQUENCY: INTERMITTENT

## 2022-11-02 NOTE — PROGRESS NOTES
Inpatient Cardiology Follow up      Reason for Consult:  s/p CABG    Requesting Physician:  Jennelle Krabbe, DO    Date of Consultation: 11/2/2022    SUBJECTIVE:   Ms. Dewayne Venegas is a 77 yo female who is known to Dr. Alexa Li. She is seen in CVICU after CABG earlier today. She is currently still intubated but weaning toward extubation. Nurse reports somewhat labile BP but currently stable on very small dose nitroprusside drip and normal saline.       Current Medications:    Current Facility-Administered Medications: [START ON 11/3/2022] citalopram (CELEXA) tablet 20 mg, 20 mg, Oral, Daily  [START ON 11/3/2022] levothyroxine (SYNTHROID) tablet 25 mcg, 25 mcg, Oral, Daily  simvastatin (ZOCOR) tablet 40 mg (Patient Supplied), 40 mg, Oral, Nightly  0.9 % sodium chloride infusion, , IntraVENous, Continuous  sodium chloride flush 0.9 % injection 5-40 mL, 5-40 mL, IntraVENous, 2 times per day  sodium chloride flush 0.9 % injection 5-40 mL, 5-40 mL, IntraVENous, PRN  0.9 % sodium chloride infusion, , IntraVENous, PRN  ondansetron (ZOFRAN-ODT) disintegrating tablet 4 mg, 4 mg, Oral, Q8H PRN **OR** ondansetron (ZOFRAN) injection 4 mg, 4 mg, IntraVENous, Q6H PRN  [START ON 11/3/2022] aspirin EC tablet 81 mg, 81 mg, Oral, Daily  acetaminophen (TYLENOL) tablet 650 mg, 650 mg, Oral, Q4H PRN  oxyCODONE (ROXICODONE) immediate release tablet 5 mg, 5 mg, Oral, Q4H PRN **OR** oxyCODONE HCl (OXY-IR) immediate release tablet 10 mg, 10 mg, Oral, Q4H PRN  fentaNYL (SUBLIMAZE) injection 25 mcg, 25 mcg, IntraVENous, Q1H PRN **OR** fentaNYL (SUBLIMAZE) injection 50 mcg, 50 mcg, IntraVENous, Q1H PRN  meperidine (DEMEROL) injection 25 mg, 25 mg, IntraVENous, Once PRN  chlorhexidine (PERIDEX) 0.12 % solution 15 mL, 15 mL, Mouth/Throat, BID  [START ON 11/3/2022] magnesium oxide (MAG-OX) tablet 400 mg, 400 mg, Oral, BID  mupirocin (BACTROBAN) 2 % ointment, , Nasal, BID  propofol injection, 10 mcg/kg/min, IntraVENous, Continuous PRN  sennosides-docusate sodium (SENOKOT-S) 8.6-50 MG tablet 1 tablet, 1 tablet, Oral, BID  magnesium hydroxide (MILK OF MAGNESIA) 400 MG/5ML suspension 30 mL, 30 mL, Oral, Daily PRN  [START ON 11/3/2022] bisacodyl (DULCOLAX) suppository 10 mg, 10 mg, Rectal, Daily PRN  [START ON 11/3/2022] pantoprazole (PROTONIX) tablet 40 mg, 40 mg, Oral, Daily  ceFAZolin (ANCEF) 2,000 mg in sterile water 20 mL IV syringe, 2,000 mg, IntraVENous, Q8H  potassium chloride 20 mEq/50 mL IVPB (Central Line), 20 mEq, IntraVENous, PRN  magnesium sulfate 2000 mg in 50 mL IVPB premix, 2,000 mg, IntraVENous, PRN  calcium chloride 1,000 mg in sodium chloride 0.9 % 100 mL IVPB, 1,000 mg, IntraVENous, PRN **OR** calcium chloride 2,000 mg in sodium chloride 0.9 % 100 mL IVPB, 2,000 mg, IntraVENous, PRN  ipratropium-albuterol (DUONEB) nebulizer solution 1 ampule, 1 ampule, Inhalation, Q4H WA  albumin human 5 % IV solution 25 g, 25 g, IntraVENous, PRN  0.9 % sodium chloride bolus, 250 mL, IntraVENous, Continuous PRN  norepinephrine (LEVOPHED) 16 mg in dextrose 5% 250 mL infusion, 1-100 mcg/min, IntraVENous, Continuous PRN  nitroPRUSSide (NIPRIDE) 50 mg in dextrose 5 % 250 mL infusion, 0.1-3 mcg/kg/min, IntraVENous, Continuous PRN  [START ON 11/3/2022] insulin glargine-yfgn (SEMGLEE-YFGN) injection vial 9 Units, 0.15 Units/kg, SubCUTAneous, Nightly  insulin regular (HUMULIN R;NOVOLIN R) 100 Units in sodium chloride 0.9 % 100 mL infusion, 0.1-50 Units/hr, IntraVENous, Continuous  glucose chewable tablet 16 g, 4 tablet, Oral, PRN  dextrose bolus 10% 125 mL, 125 mL, IntraVENous, PRN **OR** dextrose bolus 10% 250 mL, 250 mL, IntraVENous, PRN  glucagon (rDNA) injection 1 mg, 1 mg, SubCUTAneous, PRN  dextrose 10 % infusion, , IntraVENous, Continuous PRN  acetaminophen (TYLENOL) suppository 650 mg, 650 mg, Rectal, Q4H PRN  insulin lispro (HUMALOG) injection vial 0-16 Units, 0-16 Units, SubCUTAneous, Q4H  [START ON 11/3/2022] clopidogrel (PLAVIX) tablet 75 mg, 75 mg, Oral, Daily    Allergies:  Pcn [penicillins], Fosamax [alendronate], and Lipitor [atorvastatin]      REVIEW OF SYSTEMS:     Review of Systems - History unobtainable from patient due to intubation/sedation      PHYSICAL EXAM:  BP (!) 178/84   Pulse 78   Temp 97.2 °F (36.2 °C) (Bladder)   Resp 17   SpO2 100%     General Appearance:    Intubated, sedated, no distress, appears stated age    Head:    Normocephalic, without obvious abnormality, atraumatic    Eyes:    Closed    Neck:   Supple, symmetrical, trachea midline; no carotid    bruit or JVP    Lungs:     Clear to auscultation bilaterally, respirations unlabored, no wheezing, rales or rhonchi    Heart:    Regular rate and rhythm, no murmur, rub   or gallop    Abdomen:     Soft, non-tender, non-distended    Extremities:   Extremities normal, atraumatic, no cyanosis or edema    Skin:   Skin color, texture, turgor normal for age    Neurologic:   Sedated        DATA:    Tele strips: NSR        Intake/Output Summary (Last 24 hours) at 11/2/2022 1352  Last data filed at 11/2/2022 1300  Gross per 24 hour   Intake 3000 ml   Output 2120 ml   Net 880 ml       Labs:   CBC:   Recent Labs     11/02/22  1036 11/02/22  1105   WBC  --  11.0   HGB  --  9.8*   HCT 27.0* 29.4*   PLT  --  110*     BMP:   Recent Labs     11/02/22  1036 11/02/22  1105   NA  --  142   K 3.6 4.0   CO2  --  19*   BUN  --  7   CREATININE 0.6 0.4*   LABGLOM  --  >60   CALCIUM  --  7.7*     Mag:   Recent Labs     11/02/22  1105   MG 2.2     Phos: No results for input(s): PHOS in the last 72 hours. TSH: No results for input(s): TSH in the last 72 hours. HgA1c:   Lab Results   Component Value Date    LABA1C 9.7 (H) 10/28/2022     No results found for: EAG  proBNP: No results for input(s): PROBNP in the last 72 hours.   PT/INR:   Recent Labs     11/02/22  1105   PROTIME 14.9*   INR 1.4     APTT:  Recent Labs     11/02/22  1105   APTT 23.4*     CARDIAC ENZYMES:No results for input(s): CKTOTAL, CKMB, CKMBINDEX, TROPONINI in the last 72 hours. FASTING LIPID PANEL:  Lab Results   Component Value Date/Time    CHOL 203 03/17/2022 12:00 PM    HDL 37 03/17/2022 12:00 PM    LDLCALC 103 03/17/2022 12:00 PM    TRIG 317 03/17/2022 12:00 PM     LIVER PROFILE:No results for input(s): AST, ALT, LABALBU in the last 72 hours. Echo 10/27/22:   Technically difficult examination. Mild concentric left ventricular hypertrophy. Ejection fraction is visually estimated at 60 to 65%. Normal right ventricular size and function. Mild mitral regurgitation is present. Individual aortic valve leaflets are not clearly visualized. Mild aortic stenosis. Mild tricuspid regurgitation. Mild pulmonic regurgitation present. ASSESSMENT:  POD #0 s/p CABG x2 (LIMA-LAD, SVG-OM) and LAAL (40 mm AtriCure)  Postop anemia and thrombocytopenia  HLD  T2DM  Carotid atherosclerosis s/p CEA  Tobacco abuse     PLAN:  Continue ASA, plavix, statin  Resume beta blocker, ACEi when allowed by CT surgery. Encourage incentive spirometry when extubated. Encourage early ambulation when extubated and chest tube out. Electronically signed by Jim Prader, PA-C on 11/2/2022 at 1:52 PM      Patient chart reviewed with Jim Prader, PA. Assessment and plan were made in collaboration withTan Shah. Thank you for allowing me to share in the care of patient.   Eugenio Pina MD

## 2022-11-02 NOTE — PROGRESS NOTES
Patient admitted to [facility] s/p CABGx2, sedated and intubated on ventilator, connected to monitor, chest tubes connected to-20cm suction, ventricular wires connected to pacerbox-off at this time. Will continue to monitor and wean towards extubation.

## 2022-11-02 NOTE — PROGRESS NOTES
CVICU Admission Note    Name: Val Vail  MRN: [id number]    CC: Postoperative Critical Care Management     Indication for Surgery/Procedure: CAD  LVEF:  60-65%    RVF:  NML    Important/Relevant PMH/PSH: PVD ( mary jane with decreased collateral flow bilaterally with 0.60 on the right and 0.55 on the left) , iliac stenting 2018, s/p left CEA 2018, right internal carotid artery, stenosis, HLD, HTN, DMII, pulmonary nodules, mild COPD, hypothyroid, GERD, migraines, vocal cord lesion follows with Dr Nicolette Roque     Procedure/Surgeries: 11/2/2022 CABG x2 (LIMA-LAD, SVG-OM)  LAAL (40 mm AtriCure)  Sternal plating     Pacing wires:  ventricular       Physical Exam:    BP (!) 178/84   Pulse 69   Temp 96.8 °F (36 °C) (Axillary)   Resp 16   SpO2 100%     Recent Labs     11/02/22  1105   WBC 11.0   RBC 3.16*   HGB 9.8*   HCT 29.4*   MCV 93.0   MCH 31.0   MCHC 33.3   RDW 13.0   *   MPV 9.8     Recent Labs     11/02/22  1036   K 3.6   CREATININE 0.6         Post operative CXR:          Atelectasis, No pneumothorax noted, Mildly increased vascular markings bilateral, No significant pleural effusion. ETT/lines/drains appear to be in proper position. Final Radiology report pending. General Appearance: Arrived to ICU intubated, placed on ventilator, no gtts   Eyes: PERRL  Pulmonary: Diminished bibasilar. No wheezes, no accessory muscle use noted   Ventilator: Mode: AC/VC, 50 FiO2, 5 PEEP, 450 Vt   Cardiovascular: RRR, no heaves or thrills palpated   Telemetry: SR  Abdomen: Soft, OG to LIWS  Extremities: BLE with +DP/PT signals, no edema  Neurologic/Psych: Sedated   Skin: Warm and dry    Incision: MSI with nazia dressing intact       Assessment/Plan: Day of Surgery     1. CAD S/p  CABG x2 (LIMA-LAD, SVG-OM)  LAAL (40 mm AtriCure)  - DAPT, Zocor   - Perioperative Ancef  - MRSA nasal colonization (Bactroban)  - Monitor chest tube output and hemodynamics closely    2.  Acute Pulmonary Insufficiency Following Surgery    - Expected 2/2 surgery  - Intubated on ventilator  - Wean vent settings and extubate patient once awake, following commands, KENNEY, and no signs of bleeding  - ABGs per protocol and PRN   - Duonebs  - will need to follow-up with pulmonary for pulm nodules     3. Postoperative Hypertension   - Start Nipride infusion for BP control     4. Acute Post Operative Pain   - PRN fentanyl for pain management until able to take PO     5.  DMII  -Hemoglobin A1C 9.7  -SSI every 4 hours for glycemic control       Electronically signed by INDIRA Chicas - CNP on 11/2/2022 at 11:32 AM

## 2022-11-02 NOTE — PROGRESS NOTES
Patient signed out of anesthesia discharge criteria met, KENNEY equal bilaterally, following commands and is able to nod appropriately when asked simple questions. Patient remains intubated at this time, will continue to wean towards extubation.

## 2022-11-02 NOTE — BRIEF OP NOTE
Brief Postoperative Note      Patient: Charissa Gandhi  YOB: 1955  MRN: [id number]    Date of Procedure: 11/2/2022    Pre-Op Diagnosis: CAD (coronary artery disease) [I25.10]    Post-Op Diagnosis: Same       Procedure(s):  CABG x2 (LIMA-LAD, SVG-OM)  LAAL (40 mm AtriCure)  Sternal plating     Surgeon(s):  Dennie Hof, DO    Assistant:  Physician Assistant: INDIRA Magana - CNP    Anesthesia: General    Estimated Blood Loss (mL): less than 50     Complications: None    Specimens:   * No specimens in log *    Implants:  Implant Name Type Inv.  Item Serial No.  Lot No. LRB No. Used Action   IMPLANT HUM TISS [id number] DIA3-6MM SAPH VEIN CRYOPRESERVED - [id number]  IMPLANT HUM TISS [id number] DIA3-6MM SAPH VEIN CRYOPRESERVED [id number] [employer]  N/A 1 Implanted   DEVICE OCCL CLP L40MM PLUNG GRP FLX SHFT FOR GILLINOV - [id number]  DEVICE OCCL CLP L40MM PLUNG GRP FLX SHFT FOR GILLINOV  [employer]WD [id number] N/A 1 Implanted   PLATE X FIXATION STERNAL 8 HOLES - [id number]  PLATE X FIXATION STERNAL 8 HOLES  [employer]I KRISHNA-WD  N/A 1 Implanted   PLATE FIXATION BOX STERNAL 4 HOLES - [id number]  PLATE FIXATION BOX STERNAL 4 HOLES  [employer] KRISHNA-WD  N/A 1 Implanted   SCREW LOCKING SD 2.3X10MM 5P - [id number]  SCREW LOCKING SD 2.3X10MM 5P  [employer]  N/A 12 Implanted         Drains:   Chest Tube Anterior Mediastinal 1 (Active)       Chest Tube Mediastinal 2 (Active)       Chest Tube Anterior Pleural 3 (Active)       Urinary Catheter 11/02/22 Molina-Temperature (Active)       Findings:  RCA small and diffusely calcified with no distal target for bypass     Electronically signed by Dennie Hof, DO on 11/2/2022 at 10:43 AM

## 2022-11-02 NOTE — PROGRESS NOTES
Patient extubated per respiratory therapist, placed on 5 liters, no stridor noted, patient can state name. Will continue to monitor.

## 2022-11-02 NOTE — PROGRESS NOTES
Date: 11/2/2022    Time: 3:50 PM    Patient Placed On BIPAP/CPAP/ Non-Invasive Ventilation? Yes    If no must comment. Facial area red/color change? No           If YES are Blister/Lesion present? No   If yes must notify nursing staff  BIPAP/CPAP skin barrier?   Yes    Skin barrier type:mepilexlite       Comments:        Sherrie Ca RCP

## 2022-11-02 NOTE — ANESTHESIA PRE PROCEDURE
Department of Anesthesiology  Preprocedure Note       Name:  Kiki Bullard   Age:  78 y.o.  :  1955                                          MRN:  [id number]         Date:  2022      Surgeon: Scarlett Perry):  Quin Delgado DO    Procedure: Procedure(s):  coronary artery bypass grafting, ZACHARY    Medications prior to admission:   Prior to Admission medications    Medication Sig Start Date End Date Taking? Authorizing Provider   TOPROL XL 25 MG extended release tablet TAKE 1 TABLET BY MOUTH EVERY DAY 10/31/22   Uday Stover MD   isosorbide mononitrate (IMDUR) 30 MG extended release tablet TAKE 1 TABLET BY MOUTH EVERY DAY 10/31/22   Uday Stover MD   clopidogrel (PLAVIX) 75 MG tablet TAKE 1 TABLET BY MOUTH DAILY FOR 30 DOSES. 10/24/22   Minesh Schmidt MD   nitroGLYCERIN (NITROSTAT) 0.4 MG SL tablet Place 1 tablet under the tongue every 5 minutes as needed for Chest pain up to max of 3 total doses.  If no relief after 1 dose, call 911. 10/6/22   Gonsalo Moore MD   Insulin Glargine, 1 Unit Dial, (TOUJEO SOLOSTAR) 300 UNIT/ML SOPN INJECT 50 UNITS INTO THE SKIN TWICE A DAY 22   Anurag Garcia DO   simvastatin (ZOCOR) 40 MG tablet TAKE 1 TABLET BY MOUTH NIGHTLY 22   Rita Garcia DO   levothyroxine (SYNTHROID) 25 MCG tablet Take 1 tablet by mouth Daily 22   Anurag Garcia DO   citalopram (CELEXA) 20 MG tablet TAKE 1 TABLET BY MOUTH EVERY DAY 22   Rita Garcia DO   ibuprofen (ADVIL;MOTRIN) 800 MG tablet Take 1 tablet by mouth 4 times daily as needed for Pain 22   Alvaro Chance,    lisinopril (PRINIVIL;ZESTRIL) 5 MG tablet TAKE 1 TABLET DAILY 3/3/21   Anurag Garcia DO   metFORMIN (GLUCOPHAGE) 500 MG tablet TAKE 1 TABLET TWICE A DAY WITH MEALS 2/15/21   Anurag Garcia DO   Ixekizumab (TALTZ SC) Inject into the skin every 30 days For psoriasis    Historical Provider, MD   insulin lispro (HUMALOG) 100 UNIT/ML injection vial Inject 30 Units into the skin 2 times daily    Historical Provider, MD   omeprazole (PRILOSEC) 40 MG delayed release capsule TAKE 1 CAPSULE BY MOUTH EVERY DAY 8/14/20   Robbie Hutchinson DO   BD PEN NEEDLE SAMEER U/F 32G X 4 MM MISC  1/23/19   Historical Provider, MD   aspirin 81 MG tablet Take 81 mg by mouth daily    Historical Provider, MD       Current medications:    Current Facility-Administered Medications   Medication Dose Route Frequency Provider Last Rate Last Admin    0.9 % sodium chloride infusion   IntraVENous Continuous Maebelle Nino, APRN -  mL/hr at 11/02/22 0552 New Bag at 11/02/22 0552    sodium chloride flush 0.9 % injection 5-40 mL  5-40 mL IntraVENous 2 times per day Maebelle Nino, APRN - CNP        sodium chloride flush 0.9 % injection 5-40 mL  5-40 mL IntraVENous PRN Maebelle Nino, APRN - CNP        0.9 % sodium chloride infusion   IntraVENous PRN Maebelle Nino, APRN - CNP        ceFAZolin (ANCEF) 2,000 mg in sterile water 20 mL IV syringe  2,000 mg IntraVENous On Call to [address] APRN - CNP           Allergies:     Allergies   Allergen Reactions    Pcn [Penicillins] Hives and Swelling    Fosamax [Alendronate]      Nausea, vomiting    Lipitor [Atorvastatin] Myalgia     Myalgia not allergic        Problem List:    Patient Active Problem List   Diagnosis Code    Asymptomatic bilateral carotid artery stenosis I65.23    History of nuclear stress test Z92.89    Mixed hyperlipidemia E78.2    Aortoiliac occlusive disease ([facility] [address] I74.09    PVD (peripheral vascular disease) with claudication ([facility] [address] I73.9    Carotid stenosis, asymptomatic, bilateral I65.23    S/P carotid endarterectomy Z98.890    Tobacco abuse Z72.0    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 [address]     DM type 2 (diabetes mellitus, type 2) ([address]     [facility] 20[facility]    Hyperlipidemia     Hypertension     Hypothyroidism     Migraines     rare    Neuropathy, diabetic ([address]     Peripheral vascular disease ([facility] [address]     follows with Dr. Tamra Grimaldo yearly    Positive colorectal cancer screening using Cologuard test     Psoriasiform dermatitis     Seasonal allergies     Thyroid disease        Past Surgical History:        Procedure Laterality Date    BACK SURGERY      L5-S1    CAROTID ENDARTERECTOMY Left 10/11/2018    [place]    CARPAL TUNNEL RELEASE      bilat     SECTION      2    COLONOSCOPY N/A 2021    COLONOSCOPY WITH BIOPSY with tattooing performed by Cely Santillan MD at [address] [place] [address] (CERVIX STATUS UNKNOWN)      LARYNGOSCOPY N/A 2020    DIRECT LARYNGOSCOPYY AND CERVICAL ESOPHAGOSCOPY (PATHOLOGY PRESENT) performed by Judi Hamilton DO at [address] [place] [address]- right     OTHER SURGICAL HISTORY  2018    Dr. Tamra Grimaldo- Bilateral iliac stents iCast [id number]    MA OFFICE/OUTPT VISIT,PROCEDURE ONLY Left 10/11/2018    LEFT CAROTID ENDARTERECTOMY performed by Rochelle Vides MD at [address] TOE SURGERY      bilat - ingrown toe nails    TONSILLECTOMY         Social History:    Social History     Tobacco Use    Smoking status: Former     Packs/day: 0.50     Years: 40.00     Pack years: 20.00     Types: Cigarettes     Start date: 5     Quit date: 2018     Years since quittin.2    Smokeless tobacco: Never   Substance Use Topics    Alcohol use:  No                                Counseling given: Not Answered      Vital Signs (Current):   Vitals:    22 0530 11/02/22 0533   BP: (!) 166/58 (!) 178/84   Pulse: 71    Resp: 18    Temp: 36.5 °C (97.7 °F)    TempSrc: Temporal    SpO2: 97%                                               BP Readings from Last 3 Encounters:   11/02/22 (!) 178/84   10/28/22 (!) 158/72   10/11/22 (!) 125/57       NPO Status: Time of last liquid consumption: 2000                        Time of last solid consumption: 1800                        Date of last liquid consumption: 11/01/22                        Date of last solid food consumption: 11/01/22    BMI:   Wt Readings from Last 3 Encounters:   10/28/22 130 lb (59 kg)   10/11/22 128 lb (58.1 kg)   09/20/22 125 lb (56.7 kg)     There is no height or weight on file to calculate BMI.    CBC:   Lab Results   Component Value Date/Time    WBC 5.8 10/28/2022 07:55 AM    RBC 4.58 10/28/2022 07:55 AM    HGB 13.8 10/28/2022 07:55 AM    HCT 42.0 10/28/2022 07:55 AM    MCV 91.7 10/28/2022 07:55 AM    RDW 13.2 10/28/2022 07:55 AM     10/28/2022 07:55 AM       CMP:   Lab Results   Component Value Date/Time     10/28/2022 07:55 AM    K 5.2 10/28/2022 07:55 AM    K 4.5 12/04/2018 08:10 AM    CL 99 10/28/2022 07:55 AM    CO2 25 10/28/2022 07:55 AM    BUN 18 10/28/2022 07:55 AM    CREATININE 0.6 10/28/2022 07:55 AM    GFRAA >60 10/05/2022 09:30 AM    LABGLOM >60 10/28/2022 07:55 AM    GLUCOSE 240 10/28/2022 07:55 AM    GLUCOSE 157 07/21/2011 04:06 PM    PROT 6.8 10/28/2022 07:55 AM    CALCIUM 10.7 10/28/2022 07:55 AM    BILITOT 0.2 10/28/2022 07:55 AM    ALKPHOS 89 10/28/2022 07:55 AM    AST 17 10/28/2022 07:55 AM    ALT 23 10/28/2022 07:55 AM       POC Tests: No results for input(s): POCGLU, POCNA, POCK, POCCL, POCBUN, POCHEMO, POCHCT in the last 72 hours.     Coags:   Lab Results   Component Value Date/Time    PROTIME 10.3 10/28/2022 07:55 AM    INR 0.9 10/28/2022 07:55 AM    APTT 29.9 10/28/2022 07:55 AM       HCG (If Applicable): No results found for: PREGTESTUR, PREGSERUM, HCG, HCGQUANT ABGs:   Lab Results   Component Value Date/Time    PO2ART 257.0 10/11/2018 03:24 PM    GGF2BZO 39.4 10/11/2018 03:24 PM    KVE7EZZ 21.5 10/11/2018 03:24 PM        Type & Screen (If Applicable):  No results found for: LABABO, LABRH    Drug/Infectious Status (If Applicable):  No results found for: HIV, HEPCAB    COVID-19 Screening (If Applicable):   Lab Results   Component Value Date/Time    COVID19 Not Detected 08/26/2020 10:00 AM         ULTRASOUND EVALUATION OF THE CAROTID ARTERIES       10/28/2022       TECHNIQUE:   Duplex ultrasound using B-mode/gray scaled imaging, Doppler spectral analysis   and color flow Doppler was obtained of the carotid arteries.       COMPARISON:   03/03/2022       HISTORY:   ORDERING SYSTEM PROVIDED HISTORY: Bruit   TECHNOLOGIST PROVIDED HISTORY:   Reason for exam:->pre op   What reading provider will be dictating this exam?->CRC       FINDINGS:       RIGHT:       The right common carotid artery demonstrates peak systolic velocities of 09.5   cm/sec in the proximal and distal segments respectively.       The right internal carotid artery demonstrates the systolic velocities of   642.5 cm/sec in the proximal, mid and distal segments respectively.       The external carotid artery is patent.  The vertebral artery demonstrates   normal antegrade flow.       Significant focal atherosclerotic plaque.       ICA/CCA ratio of 4.9           LEFT:       There has been a prior left-sided carotid endarterectomy.  The left common   carotid artery demonstrates peak systolic velocities of 226.1 cm/sec in the   proximal and distal segments respectively.       The left internal carotid artery demonstrates the systolic velocities of   275.1 cm/sec in the proximal, mid and distal segments respectively.       The external carotid artery is patent.  The vertebral artery demonstrates   normal antegrade flow.       No evidence of focal atherosclerotic plaque.       ICA/CCA ratio of 1.0           Impression The right internal carotid artery demonstrates greater than 70% stenosis. .   Peak systolic flow velocity in the right ICA has not significantly changed   since 03/03/2022 however the ICA/CCA ratio has increased.       The left internal carotid artery demonstrates 0-50% stenosis.       Bilateral vertebral arteries are patent with flow in the normal direction.       RECOMMENDATIONS:   Recommend urgent surgical consultation.  Results were called to the referring   clinician office by support staff           CT OF THE CHEST WITHOUT CONTRAST 10/28/2022 10:14 am       TECHNIQUE:   CT of the chest was performed without the administration of intravenous   contrast. Multiplanar reformatted images are provided for review. Automated   exposure control, iterative reconstruction, and/or weight based adjustment of   the mA/kV was utilized to reduce the radiation dose to as low as reasonably   achievable.       COMPARISON:   None.       HISTORY:   ORDERING SYSTEM PROVIDED HISTORY: CAD in native artery   TECHNOLOGIST PROVIDED HISTORY:   Reason for exam:->noncontrast only. evaluate aorta and vasculature   What reading provider will be dictating this exam?->CRC       FINDINGS:   The heart size is normal. Margaretha Medin is diffuse coronary artery calcifications. There is mild calcification of the aortic root.  The remainder of the   ascending thoracic aorta is normal in caliber measuring 3 x 2.9 cm with   punctate calcification which is probably not clinically significant.  There   is diffuse calcifications of the aortic arch and punctate calcification of   the descending thoracic aorta which measures 2.3 cm in diameter.  The trachea   and major bronchi are patent.  There is emphysema. Margaretha Medin is a  8 mm   spiculated nodule in the lingula and a 7 mm nodule in the superior right   lower lobe.  3 mm nodule in the left upper lobe is noted.  There is no focal   consolidation or pleural effusion.  The liver is fatty infiltrated.    Degenerative changes are identified in the thoracic spine.           Impression   Diffuse coronary artery calcification.  The ascending thoracic aorta is   normal in caliber with a focal area of punctate calcification.       Centrilobular emphysema with the spiculated pulmonary nodules ranging from   7-8 mm in the right lower lobe and lingula which are indeterminate for   malignancy.  Further assessment by PET-CT scan and CT surveillance according   to Fleischner society guidelines is recommended. Type of Study      TTE procedure:Echocardiogram W/Contrast.     Procedure Date  Date: 10/27/2022 Start: 07:39 AM     Study Location: Echo Lab  Technical Quality: Limited visualization due to body habitus.     Indications:Preop cardiac evaluation and Coronary artery disease.     Patient Status: Pending Surgery     Contrast Medium: Definity. Amount - 2 ml     Contrast Comments: given by the rn .     Height: 64 inches Weight: 128 pounds BSA: 1.62 m^2 BMI: 21.97 kg/m^2     Rhythm: Within normal limits HR: 60 bpm BP: 183/69 mmHg      Findings      Left Ventricle   Mild concentric left ventricular hypertrophy. Ejection fraction is visually estimated at 60 to 65%. Right Ventricle   Normal right ventricular size and function. Left Atrium   Left atrium was not clearly visualized. Right Atrium   Right Atrium is not clearly visualized. Mitral Valve   Mild mitral regurgitation is present. Tricuspid Valve   Mild tricuspid regurgitation. Aortic Valve   Individual aortic valve leaflets are not clearly visualized. Mild aortic stenosis. Pulmonic Valve   Mild pulmonic regurgitation present. Conclusions      Summary   Technically difficult examination. Mild concentric left ventricular hypertrophy. Ejection fraction is visually estimated at 60 to 65%. Normal right ventricular size and function. Mild mitral regurgitation is present.    Individual aortic valve leaflets are not clearly visualized. Mild aortic stenosis. Mild tricuspid regurgitation. Mild pulmonic regurgitation present. Signature      ----------------------------------------------------------------   Electronically signed by Alexis SHIPLEYInterpreting   physician) on 10/27/2022 06:05 PM   ----------------------------------------------------------------     M-Mode/2D Measurements & Calculations      LV Diastolic    LV Systolic Dimension: 3 cm AV Cusp Separation: 1.3 cmLA   Dimension: 4.2  LV Volume Diastolic: 99.7   Dimension: 3.5 cmAO Root   cm              ml                          Dimension: 2.4 cm   LV FS:28.6 %    LV Volume Systolic: 34 ml   LV PW           LV EDV/LV EDV Index: 49.6   Diastolic: 1.6  GM/33 RZ/E^2SF ESV/LV ESV   cm              Index: 34 ml/21ml/ m^2      RV Diastolic Dimension: 2.8 cm   LV PW Systolic: EF Calculated: 32.8 %   1.4 cm          LV Mass Index: 138          Ascending Aorta: 2.5 cm   Septum          l/min*m^2                   LA volume/Index: 15.6 ml   Diastolic: 1.2  LV Length: 7.3 cm           /31.24ml/m^2   cm                                          RA Area: 10.9 cm^2   Septum          LVOT: 1.9 cm   Systolic: 1.6   cm   CO: 8.88 l/min   CI: 1.59   l/m*m^2   LV Mass: 224.33   g     Doppler Measurements & Calculations      MV Peak E-Wave:    AV Peak Velocity: 1.32 m/s      LVOT Peak Velocity:   0.91 m/s           AV Peak Gradient: 6.95 mmHg     0.51 m/s   MV Peak A-Wave:    AV Mean Velocity: 0.98 m/s      LVOT Mean Velocity: 0.4   0.67 m/s           AV Mean Gradient: 4.2 mmHg      m/s   MV E/A Ratio: 1.36 AV VTI: 34.7 cm                 LVOT Peak Gradient: 1   MV Peak Gradient:  AV Area (Continuity):1.24 cm^2  mmHgLVOT Mean Gradient:   4 mmHg                                             0.7 mmHg   MV Mean Gradient:  LVOT VTI: 15.2 cm   1.3 mmHg   MV Mean Velocity:   0.52 m/s           Pulm. Vein A Reversal   MV Deceleration    Duration:96.9 msec   Time: 383.7 msec   Pulm.  Vein D Velocity:0.46   MV P1/2t: 135.7    m/sPulm. Vein A Reversal   msec               Velocity:0.23 m/s   MVA by PHT:1.62    Pulm. Vein S Velocity: 0.56 m/s NH ED Velocity: 1.14   cm^2                                               m/s   MV Area   (continuity): 1.3   cm^2   MV E' Septal   Velocity: 0.07 m/s   MV E' Lateral   Velocity: 8 m/s          EKG Narrative & Impression    Normal sinus rhythm  Nonspecific T wave abnormality  Abnormal ECG  No previous ECGs available  Confirmed by Luda Mcmahon ([id number]) on 10/6/2022 3:55:24 PM                 Name: Roel Rossi 1154 Account Number: [de-identified]     :  1955          Sex: female                                                                Date of Study:  2022     Height: 5' 4\" (162.6 cm)         Weight: 125 lb (56.7 kg)               Ordering Provider: Mag Duarte MD          PCP: Vasiliy Odonnell DO       Cardiologist: Mag Duarte MD              Interpreting Physician: Phong Cook MD  _________________________________________________________________________________     Indication:        Preoperative Risk Stratification     Clinical History:   Patient has no known history of coronary artery disease.           Procedure:       Pharmacologic stress testing was performed with regadenoson 0.4 mg for 15 seconds. The heart rate was 56 at baseline and delores to 88 beats during the infusion. This corresponds to 57% of maximum predicted heart rate. The blood pressure at baseline was 144/62 and blood pressure at the end of infusion was 158/76. Blood pressure response was normal during the stress procedure.      The patient experienced \"weird\" feeling during the infusion.     ECG during the infusion did not change.     IMAGING: Myocardial perfusion imaging was performed at rest 30-35 minutes following the intravenous injection of 10.6 mCi of (Tc-Sestamibi) followed by 10 ml of Normal Saline.   As per infusion protocol, the patient was injected intravenously with 34.5 mCi of (Tc-Sestamibi) followed by 10 ml of Normal Saline. Gated post-stress tomographic imaging was performed 20-25 minutes after stress.      FINDINGS: The overall quality of the study was excellent.      Left ventricular cavity size was noted to be normal.              Anesthesia Evaluation  Patient summary reviewed and Nursing notes reviewed no history of anesthetic complications:   Airway: Mallampati: I  TM distance: >3 FB   Neck ROM: limited  Mouth opening: > = 3 FB   Dental:    (+) upper dentures and lower dentures      Pulmonary:normal exam  breath sounds clear to auscultation                            ROS comment: -Emphysema    Cardiovascular:    (+) hypertension: moderate, CAD: obstructive, hyperlipidemia      ECG reviewed  Rhythm: regular  Rate: normal  Echocardiogram reviewed  Stress test reviewed  Cleared by cardiology     Beta Blocker:  Dose within 24 Hrs         Neuro/Psych:   (+) headaches:,             GI/Hepatic/Renal:   (+) GERD: poorly controlled,           Endo/Other:    (+) DiabetesType II DM, poorly controlled, using insulin, hypothyroidism, blood dyscrasia: anticoagulation therapy:., electrolyte abnormalities, . ROS comment: -patient has vocal cord lesion, was BX, followed by Dr. Paulo Caal  Abdominal:             Vascular:   + PVD, aortic or cerebral, . ROS comment: -Carotid stenosis w/ Carotid endarterectomy - right sided stenosis still present >70%  - bilateral iliac stents . Other Findings:           Anesthesia Plan      general     ASA 4       Induction: intravenous. arterial line, central line and ZACHARY  MIPS: Postoperative opioids intended and Prophylactic antiemetics administered. Anesthetic plan and risks discussed with patient. Use of blood products discussed with patient whom. Plan discussed with CRNA and attending.                     Eric Pedro RN   11/2/2022        DOS STAFF ADDENDUM:    Patient seen and chart

## 2022-11-02 NOTE — PLAN OF CARE
Problem: Discharge Planning  Goal: Discharge to home or other facility with appropriate resources  Outcome: Progressing     Problem: Chronic Conditions and Co-morbidities  Goal: Patient's chronic conditions and co-morbidity symptoms are monitored and maintained or improved  Outcome: Progressing     Problem: Neurosensory - Adult  Goal: Achieves stable or improved neurological status  Outcome: Progressing  Flowsheets (Taken 11/2/2022 1645)  Achieves stable or improved neurological status: Assess for and report changes in neurological status     Problem: Respiratory - Adult  Goal: Achieves optimal ventilation and oxygenation  Outcome: Progressing  Flowsheets (Taken 11/2/2022 1645)  Achieves optimal ventilation and oxygenation:   Respiratory therapy support as indicated   Assess and instruct to report shortness of breath or any respiratory difficulty   Assess the need for suctioning and aspirate as needed   Encourage broncho-pulmonary hygiene including cough, deep breathe, incentive spirometry   Initiate smoking cessation protocol as indicated   Oxygen supplementation based on oxygen saturation or arterial blood gases   Position to facilitate oxygenation and minimize respiratory effort   Assess for changes in mentation and behavior   Assess for changes in respiratory status     Problem: Cardiovascular - Adult  Goal: Maintains optimal cardiac output and hemodynamic stability  Outcome: Progressing  Flowsheets (Taken 11/2/2022 1645)  Maintains optimal cardiac output and hemodynamic stability:   Administer vasoactive medications as ordered   Administer fluid and/or volume expanders as ordered   Assess for signs of decreased cardiac output   Monitor urine output and notify Licensed Independent Practitioner for values outside of normal range   Monitor blood pressure and heart rate  Goal: Absence of cardiac dysrhythmias or at baseline  Outcome: Progressing  Flowsheets (Taken 11/2/2022 1645)  Absence of cardiac dysrhythmias or at baseline:   Assess for signs of decreased cardiac output   Monitor cardiac rate and rhythm   Administer antiarrhythmia medication and electrolyte replacement as ordered     Problem: Skin/Tissue Integrity - Adult  Goal: Skin integrity remains intact  Outcome: Progressing  Flowsheets (Taken 11/2/2022 6715)  Skin Integrity Remains Intact:   Assess vascular access sites hourly   Monitor for areas of redness and/or skin breakdown

## 2022-11-03 ENCOUNTER — APPOINTMENT (OUTPATIENT)
Dept: GENERAL RADIOLOGY | Age: 67
DRG: 235 | End: 2022-11-03
Attending: THORACIC SURGERY (CARDIOTHORACIC VASCULAR SURGERY)
Payer: MEDICARE

## 2022-11-03 LAB
ANION GAP SERPL CALCULATED.3IONS-SCNC: 8 MMOL/L (ref 7–16)
B.E.: -3 MMOL/L (ref -3–3)
BUN BLDV-MCNC: 6 MG/DL (ref 6–23)
CALCIUM IONIZED: 1.19 MMOL/L (ref 1.15–1.33)
CALCIUM SERPL-MCNC: 8.1 MG/DL (ref 8.6–10.2)
CHLORIDE BLD-SCNC: 111 MMOL/L (ref 98–107)
CO2: 22 MMOL/L (ref 22–29)
COHB: 0.2 % (ref 0–1.5)
CREAT SERPL-MCNC: 0.4 MG/DL (ref 0.5–1)
CRITICAL: ABNORMAL
DATE ANALYZED: ABNORMAL
DATE OF COLLECTION: ABNORMAL
GFR SERPL CREATININE-BSD FRML MDRD: >60 ML/MIN/1.73
GLUCOSE BLD-MCNC: 128 MG/DL (ref 74–99)
HCO3: 22.2 MMOL/L (ref 22–26)
HCT VFR BLD CALC: 28.4 % (ref 34–48)
HEMOGLOBIN: 9.3 G/DL (ref 11.5–15.5)
HHB: 5.8 % (ref 0–5)
INR BLD: 1.2
LAB: ABNORMAL
Lab: ABNORMAL
MAGNESIUM: 1.7 MG/DL (ref 1.6–2.6)
MCH RBC QN AUTO: 31 PG (ref 26–35)
MCHC RBC AUTO-ENTMCNC: 32.7 % (ref 32–34.5)
MCV RBC AUTO: 94.7 FL (ref 80–99.9)
METER GLUCOSE: 143 MG/DL (ref 74–99)
METER GLUCOSE: 160 MG/DL (ref 74–99)
METER GLUCOSE: 162 MG/DL (ref 74–99)
METER GLUCOSE: 165 MG/DL (ref 74–99)
METER GLUCOSE: 221 MG/DL (ref 74–99)
METER GLUCOSE: 238 MG/DL (ref 74–99)
METHB: 0 % (ref 0–1.5)
MODE: ABNORMAL
O2 SATURATION: 94.2 % (ref 92–98.5)
O2HB: 94 % (ref 94–97)
OPERATOR ID: 2245
PATIENT TEMP: 37 C
PCO2: 40.3 MMHG (ref 35–45)
PDW BLD-RTO: 13.3 FL (ref 11.5–15)
PH BLOOD GAS: 7.36 (ref 7.35–7.45)
PLATELET # BLD: 136 E9/L (ref 130–450)
PMV BLD AUTO: 10.1 FL (ref 7–12)
PO2: 71.3 MMHG (ref 75–100)
POTASSIUM SERPL-SCNC: 4 MMOL/L (ref 3.5–5)
PROTHROMBIN TIME: 13.2 SEC (ref 9.3–12.4)
RBC # BLD: 3 E12/L (ref 3.5–5.5)
SODIUM BLD-SCNC: 141 MMOL/L (ref 132–146)
SOURCE, BLOOD GAS: ABNORMAL
THB: 9.9 G/DL (ref 11.5–16.5)
TIME ANALYZED: 433
WBC # BLD: 7.6 E9/L (ref 4.5–11.5)

## 2022-11-03 PROCEDURE — 2000000000 HC ICU R&B

## 2022-11-03 PROCEDURE — 37799 UNLISTED PX VASCULAR SURGERY: CPT

## 2022-11-03 PROCEDURE — 99024 POSTOP FOLLOW-UP VISIT: CPT | Performed by: NURSE PRACTITIONER

## 2022-11-03 PROCEDURE — S5553 INSULIN LONG ACTING 5 U: HCPCS | Performed by: NURSE PRACTITIONER

## 2022-11-03 PROCEDURE — 6360000002 HC RX W HCPCS: Performed by: NURSE PRACTITIONER

## 2022-11-03 PROCEDURE — 80048 BASIC METABOLIC PNL TOTAL CA: CPT

## 2022-11-03 PROCEDURE — APPSS45 APP SPLIT SHARED TIME 31-45 MINUTES: Performed by: PHYSICIAN ASSISTANT

## 2022-11-03 PROCEDURE — 97162 PT EVAL MOD COMPLEX 30 MIN: CPT

## 2022-11-03 PROCEDURE — 85027 COMPLETE CBC AUTOMATED: CPT

## 2022-11-03 PROCEDURE — 6370000000 HC RX 637 (ALT 250 FOR IP): Performed by: NURSE PRACTITIONER

## 2022-11-03 PROCEDURE — 97530 THERAPEUTIC ACTIVITIES: CPT

## 2022-11-03 PROCEDURE — 82805 BLOOD GASES W/O2 SATURATION: CPT

## 2022-11-03 PROCEDURE — 99233 SBSQ HOSP IP/OBS HIGH 50: CPT | Performed by: INTERNAL MEDICINE

## 2022-11-03 PROCEDURE — 83735 ASSAY OF MAGNESIUM: CPT

## 2022-11-03 PROCEDURE — 71045 X-RAY EXAM CHEST 1 VIEW: CPT

## 2022-11-03 PROCEDURE — 2580000003 HC RX 258: Performed by: NURSE PRACTITIONER

## 2022-11-03 PROCEDURE — 36415 COLL VENOUS BLD VENIPUNCTURE: CPT

## 2022-11-03 PROCEDURE — 97166 OT EVAL MOD COMPLEX 45 MIN: CPT

## 2022-11-03 PROCEDURE — 2700000000 HC OXYGEN THERAPY PER DAY

## 2022-11-03 PROCEDURE — 85610 PROTHROMBIN TIME: CPT

## 2022-11-03 PROCEDURE — 94660 CPAP INITIATION&MGMT: CPT

## 2022-11-03 PROCEDURE — 94640 AIRWAY INHALATION TREATMENT: CPT

## 2022-11-03 PROCEDURE — 82330 ASSAY OF CALCIUM: CPT

## 2022-11-03 PROCEDURE — 82962 GLUCOSE BLOOD TEST: CPT

## 2022-11-03 RX ORDER — INSULIN LISPRO 100 [IU]/ML
0-6 INJECTION, SOLUTION INTRAVENOUS; SUBCUTANEOUS NIGHTLY
Status: DISCONTINUED | OUTPATIENT
Start: 2022-11-03 | End: 2022-11-05

## 2022-11-03 RX ORDER — INSULIN LISPRO 100 [IU]/ML
0-16 INJECTION, SOLUTION INTRAVENOUS; SUBCUTANEOUS
Status: DISCONTINUED | OUTPATIENT
Start: 2022-11-03 | End: 2022-11-05

## 2022-11-03 RX ORDER — INSULIN GLARGINE-YFGN 100 [IU]/ML
0.2 INJECTION, SOLUTION SUBCUTANEOUS NIGHTLY
Status: DISCONTINUED | OUTPATIENT
Start: 2022-11-03 | End: 2022-11-05

## 2022-11-03 RX ORDER — ENOXAPARIN SODIUM 100 MG/ML
40 INJECTION SUBCUTANEOUS DAILY
Status: DISCONTINUED | OUTPATIENT
Start: 2022-11-03 | End: 2022-11-07

## 2022-11-03 RX ORDER — METOPROLOL SUCCINATE 25 MG/1
25 TABLET, EXTENDED RELEASE ORAL DAILY
Status: DISCONTINUED | OUTPATIENT
Start: 2022-11-03 | End: 2022-11-04

## 2022-11-03 RX ADMIN — INSULIN LISPRO 8 UNITS: 100 INJECTION, SOLUTION INTRAVENOUS; SUBCUTANEOUS at 16:58

## 2022-11-03 RX ADMIN — OXYCODONE HYDROCHLORIDE 10 MG: 10 TABLET ORAL at 17:00

## 2022-11-03 RX ADMIN — PANTOPRAZOLE SODIUM 40 MG: 40 TABLET, DELAYED RELEASE ORAL at 08:46

## 2022-11-03 RX ADMIN — SODIUM CHLORIDE, PRESERVATIVE FREE 10 ML: 5 INJECTION INTRAVENOUS at 20:28

## 2022-11-03 RX ADMIN — MAGNESIUM SULFATE HEPTAHYDRATE 2000 MG: 40 INJECTION, SOLUTION INTRAVENOUS at 06:41

## 2022-11-03 RX ADMIN — OXYCODONE HYDROCHLORIDE 10 MG: 10 TABLET ORAL at 21:37

## 2022-11-03 RX ADMIN — INSULIN LISPRO 8 UNITS: 100 INJECTION, SOLUTION INTRAVENOUS; SUBCUTANEOUS at 12:54

## 2022-11-03 RX ADMIN — INSULIN GLARGINE-YFGN 12 UNITS: 100 INJECTION, SOLUTION SUBCUTANEOUS at 20:27

## 2022-11-03 RX ADMIN — 0.12% CHLORHEXIDINE GLUCONATE 15 ML: 1.2 RINSE ORAL at 20:27

## 2022-11-03 RX ADMIN — MUPIROCIN: 20 OINTMENT TOPICAL at 08:45

## 2022-11-03 RX ADMIN — ACETAMINOPHEN 650 MG: 325 TABLET ORAL at 20:27

## 2022-11-03 RX ADMIN — CLOPIDOGREL BISULFATE 75 MG: 75 TABLET ORAL at 08:46

## 2022-11-03 RX ADMIN — SENNOSIDES AND DOCUSATE SODIUM 1 TABLET: 50; 8.6 TABLET ORAL at 20:31

## 2022-11-03 RX ADMIN — INSULIN LISPRO 4 UNITS: 100 INJECTION, SOLUTION INTRAVENOUS; SUBCUTANEOUS at 00:56

## 2022-11-03 RX ADMIN — CEFAZOLIN 2000 MG: 2 INJECTION, POWDER, FOR SOLUTION INTRAMUSCULAR; INTRAVENOUS at 15:30

## 2022-11-03 RX ADMIN — OXYCODONE HYDROCHLORIDE 10 MG: 10 TABLET ORAL at 05:47

## 2022-11-03 RX ADMIN — POTASSIUM CHLORIDE 20 MEQ: 29.8 INJECTION, SOLUTION INTRAVENOUS at 06:41

## 2022-11-03 RX ADMIN — POTASSIUM CHLORIDE 20 MEQ: 29.8 INJECTION, SOLUTION INTRAVENOUS at 00:47

## 2022-11-03 RX ADMIN — CEFAZOLIN 2000 MG: 2 INJECTION, POWDER, FOR SOLUTION INTRAMUSCULAR; INTRAVENOUS at 23:29

## 2022-11-03 RX ADMIN — ENOXAPARIN SODIUM 40 MG: 100 INJECTION SUBCUTANEOUS at 08:45

## 2022-11-03 RX ADMIN — ASPIRIN 81 MG: 81 TABLET, COATED ORAL at 08:45

## 2022-11-03 RX ADMIN — SENNOSIDES AND DOCUSATE SODIUM 1 TABLET: 50; 8.6 TABLET ORAL at 08:45

## 2022-11-03 RX ADMIN — Medication 400 MG: at 20:28

## 2022-11-03 RX ADMIN — ACETAMINOPHEN 650 MG: 325 TABLET ORAL at 00:41

## 2022-11-03 RX ADMIN — IPRATROPIUM BROMIDE AND ALBUTEROL SULFATE 1 AMPULE: 2.5; .5 SOLUTION RESPIRATORY (INHALATION) at 19:16

## 2022-11-03 RX ADMIN — METOPROLOL SUCCINATE 25 MG: 25 TABLET, EXTENDED RELEASE ORAL at 08:46

## 2022-11-03 RX ADMIN — OXYCODONE HYDROCHLORIDE 10 MG: 10 TABLET ORAL at 08:50

## 2022-11-03 RX ADMIN — OXYCODONE HYDROCHLORIDE 10 MG: 10 TABLET ORAL at 12:50

## 2022-11-03 RX ADMIN — OXYCODONE HYDROCHLORIDE 10 MG: 10 TABLET ORAL at 00:41

## 2022-11-03 RX ADMIN — IPRATROPIUM BROMIDE AND ALBUTEROL SULFATE 1 AMPULE: 2.5; .5 SOLUTION RESPIRATORY (INHALATION) at 11:12

## 2022-11-03 RX ADMIN — ACETAMINOPHEN 650 MG: 325 TABLET ORAL at 05:47

## 2022-11-03 RX ADMIN — CITALOPRAM HYDROBROMIDE 20 MG: 20 TABLET ORAL at 10:07

## 2022-11-03 RX ADMIN — FENTANYL CITRATE 25 MCG: 50 INJECTION, SOLUTION INTRAMUSCULAR; INTRAVENOUS at 23:28

## 2022-11-03 RX ADMIN — CEFAZOLIN 2000 MG: 2 INJECTION, POWDER, FOR SOLUTION INTRAMUSCULAR; INTRAVENOUS at 06:30

## 2022-11-03 RX ADMIN — SODIUM CHLORIDE, PRESERVATIVE FREE 10 ML: 5 INJECTION INTRAVENOUS at 08:45

## 2022-11-03 RX ADMIN — MUPIROCIN: 20 OINTMENT TOPICAL at 20:29

## 2022-11-03 RX ADMIN — IPRATROPIUM BROMIDE AND ALBUTEROL SULFATE 1 AMPULE: 2.5; .5 SOLUTION RESPIRATORY (INHALATION) at 16:06

## 2022-11-03 RX ADMIN — INSULIN LISPRO 4 UNITS: 100 INJECTION, SOLUTION INTRAVENOUS; SUBCUTANEOUS at 08:45

## 2022-11-03 RX ADMIN — LEVOTHYROXINE SODIUM 25 MCG: 0.03 TABLET ORAL at 06:14

## 2022-11-03 ASSESSMENT — PAIN DESCRIPTION - ORIENTATION
ORIENTATION: MID

## 2022-11-03 ASSESSMENT — PAIN SCALES - GENERAL
PAINLEVEL_OUTOF10: 0
PAINLEVEL_OUTOF10: 3
PAINLEVEL_OUTOF10: 8
PAINLEVEL_OUTOF10: 4
PAINLEVEL_OUTOF10: 2
PAINLEVEL_OUTOF10: 4
PAINLEVEL_OUTOF10: 8
PAINLEVEL_OUTOF10: 7
PAINLEVEL_OUTOF10: 6
PAINLEVEL_OUTOF10: 0
PAINLEVEL_OUTOF10: 3
PAINLEVEL_OUTOF10: 4
PAINLEVEL_OUTOF10: 7
PAINLEVEL_OUTOF10: 9
PAINLEVEL_OUTOF10: 2

## 2022-11-03 ASSESSMENT — PAIN DESCRIPTION - FREQUENCY
FREQUENCY: CONTINUOUS
FREQUENCY: INTERMITTENT
FREQUENCY: CONTINUOUS

## 2022-11-03 ASSESSMENT — PAIN DESCRIPTION - DESCRIPTORS
DESCRIPTORS: SORE
DESCRIPTORS: ACHING
DESCRIPTORS: ACHING;DISCOMFORT;SORE
DESCRIPTORS: ACHING;DISCOMFORT;SORE
DESCRIPTORS: ACHING;DISCOMFORT
DESCRIPTORS: ACHING;THROBBING
DESCRIPTORS: ACHING;DISCOMFORT;SORE
DESCRIPTORS: ACHING
DESCRIPTORS: ACHING;THROBBING;SORE
DESCRIPTORS: ACHING

## 2022-11-03 ASSESSMENT — PAIN DESCRIPTION - PAIN TYPE
TYPE: SURGICAL PAIN
TYPE: SURGICAL PAIN

## 2022-11-03 ASSESSMENT — PAIN DESCRIPTION - LOCATION
LOCATION: CHEST
LOCATION: STERNUM
LOCATION: CHEST
LOCATION: CHEST;INCISION
LOCATION: STERNUM;BACK
LOCATION: CHEST
LOCATION: STERNUM;LEG

## 2022-11-03 ASSESSMENT — PAIN - FUNCTIONAL ASSESSMENT
PAIN_FUNCTIONAL_ASSESSMENT: ACTIVITIES ARE NOT PREVENTED
PAIN_FUNCTIONAL_ASSESSMENT: ACTIVITIES ARE NOT PREVENTED

## 2022-11-03 NOTE — PROGRESS NOTES
[address]     JKRK:10/4/5215                                                               Patient Name: Errol Godwin  MRN: [id number]  : 1955  Room: [address] [place] [address]    Evaluating OT: Jennifer Oakley OTR/L [id number]    Referring Provider: INDIRA Miramontes CNP   Specific Provider Orders/Date: OT eval and treat (22)      Diagnosis: CAD     Surgery/Procedures:  CABG x2      Pertinent Medical History: Arthritis, DM, HLD, HTN, Neuropathy, PVD, thyroid disease      *Precautions:  Fall Risk, sternal, O2, chest tubes x 2    Assessment of current deficits   [x]Functional mobility  [x]ADLs [x]Strength  []Cognition  [x]Functional transfers  [x]IADLs [x]Safety Awareness  [x]Endurance  []Fine Motor Coordination  [x]Balance       []Vision/perception  []Sensation    []Gross Motor Coordination [x]ROM  []Delirium                  [] Motor Control     []Communication     OT PLAN OF CARE   OT POC based on physician orders, patient diagnosis and results of clinical assessment.        Frequency/Duration: 1-3 days/wk for 1-2 weeks PRN     Specific OT Treatment to include:   ADL retraining/adapted techniques and AE recommendations to increase functional independence within precautions                    Energy conservation techniques to improve tolerance for selfcare routine   Functional transfer/mobility training/DME recommendations for increased independence, safety and fall prevention         Patient/family education to increase safety and functional independence within precautions             Environmental modifications for safe mobility and completion of ADLs                             Therapeutic activity to improve functional performance during ADLs                                         Therapeutic exercise to improve tolerance and functional strength for ADLs   Balance retraining exercises/tasks for facilitation of postural control with dynamic challenges during ADLs . Recommended Adaptive Equipment:  LB dressing AE, shower seat    Home Living: Pt lives alone  in a 1 floor plan with 3 steps to enter; no rail. Bathroom setup: tub/shower; standard height commode seat  Equipment owned: shower seat, cane, walker    Prior Level of Function: IND with ADLs;  IND with IADLs. No device for ambulation. Driving: yes  Occupation: retired     Pain Level: pt c/o 8/10 chest pain  this session    Cognition: A&O: 4/4    Follows 1-2 step commands appropriately. Memory: Good   Comprehension Good   Problem solving: Fair+/Good   Judgement/safety: Fair+/Good               Communication skills: WFL           Vision: WFL               Glasses:reading                                                   Hearing: WFL     RASS: 0  CAM-ICU: (NT) Delirium     UE Assessment:  Hand Dominance: Right [x]  Left []     ROM Strength STM goal: PRN   RUE  Grossly WFL within precautions Not formally tested; grossly WFL              WNL for ADLS     LUE Grossly WFL within precautions Not formally tested; grossly WFL              WNL for ADLS       Sensation: No c/o numbness or tingling in extremities; h/o neuropathy    Tone: WNL   Edema: WFL     Functional Assessment: AM-PAC Daily Activity Raw Score: 14/24   Initial Eval Status  Date: 11/3/22 Treatment Status  Date: STG=LTG  Time Frame: 5-7days   Feeding S; set up                       IND  while seated up in chair to increase activity tolerance        Grooming Min A                       Cheng   while standing sink level demonstrating G tolerance; G balance.      UB dressing/bathing Mod A                       Cheng   demonstrating G knowledge of precautions during tasks     LB dressing/bathing Dep    Max A  after instruction on LB dressing AE for safe reach within precautions                       Cheng  using AE as needed for safe reach/ energy conservation Toileting NT                       Cheng     Bed Mobility  Supine to sit:   Mod A    Sit to supine:   NT                       Cheng  in prep of ADL tasks & transfers   Functional Transfers Sit to stand: Min A  from higher bed surface;    Min A from lower chair surface    Stand to sit: Min A                       Cheng  sit<>stand/functional bathroom transfers using AD/DME as needed for balance and safety   Functional Mobility Kristine  no device                        Cheng   functional/bathroom mobility using AD as needed & demonstrating G safety     Balance Sitting:     Static:  S    Dynamic:Min A  Standing: Min A  Cheng dynamic sitting balance; Cheng dynamic standing balance  during ADL tasks & transfers   Endurance/  Activity Tolerance   F tolerance with light activity. G   tolerance with moderate activity/self care routine   Visual/  Perceptual               WFL                          Vitals:   HR at rest: 89 bpm HR at end of session: 92 bpm   Spo2 at rest:97% Spo2 at end of session 95%   BP at rest:130/48 mmHg BP at end of session 124/47 mmHg       Treatment: OT intervention provided this date includes:  Functional mobility: Instruction on sternal precautions to facilitate safe bed mobility & functional transfers. Pt required 2 person assist for safe mobility due to complexity of medical condition, medical lines and deconditioning. HOB elevated to assist. Pt with no c/o dizziness EOB    ADL retraining: Instruction on adapted dressing techniques/equipment (reacher, sock aid) to maintain sternal precautions during ADLs. Pt demo G understanding. Energy Conservation training: Education on postural awareness/positioning and breathing techniques to improve overall tolerance and participation in self care routine. Pt demonstrated fair tolerance. Review of recommended DME for fall prevention, bathroom safety & energy conservation.     Pt/Family Education: Instruction with handouts on energy conservation and sternal precautions during functional activities for safe return home. Pt/family demonstrates G understanding. Line management and environmental modifications made prior to and end of session to ensure patient safety and to increase efficiency of session. Skilled monitoring of HR, O2 saturation, blood pressure and patient's response to activity performed throughout session. Comments: OK from RN to see patient. Upon arrival, patient supine in bed, agreeable to session. At end of session, patient left seated in chair to increase activity tolerance . Call light within reach, all lines and tubes intact. Pt instructed on use of call light for assistance and fall prevention. Patient presents with decreased activity tolerance, dynamic balance, functional mobility and pain limiting completion of ADLs and safety. Pt can benefit from continued skilled OT to increase safety, functional independence and quality of life. Rehab Potential: Good for established goals    Patient / Family Goal: return to PLOF    Patient and/or family were instructed/educated on diagnosis, prognosis/goals and plan of care. Pt demonstrated G understanding. [] Malnutrition indicators have been identified and nursing has been notified to ensure a dietitian consult is ordered. Evaluation Complexity: Moderate    History: Expanded chart review of consults, imaging, and psychosocial history related to current functional performance. Exam: 5+ performance deficits identified limiting functional independence and safe return home   Assistance/Modification: Min/mod assistance or modifications required to perform tasks. May have comorbidities that affect occupational performance.     Time In:0910              Time Out: 0935       Total Treatment Time: 10          Min Units   OT Eval Low 91919     OT Eval Medium 40581 X    OT Eval High [id number]     OT Re-Eval [id number]     Therapeutic Ex [id number]     Therapeutic Activities 17965 10 1 ADL/Self Care 57268     Orthotic Management 77752     Neuro Re-Ed 86733     Non-Billable Time       Evaluation time includes thorough review of current medical information, gathering information on past medical history/social history and prior level of function, completion of standardized testing/informal observation of tasks, assessment of data and development of POC/Goals.      Rosa M Sommers, OTR/L [id number]

## 2022-11-03 NOTE — PLAN OF CARE
Problem: Discharge Planning  Goal: Discharge to home or other facility with appropriate resources  11/2/2022 1344 by Cammie Bhat RN  Outcome: Progressing     Problem: Chronic Conditions and Co-morbidities  Goal: Patient's chronic conditions and co-morbidity symptoms are monitored and maintained or improved  11/3/2022 0017 by Maile Haile RN  Outcome: Progressing  11/2/2022 1344 by Cammie Bhat RN  Outcome: Progressing     Problem: Pain  Goal: Verbalizes/displays adequate comfort level or baseline comfort level  Outcome: Progressing     Problem: Neurosensory - Adult  Goal: Achieves stable or improved neurological status  11/2/2022 1645 by Cammie Bhat RN  Outcome: Progressing  Flowsheets (Taken 11/2/2022 1645)  Achieves stable or improved neurological status: Assess for and report changes in neurological status     Problem: Respiratory - Adult  Goal: Achieves optimal ventilation and oxygenation  11/3/2022 0017 by Maile Haile RN  Outcome: Progressing  Flowsheets (Taken 11/3/2022 0017)  Achieves optimal ventilation and oxygenation:   Respiratory therapy support as indicated   Assess and instruct to report shortness of breath or any respiratory difficulty   Assess the need for suctioning and aspirate as needed   Encourage broncho-pulmonary hygiene including cough, deep breathe, incentive spirometry   Oxygen supplementation based on oxygen saturation or arterial blood gases   Position to facilitate oxygenation and minimize respiratory effort   Assess for changes in mentation and behavior   Assess for changes in respiratory status  11/2/2022 1645 by Cammie Bhat RN  Outcome: Progressing  Flowsheets (Taken 11/2/2022 1645)  Achieves optimal ventilation and oxygenation:   Respiratory therapy support as indicated   Assess and instruct to report shortness of breath or any respiratory difficulty   Assess the need for suctioning and aspirate as needed   Encourage broncho-pulmonary hygiene including cough, deep breathe, incentive spirometry   Initiate smoking cessation protocol as indicated   Oxygen supplementation based on oxygen saturation or arterial blood gases   Position to facilitate oxygenation and minimize respiratory effort   Assess for changes in mentation and behavior   Assess for changes in respiratory status     Problem: Cardiovascular - Adult  Goal: Maintains optimal cardiac output and hemodynamic stability  11/3/2022 0017 by Corrinne Merritts, RN  Outcome: Progressing  Flowsheets (Taken 11/3/2022 0017)  Maintains optimal cardiac output and hemodynamic stability:   Monitor blood pressure and heart rate   Monitor urine output and notify Licensed Independent Practitioner for values outside of normal range   Assess for signs of decreased cardiac output   Administer fluid and/or volume expanders as ordered   Administer vasoactive medications as ordered  11/2/2022 1645 by Mackenzie Gonzales RN  Outcome: Progressing  Flowsheets (Taken 11/2/2022 1645)  Maintains optimal cardiac output and hemodynamic stability:   Administer vasoactive medications as ordered   Administer fluid and/or volume expanders as ordered   Assess for signs of decreased cardiac output   Monitor urine output and notify Licensed Independent Practitioner for values outside of normal range   Monitor blood pressure and heart rate  Goal: Absence of cardiac dysrhythmias or at baseline  11/3/2022 0017 by Corrinne Merritts, RN  Outcome: Progressing  Flowsheets (Taken 11/3/2022 0017)  Absence of cardiac dysrhythmias or at baseline:   Monitor cardiac rate and rhythm   Assess for signs of decreased cardiac output   Administer antiarrhythmia medication and electrolyte replacement as ordered  11/2/2022 1645 by Maceknzie Gonzales RN  Outcome: Progressing  Flowsheets (Taken 11/2/2022 1645)  Absence of cardiac dysrhythmias or at baseline:   Assess for signs of decreased cardiac output   Monitor cardiac rate and rhythm   Administer antiarrhythmia medication and electrolyte replacement as ordered     Problem: Skin/Tissue Integrity - Adult  Goal: Skin integrity remains intact  11/2/2022 1645 by Nelda Rodriguez RN  Outcome: Progressing  Flowsheets (Taken 11/2/2022 1645)  Skin Integrity Remains Intact:   Assess vascular access sites hourly   Monitor for areas of redness and/or skin breakdown

## 2022-11-03 NOTE — PROGRESS NOTES
CV[facility] Progress Note    Name: Grey Alvarez  MRN: [id number]    CC: Postoperative Critical Care Management      Indication for Surgery/Procedure: CAD  LVEF:  60-65%    RVF:  NML     Important/Relevant PMH/PSH: PVD ( mary jane with decreased collateral flow bilaterally with 0.60 on the right and 0.55 on the left) , iliac stenting 2018, s/p left CEA 2018, right internal carotid artery, stenosis, HLD, HTN, DMII, pulmonary nodules, mild COPD, hypothyroid, GERD, migraines, vocal cord lesion follows with Dr Gonsalo Alaniz      Procedure/Surgeries: 11/2/2022 CABG x2 (LIMA-LAD, SVG-OM)  LAAL (40 mm AtriCure)  Sternal plating      Pacing wires:  ventricular         Intake/Output Summary (Last 24 hours) at 11/3/2022 0803  Last data filed at 11/3/2022 0700  Gross per 24 hour   Intake 4400.18 ml   Output 3460 ml   Net 940.18 ml       Recent Labs     11/02/22  1036 11/02/22  1105 11/03/22  0430   WBC  --  11.0 7.6   HGB  --  9.8* 9.3*   HCT 27.0* 29.4* 28.4*   PLT  --  110* 136      Lab Results   Component Value Date/Time     11/03/2022 04:30 AM    K 4.0 11/03/2022 04:30 AM    K 4.5 12/04/2018 08:10 AM     11/03/2022 04:30 AM    CO2 22 11/03/2022 04:30 AM    BUN 6 11/03/2022 04:30 AM    CREATININE 0.4 11/03/2022 04:30 AM    GLUCOSE 128 11/03/2022 04:30 AM    GLUCOSE 157 07/21/2011 04:06 PM    CALCIUM 8.1 11/03/2022 04:30 AM         Physical Exam:    BP (!) 178/84   Pulse 90   Temp 100.2 °F (37.9 °C) (Bladder)   Resp 20   SpO2 94%       CXR Findings:       -CXR personally viewed and interpreted by ICU Nurse Practitioner, Radiology report pending     General: Awake, alert. Hemodynamically stable. No significant events overnight. Eyes: PERRL, anicteric   Pulmonary: Diminished bibasilar.  No wheezes, no accessory muscle use noted on 5L O2   Cardiovascular:  RRR, no heaves or thrills on palpation  Tele: SR 90  Abdomen: Soft, nontender, hypoactive BS   Extremities: BLE +DP/PT signals, no edema   Neurologic/Psych: A&Ox3, KENNEY to

## 2022-11-03 NOTE — OP NOTE
[address]                  [address] [facility],  [facility]                                OPERATIVE REPORT    PATIENT NAME: Gorge Jackson                 :        1955  MED REC NO:   [id number]                            ROOM:       3819  ACCOUNT NO:   [de-identified]                           ADMIT DATE: 2022  PROVIDER:     Jennelle Krabbe, DO    DATE OF PROCEDURE:  2022    PREOPERATIVE DIAGNOSIS: Severe multivessel coronary artery disease. POSTOPERATIVE DIAGNOSIS:  Severe multivessel coronary artery disease. PROCEDURES PERFORMED:  1. Coronary artery bypass grafting x2 using left internal mammary  artery to left anterior descending artery, reverse saphenous vein graft  to the dominant obtuse marginal branch of the circumflex. 2.  Left atrial appendage ligation using a 40-mm AtriCure appendage  clip. 3.  Rigid sternal fixation with the [employer] plating system. SURGEON: Jennelle Krabbe, DO    ASSISTANT:  Hussein Woody NP, who assisted with all critical portions of  the procedure including closing as there was no qualified resident  available. ANESTHESIA  General.    ESTIMATED BLOOD LOSS:  Less than 50. COMPLICATIONS:  None. SPECIMENS:  None. DESCRIPTION OF PROCEDURE:  After informed consent had been obtained, the  patient was brought to the operating room, placed in the supine position  on the operating table. Monitoring lines as well as Molina catheter and  transesophageal echo probe were inserted after induction of anesthesia. Preoperative echo demonstrated normal biventricular function and no  valvular abnormalities. Preoperative antibiotics were administered. Standard surgical time-out was held, and a midline sternotomy incision  was carried out. The lower saphenous veins bilaterally were inspected  with ultrasound, and they were noted be not adequate for bypass  conduits.   Thus, a cryopreserved vein was chosen for the vein grafting. The left internal mammary artery was dissected free from the  undersurface of the left chest wall with a pedicle technique. All  branches were clipped and divided. The left internal mammary artery was  then clipped and divided in its distal portion after administering  systemic heparin for an adequate ACT for bypass. The left internal  mammary artery was noted to have good flow and be a good conduit for  bypass. Next, the mammary retractor was removed. Standard sternal  retractor was placed. Pericardium was opened. Central cannulation was  commenced in usual fashion with ascending aorta and right atrial  cannulas. This was followed by insertion of antegrade  and retrograde  cardioplegia lines. The ACT was verified. The patient was placed on  cardiopulmonary bypass. The distal targets were inspected. There was  one dominant obtuse marginal branch and circumflex that was a suitable  target for bypass. The more distal terminal branches of the circumflex  were not upsized for bypassing. The right coronary artery system was  also inspected and noted to be heavily calcified diffusely. The distal  right coronary artery was heavily calcified and not bypass targeted, and  both PDA and PL branches that were identified were extremely atretic and  too small for bypassing as well. The midportion of the LAD, just past  the most distal stenotic area on the angiogram was adequate target for  bypass. Next, a cross-clamp was applied. The heart was arrested with a  combination of antegrade and retrograde cardioplegia. We achieved an  excellent arrest and also administered maintenance doses every 15  minutes. First our attention was turned to the left atrial appendage.    Given that the patient had a high CHADS-VASc score and high likelihood  of postoperative AFib, she did have an indication for ligation of left  atrial appendage, and a 40-mm AtriCure appendage clip was placed on the  base of the left atrial appendage in the usual fashion. Next, our  attention was turned to the obtuse marginal branch of the circumflex. After arteriotomy, an end-to-side vein graft anastomosis was created  here with a running 7-0 Prolene suture. This test had good flow and  also was hemostatic. Next, our attention was turned to the midportion  of LAD. After arteriotomy, an end-to-side LIMA to LAD anastomosis was  created here with running 7-0 Prolene suture. This was tested by  removing the bulldog clamp from left internal mammary artery. We noted  excellent runoff to the distal vessel. Next, our attention was turned  to the proximal anastomosis. A standard aortotomy was fashioned in the  ascending aorta with a 4.8 mm punch. Vein graft was measured to length  and the proximal anastomosis was created with running 6-0 Prolene  suture. Once this was completed, a warm dose of blood was administered  via the retrograde cardioplegia cannula as a \"hot shot. \"  Once this was  completed, the bulldog clamp was removed from left internal mammary  artery. Cross-clamp removed from the aorta. The heart began to  reanimate immediately. A temporary ventricular pacing wire was placed,  however, not used secondary to the patient being in normal sinus rhythm. Next, once all evidence of intracardiac air was gone, the aortic root  vent and retrograde cardioplegia cannulas were removed. Once these were  removed, the patient was weaned from cardiopulmonary bypass without  difficulty. The venous cannula was removed. Protamine was  administered, normalized the ACT, and the arterial cannulas removed as  well. Hemostasis was then achieved. Three chest tubes were inserted,  one in the left pleural space, two in the mediastinal space. Sponge,  needle, and instrument counts were all correct. The sternum was  approximated with stainless steel wires.   In addition, the sternum  underwent rigid fixation with the [employer] plating system with one plate  in the  manubrium and one in the body of the sternum. The wound was  irrigated copiously and closed in multiple layers. Post-bypass echo  demonstrated once again normal biventricular function, and no valvular  abnormalities. The patient tolerated the procedure well, was  transferred to the ICU in stable condition. The cardiopulmonary bypass  time was 73 minutes, and the cross-clamp time was 49 minutes.         Lazara Adame DO    D: 11/02/2022 10:52:49       T: 11/02/2022 10:56:48     [email or web address]  Job#: [id number]     Doc#: [id number]    CC:

## 2022-11-03 NOTE — CARE COORDINATION
11/3 Care Coordination: POD#1 Elective Admit CABGx2 in CV[facility]. Met with Roman Clark. PTA pt lives with her  was Independent, used no device for ambulation. Discharge plan is to return home. Has used [facility] in the past when had her Lt CEA. She wants [facility] again. Referral called they will accept and follow.  will transport home. CM/SW will continue to follow for discharge planning.    Edwina NGUYENN,RN--BC  [phone]

## 2022-11-03 NOTE — PROGRESS NOTES
INPATIENT CARDIOLOGY FOLLOW UP    Date of Service: 11/3/2022    Reason for Follow-up: CAD s/p CABG    SUBJECTIVE:    Patient is a 78year old WF known to Dr. Lj Green. Patient remains hemodynamically stable this morning. Nitroprusside drip has been weaned off, with BP stable this morning. She notes of incisional chest pain, worse with deep inspiration. States she is having difficulty taking a deep breath due to the discomfort. She is in no acute distress during my time on the floor. No other cardiac complaints noted. Being transferred to [facility] later today  All labs, diagnostic testing and vital signs reviewed      HOME MEDICATIONS:  Prior to Admission medications    Medication Sig Start Date End Date Taking? Authorizing Provider   TOPROL XL 25 MG extended release tablet TAKE 1 TABLET BY MOUTH EVERY DAY 10/31/22   Sergio Narvaez MD   isosorbide mononitrate (IMDUR) 30 MG extended release tablet TAKE 1 TABLET BY MOUTH EVERY DAY 10/31/22   Sergio Narvaez MD   clopidogrel (PLAVIX) 75 MG tablet TAKE 1 TABLET BY MOUTH DAILY FOR 30 DOSES. 10/24/22   Festus Tierney MD   nitroGLYCERIN (NITROSTAT) 0.4 MG SL tablet Place 1 tablet under the tongue every 5 minutes as needed for Chest pain up to max of 3 total doses.  If no relief after 1 dose, call 911. 10/6/22   Miranda Blum MD   Insulin Glargine, 1 Unit Dial, (TOUJEO SOLOSTAR) 300 UNIT/ML SOPN INJECT 50 UNITS INTO THE SKIN TWICE A DAY 9/28/22   Anurag Garcia DO   simvastatin (ZOCOR) 40 MG tablet TAKE 1 TABLET BY MOUTH NIGHTLY 9/8/22   Cloteal Isaiah Garcia DO   levothyroxine (SYNTHROID) 25 MCG tablet Take 1 tablet by mouth Daily 8/29/22   Anurag Garcia DO   citalopram (CELEXA) 20 MG tablet TAKE 1 TABLET BY MOUTH EVERY DAY 8/4/22   Lopez Garcia, DO   ibuprofen (ADVIL;MOTRIN) 800 MG tablet Take 1 tablet by mouth 4 times daily as needed for Pain 2/8/22   Teodora Kolb,    lisinopril (PRINIVIL;ZESTRIL) 5 MG tablet TAKE 1 TABLET DAILY 3/3/21   Anurag Garcia DO   metFORMIN (GLUCOPHAGE) 500 MG tablet TAKE 1 TABLET TWICE A DAY WITH MEALS 2/15/21   Anurag Garcia DO   Ixekizumab (TALTZ SC) Inject into the skin every 30 days For psoriasis    Historical Provider, MD   insulin lispro (HUMALOG) 100 UNIT/ML injection vial Inject 30 Units into the skin 2 times daily    Historical Provider, MD   omeprazole (PRILOSEC) 40 MG delayed release capsule TAKE 1 CAPSULE BY MOUTH EVERY DAY 8/14/20   Teresa Hutchinson DO   BD PEN NEEDLE SAMEER U/F 32G X 4 MM [address] [facility]  1/23/19   Historical Provider, MD   aspirin 81 MG tablet Take 81 mg by mouth daily    Historical Provider, MD       CURRENT MEDICATIONS:      Current Facility-Administered Medications:     metoprolol succinate (TOPROL XL) extended release tablet 25 mg, 25 mg, Oral, Daily, Magdarandy Mccurdy, APRN - CNP, 25 mg at 11/03/22 0846    insulin lispro (HUMALOG) injection vial 0-16 Units, 0-16 Units, SubCUTAneous, TID WC, Magda Kaz, APRN - CNP, 4 Units at 11/03/22 0845    insulin lispro (HUMALOG) injection vial 0-6 Units, 0-6 Units, SubCUTAneous, Nightly, Magda Mccurdy APRN - CNP    enoxaparin (LOVENOX) injection 40 mg, 40 mg, SubCUTAneous, Daily, Magda Kaz, APRN - CNP, 40 mg at 11/03/22 0845    citalopram (CELEXA) tablet 20 mg, 20 mg, Oral, Daily, Maoolm Jayda APRN - CNP    levothyroxine (SYNTHROID) tablet 25 mcg, 25 mcg, Oral, Daily, Adolm Jayda, APRN - CNP, 25 mcg at 11/03/22 5000    simvastatin (ZOCOR) tablet 40 mg (Patient Supplied), 40 mg, Oral, Nightly, Adolm Jayda, APRN - CNP    0.9 % sodium chloride infusion, , IntraVENous, Continuous, Adortega Montelongo APRN - CNP, Last Rate: 30 mL/hr at 11/03/22 0700, Rate Verify at 11/03/22 0700    sodium chloride flush 0.9 % injection 5-40 mL, 5-40 mL, IntraVENous, 2 times per day, Igor Montelongo, APRN - CNP, 10 mL at 11/03/22 0845    sodium chloride flush 0.9 % injection 5-40 mL, 5-40 mL, IntraVENous, PRN, Latisha PATINO Claudene Shay, APRN - CNP    0.9 % sodium chloride infusion, , IntraVENous, PRN, INDIRA Magana CNP    ondansetron (ZOFRAN-ODT) disintegrating tablet 4 mg, 4 mg, Oral, Q8H PRN **OR** ondansetron (ZOFRAN) injection 4 mg, 4 mg, IntraVENous, Q6H PRN, INDIRA Magana CNP    aspirin EC tablet 81 mg, 81 mg, Oral, Daily, INDIRA Magana CNP, 81 mg at 11/03/22 0845    acetaminophen (TYLENOL) tablet 650 mg, 650 mg, Oral, Q4H PRN, INDIRA Magana - CNP, 650 mg at 11/03/22 0547    oxyCODONE (ROXICODONE) immediate release tablet 5 mg, 5 mg, Oral, Q4H PRN **OR** oxyCODONE HCl (OXY-IR) immediate release tablet 10 mg, 10 mg, Oral, Q4H PRN, INDIRA Magana CNP, 10 mg at 11/03/22 0850    fentaNYL (SUBLIMAZE) injection 25 mcg, 25 mcg, IntraVENous, Q1H PRN, 25 mcg at 11/02/22 1629 **OR** fentaNYL (SUBLIMAZE) injection 50 mcg, 50 mcg, IntraVENous, Q1H PRN, INDIRA Magana CNP    meperidine (DEMEROL) injection 25 mg, 25 mg, IntraVENous, Once PRN, INDIRA Magana CNP    chlorhexidine (PERIDEX) 0.12 % solution 15 mL, 15 mL, Mouth/Throat, BID, INDIRA Magana CNP, 15 mL at 11/02/22 1946    magnesium oxide (MAG-OX) tablet 400 mg, 400 mg, Oral, BID, INDIRA Magana CNP    mupirocin (BACTROBAN) 2 % ointment, , Nasal, BID, INDIRA Magana CNP, Given at 11/03/22 0845    propofol injection, 10 mcg/kg/min, IntraVENous, Continuous PRN, INDIRA Magana CNP, Stopped at 11/02/22 1205    sennosides-docusate sodium (SENOKOT-S) 8.6-50 MG tablet 1 tablet, 1 tablet, Oral, BID, INDIRA Magana CNP, 1 tablet at 11/03/22 0845    magnesium hydroxide (MILK OF MAGNESIA) 400 MG/5ML suspension 30 mL, 30 mL, Oral, Daily PRN, INDIRA Magana CNP    bisacodyl (DULCOLAX) suppository 10 mg, 10 mg, Rectal, Daily PRN, INDIRA Magana CNP    pantoprazole (PROTONIX) tablet 40 mg, 40 mg, Oral, Daily, INDIRA Magana CNP, 40 mg at 11/03/22 0846    ceFAZolin (ANCEF) 2,000 mg in sterile water 20 mL IV syringe, 2,000 mg, IntraVENous, Q8H, Jenniffer Martín, APRN - CNP, 2,000 mg at 11/03/22 0630    potassium chloride 20 mEq/50 mL IVPB (Central Line), 20 mEq, IntraVENous, PRN, Jenniffer Funk, APRN - CNP, Stopped at 11/03/22 0737    magnesium sulfate 2000 mg in 50 mL IVPB premix, 2,000 mg, IntraVENous, PRN, Jenniffer Funk, APRN - CNP, Stopped at 11/03/22 0646    calcium chloride 1,000 mg in sodium chloride 0.9 % 100 mL IVPB, 1,000 mg, IntraVENous, PRN **OR** calcium chloride 2,000 mg in sodium chloride 0.9 % 100 mL IVPB, 2,000 mg, IntraVENous, PRN, Jenniffer Jayton, APRN - CNP    ipratropium-albuterol (DUONEB) nebulizer solution 1 ampule, 1 ampule, Inhalation, Q4H WA, Jenniffer Resendiz APRN - CNP, 1 ampule at 11/02/22 2013    albumin human 5 % IV solution 25 g, 25 g, IntraVENous, PRN, Jenniffer Jayton, APRN - CNP    0.9 % sodium chloride bolus, 250 mL, IntraVENous, Continuous PRN, Jenniffer Resendiz, APRN - CNP    norepinephrine (LEVOPHED) 16 mg in dextrose 5% 250 mL infusion, 1-100 mcg/min, IntraVENous, Continuous PRN, Jenniffer Resendiz, APRN - CNP    nitroPRUSSide (NIPRIDE) 50 mg in dextrose 5 % 250 mL infusion, 0.1-3 mcg/kg/min, IntraVENous, Continuous PRN, Jenniffer Resendiz, APRN - CNP, Stopped at 11/02/22 1727    insulin glargine-yfgn (SEMGLEE-YFGN) injection vial 9 Units, 0.15 Units/kg, SubCUTAneous, Nightly, Jenniffer Resendiz APRN - CNP    insulin regular (HUMULIN R;NOVOLIN R) 100 Units in sodium chloride 0.9 % 100 mL infusion, 0.1-50 Units/hr, IntraVENous, Continuous, Jenniffer Funk, APRN - CNP, Stopped at 11/02/22 2202    glucose chewable tablet 16 g, 4 tablet, Oral, PRN, Jenniffer Jayton, APRN - CNP    dextrose bolus 10% 125 mL, 125 mL, IntraVENous, PRN **OR** dextrose bolus 10% 250 mL, 250 mL, IntraVENous, PRN, INDIRA Alicea - CNP    glucagon (rDNA) injection 1 mg, 1 mg, SubCUTAneous, PRN, Jenniffer Resendiz, INDIRA - CNP    dextrose 10 % infusion, , IntraVENous, Continuous PRN, Jenniffer Resendiz, APRN - CNP    acetaminophen (TYLENOL) suppository 650 mg, 650 mg, Rectal, Q4H PRN, Jude Cools, APRN - CNP    clopidogrel (PLAVIX) tablet 75 mg, 75 mg, Oral, Daily, Jude Cools, APRN - CNP, 75 mg at 11/03/22 0846      ALLERGIES:  Pcn [penicillins], Fosamax [alendronate], and Lipitor [atorvastatin]      REVIEW OF SYSTEMS:     Negative except as noted above in HPI      PHYSICAL EXAM:   BP (!) 178/84   Pulse 91   Temp 98.7 °F (37.1 °C) (Oral)   Resp 19   SpO2 94%   CONST:  Well developed, well nourished WF who appears stated age. Awake, alert, cooperative, no apparent distress. HEENT:   Head- Normocephalic, atraumatic. Eyes- Conjunctivae pink, anicteric. Neck-  No stridor, trachea midline. CHEST: Chest symmetrical. No accessory muscle use or intercostal retractions. MSI with FANNY dressing  RESPIRATORY: Lung sounds - diminished bilaterally, poor inspiratory effort due to pain. On 5L NC  CARDIOVASCULAR:     No noted carotid bruit. Heart Ausculation- Regular rate and rhythm, questionable soft systolic murmur  PV: No significant lower extremity edema. Pedal pulses palpable, no clubbing or cyanosis. ABDOMEN: Soft, non-tender to light palpation. Bowel sounds present. MS: Good muscle strength and tone. No atrophy or abnormal movements. SKIN: Warm and dry. NEURO / PSYCH: Oriented to person, place and time. Speech clear and appropriate. Follows all commands. Pleasant affect. DATA:    Telemetry: SR with HR in the 80-90s    Diagnostic:  All diagnostic testing and lab work thus far this admission reviewed in detail. Echo 10/27/22:   Technically difficult examination. Mild concentric left ventricular hypertrophy. Ejection fraction is visually estimated at 60 to 65%. Normal right ventricular size and function. Mild mitral regurgitation is present. Individual aortic valve leaflets are not clearly visualized. Mild aortic stenosis. Mild tricuspid regurgitation.    Mild pulmonic regurgitation present. CXR 11/2/2022  Impression  Postop left lower lobe atelectasis/infiltrate    CXR 11/3/2022  FINDINGS:  Left lower lobe atelectasis is unchanged. Left-sided chest tube is  unchanged. Heart size is normal.  Right lung is clear. There has been  removal of the ET and NG tubes. There is no pneumothorax. Impression  No significant change    Labs:   CBC:   Recent Labs     11/02/22  1105 11/03/22  0430   WBC 11.0 7.6   HGB 9.8* 9.3*   HCT 29.4* 28.4*   * 136     BMP:   Recent Labs     11/02/22  1105 11/02/22  1707 11/02/22  2235 11/03/22  0430     --   --  141   K 4.0   < > 4.0 4.0   CO2 19*  --   --  22   BUN 7  --   --  6   CREATININE 0.4*  --   --  0.4*   LABGLOM >60  --   --  >60   CALCIUM 7.7*  --   --  8.1*    < > = values in this interval not displayed.      Mag:   Recent Labs     11/02/22  1105 11/03/22  0430   MG 2.2 1.7     HgA1c:   Lab Results   Component Value Date    LABA1C 9.7 (H) 10/28/2022     PT/INR:   Recent Labs     11/02/22  1105 11/03/22  0430   PROTIME 14.9* 13.2*   INR 1.4 1.2     APTT:  Recent Labs     11/02/22  1105   APTT 23.4*     FASTING LIPID PANEL:  Lab Results   Component Value Date/Time    CHOL 203 03/17/2022 12:00 PM    HDL 37 03/17/2022 12:00 PM    LDLCALC 103 03/17/2022 12:00 PM    TRIG 317 03/17/2022 12:00 PM     Component Ref Range & Units 11/03/22 0433   Date Analyzed  72110607    Time Analyzed  0433    Source:  Blood Arterial    pH, Blood Gas 7.350 - 7.450 7.359    PCO2 35.0 - 45.0 mmHg 40.3    PO2 75.0 - 100.0 mmHg 71.3 Low     HCO3 22.0 - 26.0 mmol/L 22.2    B.E. -3.0 - 3.0 mmol/L -3.0    O2 Sat 92.0 - 98.5 % 94.2        ASSESSMENT:  CAD s/p CABG x 2 (LIMA-LAD, SVG-OM) and LAAL (40 mm AtriCure) POD 1, hemodynamically stable  Post-operative respiratory insufficiency, now extubated on 5L NC  Postoperative anemia, stable  Post-operative thrombocytopenia, improved  Mild AS, mild TR, mild PI and mild MR on TTE 10/2022  HLD, on statin therapy   Lipitor intolerance due to myalgias  Insulin requiring T2DM  Carotid artery disease s/p L CEA 10/2018  PAD s/p bilateral common iliac stent placement and R external iliac angioplasty 12/2018  Tobacco abuse   Hypothyroidism, on HRT  GERD  Psoriasis      RECOMMENDATIONS:  Continue ASA, Plavix and statin therapy  Continue beta-blocker therapy  Resume home Lisinopril therapy once okay with CT surgery  Rest of postoperative management as per CT surgery  Cardiac rehab as an outpatient  Cardiology will sign off at this time, please call with any questions or concerns  Above A+P discussed and made in collaboration with Dr. Jessica Mccarty. Further recommendations to follow as per him          NOTE: This report was transcribed using voice recognition software. Every effort was made to ensure accuracy; however, inadvertent computerized transcription errors may be present.     Sherrie Grant, [address] [place] [address] Cardiology    Electronically signed by Jovon Hihgtower PA-C on 11/3/2022 at 9:13 AM      I agree with the above assessment and plan made in collaboration with Sherrie Grant PA-C.

## 2022-11-03 NOTE — PROGRESS NOTES
Physical Therapy  Physical Therapy Initial Assessment     Name: Josefina Barger  : 1955  MRN: [id number]      Date of Service: 11/3/2022    Evaluating PT:  Humble Larson PT, DPT [id number]    Room #:  2979/8214-E  Diagnosis:  CAD (coronary artery disease) [I25.10]  CAD in native artery [I25.10]  PMHx/PSHx:    Past Medical History:   Diagnosis Date    Age-related osteoporosis without current pathological fracture 2021    Arthritis     Cataracts, bilateral 10/'19    Colon polyps 2021    tubular adenoma    Diabetes mellitus ([facility]     DM type 2 (diabetes mellitus, type 2) ([facility]     [facility]    Hyperlipidemia     Hypertension     Hypothyroidism     Migraines     rare    Neuropathy, diabetic ([facility]     Peripheral vascular disease ([facility] [address]     follows with Dr. Mya Gallegos yearly    Positive colorectal cancer screening using Cologuard test     Psoriasiform dermatitis     Seasonal allergies     Thyroid disease      Procedure/Surgery:   CABG x 2  Precautions:  Falls, Sternal, Chest tube x 2, O2  Equipment Needs:  TBD    SUBJECTIVE:    Pt lives with  in a 1 story home with 3 stairs to enter and no rail. Pt ambulated without device and was independent PTA. OBJECTIVE:   Initial Evaluation  Date: 11/3/22 Treatment Short Term/ Long Term   Goals   AM-PAC 6 Clicks 58/60     Was pt agreeable to Eval/treatment? Yes     Does pt have pain?  8/10 surgical pain     Bed Mobility  Rolling: NT  Supine to sit: ModA with HOB elevated  Sit to supine: NT  Scooting: MaxA  Kristine   Transfers Sit to stand: Kristine bed; [address] chair  Stand to sit: Kristine  Stand pivot: Kristine no device  Independent   Ambulation   25 feet with Kristine no device  >400 feet Independently   Stair negotiation: ascended and descended NT  >4 steps with 1 rail Mod Independent    ROM BUE:  Defer to OT note  BLE:  WFL     Strength BUE:  Defer to OT note  BLE:  4/5  Increase by 1/3 MMT grade   Balance Sitting EOB: Kristine  Dynamic Standing:  Kristine no device  Sitting EOB:  Independent  Dynamic Standing:  Independent     Pt is A & O x 4  CAM-ICU: NT  RASS: 0  Sensation:  No reported paresthesias  Edema:  None    Vitals:  Heart Rate at rest 90 bpm Heart Rate post session 91 bpm   SpO2 at rest 96% SpO2 post session 96%   Blood Pressure at rest 124/49 mmHg Blood Pressure post session 114/43 mmHg       Functional Status Score-Intensive Care Unit (FSS-ICU)   Rolling -/7   Supine to sit transfer 3/7   Unsupported sitting  4/7   Sit to stand transfers 3/7   Ambulation 1/7   Total  11/35     Therapeutic Exercises:  NA    Patient education  Pt educated on safety    Patient response to education:   Pt verbalized understanding Pt demonstrated skill Pt requires further education in this area   x x x     ASSESSMENT:    Conditions Requiring Skilled Therapeutic Intervention:    [x]Decreased strength     []Decreased ROM  [x]Decreased functional mobility  [x]Decreased balance   [x]Decreased endurance   []Decreased posture  []Decreased sensation  []Decreased coordination   []Decreased vision  []Decreased safety awareness   [x]Increased pain       Comments:  NP reported pt was medically stable. Pt was in bed upon arrival, agreeable to initial evaluation. Pt was educated on sternal precautions and PLB prior to activity. Increased assistance provided for bed mobility due to deconditioning and precautions. Pt completed shuffled steps to chair initially. After a seated rest break, pt ambulated with decreased gait speed and mild unsteadiness. Fatigue limited activity. Pt was left in chair with all needs met and call light in reach. All lines remained intact. Treatment:  Patient practiced and was instructed in the following treatment:    Bed mobility training - pt given verbal and tactile cues to facilitate proper sequencing and safety during supine>sit as well as provided with physical assistance.    Sitting EOB for >5 minutes for upright tolerance, postural awareness and BLE ROM  Transfer training - pt was given verbal and tactile cues to facilitate proper hand placement, technique and safety during sit to stand and stand to sit as well as provided with physical assistance. Gait training- pt was given verbal and tactile cues to facilitate safety and balance during ambulation as well as provided with physical assistance. Pt's/ family goals   1. Return home    Prognosis is good for reaching above PT goals. Patient and or family understand(s) diagnosis, prognosis, and plan of care. yes    PHYSICAL THERAPY PLAN OF CARE:    PT POC is established based on physician order and patient diagnosis     Referring provider/PT Order:  INDIRA Lopes - CNP/11/02/22 1100 PT eval and treat  Diagnosis:  CAD (coronary artery disease) [I25.10]  CAD in native artery [I25.10]  Specific instructions for next treatment:  Progress activity    Current Treatment Recommendations:     [x] Strengthening to improve independence with functional mobility   [] ROM to improve independence with functional mobility   [x] Balance Training to improve static/dynamic balance and to reduce fall risk  [x] Endurance Training to improve activity tolerance during functional mobility   [x] Transfer Training to improve safety and independence with all functional transfers   [x] Gait Training to improve gait mechanics, endurance and asses need for appropriate assistive device  [x] Stair Training in preparation for safe discharge home and/or into the community   [] Positioning to prevent skin breakdown and contractures  [x] Safety and Education Training   [x] Patient/Caregiver Education   [] HEP  [] Other     PT long term treatment goals are located in above grid    Frequency of treatments: 2-5x/week x 1-2 weeks.     Time in  0900  Time out  0925    Total Treatment Time  10 minutes     Evaluation Time includes thorough review of current medical information, gathering information on past medical history/social history and prior level of function, completion of standardized testing/informal observation of tasks, assessment of data and education on plan of care and goals.     CPT codes:  [] Low Complexity PT evaluation 76350  [x] Moderate Complexity PT evaluation 93281  [] High Complexity PT evaluation 71677  [] PT Re-evaluation 99721  [] Gait training 81387 - minutes  [] Manual therapy 61218 - minutes  [x] Therapeutic activities 94302 10 minutes  [] Therapeutic exercises 94720 - minutes  [] Neuromuscular reeducation 89476 - minutes     Carlito Ordoñez, PT, DPT  [id number]

## 2022-11-03 NOTE — PLAN OF CARE
Problem: Discharge Planning  Goal: Discharge to home or other facility with appropriate resources  Outcome: Progressing  Flowsheets (Taken 11/3/2022 0800)  Discharge to home or other facility with appropriate resources: Identify barriers to discharge with patient and caregiver     Problem: Chronic Conditions and Co-morbidities  Goal: Patient's chronic conditions and co-morbidity symptoms are monitored and maintained or improved  07/4/1019 9584 by Chris Hunter RN  Outcome: Progressing  Flowsheets (Taken 11/3/2022 0800)  Care Plan - Patient's Chronic Conditions and Co-Morbidity Symptoms are Monitored and Maintained or Improved: Monitor and assess patient's chronic conditions and comorbid symptoms for stability, deterioration, or improvement  11/3/2022 0017 by Andra Mac RN  Outcome: Progressing     Problem: Safety - Adult  Goal: Free from fall injury  Outcome: Progressing  Flowsheets (Taken 11/3/2022 0916)  Free From Fall Injury: Instruct family/caregiver on patient safety     Problem: Pain  Goal: Verbalizes/displays adequate comfort level or baseline comfort level  54/1/7278 [id number] by Chris Hunter RN  Outcome: Progressing  11/3/2022 0017 by Andra Mac RN  Outcome: Progressing     Problem: ABCDS Injury Assessment  Goal: Absence of physical injury  Outcome: Progressing  Flowsheets (Taken 11/3/2022 0916)  Absence of Physical Injury: Implement safety measures based on patient assessment     Problem: Neurosensory - Adult  Goal: Achieves stable or improved neurological status  Outcome: Progressing  Flowsheets (Taken 11/3/2022 0800)  Achieves stable or improved neurological status: Assess for and report changes in neurological status  Goal: Achieves maximal functionality and self care  Recent Flowsheet Documentation  Taken 20/2/8003 0203 by Chris Hunter RN  Achieves maximal functionality and self care: Monitor swallowing and airway patency with patient fatigue and changes in neurological status

## 2022-11-04 ENCOUNTER — APPOINTMENT (OUTPATIENT)
Dept: GENERAL RADIOLOGY | Age: 67
DRG: 235 | End: 2022-11-04
Attending: THORACIC SURGERY (CARDIOTHORACIC VASCULAR SURGERY)
Payer: MEDICARE

## 2022-11-04 LAB
ANION GAP SERPL CALCULATED.3IONS-SCNC: 12 MMOL/L (ref 7–16)
BUN BLDV-MCNC: 7 MG/DL (ref 6–23)
CALCIUM IONIZED: 1.25 MMOL/L (ref 1.15–1.33)
CALCIUM SERPL-MCNC: 8.5 MG/DL (ref 8.6–10.2)
CHLORIDE BLD-SCNC: 101 MMOL/L (ref 98–107)
CO2: 19 MMOL/L (ref 22–29)
CREAT SERPL-MCNC: 0.4 MG/DL (ref 0.5–1)
EKG ATRIAL RATE: 166 BPM
EKG Q-T INTERVAL: 226 MS
EKG QRS DURATION: 82 MS
EKG QTC CALCULATION (BAZETT): 368 MS
EKG R AXIS: 53 DEGREES
EKG T AXIS: -139 DEGREES
EKG VENTRICULAR RATE: 160 BPM
GFR SERPL CREATININE-BSD FRML MDRD: >60 ML/MIN/1.73
GLUCOSE BLD-MCNC: 245 MG/DL (ref 74–99)
HCT VFR BLD CALC: 26.3 % (ref 34–48)
HEMOGLOBIN: 8.6 G/DL (ref 11.5–15.5)
MAGNESIUM: 1.8 MG/DL (ref 1.6–2.6)
MCH RBC QN AUTO: 30.8 PG (ref 26–35)
MCHC RBC AUTO-ENTMCNC: 32.7 % (ref 32–34.5)
MCV RBC AUTO: 94.3 FL (ref 80–99.9)
METER GLUCOSE: 228 MG/DL (ref 74–99)
METER GLUCOSE: 230 MG/DL (ref 74–99)
METER GLUCOSE: 237 MG/DL (ref 74–99)
METER GLUCOSE: 276 MG/DL (ref 74–99)
PDW BLD-RTO: 13.3 FL (ref 11.5–15)
PLATELET # BLD: 130 E9/L (ref 130–450)
PMV BLD AUTO: 11 FL (ref 7–12)
POTASSIUM SERPL-SCNC: 4.1 MMOL/L (ref 3.5–5)
POTASSIUM SERPL-SCNC: 4.4 MMOL/L (ref 3.5–5)
RBC # BLD: 2.79 E12/L (ref 3.5–5.5)
SODIUM BLD-SCNC: 132 MMOL/L (ref 132–146)
WBC # BLD: 10.5 E9/L (ref 4.5–11.5)

## 2022-11-04 PROCEDURE — 94640 AIRWAY INHALATION TREATMENT: CPT

## 2022-11-04 PROCEDURE — 6360000002 HC RX W HCPCS: Performed by: THORACIC SURGERY (CARDIOTHORACIC VASCULAR SURGERY)

## 2022-11-04 PROCEDURE — 6360000002 HC RX W HCPCS: Performed by: NURSE PRACTITIONER

## 2022-11-04 PROCEDURE — S5553 INSULIN LONG ACTING 5 U: HCPCS | Performed by: NURSE PRACTITIONER

## 2022-11-04 PROCEDURE — 6370000000 HC RX 637 (ALT 250 FOR IP): Performed by: NURSE PRACTITIONER

## 2022-11-04 PROCEDURE — 2700000000 HC OXYGEN THERAPY PER DAY

## 2022-11-04 PROCEDURE — 93010 ELECTROCARDIOGRAM REPORT: CPT | Performed by: INTERNAL MEDICINE

## 2022-11-04 PROCEDURE — 2580000003 HC RX 258: Performed by: NURSE PRACTITIONER

## 2022-11-04 PROCEDURE — 71045 X-RAY EXAM CHEST 1 VIEW: CPT

## 2022-11-04 PROCEDURE — 85027 COMPLETE CBC AUTOMATED: CPT

## 2022-11-04 PROCEDURE — C1751 CATH, INF, PER/CENT/MIDLINE: HCPCS

## 2022-11-04 PROCEDURE — 99024 POSTOP FOLLOW-UP VISIT: CPT | Performed by: NURSE PRACTITIONER

## 2022-11-04 PROCEDURE — 76937 US GUIDE VASCULAR ACCESS: CPT

## 2022-11-04 PROCEDURE — 2580000003 HC RX 258: Performed by: THORACIC SURGERY (CARDIOTHORACIC VASCULAR SURGERY)

## 2022-11-04 PROCEDURE — 83735 ASSAY OF MAGNESIUM: CPT

## 2022-11-04 PROCEDURE — 80048 BASIC METABOLIC PNL TOTAL CA: CPT

## 2022-11-04 PROCEDURE — 94660 CPAP INITIATION&MGMT: CPT

## 2022-11-04 PROCEDURE — 93005 ELECTROCARDIOGRAM TRACING: CPT | Performed by: THORACIC SURGERY (CARDIOTHORACIC VASCULAR SURGERY)

## 2022-11-04 PROCEDURE — 51701 INSERT BLADDER CATHETER: CPT

## 2022-11-04 PROCEDURE — 36415 COLL VENOUS BLD VENIPUNCTURE: CPT

## 2022-11-04 PROCEDURE — 2140000000 HC CCU INTERMEDIATE R&B

## 2022-11-04 PROCEDURE — 82330 ASSAY OF CALCIUM: CPT

## 2022-11-04 PROCEDURE — 36592 COLLECT BLOOD FROM PICC: CPT

## 2022-11-04 PROCEDURE — 36569 INSJ PICC 5 YR+ W/O IMAGING: CPT

## 2022-11-04 PROCEDURE — 84132 ASSAY OF SERUM POTASSIUM: CPT

## 2022-11-04 PROCEDURE — 82962 GLUCOSE BLOOD TEST: CPT

## 2022-11-04 RX ORDER — ACETAMINOPHEN 325 MG/1
650 TABLET ORAL EVERY 4 HOURS PRN
Status: DISCONTINUED | OUTPATIENT
Start: 2022-11-04 | End: 2022-11-07 | Stop reason: HOSPADM

## 2022-11-04 RX ORDER — SODIUM CHLORIDE 0.9 % (FLUSH) 0.9 %
5-40 SYRINGE (ML) INJECTION EVERY 12 HOURS SCHEDULED
Status: DISCONTINUED | OUTPATIENT
Start: 2022-11-04 | End: 2022-11-07 | Stop reason: HOSPADM

## 2022-11-04 RX ORDER — ASCORBIC ACID 500 MG
500 TABLET ORAL 2 TIMES DAILY
Status: DISCONTINUED | OUTPATIENT
Start: 2022-11-04 | End: 2022-11-07 | Stop reason: HOSPADM

## 2022-11-04 RX ORDER — AMIODARONE HYDROCHLORIDE 200 MG/1
400 TABLET ORAL PRN
Status: DISCONTINUED | OUTPATIENT
Start: 2022-11-04 | End: 2022-11-07 | Stop reason: HOSPADM

## 2022-11-04 RX ORDER — DEXTROSE MONOHYDRATE 100 MG/ML
INJECTION, SOLUTION INTRAVENOUS CONTINUOUS PRN
Status: DISCONTINUED | OUTPATIENT
Start: 2022-11-04 | End: 2022-11-07 | Stop reason: HOSPADM

## 2022-11-04 RX ORDER — BISACODYL 10 MG
10 SUPPOSITORY, RECTAL RECTAL DAILY PRN
Status: DISCONTINUED | OUTPATIENT
Start: 2022-11-04 | End: 2022-11-06

## 2022-11-04 RX ORDER — SODIUM CHLORIDE 0.9 % (FLUSH) 0.9 %
5-40 SYRINGE (ML) INJECTION PRN
Status: DISCONTINUED | OUTPATIENT
Start: 2022-11-04 | End: 2022-11-07 | Stop reason: HOSPADM

## 2022-11-04 RX ORDER — HEPARIN SODIUM (PORCINE) LOCK FLUSH IV SOLN 100 UNIT/ML 100 UNIT/ML
3 SOLUTION INTRAVENOUS EVERY 12 HOURS SCHEDULED
Status: DISCONTINUED | OUTPATIENT
Start: 2022-11-04 | End: 2022-11-07 | Stop reason: HOSPADM

## 2022-11-04 RX ORDER — MAGNESIUM SULFATE IN WATER 40 MG/ML
2000 INJECTION, SOLUTION INTRAVENOUS PRN
Status: DISCONTINUED | OUTPATIENT
Start: 2022-11-04 | End: 2022-11-07 | Stop reason: HOSPADM

## 2022-11-04 RX ORDER — DIPHENHYDRAMINE HCL 25 MG
25 TABLET ORAL EVERY 8 HOURS PRN
Status: DISCONTINUED | OUTPATIENT
Start: 2022-11-04 | End: 2022-11-07 | Stop reason: HOSPADM

## 2022-11-04 RX ORDER — LIDOCAINE HYDROCHLORIDE 10 MG/ML
5 INJECTION, SOLUTION EPIDURAL; INFILTRATION; INTRACAUDAL; PERINEURAL ONCE
Status: DISCONTINUED | OUTPATIENT
Start: 2022-11-04 | End: 2022-11-04

## 2022-11-04 RX ORDER — FERROUS SULFATE 325(65) MG
325 TABLET ORAL 2 TIMES DAILY WITH MEALS
Status: DISCONTINUED | OUTPATIENT
Start: 2022-11-04 | End: 2022-11-07 | Stop reason: HOSPADM

## 2022-11-04 RX ORDER — OXYCODONE HYDROCHLORIDE AND ACETAMINOPHEN 5; 325 MG/1; MG/1
1 TABLET ORAL EVERY 4 HOURS PRN
Status: DISCONTINUED | OUTPATIENT
Start: 2022-11-04 | End: 2022-11-07 | Stop reason: HOSPADM

## 2022-11-04 RX ORDER — SENNA AND DOCUSATE SODIUM 50; 8.6 MG/1; MG/1
1 TABLET, FILM COATED ORAL 2 TIMES DAILY
Status: DISCONTINUED | OUTPATIENT
Start: 2022-11-04 | End: 2022-11-07 | Stop reason: HOSPADM

## 2022-11-04 RX ORDER — OXYCODONE HYDROCHLORIDE AND ACETAMINOPHEN 5; 325 MG/1; MG/1
2 TABLET ORAL EVERY 4 HOURS PRN
Status: DISCONTINUED | OUTPATIENT
Start: 2022-11-04 | End: 2022-11-07 | Stop reason: HOSPADM

## 2022-11-04 RX ORDER — SODIUM CHLORIDE 9 MG/ML
INJECTION, SOLUTION INTRAVENOUS PRN
Status: DISCONTINUED | OUTPATIENT
Start: 2022-11-04 | End: 2022-11-07 | Stop reason: HOSPADM

## 2022-11-04 RX ORDER — POTASSIUM CHLORIDE 20 MEQ/1
20 TABLET, EXTENDED RELEASE ORAL PRN
Status: DISCONTINUED | OUTPATIENT
Start: 2022-11-04 | End: 2022-11-07 | Stop reason: HOSPADM

## 2022-11-04 RX ORDER — HEPARIN SODIUM (PORCINE) LOCK FLUSH IV SOLN 100 UNIT/ML 100 UNIT/ML
3 SOLUTION INTRAVENOUS PRN
Status: DISCONTINUED | OUTPATIENT
Start: 2022-11-04 | End: 2022-11-07 | Stop reason: HOSPADM

## 2022-11-04 RX ORDER — ASPIRIN 81 MG/1
81 TABLET ORAL DAILY
Status: DISCONTINUED | OUTPATIENT
Start: 2022-11-05 | End: 2022-11-07 | Stop reason: HOSPADM

## 2022-11-04 RX ORDER — MORPHINE SULFATE 2 MG/ML
2 INJECTION, SOLUTION INTRAMUSCULAR; INTRAVENOUS EVERY 4 HOURS PRN
Status: DISCONTINUED | OUTPATIENT
Start: 2022-11-04 | End: 2022-11-07 | Stop reason: HOSPADM

## 2022-11-04 RX ORDER — FOLIC ACID 1 MG/1
1 TABLET ORAL DAILY
Status: DISCONTINUED | OUTPATIENT
Start: 2022-11-04 | End: 2022-11-07 | Stop reason: HOSPADM

## 2022-11-04 RX ADMIN — AMIODARONE HYDROCHLORIDE 0.5 MG/MIN: 50 INJECTION, SOLUTION INTRAVENOUS at 10:04

## 2022-11-04 RX ADMIN — OXYCODONE HYDROCHLORIDE 10 MG: 10 TABLET ORAL at 08:21

## 2022-11-04 RX ADMIN — OXYCODONE HYDROCHLORIDE 10 MG: 10 TABLET ORAL at 04:33

## 2022-11-04 RX ADMIN — INSULIN LISPRO 10 UNITS: 100 INJECTION, SOLUTION INTRAVENOUS; SUBCUTANEOUS at 08:22

## 2022-11-04 RX ADMIN — FOLIC ACID 1 MG: 1 TABLET ORAL at 17:46

## 2022-11-04 RX ADMIN — FERROUS SULFATE TAB 325 MG (65 MG ELEMENTAL FE) 325 MG: 325 (65 FE) TAB at 17:46

## 2022-11-04 RX ADMIN — AMIODARONE HYDROCHLORIDE 1 MG/MIN: 50 INJECTION, SOLUTION INTRAVENOUS at 02:45

## 2022-11-04 RX ADMIN — SODIUM CHLORIDE, PRESERVATIVE FREE 10 ML: 5 INJECTION INTRAVENOUS at 08:24

## 2022-11-04 RX ADMIN — INSULIN GLARGINE-YFGN 12 UNITS: 100 INJECTION, SOLUTION SUBCUTANEOUS at 21:10

## 2022-11-04 RX ADMIN — MUPIROCIN: 20 OINTMENT TOPICAL at 21:03

## 2022-11-04 RX ADMIN — Medication 300 UNITS: at 21:11

## 2022-11-04 RX ADMIN — IPRATROPIUM BROMIDE AND ALBUTEROL SULFATE 1 AMPULE: 2.5; .5 SOLUTION RESPIRATORY (INHALATION) at 17:07

## 2022-11-04 RX ADMIN — IPRATROPIUM BROMIDE AND ALBUTEROL SULFATE 1 AMPULE: 2.5; .5 SOLUTION RESPIRATORY (INHALATION) at 07:24

## 2022-11-04 RX ADMIN — LEVOTHYROXINE SODIUM 25 MCG: 0.03 TABLET ORAL at 05:47

## 2022-11-04 RX ADMIN — SENNOSIDES AND DOCUSATE SODIUM 1 TABLET: 50; 8.6 TABLET ORAL at 21:11

## 2022-11-04 RX ADMIN — MUPIROCIN: 20 OINTMENT TOPICAL at 08:35

## 2022-11-04 RX ADMIN — Medication 400 MG: at 21:11

## 2022-11-04 RX ADMIN — DEXTROSE MONOHYDRATE 150 MG: 50 INJECTION, SOLUTION INTRAVENOUS at 02:31

## 2022-11-04 RX ADMIN — IPRATROPIUM BROMIDE AND ALBUTEROL SULFATE 1 AMPULE: 2.5; .5 SOLUTION RESPIRATORY (INHALATION) at 21:15

## 2022-11-04 RX ADMIN — METOPROLOL TARTRATE 12.5 MG: 25 TABLET, FILM COATED ORAL at 08:21

## 2022-11-04 RX ADMIN — SODIUM CHLORIDE, PRESERVATIVE FREE 10 ML: 5 INJECTION INTRAVENOUS at 21:11

## 2022-11-04 RX ADMIN — ENOXAPARIN SODIUM 40 MG: 100 INJECTION SUBCUTANEOUS at 08:19

## 2022-11-04 RX ADMIN — INSULIN LISPRO 8 UNITS: 100 INJECTION, SOLUTION INTRAVENOUS; SUBCUTANEOUS at 12:23

## 2022-11-04 RX ADMIN — Medication 400 MG: at 08:20

## 2022-11-04 RX ADMIN — INSULIN LISPRO 8 UNITS: 100 INJECTION, SOLUTION INTRAVENOUS; SUBCUTANEOUS at 17:45

## 2022-11-04 RX ADMIN — ASPIRIN 81 MG: 81 TABLET, COATED ORAL at 08:20

## 2022-11-04 RX ADMIN — CITALOPRAM HYDROBROMIDE 20 MG: 20 TABLET ORAL at 08:27

## 2022-11-04 RX ADMIN — PANTOPRAZOLE SODIUM 40 MG: 40 TABLET, DELAYED RELEASE ORAL at 08:20

## 2022-11-04 RX ADMIN — MAGNESIUM SULFATE HEPTAHYDRATE 2000 MG: 40 INJECTION, SOLUTION INTRAVENOUS at 07:13

## 2022-11-04 RX ADMIN — INSULIN LISPRO 4 UNITS: 100 INJECTION, SOLUTION INTRAVENOUS; SUBCUTANEOUS at 21:10

## 2022-11-04 RX ADMIN — BISACODYL 5 MG: 5 TABLET, COATED ORAL at 17:46

## 2022-11-04 RX ADMIN — METOPROLOL TARTRATE 12.5 MG: 25 TABLET, FILM COATED ORAL at 21:11

## 2022-11-04 RX ADMIN — SODIUM CHLORIDE, PRESERVATIVE FREE 10 ML: 5 INJECTION INTRAVENOUS at 08:33

## 2022-11-04 RX ADMIN — OXYCODONE HYDROCHLORIDE AND ACETAMINOPHEN 500 MG: 500 TABLET ORAL at 21:11

## 2022-11-04 ASSESSMENT — PAIN SCALES - GENERAL
PAINLEVEL_OUTOF10: 0
PAINLEVEL_OUTOF10: 7
PAINLEVEL_OUTOF10: 0
PAINLEVEL_OUTOF10: 7
PAINLEVEL_OUTOF10: 0
PAINLEVEL_OUTOF10: 2

## 2022-11-04 ASSESSMENT — PAIN DESCRIPTION - ORIENTATION
ORIENTATION: MID
ORIENTATION: MID

## 2022-11-04 ASSESSMENT — PAIN DESCRIPTION - DESCRIPTORS
DESCRIPTORS: ACHING
DESCRIPTORS: THROBBING

## 2022-11-04 ASSESSMENT — PAIN DESCRIPTION - LOCATION
LOCATION: BACK;STERNUM
LOCATION: CHEST

## 2022-11-04 NOTE — PROGRESS NOTES
CVICU Progress Note    Name: Pennie Rangel  MRN: [id number]    CC: Postoperative Critical Care Management      Indication for Surgery/Procedure: CAD  LVEF:  60-65%    RVF:  NML     Important/Relevant PMH/PSH: PVD ( mary jane with decreased collateral flow bilaterally with 0.60 on the right and 0.55 on the left) , iliac stenting 2018, s/p left CEA 2018, right internal carotid artery, stenosis, HLD, HTN, DMII, pulmonary nodules, mild COPD, hypothyroid, GERD, migraines, vocal cord lesion follows with Dr Rodríguez Huerta      Procedure/Surgeries: 11/2/2022 CABG x2 (LIMA-LAD, SVG-OM)  LAAL (40 mm AtriCure)  Sternal plating      Pacing wires:  ventricular          Intake/Output Summary (Last 24 hours) at 11/4/2022 0804  Last data filed at 11/4/2022 0700  Gross per 24 hour   Intake 1432.95 ml   Output 1060 ml   Net 372.95 ml       Recent Labs     11/02/22  1105 11/03/22  0430 11/04/22  0430   WBC 11.0 7.6 10.5   HGB 9.8* 9.3* 8.6*   HCT 29.4* 28.4* 26.3*   * 136 130      Lab Results   Component Value Date/Time     11/04/2022 04:30 AM    K 4.1 11/04/2022 04:30 AM    K 4.5 12/04/2018 08:10 AM     11/04/2022 04:30 AM    CO2 19 11/04/2022 04:30 AM    BUN 7 11/04/2022 04:30 AM    CREATININE 0.4 11/04/2022 04:30 AM    GLUCOSE 245 11/04/2022 04:30 AM    GLUCOSE 157 07/21/2011 04:06 PM    CALCIUM 8.5 11/04/2022 04:30 AM         Physical Exam:    /65   Pulse (!) 141   Temp 98.7 °F (37.1 °C) (Axillary)   Resp 29   Wt 132 lb 3 oz (60 kg)   SpO2 93%   BMI 22.69 kg/m²       CXR Findings:     FINDINGS:   There is new right lower lobe infiltrate and effusion. Left basilar   atelectasis is unchanged. Left sided chest tube is appropriate. There is sternotomy. There has been removal of the right IJ line. -  CXR personally viewed and interpreted by ICU Nurse Practitioner, agree with above findings     General: Awake, alert. States her breathing is better today. Afib RVR overnight, now on Amio gtt.    Eyes: PERRL, anicteric   Pulmonary: Diminished bibasilar. No wheezes, no accessory muscle use noted   Cardiovascular:  IRR, no heaves or thrills on palpation  Tele: Afib RVR   Abdomen: Soft, nontender, + BS   Extremities: BLE +DP/PT signals, no edema   Neurologic/Psych: A&Ox3, KENNEY to command   Skin: Warm and dry  Incisions: MSI FANNY intact      Assessment/Plan: POD #2    1. CAD S/p  CABG x2 (LIMA-LAD, SVG-OM)  LAAL (40 mm AtriCure)  - ASA (Plavix stopped), Zocor, Metoprolol   - Perioperative Ancef  - MRSA nasal colonization (Bactroban)  - Chest tubes with 330cc/24 hours, 195cc/24 hours; continue to monitor   - Lovenox for VTE prophylaxis   - Place PICC today      2. Acute Pulmonary Insufficiency Following Surgery    - 2/2 surgery  - Extubated DOS, BiPAP post extubation  -Tolerating 5L O2 NC, sats 96%  - Continue EZPAP q 4 hours, encourage IS, instructed to C&DB, OOBTC with PT/OT, increase activity as tolerated, wean O2 as able for sats >92%  - Imeldabs  - will need to follow-up with pulmonary for pulm nodules      3. Postoperative Hypertension   - Weaned off Nipride post op, metoprolol 12.5mg BID- uptitrate as able      4. Acute Post Operative Pain   - PRN oxycodone, PRN IV morphine for breakthrough pain on transfer      5. DMII  -Hemoglobin A1C 9.7; Home medications: Humalog  BID, Lantus 50units BID, metformin   - SSI AC/HS, nightly long acting for glycemic control      6. Postoperative Atrial Fibrillation  -  s/p LAAL (40 mm AtriCure)  - Afib  RVR 11/4 @0100, Amio bolus given, gtt started   - Remains afib RVR 130s, optimize electrolytes, switch to metoprolol 12.5mg BID, uptitrate as able  - Plavix stopped in case need for [address]      7. Urinary Retention  - Rader removed 11/3 2215; bladder scanned this AM for 250cc, straight cath if needed and reinsert rader if still unable to void     8.  Acute blood loss anemia  - expected 2/2 surgery   - H/H 8.6/26.3, monitor     VTE Prophylaxis: Pharmacologic/Mechanical:  Yes, SCDs, Lovenox   Line infection prevention: Can CVC or arterial line be removed: n/a  Continued need for urinary catheter: n/a      Dispo: Transfer to [facility] once rate improved     Electronically signed by INDIRA Rodgers CNP on 11/4/2022 at 8:04 AM

## 2022-11-04 NOTE — PLAN OF CARE
Maintains or returns to baseline bowel function  Outcome: Progressing  Goal: Maintains adequate nutritional intake  Outcome: Progressing     Problem: Genitourinary - Adult  Goal: Urinary catheter remains patent  Outcome: Progressing     Problem: Infection - Adult  Goal: Absence of infection at discharge  Outcome: Progressing  Goal: Absence of infection during hospitalization  Outcome: Progressing     Problem: Metabolic/Fluid and Electrolytes - Adult  Goal: Electrolytes maintained within normal limits  Outcome: Progressing  Goal: Hemodynamic stability and optimal renal function maintained  Outcome: Progressing  Goal: Glucose maintained within prescribed range  Outcome: Progressing     Problem: Hematologic - Adult  Goal: Maintains hematologic stability  Outcome: Progressing     Problem: Skin/Tissue Integrity  Goal: Absence of new skin breakdown  Description: 1. Monitor for areas of redness and/or skin breakdown  2. Assess vascular access sites hourly  3. Every 4-6 hours minimum:  Change oxygen saturation probe site  4. Every 4-6 hours:  If on nasal continuous positive airway pressure, respiratory therapy assess nares and determine need for appliance change or resting period.   Outcome: Progressing

## 2022-11-04 NOTE — PROGRESS NOTES
Power picc line  Placement 11/4/2022    Product number: [id number]   Lot Number: [id number]      Ultrasound: yes   Left Brachial vein:                Upper Arm Circumference: 25cm    Size: 5.5frdl    Exposed Length: 4cm    Internal Length: 35cm   Cut: 16cm   Vein Measurement: 0.59cm    Zandra Bettencourt RN  11/4/2022  11:50 AM            Power picc line placed with vps tip conformation.  Tip in lower 1/3 svc/caj

## 2022-11-04 NOTE — PROGRESS NOTES
Patient has not voided. Patient bladders scanned, 250 cc of urine in bladder. Will continue to monitor and assess for need to bathroom.

## 2022-11-04 NOTE — PLAN OF CARE
Problem: Discharge Planning  Goal: Discharge to home or other facility with appropriate resources  44/9/2037 [id number] by López Dudley RN  Outcome: Progressing  Flowsheets (Taken 11/3/2022 0800)  Discharge to home or other facility with appropriate resources: Identify barriers to discharge with patient and caregiver     Problem: Chronic Conditions and Co-morbidities  Goal: Patient's chronic conditions and co-morbidity symptoms are monitored and maintained or improved  11/4/2022 0220 by Roshan Santiago RN  Outcome: Progressing  11/3/2022 2347 by Roshan Santiago RN  Outcome: Progressing  Flowsheets (Taken 11/3/2022 2000)  Care Plan - Patient's Chronic Conditions and Co-Morbidity Symptoms are Monitored and Maintained or Improved: Monitor and assess patient's chronic conditions and comorbid symptoms for stability, deterioration, or improvement  77/9/0119 2738 by López Dudley RN  Outcome: Progressing  Flowsheets (Taken 11/3/2022 0800)  Care Plan - Patient's Chronic Conditions and Co-Morbidity Symptoms are Monitored and Maintained or Improved: Monitor and assess patient's chronic conditions and comorbid symptoms for stability, deterioration, or improvement     Problem: Safety - Adult  Goal: Free from fall injury  11/4/2022 0220 by Roshan Santiago RN  Outcome: Progressing  11/3/2022 2347 by Roshan Santiago RN  Outcome: Progressing  97/5/3346 1578 by López Dudley RN  Outcome: Progressing  Flowsheets (Taken 11/3/2022 0916)  Free From Fall Injury: Instruct family/caregiver on patient safety     Problem: Pain  Goal: Verbalizes/displays adequate comfort level or baseline comfort level  33/4/9567 5387 by López Dudley RN  Outcome: Progressing     Problem: Respiratory - Adult  Goal: Achieves optimal ventilation and oxygenation  11/4/2022 0220 by Roshan Santiago RN  Outcome: Progressing  11/3/2022 2347 by Roshan Santiago RN  Outcome: Progressing     Problem: Neurosensory - Adult  Goal: Achieves maximal functionality and self care  Outcome: Progressing

## 2022-11-04 NOTE — PLAN OF CARE
Problem: Discharge Planning  Goal: Discharge to home or other facility with appropriate resources  Outcome: Progressing     Problem: Chronic Conditions and Co-morbidities  Goal: Patient's chronic conditions and co-morbidity symptoms are monitored and maintained or improved  16/4/0525 3003 by Jaden Hernandez RN  Outcome: Progressing  11/4/2022 0220 by Jose Tabor RN  Outcome: Progressing  11/3/2022 2347 by Jose Tabor RN  Outcome: Progressing  Flowsheets (Taken 11/3/2022 2000)  Care Plan - Patient's Chronic Conditions and Co-Morbidity Symptoms are Monitored and Maintained or Improved: Monitor and assess patient's chronic conditions and comorbid symptoms for stability, deterioration, or improvement     Problem: Safety - Adult  Goal: Free from fall injury  78/4/2649 3424 by Jaden Hernandez RN  Outcome: Progressing  11/4/2022 0220 by Jose Tabor RN  Outcome: Progressing  11/3/2022 2347 by Jose Tabor RN  Outcome: Progressing     Problem: Pain  Goal: Verbalizes/displays adequate comfort level or baseline comfort level  Outcome: Progressing

## 2022-11-04 NOTE — PLAN OF CARE
Problem: Discharge Planning  Goal: Discharge to home or other facility with appropriate resources  04/4/2775 8571 by Hemant Vigil RN  Outcome: Progressing  Flowsheets (Taken 11/3/2022 0800)  Discharge to home or other facility with appropriate resources: Identify barriers to discharge with patient and caregiver     Problem: Chronic Conditions and Co-morbidities  Goal: Patient's chronic conditions and co-morbidity symptoms are monitored and maintained or improved  11/3/2022 2347 by Nelma Habermann, RN  Outcome: Progressing  Flowsheets (Taken 11/3/2022 2000)  Care Plan - Patient's Chronic Conditions and Co-Morbidity Symptoms are Monitored and Maintained or Improved: Monitor and assess patient's chronic conditions and comorbid symptoms for stability, deterioration, or improvement  96/6/1549 0825 by Hemant Vigil RN  Outcome: Progressing  Flowsheets (Taken 11/3/2022 0800)  Care Plan - Patient's Chronic Conditions and Co-Morbidity Symptoms are Monitored and Maintained or Improved: Monitor and assess patient's chronic conditions and comorbid symptoms for stability, deterioration, or improvement     Problem: Safety - Adult  Goal: Free from fall injury  11/3/2022 2347 by Nelma Habermann, RN  Outcome: Progressing  58/6/9644 9962 by Hemant Vigil RN  Outcome: Progressing  Flowsheets (Taken 11/3/2022 0916)  Free From Fall Injury: Instruct family/caregiver on patient safety     Problem: Pain  Goal: Verbalizes/displays adequate comfort level or baseline comfort level  51/7/7327 7761 by Hemant Vigil RN  Outcome: Progressing     Problem: Respiratory - Adult  Goal: Achieves optimal ventilation and oxygenation  Outcome: Progressing     Problem: Cardiovascular - Adult  Goal: Maintains optimal cardiac output and hemodynamic stability  Outcome: Progressing

## 2022-11-05 ENCOUNTER — APPOINTMENT (OUTPATIENT)
Dept: GENERAL RADIOLOGY | Age: 67
DRG: 235 | End: 2022-11-05
Attending: THORACIC SURGERY (CARDIOTHORACIC VASCULAR SURGERY)
Payer: MEDICARE

## 2022-11-05 LAB
ANION GAP SERPL CALCULATED.3IONS-SCNC: 9 MMOL/L (ref 7–16)
BUN BLDV-MCNC: 9 MG/DL (ref 6–23)
CALCIUM SERPL-MCNC: 8.7 MG/DL (ref 8.6–10.2)
CHLORIDE BLD-SCNC: 98 MMOL/L (ref 98–107)
CO2: 24 MMOL/L (ref 22–29)
CREAT SERPL-MCNC: 0.4 MG/DL (ref 0.5–1)
GFR SERPL CREATININE-BSD FRML MDRD: >60 ML/MIN/1.73
GLUCOSE BLD-MCNC: 239 MG/DL (ref 74–99)
HCT VFR BLD CALC: 25.2 % (ref 34–48)
HEMOGLOBIN: 8.5 G/DL (ref 11.5–15.5)
MAGNESIUM: 1.8 MG/DL (ref 1.6–2.6)
MCH RBC QN AUTO: 31.4 PG (ref 26–35)
MCHC RBC AUTO-ENTMCNC: 33.7 % (ref 32–34.5)
MCV RBC AUTO: 93 FL (ref 80–99.9)
METER GLUCOSE: 161 MG/DL (ref 74–99)
METER GLUCOSE: 176 MG/DL (ref 74–99)
METER GLUCOSE: 212 MG/DL (ref 74–99)
METER GLUCOSE: 237 MG/DL (ref 74–99)
PDW BLD-RTO: 13.3 FL (ref 11.5–15)
PLATELET # BLD: 151 E9/L (ref 130–450)
PMV BLD AUTO: 10.5 FL (ref 7–12)
POTASSIUM SERPL-SCNC: 4.1 MMOL/L (ref 3.5–5)
RBC # BLD: 2.71 E12/L (ref 3.5–5.5)
SODIUM BLD-SCNC: 131 MMOL/L (ref 132–146)
WBC # BLD: 9.6 E9/L (ref 4.5–11.5)

## 2022-11-05 PROCEDURE — 2580000003 HC RX 258: Performed by: NURSE PRACTITIONER

## 2022-11-05 PROCEDURE — 71045 X-RAY EXAM CHEST 1 VIEW: CPT

## 2022-11-05 PROCEDURE — 6370000000 HC RX 637 (ALT 250 FOR IP): Performed by: NURSE PRACTITIONER

## 2022-11-05 PROCEDURE — 6360000002 HC RX W HCPCS: Performed by: NURSE PRACTITIONER

## 2022-11-05 PROCEDURE — 93798 PHYS/QHP OP CAR RHAB W/ECG: CPT

## 2022-11-05 PROCEDURE — 6360000002 HC RX W HCPCS: Performed by: PHYSICIAN ASSISTANT

## 2022-11-05 PROCEDURE — S5553 INSULIN LONG ACTING 5 U: HCPCS | Performed by: INTERNAL MEDICINE

## 2022-11-05 PROCEDURE — 94640 AIRWAY INHALATION TREATMENT: CPT

## 2022-11-05 PROCEDURE — 82962 GLUCOSE BLOOD TEST: CPT

## 2022-11-05 PROCEDURE — 2140000000 HC CCU INTERMEDIATE R&B

## 2022-11-05 PROCEDURE — 6370000000 HC RX 637 (ALT 250 FOR IP): Performed by: PHYSICIAN ASSISTANT

## 2022-11-05 PROCEDURE — 80048 BASIC METABOLIC PNL TOTAL CA: CPT

## 2022-11-05 PROCEDURE — 99222 1ST HOSP IP/OBS MODERATE 55: CPT | Performed by: INTERNAL MEDICINE

## 2022-11-05 PROCEDURE — 2700000000 HC OXYGEN THERAPY PER DAY

## 2022-11-05 PROCEDURE — 83735 ASSAY OF MAGNESIUM: CPT

## 2022-11-05 PROCEDURE — 36415 COLL VENOUS BLD VENIPUNCTURE: CPT

## 2022-11-05 PROCEDURE — 85027 COMPLETE CBC AUTOMATED: CPT

## 2022-11-05 PROCEDURE — 6370000000 HC RX 637 (ALT 250 FOR IP): Performed by: INTERNAL MEDICINE

## 2022-11-05 PROCEDURE — 2500000003 HC RX 250 WO HCPCS: Performed by: PHYSICIAN ASSISTANT

## 2022-11-05 RX ORDER — INSULIN LISPRO 100 [IU]/ML
0-4 INJECTION, SOLUTION INTRAVENOUS; SUBCUTANEOUS
Status: DISCONTINUED | OUTPATIENT
Start: 2022-11-06 | End: 2022-11-06

## 2022-11-05 RX ORDER — MAGNESIUM SULFATE IN WATER 40 MG/ML
2000 INJECTION, SOLUTION INTRAVENOUS ONCE
Status: COMPLETED | OUTPATIENT
Start: 2022-11-05 | End: 2022-11-05

## 2022-11-05 RX ORDER — INSULIN LISPRO 100 [IU]/ML
0-4 INJECTION, SOLUTION INTRAVENOUS; SUBCUTANEOUS NIGHTLY
Status: DISCONTINUED | OUTPATIENT
Start: 2022-11-05 | End: 2022-11-06

## 2022-11-05 RX ORDER — METOPROLOL TARTRATE 5 MG/5ML
5 INJECTION INTRAVENOUS ONCE
Status: COMPLETED | OUTPATIENT
Start: 2022-11-05 | End: 2022-11-05

## 2022-11-05 RX ORDER — ROSUVASTATIN CALCIUM 20 MG/1
10 TABLET, COATED ORAL NIGHTLY
Status: DISCONTINUED | OUTPATIENT
Start: 2022-11-05 | End: 2022-11-07 | Stop reason: HOSPADM

## 2022-11-05 RX ORDER — AMIODARONE HYDROCHLORIDE 200 MG/1
400 TABLET ORAL 3 TIMES DAILY
Status: DISCONTINUED | OUTPATIENT
Start: 2022-11-05 | End: 2022-11-07 | Stop reason: HOSPADM

## 2022-11-05 RX ORDER — FUROSEMIDE 10 MG/ML
40 INJECTION INTRAMUSCULAR; INTRAVENOUS ONCE
Status: COMPLETED | OUTPATIENT
Start: 2022-11-05 | End: 2022-11-05

## 2022-11-05 RX ORDER — INSULIN LISPRO 100 [IU]/ML
4 INJECTION, SOLUTION INTRAVENOUS; SUBCUTANEOUS
Status: DISCONTINUED | OUTPATIENT
Start: 2022-11-06 | End: 2022-11-07

## 2022-11-05 RX ORDER — INSULIN GLARGINE-YFGN 100 [IU]/ML
14 INJECTION, SOLUTION SUBCUTANEOUS NIGHTLY
Status: DISCONTINUED | OUTPATIENT
Start: 2022-11-05 | End: 2022-11-06

## 2022-11-05 RX ADMIN — PANTOPRAZOLE SODIUM 40 MG: 40 TABLET, DELAYED RELEASE ORAL at 09:10

## 2022-11-05 RX ADMIN — INSULIN GLARGINE-YFGN 14 UNITS: 100 INJECTION, SOLUTION SUBCUTANEOUS at 22:02

## 2022-11-05 RX ADMIN — OXYCODONE HYDROCHLORIDE AND ACETAMINOPHEN 500 MG: 500 TABLET ORAL at 09:10

## 2022-11-05 RX ADMIN — OXYCODONE HYDROCHLORIDE AND ACETAMINOPHEN 500 MG: 500 TABLET ORAL at 21:58

## 2022-11-05 RX ADMIN — DEXTROSE MONOHYDRATE 5 MG/HR: 50 INJECTION, SOLUTION INTRAVENOUS at 17:44

## 2022-11-05 RX ADMIN — FERROUS SULFATE TAB 325 MG (65 MG ELEMENTAL FE) 325 MG: 325 (65 FE) TAB at 09:10

## 2022-11-05 RX ADMIN — OXYCODONE AND ACETAMINOPHEN 1 TABLET: 5; 325 TABLET ORAL at 04:01

## 2022-11-05 RX ADMIN — IPRATROPIUM BROMIDE AND ALBUTEROL SULFATE 1 AMPULE: 2.5; .5 SOLUTION RESPIRATORY (INHALATION) at 13:39

## 2022-11-05 RX ADMIN — INSULIN LISPRO 8 UNITS: 100 INJECTION, SOLUTION INTRAVENOUS; SUBCUTANEOUS at 09:11

## 2022-11-05 RX ADMIN — METOPROLOL TARTRATE 5 MG: 5 INJECTION, SOLUTION INTRAVENOUS at 12:46

## 2022-11-05 RX ADMIN — FUROSEMIDE 40 MG: 10 INJECTION, SOLUTION INTRAMUSCULAR; INTRAVENOUS at 09:11

## 2022-11-05 RX ADMIN — INSULIN LISPRO 8 UNITS: 100 INJECTION, SOLUTION INTRAVENOUS; SUBCUTANEOUS at 11:46

## 2022-11-05 RX ADMIN — FERROUS SULFATE TAB 325 MG (65 MG ELEMENTAL FE) 325 MG: 325 (65 FE) TAB at 17:06

## 2022-11-05 RX ADMIN — SENNOSIDES AND DOCUSATE SODIUM 1 TABLET: 50; 8.6 TABLET ORAL at 09:09

## 2022-11-05 RX ADMIN — METOPROLOL TARTRATE 12.5 MG: 25 TABLET, FILM COATED ORAL at 21:57

## 2022-11-05 RX ADMIN — Medication 400 MG: at 09:10

## 2022-11-05 RX ADMIN — BISACODYL 5 MG: 5 TABLET, COATED ORAL at 09:10

## 2022-11-05 RX ADMIN — AMIODARONE HYDROCHLORIDE 400 MG: 200 TABLET ORAL at 21:57

## 2022-11-05 RX ADMIN — SENNOSIDES AND DOCUSATE SODIUM 1 TABLET: 50; 8.6 TABLET ORAL at 22:07

## 2022-11-05 RX ADMIN — METOPROLOL TARTRATE 12.5 MG: 25 TABLET, FILM COATED ORAL at 09:10

## 2022-11-05 RX ADMIN — Medication 400 MG: at 21:58

## 2022-11-05 RX ADMIN — MUPIROCIN: 20 OINTMENT TOPICAL at 22:02

## 2022-11-05 RX ADMIN — ENOXAPARIN SODIUM 40 MG: 100 INJECTION SUBCUTANEOUS at 09:09

## 2022-11-05 RX ADMIN — INSULIN LISPRO 4 UNITS: 100 INJECTION, SOLUTION INTRAVENOUS; SUBCUTANEOUS at 17:06

## 2022-11-05 RX ADMIN — LEVOTHYROXINE SODIUM 25 MCG: 0.03 TABLET ORAL at 06:57

## 2022-11-05 RX ADMIN — MAGNESIUM SULFATE HEPTAHYDRATE 2000 MG: 40 INJECTION, SOLUTION INTRAVENOUS at 17:43

## 2022-11-05 RX ADMIN — SODIUM CHLORIDE, PRESERVATIVE FREE 10 ML: 5 INJECTION INTRAVENOUS at 09:10

## 2022-11-05 RX ADMIN — SODIUM CHLORIDE, PRESERVATIVE FREE 10 ML: 5 INJECTION INTRAVENOUS at 22:08

## 2022-11-05 RX ADMIN — FOLIC ACID 1 MG: 1 TABLET ORAL at 09:10

## 2022-11-05 RX ADMIN — ASPIRIN 81 MG: 81 TABLET, COATED ORAL at 09:10

## 2022-11-05 RX ADMIN — ROSUVASTATIN CALCIUM 10 MG: 20 TABLET, COATED ORAL at 21:58

## 2022-11-05 RX ADMIN — Medication 300 UNITS: at 09:17

## 2022-11-05 RX ADMIN — AMIODARONE HYDROCHLORIDE 400 MG: 200 TABLET ORAL at 11:33

## 2022-11-05 RX ADMIN — METOPROLOL TARTRATE 5 MG: 5 INJECTION, SOLUTION INTRAVENOUS at 14:06

## 2022-11-05 RX ADMIN — IPRATROPIUM BROMIDE AND ALBUTEROL SULFATE 1 AMPULE: 2.5; .5 SOLUTION RESPIRATORY (INHALATION) at 10:14

## 2022-11-05 RX ADMIN — MUPIROCIN: 20 OINTMENT TOPICAL at 09:10

## 2022-11-05 RX ADMIN — CITALOPRAM HYDROBROMIDE 20 MG: 20 TABLET ORAL at 09:10

## 2022-11-05 RX ADMIN — AMIODARONE HYDROCHLORIDE 0.5 MG/MIN: 50 INJECTION, SOLUTION INTRAVENOUS at 00:57

## 2022-11-05 ASSESSMENT — PAIN SCALES - GENERAL
PAINLEVEL_OUTOF10: 0
PAINLEVEL_OUTOF10: 7

## 2022-11-05 ASSESSMENT — PAIN DESCRIPTION - LOCATION: LOCATION: CHEST;HEAD

## 2022-11-05 ASSESSMENT — PAIN - FUNCTIONAL ASSESSMENT: PAIN_FUNCTIONAL_ASSESSMENT: ACTIVITIES ARE NOT PREVENTED

## 2022-11-05 ASSESSMENT — PAIN DESCRIPTION - ORIENTATION: ORIENTATION: LEFT

## 2022-11-05 ASSESSMENT — PAIN DESCRIPTION - DESCRIPTORS: DESCRIPTORS: ACHING;DISCOMFORT;SORE

## 2022-11-05 NOTE — PROGRESS NOTES
POD#3 Awake, alert. No complaints. Denies CP, palpitations, SOB at rest, dizziness/lightheadedness. Vitals:    11/05/22 0343 11/05/22 0346 11/05/22 0401 11/05/22 0801   BP:   134/64 (!) 135/46   Pulse:  81 81 80   Resp:  20 18 20   Temp:  97.6 °F (36.4 °C)  97.5 °F (36.4 °C)   TempSrc:  Temporal  Temporal   SpO2:  92%  95%   Weight: 135 lb (61.2 kg)        O2: 6L/NC      Intake/Output Summary (Last 24 hours) at 11/5/2022 0812  Last data filed at 11/5/2022 0655  Gross per 24 hour   Intake 1405.36 ml   Output 735 ml   Net 670.36 ml     +BM pre-op    UO: 350mL/8hr   CT output: Mediastinal: 80mL/8hr (110mL/24hrs)     Pleural: 30mL/8hr (100mL/24hrs)      Recent Labs     11/03/22  0430 11/04/22  0430 11/05/22  0520   WBC 7.6 10.5 9.6   HGB 9.3* 8.6* 8.5*   HCT 28.4* 26.3* 25.2*    130 151      Recent Labs     11/03/22  0430 11/04/22  0430 11/05/22  0520   BUN 6 7 9   CREATININE 0.4* 0.4* 0.4*       Telemetry: SR      PE  Cardiac: RRR  Lungs: decreased bases  Chest incision with intact FANNY DSD. Sternum stable. Prior chest tube site incisions C/D/I, no erythema with intact sutures. Chest tubes x 3 and Epicardial pacing wires present and secure. Abd: Soft, nontender, +BS  Ext: KENNEY, minimal edema       A/P: POD#3    1. CAD  --Stable s/p CABG x2, LAAL on 11/2/2022  --Post op ZACHARY reveals normal EF  --Scr stable 0.4  --ASA/statin/BB - plavix currently on hold for possible start of eliquis  --reinforce sternal precautions  --continue epicardial pacing wires  --chest tubes without significant output and without airleaks. Chest tubes removed without difficulty. Patient tolerated well  --PICC placed 11/4  --MRSA nasal colonization (Bactroban)       2. Expected acute blood loss anemia secondary to open heart surgery  --stable; hgb 8.5      3.  Post-op Afib s/p LAAL  --Afib  RVR 11/4 @0100, Amio bolus given, gtt started and to be completed today by 10am  --Currently SR with HR in 80s  --continue BB with hold parameters  --Start oral amiodarone for afib prophylaxis--with plans to taper on dc  --Contraindication with amio and Zocor - switch patient to crestor while on amio  --Plavix on hold for possible start of eliquis         4. Expected acute pulmonary insufficiency in the setting following surgery  --wean oxygen to keep SpO2 greater than or equal to 92%  --continue duonebs with ezpap  --encourage C&DB, SMI  --currently on 6L O2/NC  --diurese   --will need to follow-up with pulmonary for pulm nodules      5. DMII  --Hemoglobin A1C 9.7; Home medications: Humalog  BID, Lantus 50units BID, metformin   --SSI AC/HS, nightly long acting for glycemic control   --Consult endocrine       6. Constipation--expected delayed return of bowel function  --secondary to anesthesia, narcotics, decreased oral intake, and decreased physical activity   --no BM since prior to surgery  --Continue daily MOM/oral bisacodyl until +BM and senna-s as scheduled. --Will give daily suppository until +BM starting POD 3 if no result by then   --Encouraged continued increase in oral intake and activity. 7. Expected deconditioning in the setting following surgery  --Increase activity as tolerated  --PT/OT      8. Urinary Retention  --Rader removed 11/3 2215; bladder scanned 11/4 for 250cc  --Now urinating without difficulty - no rader needed      9.  Hyponatremia  --Na 131 today  --Lasix           DVT prophylaxis:  --continue bilateral knee high AIDAN hose  --continue PCDs  --continue progressive ambulation  --lovenox for dvt prophylaxis and continue knee high AIDAN hose/pcds/progressive ambulation      Dispo: home vs. Rehab pending progression in activity level     This patient's case and care plan was discussed with the attending surgeon

## 2022-11-05 NOTE — PATIENT CARE CONFERENCE
P Quality Flow/Interdisciplinary Rounds Progress Note        Quality Flow Rounds held on November 5, 2022    Disciplines Attending:  Bedside Nurse, , , and Nursing Unit Leadership    Bro Claros was admitted on 11/2/2022  5:20 AM    Anticipated Discharge Date:       Disposition:    Devon Score:  Devon Scale Score: 20    Readmission Risk              Risk of Unplanned Readmission:  12           Discussed patient goal for the day, patient clinical progression, and barriers to discharge.   The following Goal(s) of the Day/Commitment(s) have been identified:  Report labs/diagnostics      Veda Villegas RN  November 5, 2022

## 2022-11-05 NOTE — CONSULTS
ENDOCRINOLOGY INITIAL CONSULTATION NOTE      Date of admission: 11/2/2022  Date of service: 11/5/2022  Admitting physician: Steve Maya DO   Primary Care Physician: Dasia Galindo DO  Consultant physician: Neil Sauceda MD     Reason for the consultation:  Uncontrolled DM    History of Present Illness: The history is provided by the patient. Accuracy of the patient data is excellent    Jp Emerson is a very pleasant 78 y.o. old female with PMH uncontrolled DM, CAD, HLD and other listed below admitted to [facility] on 11/2/2022 because of scheduled CABG surgery. The patient underwent CABG surgery on 11/2/2022 endocrine service was consulted for diabetes management. Cardiac stress test of obtained on 9/20/22 which was abnormal. She then underwent cardiac catheterization on 10/6/2022 that showed severe MVCAD     Prior to admission  The patient was diagnosed with type 2 DM many years ago. Prior to admission patient was on Lantus 50u BID, Metformin 500 mg BID. Patient has had no hypoglycemic episodes. Patient has not been eating consistent carbohydrate meals, self-blood glucose monitoring has been above goal prior to admission. In addition, patient reports both macrovascular and  microvascular complications.  The patient is not up to date with yearly diabetic eye exam.   Lab Results   Component Value Date/Time    LABA1C 9.7 10/28/2022 07:55 AM     Inpatient diet:   Carb Restricted diet     Point of care glucose monitoring   (Independently reviewed)   Recent Labs     11/03/22  1251 11/03/22  1655 11/03/22  2018 11/04/22  0814 11/04/22  1217 11/04/22  1735 11/04/22  2101 11/05/22  0654   GLUMET 221* 238* 162* 276* 237* 230* 228* 212*       Past medical history:   Past Medical History:   Diagnosis Date    Age-related osteoporosis without current pathological fracture 06/2021    Arthritis     Cataracts, bilateral 10/'19    Colon polyps 08/05/2021    tubular adenoma    Diabetes mellitus ([address]     DM type 2 (diabetes mellitus, type 2) ([facility] [address]     Facial basal cell cancer 2022    [facility]    Hyperlipidemia     Hypertension     Hypothyroidism     Migraines     rare    Neuropathy, diabetic ([facility] [address]     Peripheral vascular disease ([facility] [address]     follows with Dr. Davide Singh yearly    Positive colorectal cancer screening using Cologuard test     Psoriasiform dermatitis     Seasonal allergies     Thyroid disease        Past surgical history:  Past Surgical History:   Procedure Laterality Date    BACK SURGERY      L5-S1    CAROTID ENDARTERECTOMY Left 10/11/2018    [place]    CARPAL TUNNEL RELEASE      bilat     SECTION      2    COLONOSCOPY N/A 2021    COLONOSCOPY WITH BIOPSY with tattooing performed by Jake Jiménez MD at [facility] N/A 2022    coronary artery bypass grafting, ZACHARY performed by Lisa Anderson DO at [address] (CERVIX STATUS UNKNOWN)      LARYNGOSCOPY N/A 2020    DIRECT LARYNGOSCOPYY AND CERVICAL ESOPHAGOSCOPY (PATHOLOGY PRESENT) performed by Romilda Runner, DO at [facility]      remote- right     OTHER SURGICAL HISTORY  2018    Dr. Davide Singh- Bilateral iliac stents iCast [id number]    MA OFFICE/OUTPT VISIT,PROCEDURE ONLY Left 10/11/2018    LEFT CAROTID ENDARTERECTOMY performed by Leanord Sacks, MD at [address] - ingrown toe nails    TONSILLECTOMY         Social history:   Tobacco:   reports that she quit smoking about 4 years ago. Her smoking use included cigarettes. She started smoking about 52 years ago. She has a 20.00 pack-year smoking history. She has never used smokeless tobacco.  Alcohol:   reports no history of alcohol use. Drugs:   reports no history of drug use.     Family history:    Family History   Problem Relation Age of Onset    Cancer Mother         lung brain smoker    Heart Attack Father     Heart Disease Father         mi in his 52's     Other Father         pulmonary fibrosis    Heart Attack Brother         48    Diabetes Brother     Heart Attack Paternal Grandfather        Allergy and drug reactions:    Allergies   Allergen Reactions    Pcn [Penicillins] Hives and Swelling    Fosamax [Alendronate]      Nausea, vomiting    Lipitor [Atorvastatin] Myalgia     Myalgia not allergic        Scheduled Meds:   amiodarone  400 mg Oral TID    rosuvastatin  10 mg Oral Nightly    magnesium sulfate  2,000 mg IntraVENous Once    insulin glargine  14 Units SubCUTAneous Nightly    [START ON 11/6/2022] insulin lispro  4 Units SubCUTAneous TID WC    [START ON 11/6/2022] insulin lispro  0-4 Units SubCUTAneous TID WC    insulin lispro  0-4 Units SubCUTAneous Nightly    aspirin  81 mg Oral Daily    bisacodyl  5 mg Oral Daily    sennosides-docusate sodium  1 tablet Oral BID    ferrous sulfate  325 mg Oral BID WC    vitamin C  500 mg Oral BID    folic acid  1 mg Oral Daily    sodium chloride flush  5-40 mL IntraVENous 2 times per day    heparin flush  3 mL IntraVENous 2 times per day    metoprolol tartrate  12.5 mg Oral BID    enoxaparin  40 mg SubCUTAneous Daily    citalopram  20 mg Oral Daily    levothyroxine  25 mcg Oral Daily    sodium chloride flush  5-40 mL IntraVENous 2 times per day    magnesium oxide  400 mg Oral BID    mupirocin   Nasal BID    pantoprazole  40 mg Oral Daily    ipratropium-albuterol  1 ampule Inhalation Q4H WA       PRN Meds:   acetaminophen, 650 mg, Q4H PRN  oxyCODONE-acetaminophen, 1 tablet, Q4H PRN   Or  oxyCODONE-acetaminophen, 2 tablet, Q4H PRN  diphenhydrAMINE, 25 mg, Q8H PRN  potassium chloride, 20 mEq, PRN  bisacodyl, 10 mg, Daily PRN  amiodarone, 400 mg, PRN  amiodarone bolus, 150 mg, PRN  magnesium sulfate, 2,000 mg, PRN  morphine, 2 mg, Q4H PRN  sodium chloride, 1 spray, PRN  trimethobenzamide, 200 mg, Q6H PRN  glucose, 4 tablet, PRN  dextrose bolus, 125 mL, PRN   Or  dextrose bolus, 250 mL, PRN  glucagon (rDNA), 1 mg, PRN  dextrose, , Continuous PRN  sodium chloride flush, 5-40 mL, PRN  sodium chloride, , PRN  heparin flush, 3 mL, PRN  sodium chloride flush, 5-40 mL, PRN  ondansetron, 4 mg, Q8H PRN   Or  ondansetron, 4 mg, Q6H PRN      Continuous Infusions:   dextrose      amiodarone 450mg/250ml D5W infusion 0.5 mg/min (11/05/22 0057)    sodium chloride         Review of Systems  All systems reviewed. All negative except for symptoms mentioned in HPI     OBJECTIVE    BP (!) 135/46   Pulse 80   Temp 97.5 °F (36.4 °C) (Temporal)   Resp 20   Wt 135 lb (61.2 kg)   SpO2 95%   BMI 23.17 kg/m²     Intake/Output Summary (Last 24 hours) at 11/5/2022 0834  Last data filed at 11/5/2022 0655  Gross per 24 hour   Intake 1405.36 ml   Output 735 ml   Net 670.36 ml       Physical examination:  General: awake alert, oriented x3  HEENT: normocephalic non traumatic, no exophthalmos   Neck: supple, No thyroid tenderness,  Pulm: good equal air entry no added sounds  CVS: S1 + S2. S/p CABG  Abd: soft lax, no tenderness  Skin: warm, no lesions, no rash.  No open wounds, no ulcers   Neuro: CN intact, sensation decreased bilateral , muscle power normal  Psych: normal mood, and affect    Review of Laboratory Data:  I personally reviewed the following labs:   Recent Labs     11/03/22  0430 11/04/22 0430 11/05/22  0520   WBC 7.6 10.5 9.6   RBC 3.00* 2.79* 2.71*   HGB 9.3* 8.6* 8.5*   HCT 28.4* 26.3* 25.2*   MCV 94.7 94.3 93.0   MCH 31.0 30.8 31.4   MCHC 32.7 32.7 33.7   RDW 13.3 13.3 13.3    130 151   MPV 10.1 11.0 10.5     Recent Labs     11/03/22  0430 11/04/22  0430 11/04/22  1755 11/05/22  0520    132  --  131*   K 4.0 4.1 4.4 4.1   * 101  --  98   CO2 22 19*  --  24   BUN 6 7  --  9   CREATININE 0.4* 0.4*  --  0.4*   GLUCOSE 128* 245*  --  239*   CALCIUM 8.1* 8.5*  --  8.7     No results found for: BHYDRXBUT  Lab Results   Component Value Date/Time    LABA1C 9.7 10/28/2022 07:55 AM    LABA1C 11.3 06/23/2022 10:54 AM    LABA1C 10.5 03/17/2022 12:00 PM     Lab Results   Component Value Date/Time    TSH 1.860 03/17/2022 12:00 PM     Lab Results   Component Value Date/Time    LABA1C 9.7 10/28/2022 07:55 AM    GLUCOSE 239 11/05/2022 05:20 AM    GLUCOSE 157 07/21/2011 04:06 PM    MALBCR 52.8 08/27/2021 12:00 PM    LABMICR 16.9 08/27/2021 12:00 PM    LABCREA 32 08/27/2021 12:00 PM     Lab Results   Component Value Date/Time    TRIG 317 03/17/2022 12:00 PM    HDL 37 03/17/2022 12:00 PM    LDLCALC 103 03/17/2022 12:00 PM    CHOL 203 03/17/2022 12:00 PM       Blood culture   No results found for: [facility]    Radiology:  XR CHEST PORTABLE   Final Result   New right lower lobe infiltrate and effusion         XR CHEST PORTABLE   Final Result   No significant change         XR CHEST PORTABLE   Final Result   Postop left lower lobe atelectasis/infiltrate         XR CHEST PORTABLE    (Results Pending)       Medical Records/Labs/Images review:   I personally reviewed and summarized previous records   All labs and imaging were reviewed independently     [address], a 78 y.o.-old female seen today for inpatient diabetes management     Diabetes Mellitus type 2  Patient's diabetes is uncontrolled , A1c 9.5%   will change diabetes regimen to:  Lantus 14u nightly   Humalog 4u with meals   Low dose sliding scale   Continue glucose check with meals and at bedtime   Will titrate insulin dose based on the blood glucose trend & insulin requirement  Will arrange for patient to be seen in endocrinology clinic upon discharge for routine diabetes maintenance and prevention.     Primary hypothyroidism   Continue levothyroxine 25 mcg daily     MVCAD  Pt underwent CABG surgery 11/2/2022  Management per CT surgery     Interdisciplinary plan for communication with healthcare providers:   Consult recommendations were discussed with the Primary Service/Nursing staff      The above issues were reviewed with the patient who understood and agreed with the plan.    Thank you for allowing us to participate in the care of this patient. Please do not hesitate to contact us with any additional questions. Naman Marcano MD  Endocrinologist, [facility] and Endocrinology   [address] [place] [address]   Phone: [phone]  Fax: [phone]  --------------------------------------------  An electronic signature was used to authenticate this note.  Kayy Chatman MD on 11/5/2022 at 8:34 AM

## 2022-11-06 PROBLEM — I48.91 POSTOPERATIVE ATRIAL FIBRILLATION (HCC): Status: ACTIVE | Noted: 2022-11-06

## 2022-11-06 PROBLEM — I97.89 POSTOPERATIVE ATRIAL FIBRILLATION (HCC): Status: ACTIVE | Noted: 2022-11-06

## 2022-11-06 LAB
ANION GAP SERPL CALCULATED.3IONS-SCNC: 10 MMOL/L (ref 7–16)
BUN BLDV-MCNC: 12 MG/DL (ref 6–23)
CALCIUM SERPL-MCNC: 8.8 MG/DL (ref 8.6–10.2)
CHLORIDE BLD-SCNC: 99 MMOL/L (ref 98–107)
CO2: 25 MMOL/L (ref 22–29)
CREAT SERPL-MCNC: 0.4 MG/DL (ref 0.5–1)
GFR SERPL CREATININE-BSD FRML MDRD: >60 ML/MIN/1.73
GLUCOSE BLD-MCNC: 219 MG/DL (ref 74–99)
HCT VFR BLD CALC: 25 % (ref 34–48)
HEMOGLOBIN: 8.3 G/DL (ref 11.5–15.5)
MAGNESIUM: 1.9 MG/DL (ref 1.6–2.6)
MCH RBC QN AUTO: 30.5 PG (ref 26–35)
MCHC RBC AUTO-ENTMCNC: 33.2 % (ref 32–34.5)
MCV RBC AUTO: 91.9 FL (ref 80–99.9)
METER GLUCOSE: 181 MG/DL (ref 74–99)
METER GLUCOSE: 185 MG/DL (ref 74–99)
METER GLUCOSE: 234 MG/DL (ref 74–99)
METER GLUCOSE: 254 MG/DL (ref 74–99)
METER GLUCOSE: 325 MG/DL (ref 74–99)
PDW BLD-RTO: 13.3 FL (ref 11.5–15)
PLATELET # BLD: 187 E9/L (ref 130–450)
PMV BLD AUTO: 10 FL (ref 7–12)
POTASSIUM SERPL-SCNC: 4 MMOL/L (ref 3.5–5)
RBC # BLD: 2.72 E12/L (ref 3.5–5.5)
SODIUM BLD-SCNC: 134 MMOL/L (ref 132–146)
WBC # BLD: 6.6 E9/L (ref 4.5–11.5)

## 2022-11-06 PROCEDURE — 82962 GLUCOSE BLOOD TEST: CPT

## 2022-11-06 PROCEDURE — 6370000000 HC RX 637 (ALT 250 FOR IP): Performed by: NURSE PRACTITIONER

## 2022-11-06 PROCEDURE — 99222 1ST HOSP IP/OBS MODERATE 55: CPT | Performed by: STUDENT IN AN ORGANIZED HEALTH CARE EDUCATION/TRAINING PROGRAM

## 2022-11-06 PROCEDURE — 85027 COMPLETE CBC AUTOMATED: CPT

## 2022-11-06 PROCEDURE — 99232 SBSQ HOSP IP/OBS MODERATE 35: CPT | Performed by: INTERNAL MEDICINE

## 2022-11-06 PROCEDURE — 6360000002 HC RX W HCPCS: Performed by: NURSE PRACTITIONER

## 2022-11-06 PROCEDURE — 6370000000 HC RX 637 (ALT 250 FOR IP): Performed by: INTERNAL MEDICINE

## 2022-11-06 PROCEDURE — 83735 ASSAY OF MAGNESIUM: CPT

## 2022-11-06 PROCEDURE — S5553 INSULIN LONG ACTING 5 U: HCPCS | Performed by: INTERNAL MEDICINE

## 2022-11-06 PROCEDURE — 80048 BASIC METABOLIC PNL TOTAL CA: CPT

## 2022-11-06 PROCEDURE — 6370000000 HC RX 637 (ALT 250 FOR IP): Performed by: PHYSICIAN ASSISTANT

## 2022-11-06 PROCEDURE — 94640 AIRWAY INHALATION TREATMENT: CPT

## 2022-11-06 PROCEDURE — 2700000000 HC OXYGEN THERAPY PER DAY

## 2022-11-06 PROCEDURE — 36415 COLL VENOUS BLD VENIPUNCTURE: CPT

## 2022-11-06 PROCEDURE — 2140000000 HC CCU INTERMEDIATE R&B

## 2022-11-06 PROCEDURE — 2580000003 HC RX 258: Performed by: NURSE PRACTITIONER

## 2022-11-06 PROCEDURE — 2500000003 HC RX 250 WO HCPCS: Performed by: PHYSICIAN ASSISTANT

## 2022-11-06 RX ORDER — BISACODYL 10 MG
10 SUPPOSITORY, RECTAL RECTAL DAILY
Status: DISCONTINUED | OUTPATIENT
Start: 2022-11-06 | End: 2022-11-07 | Stop reason: HOSPADM

## 2022-11-06 RX ORDER — INSULIN LISPRO 100 [IU]/ML
0-8 INJECTION, SOLUTION INTRAVENOUS; SUBCUTANEOUS
Status: DISCONTINUED | OUTPATIENT
Start: 2022-11-06 | End: 2022-11-07 | Stop reason: HOSPADM

## 2022-11-06 RX ORDER — INSULIN GLARGINE-YFGN 100 [IU]/ML
18 INJECTION, SOLUTION SUBCUTANEOUS NIGHTLY
Status: DISCONTINUED | OUTPATIENT
Start: 2022-11-06 | End: 2022-11-07 | Stop reason: HOSPADM

## 2022-11-06 RX ORDER — FUROSEMIDE 10 MG/ML
40 INJECTION INTRAMUSCULAR; INTRAVENOUS ONCE
Status: DISCONTINUED | OUTPATIENT
Start: 2022-11-06 | End: 2022-11-06

## 2022-11-06 RX ORDER — INSULIN LISPRO 100 [IU]/ML
0-4 INJECTION, SOLUTION INTRAVENOUS; SUBCUTANEOUS NIGHTLY
Status: DISCONTINUED | OUTPATIENT
Start: 2022-11-06 | End: 2022-11-07 | Stop reason: HOSPADM

## 2022-11-06 RX ADMIN — FERROUS SULFATE TAB 325 MG (65 MG ELEMENTAL FE) 325 MG: 325 (65 FE) TAB at 16:33

## 2022-11-06 RX ADMIN — IPRATROPIUM BROMIDE AND ALBUTEROL SULFATE 1 AMPULE: 2.5; .5 SOLUTION RESPIRATORY (INHALATION) at 13:08

## 2022-11-06 RX ADMIN — OXYCODONE AND ACETAMINOPHEN 2 TABLET: 5; 325 TABLET ORAL at 13:27

## 2022-11-06 RX ADMIN — SODIUM CHLORIDE, PRESERVATIVE FREE 10 ML: 5 INJECTION INTRAVENOUS at 21:04

## 2022-11-06 RX ADMIN — SODIUM CHLORIDE, PRESERVATIVE FREE 10 ML: 5 INJECTION INTRAVENOUS at 08:16

## 2022-11-06 RX ADMIN — AMIODARONE HYDROCHLORIDE 400 MG: 200 TABLET ORAL at 14:43

## 2022-11-06 RX ADMIN — IPRATROPIUM BROMIDE AND ALBUTEROL SULFATE 1 AMPULE: 2.5; .5 SOLUTION RESPIRATORY (INHALATION) at 19:01

## 2022-11-06 RX ADMIN — ASPIRIN 81 MG: 81 TABLET, COATED ORAL at 08:14

## 2022-11-06 RX ADMIN — AMIODARONE HYDROCHLORIDE 400 MG: 200 TABLET ORAL at 08:15

## 2022-11-06 RX ADMIN — INSULIN LISPRO 4 UNITS: 100 INJECTION, SOLUTION INTRAVENOUS; SUBCUTANEOUS at 16:53

## 2022-11-06 RX ADMIN — Medication 400 MG: at 21:05

## 2022-11-06 RX ADMIN — AMIODARONE HYDROCHLORIDE 400 MG: 200 TABLET ORAL at 21:05

## 2022-11-06 RX ADMIN — INSULIN GLARGINE-YFGN 18 UNITS: 100 INJECTION, SOLUTION SUBCUTANEOUS at 21:12

## 2022-11-06 RX ADMIN — ROSUVASTATIN CALCIUM 10 MG: 20 TABLET, COATED ORAL at 21:05

## 2022-11-06 RX ADMIN — BISACODYL 5 MG: 5 TABLET, COATED ORAL at 08:14

## 2022-11-06 RX ADMIN — Medication 300 UNITS: at 21:04

## 2022-11-06 RX ADMIN — SENNOSIDES AND DOCUSATE SODIUM 1 TABLET: 50; 8.6 TABLET ORAL at 08:15

## 2022-11-06 RX ADMIN — IPRATROPIUM BROMIDE AND ALBUTEROL SULFATE 1 AMPULE: 2.5; .5 SOLUTION RESPIRATORY (INHALATION) at 16:37

## 2022-11-06 RX ADMIN — PANTOPRAZOLE SODIUM 40 MG: 40 TABLET, DELAYED RELEASE ORAL at 08:16

## 2022-11-06 RX ADMIN — INSULIN LISPRO 4 UNITS: 100 INJECTION, SOLUTION INTRAVENOUS; SUBCUTANEOUS at 21:13

## 2022-11-06 RX ADMIN — SENNOSIDES AND DOCUSATE SODIUM 1 TABLET: 50; 8.6 TABLET ORAL at 21:05

## 2022-11-06 RX ADMIN — MUPIROCIN: 20 OINTMENT TOPICAL at 21:06

## 2022-11-06 RX ADMIN — FERROUS SULFATE TAB 325 MG (65 MG ELEMENTAL FE) 325 MG: 325 (65 FE) TAB at 08:14

## 2022-11-06 RX ADMIN — FOLIC ACID 1 MG: 1 TABLET ORAL at 08:15

## 2022-11-06 RX ADMIN — OXYCODONE HYDROCHLORIDE AND ACETAMINOPHEN 500 MG: 500 TABLET ORAL at 08:15

## 2022-11-06 RX ADMIN — INSULIN LISPRO 2 UNITS: 100 INJECTION, SOLUTION INTRAVENOUS; SUBCUTANEOUS at 08:23

## 2022-11-06 RX ADMIN — DEXTROSE MONOHYDRATE 10 MG/HR: 50 INJECTION, SOLUTION INTRAVENOUS at 01:53

## 2022-11-06 RX ADMIN — Medication 300 UNITS: at 08:16

## 2022-11-06 RX ADMIN — METOPROLOL TARTRATE 12.5 MG: 25 TABLET, FILM COATED ORAL at 08:15

## 2022-11-06 RX ADMIN — ENOXAPARIN SODIUM 40 MG: 100 INJECTION SUBCUTANEOUS at 08:14

## 2022-11-06 RX ADMIN — OXYCODONE HYDROCHLORIDE AND ACETAMINOPHEN 500 MG: 500 TABLET ORAL at 21:05

## 2022-11-06 RX ADMIN — BISACODYL 10 MG: 10 SUPPOSITORY RECTAL at 08:16

## 2022-11-06 RX ADMIN — METOPROLOL TARTRATE 12.5 MG: 25 TABLET, FILM COATED ORAL at 21:06

## 2022-11-06 RX ADMIN — CITALOPRAM HYDROBROMIDE 20 MG: 20 TABLET ORAL at 08:16

## 2022-11-06 RX ADMIN — INSULIN LISPRO 4 UNITS: 100 INJECTION, SOLUTION INTRAVENOUS; SUBCUTANEOUS at 12:06

## 2022-11-06 RX ADMIN — Medication 400 MG: at 08:14

## 2022-11-06 RX ADMIN — MUPIROCIN: 20 OINTMENT TOPICAL at 08:15

## 2022-11-06 RX ADMIN — INSULIN LISPRO 4 UNITS: 100 INJECTION, SOLUTION INTRAVENOUS; SUBCUTANEOUS at 12:07

## 2022-11-06 RX ADMIN — LEVOTHYROXINE SODIUM 25 MCG: 0.03 TABLET ORAL at 06:22

## 2022-11-06 RX ADMIN — INSULIN LISPRO 4 UNITS: 100 INJECTION, SOLUTION INTRAVENOUS; SUBCUTANEOUS at 08:17

## 2022-11-06 ASSESSMENT — PAIN SCALES - GENERAL
PAINLEVEL_OUTOF10: 0
PAINLEVEL_OUTOF10: 0
PAINLEVEL_OUTOF10: 7
PAINLEVEL_OUTOF10: 0

## 2022-11-06 NOTE — PROGRESS NOTES
ENDOCRINOLOGY PROGRESS NOTE      Date of admission: 11/2/2022  Date of service: 11/6/2022  Admitting physician: Steve Maya DO   Primary Care Physician: Dasia Galindo DO  Consultant physician: Neil Sauceda MD     Reason for the consultation:  Uncontrolled DM    History of Present Illness: The history is provided by the patient. Accuracy of the patient data is excellent    Jp Emerson is a very pleasant 78 y.o. old female with PMH uncontrolled DM, CAD, HLD and other listed below admitted to [facility] on 11/2/2022 because of scheduled CABG surgery. The patient underwent CABG surgery on 11/2/2022 endocrine service was consulted for diabetes management.      SUBJECTIVE   Pt was seen and examined this AM, no acute events, BG above goal     Inpatient diet:   Carb Restricted diet     Point of care glucose monitoring   (Independently reviewed)   Recent Labs     11/04/22  1217 11/04/22  1735 11/04/22  2101 11/05/22  0654 11/05/22  1139 11/05/22  1655 11/05/22  2114 11/06/22  0620   GLUMET 237* 230* 228* 212* 237* 161* 176* 234*     Scheduled Meds:   insulin glargine  18 Units SubCUTAneous Nightly    insulin lispro  0-8 Units SubCUTAneous TID WC    insulin lispro  0-4 Units SubCUTAneous Nightly    amiodarone  400 mg Oral TID    rosuvastatin  10 mg Oral Nightly    insulin lispro  4 Units SubCUTAneous TID WC    aspirin  81 mg Oral Daily    bisacodyl  5 mg Oral Daily    sennosides-docusate sodium  1 tablet Oral BID    ferrous sulfate  325 mg Oral BID WC    vitamin C  500 mg Oral BID    folic acid  1 mg Oral Daily    sodium chloride flush  5-40 mL IntraVENous 2 times per day    heparin flush  3 mL IntraVENous 2 times per day    metoprolol tartrate  12.5 mg Oral BID    enoxaparin  40 mg SubCUTAneous Daily    citalopram  20 mg Oral Daily    levothyroxine  25 mcg Oral Daily    sodium chloride flush  5-40 mL IntraVENous 2 times per day    magnesium oxide  400 mg Oral BID    mupirocin   Nasal BID    pantoprazole  40 mg Oral Daily    ipratropium-albuterol  1 ampule Inhalation Q4H WA       PRN Meds:   acetaminophen, 650 mg, Q4H PRN  oxyCODONE-acetaminophen, 1 tablet, Q4H PRN   Or  oxyCODONE-acetaminophen, 2 tablet, Q4H PRN  diphenhydrAMINE, 25 mg, Q8H PRN  potassium chloride, 20 mEq, PRN  bisacodyl, 10 mg, Daily PRN  amiodarone, 400 mg, PRN  amiodarone bolus, 150 mg, PRN  magnesium sulfate, 2,000 mg, PRN  morphine, 2 mg, Q4H PRN  sodium chloride, 1 spray, PRN  trimethobenzamide, 200 mg, Q6H PRN  glucose, 4 tablet, PRN  dextrose bolus, 125 mL, PRN   Or  dextrose bolus, 250 mL, PRN  glucagon (rDNA), 1 mg, PRN  dextrose, , Continuous PRN  sodium chloride flush, 5-40 mL, PRN  sodium chloride, , PRN  heparin flush, 3 mL, PRN  sodium chloride flush, 5-40 mL, PRN  ondansetron, 4 mg, Q8H PRN   Or  ondansetron, 4 mg, Q6H PRN    Continuous Infusions:   dilTIAZem (CARDIZEM) 100 mg in dextrose 5% 100 mL ([facility]) 10 mg/hr (11/06/22 0153)    dextrose      sodium chloride         Review of Systems  All systems reviewed. All negative except for symptoms mentioned in HPI     OBJECTIVE    /64   Pulse (!) 104   Temp 97.2 °F (36.2 °C) (Oral)   Resp 18   Wt 130 lb 3.2 oz (59.1 kg)   SpO2 91%   BMI 22.35 kg/m²     Intake/Output Summary (Last 24 hours) at 11/6/2022 0754  Last data filed at 11/5/2022 2200  Gross per 24 hour   Intake 270 ml   Output 600 ml   Net -330 ml       Physical examination:  General: awake alert, oriented x3  HEENT: normocephalic non traumatic, no exophthalmos   Neck: supple, No thyroid tenderness,  Pulm: good equal air entry no added sounds  CVS: S1 + S2. S/p CABG  Abd: soft lax, no tenderness  Skin: warm, no lesions, no rash.  No open wounds, no ulcers   Neuro: CN intact, sensation decreased bilateral , muscle power normal  Psych: normal mood, and affect    Review of Laboratory Data:  I personally reviewed the following labs:   Recent Labs     11/04/22  0430 11/05/22  0520 11/06/22  0625   WBC 10.5 9.6 6.6   RBC 2.79* 2.71* 2.72*   HGB 8.6* 8.5* 8.3*   HCT 26.3* 25.2* 25.0*   MCV 94.3 93.0 91.9   MCH 30.8 31.4 30.5   MCHC 32.7 33.7 33.2   RDW 13.3 13.3 13.3    151 187   MPV 11.0 10.5 10.0     Recent Labs     11/04/22  0430 11/04/22  1755 11/05/22  0520 11/06/22  0625     --  131* 134   K 4.1 4.4 4.1 4.0     --  98 99   CO2 19*  --  24 25   BUN 7  --  9 12   CREATININE 0.4*  --  0.4* 0.4*   GLUCOSE 245*  --  239* 219*   CALCIUM 8.5*  --  8.7 8.8     No results found for: BHYDRXBUT  Lab Results   Component Value Date/Time    LABA1C 9.7 10/28/2022 07:55 AM    LABA1C 11.3 06/23/2022 10:54 AM    LABA1C 10.5 03/17/2022 12:00 PM     Lab Results   Component Value Date/Time    TSH 1.860 03/17/2022 12:00 PM     Lab Results   Component Value Date/Time    LABA1C 9.7 10/28/2022 07:55 AM    GLUCOSE 219 11/06/2022 06:25 AM    GLUCOSE 157 07/21/2011 04:06 PM    MALBCR 52.8 08/27/2021 12:00 PM    LABMICR 16.9 08/27/2021 12:00 PM    LABCREA 32 08/27/2021 12:00 PM     Lab Results   Component Value Date/Time    TRIG 317 03/17/2022 12:00 PM    HDL 37 03/17/2022 12:00 PM    LDLCALC 103 03/17/2022 12:00 PM    CHOL 203 03/17/2022 12:00 PM       Blood culture   No results found for: [facility]    Radiology:  XR CHEST PORTABLE   Final Result   No significant interval change. XR CHEST PORTABLE   Final Result   New right lower lobe infiltrate and effusion         XR CHEST PORTABLE   Final Result   No significant change         XR CHEST PORTABLE   Final Result   Postop left lower lobe atelectasis/infiltrate             Medical Records/Labs/Images review:   I personally reviewed and summarized previous records   All labs and imaging were reviewed independently     [address], a 78 y.o.-old female seen today for inpatient diabetes management     Diabetes Mellitus type 2  Patient's diabetes is uncontrolled , A1c 9.5%   will change diabetes regimen to:   Increase  Lantus 18u nightly   Start Humalog 4u with meals   Increase sliding scale to medium dose   Continue glucose check with meals and at bedtime   Will titrate insulin dose based on the blood glucose trend & insulin requirement  Will arrange for patient to be seen in endocrinology clinic upon discharge for routine diabetes maintenance and prevention. Primary hypothyroidism   Continue levothyroxine 25 mcg daily     MVCAD  Pt underwent CABG surgery 11/2/2022  Management per CT surgery     Interdisciplinary plan for communication with healthcare providers:   Consult recommendations were discussed with the Primary Service/Nursing staff      The above issues were reviewed with the patient who understood and agreed with the plan. Thank you for allowing us to participate in the care of this patient. Please do not hesitate to contact us with any additional questions. Julio Delgado MD  Endocrinologist, [facility] and Endocrinology   [address] [place] [address]   Phone: [phone]  Fax: [phone]  --------------------------------------------  An electronic signature was used to authenticate this note.  Shira Bui MD on 11/6/2022 at 7:54 AM

## 2022-11-06 NOTE — CONSULTS
Maciel Quiroz M.D.,[facility]  Geeta Carrillo D.O., F.A.C.O.I., Rosamaria Milner M.D. Juluis Kehr, M.D. Amanda Pandya D.O. Patient:  Merly So 78 y.o. female MRN: [id number]     Date of Service: 2022      PULMONARY CONSULTATION    Reason for Consultation: Prolonged pulmonary sufficiency  Referring Physician: Dr. Bravo Landers    Communication with the referring physician will be sent via the electronic medical record. Chief Complaint: Multivessel disease    CODE STATUS: Full code    SUBJECTIVE:  HPI:  Merly So is a 78 y.o. with a past medical history significant for diabetes type 2, hyperlipidemia, hypertension, hypothyroidism, peripheral neuropathy who on a preop evaluation for back pain was found to have abnormal stress test.  She underwent a heart catheterization and was found to have severe multiple vessel coronary artery disease. She underwentCABG x 2 (LIMA-LAD, SVG-OM) and LAAL (40 mm AtriCure) on 22. She developed A. fib with RVR on  she was treated with amnio bolus followed by drip which was stopped earlier today.     Past Medical History:   Diagnosis Date    Age-related osteoporosis without current pathological fracture 2021    Arthritis     Cataracts, bilateral 10/'19    Colon polyps 2021    tubular adenoma    Diabetes mellitus ([address]     DM type 2 (diabetes mellitus, type 2) ([address]     Facial basal cell cancer 2022    [facility]    Hyperlipidemia     Hypertension     Hypothyroidism     Migraines     rare    Neuropathy, diabetic ([address]     Peripheral vascular disease ([address]     follows with Dr. Echo Noel yearly    Positive colorectal cancer screening using Cologuard test     Psoriasiform dermatitis     Seasonal allergies     Thyroid disease        Past Surgical History:   Procedure Laterality Date    BACK SURGERY      L5-S1    CAROTID ENDARTERECTOMY Left 10/11/2018    [place]    CARPAL TUNNEL RELEASE      bilat     SECTION      2 COLONOSCOPY N/A 2021    COLONOSCOPY WITH BIOPSY with tattooing performed by Radha Zuniga MD at [address] N/A 2022    coronary artery bypass grafting, ZACHARY performed by Karen Mascorro DO at [place] [address] (CERVIX STATUS UNKNOWN)      LARYNGOSCOPY N/A 2020    DIRECT LARYNGOSCOPYY AND CERVICAL ESOPHAGOSCOPY (PATHOLOGY PRESENT) performed by Anastasiya Schwartz DO at [address] [facility]      remote- right     OTHER SURGICAL HISTORY  2018    Dr. Benigno Crowell- Bilateral iliac stents iCast [id number]    [place] OFFICE/OUTPT VISIT,PROCEDURE ONLY Left 10/11/2018    LEFT CAROTID ENDARTERECTOMY performed by Anaya Amin MD at [place] [address]      TOE SURGERY      bilat - ingrown toe nails    TONSILLECTOMY         Family History   Problem Relation Age of Onset    Cancer Mother         lung brain smoker    Heart Attack Father     Heart Disease Father         mi in his 52's     Other Father         pulmonary fibrosis    Heart Attack Brother         48    Diabetes Brother     Heart Attack Paternal Grandfather        Social History:   Social History     Socioeconomic History    Marital status:      Spouse name: Not on file    Number of children: Not on file    Years of education: Not on file    Highest education level: Not on file   Occupational History    Occupation:  bookeeper   Tobacco Use    Smoking status: Former     Packs/day: 0.50     Years: 40.00     Pack years: 20.00     Types: Cigarettes     Start date:      Quit date: 2018     Years since quittin.2    Smokeless tobacco: Never   Vaping Use    Vaping Use: Never used   Substance and Sexual Activity    Alcohol use: No    Drug use: Never    Sexual activity: Not on file   Other Topics Concern    Not on file   Social History Narrative    Not on file     Social Determinants of Health     Financial Resource Strain: Not on file   Food Insecurity: Not on file Transportation Needs: Not on file   Physical Activity: Inactive    Days of Exercise per Week: 0 days    Minutes of Exercise per Session: 120 min   Stress: Not on file   Social Connections: Not on file   Intimate Partner Violence: Not on file   Housing Stability: Not on file     Smoking history: The patient is a Past smoker 20 pack years. Started in 1970 quit in July 2000 18/2 pack a year 2018. Verenice Rodriguez ETOH:   reports no history of alcohol use. Exposures: There  is not history of TB or TB exposure. There is not asbestos or silica dust exposure. The patient reports does not have coal, foundry, quarry or [employer] exposure. Recent travel history none. There is not  history of recreational or IV drug use. There is not hot tub exposure. The patient does not have any exotic pets, turtles or exotic birds. Vaccines:      Immunization History   Administered Date(s) Administered    COVID-19, MODERNA BLUE border, Primary or Immunocompromised, (age 12y+), IM, 100 mcg/0.5mL 02/27/2021, 03/27/2021, 11/30/2021    Pneumococcal Polysaccharide ([id number]) 02/11/2021    Pneumococcal conjugate PCV20, PF (Prevnar 20) 06/23/2022    Tdap (Boostrix, Adacel) 05/11/2021        Home Meds: Medications Prior to Admission: TOPROL XL 25 MG extended release tablet, TAKE 1 TABLET BY MOUTH EVERY DAY  isosorbide mononitrate (IMDUR) 30 MG extended release tablet, TAKE 1 TABLET BY MOUTH EVERY DAY  clopidogrel (PLAVIX) 75 MG tablet, TAKE 1 TABLET BY MOUTH DAILY FOR 30 DOSES. nitroGLYCERIN (NITROSTAT) 0.4 MG SL tablet, Place 1 tablet under the tongue every 5 minutes as needed for Chest pain up to max of 3 total doses. If no relief after 1 dose, call 911.   Insulin Glargine, 1 Unit Dial, (TOUJEO SOLOSTAR) 300 UNIT/ML SOPN, INJECT 50 UNITS INTO THE SKIN TWICE A DAY  simvastatin (ZOCOR) 40 MG tablet, TAKE 1 TABLET BY MOUTH NIGHTLY  levothyroxine (SYNTHROID) 25 MCG tablet, Take 1 tablet by mouth Daily  citalopram (CELEXA) 20 MG tablet, TAKE not allergic        REVIEW OF SYSTEMS:  Constitutional: Denies fever, weight loss, night sweats, and fatigue  Skin: Denies pigmentation, dark lesions, and rashes   HEENT: Denies hearing loss, tinnitus, ear drainage, epistaxis, sore throat, and hoarseness. Cardiovascular: Denies palpitations, chest pain, and chest pressure. Respiratory: Denies cough, dyspnea at rest, hemoptysis, apnea, and choking. Gastrointestinal: Denies nausea, vomiting, poor appetite, diarrhea, heartburn or reflux  Genitourinary: Denies dysuria, frequency, urgency or hematuria  Musculoskeletal: Denies myalgias, muscle weakness, and bone pain  Neurological: Denies dizziness, vertigo, headache, and focal weakness  Psychological: Denies anxiety and depression  Endocrine: Denies heat intolerance and cold intolerance  Hematopoietic/Lymphatic: Denies bleeding problems and blood transfusions    OBJECTIVE:   BP (!) 99/53   Pulse 64   Temp (!) 95 °F (35 °C) (Temporal)   Resp 16   Ht 5' 4\" (1.626 m)   Wt 130 lb 3.2 oz (59.1 kg)   SpO2 92%   BMI 22.35 kg/m²   SpO2 Readings from Last 1 Encounters:   11/06/22 92%        I/O:    Intake/Output Summary (Last 24 hours) at 11/6/2022 1141  Last data filed at 11/5/2022 2200  Gross per 24 hour   Intake 270 ml   Output 600 ml   Net -330 ml             IPAP: 15 cmH20  CPAP/EPAP: 8 cmH2O     Physical Exam:  General: The patient is lying in bed comfortably without any distress. Breathing is not labored  HEENT: Pupils are equal round and reactive to light, there are no oral lesions and no post-nasal drip   Neck: supple without adenopathy  Cardiovascular: regular rate and rhythm without murmur or gallop  Respiratory: Clear to auscultation bilaterally without wheezing or crackles.   Air entry is symmetric  Abdomen: soft, non-tender, non-distended, normal bowel sounds  Extremities: warm, no edema, no clubbing  Skin: no rash or lesion  Neurologic: CN II-XII grossly intact, no focal deficits    Pulmonary Function thoracic aorta is normal in caliber measuring 3 x 2.9 cm with   punctate calcification which is probably not clinically significant. There   is diffuse calcifications of the aortic arch and punctate calcification of   the descending thoracic aorta which measures 2.3 cm in diameter. The trachea   and major bronchi are patent. There is emphysema. There is a  8 mm   spiculated nodule in the lingula and a 7 mm nodule in the superior right   lower lobe. 3 mm nodule in the left upper lobe is noted. There is no focal   consolidation or pleural effusion. The liver is fatty infiltrated. Degenerative changes are identified in the thoracic spine. Impression   Diffuse coronary artery calcification. The ascending thoracic aorta is   normal in caliber with a focal area of punctate calcification. Centrilobular emphysema with the spiculated pulmonary nodules ranging from   7-8 mm in the right lower lobe and lingula which are indeterminate for   malignancy. Further assessment by PET-CT scan and CT surveillance according   to Fleischner society guidelines is recommended. Echo:    Conclusions      Summary   Technically difficult examination. Mild concentric left ventricular hypertrophy. Ejection fraction is visually estimated at 60 to 65%. Normal right ventricular size and function. Mild mitral regurgitation is present. Individual aortic valve leaflets are not clearly visualized. Mild aortic stenosis. Mild tricuspid regurgitation. Mild pulmonic regurgitation present.       Signature      ----------------------------------------------------------------   Electronically signed by All Hall MD(Interpreting   physician) on 10/27/2022 06:05 PM   ----------------------------------------------------------------  Labs:  Lab Results   Component Value Date/Time    WBC 6.6 11/06/2022 06:25 AM    HGB 8.3 11/06/2022 06:25 AM    HCT 25.0 11/06/2022 06:25 AM    HCT 27.0 11/02/2022 10:36 AM    MCV 91.9 11/06/2022 06:25 AM    MCH 30.5 11/06/2022 06:25 AM    MCHC 33.2 11/06/2022 06:25 AM    RDW 13.3 11/06/2022 06:25 AM     11/06/2022 06:25 AM    MPV 10.0 11/06/2022 06:25 AM     Lab Results   Component Value Date/Time     11/06/2022 06:25 AM    K 4.0 11/06/2022 06:25 AM    K 4.5 12/04/2018 08:10 AM    CL 99 11/06/2022 06:25 AM    CO2 25 11/06/2022 06:25 AM    BUN 12 11/06/2022 06:25 AM    CREATININE 0.4 11/06/2022 06:25 AM    LABALBU 4.3 10/28/2022 07:55 AM    LABALBU 4.1 07/21/2011 04:06 PM    CALCIUM 8.8 11/06/2022 06:25 AM    GFRAA >60 10/05/2022 09:30 AM    LABGLOM >60 11/06/2022 06:25 AM     Lab Results   Component Value Date/Time    PROTIME 13.2 11/03/2022 04:30 AM    INR 1.2 11/03/2022 04:30 AM     No results for input(s): PROBNP in the last 72 hours. No results for input(s): TROPONINI in the last 72 hours. No results for input(s): PROCAL in the last 72 hours. This SmartLink has not been configured with any valid records. Micro:  No results for input(s): CULTRESP in the last 72 hours. No results for input(s): LABGRAM in the last 72 hours. No results for input(s): LEGUR in the last 72 hours. No results for input(s): STREPNEUMAGU in the last 72 hours. No results for input(s): LP1UAG in the last 72 hours. Assessment:  Acute hypoxic respiratory failure  Severe multivessel coronary artery disease status post CABG x 2 (LIMA-LAD, SVG-OM) and LAAL (40 mm AtriCure) on 11/2/22. Bilateral pleural effusions  Pulmonary nodules  COPD Gold stage I  A. fib now converted to normal sinus rhythm    Plan:  Wean FiO2 for saturations above 92%  Continue bronchodilators with EZ Pap. Encourage incentive spirometry   Once patient ready to be discharge can discharged her Anoro or Stiolto  Would recommend to resume diuresis once BP has improved. Check an albumin level.   Regarding her pulmonary nodules she will need a follow-up CT scan in 3 months which will be January 2023        Thank you for allowing me to participate in the care of [facility]. Please feel free to call with questions. Electronically signed by Trang Trotter MD on 11/6/2022 at 11:41 AM      Note: This report was completed utilizing computer voice recognition software.  Every effort has been made to ensure accuracy, however; inadvertent computerized transcription errors may be present

## 2022-11-06 NOTE — CONSULTS
[address] DEPARTMENT/DIVISION OF CARDIOLOGY  Inpatient consultation Report  PATIENT: Micheline Bruno  MEDICAL RECORD NUMBER: [id number]  DATE OF SERVICE:  11/6/2022  ATTENDING ELECTROPHYSIOLOGIST:  Fernanda Juarez DO   REFERRING PHYSICIAN: No ref. provider found and Socorro Swain DO  CHIEF COMPLAINT: Postoperative A. fib    HPI: Micheline Bruno is a 78 y.o. female with a history of CAD sp CABG x2 (11/1/2022: LIMA-LAD, SVG-OM Dr. Zenaida Sam), LUPE AtriClip 40 mm (11/1/2022-Dr. Zenaida Sam, no prior AF/AFL), HTN, carotid artery stenosis sp left CEA (12/17/2018), PAD sp bilateral iliac stents (12/4/2018) DM 2, diabetic neuropathy, carpal tunnel sp bilateral release, hypothyroidism, colon polyps sp polypectomy (2021), bilateral cataracts, and chronic back pain/spinal stenosis/scoliosis sp L5-S1 surgery. She is managed by Dr. Vandana Allen with aspirin 81 mg daily, Plavix 75 mg daily, Imdur 30 mg daily, lisinopril 5 mg daily, metoprolol XL 25 mg daily, simvastatin 40 mg daily, and PPI. On 11/2/2022, patient was admitted for outpatient CABG and prophylactic LUPE AtriClip, which was performed by Dr. Zenaida Sam. There was reportedly a postoperative ZACHARY, but I see no records regarding this. On 11/4/2022, patient reportedly converted from SR to AF with RVR, which was treated with amiodarone infusion, then converted to SR on 11/5/2022 briefly prior to return to AF with RVR, which was treated with IV metoprolol and Cardizem. On 11/6/2022 at 10 AM, patient converted from AF to NSR. EP service is now consulted by admitting physician for further evaluation and management of postoperative AF. Patient denies any complaints at this time. Prior cardiac testing:  ECG (11/4/2022): AF with ventricular rate of 160 bpm  TTE (10/27/2022):  Technically difficult study, LVEF = 60-65%, mild concentric LVH, mild MR, mild TR, mild AAS, mild PI.  ECG (10/6/2022): SR at 65 bpm, QTC = 443 ms, nonspecific T wave abnormalities. [facility] (10/6/2022): LVEF = 70% with hypokinesis of basal inferior wall, RCA dominant circulation, 20% distal LM stenosis, 70-80% proximal LAD stenosis, 90-95% mid LAD stenosis, 80-90% distal LAD stenosis, 70-80% ostial D1 stenosis, 70-80% ostial-proximal OM1 stenosis, 95% mid LCx stenosis, 95-99% proximal RCA stenosis,  mid RCA with distal filling via collaterals from acute marginal branch. Pharmacologic nuclear stress test (2022): LVEF = 55%, basal-mid inferior and basal inferior lateral wall ischemia. Pharmacologic nuclear stress test (9/10/2018): LVEF = 57%, normal perfusion.     Past Medical History:   Diagnosis Date    Age-related osteoporosis without current pathological fracture 2021    Arthritis     Cataracts, bilateral 10/'19    Colon polyps 2021    tubular adenoma    Diabetes mellitus ([address]     DM type 2 (diabetes mellitus, type 2) ([address]     Facial basal cell cancer 2022    [facility]    Hyperlipidemia     Hypertension     Hypothyroidism     Migraines     rare    Neuropathy, diabetic ([address]     Peripheral vascular disease ([address]     follows with Dr. Rabia Felix yearly    Positive colorectal cancer screening using Cologuard test     Psoriasiform dermatitis     Seasonal allergies     Thyroid disease      Past Surgical History:   Procedure Laterality Date    BACK SURGERY      L5-S1    CAROTID ENDARTERECTOMY Left 10/11/2018    [place]    CARPAL TUNNEL RELEASE      bilat     SECTION      2    COLONOSCOPY N/A 2021    COLONOSCOPY WITH BIOPSY with tattooing performed by Serena Mosley MD at [facility] N/A 2022    coronary artery bypass grafting, ZACHARY performed by Cam Magallanes DO at [place] [address] ([facility])      LARYNGOSCOPY N/A 2020    DIRECT LARYNGOSCOPYY AND CERVICAL ESOPHAGOSCOPY (PATHOLOGY PRESENT) performed by Jose Bianchi DO at [address] [address] LEG SURGERY      remote- right     OTHER SURGICAL HISTORY  2018    Dr. Sameera Gray- Bilateral iliac stents iCast [id number]    [place] OFFICE/OUTPT VISIT,PROCEDURE ONLY Left 10/11/2018    LEFT CAROTID ENDARTERECTOMY performed by Sal Knutson MD at [facility]      TOE SURGERY      bilat - ingrown toe nails    TONSILLECTOMY        Family History   Problem Relation Age of Onset    Cancer Mother         lung brain smoker    Heart Attack Father     Heart Disease Father         mi in his 52's     Other Father         pulmonary fibrosis    Heart Attack Brother         48    Diabetes Brother     Heart Attack Paternal Grandfather      There is no family history of sudden cardiac arrest    Social History     Tobacco Use    Smoking status: Former     Packs/day: 0.50     Years: 40.00     Pack years: 20.00     Types: Cigarettes     Start date: 5     Quit date: 2018     Years since quittin.2    Smokeless tobacco: Never   Substance Use Topics    Alcohol use: No       Current Facility-Administered Medications   Medication Dose Route Frequency Provider Last Rate Last Admin    insulin glargine-yfgn (SEMGLEE-YFGN) injection vial 18 Units  18 Units SubCUTAneous Nightly Alfred Sandy Plaza MD        insulin lispro (HUMALOG) injection vial 0-8 Units  0-8 Units SubCUTAneous TID  Panfilo Epstein MD   4 Units at 22 1207    insulin lispro (HUMALOG) injection vial 0-4 Units  0-4 Units SubCUTAneous Nightly Alfred Sandy Plaza MD        bisacodyl (DULCOLAX) suppository 10 mg  10 mg Rectal Daily Lohman, PA   10 mg at 22 4518    amiodarone (CORDARONE) tablet 400 mg  400 mg Oral TID Lohman, PA   400 mg at 22 0815    rosuvastatin (CRESTOR) tablet 10 mg  10 mg Oral Nightly Lohman, PA   10 mg at 22 2158    dilTIAZem 100 mg in dextrose 5 % 100 mL infusion (ADD-[place])  2.5-15 mg/hr IntraVENous Continuous Lohman, PA   Stopped at 22 1000    insulin lispro (HUMALOG) injection vial 4 Units  4 Units SubCUTAneous TID  Gage Patino MD   4 Units at 11/06/22 1206    aspirin EC tablet 81 mg  81 mg Oral Daily INDIRA Callahan CNP   81 mg at 11/06/22 0814    acetaminophen (TYLENOL) tablet 650 mg  650 mg Oral Q4H PRN INDIRA Callahan - CNP        oxyCODONE-acetaminophen (PERCOCET) 5-325 MG per tablet 1 tablet  1 tablet Oral Q4H PRN INDIRA Hein CNP   1 tablet at 11/05/22 0401    Or    oxyCODONE-acetaminophen (PERCOCET) 5-325 MG per tablet 2 tablet  2 tablet Oral Q4H PRN INDIRA Hein CNP        bisacodyl (DULCOLAX) EC tablet 5 mg  5 mg Oral Daily INDIRA Hein CNP   5 mg at 11/06/22 0814    sennosides-docusate sodium (SENOKOT-S) 8.6-50 MG tablet 1 tablet  1 tablet Oral BID INDIRA Hein CNP   1 tablet at 11/06/22 0815    diphenhydrAMINE (BENADRYL) tablet 25 mg  25 mg Oral Q8H PRN INDIRA Callahan CNP        ferrous sulfate (IRON 325) tablet 325 mg  325 mg Oral BID  INDIRA Callahan CNP   325 mg at 11/06/22 5781    ascorbic acid (VITAMIN C) tablet 500 mg  500 mg Oral BID INDIRA Hein CNP   500 mg at 26/36/95 9640    folic acid (FOLVITE) tablet 1 mg  1 mg Oral Daily INDIRA Callahan CNP   1 mg at 11/06/22 0815    potassium chloride (KLOR-CON M) extended release tablet 20 mEq  20 mEq Oral PRN INDIRA Callahan - CNP        amiodarone (CORDARONE) tablet 400 mg  400 mg Oral PRN INDIRA Callahan - CNP        amiodarone (CORDARONE) 150 mg in dextrose 5 % 100 mL bolus  150 mg IntraVENous PRN INDIRA Callahan - CNP        magnesium sulfate 2000 mg in 50 mL IVPB premix  2,000 mg IntraVENous PRN INDIRA Callahan - CNP        morphine (PF) injection 2 mg  2 mg IntraVENous Q4H PRN INDIRA Callahan CNP        sodium chloride (OCEAN, BABY AYR) 0.65 % nasal spray 1 spray  1 spray Each Nostril PRN INDIRA Callahan CNP        trimethobenzamide (TIGAN) injection 200 mg  200 mg IntraMUSCular Q6H PRN Magda Kaz, APRN - CNP        glucose chewable tablet 16 g  4 tablet Oral PRN Magda Kaz, APRN - CNP        dextrose bolus 10% 125 mL  125 mL IntraVENous PRN Magda Kaz, APRN - CNP        Or    dextrose bolus 10% 250 mL  250 mL IntraVENous PRN Magda Kaz, APRN - CNP        glucagon (rDNA) injection 1 mg  1 mg SubCUTAneous PRN Magda Kaz, APRN - CNP        dextrose 10 % infusion   IntraVENous Continuous PRN Magda Kaz, APRN - CNP        sodium chloride flush 0.9 % injection 5-40 mL  5-40 mL IntraVENous 2 times per day Valentín Brantley APRN - CNP   10 mL at 11/06/22 0816    sodium chloride flush 0.9 % injection 5-40 mL  5-40 mL IntraVENous PRN Madga Kaz, APRN - CNP        0.9 % sodium chloride infusion   IntraVENous PRN Magda Kaz, APRN - CNP        heparin flush 100 UNIT/ML injection 300 Units  3 mL IntraVENous 2 times per day Valentín Brantley APRN - CNP   300 Units at 11/06/22 0816    heparin flush 100 UNIT/ML injection 300 Units  3 mL IntraCATHeter PRN Valentín Brantley APRN - CNP        metoprolol tartrate (LOPRESSOR) tablet 12.5 mg  12.5 mg Oral BID Valentín Karon APRN - CNP   12.5 mg at 11/06/22 0815    enoxaparin (LOVENOX) injection 40 mg  40 mg SubCUTAneous Daily Magda Kaz, APRN - CNP   40 mg at 11/06/22 0814    citalopram (CELEXA) tablet 20 mg  20 mg Oral Daily Magda Kaz, APRN - CNP   20 mg at 11/06/22 0816    levothyroxine (SYNTHROID) tablet 25 mcg  25 mcg Oral Daily Magda Kaz, APRN - CNP   25 mcg at 11/06/22 0622    sodium chloride flush 0.9 % injection 5-40 mL  5-40 mL IntraVENous 2 times per day Valentín Brantley APRN - CNP   10 mL at 11/04/22 0833    sodium chloride flush 0.9 % injection 5-40 mL  5-40 mL IntraVENous PRN Magda Kaz, APRN - CNP        ondansetron (ZOFRAN-ODT) disintegrating tablet 4 mg  4 mg Oral Q8H PRN INDIRA Callahan - CNP        Or    ondansetron (ZOFRAN) injection 4 mg  4 mg IntraVENous Q6H PRN Magda Mccurdy, APRN - CNP        magnesium oxide (MAG-OX) tablet 400 mg  400 mg Oral BID Angelina Tejeda APRN - CNP   400 mg at 11/06/22 0814    mupirocin (BACTROBAN) 2 % ointment   Nasal BID Angelina Tejeda APRN - CNP   Given at 11/06/22 0815    pantoprazole (PROTONIX) tablet 40 mg  40 mg Oral Daily Magda Mccurdy APRN - CNP   40 mg at 11/06/22 0816    ipratropium-albuterol (DUONEB) nebulizer solution 1 ampule  1 ampule Inhalation Q4H WA Magda Mccurdy APRN - CNP   1 ampule at 11/06/22 1308        Allergies   Allergen Reactions    Pcn [Penicillins] Hives and Swelling    Fosamax [Alendronate]      Nausea, vomiting    Lipitor [Atorvastatin] Myalgia     Myalgia not allergic        ROS:   Constitutional: Negative for fever, activity change and appetite change. HENT: Negative for epistaxis. Eyes: Negative for diploplia, blurred vision. Respiratory: Negative for cough, chest tightness, shortness of breath and wheezing. Cardiovascular: pertinent positives in HPI  Gastrointestinal: Negative for abdominal pain and blood in stool. Genitourinary: Negative for hematuria and difficulty urinating. Musculoskeletal: Negative for myalgias and gait problem. Skin: Negative for color change and rash. Neurological: Negative for syncope and light-headedness. Psychiatric/Behavioral: Negative for confusion and agitation. The patient is not nervous/anxious.   Heme: no bleeding disorders, no melena or hematochezia  All other review of systems are negative     PHYSICAL EXAM:  Constitutional   Vitals:    11/06/22 1030 11/06/22 1119 11/06/22 1126 11/06/22 1312   BP: (!) 93/48 (!) 99/53     Pulse: 66 64  75   Resp:  16  16   Temp:  (!) 95 °F (35 °C)     TempSrc:  Temporal     SpO2: 94% 92%  95%   Weight:       Height:   5' 4\" (1.626 m)     Well-developed, no acute distress, well groomed  Eyes: conjunctivae normal, no xanthelasma   Ears, Nose, Throat: oral mucosa moist, no cyanosis   Neck: supple, no JVD, no bruits, no thyromegaly   CV: normal rate, regular rhythm,  no murmurs, rubs, or gallops. PMI is nondisplaced, Peripheral pulses normal including carotid auscultation, no noted aortic bruit, bilateral femoral and pedal pulses are normal in quality  Lungs: clear to auscultation bilaterally, normal respiratory effort without used of accessory muscles, no wheezes  Abdomen: soft, non-tender, bowel sounds present, no masses or hepatomegaly   Extremities: no digital clubbing, no edema   Skin: warm, no rashes, sternal wound dressed without active drainage. Neuro/Psych: A&O x 3, normal mood and affect      Data:    Recent Labs     11/04/22  0430 11/05/22  0520 11/06/22  0625   WBC 10.5 9.6 6.6   HGB 8.6* 8.5* 8.3*   HCT 26.3* 25.2* 25.0*    151 187     Recent Labs     11/04/22  0430 11/04/22  1755 11/05/22  0520 11/06/22  0625     --  131* 134   K 4.1 4.4 4.1 4.0     --  98 99   CO2 19*  --  24 25   BUN 7  --  9 12   CREATININE 0.4*  --  0.4* 0.4*   CALCIUM 8.5*  --  8.7 8.8     No results for input(s): INR in the last 72 hours. No results for input(s): TSH in the last 72 hours. Lab Results   Component Value Date/Time    MG 1.9 11/06/2022 06:25 AM     Telemetry: Paroxysms of AF at 120-150 bpm, but appears to have stabilized in sinus rhythm since approximately 10 AM on 11/6/2022 at approximately 65 bpm     Assessment/plan  Postoperative atrial fibrillation sp LUPE AtriClip 40 mm (11/2/22)  -Initially diagnosed in the postoperative setting of CABG and LUPE clip (prophylactic, no prior history of AF/AFL). - [id number] score = 5 (age, sex, CAD, HTN, DM). Recommend [facility] in females with score of 2 or more. DOAC preferred. - Surgical LUPE clip is not an alternative to [facility] at this time, but is a complementary therapy to reduce the risk of thromboembolic events. Additionally, there was reportedly a postoperative ZACHARY, but this report is not available to me including any description of adequacy of LUPE clip/degree of LUPE elimination. Continue to monitor for AF recurrence and consider initiation of apixaban 5 mg twice daily in addition to PPI as she is also on aspirin in the future and when okay with other services.    -Rhythm control desired by CT surgery and was treated with amiodarone. Patient currently on amiodarone 400 mg 3 times daily per CT surgery. I would anticipate there is not a plan to use this as a long-term therapy. In the event patient has a recurrence of AF while admitted, it would not be unreasonable to transiently add IV infusion to oral therapy for a brief period of time. - Continue metoprolol 12.5 mg twice daily. Recommend titrate as able. - No need for diltiazem infusion at this time as patient is in sinus rhythm. I will discontinue.  -Patient is now in sinus rhythm, EP service will sign off. Please contact with questions concerns. CAD sp CABG x2 (11/1/2022: LIMA-LAD, SVG-OM Dr. Phong Weller)  -Management per CT surgery and cardiology. Postoperative anemia  - Baseline hemoglobin 13.8.  - Management per primary service/CT surgery. I have spent a total of 80 minutes with the patient and his/her family reviewing the above stated recommendations. And a total of >50% of that time involved face-to-face time providing counseling and or coordination of care with the other providers. Thank you for allowing me to participate in your patient's care. Please call me if there are any questions.       Celeste Hanks, DO   Cardiac Electrophysiology  [facility]  [facility] [place] [place]) Physicians

## 2022-11-06 NOTE — PROGRESS NOTES
POD#4 Awake, alert. No complaints. Denies CP, palpitations, SOB at rest, dizziness/lightheadedness. Vitals:    11/05/22 2212 11/06/22 0030 11/06/22 0157 11/06/22 0800   BP: 107/71 108/65 100/64 103/68   Pulse: (!) 140 (!) 122 (!) 104 (!) 128   Resp: 18  18 16   Temp: 97 °F (36.1 °C)  97.2 °F (36.2 °C) (!) 95 °F (35 °C)   TempSrc: Temporal  Oral Temporal   SpO2: 97%  91% 95%   Weight:   130 lb 3.2 oz (59.1 kg)      O2: 5L/NC 95%      Intake/Output Summary (Last 24 hours) at 11/6/2022 0907  Last data filed at 11/5/2022 2200  Gross per 24 hour   Intake 270 ml   Output 600 ml   Net -330 ml     +BM pre-op    UO: unmeasured     Recent Labs     11/04/22  0430 11/05/22  0520 11/06/22  0625   WBC 10.5 9.6 6.6   HGB 8.6* 8.5* 8.3*   HCT 26.3* 25.2* 25.0*    151 187      Recent Labs     11/04/22  0430 11/05/22  0520 11/06/22  0625   BUN 7 9 12   CREATININE 0.4* 0.4* 0.4*       Telemetry: Afib rvr    PE  Cardiac: RRR  Lungs: decreased bases  Chest incision with intact FANNY DSD. Sternum stable. Prior chest tube site incisions C/D/I, no erythema with intact sutures. Epicardial pacing wires present and secure. Abd: Soft, nontender, +BS  Ext: KENNEY, minimal edema         A/P: POD#4     1. CAD  --Stable s/p CABG x2, LAAL on 11/2/2022  --Post op ZACHARY reveals normal EF  --Scr stable 0.4  --ASA/statin/BB   --reinforce sternal precautions  --continue epicardial pacing wires  --chest tubes removed  --PICC placed 11/4  --MRSA nasal colonization (Bactroban)        2. Expected acute blood loss anemia secondary to open heart surgery  --stable; hgb 8.3        3.  Post-op Afib s/p LAAL  --continue BB with hold parameters  --oral amiodarone for afib prophylaxis--with plans to taper on dc  --Contraindication with amio and Zocor - switch patient to crestor while on amio  --Will need eliquis prior to DC  --Consult EP today  --Afib  RVR 11/4 @0100, Amio bolus given, gtt started and completed 24 hrs  --11/5 patient was SR in the morning however reverted back to afib rvr around 1230, received lopressor 5mg IV x2 with no change, cardizem 10mg IV started for rate control however patient remains in Afib RVR  --11/6 Currently Afib RVR with -140 - continue cardizem and add IV amio at .5         4. Expected acute pulmonary insufficiency in the setting following surgery  --wean oxygen to keep SpO2 greater than or equal to 92%  --continue duonebs with ezpap  --encourage C&DB, SMI  --currently on 5L O2/NC - establish with pulmonary today  --diurese   --will need to follow-up with pulmonary for pulm nodules        5. DMII  --Hemoglobin A1C 9.7; Home medications: Humalog  BID, Lantus 50units BID, metformin   --SSI AC/HS, nightly long acting for glycemic control   --Consult endocrine         6. Constipation--expected delayed return of bowel function  --secondary to anesthesia, narcotics, decreased oral intake, and decreased physical activity   --no BM since prior to surgery  --Continue daily MOM/oral bisacodyl until +BM and senna-s as scheduled. --Will give daily suppository until +BM starting POD 3 if no result by then   --Encouraged continued increase in oral intake and activity. 7. Expected deconditioning in the setting following surgery  --Increase activity as tolerated  --PT/OT        8. Urinary Retention  --Rader removed 11/3 2215; bladder scanned 11/4 for 250cc  --Now urinating without difficulty - no rader needed        9. Hyponatremia  --resolved;  Na 134 today  --Lasix           DVT prophylaxis:  --continue bilateral knee high AIDAN hose  --continue PCDs  --continue progressive ambulation  --lovenox for dvt prophylaxis and continue knee high AIDAN hose/pcds/progressive ambulation        Dispo: home vs. Rehab pending progression in activity level        This patient's case and care plan was discussed with the attending surgeon

## 2022-11-06 NOTE — PATIENT CARE CONFERENCE
P Quality Flow/Interdisciplinary Rounds Progress Note        Quality Flow Rounds held on November 6, 2022    Disciplines Attending:  Bedside Nurse, , , and Nursing Unit Leadership    Kiki Bullard was admitted on 11/2/2022  5:20 AM    Anticipated Discharge Date:       Disposition:    Devon Score:  Devon Scale Score: 19    Readmission Risk              Risk of Unplanned Readmission:  11           Discussed patient goal for the day, patient clinical progression, and barriers to discharge.   The following Goal(s) of the Day/Commitment(s) have been identified:  discharge plan/report labs/diagnostics       Anthony Greene RN  November 6, 2022

## 2022-11-06 NOTE — PROGRESS NOTES
Upon rechecking patient, she is now in [employer] [facility] [facility] with HR in 60s and BP 93/48. Continue with EP consult. Discontinue cardizem and hold on amio gtt. Also hold lasix until pressure improves.

## 2022-11-06 NOTE — PLAN OF CARE
Problem: Discharge Planning  Goal: Discharge to home or other facility with appropriate resources  Outcome: Progressing     Problem: Chronic Conditions and Co-morbidities  Goal: Patient's chronic conditions and co-morbidity symptoms are monitored and maintained or improved  Outcome: Progressing     Problem: Safety - Adult  Goal: Free from fall injury  Outcome: Progressing

## 2022-11-06 NOTE — CONSULTS
Comprehensive Nutrition Assessment    Type and Reason for Visit:  Initial, Consult, Patient Education    Nutrition Recommendations/Plan:   Continue current diet  Start Glucerna TID & Fred BID     Malnutrition Assessment:  Malnutrition Status:  No malnutrition (11/06/22 1156)    Context:  Chronic Illness     Findings of the 6 clinical characteristics of malnutrition:  Energy Intake:  No significant decrease in energy intake  Weight Loss:  Unable to assess (no long term wt hx on file)     Body Fat Loss:  No significant body fat loss     Muscle Mass Loss:  No significant muscle mass loss    Fluid Accumulation:  No significant fluid accumulation     Strength:  Not Performed    Nutrition Assessment:    Pt admit w/ CAD s/p CABG x2. Hx DM2, COPD, PVD. Reviewed the Heart Healthy, Carb Controlled diet guidelines w/ pt. Will add ONS to aid in post-op healing & will monitor. Nutrition Related Findings:    pt alert, active BS, soft abd, no edema, +I/Os Wound Type: Surgical Incision       Current Nutrition Intake & Therapies:    Average Meal Intake: 51-75% (average per doc flow)  Average Supplements Intake: None Ordered  ADULT DIET; Regular; 4 carb choices (60 gm/meal); Low Fat/Low Chol/High Fiber/OMAR    Anthropometric Measures:  Height: 5' 4\" (162.6 cm)  Ideal Body Weight (IBW): 120 lbs (55 kg)    Admission Body Weight: 132 lb (59.9 kg) (11/4 bed scale)  Current Body Weight: 130 lb (59 kg), 108.3 % IBW.     Current BMI (kg/m2): 22.3  Usual Body Weight: 130 lb (59 kg) (10/28/22 actual per EMR,  no longterm actual wt hx on file)  % Weight Change (Calculated): 0                    BMI Categories: Normal Weight (BMI 22.0 to 24.9) age over 72    Estimated Daily Nutrient Needs:  Energy Requirements Based On: Formula  Weight Used for Energy Requirements: Current  Energy (kcal/day): MSJ x 1.3 SF = 5441-6517  Weight Used for Protein Requirements: Current  Protein (g/day): 75-90 (1.3-1.5 gm/kg CBW)  Method Used for Fluid Requirements: 1 ml/kcal  Fluid (ml/day): 4424-5460    Nutrition Diagnosis:   Increased nutrient needs related to increase demand for energy/nutrients (s/p CABG x2) as evidenced by wounds    Nutrition Interventions:   Food and/or Nutrient Delivery: Continue Current Diet, Start Oral Nutrition Supplement (Fred BID, Glucerna TID)  Nutrition Education/Counseling: Education completed (Heart Healthy, Carb Controlled diet guidelines reviewed w/ pt & all questions answered at this time.)  Coordination of Nutrition Care: Continue to monitor while inpatient       Goals:     Goals: PO intake 75% or greater, by next RD assessment       Nutrition Monitoring and Evaluation:      Food/Nutrient Intake Outcomes: Food and Nutrient Intake, Supplement Intake  Physical Signs/Symptoms Outcomes: Biochemical Data, GI Status, Fluid Status or Edema, Nutrition Focused Physical Findings, Skin, Weight    Discharge Planning:    Continue current diet, Continue Oral Nutrition Supplement     Mariano Bosch MS, RD, LD  Contact: [phone]

## 2022-11-07 VITALS
WEIGHT: 130.4 LBS | OXYGEN SATURATION: 90 % | DIASTOLIC BLOOD PRESSURE: 55 MMHG | HEIGHT: 64 IN | HEART RATE: 71 BPM | RESPIRATION RATE: 16 BRPM | TEMPERATURE: 98.5 F | BODY MASS INDEX: 22.26 KG/M2 | SYSTOLIC BLOOD PRESSURE: 107 MMHG

## 2022-11-07 LAB
ANION GAP SERPL CALCULATED.3IONS-SCNC: 6 MMOL/L (ref 7–16)
BUN BLDV-MCNC: 11 MG/DL (ref 6–23)
CALCIUM SERPL-MCNC: 8.8 MG/DL (ref 8.6–10.2)
CHLORIDE BLD-SCNC: 97 MMOL/L (ref 98–107)
CO2: 29 MMOL/L (ref 22–29)
CREAT SERPL-MCNC: 0.4 MG/DL (ref 0.5–1)
GFR SERPL CREATININE-BSD FRML MDRD: >60 ML/MIN/1.73
GLUCOSE BLD-MCNC: 152 MG/DL (ref 74–99)
HCT VFR BLD CALC: 23.8 % (ref 34–48)
HEMOGLOBIN: 7.9 G/DL (ref 11.5–15.5)
MAGNESIUM: 1.9 MG/DL (ref 1.6–2.6)
MCH RBC QN AUTO: 30.7 PG (ref 26–35)
MCHC RBC AUTO-ENTMCNC: 33.2 % (ref 32–34.5)
MCV RBC AUTO: 92.6 FL (ref 80–99.9)
METER GLUCOSE: 157 MG/DL (ref 74–99)
METER GLUCOSE: 172 MG/DL (ref 74–99)
METER GLUCOSE: 209 MG/DL (ref 74–99)
PDW BLD-RTO: 13.2 FL (ref 11.5–15)
PLATELET # BLD: 187 E9/L (ref 130–450)
PMV BLD AUTO: 10 FL (ref 7–12)
POTASSIUM SERPL-SCNC: 3.7 MMOL/L (ref 3.5–5)
RBC # BLD: 2.57 E12/L (ref 3.5–5.5)
SODIUM BLD-SCNC: 132 MMOL/L (ref 132–146)
WBC # BLD: 5.8 E9/L (ref 4.5–11.5)

## 2022-11-07 PROCEDURE — 6370000000 HC RX 637 (ALT 250 FOR IP): Performed by: PHYSICIAN ASSISTANT

## 2022-11-07 PROCEDURE — 93798 PHYS/QHP OP CAR RHAB W/ECG: CPT

## 2022-11-07 PROCEDURE — 94640 AIRWAY INHALATION TREATMENT: CPT

## 2022-11-07 PROCEDURE — 6370000000 HC RX 637 (ALT 250 FOR IP): Performed by: NURSE PRACTITIONER

## 2022-11-07 PROCEDURE — 80048 BASIC METABOLIC PNL TOTAL CA: CPT

## 2022-11-07 PROCEDURE — 83735 ASSAY OF MAGNESIUM: CPT

## 2022-11-07 PROCEDURE — 6360000002 HC RX W HCPCS: Performed by: PHYSICIAN ASSISTANT

## 2022-11-07 PROCEDURE — 6370000000 HC RX 637 (ALT 250 FOR IP): Performed by: INTERNAL MEDICINE

## 2022-11-07 PROCEDURE — 85027 COMPLETE CBC AUTOMATED: CPT

## 2022-11-07 PROCEDURE — 2580000003 HC RX 258: Performed by: NURSE PRACTITIONER

## 2022-11-07 PROCEDURE — 99232 SBSQ HOSP IP/OBS MODERATE 35: CPT | Performed by: INTERNAL MEDICINE

## 2022-11-07 PROCEDURE — 36415 COLL VENOUS BLD VENIPUNCTURE: CPT

## 2022-11-07 PROCEDURE — 82962 GLUCOSE BLOOD TEST: CPT

## 2022-11-07 PROCEDURE — 6360000002 HC RX W HCPCS: Performed by: NURSE PRACTITIONER

## 2022-11-07 RX ORDER — OXYCODONE HYDROCHLORIDE AND ACETAMINOPHEN 5; 325 MG/1; MG/1
1 TABLET ORAL EVERY 6 HOURS PRN
Qty: 28 TABLET | Refills: 0 | Status: SHIPPED | OUTPATIENT
Start: 2022-11-07 | End: 2022-11-12

## 2022-11-07 RX ORDER — AMIODARONE HYDROCHLORIDE 200 MG/1
200 TABLET ORAL SEE ADMIN INSTRUCTIONS
Qty: 91 TABLET | Refills: 0 | Status: SHIPPED | OUTPATIENT
Start: 2022-11-07

## 2022-11-07 RX ORDER — FUROSEMIDE 10 MG/ML
20 INJECTION INTRAMUSCULAR; INTRAVENOUS ONCE
Status: COMPLETED | OUTPATIENT
Start: 2022-11-07 | End: 2022-11-07

## 2022-11-07 RX ORDER — ASCORBIC ACID 500 MG
500 TABLET ORAL 2 TIMES DAILY
Qty: 30 TABLET | Refills: 3 | Status: SHIPPED | OUTPATIENT
Start: 2022-11-07 | End: 2022-12-07

## 2022-11-07 RX ORDER — ROSUVASTATIN CALCIUM 10 MG/1
10 TABLET, COATED ORAL NIGHTLY
Qty: 30 TABLET | Refills: 3 | Status: SHIPPED | OUTPATIENT
Start: 2022-11-07

## 2022-11-07 RX ORDER — INSULIN GLARGINE 300 U/ML
18 INJECTION, SOLUTION SUBCUTANEOUS NIGHTLY
Qty: 15 ADJUSTABLE DOSE PRE-FILLED PEN SYRINGE | Refills: 1 | Status: SHIPPED | OUTPATIENT
Start: 2022-11-07

## 2022-11-07 RX ORDER — FOLIC ACID 1 MG/1
1 TABLET ORAL DAILY
Qty: 30 TABLET | Refills: 0 | Status: SHIPPED | OUTPATIENT
Start: 2022-11-08 | End: 2022-12-08

## 2022-11-07 RX ORDER — INSULIN LISPRO 100 [IU]/ML
6 INJECTION, SOLUTION INTRAVENOUS; SUBCUTANEOUS
Status: DISCONTINUED | OUTPATIENT
Start: 2022-11-07 | End: 2022-11-07 | Stop reason: HOSPADM

## 2022-11-07 RX ORDER — FERROUS SULFATE 325(65) MG
325 TABLET ORAL 2 TIMES DAILY WITH MEALS
Qty: 60 TABLET | Refills: 0 | Status: SHIPPED | OUTPATIENT
Start: 2022-11-07 | End: 2022-12-07

## 2022-11-07 RX ADMIN — CITALOPRAM HYDROBROMIDE 20 MG: 20 TABLET ORAL at 09:19

## 2022-11-07 RX ADMIN — INSULIN LISPRO 4 UNITS: 100 INJECTION, SOLUTION INTRAVENOUS; SUBCUTANEOUS at 09:15

## 2022-11-07 RX ADMIN — BISACODYL 5 MG: 5 TABLET, COATED ORAL at 09:16

## 2022-11-07 RX ADMIN — SENNOSIDES AND DOCUSATE SODIUM 1 TABLET: 50; 8.6 TABLET ORAL at 09:17

## 2022-11-07 RX ADMIN — Medication 400 MG: at 09:16

## 2022-11-07 RX ADMIN — PANTOPRAZOLE SODIUM 40 MG: 40 TABLET, DELAYED RELEASE ORAL at 09:16

## 2022-11-07 RX ADMIN — IPRATROPIUM BROMIDE AND ALBUTEROL SULFATE 1 AMPULE: 2.5; .5 SOLUTION RESPIRATORY (INHALATION) at 09:54

## 2022-11-07 RX ADMIN — POTASSIUM CHLORIDE 40 MEQ: 1500 TABLET, EXTENDED RELEASE ORAL at 09:16

## 2022-11-07 RX ADMIN — IPRATROPIUM BROMIDE AND ALBUTEROL SULFATE 1 AMPULE: 2.5; .5 SOLUTION RESPIRATORY (INHALATION) at 12:58

## 2022-11-07 RX ADMIN — SODIUM CHLORIDE, PRESERVATIVE FREE 10 ML: 5 INJECTION INTRAVENOUS at 09:00

## 2022-11-07 RX ADMIN — FUROSEMIDE 20 MG: 10 INJECTION, SOLUTION INTRAMUSCULAR; INTRAVENOUS at 09:15

## 2022-11-07 RX ADMIN — AMIODARONE HYDROCHLORIDE 400 MG: 200 TABLET ORAL at 15:55

## 2022-11-07 RX ADMIN — ASPIRIN 81 MG: 81 TABLET, COATED ORAL at 09:20

## 2022-11-07 RX ADMIN — FOLIC ACID 1 MG: 1 TABLET ORAL at 09:20

## 2022-11-07 RX ADMIN — LEVOTHYROXINE SODIUM 25 MCG: 0.03 TABLET ORAL at 05:52

## 2022-11-07 RX ADMIN — AMIODARONE HYDROCHLORIDE 400 MG: 200 TABLET ORAL at 09:19

## 2022-11-07 RX ADMIN — INSULIN LISPRO 4 UNITS: 100 INJECTION, SOLUTION INTRAVENOUS; SUBCUTANEOUS at 12:10

## 2022-11-07 RX ADMIN — METOPROLOL TARTRATE 12.5 MG: 25 TABLET, FILM COATED ORAL at 09:17

## 2022-11-07 RX ADMIN — Medication 300 UNITS: at 09:21

## 2022-11-07 RX ADMIN — INSULIN LISPRO 6 UNITS: 100 INJECTION, SOLUTION INTRAVENOUS; SUBCUTANEOUS at 16:51

## 2022-11-07 RX ADMIN — INSULIN LISPRO 2 UNITS: 100 INJECTION, SOLUTION INTRAVENOUS; SUBCUTANEOUS at 12:09

## 2022-11-07 RX ADMIN — APIXABAN 5 MG: 5 TABLET, FILM COATED ORAL at 12:10

## 2022-11-07 RX ADMIN — FERROUS SULFATE TAB 325 MG (65 MG ELEMENTAL FE) 325 MG: 325 (65 FE) TAB at 16:55

## 2022-11-07 RX ADMIN — ENOXAPARIN SODIUM 40 MG: 100 INJECTION SUBCUTANEOUS at 09:16

## 2022-11-07 RX ADMIN — OXYCODONE HYDROCHLORIDE AND ACETAMINOPHEN 500 MG: 500 TABLET ORAL at 09:16

## 2022-11-07 RX ADMIN — FERROUS SULFATE TAB 325 MG (65 MG ELEMENTAL FE) 325 MG: 325 (65 FE) TAB at 09:17

## 2022-11-07 ASSESSMENT — PAIN SCALES - GENERAL
PAINLEVEL_OUTOF10: 0
PAINLEVEL_OUTOF10: 0

## 2022-11-07 NOTE — ACP (ADVANCE CARE PLANNING)
.Advance Care Planning   Healthcare Decision Maker:    Primary Decision Maker: Carlotta Longo - [facility] - [phone]    Click here to complete Healthcare Decision Makers including selection of the Healthcare Decision Maker Relationship (ie \"Primary\").

## 2022-11-07 NOTE — CONSULTS
Met with patient and discussed our [address] and our outpatient Phase II Cardiac Rehabilitation program. Reviewed the benefits of cardiac rehabilitation based on their diagnosis and personal risk factors. Patient demonstrates strong interest in Cardiac Rehabilitation at this time. Cardiac Rehabilitation brochure provided to patient/family. [facility] at [phone] for additional information or questions. Contact information for [facility] [place] and other choices close to the patient's residence have been provided in the discharge instructions so that the patient may call and schedule an appointment when cleared by their physician.

## 2022-11-07 NOTE — PROGRESS NOTES
CLINICAL PHARMACY NOTE: MEDS TO BEDS    Total # of Prescriptions Filled: 8   The following medications were delivered to the patient:  Rosuvastatin 10  oxycodone-acetaminophen 5-325  Metoprolol tartrate 25  Folic acid 1  Amiodarone 200  Vitamin c 500  Eliquis 5  True plus needle 31g x 6mm    Additional Documentation:    Pt notified anoro went to [facility] [place].  Per pt request Humalog and Toujeo sent to [facility] [phone]

## 2022-11-07 NOTE — DISCHARGE INSTR - COC
Continuity of Care Form    Patient Name: Merly So   :  1955  MRN:  [id number]    Admit date:  2022  Discharge date:  ***    Code Status Order: Full Code   Advance Directives:   Advance Care Flowsheet Documentation       Date/Time Healthcare Directive Type of Healthcare Directive Copy in [address] Agent's Name Healthcare Agent's Phone Number    22 0536 No, patient does not have an advance directive for healthcare treatment -- -- -- -- --            Admitting Physician:  Bravo Landers DO  PCP: Kathy London DO    Discharging Nurse: [facility] Unit/Room#: 1491/5515-J  Discharging Unit Phone Number: ***    Emergency Contact:   Extended Emergency Contact Information  Primary Emergency Contact: Rigoberto Pelletier  Address: [address], [facility] [address] Phone: [phone]  Mobile Phone: [phone]  Relation: Spouse  Secondary Emergency Contact: Leela Conn  Address: [address] Phone: [phone]  Mobile Phone: [phone]  Relation: Child    Past Surgical History:  Past Surgical History:   Procedure Laterality Date    BACK SURGERY      L5-S1    CAROTID ENDARTERECTOMY Left 10/11/2018    [place]    CARPAL TUNNEL RELEASE      bilat     SECTION      2    COLONOSCOPY N/A 2021    COLONOSCOPY WITH BIOPSY with tattooing performed by Siddharth Mccoy MD at [address] N/A 2022    coronary artery bypass grafting, ZACHARY performed by Bravo Landers DO at [facility] [facility] (CERVIX STATUS UNKNOWN)      LARYNGOSCOPY N/A 2020    DIRECT LARYNGOSCOPYY AND CERVICAL ESOPHAGOSCOPY (PATHOLOGY PRESENT) performed by Lazarus Osgood, DO at [facility]      remote- right     OTHER SURGICAL HISTORY  2018    Dr. Echo Noel- Bilateral iliac stents iCast [id number]    [place] OFFICE/OUTPT VISIT,PROCEDURE ONLY Left 10/11/2018    LEFT CAROTID ENDARTERECTOMY performed by Annalee Carrion MD at [address] - ingrown toe nails    TONSILLECTOMY         Immunization History:   Immunization History   Administered Date(s) Administered    COVID-19, MODERNA BLUE border, Primary or Immunocompromised, (age 12y+), IM, 100 mcg/0.5mL 2021, 2021, 2021    Pneumococcal Polysaccharide (Canlrowzc25) 2021    Pneumococcal conjugate PCV20, PF (Prevnar 20) 2022    Tdap (Boostrix, Adacel) 2021       Active Problems:  Patient Active Problem List   Diagnosis Code    Asymptomatic bilateral carotid artery stenosis I65.23    History of nuclear stress test Z92.89    Mixed hyperlipidemia E78.2    Aortoiliac occlusive disease ([address] I74.09    PVD (peripheral vascular disease) with claudication ([address] I73.9    Carotid stenosis, asymptomatic, bilateral I65.23    S/P carotid endarterectomy Z98.890    Tobacco abuse Z72.0    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    Type 2 diabetes mellitus E11.9    Senile osteoporosis M81.0    Scoliosis M41.9    Lumbar spinal stenosis M48.061    CAD in native artery I25.10    Postoperative hypertension I97.3    Acute pulmonary insufficiency J98.4    Hyperglycemia R73.9    Status post aorto-coronary artery bypass graft Z95.1    Postoperative atrial fibrillation ([address] I97.89, I48.91       Isolation/Infection:   Isolation            No Isolation          Patient Infection Status       Infection Onset Added Last Indicated Last Indicated By Review Planned Expiration Resolved Resolved By    None active    Resolved    MRSA 10/28/22 10/29/22 10/28/22 MRSA culture   22 ANTONINA Israel 10/28/22 - treatment initiated.     COVID-19 (Rule Out) 20 Covid-19 Ambulatory (Ordered)   20 Rule-Out Test Resulted            Nurse Assessment:  Last Vital Signs: BP (!) 107/55   Pulse 71   Temp 98.5 °F (36.9 °C) (Temporal)   Resp 16   Ht 5' 4\" (1.626 m)   Wt 130 lb 6.4 oz (59.1 kg)   SpO2 90%   BMI 22.38 kg/m²     Last documented pain score (0-10 scale): Pain Level: 0  Last Weight:   Wt Readings from Last 1 Encounters:   11/07/22 130 lb 6.4 oz (59.1 kg)     Mental Status:  {IP PT MENTAL STATUS:20030}    IV Access:  { KENAN IV ACCESS:796580917}    Nursing Mobility/ADLs:  Walking   {CHP DME UZHC:623175460}  Transfer  {CHP DME YPTN:271119128}  Bathing  {CHP DME UMZX:054707600}  Dressing  {CHP DME HJPX:400675602}  Toileting  {CHP DME OPMD:429435609}  Feeding  {P DME VIDI:841191144}  Med Admin  {[facility] DME CEFF:330787983}  Med Delivery   {[facility] MED Delivery:051268657}    Wound Care Documentation and Therapy:  Incision 11/02/22 Sternum Anterior (Active)   Dressing Status Clean;Dry; Intact 11/07/22 1156   Incision Cleansed Not Cleansed 11/07/22 1156   Dressing/Treatment Other (comment) 11/07/22 1156   Closure Sutures;Surgical glue 11/02/22 0930   Drainage Amount None 11/07/22 1156   Number of days: 5        Elimination:  Continence: Bowel: {YES / SX:39071}  Bladder: {YES / BZ:28709}  Urinary Catheter: {Urinary Catheter:774779099}   Colostomy/Ileostomy/Ileal Conduit: {YES / LN:15707}       Date of Last BM: ***    Intake/Output Summary (Last 24 hours) at 11/7/2022 1658  Last data filed at 11/7/2022 1427  Gross per 24 hour   Intake 360 ml   Output 550 ml   Net -190 ml     I/O last 3 completed shifts:   In: 270 [P.O.:270]  Out: 150 [Urine:150]    Safety Concerns:     [address] Safety Concerns:328172430}    Impairments/Disabilities:      [address] [employer] Impairments/Disabilities:390741259}    Nutrition Therapy:  Current Nutrition Therapy:   [address] Diet List:618383069}    Routes of Feeding: {CHP DME Other Feedings:246724925}  Liquids: {Slp liquid thickness:93920}  Daily Fluid Restriction: {CHP DME Yes amt example:376898714}  Last Modified Barium Swallow with Video (Video Swallowing Test): {Done Not Done IONX:756327340}    Treatments at the Time of Hospital Discharge:   Respiratory Treatments: ***  Oxygen Therapy:  {Therapy; copd oxygen:77612}  Ventilator:    {[facility] Vent IAYM:739750721}    Rehab Therapies: {THERAPEUTIC INTERVENTION:3174644481}  Weight Bearing Status/Restrictions: {[facility] Weight Bearin}  Other Medical Equipment (for information only, NOT a DME order):  {EQUIPMENT:865319824}  Other Treatments: ***    Patient's personal belongings (please select all that are sent with patient):  {[facility] DME Belongings:289326185}    RN SIGNATURE:  {Esignature:346585282}    CASE MANAGEMENT/SOCIAL WORK SECTION    Inpatient Status Date: ***    Readmission Risk Assessment Score:  Readmission Risk              Risk of Unplanned Readmission:  15           Discharging to Facility/ Agency   Name:   Address:  Phone:  Fax:    Dialysis Facility (if applicable)   Name:  Address:  Dialysis Schedule:  Phone:  Fax:    / signature: {Esignature:848914904}    PHYSICIAN SECTION    Prognosis: {Prognosis:1145573076}    Condition at Discharge: [address] [place] [address] Patient Condition:203557311}    Rehab Potential (if transferring to Rehab): {Prognosis:6017168088}    Recommended Labs or Other Treatments After Discharge: ***    Physician Certification: I certify the above information and transfer of Sinan Mccollum  is necessary for the continuing treatment of the diagnosis listed and that she requires {Admit to Appropriate Level of Care:91726} for {GREATER/LESS:758401003} 30 days.      Update Admission H&P: {P DME Changes in WFTTJ:418535019}    PHYSICIAN SIGNATURE:  {Esignature:756330990}

## 2022-11-07 NOTE — PLAN OF CARE
Problem: Discharge Planning  Goal: Discharge to home or other facility with appropriate resources  11/7/2022 1248 by Ilsa Homans, RN  Outcome: Progressing  11/7/2022 0328 by Candy Pickering RN  Outcome: Progressing     Problem: Chronic Conditions and Co-morbidities  Goal: Patient's chronic conditions and co-morbidity symptoms are monitored and maintained or improved  11/7/2022 1248 by Ilsa Homans, RN  Outcome: Progressing  11/7/2022 0328 by Candy Pickering RN  Outcome: Progressing     Problem: Safety - Adult  Goal: Free from fall injury  11/7/2022 1248 by Ilsa Homans, RN  Outcome: Progressing  11/7/2022 0328 by Candy Pickering RN  Outcome: Progressing     Problem: Pain  Goal: Verbalizes/displays adequate comfort level or baseline comfort level  11/7/2022 1248 by Ilsa Homans, RN  Outcome: Progressing  11/7/2022 0328 by Candy Pickering RN  Outcome: Progressing     Problem: ABCDS Injury Assessment  Goal: Absence of physical injury  Outcome: Progressing     Problem: Neurosensory - Adult  Goal: Achieves stable or improved neurological status  11/7/2022 1248 by Ilsa Homans, RN  Outcome: Progressing  11/7/2022 0328 by Candy Pickering RN  Outcome: Progressing  Goal: Achieves maximal functionality and self care  11/7/2022 1248 by Ilsa Homans, RN  Outcome: Progressing  11/7/2022 0328 by Candy Pickering RN  Outcome: Progressing     Problem: Respiratory - Adult  Goal: Achieves optimal ventilation and oxygenation  Outcome: Progressing     Problem: Cardiovascular - Adult  Goal: Maintains optimal cardiac output and hemodynamic stability  11/7/2022 1248 by Ilsa Homans, RN  Outcome: Progressing  11/7/2022 0328 by Candy Pickering RN  Outcome: Progressing  Goal: Absence of cardiac dysrhythmias or at baseline  11/7/2022 1248 by Ilsa Homans, RN  Outcome: Progressing  11/7/2022 0328 by Candy Pickering RN  Outcome: Progressing     Problem: Skin/Tissue Integrity - Adult  Goal: Skin integrity remains intact  Outcome: Progressing  Goal: Incisions, wounds, or drain sites healing without S/S of infection  Outcome: Progressing     Problem: Musculoskeletal - Adult  Goal: Return mobility to safest level of function  Outcome: Progressing  Goal: Return ADL status to a safe level of function  Outcome: Progressing     Problem: Gastrointestinal - Adult  Goal: Minimal or absence of nausea and vomiting  Outcome: Progressing  Goal: Maintains or returns to baseline bowel function  Outcome: Progressing  Goal: Maintains adequate nutritional intake  Outcome: Progressing     Problem: Genitourinary - Adult  Goal: Urinary catheter remains patent  Outcome: Progressing     Problem: Infection - Adult  Goal: Absence of infection at discharge  Outcome: Progressing  Goal: Absence of infection during hospitalization  Outcome: Progressing     Problem: Metabolic/Fluid and Electrolytes - Adult  Goal: Electrolytes maintained within normal limits  Outcome: Progressing  Goal: Hemodynamic stability and optimal renal function maintained  Outcome: Progressing  Goal: Glucose maintained within prescribed range  Outcome: Progressing     Problem: Hematologic - Adult  Goal: Maintains hematologic stability  Outcome: Progressing     Problem: Skin/Tissue Integrity  Goal: Absence of new skin breakdown  Description: 1. Monitor for areas of redness and/or skin breakdown  2. Assess vascular access sites hourly  3. Every 4-6 hours minimum:  Change oxygen saturation probe site  4. Every 4-6 hours:  If on nasal continuous positive airway pressure, respiratory therapy assess nares and determine need for appliance change or resting period.   Outcome: Progressing     Problem: Nutrition Deficit:  Goal: Optimize nutritional status  Outcome: Progressing

## 2022-11-07 NOTE — PLAN OF CARE
Problem: Discharge Planning  Goal: Discharge to home or other facility with appropriate resources  11/7/2022 0328 by Radha Briscoe RN  Outcome: Progressing     Problem: Chronic Conditions and Co-morbidities  Goal: Patient's chronic conditions and co-morbidity symptoms are monitored and maintained or improved  11/7/2022 0328 by Radha Briscoe RN  Outcome: Progressing     Problem: Safety - Adult  Goal: Free from fall injury  11/7/2022 0328 by Radha Briscoe RN  Outcome: Progressing     Problem: Pain  Goal: Verbalizes/displays adequate comfort level or baseline comfort level  Outcome: Progressing     Problem: Neurosensory - Adult  Goal: Achieves stable or improved neurological status  Outcome: Progressing     Problem: Neurosensory - Adult  Goal: Achieves maximal functionality and self care  Outcome: Progressing     Problem: Cardiovascular - Adult  Goal: Maintains optimal cardiac output and hemodynamic stability  Outcome: Progressing     Problem: Cardiovascular - Adult  Goal: Absence of cardiac dysrhythmias or at baseline  Outcome: Progressing

## 2022-11-07 NOTE — PROGRESS NOTES
POD#5 Awake, alert. No complaints. Denies CP, palpitations, SOB at rest, dizziness/lightheadedness. Vitals:    11/06/22 2359 11/07/22 0504 11/07/22 0530 11/07/22 0807   BP: 110/63 (!) 134/50  (!) 118/58   Pulse: 66 69  70   Resp: 14 16  18   Temp: 98.2 °F (36.8 °C) 97.7 °F (36.5 °C)  96.9 °F (36.1 °C)   TempSrc: Oral Temporal  Temporal   SpO2: 97% 96%  94%   Weight:   130 lb 6.4 oz (59.1 kg)    Height:         O2: 2L/NC      Intake/Output Summary (Last 24 hours) at 11/7/2022 0857  Last data filed at 11/6/2022 1831  Gross per 24 hour   Intake --   Output 150 ml   Net -150 ml         +BM      Recent Labs     11/05/22  0520 11/06/22  0625 11/07/22  0530   WBC 9.6 6.6 5.8   HGB 8.5* 8.3* 7.9*   HCT 25.2* 25.0* 23.8*    187 187      Recent Labs     11/05/22  0520 11/06/22  0625 11/07/22  0530   BUN 9 12 11   CREATININE 0.4* 0.4* 0.4*         Telemetry: SR      PE  Cardiac: RRR  Lungs: decreased bases  Chest incision with intact FANNY DSD. Sternum stable. Prior chest tube site incisions C/D/I, no erythema with intact sutures. Epicardial pacing wires present and secure. Abd: Soft, nontender, +BS  Ext: KENNEY, minimal edema         A/P: POD#5    1. CAD  --Stable s/p CABG x2, LAAL on 11/2/2022  --Post op ZACHARY reveals normal EF  --Scr stable 0.4  --ASA/statin/BB   --reinforce sternal precautions  --DC pacing wires. Epicardial pacing wires cut without difficulty. Patient tolerated well  --chest tubes removed  --PICC placed 11/4  --MRSA nasal colonization (Bactroban)        2. Expected acute blood loss anemia secondary to open heart surgery  --stable; hgb 7.9 (yest 8.3) no signs of active bleeding, cont to monitor, transfuse < 7        3.  Post-op Afib s/p LAAL  --continue BB with hold parameters- currently on Lopressor 12.5mg bid   --oral amiodarone for afib prophylaxis--with plans to taper on dc- currently on Amio 400mg tid   --Contraindication with amio and Zocor - switch patient to crestor while on amio  --Eliquis today   --Ep consulted yest   --Afib  RVR 11/4 @0100, Amio bolus given, gtt started and completed 24 hrs  --11/5 patient was SR in the morning however reverted back to afib rvr around 1230, received lopressor 5mg IV x2 with no change, cardizem 10mg IV started for rate control, amio and cardizem gtt off, currently SR in the 80s          4. Expected acute pulmonary insufficiency in the setting following surgery  --wean oxygen to keep SpO2 greater than or equal to 92%  -- Hx of tobacco abuse- quit smoking 8/2022  --continue duonebs with ezpap  --encourage C&DB, JAYDEN COHEN [facility]  --currently on RA   -- Pulmonary on board   --jer   --will need to follow-up with pulmonary for pulm nodules        5. DMII  --Hemoglobin A1C 9.7; Home medications: Humalog  BID, Lantus 50units BID, metformin   --SSI AC/HS, nightly long acting for glycemic control   --Endocrine consulted and on board         6. Constipation--expected delayed return of bowel function  --secondary to anesthesia, narcotics, decreased oral intake, and decreased physical activity   --resolved   --Continue daily MOM/oral bisacodyl prn  --Encouraged continued increase in oral intake and activity. 7. Expected deconditioning in the setting following surgery  --Increase activity as tolerated  --PT/OT  - Ambulating 400 ft         8. Urinary Retention  --Rader removed 11/3 2215; bladder scanned 11/4 for 250cc  --Now urinating without difficulty - no rader needed        9. Hyponatremia  --resolved;  Na 132 today  --Lasix        DVT prophylaxis:  --continue bilateral knee high AIDAN hose  --continue PCDs  --continue progressive ambulation  --lovenox for dvt prophylaxis and continue knee high IADAN hose/pcds/progressive ambulation        Dispo: Home today          This patient's case and care plan was discussed

## 2022-11-07 NOTE — PATIENT CARE CONFERENCE
P Quality Flow/Interdisciplinary Rounds Progress Note        Quality Flow Rounds held on November 7, 2022    Disciplines Attending:  Bedside Nurse, , , and Nursing Unit Leadership    Errol Godwin was admitted on 11/2/2022  5:20 AM    Anticipated Discharge Date:       Disposition:    Devon Score:  Devon Scale Score: 20    Readmission Risk              Risk of Unplanned Readmission:  11           Discussed patient goal for the day, patient clinical progression, and barriers to discharge.   The following Goal(s) of the Day/Commitment(s) have been identified:  Diagnostics - Report Results      Robert Oneil RN  November 7, 2022

## 2022-11-07 NOTE — DISCHARGE INSTRUCTIONS
Discharge Instructions for Open Heart Surgery    What you will need at home:               * Accurate Scale               *  Digital Thermometer               *  Antibacterial Soap                *  Clean Wash Cloths             *  Someone to be with you for one week              DO NOT LIFT, PUSH, OR PULL ANYTHING OVER 5 POUNDS FOR 8 WEEK from the day of surgery. This could prevent your sternum from healing properly. ? When you are given permission from your surgeon to begin lifting again,                     do so gradually. You will need time to build your muscle strength. ? A gallon of milk is more than 5 lbs. you should buy ½ gallons. NEVER SMOKE AGAIN! Absolutely no tobacco products! Do not allow others to smoke around you. Second hand smoke can be just as bad. [facility] [employer] has a free program available, call 6-494.319.2518. Activity: See your activity diary in your binder for your walking plan. Plan to walk indoors on days the temperature is below 40? or over 80? or during smog alerts. At first limit your stair climbing to once or twice a day. You may use the handrail for balance only. Do not pull yourself up with it. It is not unusual to feel tired for the first few weeks, but walking builds up your strength. Driving: Do not drive a car or any vehicle for 4 weeks from the day of surgery. You can not drive a truck, tractor or  for 8 weeks from the day of surgery. After 4 weeks, you can drive vehicles with power steering only. Do not drive while on pain medication. Incentive Spirometer:  Continue to use your lung exerciser for the next 4 weeks. Support your chest and cough each time you complete the 10 exercises. Weight:  Weigh yourself every morning. Call the surgeon if you gain 3 pounds or more overnight or 5 pounds in a week. This may be a sign of fluid retention.     Medication:    Pain pills are ordered to keep you comfortable and able to increase activity         level. Your need for pain pills should decrease over the next 2 weeks. For mild pain you take Tylenol, but do not exceed 4000mg of Tylenol a day. Vicodin contains Tylenol,  so watch how much Tylenol (Acetaminophen) you take  Stool Softners: You may have been prescribed Colace (docusate), to help prevent straining with bowel movements. If your bowels have not moved by the second day home, take a laxative of your choice. If no bowel movement by the third day, call your surgeon. If you find you have the opposite problem and your stools are too soft, stop taking the stool softener. Avoid herbal, dietary products and vitamins unless OKd by your surgeon. If on Coumadin monitor Vitamin K intake and keep it consistent. Call during business hours Monday through Friday for medication refills. [phone]      Incision Care:  [address] YOUR INCISIONS DAILY WITH A CLEAN WASHCLOTH AND ANTIBACTERIAL SOAP. Do not wash your incisions after you have cleansed other parts of your body. You may shower but keep your back to the spray. Avoid hot water, comfortably warm is OK. ? If you went home with a dressing on any incision: Remove the dressing daily     and wash the incision with antibacterial soap and water and pat dry. Only     cover the incision with a dressing if it is draining. Otherwise leave it     uncovered (unless your doctor told you otherwise)  ? No tub baths until your incisions are healed. ? DO NOT APPLY ANYTHING OTHER THAN ANTIBACTERIAL SOAP AND     WATER TO YOUR INCISIONS. Do not apply lotions, creams, ointments,     hydrogen peroxide or powder. ? Keep your incisions CLEAN. Think CLEAN. No one should touch your     incisions without washing their hands first.  Do not let pets touch your incisions     (have incisions covered with clothing when around pets). Keep your heart     pillow clean and off the floor. ?  Expect some swelling in the leg with the incision. Keep your legs elevated     above the level of your heart when lying or sitting. ? Wear loose clothing. For support women should wear a bra. AIDAN Hose (white stockings): Should be worn during the day and off at night. Someone other than the patient will need to put on and take off the hose. It is too much pulling and tugging for the patient. Expect them to be difficult to put on and they should feel snug. These are usually worn for 2-4 weeks. Wash them by hand to keep their elasticity. Diet:  Eat a low fat, low salt diet. Eat a high fiber diet to avoid constipation and straining during bowel movements (whole grains, raw veggies, fruits)    Diabetics:  Check blood sugars twice a day. Contact your primary care physician if your blood sugar is consistently above 130. Good tissue healing occurs when blood sugars are less than 130. Housework: Do not cut the grass, vacuum, sweep or do any heavy housework. Riding: You may ride in the car for local trips, up to 45 minutes. If it you have to travel further stop every 45 min. Wear your lap and shoulder belts in the car. Place your heart pillow between your chest and the shoulder belt. Sexual Activity: When you can climb 2 flights of stairs without chest pain, shortness of breath or fatigue, it is generally OK to resume sexual activity. Depression: It is not unusual to have feelings of anxiety, fear or depression after surgery. If you need help with these feelings, call your primary care physician. There are medications to help and healing usually occurs sooner is youre not depressed. Heart Valve Replacement:  If you had your heart valve replaced you should receive a card for your wallet. Show ALL your doctors and dentist the card before treatment. You will need to take antibiotics before and after surgery, teeth cleaning etc. for the rest of your life.     ? If you get a sore throat or fever over 101? that lasts more than a day or     two call your doctor. ? If you are exposed to someone with strept throat, then get a sore throat     yourself, call your doctor IMMEDIATELY. Any doubts about taking antibiotics before or after a procedure call your surgeons office. When to call the doctor:  [phone]    If you have sudden, severe shortness of breath or shortness of breath while resting. Pain, tenderness, warmth or redness in your calf. Weight gain of 3 pounds in one day or 5 pounds in one week. If your heart rate is below 50 or over 110 beats per minute, or symptoms of fluttering in your chest or irregular pulse. Temperature (taken daily) greater than 101? Tish Spear If your bowels have not moved by the third day home. Persistent diarrhea, nausea or vomiting. If you are unable to eat, or unable to drink 5 glasses of fluid a day for more than one day. For redness, warmth, tenderness, drainage or swelling of any incision. For ANY chest incision drainage. If you have pain not relieved by pain medication. If you experience the same pain or other symptoms that brought you to the hospital or doctor before surgery. Diabetics:  Call Primary Care Physician for blood sugars consistently above 130. Depression:  Call Primary Care Physician if feelings of depression persist.      If you think something is seriously wrong go to the nearest emergency room!     Call 911 for any EMERGENCY and go to the nearest hospital!    Future Appointments   Date Time Provider Jana Blount   12/13/2022 12:30 PM DO Alden Delatorre [address]   1/27/2023 10:00 AM DO JOSE [facility] ENT [facility]   3/13/2023  1:00 PM HP VAS US RM 1 SEYZ CARDIO [facility]   3/13/2023  1:45 PM HP VAS US RM 1 SEYZ CARDIO [facility]   3/13/2023  2:15 PM Silvia Eubanks MD [facility]               Cardiac Rehabilitation: Discharge instructions        Cardiac rehabilitation is a program for people who have a heart problem, such as a heart attack, coronary stent placed, heart failure, or a heart valve disease. The program includes exercise, lifestyle changes, education, and emotional support. Cardiac rehab can help you improve the quality of your life through better overall health. It can help you lose weight and feel better about yourself. On your cardiac rehab team, you may have your doctor, a nurse specialist, an exercise physiologist, and a dietitian. They will design your cardiac rehab program specifically for you. You will learn how to reduce your risk for heart problems, how to manage stress, and how to eat a heart-healthy diet. By the end of the program, you will be ready to maintain a healthier lifestyle on your own. Follow-up care is a key part of your treatment and safety. Be sure to make and go to all appointments, and call your doctor if you are having problems. It's also a good idea to know your test results and keep a list of the medicines you take. Please call to schedule your first appointment once you have been cleared by your cardiologist to attend Phase II Outpatient Cardiac Rehabilitation. Cardiac Rehabilitation Options:  [address] [place] [address].                                                                                           Cardiology Services  [facility] CARE [place] [address] [address] [place] [address] [phone]                                                                                         [address]  Hours: M/W/F 7am-7pm & T/Th 7am-12pm                                                  [phone]                                                                                                                          [phone] [address] [place] [address]  Cardiac Rehab  [facility]. [address] [facility]  P- [phone]                                                                                         Cardiac Rehabilitation  Hours: M/W/F 7am-6pm                                                                                [place], [address]  [facility]                                                          [phone]  Cardiac Rehabilitation                                                                                   [phone]  [address] [place] [address] Dr, [address]                                                                                        [facility]  [phone]                                                                                          Cardiac Rehabilitation  [phone]                                                                                          [address] [place] [address].  [place] [address] [place] [address]                                                                                                                        [phone]                                                                                                                        [phone]

## 2022-11-07 NOTE — PROGRESS NOTES
Chely Sneed M.D.,[facility]  Bud Figueroa D.O., F.A.C.O.I., Parker Murphy M.D. Juan Romero M.D. Lanette Stephens D.O. Daily Pulmonary Progress Note    Patient:  Ellen Jerome 78 y.o. female MRN: [id number]     Date of Service: 11/7/2022      Synopsis     We are following patient for prolonged postop pulmonary insufficiency    \"CC\" multivessel disease    Code status: Full code      Subjective      Patient was seen and examined. Sitting up in the chair, oxygen weaned off. Feeling much improved with breathing. Encouraged to use incentive spirometer. Increasing activity as tolerated. Review of Systems:  Constitutional: Denies fever, weight loss, night sweats, and fatigue  Skin: Denies pigmentation, dark lesions, and rashes   HEENT: Denies hearing loss, tinnitus, ear drainage, epistaxis, sore throat, and hoarseness. Cardiovascular: Denies palpitations, chest pain, and chest pressure. Respiratory: Denies cough, dyspnea at rest, hemoptysis, apnea, and choking.   Gastrointestinal: Denies nausea, vomiting, poor appetite, diarrhea, heartburn or reflux  Genitourinary: Denies dysuria, frequency, urgency or hematuria  Musculoskeletal: Denies myalgias, muscle weakness, and bone pain  Neurological: Denies dizziness, vertigo, headache, and focal weakness  Psychological: Denies anxiety and depression  Endocrine: Denies heat intolerance and cold intolerance  Hematopoietic/Lymphatic: Denies bleeding problems and blood transfusions    24-hour events:  Oxygen weaned off    Objective   Vitals: BP (!) 107/55   Pulse 71   Temp 98.5 °F (36.9 °C) (Temporal)   Resp 16   Ht 5' 4\" (1.626 m)   Wt 130 lb 6.4 oz (59.1 kg)   SpO2 90%   BMI 22.38 kg/m²     I/O:    Intake/Output Summary (Last 24 hours) at 11/7/2022 1447  Last data filed at 11/7/2022 1427  Gross per 24 hour   Intake 360 ml   Output 550 ml   Net -190 ml               IPAP: 15 cmH20  CPAP/EPAP: 8 cmH2O     CURRENT MEDS :  Scheduled Meds: apixaban  5 mg Oral BID    insulin lispro  6 Units SubCUTAneous TID     insulin glargine  18 Units SubCUTAneous Nightly    insulin lispro  0-8 Units SubCUTAneous TID     insulin lispro  0-4 Units SubCUTAneous Nightly    bisacodyl  10 mg Rectal Daily    amiodarone  400 mg Oral TID    rosuvastatin  10 mg Oral Nightly    aspirin  81 mg Oral Daily    bisacodyl  5 mg Oral Daily    sennosides-docusate sodium  1 tablet Oral BID    ferrous sulfate  325 mg Oral BID     vitamin C  500 mg Oral BID    folic acid  1 mg Oral Daily    sodium chloride flush  5-40 mL IntraVENous 2 times per day    heparin flush  3 mL IntraVENous 2 times per day    metoprolol tartrate  12.5 mg Oral BID    citalopram  20 mg Oral Daily    levothyroxine  25 mcg Oral Daily    sodium chloride flush  5-40 mL IntraVENous 2 times per day    magnesium oxide  400 mg Oral BID    pantoprazole  40 mg Oral Daily    ipratropium-albuterol  1 ampule Inhalation Q4H WA       Physical Exam:  General Appearance: appears comfortable in no acute distress. HEENT: Normocephalic atraumatic without obvious abnormality   Neck: Lips, mucosa, and tongue normal.  Supple, symmetrical, trachea midline, no adenopathy;thyroid:  no enlargement/tenderness/nodules or JVD. Lung: Breath sounds CTA. Respirations   unlabored. Symmetrical expansion. Heart: RRR, normal S1, S2. No MRG  Abdomen: Soft, NT, ND. BS present x 4 quadrants. No bruit or organomegaly. Extremities: Pedal pulses 2+ symmetric b/l. Extremities normal, no cyanosis, clubbing, or edema. Musculokeletal: No joint swelling, no muscle tenderness. ROM normal in all joints of extremities. Neurologic: Mental status: Alert and Oriented X3 . Pertinent/ New Labs and Imaging Studies     Imaging Personally Reviewed:    FINDINGS:  Spirometry with airflow obstruction with a forced vital  capacity of 2.74 liters, 92% of predicted and FEV1 of 1.84 liters, 79%  of predicted with an FEV1-FVC ratio of 67%.   Midflow rates reduced to  62% of predicted with a maximum voluntary ventilation 75 liters per  minute, 85% of predicted. Static lung volumes with slow vital capacity of 2.69 liters, 91% of  predicted. Inspiratory capacity 1.97 liters, 90% of predicted. Expiratory reserve volume 0.73 liters, 91% of predicted. The diffusion  capacity is 16.22 mL/minute per mmHg which is 66% of predicted. IMPRESSION:  Mild airflow obstruction, GOLD I COPD, with  mild-to-moderate loss in gas transfer. Clinical correlation needed. Celeste Grimes DO     D: 10/28/2022 16:03:09       T: 10/28/2022 16:06:26     [id number]  Job#: [id number]     Doc#: [id number]        Imaging personally reviewed:     Narrative   EXAMINATION:   CT OF THE CHEST WITHOUT CONTRAST 10/28/2022 10:14 am       TECHNIQUE:   CT of the chest was performed without the administration of intravenous   contrast. Multiplanar reformatted images are provided for review. Automated   exposure control, iterative reconstruction, and/or weight based adjustment of   the mA/kV was utilized to reduce the radiation dose to as low as reasonably   achievable. COMPARISON:   None. HISTORY:   ORDERING SYSTEM PROVIDED HISTORY: CAD in native artery   TECHNOLOGIST PROVIDED HISTORY:   Reason for exam:->noncontrast only. evaluate aorta and vasculature   What reading provider will be dictating this exam?->CRC       FINDINGS:   The heart size is normal.  There is diffuse coronary artery calcifications. There is mild calcification of the aortic root. The remainder of the   ascending thoracic aorta is normal in caliber measuring 3 x 2.9 cm with   punctate calcification which is probably not clinically significant. There   is diffuse calcifications of the aortic arch and punctate calcification of   the descending thoracic aorta which measures 2.3 cm in diameter. The trachea   and major bronchi are patent. There is emphysema.   There is a  8 mm   spiculated nodule in the lingula and a 7 mm nodule in the superior right   lower lobe. 3 mm nodule in the left upper lobe is noted. There is no focal   consolidation or pleural effusion. The liver is fatty infiltrated. Degenerative changes are identified in the thoracic spine. Impression   Diffuse coronary artery calcification. The ascending thoracic aorta is   normal in caliber with a focal area of punctate calcification. Centrilobular emphysema with the spiculated pulmonary nodules ranging from   7-8 mm in the right lower lobe and lingula which are indeterminate for   malignancy. Further assessment by PET-CT scan and CT surveillance according   to Fleischner society guidelines is recommended. Echo:     Conclusions      Summary   Technically difficult examination. Mild concentric left ventricular hypertrophy. Ejection fraction is visually estimated at 60 to 65%. Normal right ventricular size and function. Mild mitral regurgitation is present. Individual aortic valve leaflets are not clearly visualized. Mild aortic stenosis. Mild tricuspid regurgitation. Mild pulmonic regurgitation present.           Labs:  Lab Results   Component Value Date/Time    WBC 5.8 11/07/2022 05:30 AM    HGB 7.9 11/07/2022 05:30 AM    HCT 23.8 11/07/2022 05:30 AM    HCT 27.0 11/02/2022 10:36 AM    MCV 92.6 11/07/2022 05:30 AM    MCH 30.7 11/07/2022 05:30 AM    MCHC 33.2 11/07/2022 05:30 AM    RDW 13.2 11/07/2022 05:30 AM     11/07/2022 05:30 AM    MPV 10.0 11/07/2022 05:30 AM     Lab Results   Component Value Date/Time     11/07/2022 05:30 AM    K 3.7 11/07/2022 05:30 AM    K 4.5 12/04/2018 08:10 AM    CL 97 11/07/2022 05:30 AM    CO2 29 11/07/2022 05:30 AM    BUN 11 11/07/2022 05:30 AM    CREATININE 0.4 11/07/2022 05:30 AM    LABALBU 4.3 10/28/2022 07:55 AM    LABALBU 4.1 07/21/2011 04:06 PM    CALCIUM 8.8 11/07/2022 05:30 AM    GFRAA >60 10/05/2022 09:30 AM    LABGLOM >60 11/07/2022 05:30 AM     Lab Results   Component Value Date/Time    PROTIME 13.2 11/03/2022 04:30 AM    INR 1.2 11/03/2022 04:30 AM     No results for input(s): PROBNP in the last 72 hours. No results for input(s): PROCAL in the last 72 hours. This SmartLink has not been configured with any valid records. Micro:  No results for input(s): CULTRESP in the last 72 hours. No results for input(s): LABGRAM in the last 72 hours. No results for input(s): LEGUR in the last 72 hours. No results for input(s): STREPNEUMAGU in the last 72 hours. No results for input(s): LP1UAG in the last 72 hours. Assessment:    Acute hypoxic respiratory failure  Severe multivessel coronary artery disease status post CABG x 2 (LIMA-LAD, SVG-OM) and LAAL (40 mm AtriCure) on 11/2/22. Bilateral pleural effusions  Pulmonary nodules  COPD Gold stage I  A. fib now converted to normal sinus rhythm      Plan:   Wean FiO2 for saturations above 92%  Continue bronchodilators with EZ Pap. Encourage incentive spirometry   Once patient ready to be discharge can discharged her Anoro or Stiolto  Would recommend to resume diuresis once BP has improved. Check an albumin level. Regarding her pulmonary nodules she will need a follow-up CT scan in 3 months which will be January 2023  Okay to KAELYN from a pulmonary perspective message routed to office for follow-up  This plan of care was reviewed in collaboration with Dr. Kiran Moreno  Electronically signed by INDIRA Sanford CNP on 11/7/2022 at 2:47 PM      I personally saw, examined and provided care for the patient. Radiographs, labs and medication list were reviewed by me independently. I spoke with bedside nursing, therapists and consultants. The case was discussed in detail and plans for care were established. Review of CNP documentation was conducted and revisions were made as appropriate. I agree with the above documented exam, problem list and plan of care.    Matheus Torres MD

## 2022-11-07 NOTE — PROGRESS NOTES
Dr. Nicolette Barnett notified via voicemail that patient would d/c today to complete part of med rec.

## 2022-11-07 NOTE — PROGRESS NOTES
ENDOCRINOLOGY PROGRESS NOTE      Date of admission: 11/2/2022  Date of service: 11/7/2022  Admitting physician: Jaime Borrego DO   Primary Care Physician: Isabel Tee DO  Consultant physician: Domitila Loomis MD     Reason for the consultation:  Uncontrolled DM    History of Present Illness: The history is provided by the patient. Accuracy of the patient data is excellent    Kayla Vazquez is a very pleasant 78 y.o. old female with PMH uncontrolled DM, CAD, HLD and other listed below admitted to [facility] on 11/2/2022 because of scheduled CABG surgery. The patient underwent CABG surgery on 11/2/2022 endocrine service was consulted for diabetes management. SUBJECTIVE   Pt was seen and examined this AM, no acute events, BG improved to mid 100s this morning.     Inpatient diet:   Carb Restricted diet     Point of care glucose monitoring   (Independently reviewed)   Recent Labs     11/05/22  1655 11/05/22  2114 11/06/22  0620 11/06/22  1153 11/06/22  1632 11/06/22  1645 11/06/22 2021 11/07/22  0554   GLUMET 161* 176* 234* 254* 181* 185* 325* 172*       Scheduled Meds:   insulin glargine  18 Units SubCUTAneous Nightly    insulin lispro  0-8 Units SubCUTAneous TID WC    insulin lispro  0-4 Units SubCUTAneous Nightly    bisacodyl  10 mg Rectal Daily    amiodarone  400 mg Oral TID    rosuvastatin  10 mg Oral Nightly    insulin lispro  4 Units SubCUTAneous TID WC    aspirin  81 mg Oral Daily    bisacodyl  5 mg Oral Daily    sennosides-docusate sodium  1 tablet Oral BID    ferrous sulfate  325 mg Oral BID WC    vitamin C  500 mg Oral BID    folic acid  1 mg Oral Daily    sodium chloride flush  5-40 mL IntraVENous 2 times per day    heparin flush  3 mL IntraVENous 2 times per day    metoprolol tartrate  12.5 mg Oral BID    enoxaparin  40 mg SubCUTAneous Daily    citalopram  20 mg Oral Daily    levothyroxine  25 mcg Oral Daily    sodium chloride flush  5-40 mL IntraVENous 2 times per day    magnesium oxide  400 mg Oral BID    pantoprazole  40 mg Oral Daily    ipratropium-albuterol  1 ampule Inhalation Q4H WA       PRN Meds:   acetaminophen, 650 mg, Q4H PRN  oxyCODONE-acetaminophen, 1 tablet, Q4H PRN   Or  oxyCODONE-acetaminophen, 2 tablet, Q4H PRN  diphenhydrAMINE, 25 mg, Q8H PRN  potassium chloride, 20 mEq, PRN  amiodarone, 400 mg, PRN  amiodarone bolus, 150 mg, PRN  magnesium sulfate, 2,000 mg, PRN  morphine, 2 mg, Q4H PRN  sodium chloride, 1 spray, PRN  trimethobenzamide, 200 mg, Q6H PRN  glucose, 4 tablet, PRN  dextrose bolus, 125 mL, PRN   Or  dextrose bolus, 250 mL, PRN  glucagon (rDNA), 1 mg, PRN  dextrose, , Continuous PRN  sodium chloride flush, 5-40 mL, PRN  sodium chloride, , PRN  heparin flush, 3 mL, PRN  sodium chloride flush, 5-40 mL, PRN  ondansetron, 4 mg, Q8H PRN   Or  ondansetron, 4 mg, Q6H PRN    Continuous Infusions:   dextrose      sodium chloride         Review of Systems  All systems reviewed. All negative except for symptoms mentioned in HPI     OBJECTIVE    BP (!) 118/58   Pulse 70   Temp 96.9 °F (36.1 °C) (Temporal)   Resp 18   Ht 5' 4\" (1.626 m)   Wt 130 lb 6.4 oz (59.1 kg)   SpO2 94%   BMI 22.38 kg/m²     Intake/Output Summary (Last 24 hours) at 11/7/2022 1119  Last data filed at 11/7/2022 1034  Gross per 24 hour   Intake 180 ml   Output 550 ml   Net -370 ml         Physical examination:  General: awake alert, oriented x3  HEENT: normocephalic non traumatic, no exophthalmos   Neck: supple, No thyroid tenderness,  Pulm: good equal air entry no added sounds  CVS: S1 + S2. S/p CABG  Abd: soft lax, no tenderness  Skin: warm, no lesions, no rash.  No open wounds, no ulcers   Neuro: CN intact, sensation decreased bilateral , muscle power normal  Psych: normal mood, and affect    Review of Laboratory Data:  I personally reviewed the following labs:   Recent Labs     11/05/22  0520 11/06/22  0625 11/07/22  0530   WBC 9.6 6.6 5.8   RBC 2.71* 2.72* 2.57*   HGB 8.5* 8.3* 7.9*   HCT 25.2* 25.0* 23.8* MCV 93.0 91.9 92.6   MCH 31.4 30.5 30.7   MCHC 33.7 33.2 33.2   RDW 13.3 13.3 13.2    187 187   MPV 10.5 10.0 10.0       Recent Labs     11/05/22  0520 11/06/22  0625 11/07/22  0530   * 134 132   K 4.1 4.0 3.7   CL 98 99 97*   CO2 24 25 29   BUN 9 12 11   CREATININE 0.4* 0.4* 0.4*   GLUCOSE 239* 219* 152*   CALCIUM 8.7 8.8 8.8       No results found for: BHYDRXBUT  Lab Results   Component Value Date/Time    LABA1C 9.7 10/28/2022 07:55 AM    LABA1C 11.3 06/23/2022 10:54 AM    LABA1C 10.5 03/17/2022 12:00 PM     Lab Results   Component Value Date/Time    TSH 1.860 03/17/2022 12:00 PM     Lab Results   Component Value Date/Time    LABA1C 9.7 10/28/2022 07:55 AM    GLUCOSE 152 11/07/2022 05:30 AM    GLUCOSE 157 07/21/2011 04:06 PM    MALBCR 52.8 08/27/2021 12:00 PM    LABMICR 16.9 08/27/2021 12:00 PM    LABCREA 32 08/27/2021 12:00 PM     Lab Results   Component Value Date/Time    TRIG 317 03/17/2022 12:00 PM    HDL 37 03/17/2022 12:00 PM    LDLCALC 103 03/17/2022 12:00 PM    CHOL 203 03/17/2022 12:00 PM       Blood culture   No results found for: [facility]    Radiology:  XR CHEST PORTABLE   Final Result   No significant interval change.          XR CHEST PORTABLE   Final Result   New right lower lobe infiltrate and effusion         XR CHEST PORTABLE   Final Result   No significant change         XR CHEST PORTABLE   Final Result   Postop left lower lobe atelectasis/infiltrate             Medical Records/Labs/Images review:   I personally reviewed and summarized previous records   All labs and imaging were reviewed independently     [address], a 78 y.o.-old female seen today for inpatient diabetes management     Diabetes Mellitus type 2  Patient's diabetes is uncontrolled , A1c 9.5%   We recommend the following diabetes regimen:  Lantus 18u nightly   Increase Humalog to 6u with meals   Medium dose insulin sliding scale  Continue glucose check with meals and at bedtime   Will titrate insulin dose based on the blood glucose trend & insulin requirement  Will arrange for patient to be seen in endocrinology clinic upon discharge for routine diabetes maintenance and prevention. Primary hypothyroidism   Continue levothyroxine 25 mcg daily     MVCAD  Pt underwent CABG surgery 11/2/2022  Management per CT surgery     Interdisciplinary plan for communication with healthcare providers:   Consult recommendations were discussed with the Primary Service/Nursing staff      The above issues were reviewed with the patient who understood and agreed with the plan. Thank you for allowing us to participate in the care of this patient. Please do not hesitate to contact us with any additional questions. Rolando Vargas MD PGY-2  I saw the patient and discussed the management with the resident physician Dr. Gómez Haynes MD.  I reviewed and agree with the findings and plan as documented in the resident's note    Dierdre Seip MD  Endocrinologist, [address] [place] [address] and Endocrinology   [address] [place] [address], [address] [place] [address],[facility] [id number]   Phone: [phone]  Fax: [phone]  --------------------------  An electronic signature was used to authenticate this note.  Jo-Ann Ball MD on 11/8/2022 at 7:08 PM

## 2022-11-07 NOTE — CARE COORDINATION
POD#5 CABG x 2. Wires cont. Met with pt in room. Up in chair. On RA. Ambulated 400 ft this am with cardiac rehab. Plan remains to home with her , Gustavo Fatima, and Aletahesham Trevino with be with pt 24/7 for 1-2 weeks after discharge. Voiced no other needs for home at present. Confirmed with [facility] at [facility] they have accepted pt. Sw/cm will follow. [address] at [facility] notified pt for dc today and [facility] to draw a CBC on Wednesday 11/09.

## 2022-11-08 NOTE — DISCHARGE SUMMARY
Physician Discharge Summary     Patient ID:  Rc Moreira  [id number]  62 y.o.  1955    Admit date: 11/2/2022    Discharge date and time: 11/7/2022  6:43 PM     Admitting Physician: Richie Vanessa DO     Discharge Physician: Richie Vanessa DO     Discharge Diagnoses:    Severe multivessel coronary artery disease. PROCEDURES PERFORMED:  1. Coronary artery bypass grafting x2 using left internal mammary  artery to left anterior descending artery, reverse saphenous vein graft  to the dominant obtuse marginal branch of the circumflex. 2.  Left atrial appendage ligation using a 40-mm AtriCure appendage  clip. 3.  Rigid sternal fixation with the [employer] plating system. Discharged Condition: stable    Indication for Admission:   Ms. Viktoria Rolon is a 78year old female with past medical history of PVD and HLD whom recently presented to the office to discuss surgical intervention regarding recently found MVCAD. She originally presented to the cardiology clinic for preoperative evaluation for back surgery. Patient was RCRI class III with a 6.6% risk of major adverse cardiac event. Therefore, stress test of obtained on 9/20/22 which was abnormal. She then was referred for a cardiac catheterization on 10/6/2022 that showed severe CAD (full report below) and she was referred to CTS for further recommendation. She currently denies any CP,SOB, CONTRERAS, PND or syncope. Hospital Course: Course as above, and on 11/2 the patient underwent CABG x2 (LIMA-LAD, SVG-OM), LAAL (40 mm AtriCure) Sternal plating . Postoperatively, she was transferred to the CVICU in guarded stable condition and extubated once indications met. Once appropriate, she was transferred to the monitored stepdown unit on POD 2 and beta blockade was initiated.  She did experience post op afib RVR and amio bolus was given, picc placed and amio gtt initiated, she continued to remain in afib RVR through POD 4 and cardizem gtt was started to help ontrol rate and EP was consulted. She reverted back to SR, PO amio was started and eliquis was added. Pulm was consulted for her COPD and prolonged o2 requirements. The mediastinal/pleural chest tubes, and epicardial pacing wires were discontinued in the usual fashion. Supplemental oxygen was weaned off, all ambulatory requirements were met, and she was deemed ready for discharge. She was discharged to home on POD 5  in stable condition with a post-operative appointment scheduled. Consults: EP, pulmonary     Discharge Exam:  Cardiac: RRR  Lungs: decreased bases  Chest incision with intact FANNY DSD. Sternum stable. Prior chest tube site incisions C/D/I, no erythema with intact sutures. Abd: Soft, nontender, +BS  Ext: KENNEY, minimal edema    Disposition: home    Patient Instructions:      Medication List        START taking these medications      amiodarone 200 MG tablet  Commonly known as: CORDARONE  Take 1 tablet by mouth See Admin Instructions Take two tablets three times daily x 1 week, then take two tablets two times daily x 1 week, then take two tablets one time daily x 1 week, then take one tablet one time daily x 1 week, then stop medication     apixaban 5 MG Tabs tablet  Commonly known as: ELIQUIS  Take 1 tablet by mouth 2 times daily     ascorbic acid 500 MG tablet  Commonly known as: VITAMIN C  Take 1 tablet by mouth 2 times daily     ferrous sulfate 325 (65 Fe) MG tablet  Commonly known as: IRON 325  Take 1 tablet by mouth 2 times daily (with meals)     folic acid 1 MG tablet  Commonly known as: FOLVITE  Take 1 tablet by mouth daily     metoprolol tartrate 25 MG tablet  Commonly known as: LOPRESSOR  Take 0.5 tablets by mouth 2 times daily     oxyCODONE-acetaminophen 5-325 MG per tablet  Commonly known as: PERCOCET  Take 1 tablet by mouth every 6 hours as needed for Pain for up to 5 days.      rosuvastatin 10 MG tablet  Commonly known as: CRESTOR  Take 1 tablet by mouth nightly     umeclidinium-vilanterol 62.5-25 MCG/INH Aepb inhaler  Commonly known as: ANORO ELLIPTA  Inhale 1 puff into the lungs daily            CHANGE how you take these medications      insulin lispro 100 UNIT/ML injection vial  Commonly known as: HUMALOG  Inject 6 Units into the skin 3 times daily (before meals)  What changed:   how much to take  when to take this     Toujeo SoloStar 300 UNIT/ML concentrated injection pen  Generic drug: insulin glargine (1 unit dial)  Inject 18 Units into the skin nightly  What changed:   how much to take  how to take this  when to take this  additional instructions            CONTINUE taking these medications      aspirin 81 MG tablet     BD Pen Needle Alisa U/F 32G X 4 MM Misc  Generic drug: Insulin Pen Needle     citalopram 20 MG tablet  Commonly known as: CELEXA  TAKE 1 TABLET BY MOUTH EVERY DAY     levothyroxine 25 MCG tablet  Commonly known as: SYNTHROID  Take 1 tablet by mouth Daily     metFORMIN 500 MG tablet  Commonly known as: GLUCOPHAGE  TAKE 1 TABLET TWICE A DAY WITH MEALS     omeprazole 40 MG delayed release capsule  Commonly known as: PRILOSEC  TAKE 1 CAPSULE BY MOUTH EVERY DAY     TALTZ SC            STOP taking these medications      clopidogrel 75 MG tablet  Commonly known as: PLAVIX     ibuprofen 800 MG tablet  Commonly known as: ADVIL;MOTRIN     isosorbide mononitrate 30 MG extended release tablet  Commonly known as: IMDUR     lisinopril 5 MG tablet  Commonly known as: PRINIVIL;ZESTRIL     nitroGLYCERIN 0.4 MG SL tablet  Commonly known as: Nitrostat     simvastatin 40 MG tablet  Commonly known as: ZOCOR     Toprol XL 25 MG extended release tablet  Generic drug: metoprolol succinate               Where to Get Your Medications        These medications were sent to [address] [place] [address] - P [phone] Zaida [address] [facility] [address]      Phone: [phone]   umeclidinium-vilanterol 62.5-25 MCG/INH Aepb inhaler       These medications were sent to [facility], [address] [address]      Phone: [phone]   insulin lispro 100 UNIT/ML injection vial  Damionujeo SoloStar 300 UNIT/ML concentrated injection pen       You can get these medications from any pharmacy    Bring a paper prescription for each of these medications  amiodarone 200 MG tablet  apixaban 5 MG Tabs tablet  ascorbic acid 500 MG tablet  ferrous sulfate 325 (65 Fe) MG tablet  folic acid 1 MG tablet  metoprolol tartrate 25 MG tablet  oxyCODONE-acetaminophen 5-325 MG per tablet  rosuvastatin 10 MG tablet       Activity: sternal precautions  Diet: cardiac diet  Wound Care: as directed    Follow-up with   Future Appointments   Date Time Provider Jana Blount   11/14/2022 11:00 AM Ra Alegria DO [facility]   12/13/2022 12:30 PM Smith Dutton DO CARDIO SURG [facility]   1/27/2023 10:00 AM Trever Alfredo DO [facility]   3/13/2023  1:00 PM HMHP VAS US RM 1 SEYZ CARDIO [facility]   3/13/2023  1:45 PM [facility] VAS US RM 1 SEYZ CARDIO [facility]   3/13/2023  2:15 PM Valnete Subramanian MD [facility]       Smoking cessation education provided prior to discharge    Discussed with patient the benefits of participation in cardiac rehab after discharge once approved safe by the surgeon and that a referral will be made at the follow up appointment. Pt verbalized comprehension.     Signed:  Electronically signed by Nakia Pearson PA-C on 11/8/2022 at 9:11 AM

## 2022-11-08 NOTE — ANESTHESIA POSTPROCEDURE EVALUATION
Department of Anesthesiology  Postprocedure Note    Patient: Nicole Greenberg  MRN: [id number]  YOB: 1955  Date of evaluation: 11/8/2022      Procedure Summary     Date: 11/02/22 Room / Location: [facility] 01 / [facility]    Anesthesia Start: 0066 Anesthesia Stop: 1100    Procedure: coronary artery bypass grafting, ZACHARY Diagnosis:       CAD (coronary artery disease)      (CAD (coronary artery disease) [I25.10])    Surgeons: Lisa Anderson DO Responsible Provider: Lakisha Munson MD    Anesthesia Type: general ASA Status: 4          Anesthesia Type: No value filed.     Amelia Phase I: Amelia Score: 7    Amelia Phase II: Amelia Score: 4      Anesthesia Post Evaluation    Patient location during evaluation: PACU  Patient participation: complete - patient participated  Level of consciousness: awake and alert  Airway patency: patent  Nausea & Vomiting: no nausea and no vomiting  Complications: no  Cardiovascular status: blood pressure returned to baseline and hemodynamically stable  Respiratory status: acceptable and spontaneous ventilation  Hydration status: euvolemic  Multimodal analgesia pain management approach

## 2022-11-09 DIAGNOSIS — I73.9 PVD (PERIPHERAL VASCULAR DISEASE) (HCC): Primary | ICD-10-CM

## 2022-11-09 DIAGNOSIS — Z79.4 TYPE 2 DIABETES MELLITUS WITHOUT COMPLICATION, WITH LONG-TERM CURRENT USE OF INSULIN (HCC): Primary | ICD-10-CM

## 2022-11-09 DIAGNOSIS — E11.9 TYPE 2 DIABETES MELLITUS WITHOUT COMPLICATION, WITH LONG-TERM CURRENT USE OF INSULIN (HCC): Primary | ICD-10-CM

## 2022-11-10 LAB
HCT VFR BLD CALC: 29.8 % (ref 34–48)
HEMOGLOBIN: 9.3 G/DL (ref 11.5–15.5)
MCH RBC QN AUTO: 30.2 PG (ref 26–35)
MCHC RBC AUTO-ENTMCNC: 31.2 % (ref 32–34.5)
MCV RBC AUTO: 96.8 FL (ref 80–99.9)
PDW BLD-RTO: 13.9 FL (ref 11.5–15)
PLATELET # BLD: 341 E9/L (ref 130–450)
PMV BLD AUTO: 10.1 FL (ref 7–12)
RBC # BLD: 3.08 E12/L (ref 3.5–5.5)
WBC # BLD: 8.4 E9/L (ref 4.5–11.5)

## 2022-11-14 ENCOUNTER — OFFICE VISIT (OUTPATIENT)
Dept: PRIMARY CARE CLINIC | Age: 67
End: 2022-11-14
Payer: MEDICARE

## 2022-11-14 VITALS
OXYGEN SATURATION: 98 % | DIASTOLIC BLOOD PRESSURE: 62 MMHG | BODY MASS INDEX: 21.68 KG/M2 | TEMPERATURE: 97.4 F | WEIGHT: 127 LBS | HEART RATE: 74 BPM | HEIGHT: 64 IN | SYSTOLIC BLOOD PRESSURE: 158 MMHG | RESPIRATION RATE: 16 BRPM

## 2022-11-14 DIAGNOSIS — I25.10 CAD IN NATIVE ARTERY: Primary | ICD-10-CM

## 2022-11-14 PROCEDURE — 1111F DSCHRG MED/CURRENT MED MERGE: CPT | Performed by: FAMILY MEDICINE

## 2022-11-14 PROCEDURE — 99213 OFFICE O/P EST LOW 20 MIN: CPT | Performed by: FAMILY MEDICINE

## 2022-11-14 PROCEDURE — 1123F ACP DISCUSS/DSCN MKR DOCD: CPT | Performed by: FAMILY MEDICINE

## 2022-11-14 PROCEDURE — 1090F PRES/ABSN URINE INCON ASSESS: CPT | Performed by: FAMILY MEDICINE

## 2022-11-14 PROCEDURE — G8427 DOCREV CUR MEDS BY ELIG CLIN: HCPCS | Performed by: FAMILY MEDICINE

## 2022-11-14 PROCEDURE — 3017F COLORECTAL CA SCREEN DOC REV: CPT | Performed by: FAMILY MEDICINE

## 2022-11-14 PROCEDURE — 1036F TOBACCO NON-USER: CPT | Performed by: FAMILY MEDICINE

## 2022-11-14 PROCEDURE — G8420 CALC BMI NORM PARAMETERS: HCPCS | Performed by: FAMILY MEDICINE

## 2022-11-14 PROCEDURE — G8484 FLU IMMUNIZE NO ADMIN: HCPCS | Performed by: FAMILY MEDICINE

## 2022-11-14 PROCEDURE — G8399 PT W/DXA RESULTS DOCUMENT: HCPCS | Performed by: FAMILY MEDICINE

## 2022-11-14 RX ORDER — INSULIN LISPRO 100 [IU]/ML
INJECTION, SOLUTION INTRAVENOUS; SUBCUTANEOUS
COMMUNITY
Start: 2022-11-07

## 2022-11-14 RX ORDER — PEN NEEDLE, DIABETIC 31 G X1/4"
NEEDLE, DISPOSABLE MISCELLANEOUS
COMMUNITY
Start: 2022-11-07

## 2022-11-14 RX ORDER — INSULIN LISPRO 100 [IU]/ML
INJECTION, SOLUTION INTRAVENOUS; SUBCUTANEOUS
COMMUNITY
Start: 2022-11-10 | End: 2022-11-21

## 2022-11-14 SDOH — ECONOMIC STABILITY: FOOD INSECURITY: WITHIN THE PAST 12 MONTHS, THE FOOD YOU BOUGHT JUST DIDN'T LAST AND YOU DIDN'T HAVE MONEY TO GET MORE.: NEVER TRUE

## 2022-11-14 SDOH — ECONOMIC STABILITY: FOOD INSECURITY: WITHIN THE PAST 12 MONTHS, YOU WORRIED THAT YOUR FOOD WOULD RUN OUT BEFORE YOU GOT MONEY TO BUY MORE.: NEVER TRUE

## 2022-11-14 ASSESSMENT — ENCOUNTER SYMPTOMS
CHEST TIGHTNESS: 0
SHORTNESS OF BREATH: 0

## 2022-11-14 ASSESSMENT — SOCIAL DETERMINANTS OF HEALTH (SDOH): HOW HARD IS IT FOR YOU TO PAY FOR THE VERY BASICS LIKE FOOD, HOUSING, MEDICAL CARE, AND HEATING?: NOT HARD AT ALL

## 2022-11-14 NOTE — PROGRESS NOTES
Rc Moreira, a female of 78 y.o. came to the office 11/14/2022. Patient Active Problem List   Diagnosis    Asymptomatic bilateral carotid artery stenosis    History of nuclear stress test    Mixed hyperlipidemia    [facility]clusive [facility]    PVD (peripheral vascular disease) with claudication ([facility]    Carotid stenosis, asymptomatic, bilateral    S/P carotid endarterectomy    Tobacco abuse    Lesion of true vocal cord    Type 2 diabetes mellitus    Senile osteoporosis    Scoliosis    Lumbar spinal stenosis    CAD in native artery    Postoperative hypertension    Acute pulmonary insufficiency    Hyperglycemia    Status post aorto-coronary artery bypass graft    Postoperative atrial fibrillation (HCC)          Coronary Artery Disease  Presents for follow-up visit. Pertinent negatives include no chest pain, chest pressure, chest tightness, leg swelling, palpitations, shortness of breath or weight gain. The symptoms have been stable. Compliance with diet is good. Compliance with exercise is variable (to start cardiac rehab soon. ). Compliance with medications is variable. DM: saw Dr. Lizette Hanks while in [place] [address] and Humalog 6 units with meals. Appt in Dec with him.    Allergies   Allergen Reactions    Pcn [Penicillins] Hives and Swelling    Fosamax [Alendronate] Other (See Comments)     Nausea, vomiting    Lipitor [Atorvastatin] Myalgia     Myalgia not allergic        Current Outpatient Medications on File Prior to Visit   Medication Sig Dispense Refill    HUMALOG 100 UNIT/ML SOLN injection vial       HUMALOG KWIKPEN 100 UNIT/ML SOPN INJECT 6 UNITS INTO THE SKIN 3 TIMES A DAY BEFORE MEALS      TRUEPLUS 5-BEVEL PEN NEEDLES 31G X 6 MM MISC       insulin lispro (HUMALOG) 100 UNIT/ML injection vial Inject 6 Units into the skin 3 times daily (before meals) 10 each 1    amiodarone (CORDARONE) 200 MG tablet Take 1 tablet by mouth See Admin Instructions Take two tablets three times daily x 1 week, then take two tablets two times daily x 1 week, then take two tablets one time daily x 1 week, then take one tablet one time daily x 1 week, then stop medication 91 tablet 0    apixaban (ELIQUIS) 5 MG TABS tablet Take 1 tablet by mouth 2 times daily 60 tablet 3    rosuvastatin (CRESTOR) 10 MG tablet Take 1 tablet by mouth nightly 30 tablet 3    metoprolol tartrate (LOPRESSOR) 25 MG tablet Take 0.5 tablets by mouth 2 times daily 60 tablet 3    ferrous sulfate (IRON 325) 325 (65 Fe) MG tablet Take 1 tablet by mouth 2 times daily (with meals) 60 tablet 0    folic acid (FOLVITE) 1 MG tablet Take 1 tablet by mouth daily 30 tablet 0    ascorbic acid (VITAMIN C) 500 MG tablet Take 1 tablet by mouth 2 times daily 30 tablet 3    insulin glargine, 1 unit dial, (TOUJEO SOLOSTAR) 300 UNIT/ML concentrated injection pen Inject 18 Units into the skin nightly 15 Adjustable Dose Pre-filled Pen Syringe 1    umeclidinium-vilanterol (ANORO ELLIPTA) 62.5-25 MCG/INH AEPB inhaler Inhale 1 puff into the lungs daily 1 each 3    levothyroxine (SYNTHROID) 25 MCG tablet Take 1 tablet by mouth Daily 90 tablet 1    citalopram (CELEXA) 20 MG tablet TAKE 1 TABLET BY MOUTH EVERY DAY 90 tablet 1    metFORMIN (GLUCOPHAGE) 500 MG tablet TAKE 1 TABLET TWICE A DAY WITH MEALS 180 tablet 1    omeprazole (PRILOSEC) 40 MG delayed release capsule TAKE 1 CAPSULE BY MOUTH EVERY DAY 30 capsule 1    BD PEN NEEDLE SAMEER U/F 32G X 4 MM MISC       aspirin 81 MG tablet Take 81 mg by mouth daily      Ixekizumab (TALTZ SC) Inject into the skin every 30 days For psoriasis       No current facility-administered medications on file prior to visit. Review of Systems   Constitutional:  Negative for weight gain. Respiratory:  Negative for chest tightness and shortness of breath. Cardiovascular:  Negative for chest pain, palpitations and leg swelling.  other review of systems reviewed and are negative    OBJECTIVE:  BP (!) 158/62 (Site: Right Upper Arm, Position: Sitting)   Pulse 74   Temp 97.4 °F (36.3 °C)   Resp 16   Ht 5' 4\" (1.626 m)   Wt 127 lb (57.6 kg)   SpO2 98%   BMI 21.80 kg/m²      Physical Exam  Vitals reviewed. Eyes:      General: No scleral icterus. Conjunctiva/sclera: Conjunctivae normal.   Neck:      Thyroid: No thyromegaly. Vascular: Carotid bruit (R) present. Cardiovascular:      Rate and Rhythm: Normal rate and regular rhythm. Heart sounds: No murmur heard. Pulmonary:      Effort: Pulmonary effort is normal.      Breath sounds: Normal breath sounds. No wheezing or rales. Chest:      Comments: Healing chest incision midline of sternum  Abdominal:      General: Bowel sounds are normal.      Palpations: Abdomen is soft. There is no mass. Tenderness: There is no abdominal tenderness. There is no guarding or rebound. Musculoskeletal:         General: Normal range of motion. Cervical back: Neck supple. Lymphadenopathy:      Cervical: No cervical adenopathy. Skin:     General: Skin is warm and dry. Neurological:      Mental Status: She is alert and oriented to person, place, and time. Psychiatric:         Mood and Affect: Mood normal.       ASSESSMENT AND PLAN:    Roman Clark was seen today for follow-up from hospital and [facility]. Diagnoses and all orders for this visit:    CAD in native artery  - continue current meds per cardiology  - start cardiac rehab when able  - continue F[address]  - continue cig cessation  - follow up with endo as scheduled. Return in about 7 months (around 6/24/2023) for medicare pe.     Sherrie Garcia, DO

## 2022-11-16 ENCOUNTER — TELEPHONE (OUTPATIENT)
Dept: PULMONOLOGY | Age: 67
End: 2022-11-16

## 2022-11-16 NOTE — TELEPHONE ENCOUNTER
Return call to pt's  confused with appt card they received in mail.  states pt is already following with Dr. Patricia Douglas post [facility] and is not sure why referral appt was made. Clair rouse that Dr. Susan Márquez had made referral prior to pt's surgery for her heart re: Lung Nodules. Renea Stinson that Rn will discontinue appt and cancel referral to our office since she is already established with Dr. Patricia Douglas and is planning follow up.

## 2022-11-21 ENCOUNTER — OFFICE VISIT (OUTPATIENT)
Dept: ENDOCRINOLOGY | Age: 67
End: 2022-11-21
Payer: MEDICARE

## 2022-11-21 VITALS
RESPIRATION RATE: 18 BRPM | OXYGEN SATURATION: 96 % | BODY MASS INDEX: 22.02 KG/M2 | WEIGHT: 129 LBS | HEART RATE: 65 BPM | HEIGHT: 64 IN | SYSTOLIC BLOOD PRESSURE: 161 MMHG | DIASTOLIC BLOOD PRESSURE: 64 MMHG

## 2022-11-21 DIAGNOSIS — Z91.119 DIETARY NONCOMPLIANCE: ICD-10-CM

## 2022-11-21 DIAGNOSIS — E11.65 TYPE 2 DIABETES MELLITUS WITH HYPERGLYCEMIA, WITH LONG-TERM CURRENT USE OF INSULIN (HCC): Primary | ICD-10-CM

## 2022-11-21 DIAGNOSIS — Z79.4 TYPE 2 DIABETES MELLITUS WITH HYPERGLYCEMIA, WITH LONG-TERM CURRENT USE OF INSULIN (HCC): Primary | ICD-10-CM

## 2022-11-21 DIAGNOSIS — E03.9 HYPOTHYROIDISM, UNSPECIFIED TYPE: ICD-10-CM

## 2022-11-21 PROCEDURE — 2022F DILAT RTA XM EVC RTNOPTHY: CPT | Performed by: INTERNAL MEDICINE

## 2022-11-21 PROCEDURE — G8399 PT W/DXA RESULTS DOCUMENT: HCPCS | Performed by: INTERNAL MEDICINE

## 2022-11-21 PROCEDURE — 1123F ACP DISCUSS/DSCN MKR DOCD: CPT | Performed by: INTERNAL MEDICINE

## 2022-11-21 PROCEDURE — 3046F HEMOGLOBIN A1C LEVEL >9.0%: CPT | Performed by: INTERNAL MEDICINE

## 2022-11-21 PROCEDURE — 1111F DSCHRG MED/CURRENT MED MERGE: CPT | Performed by: INTERNAL MEDICINE

## 2022-11-21 PROCEDURE — 1036F TOBACCO NON-USER: CPT | Performed by: INTERNAL MEDICINE

## 2022-11-21 PROCEDURE — 1090F PRES/ABSN URINE INCON ASSESS: CPT | Performed by: INTERNAL MEDICINE

## 2022-11-21 PROCEDURE — G8420 CALC BMI NORM PARAMETERS: HCPCS | Performed by: INTERNAL MEDICINE

## 2022-11-21 PROCEDURE — 99214 OFFICE O/P EST MOD 30 MIN: CPT | Performed by: INTERNAL MEDICINE

## 2022-11-21 PROCEDURE — G8427 DOCREV CUR MEDS BY ELIG CLIN: HCPCS | Performed by: INTERNAL MEDICINE

## 2022-11-21 PROCEDURE — 3017F COLORECTAL CA SCREEN DOC REV: CPT | Performed by: INTERNAL MEDICINE

## 2022-11-21 PROCEDURE — G8484 FLU IMMUNIZE NO ADMIN: HCPCS | Performed by: INTERNAL MEDICINE

## 2022-11-21 RX ORDER — FLASH GLUCOSE SENSOR
KIT MISCELLANEOUS
Qty: 6 EACH | Refills: 3 | Status: SHIPPED | OUTPATIENT
Start: 2022-11-21

## 2022-11-21 RX ORDER — FLASH GLUCOSE SCANNING READER
1 EACH MISCELLANEOUS ONCE
Qty: 1 EACH | Refills: 0 | Status: SHIPPED | OUTPATIENT
Start: 2022-11-21 | End: 2022-11-21

## 2022-11-21 NOTE — PROGRESS NOTES
[address] Department of Endocrinology Diabetes and Metabolism   [address] [facility] [address]   Phone: [phone]  Fax: [phone]       Date of admission: (Not on file)  Date of service: 11/21/2022  Admitting physician: No admitting provider for patient encounter. Primary Care Physician: Otto Ferrara DO  Consultant physician: Ricardo Alvarenga MD     Reason for the consultation:  Uncontrolled DM    History of Present Illness: The history is provided by the patient. Accuracy of the patient data is excellent    Cristóbal Lopez is a very pleasant 78 y.o. old female with PMH uncontrolled DM, CAD, HLD seen today for follow up visit     The patient was recently admitted to the hospital and underwent CABG surgery on 11/2/2022      The patient was diagnosed with type 2 DM many years ago. Currently on Lantus 18u daily, Humalog 6u with meals + ss 1:50>150. Reports doing much better on this regimen. Since recent hospitalization, the Patient has been eating consistent carbohydrate meals, self-blood glucose monitoring has been better recently. In addition, patient reports both macrovascular and  microvascular complications.  The patient is not up to date with yearly diabetic eye exam.   Lab Results   Component Value Date/Time    LABA1C 9.7 10/28/2022 07:55 AM     Past medical history:   Past Medical History:   Diagnosis Date    Age-related osteoporosis without current pathological fracture 06/2021    Arthritis     Cataracts, bilateral 10/'19    Colon polyps 08/05/2021    tubular adenoma    Diabetes mellitus ([address]     DM type 2 (diabetes mellitus, type 2) ([address]     Facial basal cell cancer 03/2022    Generalovich    Hyperlipidemia     Hypertension     Hypothyroidism     Migraines     rare    Neuropathy, diabetic ([address]     Peripheral vascular disease ([address]     follows with Dr. Alex Ashton yearly    Positive colorectal cancer screening using Cologuard test     Psoriasiform dermatitis     Seasonal allergies     Thyroid disease        Past surgical history:  Past Surgical History:   Procedure Laterality Date    BACK SURGERY      L5-S1    CAROTID ENDARTERECTOMY Left 10/11/2018    [place]    CARPAL TUNNEL RELEASE      bilat     SECTION      2    COLONOSCOPY N/A 2021    COLONOSCOPY WITH BIOPSY with tattooing performed by Conrad Couch MD at [facility] N/A 2022    coronary artery bypass grafting, ZACHARY performed by Abraham Marie DO at [address] [facility] (CERVIX STATUS UNKNOWN)      LARYNGOSCOPY N/A 2020    DIRECT LARYNGOSCOPYY AND CERVICAL ESOPHAGOSCOPY (PATHOLOGY PRESENT) performed by Iraida Ramírez DO at [address] [facility]      remote- right     OTHER SURGICAL HISTORY  2018    Dr. Wynne [facility]- Bilateral iliac stents iCast [id number]    [place] OFFICE/OUTPT VISIT,PROCEDURE ONLY Left 10/11/2018    LEFT CAROTID ENDARTERECTOMY performed by Steve Palm MD at [facility]      TOE SURGERY      bilat - ingrown toe nails    TONSILLECTOMY         Social history:   Tobacco:   reports that she quit smoking about 4 years ago. Her smoking use included cigarettes. She started smoking about 52 years ago. She has a 20.00 pack-year smoking history. She has never used smokeless tobacco.  Alcohol:   reports no history of alcohol use. Drugs:   reports no history of drug use. Family history:    Family History   Problem Relation Age of Onset    Cancer Mother         lung brain smoker    Heart Attack Father     Heart Disease Father         mi in his 52's     Other Father         pulmonary fibrosis    Heart Attack Brother         48    Diabetes Brother     Heart Attack Paternal Grandfather        Allergy and drug reactions:    Allergies   Allergen Reactions    Pcn [Penicillins] Hives and Swelling    Fosamax [Alendronate] Other (See Comments)     Nausea, vomiting    Lipitor [Atorvastatin] Myalgia Myalgia not allergic      Current Outpatient Medications   Medication Sig Dispense Refill    Continuous Blood Gluc  (FREESTYLE YADIRA 2 READER) RACIEL 1 Device by Does not apply route once for 1 dose 1 each 0    Continuous Blood Gluc Sensor (FREESTYLE YADIRA 2 SENSOR) MISC Change sensor every 14 days 6 each 3    HUMALOG KWIKPEN 100 UNIT/ML SOPN INJECT 6 UNITS INTO THE SKIN 3 TIMES A DAY BEFORE MEALS      TRUEPLUS 5-BEVEL PEN NEEDLES 31G X 6 MM MISC       amiodarone (CORDARONE) 200 MG tablet Take 1 tablet by mouth See Admin Instructions Take two tablets three times daily x 1 week, then take two tablets two times daily x 1 week, then take two tablets one time daily x 1 week, then take one tablet one time daily x 1 week, then stop medication 91 tablet 0    apixaban (ELIQUIS) 5 MG TABS tablet Take 1 tablet by mouth 2 times daily 60 tablet 3    rosuvastatin (CRESTOR) 10 MG tablet Take 1 tablet by mouth nightly 30 tablet 3    metoprolol tartrate (LOPRESSOR) 25 MG tablet Take 0.5 tablets by mouth 2 times daily 60 tablet 3    ferrous sulfate (IRON 325) 325 (65 Fe) MG tablet Take 1 tablet by mouth 2 times daily (with meals) 60 tablet 0    folic acid (FOLVITE) 1 MG tablet Take 1 tablet by mouth daily 30 tablet 0    ascorbic acid (VITAMIN C) 500 MG tablet Take 1 tablet by mouth 2 times daily 30 tablet 3    insulin glargine, 1 unit dial, (TOUJEO SOLOSTAR) 300 UNIT/ML concentrated injection pen Inject 18 Units into the skin nightly 15 Adjustable Dose Pre-filled Pen Syringe 1    umeclidinium-vilanterol (ANORO ELLIPTA) 62.5-25 MCG/INH AEPB inhaler Inhale 1 puff into the lungs daily 1 each 3    levothyroxine (SYNTHROID) 25 MCG tablet Take 1 tablet by mouth Daily 90 tablet 1    citalopram (CELEXA) 20 MG tablet TAKE 1 TABLET BY MOUTH EVERY DAY 90 tablet 1    metFORMIN (GLUCOPHAGE) 500 MG tablet TAKE 1 TABLET TWICE A DAY WITH MEALS 180 tablet 1    Ixekizumab (TALTZ SC) Inject into the skin every 30 days For psoriasis omeprazole (PRILOSEC) 40 MG delayed release capsule TAKE 1 CAPSULE BY MOUTH EVERY DAY 30 capsule 1    BD PEN NEEDLE SAMEER U/F 32G X 4 MM MISC       aspirin 81 MG tablet Take 81 mg by mouth daily       No current facility-administered medications for this visit. Review of Systems  All systems reviewed. All negative except for symptoms mentioned in HPI     OBJECTIVE    BP (!) 161/64   Pulse 65   Resp 18   Ht 5' 4\" (1.626 m)   Wt 129 lb (58.5 kg)   SpO2 96%   BMI 22.14 kg/m²   No intake or output data in the 24 hours ending 11/21/22 1128      Physical examination:  General: awake alert, oriented x3  HEENT: normocephalic non traumatic, no exophthalmos   Neck: supple, No thyroid tenderness,  Pulm: good equal air entry no added sounds  CVS: S1 + S2. S/p CABG  Abd: soft lax, no tenderness  Skin: warm, no lesions, no rash.  No open wounds, no ulcers   Neuro: CN intact, sensation decreased bilateral , muscle power normal  Psych: normal mood, and affect    Review of Laboratory Data:  I personally reviewed the following labs:   Lab Results   Component Value Date/Time    WBC 8.4 11/10/2022 03:45 PM    RBC 3.08 (L) 11/10/2022 03:45 PM    HGB 9.3 (L) 11/10/2022 03:45 PM    HCT 29.8 (L) 11/10/2022 03:45 PM    HCT 27.0 (L) 11/02/2022 10:36 AM    MCV 96.8 11/10/2022 03:45 PM    MCH 30.2 11/10/2022 03:45 PM    MCHC 31.2 (L) 11/10/2022 03:45 PM    RDW 13.9 11/10/2022 03:45 PM     11/10/2022 03:45 PM    MPV 10.1 11/10/2022 03:45 PM      Lab Results   Component Value Date/Time     11/07/2022 05:30 AM    K 3.7 11/07/2022 05:30 AM    K 4.5 12/04/2018 08:10 AM    CO2 29 11/07/2022 05:30 AM    BUN 11 11/07/2022 05:30 AM    CREATININE 0.4 (L) 11/07/2022 05:30 AM    CALCIUM 8.8 11/07/2022 05:30 AM    LABGLOM >60 11/07/2022 05:30 AM    GFRAA >60 10/05/2022 09:30 AM      Lab Results   Component Value Date/Time    TSH 1.860 03/17/2022 12:00 PM     Lab Results   Component Value Date/Time    LABA1C 9.7 10/28/2022 07:55 AM GLUCOSE 152 11/07/2022 05:30 AM    GLUCOSE 157 07/21/2011 04:06 PM    MALBCR 52.8 08/27/2021 12:00 PM    LABMICR 16.9 08/27/2021 12:00 PM    LABCREA 32 08/27/2021 12:00 PM     Lab Results   Component Value Date/Time    LABA1C 9.7 10/28/2022 07:55 AM    LABA1C 11.3 06/23/2022 10:54 AM    LABA1C 10.5 03/17/2022 12:00 PM     Lab Results   Component Value Date/Time    TRIG 317 03/17/2022 12:00 PM    HDL 37 03/17/2022 12:00 PM    LDLCALC 103 03/17/2022 12:00 PM    CHOL 203 03/17/2022 12:00 PM     No results found for: [address] [place], a 78 y.o.-old female seen today for inpatient diabetes management     Diabetes Mellitus type 2  Improving control since recent hospitalization. Next A1c will likely be better   Continue Lantus 18 u daily, Humalog 6u with meals + ss 1:50>150   Continue glucose check with meals and at bedtime   Will order freestyle Shirley system   A1c at next OV     Dietary noncompliance  Improving  Discussed with patient the importance of eating consistent carbohydrate meals, avoiding high glycemic index food. Also, discussed with patient the risk and negative consequences of dietary noncompliance on blood glucose control, blood pressure and weight    Primary hypothyroidism   Continue levothyroxine 25 mcg daily     MVCAD  Pt s/p CABG surgery 11/2/202    I personally reviewed external notes from PCP and other patient's care team providers, and personally interpreted labs associated with the above diagnosis. I also ordered labs to further assess and manage the above addressed medical conditions    The above issues were reviewed with the patient who understood and agreed with the plan. Thank you for allowing us to participate in the care of this patient. Please do not hesitate to contact us with any additional questions. Diagnosis Orders   1.  Type 2 diabetes mellitus with hyperglycemia, with long-term current use of insulin (HCC)  Continuous Blood Gluc  (FREESTYLE YADIRA 2 READER) RACIEL    Continuous Blood Gluc Sensor (FREESTYLE YADIRA 2 SENSOR) MISC    Hemoglobin A1C      2. Dietary noncompliance        3. Hypothyroidism, unspecified type          Deniz Liu MD  Endocrinologist, [facility] Diabetes Care and Endocrinology   [address] [place] [id number]   Phone: [phone]  Fax: [phone]  --------------------------------------------  An electronic signature was used to authenticate this note.  Denae Villegas MD on 11/21/2022 at 11:28 AM

## 2022-11-22 ENCOUNTER — HOSPITAL ENCOUNTER (OUTPATIENT)
Dept: CARDIAC REHAB | Age: 67
Setting detail: THERAPIES SERIES
Discharge: HOME | End: 2022-11-22

## 2022-11-23 ENCOUNTER — TELEPHONE (OUTPATIENT)
Dept: ENDOCRINOLOGY | Age: 67
End: 2022-11-23

## 2022-11-29 ENCOUNTER — HOSPITAL ENCOUNTER (OUTPATIENT)
Dept: CARDIAC REHAB | Age: 67
Setting detail: THERAPIES SERIES
Discharge: HOME | End: 2022-11-29

## 2022-12-01 ENCOUNTER — HOSPITAL ENCOUNTER (OUTPATIENT)
Dept: CARDIAC REHAB | Age: 67
Setting detail: THERAPIES SERIES
Discharge: HOME | End: 2022-12-01

## 2022-12-05 ENCOUNTER — HOSPITAL ENCOUNTER (OUTPATIENT)
Dept: CARDIAC REHAB | Age: 67
Setting detail: THERAPIES SERIES
Discharge: HOME | End: 2022-12-05

## 2022-12-05 NOTE — PROGRESS NOTES
CC: S/P CABG post-operative follow up visit      HPI:  Jonny Sheldon is a 78 y.o. female whom presents to the office today for routine post-operative follow-up status post: CABG x2, LAAL on 11/2/22. She experienced post operative atrial fibrillation and was treated with beta blockade, amiodarone, and eliquis. She reverted to NSR prior to discharge. Currently, there are no complaints of any CP, SOB, major concerns, or incisional issues. Review of Systems:   Constitutional: Negative for fever and chills. Respiratory: Negative for cough, chest tightness and shortness of breath. Cardiovascular: Negative for chest pain, palpitations and leg swelling. Gastrointestinal: Negative for nausea, diarrhea and constipation. Musculoskeletal: Negative for back pain. Skin: Negative for color change. Neurological: Negative for dizziness, syncope and light-headedness. Objective:   Physical Exam   Constitutional: oriented to person, place, and time. No distress. Cardiovascular: Normal rate. Pulmonary/Chest: Effort normal. No respiratory distress. midsternal and chest tube incisions well healed without evidence of infection. Sternum stable. Abdominal: Soft. Bowel sounds are normal.   Musculoskeletal: Normal range of motion. Exhibits no edema. Neurological: alert and oriented to person, place, and time. Skin: Skin is warm and dry. No erythema. Psychiatric: normal mood and affect. Assessment:      S/P CABG        Plan:      Remove sternal precautions on 12/14/22  Cardiac rehab referral  Continue follow up with PCP, Cardiology as scheduled. Encouraged to call office with any questions, concerns. Otherwise no further follow up necessary from CTS standpoint.

## 2022-12-07 ENCOUNTER — HOSPITAL ENCOUNTER (OUTPATIENT)
Dept: CARDIAC REHAB | Age: 67
Setting detail: THERAPIES SERIES
Discharge: HOME | End: 2022-12-07

## 2022-12-09 ENCOUNTER — HOSPITAL ENCOUNTER (OUTPATIENT)
Dept: CARDIAC REHAB | Age: 67
Setting detail: THERAPIES SERIES
Discharge: HOME | End: 2022-12-09

## 2022-12-13 ENCOUNTER — OFFICE VISIT (OUTPATIENT)
Dept: CARDIOTHORACIC SURGERY | Age: 67
End: 2022-12-13

## 2022-12-13 VITALS
DIASTOLIC BLOOD PRESSURE: 54 MMHG | BODY MASS INDEX: 21.17 KG/M2 | HEIGHT: 64 IN | WEIGHT: 124 LBS | HEART RATE: 55 BPM | SYSTOLIC BLOOD PRESSURE: 132 MMHG

## 2022-12-13 DIAGNOSIS — Z95.1 S/P CABG (CORONARY ARTERY BYPASS GRAFT): Primary | ICD-10-CM

## 2022-12-13 PROCEDURE — 99024 POSTOP FOLLOW-UP VISIT: CPT | Performed by: NURSE PRACTITIONER

## 2022-12-27 ENCOUNTER — TELEPHONE (OUTPATIENT)
Dept: CARDIOLOGY CLINIC | Age: 67
End: 2022-12-27

## 2022-12-27 NOTE — TELEPHONE ENCOUNTER
Please let patient's  know that the alternative would be warfarin which can be set up through the anticoagulation clinic at [facility].

## 2022-12-27 NOTE — TELEPHONE ENCOUNTER
Patient's  states his wife was put on Eliquis in the hospital and they cant afford this medication. Asking for a alternative medication. Please advise.

## 2022-12-28 NOTE — TELEPHONE ENCOUNTER
Please let patient know that unfortunately Plavix does not work for A. fib. However, she may be able to discontinue her Eliquis soon depending on her office evaluation.

## 2022-12-28 NOTE — TELEPHONE ENCOUNTER
Patient requesting Eliquis 5mg  Given 2 boxes of Eliquis 2.5 (did not have 5mg) 2 WEEKS WORTH UNTIL 23 APPT  Patient to take 2 -2.5mg tablets twice a day. PATIENT VERBALIZED UNDERSTANDING  Lot#[id number]  EXP 2023  : 1955

## 2023-01-02 LAB — DIABETIC RETINOPATHY: NEGATIVE

## 2023-01-03 ENCOUNTER — TELEPHONE (OUTPATIENT)
Dept: ENDOCRINOLOGY | Age: 68
End: 2023-01-03

## 2023-01-11 ENCOUNTER — OFFICE VISIT (OUTPATIENT)
Dept: CARDIOLOGY CLINIC | Age: 68
End: 2023-01-11
Payer: MEDICARE

## 2023-01-11 VITALS
WEIGHT: 126 LBS | DIASTOLIC BLOOD PRESSURE: 68 MMHG | HEART RATE: 65 BPM | SYSTOLIC BLOOD PRESSURE: 148 MMHG | BODY MASS INDEX: 21.51 KG/M2 | RESPIRATION RATE: 14 BRPM | HEIGHT: 64 IN

## 2023-01-11 DIAGNOSIS — I74.09 AORTOILIAC OCCLUSIVE DISEASE (HCC): ICD-10-CM

## 2023-01-11 DIAGNOSIS — E78.2 MIXED HYPERLIPIDEMIA: ICD-10-CM

## 2023-01-11 DIAGNOSIS — I97.89 POSTOPERATIVE ATRIAL FIBRILLATION (HCC): ICD-10-CM

## 2023-01-11 DIAGNOSIS — I48.91 POSTOPERATIVE ATRIAL FIBRILLATION (HCC): ICD-10-CM

## 2023-01-11 DIAGNOSIS — Z95.1 HX OF CABG: Primary | ICD-10-CM

## 2023-01-11 PROCEDURE — G8420 CALC BMI NORM PARAMETERS: HCPCS | Performed by: INTERNAL MEDICINE

## 2023-01-11 PROCEDURE — G8399 PT W/DXA RESULTS DOCUMENT: HCPCS | Performed by: INTERNAL MEDICINE

## 2023-01-11 PROCEDURE — 1090F PRES/ABSN URINE INCON ASSESS: CPT | Performed by: INTERNAL MEDICINE

## 2023-01-11 PROCEDURE — 3017F COLORECTAL CA SCREEN DOC REV: CPT | Performed by: INTERNAL MEDICINE

## 2023-01-11 PROCEDURE — 1123F ACP DISCUSS/DSCN MKR DOCD: CPT | Performed by: INTERNAL MEDICINE

## 2023-01-11 PROCEDURE — 99214 OFFICE O/P EST MOD 30 MIN: CPT | Performed by: INTERNAL MEDICINE

## 2023-01-11 PROCEDURE — G8484 FLU IMMUNIZE NO ADMIN: HCPCS | Performed by: INTERNAL MEDICINE

## 2023-01-11 PROCEDURE — G8427 DOCREV CUR MEDS BY ELIG CLIN: HCPCS | Performed by: INTERNAL MEDICINE

## 2023-01-11 PROCEDURE — 93000 ELECTROCARDIOGRAM COMPLETE: CPT | Performed by: INTERNAL MEDICINE

## 2023-01-11 PROCEDURE — 1036F TOBACCO NON-USER: CPT | Performed by: INTERNAL MEDICINE

## 2023-01-11 NOTE — PROGRESS NOTES
Patient Active Problem List   Diagnosis    Asymptomatic bilateral carotid artery stenosis    History of nuclear stress test    Mixed hyperlipidemia    Aortoiliac occlusive disease (HCC)    PVD (peripheral vascular disease) with claudication ([facility])    Carotid stenosis, asymptomatic, bilateral    S/P carotid endarterectomy    Tobacco abuse    Lesion of true vocal cord    Type 2 diabetes mellitus    Senile osteoporosis    Scoliosis    Lumbar spinal stenosis    CAD in native artery    Postoperative hypertension    Acute pulmonary insufficiency    Hyperglycemia    Status post aorto-coronary artery bypass graft    Postoperative atrial fibrillation (HCC)    Type 2 diabetes mellitus with hyperglycemia    Hypothyroidism    Dietary noncompliance       Current Outpatient Medications   Medication Sig Dispense Refill    metFORMIN (GLUCOPHAGE) 500 MG tablet TAKE 1 TABLET TWICE A DAY WITH MEALS 180 tablet 1    Continuous Blood Gluc Sensor (FREESTYLE YADIRA 2 SENSOR) MISC Change sensor every 14 days 6 each 3    HUMALOG KWIKPEN 100 UNIT/ML SOPN INJECT 6 UNITS INTO THE SKIN 3 TIMES A DAY BEFORE MEALS      TRUEPLUS 5-BEVEL PEN NEEDLES 31G X 6 MM MISC       apixaban (ELIQUIS) 5 MG TABS tablet Take 1 tablet by mouth 2 times daily 60 tablet 3    rosuvastatin (CRESTOR) 10 MG tablet Take 1 tablet by mouth nightly 30 tablet 3    insulin glargine, 1 unit dial, (TOUJEO SOLOSTAR) 300 UNIT/ML concentrated injection pen Inject 18 Units into the skin nightly 15 Adjustable Dose Pre-filled Pen Syringe 1    umeclidinium-vilanterol (ANORO ELLIPTA) 62.5-25 MCG/INH AEPB inhaler Inhale 1 puff into the lungs daily 1 each 3    levothyroxine (SYNTHROID) 25 MCG tablet Take 1 tablet by mouth Daily 90 tablet 1    citalopram (CELEXA) 20 MG tablet TAKE 1 TABLET BY MOUTH EVERY DAY 90 tablet 1    Ixekizumab (TALTZ SC) Inject into the skin every 30 days For psoriasis      omeprazole (PRILOSEC) 40 MG delayed release capsule TAKE 1 CAPSULE BY MOUTH EVERY DAY 30  capsule 1    BD PEN NEEDLE SAMEER U/F 32G X 4 MM MISC       aspirin 81 MG tablet Take 81 mg by mouth daily       No current facility-administered medications for this visit. CC:    Patient is seen in follow up for:  1. Hx of CABG - s/p AtriCure    2. Mixed hyperlipidemia    3. Aortoiliac occlusive disease ([address]    4. Postoperative atrial fibrillation (HCC)        HPI:  Patient is doing well without any specific cardiac problems. Patient denies any shortness of breath, chest pain, lightheadedness or dizziness. Patient is tolerating medications well without side effects. High cost of Eliquis is an issue.     ROS:   General: No unusual weight gain, no change in exercise tolerance  Skin: No rash or itching  EENT: No vision changes or nosebleeds  Cardiovascular: No orthopnea or paroxysmal nocturnal dyspnea  Respiratory: No cough or hemoptysis  Gastrointestinal: No hematemesis or recent changes in bowel habits  Genitourinary: No hematuria, urgency or frequency  Musculoskeletal: No muscular weakness or joint swelling   Neurologic / Psychiatric: No incoordination or convulsions  Allergic / Immunologic/ Lymphatic / Endocrine: No anemia or bleeding tendency    Social History     Socioeconomic History    Marital status:      Spouse name: Not on file    Number of children: Not on file    Years of education: Not on file    Highest education level: Not on file   Occupational History    Occupation:  bookeeper   Tobacco Use    Smoking status: Former     Packs/day: 0.50     Years: 40.00     Pack years: 20.00     Types: Cigarettes     Start date: 5     Quit date: 2018     Years since quittin.4    Smokeless tobacco: Never   Vaping Use    Vaping Use: Never used   Substance and Sexual Activity    Alcohol use: No    Drug use: Never    Sexual activity: Not on file   Other Topics Concern    Not on file   Social History Narrative    Not on file     Social Determinants of Health     Financial Resource Strain: Low Risk     Difficulty of Paying Living Expenses: Not hard at all   Food Insecurity: No Food Insecurity    Worried About Running Out of Food in the Last Year: Never true    Ran Out of Food in the Last Year: Never true   Transportation Needs: Not on file   Physical Activity: Inactive    Days of Exercise per Week: 0 days    Minutes of Exercise per Session: 120 min   Stress: Not on file   Social Connections: Not on file   Intimate Partner Violence: Not on file   Housing Stability: Not on file       Family History   Problem Relation Age of Onset    Cancer Mother         lung brain smoker    Heart Attack Father     Heart Disease Father         mi in his 52's     Other Father         pulmonary fibrosis    Heart Attack Brother         48    Diabetes Brother     Heart Attack Paternal Grandfather        Past Medical History:   Diagnosis Date    Age-related osteoporosis without current pathological fracture 06/2021    Arthritis     Cataracts, bilateral 10/'19    Colon polyps 08/05/2021    tubular adenoma    Diabetes mellitus ([address]     DM type 2 (diabetes mellitus, type 2) ([address]     Facial basal cell cancer 03/2022    Generalovich    Hyperlipidemia     Hypertension     Hypothyroidism     Migraines     rare    Neuropathy, diabetic ([address]     Peripheral vascular disease ([address]     follows with Dr. Sameera Gray yearly    Positive colorectal cancer screening using Cologuard test     Psoriasiform dermatitis     Seasonal allergies     Thyroid disease        PHYSICAL EXAM:  CONSTITUTIONAL:  Well developed, well nourished    Vitals:    01/11/23 0940 01/11/23 0941   BP: (!) 158/70 (!) 148/68   Pulse: 65    Resp: 14    Weight: 126 lb (57.2 kg)    Height: 5' 4\" (1.626 m)      HEAD & FACE: Normocephalic. Symmetric. EYES: No xanthelasma. Conjunctivae not injected. EARS, NOSE, MOUTH & THROAT: Good dentition. No oral pallor or cyanosis. NECK: No JVD at 30 degrees. No thyromegaly.   RESPIRATORY: Clear to auscultation and percussion in all fields. No use of accessory muscle or intercostal retractions. CARDIOVASCULAR: Regular rate and rhythm. No lifts or thrills on palpitation. Auscultation with normal S1-S2 in intensity and splitting. Grade 2/6 early peaking systolic ejection murmur heard best the right sternal border without radiation. No carotid bruits. Abdominal aorta not enlarged. Femoral arteries without bruits. Pedal pulses 1+. No edema. ABDOMEN: Soft without hepatic or splenic enlargement. No tenderness. MUSCULOSKELETAL: No kyphosis or scoliosis of the back. Good muscle strength and tone. No muscle atrophy. Normal gait and ability to undergo exercise stress testing. EXTREMITIES: No clubbing or cyanosis. SKIN: No Xanthomas or ulcerations. NEUROLOGIC: Oriented to time, place and person. Normal mood and affect. LYMPHATIC:  No palpable neck or supraclavicular nodes. No splenomegaly. EKG: the EKG tracing was reviewed and found to reveal: Normal sinus rhythm.  ms. No change compared to prior tracing. ASSESSMENT:                                                     ORDERS:       Diagnosis Orders   1. Hx of CABG - s/p AtriCure  EKG 12 lead      2. Mixed hyperlipidemia  Lipid Panel      3. Aortoiliac occlusive disease (HCC)  EKG 12 lead      4. Postoperative atrial fibrillation (HCC)  EKG 12 lead        Above assessment cardiac issues stable. PLAN:   See above orders. Medication reconciliation completed. Old records were reviewed and found to reveal: Needs follow-up of lipids since recently switched to Crestor  Set up for switch from Eliquis to Coumadin at anticoagulation clinic. Discussed issues that would prompt earlier evaluation. Same cardiac medications. Follow-up office visit in 6 months.

## 2023-01-12 DIAGNOSIS — F34.1 DYSTHYMIA: ICD-10-CM

## 2023-01-12 RX ORDER — CITALOPRAM 20 MG/1
TABLET ORAL
Qty: 90 TABLET | Refills: 1 | Status: SHIPPED | OUTPATIENT
Start: 2023-01-12

## 2023-01-13 ENCOUNTER — HOSPITAL ENCOUNTER (OUTPATIENT)
Dept: CARDIAC REHAB | Age: 68
Setting detail: THERAPIES SERIES
Discharge: HOME | End: 2023-01-13
Payer: MEDICARE

## 2023-01-13 VITALS — WEIGHT: 125.4 LBS | OXYGEN SATURATION: 97 % | BODY MASS INDEX: 21.41 KG/M2 | HEIGHT: 64 IN | RESPIRATION RATE: 20 BRPM

## 2023-01-13 PROCEDURE — 93797 PHYS/QHP OP CAR RHAB WO ECG: CPT

## 2023-01-13 PROCEDURE — 93798 PHYS/QHP OP CAR RHAB W/ECG: CPT

## 2023-01-13 ASSESSMENT — PATIENT HEALTH QUESTIONNAIRE - PHQ9
1. LITTLE INTEREST OR PLEASURE IN DOING THINGS: 0
SUM OF ALL RESPONSES TO PHQ QUESTIONS 1-9: 0
SUM OF ALL RESPONSES TO PHQ QUESTIONS 1-9: 0
2. FEELING DOWN, DEPRESSED OR HOPELESS: 0
SUM OF ALL RESPONSES TO PHQ QUESTIONS 1-9: 0
SUM OF ALL RESPONSES TO PHQ9 QUESTIONS 1 & 2: 0
SUM OF ALL RESPONSES TO PHQ QUESTIONS 1-9: 0

## 2023-01-13 ASSESSMENT — EJECTION FRACTION: EF_VALUE: 65

## 2023-01-13 ASSESSMENT — EXERCISE STRESS TEST
PEAK_HR: 84
PEAK_BP: 154/68
PEAK_RPE: 11
PEAK_BP: 154/68

## 2023-01-13 ASSESSMENT — LIFESTYLE VARIABLES
CIGARETTES_PER_DAY: 1/2 PACK PER DAY
SMOKELESS_TOBACCO: NO

## 2023-01-13 NOTE — PROGRESS NOTES
01/13/23 0945   Treatment Diagnosis   Treatment Diagnosis 1 CABG   CABG Date 11/02/22   Referral Date 12/13/22   Co-morbidities Diabetes;Pulmonary disease   Oxygen Saturation / Titration    Stages of Change  Maintenance   Oxygen Intervention   Oxygen Use No   Individual Treatment Plan   ITP Visit Type Initial assessment   1st Date of Exercise  01/13/23   ITP Next Review Date 02/08/23   Visit #/Total Visits 2/36   EF% 65 %   Risk Stratification Low   ITP Exercise;Psychosocial;Tobacco;Nutrition;Education   Exercise    Stages of Change Action   Assisted Devices None   Test Six minute walk test   Data Measured Before Walk   Heart Rate 65   Blood Pressure 151/69   O2 Saturation 97   O2 Device Room air   RPE 6   Data Measured during Walk   Indicate Mode of RPE 6 minutes   RPE Data Measured During   Treadmill Speed 1.1 mph   Treadmill Grade 0 %   Symptoms Fatigue;Leg pain;Tightness  (all in legs due to normal dumbess, leg pain was tolerable during walk test she stated her legs are just very weak.)   Any problems while exercising leg pain  (all in legs due to normal dumbess, leg pain was tolerable during walk test she stated her legs are just very weak.)   Do You Have Shortness of Breath No   Describe Type of Pain You Are Having pain/numbess is legs due to weakness in legs. (pt trying to do more to improve issue)   Peak RPE 11   Data Measured Immediately After Walk   Distance Walked in  [employer] (miles) 0.1   Distance Walked in Feet (Calculated) 528 ft   Distance Walked (ft) 528 ft   Peak Heart Rate 84   Peak Blood Pressure 154/68   RPE 11   O2 Saturation 98   Data Measured at 5 Minutes After Walk   Heart Rate 69   Blood Pressure 144/64   SpO2 97 %   Comments achieved a MET level of 1.9 during walk test   Exercise Prescription   Mode Treadmill;Bike;Ergometer; Stepper  (stepper- Nu-Step)   Frequency per week 2   Duration Per Session 30-60   Intensity METS       3-4  (goal to work up to 2-4 METs, light to mod. Pt currently at 1.6-1.9 METs)   RPE 8-13   Progression Work up to 60 mins of exercise at a mod intensity of 3-4 METs bye end of 36 sessions in program.   Symptoms with Exercise Leg pain   Target Heart Rate 100-140  ([place] 40-85%)   Resistance Training Yes   Exercise Blood Pressures   Resting /69   Peak /68   Is BP WDL No   Exercise Activity at Home   Type none, besides normal ADL. states shes is on her feet a lot at home. Frequency N/A   Duration N/A   Resistance Training No   Exercise Education   Education Exercise safety;Signs/symptoms to report;RPE scale;Equipment orientation; Warm up/cool down;Physically active   Exercise Target Goal   Target Goal(s) Individual exercise RX; Aerobic activity 30 + minutes/day  5 days/week   Patient Stated Exercise Goals wants to walk more and be able to walk longer without leg pain and fatigue. Psychosocial   Stages of Change Contemplate;Preparation; Action;Maintenance   PHQ-9 Total Score 0   Psychosocial Intervention   Interventions No intervention indicated   Consults   (NONE)   Currently Taking Psychotropic Meds Yes  (CELEXA)   Medication Changes No   Psychosocial Education   Education Coping techniques;Signs/symptoms of depression   Psychosocial Target Goals   Target Goal(s) Assess presence or absence of depression using a valid screening tool;Maximizes coping skills   Uses Stress Mgmt Techniques Yes  (LISTENING TO MUSIC, DEEP BREATHING)   Patient Stated Psychosocial Goals KEEP ANXIETY/ DEPRESSION AT A MANAGEABLE LEVEL AND CONTINUE MEDICATION   Tobacco   Stages of Change Action;Maintenance   Tobacco Use Yes   Quit Less than 6 month   Date Started   (SMOKED FOR 36+ YEARS)   Date Quit 08/28/22   # Cigarette Smoked/Day 1/2 PACK PER DAY   Smokeless Tobacco Use No   Tobacco Cessation Intervention   Smoking Cessation Referral No   Tobacco Adjunct No   Tobacco Education   Resource Information Provided No   Tobacco Triggers none   Target Goal   Target Goal   (N/A) Patient Stated Tobacco Goals REMAIN SMOKE FREE   Nutrition   Stages of Change Contemplate;Preparation;Maintenance; Action   Diabetes Yes      HgbA1c 9.7   BG at Home Yes  (180 THIS AM)   Diabetes Med(s) METFORMIN, HUMALOG, TOUJEO   Diabetes Med(s) Change No   BG in Range No   FBS Date 11/07/22   HgbA1c Date 10/28/22   BG Frequency PT HAS  A  CONTINUOUS MONITOR  FREESTYLE, ITS NOT WORKING PROPERLY. . PT CHECKING BS 3 X DAILY   Lipids   Date Lipids Drawn 03/17/22   Total 203   HDL 37      Triglycerides 317   Lipid Med(s) CRESTOR   Lipid Med Change(s) No   Weight Management   Weight  125 lb 6.4 oz (56.9 kg)   Height  5' 4\" (1.626 m)   BMI 21.57   Alcohol None   Nutrition Intervention   Dietitian Consult No  (TBD, no dietitian at facility at this time.)   Nurse/Patient Discussion Yes  (Wolfe Arlene HANDOUTS GIVEN AND REVIEWED WITH PT.)   Nutrition Goals ADHERE TO CARDIAC AND DIABETIC DIETS   Nutrition Class No   Diabetes Education Referral No   Lipid Clinic Referral No   Weight Management Referral No   Nutrition Education   Education Signs/symptoms/treatment of hypo/hyperglycemia;DM & CAD relationship;Low sodium diet; Low saturated fat diet;Limit added sugars; Overall healthy eating pattern that emphasizes vegetables, fruits, wholegrains, healthy proteins and non-tropical oils   Nutrition Target Goals   Target Goals LDL-C less than 70 and non-HDL-C <100 for those with ASCVD;Triglycerides less than 150;HbA1C less than 7 percent for those with diabetes   Patient Stated Nutrition Goals MAINTAIN CARDIAC AND DIABETIC DIET.  KEEP BS UNDER CONTROL   Education   Learning Barrier Ready to learn   Cardiac Knowledge Total Score 9   Education Intervention   Education Schedule Given Yes   Patient Education    Education CAD;Risk factors;Med compliance;Cardiac A&P;Signs/Symptoms of angina   Hypertension Yes   Hypertension Controlled Yes  (PT STATED ITS CONTROLLED AT HOME)   Is BP WDL No   Med(s) Change No  (PT IS NOT ON ANY BP MEDS)   BP Meds NONE   ACE/ARB Prescribed No   ASA Prescribed Yes   ASA Adherent Yes   Antiplatelet Prescribed Yes   Antiplatelet Adherent Yes   BB Prescribed No   Statins Prescribed Yes   Statins Adherent Yes   Statin Intensity Moderate   Education Target Goals   Target Goals Medication compliance; Risk factors; Understand target guidelines for lipids; Understand target guidelines for B/P   Patient Stated Education Goals MED COMPLIANT   Staff Treatment Goals   Goals Exercise;Education   Exercise Goal Work up to 60 mins of exercise at a mod intensity of 3-4 METs bye end of 36 sessions in program.   Exercise Goal Status Initial   Exercise Goal Assessment Recommendations;Frequency/duration   Education Goal Improve overall cardiac knowledge on risk factors, diet, exercise, and diabetes, and medication control by end of 36 sessions. Provide additional information to help.    Education Goal Status Initial   Education Goal Assessment Recommendations;Treatment time frame

## 2023-01-13 NOTE — CARDIO/PULMONARY
PT. SEEN IN CARDIAC REHAB FOR INITAIL EVALUATION AND EXERCISE. PT S/P CABG X2 ON 11-2-2022. PT HAS A HX OF DIABETES TYPE 2. PT HAS A FREESTLYE MONITORING DEVICE ABD IS NOT FUNCTIONING PROPERLY. THEREFORE SHE TAKES HER BS 3 X DAILY. PT ALSO HAS A HX OF BACK PROBLEMS THAT IS GOING TO REQUIRE SURGERY IN THE FUTURE. SHE DOES HAVE NUMBNESS IN HER LEFT LEG AND FOOT DUE TO BACK ISSUES. STERNAL INCISION IS HEALING WELL. . NO S/S OF INFECTION. NO SWELLING OF FEET OR ANKLES. SHE IS UNSURE IF SHE HAS ANY EXERCISE/ EQUIPMENT LIMITATIONS DUE TO HER BACK PROBLEMS. PT ALSO STATES SHE HAS COPD AND SEES A PULMONOLOGIST. PT TAKES CELEXA FOR ANXIETY. STATES ITS UNDER CONTROL. PHQ9 SCORE IS A 0. DENIES S/S OF DEPRESSION. FALLS RISK IS A 4/8 MODERATE RISK. PT.'S GOAL IS TO BUILD UP HER ENDURANCE.

## 2023-01-13 NOTE — PROGRESS NOTES
DR Sunita Costa OFFICE CALLED TO CHECK TO SEE IF PT. IS ON ANY BP MEDICATIONS. MESSAGE LEFT AWAITING CALL BACK.

## 2023-01-17 ENCOUNTER — TELEPHONE (OUTPATIENT)
Dept: CARDIOLOGY CLINIC | Age: 68
End: 2023-01-17

## 2023-01-17 NOTE — TELEPHONE ENCOUNTER
Please let patient know that blood pressure elevated in office and at rehab. Would like to try her on Toprol XL 25 mg daily. Also helps maintain a normal heart rhythm.

## 2023-01-17 NOTE — TELEPHONE ENCOUNTER
Cardiac rehab called and would like to know if patient is on anything for BP. Her blood pressure was 151./69.  should patient be on Metoprolol?       [phone]

## 2023-01-18 ENCOUNTER — HOSPITAL ENCOUNTER (OUTPATIENT)
Dept: CARDIAC REHAB | Age: 68
Setting detail: THERAPIES SERIES
Discharge: HOME | End: 2023-01-18
Payer: MEDICARE

## 2023-01-18 ENCOUNTER — TELEPHONE (OUTPATIENT)
Dept: PHARMACY | Age: 68
End: 2023-01-18

## 2023-01-18 ENCOUNTER — TELEPHONE (OUTPATIENT)
Dept: ENDOCRINOLOGY | Age: 68
End: 2023-01-18

## 2023-01-18 PROCEDURE — 93798 PHYS/QHP OP CAR RHAB W/ECG: CPT

## 2023-01-18 RX ORDER — METOPROLOL SUCCINATE 25 MG/1
25 TABLET, EXTENDED RELEASE ORAL DAILY
Qty: 30 TABLET | Refills: 5 | Status: SHIPPED | OUTPATIENT
Start: 2023-01-18

## 2023-01-18 RX ORDER — WARFARIN SODIUM 5 MG/1
TABLET ORAL
Qty: 30 TABLET | Refills: 3 | Status: SHIPPED | OUTPATIENT
Start: 2023-01-18

## 2023-01-18 NOTE — TELEPHONE ENCOUNTER
Called and left message on patient's VM requesting call back to schedule initial visit at the [address] [place] [address]. Provided our office hours for return call: M/W/Th/F: 9 am - 4 pm; closed daily from 12 noon to 1 pm.    Upon call back, please ask:  1) How much Eliquis does she have left? 2) Does she have an Rx for warfarin? 3) If not, which pharmacy would she like Rx for warfarin sent to?     Electronically signed by Martha Davidson on 1/18/23 at 10:52 AM EST

## 2023-01-18 NOTE — TELEPHONE ENCOUNTER
Patient to start warfarin 5 mg daily (in the evening) on 1/27/23. She should also continue to take Eliquis until her first visit at the [facility] on 1/30/23. Warfarin prescription sent to her pharmacy.      Thank you,   Saroj Cruz, [address] [place] [address], PharmD 1/18/2023 3:05 PM

## 2023-01-18 NOTE — TELEPHONE ENCOUNTER
Patient returned called and left message on  stating she was returning call to schedule test for if she can take Coumadin. Returned call and spoke with patient. She only has enough Eliquis to get through Wednesday 1/25/23. She will call Dr. Elen Rodriguez office to request enough samples to get through 2/2/23.     - Patient does not have an Rx for warfarin.  - Please send Rx for warfarin to [facility] in [place], [place] (on [address] [facility]). Patient is now scheduled for the following:  - Monday 1/30/23 @ 1:45 pm (arrive by 1:30 pm to register);  - Thursday 2/2/23 @ 1:30 pm;  - Monday 2/623 @ 2:15 pm.    For initial visit on 1/30, please bring a valid photo ID, insurance card(s), and an updated medication list including any over the counter medications, such as, vitamins or supplements. If easier, please bring all of the actual medication bottles. This afternoon, once I speak with our pharmacist for instructions on how/when to begin warfarin, I will call back to inform her.     Electronically signed by Modesta Wing on 1/18/23 at 2:18 PM EST

## 2023-01-18 NOTE — TELEPHONE ENCOUNTER
Called and spoke with patient. Stated the warfarin prescription has been sent to her pharmacy. Stated, per pharmacist, start taking warfarin 5 mg daily (in the evening) on Friday 1/27/23. She should also continue to take Eliquis until her first visit at the [facility] on 1/30/23. Patient contacted Dr. Analilia Walkered office for more Eliquis samples, but they are out. She will contact Dr. Em Mckenna' office tomorrow to request samples. She will call back to let us know whether or not she is able to obtain samples through 2/2/23.     Electronically signed by Angela Carmona on 1/18/23 at 4:53 PM EST

## 2023-01-19 NOTE — TELEPHONE ENCOUNTER
Patient called and left message on VM stating she just wanted to let us know that she was able to get more Eliquis through her family doctor.     Electronically signed by Lizz Lucas on 1/19/23 at 4:26 PM EST

## 2023-01-25 ENCOUNTER — HOSPITAL ENCOUNTER (OUTPATIENT)
Dept: CARDIAC REHAB | Age: 68
Setting detail: THERAPIES SERIES
End: 2023-01-25
Payer: MEDICARE

## 2023-01-25 DIAGNOSIS — F34.1 DYSTHYMIA: ICD-10-CM

## 2023-01-25 RX ORDER — CITALOPRAM 20 MG/1
TABLET ORAL
Qty: 90 TABLET | Refills: 1 | OUTPATIENT
Start: 2023-01-25

## 2023-01-27 ENCOUNTER — HOSPITAL ENCOUNTER (OUTPATIENT)
Dept: CARDIAC REHAB | Age: 68
Setting detail: THERAPIES SERIES
Discharge: HOME | End: 2023-01-27
Payer: MEDICARE

## 2023-01-27 ENCOUNTER — OFFICE VISIT (OUTPATIENT)
Dept: ENT CLINIC | Age: 68
End: 2023-01-27
Payer: MEDICARE

## 2023-01-27 VITALS
HEIGHT: 64 IN | WEIGHT: 125 LBS | SYSTOLIC BLOOD PRESSURE: 175 MMHG | OXYGEN SATURATION: 99 % | BODY MASS INDEX: 21.34 KG/M2 | HEART RATE: 56 BPM | DIASTOLIC BLOOD PRESSURE: 76 MMHG

## 2023-01-27 DIAGNOSIS — R09.89 GLOBUS SENSATION: ICD-10-CM

## 2023-01-27 DIAGNOSIS — J39.2 THROAT IRRITATION: Primary | ICD-10-CM

## 2023-01-27 DIAGNOSIS — K21.9 GASTROESOPHAGEAL REFLUX DISEASE WITHOUT ESOPHAGITIS: ICD-10-CM

## 2023-01-27 DIAGNOSIS — F17.200 SMOKING: ICD-10-CM

## 2023-01-27 PROCEDURE — G8420 CALC BMI NORM PARAMETERS: HCPCS | Performed by: OTOLARYNGOLOGY

## 2023-01-27 PROCEDURE — 1123F ACP DISCUSS/DSCN MKR DOCD: CPT | Performed by: OTOLARYNGOLOGY

## 2023-01-27 PROCEDURE — 99213 OFFICE O/P EST LOW 20 MIN: CPT | Performed by: OTOLARYNGOLOGY

## 2023-01-27 PROCEDURE — 3017F COLORECTAL CA SCREEN DOC REV: CPT | Performed by: OTOLARYNGOLOGY

## 2023-01-27 PROCEDURE — G8427 DOCREV CUR MEDS BY ELIG CLIN: HCPCS | Performed by: OTOLARYNGOLOGY

## 2023-01-27 PROCEDURE — 93798 PHYS/QHP OP CAR RHAB W/ECG: CPT

## 2023-01-27 PROCEDURE — 1090F PRES/ABSN URINE INCON ASSESS: CPT | Performed by: OTOLARYNGOLOGY

## 2023-01-27 PROCEDURE — G8484 FLU IMMUNIZE NO ADMIN: HCPCS | Performed by: OTOLARYNGOLOGY

## 2023-01-27 PROCEDURE — 1036F TOBACCO NON-USER: CPT | Performed by: OTOLARYNGOLOGY

## 2023-01-27 PROCEDURE — G8399 PT W/DXA RESULTS DOCUMENT: HCPCS | Performed by: OTOLARYNGOLOGY

## 2023-01-27 ASSESSMENT — ENCOUNTER SYMPTOMS
EYE PAIN: 0
VOMITING: 0
SINUS PRESSURE: 0
SORE THROAT: 0
BACK PAIN: 0
DIARRHEA: 0
ALLERGIC/IMMUNOLOGIC NEGATIVE: 1
RHINORRHEA: 0
COUGH: 0
SHORTNESS OF BREATH: 0
EYE DISCHARGE: 0

## 2023-01-27 NOTE — PROGRESS NOTES
Subjective:      Patient ID:  Lina Quezada is a 80 y.o. female. HPI:    Patient presents today for throat soreness. Condition has been present for 6 month(s). Pt recently had heart surgery in November. Still recovering. She is also having some throat irritation and left sided jaw pain over the last 3 weeks. Pt is still  taking medicine for reflux as well.    Past Medical History:   Diagnosis Date    Age-related osteoporosis without current pathological fracture 2021    Arthritis     Cataracts, bilateral 10/'19    Colon polyps 2021    tubular adenoma    Diabetes mellitus ([address]     DM type 2 (diabetes mellitus, type 2) ([address]     Facial basal cell cancer 2022    [facility]    Hyperlipidemia     Hypertension     Hypothyroidism     Migraines     rare    Neuropathy, diabetic ([address]     Peripheral vascular disease ([address]     follows with Dr. Aylin Womack yearly    Positive colorectal cancer screening using Cologuard test     Psoriasiform dermatitis     Seasonal allergies     Thyroid disease      Past Surgical History:   Procedure Laterality Date    BACK SURGERY      L5-S1    CARDIAC SURGERY      CAROTID ENDARTERECTOMY Left 10/11/2018    [place]    CARPAL TUNNEL RELEASE      bilat     SECTION      2    COLONOSCOPY N/A 2021    COLONOSCOPY WITH BIOPSY with tattooing performed by Amado Castañeda MD at [facility] N/A 2022    coronary artery bypass grafting, ZACHARY performed by Contreras Christine DO at [id number] [place] [address] (CERVIX STATUS UNKNOWN)      LARYNGOSCOPY N/A 2020    DIRECT LARYNGOSCOPYY AND CERVICAL ESOPHAGOSCOPY (PATHOLOGY PRESENT) performed by Cleo Calvin DO at [address] [facility]      remote- right     OTHER SURGICAL HISTORY  2018    Dr. Aylin Womack- Bilateral iliac stents iCast [id number]    [place] OFFICE/OUTPT VISIT,PROCEDURE ONLY Left 10/11/2018    LEFT CAROTID ENDARTERECTOMY [facility]      TOE SURGERY      bilat - ingrown toe nails    TONSILLECTOMY       Family History   Problem Relation Age of Onset    Cancer Mother         lung brain smoker    Heart Attack Father     Heart Disease Father         mi in his 52's     Other Father         pulmonary fibrosis    Heart Attack Brother         48    Diabetes Brother     Heart Attack Paternal Grandfather      Social History     Socioeconomic History    Marital status:      Spouse name: None    Number of children: None    Years of education: None    Highest education level: None   Occupational History    Occupation:  bookeeper   Tobacco Use    Smoking status: Former     Packs/day: 0.50     Years: 40.00     Pack years: 20.00     Types: Cigarettes     Start date: 5     Quit date: 2018     Years since quittin.5    Smokeless tobacco: Never   Vaping Use    Vaping Use: Never used   Substance and Sexual Activity    Alcohol use: No    Drug use: Never     Social Determinants of Health     Financial Resource Strain: Low Risk     Difficulty of Paying Living Expenses: Not hard at all   Food Insecurity: No Food Insecurity    Worried About Running Out of Food in the Last Year: Never true    Ran Out of Food in the Last Year: Never true   Physical Activity: Inactive    Days of Exercise per Week: 0 days    Minutes of Exercise per Session: 120 min     Allergies   Allergen Reactions    Pcn [Penicillins] Hives and Swelling    Fosamax [Alendronate] Other (See Comments)     Nausea, vomiting    Lipitor [Atorvastatin] Myalgia     Myalgia not allergic , approved to take by provider       Review of Systems   Constitutional:  Negative for chills and fever. HENT:  Positive for congestion and postnasal drip. Negative for ear discharge, ear pain, hearing loss, rhinorrhea, sinus pressure, sneezing and sore throat. Eyes:  Negative for pain and discharge. Respiratory:  Negative for cough and shortness of breath. Cardiovascular:  Negative for chest pain. Gastrointestinal:  Negative for diarrhea and vomiting. Genitourinary:  Negative for flank pain. Musculoskeletal:  Negative for back pain and neck pain. Skin:  Negative for rash. Allergic/Immunologic: Negative. Neurological:  Negative for syncope and headaches. All other systems reviewed and are negative. Objective:     Vitals:    01/27/23 1041   BP: (!) 175/76   Pulse: 56   SpO2: 99%     Physical Exam  Vitals reviewed. Constitutional:       Appearance: Normal appearance. HENT:      Head: Normocephalic and atraumatic. Jaw: There is normal jaw occlusion. No tenderness. Right Ear: Tympanic membrane, ear canal and external ear normal.      Left Ear: Tympanic membrane, ear canal and external ear normal.      Nose: Nose normal.      Right Turbinates: Not swollen or pale. Left Turbinates: Not swollen or pale. Mouth/Throat:      Lips: Pink. Mouth: Mucous membranes are moist.      Dentition: Has dentures. Pharynx: Oropharynx is clear. Comments: Pt jaw palpated and does not have some crepitance on the bilateral and excursion is bilaterally. Eyes:      General: Lids are normal.      Conjunctiva/sclera: Conjunctivae normal.      Pupils: Pupils are equal, round, and reactive to light. Cardiovascular:      Rate and Rhythm: Normal rate and regular rhythm. Pulses: Normal pulses. Pulmonary:      Effort: Pulmonary effort is normal. No respiratory distress. Breath sounds: No stridor. Abdominal:      General: Abdomen is flat. Palpations: Abdomen is soft. Musculoskeletal:         General: Normal range of motion. Cervical back: Normal range of motion. No rigidity. Skin:     General: Skin is warm and dry. Neurological:      General: No focal deficit present. Mental Status: She is alert and oriented to person, place, and time.    Psychiatric:         Attention and Perception: Attention normal.         Mood and Affect: Affect normal.         Behavior: Behavior normal. Behavior is cooperative. Thought Content: Thought content normal.         Judgment: Judgment normal.               Assessment:       Diagnosis Orders   1. Throat irritation        2. Gastroesophageal reflux disease without esophagitis        3. Smoking        4. Globus sensation                   Plan:      I suggested the jaw pain may be due to the recent heart issues. TMj appears to be ok. Pt still has reflux and I will hold of on the scope for now until she is better healed from the surgery.    Follow up in 6 months

## 2023-01-30 ENCOUNTER — HOSPITAL ENCOUNTER (OUTPATIENT)
Dept: PHARMACY | Age: 68
Setting detail: THERAPIES SERIES
Discharge: HOME | End: 2023-01-30
Payer: MEDICARE

## 2023-01-30 VITALS — SYSTOLIC BLOOD PRESSURE: 118 MMHG | DIASTOLIC BLOOD PRESSURE: 52 MMHG | HEART RATE: 56 BPM

## 2023-01-30 DIAGNOSIS — I97.89 POSTOPERATIVE ATRIAL FIBRILLATION (HCC): Primary | ICD-10-CM

## 2023-01-30 DIAGNOSIS — I48.91 POSTOPERATIVE ATRIAL FIBRILLATION (HCC): Primary | ICD-10-CM

## 2023-01-30 LAB — INTERNATIONAL NORMALIZATION RATIO, POC: 1.7

## 2023-01-30 PROCEDURE — 85610 PROTHROMBIN TIME: CPT

## 2023-01-30 PROCEDURE — 99203 OFFICE O/P NEW LOW 30 MIN: CPT

## 2023-01-30 RX ORDER — IBUPROFEN 200 MG
200 TABLET ORAL EVERY 6 HOURS PRN
COMMUNITY

## 2023-01-30 NOTE — PROGRESS NOTES
[address] [facility] Patient Encounter    Patient Findings       Positives:  Upcoming invasive procedure (SHE WILL NEED BACK SURGERY), Change in medications (MED REC COMPLETED)    Negatives:  Signs/symptoms of thrombosis, Signs/symptoms of bleeding, Laboratory test error suspected, Change in health, Change in alcohol use, Change in activity, Emergency department visit, Upcoming dental procedure, Missed doses, Extra doses, Change in diet/appetite, Hospital admission, Bruising, Other complaints           Patient  reports that she quit smoking about 4 years ago. Her smoking use included cigarettes. She started smoking about 53 years ago. She has a 20.00 pack-year smoking history. She has never used smokeless tobacco.     Assessment/Plan: This is patient's first visit to the [address] [place] [address] (Medication Management). The patient was provided with all standard clinic warfarin education (MOA, s/s bleeding, INR monitoring, compliance, drug/food/substance interactions, clinic procedures). The patient was provided with the following supplemental printed handouts: Warfarin Information, Vitamin K Content, and When to Contact the Anticoagulation Clinic. Dante Nails is on warfarin for atrial fibrillation, with a goal INR of 2-3. Patient's [id number] score:  Atrial Fibrillation CHADSVASC2 Score Stroke Risk:   80 y.o. 74-69 - 1   female Female - 1   CHF HX: No - 0   HTN HX: Yes - 1   Stroke/TIA/Thromboembolism No - 0   Vascular Disease HX: Yes - 1   Diabetes Mellitus Yes - 1   CHADSVASC 2 Score 5      Annual Stroke Risk 7.2% - moderate-high      Patient has been on warfarin since 1/27/23. Current warfarin dose is 5 mg daily. She is currently on Eliquis, but cannot afford it. INR is subtherapeutic at 1.7, goal 2-3.    Warfarin Dose: 5 mg today and tomorrow, 2.5 mg Wed.; continue on Eliquis  Follow Up: 3 days    Sharon Ortiz [facility] PharmD 2/2/2023 1:40 PM  For Pharmacy Admin Tracking Only    Intervention Detail: Dose Adjustment: 1, reason: Therapy Optimization  Total # of Interventions Recommended: 1  Total # of Interventions Accepted: 1  Time Spent (min): 45

## 2023-02-02 ENCOUNTER — HOSPITAL ENCOUNTER (OUTPATIENT)
Dept: PHARMACY | Age: 68
Setting detail: THERAPIES SERIES
Discharge: HOME | End: 2023-02-02
Payer: MEDICARE

## 2023-02-02 ENCOUNTER — HOSPITAL ENCOUNTER (OUTPATIENT)
Dept: CARDIAC REHAB | Age: 68
Setting detail: THERAPIES SERIES
Discharge: HOME | End: 2023-02-02
Payer: MEDICARE

## 2023-02-02 VITALS — HEART RATE: 56 BPM | SYSTOLIC BLOOD PRESSURE: 120 MMHG | DIASTOLIC BLOOD PRESSURE: 66 MMHG

## 2023-02-02 DIAGNOSIS — E78.2 MIXED HYPERLIPIDEMIA: ICD-10-CM

## 2023-02-02 DIAGNOSIS — I48.91 POSTOPERATIVE ATRIAL FIBRILLATION (HCC): Primary | ICD-10-CM

## 2023-02-02 DIAGNOSIS — I97.89 POSTOPERATIVE ATRIAL FIBRILLATION (HCC): Primary | ICD-10-CM

## 2023-02-02 LAB
CHOLESTEROL, TOTAL: NORMAL
CHOLESTEROL/HDL RATIO: NORMAL
HDLC SERPL-MCNC: NORMAL MG/DL
INTERNATIONAL NORMALIZATION RATIO, POC: 2
LDL CHOLESTEROL CALCULATED: NORMAL
NONHDLC SERPL-MCNC: NORMAL MG/DL
TRIGL SERPL-MCNC: NORMAL MG/DL
VLDLC SERPL CALC-MCNC: NORMAL MG/DL

## 2023-02-02 PROCEDURE — 99211 OFF/OP EST MAY X REQ PHY/QHP: CPT

## 2023-02-02 PROCEDURE — 85610 PROTHROMBIN TIME: CPT

## 2023-02-02 PROCEDURE — 93798 PHYS/QHP OP CAR RHAB W/ECG: CPT

## 2023-02-02 NOTE — PROGRESS NOTES
[facility] [address]    Patient Findings       Negatives:  Signs/symptoms of thrombosis, Signs/symptoms of bleeding, Laboratory test error suspected, Change in health, Change in alcohol use, Change in activity, Upcoming invasive procedure, Emergency department visit, Upcoming dental procedure, Missed doses, Extra doses, Change in medications, Change in diet/appetite, Hospital admission, Bruising, Other complaints             Assessment/Plan:  Warfarin indication: atrial fibrillation      INR today is therapeutic at 2, goal is 2-3. SHE STARTED WARFARIN ON 1/27 AND HAS TAKEN 5 MG DAILY EXCEPT FOR 2.5 MG LAST NIGHT. Warfarin Dose: continue 5 mg daily - STOP ELIQUIS    Follow Up: 4 days    SHE BROUGHT HER BP CUFF IN. IT WAS SIMILAR TO MINE. SHE STATES SHE GETS HIGH BP READINGS BUT ONLY CHECKS IT IN THE MORNING. I SUGGESTED SHE ALSO CHECK AT NIGHT TO COMPARE.      Anabel Salvador [facility] PharmD 2/2/2023 1:42 PM  For Pharmacy Admin Tracking Only    Intervention Detail:   Total # of Interventions Recommended: 0  Total # of Interventions Accepted: 0  Time Spent (min): 15

## 2023-02-03 ENCOUNTER — HOSPITAL ENCOUNTER (OUTPATIENT)
Dept: CARDIAC REHAB | Age: 68
Setting detail: THERAPIES SERIES
Discharge: HOME | End: 2023-02-03
Payer: MEDICARE

## 2023-02-03 PROCEDURE — 93798 PHYS/QHP OP CAR RHAB W/ECG: CPT

## 2023-02-06 ENCOUNTER — TELEPHONE (OUTPATIENT)
Dept: CARDIOLOGY CLINIC | Age: 68
End: 2023-02-06

## 2023-02-06 ENCOUNTER — HOSPITAL ENCOUNTER (OUTPATIENT)
Dept: PHARMACY | Age: 68
Setting detail: THERAPIES SERIES
Discharge: HOME | End: 2023-02-06
Payer: MEDICARE

## 2023-02-06 VITALS — HEART RATE: 62 BPM | DIASTOLIC BLOOD PRESSURE: 74 MMHG | SYSTOLIC BLOOD PRESSURE: 148 MMHG

## 2023-02-06 DIAGNOSIS — I97.89 POSTOPERATIVE ATRIAL FIBRILLATION (HCC): Primary | ICD-10-CM

## 2023-02-06 DIAGNOSIS — I48.91 POSTOPERATIVE ATRIAL FIBRILLATION (HCC): Primary | ICD-10-CM

## 2023-02-06 LAB — INTERNATIONAL NORMALIZATION RATIO, POC: 1.8

## 2023-02-06 PROCEDURE — 99212 OFFICE O/P EST SF 10 MIN: CPT

## 2023-02-06 PROCEDURE — 85610 PROTHROMBIN TIME: CPT

## 2023-02-06 NOTE — TELEPHONE ENCOUNTER
----- Message from Shannan Dickerson MD sent at 2/2/2023  3:59 PM EST -----  Please let patient know the cholesterol is still significantly elevated. Recommend increasing rosuvastatin to 40 mg daily.

## 2023-02-06 NOTE — PROGRESS NOTES
Door [facility] [address]    Patient Findings       Positives:  Bruising (SOME BRUISES ON HER ABDOMEN FROM HER INSULIN)    Negatives:  Signs/symptoms of thrombosis, Signs/symptoms of bleeding, Laboratory test error suspected, Change in health, Change in alcohol use, Change in activity, Upcoming invasive procedure, Emergency department visit, Upcoming dental procedure, Missed doses, Extra doses, Change in medications, Change in diet/appetite, Hospital admission, Other complaints        Assessment/Plan:  Warfarin indication: atrial fibrillation      INR today is SUBtherapeutic at 1.8, goal is 2-3. SHE STARTED WARFARIN ON 1/27/23. Warfarin Dose: INCREASE TO 7.5 MG MON.; 5 MG TUES./WED./THUR.     Follow Up: 4 days      ANJUM Andersen [facility] PharmD 2/6/2023 1:29 PM  For Pharmacy Admin Tracking Only    Intervention Detail: Dose Adjustment: 1, reason: Therapy Optimization  Total # of Interventions Recommended: 1  Total # of Interventions Accepted: 1  Time Spent (min): 15

## 2023-02-07 RX ORDER — ROSUVASTATIN CALCIUM 40 MG/1
40 TABLET, COATED ORAL DAILY
Qty: 30 TABLET | Refills: 5 | Status: SHIPPED | OUTPATIENT
Start: 2023-02-07

## 2023-02-08 ENCOUNTER — HOSPITAL ENCOUNTER (OUTPATIENT)
Dept: CARDIAC REHAB | Age: 68
Setting detail: THERAPIES SERIES
Discharge: HOME | End: 2023-02-08
Payer: MEDICARE

## 2023-02-08 PROCEDURE — 93798 PHYS/QHP OP CAR RHAB W/ECG: CPT

## 2023-02-08 ASSESSMENT — PATIENT HEALTH QUESTIONNAIRE - PHQ9
1. LITTLE INTEREST OR PLEASURE IN DOING THINGS: 0
SUM OF ALL RESPONSES TO PHQ QUESTIONS 1-9: 0
SUM OF ALL RESPONSES TO PHQ QUESTIONS 1-9: 0
SUM OF ALL RESPONSES TO PHQ9 QUESTIONS 1 & 2: 0
SUM OF ALL RESPONSES TO PHQ QUESTIONS 1-9: 0
2. FEELING DOWN, DEPRESSED OR HOPELESS: 0
SUM OF ALL RESPONSES TO PHQ QUESTIONS 1-9: 0

## 2023-02-08 ASSESSMENT — LIFESTYLE VARIABLES
SMOKELESS_TOBACCO: NO
CIGARETTES_PER_DAY: 1/2 PACK PER DAY

## 2023-02-08 ASSESSMENT — EJECTION FRACTION: EF_VALUE: 65

## 2023-02-08 ASSESSMENT — EXERCISE STRESS TEST: PEAK_BP: 188/68

## 2023-02-08 NOTE — PROGRESS NOTES
02/08/23 1053   Treatment Diagnosis   Treatment Diagnosis 1 CABG   CABG Date 11/02/22   Referral Date 12/13/22   Co-morbidities Diabetes;Pulmonary disease   Oxygen Saturation / Titration    Stages of Change  Maintenance   Oxygen Intervention   Oxygen Use No   O2 Sat Greater Than 90% Yes   Oxygen Target Goals  Keep SA02 greater than 90%   Individual Treatment Plan   ITP Visit Type Re-assessment   1st Date of Exercise  03/10/23   ITP Next Review Date 02/08/23   Visit #/Total Visits 2/367/36   EF% 65 %   Risk Stratification Low   ITP Exercise;Psychosocial;Tobacco;Nutrition;Education   Exercise    Stages of Change Action   Assisted Devices None   Test Six minute walk test   Data Measured Immediately After Walk   Distance Walked in  mi   RPE 11   Data Measured at 5 Minutes After Walk   Comments achieved a MET level of 1.9 during walk test   Exercise Prescription   Mode Treadmill;Bike;Ergometer; Stepper  (stepper- Nu-Step)   Frequency per week 2   Duration Per Session 30-60   Intensity METS       3-4  (goal to work up to 2-4 METs, light to mod. Pt currently at 1.6-1.9 METs)   RPE 8-13   Progression Work up to 60 mins of exercise at a mod intensity of 3-4 METs bye end of 36 sessions in program.   Symptoms with Exercise Leg pain   Target Heart Rate   (UNM -30%)   Resistance Training Yes   Exercise Blood Pressures   Resting /70  (as of 2/8/23)   Peak /68  (as of 2/8/23)   Is BP WDL No   Exercise Activity at Home   Type none, besides normal ADL. states shes is on her feet a lot at home. Frequency N/A   Duration N/A   Resistance Training No   Exercise Education   Education Exercise safety;Signs/symptoms to report;RPE scale;Equipment orientation; Warm up/cool down;Physically active   Exercise Target Goal   Target Goal(s) Individual exercise RX; Aerobic activity 30 + minutes/day  5 days/week   Patient Stated Exercise Goals wants to walk more and be able to walk longer without leg pain and fatigue. Psychosocial   Stages of Change Contemplate;Preparation; Action;Maintenance   PHQ-9 Total Score 0   Psychosocial Intervention   Interventions Currently under treatment for depression; No intervention indicated   Currently Taking Psychotropic Meds Yes  (Celexa)   Medication Changes No   Psychosocial Education   Education Coping techniques;Signs/symptoms of depression   Psychosocial Target Goals   Target Goal(s) Assess presence or absence of depression using a valid screening tool;Maximizes coping skills   Uses Stress Mgmt Techniques Yes  (LISTENING TO MUSIC, DEEP BREATHING)   Patient Stated Psychosocial Goals KEEP ANXIETY/ DEPRESSION AT A MANAGEABLE LEVEL AND CONTINUE MEDICATION   Tobacco   Stages of Change Action;Maintenance   Tobacco Use Yes   Quit Less than 6 month   Date Started   (SMOKED FOR 36+ YEARS)   Date Quit 08/28/22   # Cigarette Smoked/Day 1/2 PACK PER DAY   Smokeless Tobacco Use No   Tobacco Cessation Intervention   Smoking Cessation Referral No   Tobacco Adjunct No   Tobacco Education   Resource Information Provided No   Tobacco Triggers none   Target Goal   Target Goal   (N/A)   Patient Stated Tobacco Goals REMAIN SMOKE FREE   Nutrition   Stages of Change Contemplate   Diabetes Yes      FBS Date 11/07/22   HgbA1c 9.7   HgbA1c Date 10/28/22   BG at Home Yes  (180 THIS AM)   BG Frequency PT HAS  A  CONTINUOUS MONITOR  FREESTYLE, ITS NOT WORKING PROPERLY. . PT CHECKING BS 3 X DAILY   Diabetes Med(s) METFORMIN, HUMALOG, TOUJEO   Diabetes Med(s) Change No   BG in Range No   Lipids   Date Lipids Drawn 03/17/22   Total 203   HDL 37      Triglycerides 317   Lipid Med(s) CRESTOR   Lipid Med Change(s) No   Weight Management   Weight  126 lb (57.2 kg)   Height  5' 4\" (1.626 m)   BMI 21.67   Alcohol None   Nutrition Intervention   Dietitian Consult No  (TBD, no dietitian at facility at this time.)   Nurse/Patient Discussion Yes  (HEART HEALTY AND DIABETIC HANDOUTS GIVEN AND REVIEWED WITH PT.) Nutrition Goals ADHERE TO CARDIAC AND DIABETIC DIETS   Supplemental Nutrition none   Nutrition Class No   Diabetes Education Referral No   Lipid Clinic Referral No   Weight Management Referral No   Nutrition Education   Education Low saturated fat diet; Low sodium diet;Limit added sugars; Overall healthy eating pattern that emphasizes vegetables, fruits, wholegrains, healthy proteins and non-tropical oils; Reduced calorie/portion controlled diet;DM & CAD relationship;Signs/symptoms/treatment of hypo/hyperglycemia   Nutrition Target Goals   Target Goals LDL-C less than 70 and non-HDL-C <100 for those with ASCVD;Triglycerides less than 150;HbA1C less than 7 percent for those with diabetes   Patient Stated Nutrition Goals MAINTAIN CARDIAC AND DIABETIC DIET. KEEP BS UNDER CONTROL   Education   Learning Barrier Ready to learn   Education Intervention   Education Schedule Given Yes   Patient Education    Education CAD;Risk factors;Med compliance;Cardiac A&P;Signs/Symptoms of angina   Hypertension Yes   Hypertension Controlled Yes  (PT STATED ITS CONTROLLED AT HOME)   Is BP WDL No   Med(s) Change Yes   BP Meds (TOPROL XL) 25 MG extended release   ACE/ARB Prescribed No   ASA Prescribed Yes   ASA Adherent Yes   Antiplatelet Prescribed Yes   Antiplatelet Adherent Yes   BB Prescribed No   Statins Prescribed Yes   Statins Adherent Yes   Statin Intensity Moderate   Education Target Goals   Target Goals Medication compliance; Risk factors; Understand target guidelines for lipids; Understand target guidelines for B/P   Patient Stated Education Goals MED COMPLIANT   Staff Treatment Goals   Goals Exercise;Education   Exercise Goal Work up to 60 mins of exercise at a mod intensity of 3-4 METs bye end of 36 sessions in program.   Exercise Goal Status Progressing   Exercise Goal Assessment Recommendations;Frequency/duration   Education Goal Improve overall cardiac knowledge on risk factors, diet, exercise, and diabetes, and medication control by end of 36 sessions. Provide additional information to help.    Education Goal Status Progressing   Education Goal Assessment Recommendations;Treatment time frame

## 2023-02-10 ENCOUNTER — HOSPITAL ENCOUNTER (OUTPATIENT)
Dept: CARDIAC REHAB | Age: 68
Setting detail: THERAPIES SERIES
Discharge: HOME | End: 2023-02-10
Payer: MEDICARE

## 2023-02-10 ENCOUNTER — HOSPITAL ENCOUNTER (OUTPATIENT)
Dept: PHARMACY | Age: 68
Setting detail: THERAPIES SERIES
Discharge: HOME | End: 2023-02-10
Payer: MEDICARE

## 2023-02-10 VITALS — SYSTOLIC BLOOD PRESSURE: 158 MMHG | DIASTOLIC BLOOD PRESSURE: 67 MMHG | HEART RATE: 57 BPM

## 2023-02-10 DIAGNOSIS — I97.89 POSTOPERATIVE ATRIAL FIBRILLATION (HCC): Primary | ICD-10-CM

## 2023-02-10 DIAGNOSIS — I48.91 POSTOPERATIVE ATRIAL FIBRILLATION (HCC): Primary | ICD-10-CM

## 2023-02-10 LAB — INTERNATIONAL NORMALIZATION RATIO, POC: 2.7

## 2023-02-10 PROCEDURE — 85610 PROTHROMBIN TIME: CPT

## 2023-02-10 PROCEDURE — 93798 PHYS/QHP OP CAR RHAB W/ECG: CPT

## 2023-02-10 NOTE — PROGRESS NOTES
[facility]oor [facility] [address]    Patient Findings       Positives:  Change in activity ([address])    Negatives:  Signs/symptoms of thrombosis, Signs/symptoms of bleeding, Laboratory test error suspected, Change in health, Change in alcohol use, Upcoming invasive procedure, Emergency department visit, Upcoming dental procedure, Missed doses, Extra doses, Change in medications, Change in diet/appetite, Hospital admission, Bruising, Other complaints    Comments:  DR. Abhi Reynoso NEXT WEEK (COPD)             Assessment/Plan:  Warfarin indication: atrial fibrillation      INR today is therapeutic at 2.7, goal is 2-3.     Warfarin Dose: 5 mg daily    Follow Up: 1 week      ANJUM Rosen [facility] PharmD 2/10/2023 8:50 AM  For Pharmacy Admin Tracking Only    Intervention Detail:   Total # of Interventions Recommended: 0  Total # of Interventions Accepted: 0  Time Spent (min): 15
Dr. Mayo [phone]

## 2023-02-15 ENCOUNTER — HOSPITAL ENCOUNTER (OUTPATIENT)
Dept: CARDIAC REHAB | Age: 68
Setting detail: THERAPIES SERIES
Discharge: HOME | End: 2023-02-15
Payer: MEDICARE

## 2023-02-15 PROCEDURE — 93798 PHYS/QHP OP CAR RHAB W/ECG: CPT

## 2023-02-17 ENCOUNTER — HOSPITAL ENCOUNTER (OUTPATIENT)
Dept: CARDIAC REHAB | Age: 68
Setting detail: THERAPIES SERIES
Discharge: HOME | End: 2023-02-17
Payer: MEDICARE

## 2023-02-17 ENCOUNTER — HOSPITAL ENCOUNTER (OUTPATIENT)
Dept: PHARMACY | Age: 68
Setting detail: THERAPIES SERIES
Discharge: HOME | End: 2023-02-17
Payer: MEDICARE

## 2023-02-17 VITALS — HEART RATE: 52 BPM | SYSTOLIC BLOOD PRESSURE: 171 MMHG | DIASTOLIC BLOOD PRESSURE: 63 MMHG

## 2023-02-17 DIAGNOSIS — I48.91 POSTOPERATIVE ATRIAL FIBRILLATION (HCC): Primary | ICD-10-CM

## 2023-02-17 DIAGNOSIS — I97.89 POSTOPERATIVE ATRIAL FIBRILLATION (HCC): Primary | ICD-10-CM

## 2023-02-17 LAB — INTERNATIONAL NORMALIZATION RATIO, POC: 3.4

## 2023-02-17 PROCEDURE — 85610 PROTHROMBIN TIME: CPT

## 2023-02-17 PROCEDURE — 93798 PHYS/QHP OP CAR RHAB W/ECG: CPT

## 2023-02-17 NOTE — PROGRESS NOTES
Door [facility] 430    Patient Findings       Positives:  Change in activity ([address])    Negatives:  Signs/symptoms of thrombosis, Signs/symptoms of bleeding, Laboratory test error suspected, Change in health, Change in alcohol use, Upcoming invasive procedure, Emergency department visit, Upcoming dental procedure, Missed doses, Extra doses, Change in medications, Change in diet/appetite, Hospital admission, Bruising, Other complaints    Comments:  TO HAVE CT FOR PULM NODULE             Assessment/Plan:  Warfarin indication: atrial fibrillation      INR today is SUPRAtherapeutic at 3.4, goal is 2-3. SHE STARTED WARFARIN ON 1/27/23.      Warfarin Dose: DECREASE 7% TO 2.5 MG EVERY FRI.; 5 MG ALL OTHER DAYS    Follow Up: 1 week      ANJUM Pal [facility] PharmD 2/17/2023 8:51 AM  For Pharmacy Admin Tracking Only    Intervention Detail: Dose Adjustment: 1, reason: Therapy De-escalation  Total # of Interventions Recommended: 1  Total # of Interventions Accepted: 1  Time Spent (min): 15

## 2023-02-22 ENCOUNTER — TELEPHONE (OUTPATIENT)
Dept: ENDOCRINOLOGY | Age: 68
End: 2023-02-22

## 2023-02-22 ENCOUNTER — HOSPITAL ENCOUNTER (OUTPATIENT)
Dept: CARDIAC REHAB | Age: 68
Setting detail: THERAPIES SERIES
Discharge: HOME | End: 2023-02-22
Payer: MEDICARE

## 2023-02-22 DIAGNOSIS — E03.9 HYPOTHYROIDISM, UNSPECIFIED TYPE: ICD-10-CM

## 2023-02-22 PROCEDURE — 93798 PHYS/QHP OP CAR RHAB W/ECG: CPT

## 2023-02-22 RX ORDER — LEVOTHYROXINE SODIUM 0.03 MG/1
TABLET ORAL
Qty: 90 TABLET | Refills: 1 | Status: SHIPPED | OUTPATIENT
Start: 2023-02-22

## 2023-02-24 ENCOUNTER — HOSPITAL ENCOUNTER (OUTPATIENT)
Dept: CARDIAC REHAB | Age: 68
Setting detail: THERAPIES SERIES
Discharge: HOME | End: 2023-02-24
Payer: MEDICARE

## 2023-02-24 ENCOUNTER — HOSPITAL ENCOUNTER (OUTPATIENT)
Dept: PHARMACY | Age: 68
Setting detail: THERAPIES SERIES
Discharge: HOME | End: 2023-02-24
Payer: MEDICARE

## 2023-02-24 VITALS — HEART RATE: 55 BPM | SYSTOLIC BLOOD PRESSURE: 157 MMHG | DIASTOLIC BLOOD PRESSURE: 65 MMHG

## 2023-02-24 DIAGNOSIS — I48.91 POSTOPERATIVE ATRIAL FIBRILLATION (HCC): Primary | ICD-10-CM

## 2023-02-24 DIAGNOSIS — I97.89 POSTOPERATIVE ATRIAL FIBRILLATION (HCC): Primary | ICD-10-CM

## 2023-02-24 LAB — INTERNATIONAL NORMALIZATION RATIO, POC: 2.5

## 2023-02-24 PROCEDURE — 93798 PHYS/QHP OP CAR RHAB W/ECG: CPT

## 2023-02-24 PROCEDURE — 85610 PROTHROMBIN TIME: CPT

## 2023-02-24 NOTE — PROGRESS NOTES
Door [facility] 430    Patient Findings       Positives:  Change in medications (TOUJEO INCREASED FROM 18 TO 20 UNITS DAILY)    Negatives:  Signs/symptoms of thrombosis, Signs/symptoms of bleeding, Laboratory test error suspected, Change in health, Change in alcohol use, Change in activity, Upcoming invasive procedure, Emergency department visit, Upcoming dental procedure, Missed doses, Extra doses, Change in diet/appetite, Hospital admission, Bruising, Other complaints    Comments:  MARCH 17TH - CT SCAN           Assessment/Plan:  Warfarin indication: atrial fibrillation      INR today is therapeutic at 2.5, goal is 2-3.     Warfarin Dose: same (2.5 mg every Fri.; 5 mg all other days)    Follow Up: 2 weeks      ANJUM Marion GONZALE[facility] PharmD 2/24/2023 9:49 AM  For Pharmacy Admin Tracking Only    Intervention Detail:   Total # of Interventions Recommended: 0  Total # of Interventions Accepted: 0  Time Spent (min): 15

## 2023-03-01 ENCOUNTER — HOSPITAL ENCOUNTER (OUTPATIENT)
Dept: CARDIAC REHAB | Age: 68
Setting detail: THERAPIES SERIES
Discharge: HOME | End: 2023-03-01
Payer: MEDICARE

## 2023-03-01 PROCEDURE — 93798 PHYS/QHP OP CAR RHAB W/ECG: CPT

## 2023-03-03 ENCOUNTER — HOSPITAL ENCOUNTER (OUTPATIENT)
Dept: CARDIAC REHAB | Age: 68
Setting detail: THERAPIES SERIES
Discharge: HOME | End: 2023-03-03
Payer: MEDICARE

## 2023-03-03 PROCEDURE — 93798 PHYS/QHP OP CAR RHAB W/ECG: CPT

## 2023-03-08 ENCOUNTER — HOSPITAL ENCOUNTER (OUTPATIENT)
Dept: CARDIAC REHAB | Age: 68
Setting detail: THERAPIES SERIES
Discharge: HOME | End: 2023-03-08
Payer: MEDICARE

## 2023-03-08 PROCEDURE — 93798 PHYS/QHP OP CAR RHAB W/ECG: CPT

## 2023-03-10 ENCOUNTER — HOSPITAL ENCOUNTER (OUTPATIENT)
Dept: PHARMACY | Age: 68
Setting detail: THERAPIES SERIES
Discharge: HOME | End: 2023-03-10
Payer: MEDICARE

## 2023-03-10 ENCOUNTER — HOSPITAL ENCOUNTER (OUTPATIENT)
Dept: CARDIAC REHAB | Age: 68
Setting detail: THERAPIES SERIES
Discharge: HOME | End: 2023-03-10
Payer: MEDICARE

## 2023-03-10 VITALS — DIASTOLIC BLOOD PRESSURE: 68 MMHG | HEART RATE: 57 BPM | SYSTOLIC BLOOD PRESSURE: 152 MMHG

## 2023-03-10 DIAGNOSIS — I97.89 POSTOPERATIVE ATRIAL FIBRILLATION (HCC): Primary | ICD-10-CM

## 2023-03-10 DIAGNOSIS — I48.91 POSTOPERATIVE ATRIAL FIBRILLATION (HCC): Primary | ICD-10-CM

## 2023-03-10 LAB — INTERNATIONAL NORMALIZATION RATIO, POC: 2.1

## 2023-03-10 PROCEDURE — 99211 OFF/OP EST MAY X REQ PHY/QHP: CPT

## 2023-03-10 PROCEDURE — 93798 PHYS/QHP OP CAR RHAB W/ECG: CPT

## 2023-03-10 PROCEDURE — 85610 PROTHROMBIN TIME: CPT

## 2023-03-10 ASSESSMENT — LIFESTYLE VARIABLES
SMOKELESS_TOBACCO: NO
CIGARETTES_PER_DAY: 1/2 PACK PER DAY

## 2023-03-10 ASSESSMENT — EXERCISE STRESS TEST: PEAK_BP: 142/60

## 2023-03-10 ASSESSMENT — EJECTION FRACTION: EF_VALUE: 65

## 2023-03-10 NOTE — PROGRESS NOTES
Door [facility] 430  Patient Findings       Negatives:  Signs/symptoms of thrombosis, Signs/symptoms of bleeding, Laboratory test error suspected, Change in health, Change in alcohol use, Change in activity, Upcoming invasive procedure, Emergency department visit, Upcoming dental procedure, Missed doses, Extra doses, Change in medications, Change in diet/appetite, Hospital admission, Bruising, Other complaints    Comments:  3/13: US AND DR. GAMA  3/17: CT SCAN  3/21: DR. Carina Davila IN [place] FOR PSORIASIS CHECK           Assessment/Plan:  Warfarin indication: atrial fibrillation      INR today is therapeutic at 2.1, goal is 2-3.     Warfarin Dose: same (2.5 mg every Fri.; 5 mg all other days)    Follow Up: 2 weeks      Jan Vega [facility] PharmD 3/10/2023 9:58 AM  For Pharmacy Admin Tracking Only    Intervention Detail:   Total # of Interventions Recommended: 0  Total # of Interventions Accepted: 0  Time Spent (min): 15

## 2023-03-10 NOTE — PROGRESS NOTES
03/10/23 1614   Treatment Diagnosis   Treatment Diagnosis 1 CABG   CABG Date 11/02/22   Referral Date 12/13/22   Co-morbidities Diabetes;Pulmonary disease   Oxygen Saturation / Titration    Stages of Change  Maintenance   Oxygen Intervention   Oxygen Use No   O2 Sat Greater Than 90% Yes   Oxygen Target Goals  Keep SA02 greater than 90%   Individual Treatment Plan   ITP Visit Type Re-assessment   1st Date of Exercise  03/10/23   ITP Next Review Date 04/07/23   Visit #/Total Visits 16/36   EF% 65 %   Risk Stratification Low   ITP Exercise;Psychosocial;Tobacco;Nutrition;Education   Exercise    Stages of Change Action   Assisted Devices None   Exercise Prescription   Mode Treadmill;Bike;Ergometer; Stepper  (stepper- Nu-Step)   Frequency per week 2   Duration Per Session 45-60   Intensity METS       3-4  (goal to work up to 2-4 METs, light to mod. Pt currently at 1.9-3 METs)   RPE 8-13   Progression Work up to 60 mins of exercise at a mod intensity of 3-4 METs bye end of 36 sessions in program.   Symptoms with Exercise Leg pain   Target Heart Rate   (UNM -30%)   Resistance Training Yes   Exercise Blood Pressures   Resting /60  (as of 3/10/23)   Peak /60  (as of 3/10/2023)   Is BP WDL No   Exercise Activity at Home   Type none, besides normal ADL. states shes is on her feet a lot at home. Frequency N/A   Duration N/A   Resistance Training No   Exercise Education   Education Exercise safety;Signs/symptoms to report;RPE scale;Equipment orientation; Warm up/cool down;Physically active   Exercise Target Goal   Target Goal(s) Individual exercise RX; Aerobic activity 30 + minutes/day  5 days/week   Patient Stated Exercise Goals wants to walk more and be able to walk longer without leg pain and fatigue. Psychosocial   Stages of Change Contemplate;Preparation; Action;Maintenance   Psychosocial Intervention   Interventions Currently under treatment for depression   Currently Taking Psychotropic Meds Yes  (Celexa)   Medication Changes No   Psychosocial Education   Education Coping techniques;Signs/symptoms of depression   Psychosocial Target Goals   Target Goal(s) Assess presence or absence of depression using a valid screening tool;Maximizes coping skills   Uses Stress Mgmt Techniques Yes  (LISTENING TO MUSIC, DEEP BREATHING)   Patient Stated Psychosocial Goals KEEP ANXIETY/ DEPRESSION AT A MANAGEABLE LEVEL AND CONTINUE MEDICATION   Tobacco   Stages of Change Action;Maintenance   Tobacco Use Yes   Quit Less than 6 month   Date Started   (SMOKED FOR 36+ YEARS)   Date Quit 08/28/22   # Cigarette Smoked/Day 1/2 PACK PER DAY   Smokeless Tobacco Use No   Tobacco Cessation Intervention   Smoking Cessation Referral No   Tobacco Adjunct No   Tobacco Education   Resource Information Provided No   Tobacco Triggers none   Target Goal   Patient Stated Tobacco Goals REMAIN SMOKE FREE   Nutrition   Stages of Change Contemplate   Diabetes Yes      HgbA1c 9.7   BG at Home Yes  (180 THIS AM)   BG Frequency PT HAS  A  CONTINUOUS MONITOR  FREESTYLE, ITS NOT WORKING PROPERLY. . PT CHECKING BS 3 X DAILY   Diabetes Med(s) METFORMIN, HUMALOG, TOUJEO   Diabetes Med(s) Change No   BG in Range No   Lipids   Date Lipids Drawn 03/17/22   Total 203   HDL 37      Triglycerides 317   Lipid Med(s) CRESTOR   Lipid Med Change(s) No   Weight Management   Weight  126 lb (57.2 kg)   Height  5' 4\" (1.626 m)   BMI 21.67   Alcohol None   Nutrition Intervention   Dietitian Consult No  (TBD, no dietitian at facility at this time.)   Nurse/Patient Discussion Yes  (HEART HEALTY AND DIABETIC HANDOUTS GIVEN AND REVIEWED WITH PT.)   Nutrition Goals ADHERE TO CARDIAC AND DIABETIC DIETS   Supplemental Nutrition none   Nutrition Class No   Diabetes Education Referral No   Lipid Clinic Referral No   Weight Management Referral No   Nutrition Education   Education Low saturated fat diet; Low sodium diet;Limit added sugars; Overall healthy eating pattern that emphasizes vegetables, fruits, wholegrains, healthy proteins and non-tropical oils; Reduced calorie/portion controlled diet;DM & CAD relationship;Signs/symptoms/treatment of hypo/hyperglycemia   Nutrition Target Goals   Target Goals LDL-C less than 70 and non-HDL-C <100 for those with ASCVD;Triglycerides less than 150;HbA1C less than 7 percent for those with diabetes   Patient Stated Nutrition Goals MAINTAIN CARDIAC AND DIABETIC DIET. KEEP BS UNDER CONTROL   Education   Learning Barrier Ready to learn   Education Intervention   Education Schedule Given Yes   Patient Education    Education CAD;Risk factors;Med compliance;Cardiac A&P;Signs/Symptoms of angina   Hypertension Yes   Hypertension Controlled Yes  (PT STATED ITS CONTROLLED AT HOME)   Is BP WDL No   Med(s) Change Yes   BP Meds (TOPROL XL) 25 MG extended release   ACE/ARB Prescribed No   ASA Prescribed Yes   ASA Adherent Yes   Antiplatelet Prescribed Yes   Antiplatelet Adherent Yes   BB Prescribed No   Statins Prescribed Yes   Statins Adherent Yes   Statin Intensity Moderate   Education Target Goals   Target Goals Medication compliance; Risk factors; Understand target guidelines for lipids; Understand target guidelines for B/P   Patient Stated Education Goals MED COMPLIANT   Staff Treatment Goals   Goals Exercise;Education   Exercise Goal Work up to 60 mins of exercise at a mod intensity of 3-4 METs bye end of 36 sessions in program.   Exercise Goal Status Progressing   Exercise Goal Assessment Recommendations;Frequency/duration   Education Goal Improve overall cardiac knowledge on risk factors, diet, exercise, and diabetes, and medication control by end of 36 sessions. Provide additional information to help. Education Goal Status Progressing   Education Goal Comments on 2/10/23 educated patient on what BP readings mean and what is considered too high and low. Patient started taking new BP meds due to having higher BPs.  Handouts reviewed and provided to patient. Spent 10 mins with Q&A for patient.    Education Goal Assessment Recommendations;Treatment time frame

## 2023-03-13 ENCOUNTER — OFFICE VISIT (OUTPATIENT)
Dept: VASCULAR SURGERY | Age: 68
End: 2023-03-13

## 2023-03-13 ENCOUNTER — HOSPITAL ENCOUNTER (OUTPATIENT)
Dept: CARDIOLOGY | Age: 68
Discharge: HOME | End: 2023-03-13
Payer: MEDICARE

## 2023-03-13 DIAGNOSIS — Z98.890 S/P CAROTID ENDARTERECTOMY: ICD-10-CM

## 2023-03-13 DIAGNOSIS — I65.23 CAROTID ARTERY STENOSIS, ASYMPTOMATIC, BILATERAL: ICD-10-CM

## 2023-03-13 DIAGNOSIS — I74.09 AORTOILIAC OCCLUSIVE DISEASE (HCC): Primary | ICD-10-CM

## 2023-03-13 DIAGNOSIS — I73.9 PVD (PERIPHERAL VASCULAR DISEASE) (HCC): ICD-10-CM

## 2023-03-13 PROCEDURE — 93880 EXTRACRANIAL BILAT STUDY: CPT

## 2023-03-13 PROCEDURE — 93923 UPR/LXTR ART STDY 3+ LVLS: CPT

## 2023-03-13 NOTE — PROGRESS NOTES
Vascular Surgery Outpatient Followup    PCP : Katelynn Lamas DO   ENT : Dr. Liseth Palomares    Previous procedures  10/11/18 L CEA for assx   12/4/18 Kissing iliac stents 8x59 ICAST  Plasty of the R EIA with 8x59 balloon     HISTORY OF PRESENT ILLNESS:    The patient is a 80 y.o. female who is here in regards to follow up of their PVD and her carotid stenosis. The patient states that her symptoms of right leg pain remains resolved. She is able to walk any distance without limitations. She denies lifestyle limiting claudication, rest pain, or tissue loss. She states that she has pain which is achey and sore in the left lateral thigh typically when walking long distances. She denies posterior thigh or buttock pain. This most likely related to her hip pain and not her PVD. The pain does limit her activity when walking in the store she still uses a buggy. She denies sxs of stroke, tia, slurred speech, focal weakness. She started having headaches in 12/2019. She has known issues with her sinuses and has had two sinus surgeries in the past.  She follows with Dr. Liseth Palomares for this and swallowing issues. The headache symptoms improved with use of nasal spray, more intermittent now. She was supposed to get back surgery but it was noted an abnormal stress. She underwent CABGx 2 in 11/2022 with Dr. Alonzo Rodriguez. She has recovered well per her report. She is off plavix and is on coumadin. She is not smoking.      Past Medical History:        Diagnosis Date    Age-related osteoporosis without current pathological fracture 06/2021    Arthritis     Cataracts, bilateral 10/'19    Colon polyps 08/05/2021    tubular adenoma    Diabetes mellitus ([address]     DM type 2 (diabetes mellitus, type 2) ([address]     [facility]    Hyperlipidemia     Hypertension     Hypothyroidism     Migraines     rare    Neuropathy, diabetic ([address]     Peripheral vascular disease ([address]     follows with Dr. Toby Valentin yearly    Positive colorectal cancer screening using Cologuard test     Psoriasiform dermatitis     Seasonal allergies     Thyroid disease      Past Surgical History:        Procedure Laterality Date    BACK SURGERY      L5-S1    CARDIAC SURGERY      CAROTID ENDARTERECTOMY Left 10/11/2018    [place]    CARPAL TUNNEL RELEASE      bilat     SECTION      2    COLONOSCOPY N/A 2021    COLONOSCOPY WITH BIOPSY with tattooing performed by Kanu Alas MD at [facility] N/A 2022    coronary artery bypass grafting, ZACHARY performed by Richie Vanessa DO at [facility] (CERVIX STATUS UNKNOWN)      LARYNGOSCOPY N/A 2020    DIRECT LARYNGOSCOPYY AND CERVICAL ESOPHAGOSCOPY (PATHOLOGY PRESENT) performed by Annalise Smith DO at [address]      remote- right     OTHER SURGICAL HISTORY  2018    Dr. Toby Valentin- Bilateral iliac stents iCast [id number]    PA OFFICE/OUTPT VISIT,PROCEDURE ONLY Left 10/11/2018    LEFT CAROTID ENDARTERECTOMY performed by Sultana Hein MD at [address] - ingrown toe nails    TONSILLECTOMY       Current Medications:   Current Outpatient Medications   Medication Sig Dispense Refill    levothyroxine (SYNTHROID) 25 MCG tablet TAKE 1 TABLET BY MOUTH EVERY DAY 90 tablet 1    rosuvastatin (CRESTOR) 40 MG tablet Take 1 tablet by mouth daily 30 tablet 5    ibuprofen (ADVIL;MOTRIN) 200 MG tablet Take 200 mg by mouth every 6 hours as needed for Pain Every couple days      warfarin (COUMADIN) 5 MG tablet Take one tablet by mouth once daily as directed by Anticoagulation Clinic 30 tablet 3    metoprolol succinate (TOPROL XL) 25 MG extended release tablet Take 1 tablet by mouth daily 30 tablet 5    citalopram (CELEXA) 20 MG tablet TAKE 1 TABLET BY MOUTH EVERY DAY 90 tablet 1    metFORMIN (GLUCOPHAGE) 500 MG tablet TAKE 1 TABLET TWICE A DAY WITH MEALS 180 tablet 1    Continuous Blood Gluc Sensor (FREESTYLE YADIRA 2 SENSOR) MISC Change sensor every 14 days 6 each 3    HUMALOG KWIKPEN 100 UNIT/ML SOPN INJECT 6 UNITS INTO THE SKIN 3 TIMES A DAY BEFORE MEALS      insulin glargine, 1 unit dial, (TOUJEO SOLOSTAR) 300 UNIT/ML concentrated injection pen Inject 18 Units into the skin nightly (Patient taking differently: Inject 20 Units into the skin nightly) 15 Adjustable Dose Pre-filled Pen Syringe 1    umeclidinium-vilanterol (ANORO ELLIPTA) 62.5-25 MCG/INH AEPB inhaler Inhale 1 puff into the lungs daily 1 each 3    Ixekizumab (TALTZ SC) Inject into the skin every 30 days For psoriasis      omeprazole (PRILOSEC) 40 MG delayed release capsule TAKE 1 CAPSULE BY MOUTH EVERY DAY 30 capsule 1    aspirin 81 MG tablet Take 81 mg by mouth daily       No current facility-administered medications for this visit. Allergies:  Pcn [penicillins], Fosamax [alendronate], and Lipitor [atorvastatin]  Social History     Socioeconomic History    Marital status:      Spouse name: Not on file    Number of children: Not on file    Years of education: Not on file    Highest education level: Not on file   Occupational History    Occupation:  bookeeper   Tobacco Use    Smoking status: Former     Packs/day: 0.50     Years: 40.00     Pack years: 20.00     Types: Cigarettes     Start date: 5     Quit date: 2018     Years since quittin.6    Smokeless tobacco: Never    Tobacco comments:     Quit smoking 4 yrs.ago.    Vaping Use    Vaping Use: Never used   Substance and Sexual Activity    Alcohol use: No    Drug use: Never    Sexual activity: Not Currently     Partners: Male   Other Topics Concern    Not on file   Social History Narrative    Not on file     Social Determinants of Health     Financial Resource Strain: Low Risk     Difficulty of Paying Living Expenses: Not hard at all   Food Insecurity: No Food Insecurity    Worried About [address] in the Last Year: Never true    Ran Out of Food in the Last Year: Never true   Transportation Needs: Not on file   Physical Activity: Inactive    Days of Exercise per Week: 0 days    Minutes of Exercise per Session: 120 min   Stress: Not on file   Social Connections: Not on file   Intimate Partner Violence: Not on file   Housing Stability: Not on file     Family History   Problem Relation Age of Onset    Cancer Mother         lung brain smoker    Heart Attack Father     Heart Disease Father         mi in his 52's     Other Father         pulmonary fibrosis    Heart Attack Brother         48    Diabetes Brother     Heart Attack Paternal Grandfather      Labs  Lab Results   Component Value Date    WBC 8.4 11/10/2022    HGB 9.3 (L) 11/10/2022    HCT 29.8 (L) 11/10/2022     11/10/2022    PROTIME 13.2 (H) 11/03/2022    INR 2.1 03/10/2023    APTT 23.4 (L) 11/02/2022    K 3.7 11/07/2022    BUN 11 11/07/2022    CREATININE 0.4 (L) 11/07/2022     PHYSICAL EXAM:    There were no vitals taken for this visit.   CONSTITUTIONAL:   Awake, alert, cooperative  PSYCHIATRIC :  Oriented to time, place and person     Appropriate insight to disease process  EYES: Lids and lashes normal  ENT:  External ears and nose without lesions   Hearing deficits   CN II - XII intact except for right shoulder weakness due to pain  NECK: Supple, symmetrical, trachea midline   Carotid bruit   LUNGS:  No increased work of breathing                 Clear to auscultation  CARDIOVASCULAR:  regular rate and rhythm   ABDOMEN:  soft, non-distended, non-tender  SKIN:   Normal skin color   Texture and turgor normal, no induration  EXTREMITIES:   R LE Edema absent   Biphasic DP/PT  L LE Edema absent   Monophasic DP/PT    RADIOLOGY:  ABIs and PVRs  R LE JULIANA 0.62, TBI 0.39  L LE JULIANA 0.82, TBI 0.27      A/P Assx PVD   Hx of iliac stents, R EIA plasty   Pt is asymptomatic  Arterial studies reviewed  R LE slightly worse compared to previous  L LE stable as compared to previous  Continue medical management with ASA and statin  She is now on coumadin  Emphasized importance of continued Tobacco cessation -she is not smoking  they understood that with tobacco use they are at increased risk of worsening of their pvd and increased risk of limb loss  No indication for surgical intervention at this time  Discussed with patient pathophysiology of pvd, claudication, tissues loss, rest pain, and potential for limb loss  Discussed with patient symptoms of new onset claudication, tissue loss, rest pain, changes in lower extremity coolness, pain and they understood to go to ER immediately if any symptoms developed  F/U as outpatient in 12 Months with repeat ABIs/PVRs    A/P Asymptomatic Bilateral Carotid Stenosis  Hx of L CEA for assx carotid stenosis  Carotid ultrasound imaging reviewed  Right ICA 60 to 69% stenosis which is stable as compared to previous   Left ICA 1 to 49% stenosis which is stable as compared to previous   She remains assx from this   Medical management with ASA and statin  Discussed with pt tobacco use and relationship to atherosclerosis  pt currently is not a smoker  Discussed with patient pathophysiology of carotid stenosis and all ?s answered  Discussed with patient symptoms of stroke, TIA and they understood to go to ER immediately if any symptoms developed  F/U as outpatient in 12 Months with carotid us     Heladio Austin MD

## 2023-03-13 NOTE — PROGRESS NOTES
[facility] V[facility] - [facility]    This is a pre read worksheet - prior to official physician interpretation    Kiki Aleah  1955  Date of study: 3/13/23    Indication for study:  Carotid artery stenosis  Study : Bilateral Carotid Artery Duplex Examination    Duplex examination of the RIGHT carotid artery system identifies atherosclerotic plaque. The peak systolic velocity in internal carotid artery was 468 centimeters / second. The maximum end diastolic velocity was 96 centimeters / second. The ICA/CCA ratio is 3.7. The right vertebral artery has antegrade flow. Duplex examination of the LEFT carotid artery system identifies atherosclerotic plaque. The peak systolic velocity in internal carotid artery was 113 centimeters / second. The maximum end diastolic velocity was 27 centimeters / second. The ICA/CCA ratio is 0.8. The left vertebral artery has antegrade flow.       LAST STUDY  3/4/2022 @ [facility]  [address]  [address] 50-69

## 2023-03-15 ENCOUNTER — HOSPITAL ENCOUNTER (OUTPATIENT)
Dept: CARDIAC REHAB | Age: 68
Setting detail: THERAPIES SERIES
Discharge: HOME | End: 2023-03-15
Payer: MEDICARE

## 2023-03-15 PROCEDURE — 93798 PHYS/QHP OP CAR RHAB W/ECG: CPT

## 2023-03-17 ENCOUNTER — HOSPITAL ENCOUNTER (OUTPATIENT)
Dept: CT IMAGING | Age: 68
End: 2023-03-17
Payer: MEDICARE

## 2023-03-17 ENCOUNTER — HOSPITAL ENCOUNTER (OUTPATIENT)
Dept: CARDIAC REHAB | Age: 68
Setting detail: THERAPIES SERIES
Discharge: HOME | End: 2023-03-17
Payer: MEDICARE

## 2023-03-17 DIAGNOSIS — R91.1 PULMONARY NODULE: ICD-10-CM

## 2023-03-17 PROCEDURE — 93798 PHYS/QHP OP CAR RHAB W/ECG: CPT

## 2023-03-17 PROCEDURE — 71250 CT THORAX DX C-: CPT

## 2023-03-21 ENCOUNTER — OFFICE VISIT (OUTPATIENT)
Dept: ENDOCRINOLOGY | Age: 68
End: 2023-03-21
Payer: MEDICARE

## 2023-03-21 VITALS
OXYGEN SATURATION: 96 % | RESPIRATION RATE: 18 BRPM | DIASTOLIC BLOOD PRESSURE: 67 MMHG | BODY MASS INDEX: 21.85 KG/M2 | WEIGHT: 128 LBS | HEIGHT: 64 IN | HEART RATE: 68 BPM | SYSTOLIC BLOOD PRESSURE: 115 MMHG

## 2023-03-21 DIAGNOSIS — E03.9 HYPOTHYROIDISM, UNSPECIFIED TYPE: ICD-10-CM

## 2023-03-21 DIAGNOSIS — Z91.119 DIETARY NONCOMPLIANCE: ICD-10-CM

## 2023-03-21 DIAGNOSIS — Z79.4 TYPE 2 DIABETES MELLITUS WITH HYPERGLYCEMIA, WITH LONG-TERM CURRENT USE OF INSULIN (HCC): Primary | ICD-10-CM

## 2023-03-21 DIAGNOSIS — E11.65 TYPE 2 DIABETES MELLITUS WITH HYPERGLYCEMIA, WITH LONG-TERM CURRENT USE OF INSULIN (HCC): Primary | ICD-10-CM

## 2023-03-21 LAB — HBA1C MFR BLD: 7.9 %

## 2023-03-21 PROCEDURE — 1036F TOBACCO NON-USER: CPT | Performed by: INTERNAL MEDICINE

## 2023-03-21 PROCEDURE — 99214 OFFICE O/P EST MOD 30 MIN: CPT | Performed by: INTERNAL MEDICINE

## 2023-03-21 PROCEDURE — 3017F COLORECTAL CA SCREEN DOC REV: CPT | Performed by: INTERNAL MEDICINE

## 2023-03-21 PROCEDURE — 1123F ACP DISCUSS/DSCN MKR DOCD: CPT | Performed by: INTERNAL MEDICINE

## 2023-03-21 PROCEDURE — G8420 CALC BMI NORM PARAMETERS: HCPCS | Performed by: INTERNAL MEDICINE

## 2023-03-21 PROCEDURE — 3051F HG A1C>EQUAL 7.0%<8.0%: CPT | Performed by: INTERNAL MEDICINE

## 2023-03-21 PROCEDURE — 1090F PRES/ABSN URINE INCON ASSESS: CPT | Performed by: INTERNAL MEDICINE

## 2023-03-21 PROCEDURE — 2022F DILAT RTA XM EVC RTNOPTHY: CPT | Performed by: INTERNAL MEDICINE

## 2023-03-21 PROCEDURE — G8484 FLU IMMUNIZE NO ADMIN: HCPCS | Performed by: INTERNAL MEDICINE

## 2023-03-21 PROCEDURE — G8427 DOCREV CUR MEDS BY ELIG CLIN: HCPCS | Performed by: INTERNAL MEDICINE

## 2023-03-21 PROCEDURE — G8399 PT W/DXA RESULTS DOCUMENT: HCPCS | Performed by: INTERNAL MEDICINE

## 2023-03-21 PROCEDURE — 83036 HEMOGLOBIN GLYCOSYLATED A1C: CPT | Performed by: INTERNAL MEDICINE

## 2023-03-21 RX ORDER — INSULIN GLARGINE 300 U/ML
20 INJECTION, SOLUTION SUBCUTANEOUS NIGHTLY
Qty: 3 ADJUSTABLE DOSE PRE-FILLED PEN SYRINGE | Refills: 5 | Status: SHIPPED | OUTPATIENT
Start: 2023-03-21

## 2023-03-21 RX ORDER — INSULIN LISPRO 100 [IU]/ML
INJECTION, SOLUTION INTRAVENOUS; SUBCUTANEOUS
Qty: 5 ADJUSTABLE DOSE PRE-FILLED PEN SYRINGE | Refills: 5 | Status: SHIPPED | OUTPATIENT
Start: 2023-03-21

## 2023-03-21 NOTE — PROGRESS NOTES
Stephanie Goyal MD  Endocrinologist, [facility] Diabetes Care and Endocrinology   [address] [facility] [address]   Phone: [phone]  Fax: [phone]  --------------------------------------------  An electronic signature was used to authenticate this note.  Macey Bright MD on 3/21/2023 at 11:28 AM

## 2023-03-22 ENCOUNTER — HOSPITAL ENCOUNTER (OUTPATIENT)
Dept: CARDIAC REHAB | Age: 68
Setting detail: THERAPIES SERIES
Discharge: HOME | End: 2023-03-22
Payer: MEDICARE

## 2023-03-22 PROCEDURE — 93798 PHYS/QHP OP CAR RHAB W/ECG: CPT

## 2023-03-24 ENCOUNTER — HOSPITAL ENCOUNTER (OUTPATIENT)
Dept: PHARMACY | Age: 68
Setting detail: THERAPIES SERIES
Discharge: HOME | End: 2023-03-24
Payer: MEDICARE

## 2023-03-24 ENCOUNTER — HOSPITAL ENCOUNTER (OUTPATIENT)
Dept: CARDIAC REHAB | Age: 68
Setting detail: THERAPIES SERIES
Discharge: HOME | End: 2023-03-24
Payer: MEDICARE

## 2023-03-24 VITALS — DIASTOLIC BLOOD PRESSURE: 69 MMHG | SYSTOLIC BLOOD PRESSURE: 147 MMHG | HEART RATE: 58 BPM

## 2023-03-24 DIAGNOSIS — I97.89 POSTOPERATIVE ATRIAL FIBRILLATION (HCC): Primary | ICD-10-CM

## 2023-03-24 DIAGNOSIS — I48.91 POSTOPERATIVE ATRIAL FIBRILLATION (HCC): Primary | ICD-10-CM

## 2023-03-24 LAB — INTERNATIONAL NORMALIZATION RATIO, POC: 1.6

## 2023-03-24 PROCEDURE — 93798 PHYS/QHP OP CAR RHAB W/ECG: CPT

## 2023-03-24 PROCEDURE — 85610 PROTHROMBIN TIME: CPT

## 2023-03-24 PROCEDURE — 99212 OFFICE O/P EST SF 10 MIN: CPT

## 2023-03-24 NOTE — PROGRESS NOTES
[facility]  Patient Findings       Positives:  Change in diet/appetite [facility] [place] YESTERDAY)    Negatives:  Signs/symptoms of thrombosis, Signs/symptoms of bleeding, Laboratory test error suspected, Change in health, Change in alcohol use, Change in activity, Upcoming invasive procedure, Emergency department visit, Upcoming dental procedure, Missed doses, Extra doses, Change in medications, Hospital admission, Bruising, Other complaints    Comments:  DERM IN APRIL (RE: PSORIASIS)           Assessment/Plan:  Warfarin indication: atrial fibrillation      INR today is SUBtherapeutic at 1.6, goal is 2-3. NO REASON FOR INR DROPPING.     Warfarin Dose: INCREASE WEEKLY DOSE 8% TO 5 MG DAILY    Follow Up: 2 weeks      ANJUM Davis [facility] PharmD 3/24/2023 9:55 AM  For Pharmacy Admin Tracking Only    Intervention Detail: Dose Adjustment: 1, reason: Therapy Optimization  Total # of Interventions Recommended: 1  Total # of Interventions Accepted: 1  Time Spent (min): 15

## 2023-03-29 ENCOUNTER — HOSPITAL ENCOUNTER (OUTPATIENT)
Dept: CARDIAC REHAB | Age: 68
Setting detail: THERAPIES SERIES
Discharge: HOME | End: 2023-03-29
Payer: MEDICARE

## 2023-03-29 PROCEDURE — 93798 PHYS/QHP OP CAR RHAB W/ECG: CPT

## 2023-03-31 ENCOUNTER — HOSPITAL ENCOUNTER (OUTPATIENT)
Dept: CARDIAC REHAB | Age: 68
Setting detail: THERAPIES SERIES
Discharge: HOME | End: 2023-03-31
Payer: MEDICARE

## 2023-03-31 PROCEDURE — 93798 PHYS/QHP OP CAR RHAB W/ECG: CPT

## 2023-04-04 ENCOUNTER — HOSPITAL ENCOUNTER (OUTPATIENT)
Dept: CARDIAC REHAB | Age: 68
Setting detail: THERAPIES SERIES
Discharge: HOME | End: 2023-04-04
Payer: MEDICARE

## 2023-04-04 PROCEDURE — 93798 PHYS/QHP OP CAR RHAB W/ECG: CPT

## 2023-04-06 ENCOUNTER — HOSPITAL ENCOUNTER (OUTPATIENT)
Dept: PHARMACY | Age: 68
Setting detail: THERAPIES SERIES
Discharge: HOME | End: 2023-04-06
Payer: MEDICARE

## 2023-04-06 VITALS
DIASTOLIC BLOOD PRESSURE: 66 MMHG | WEIGHT: 127 LBS | SYSTOLIC BLOOD PRESSURE: 123 MMHG | BODY MASS INDEX: 21.8 KG/M2 | HEART RATE: 57 BPM

## 2023-04-06 DIAGNOSIS — I97.89 POSTOPERATIVE ATRIAL FIBRILLATION (HCC): Primary | ICD-10-CM

## 2023-04-06 DIAGNOSIS — I48.91 POSTOPERATIVE ATRIAL FIBRILLATION (HCC): Primary | ICD-10-CM

## 2023-04-06 LAB — INTERNATIONAL NORMALIZATION RATIO, POC: 2

## 2023-04-06 PROCEDURE — 99211 OFF/OP EST MAY X REQ PHY/QHP: CPT

## 2023-04-06 PROCEDURE — 85610 PROTHROMBIN TIME: CPT

## 2023-04-06 NOTE — PROGRESS NOTES
Door [facility] [address]  Patient Findings       Negatives:  Signs/symptoms of thrombosis, Signs/symptoms of bleeding, Laboratory test error suspected, Change in health, Change in alcohol use, Change in activity, Upcoming invasive procedure, Emergency department visit, Upcoming dental procedure, Missed doses, Extra doses, Change in medications, Change in diet/appetite, Hospital admission, Bruising, Other complaints    Comments:  4/17:  (PSORIASIS)           Assessment/Plan:  Warfarin indication: atrial fibrillation      INR today is therapeutic at 2, goal is 2-3.     Warfarin Dose: same (5 mg daily)    Follow Up: 2 weeks      Iram Curtis [address] [place] [address] PharmD 4/6/2023 10:29 AM  For Pharmacy Admin Tracking Only    Intervention Detail:   Total # of Interventions Recommended: 0  Total # of Interventions Accepted: 0  Time Spent (min): 15

## 2023-04-07 ENCOUNTER — HOSPITAL ENCOUNTER (OUTPATIENT)
Dept: CARDIAC REHAB | Age: 68
Setting detail: THERAPIES SERIES
Discharge: HOME | End: 2023-04-07
Payer: MEDICARE

## 2023-04-07 ENCOUNTER — HOSPITAL ENCOUNTER (OUTPATIENT)
Dept: CARDIAC REHAB | Age: 68
Setting detail: THERAPIES SERIES
End: 2023-04-07
Payer: MEDICARE

## 2023-04-07 PROCEDURE — 93798 PHYS/QHP OP CAR RHAB W/ECG: CPT

## 2023-04-10 ASSESSMENT — PATIENT HEALTH QUESTIONNAIRE - PHQ9
1. LITTLE INTEREST OR PLEASURE IN DOING THINGS: 1
7. TROUBLE CONCENTRATING ON THINGS, SUCH AS READING THE NEWSPAPER OR WATCHING TELEVISION: 1
SUM OF ALL RESPONSES TO PHQ QUESTIONS 1-9: 7
SUM OF ALL RESPONSES TO PHQ QUESTIONS 1-9: 7
2. FEELING DOWN, DEPRESSED OR HOPELESS: 0
SUM OF ALL RESPONSES TO PHQ9 QUESTIONS 1 & 2: 1
SUM OF ALL RESPONSES TO PHQ QUESTIONS 1-9: 7
5. POOR APPETITE OR OVEREATING: 0
10. IF YOU CHECKED OFF ANY PROBLEMS, HOW DIFFICULT HAVE THESE PROBLEMS MADE IT FOR YOU TO DO YOUR WORK, TAKE CARE OF THINGS AT HOME, OR GET ALONG WITH OTHER PEOPLE: 0
9. THOUGHTS THAT YOU WOULD BE BETTER OFF DEAD, OR OF HURTING YOURSELF: 0
SUM OF ALL RESPONSES TO PHQ QUESTIONS 1-9: 7
6. FEELING BAD ABOUT YOURSELF - OR THAT YOU ARE A FAILURE OR HAVE LET YOURSELF OR YOUR FAMILY DOWN: 1
8. MOVING OR SPEAKING SO SLOWLY THAT OTHER PEOPLE COULD HAVE NOTICED. OR THE OPPOSITE, BEING SO FIGETY OR RESTLESS THAT YOU HAVE BEEN MOVING AROUND A LOT MORE THAN USUAL: 0
3. TROUBLE FALLING OR STAYING ASLEEP: 2
4. FEELING TIRED OR HAVING LITTLE ENERGY: 2

## 2023-04-10 ASSESSMENT — LIFESTYLE VARIABLES
CIGARETTES_PER_DAY: 1/2 PACK PER DAY
SMOKELESS_TOBACCO: NO

## 2023-04-10 ASSESSMENT — EXERCISE STRESS TEST: PEAK_BP: 140/64

## 2023-04-10 ASSESSMENT — EJECTION FRACTION: EF_VALUE: 65

## 2023-04-18 ENCOUNTER — HOSPITAL ENCOUNTER (OUTPATIENT)
Dept: CARDIAC REHAB | Age: 68
Setting detail: THERAPIES SERIES
Discharge: HOME | End: 2023-04-18
Payer: MEDICARE

## 2023-04-18 PROCEDURE — 93798 PHYS/QHP OP CAR RHAB W/ECG: CPT

## 2023-04-19 DIAGNOSIS — I74.09 AORTOILIAC OCCLUSIVE DISEASE (HCC): ICD-10-CM

## 2023-04-19 RX ORDER — CLOPIDOGREL BISULFATE 75 MG/1
TABLET ORAL
Qty: 90 TABLET | Refills: 1 | Status: SHIPPED | OUTPATIENT
Start: 2023-04-19

## 2023-04-21 ENCOUNTER — HOSPITAL ENCOUNTER (OUTPATIENT)
Dept: CARDIAC REHAB | Age: 68
Setting detail: THERAPIES SERIES
Discharge: HOME | End: 2023-04-21
Payer: MEDICARE

## 2023-04-21 ENCOUNTER — HOSPITAL ENCOUNTER (OUTPATIENT)
Dept: PHARMACY | Age: 68
Setting detail: THERAPIES SERIES
Discharge: HOME | End: 2023-04-21
Payer: MEDICARE

## 2023-04-21 VITALS
DIASTOLIC BLOOD PRESSURE: 99 MMHG | HEART RATE: 59 BPM | WEIGHT: 126.8 LBS | BODY MASS INDEX: 21.77 KG/M2 | SYSTOLIC BLOOD PRESSURE: 134 MMHG

## 2023-04-21 DIAGNOSIS — I48.91 POSTOPERATIVE ATRIAL FIBRILLATION (HCC): Primary | ICD-10-CM

## 2023-04-21 DIAGNOSIS — I97.89 POSTOPERATIVE ATRIAL FIBRILLATION (HCC): Primary | ICD-10-CM

## 2023-04-21 LAB — INTERNATIONAL NORMALIZATION RATIO, POC: 1.7

## 2023-04-21 PROCEDURE — 93798 PHYS/QHP OP CAR RHAB W/ECG: CPT

## 2023-04-21 PROCEDURE — 99212 OFFICE O/P EST SF 10 MIN: CPT

## 2023-04-21 PROCEDURE — 85610 PROTHROMBIN TIME: CPT

## 2023-04-21 RX ORDER — WARFARIN SODIUM 5 MG/1
TABLET ORAL
Qty: 100 TABLET | Refills: 3 | OUTPATIENT
Start: 2023-04-21 | End: 2023-04-21 | Stop reason: SDUPTHER

## 2023-04-21 RX ORDER — WARFARIN SODIUM 5 MG/1
TABLET ORAL
Qty: 100 TABLET | Refills: 3 | Status: SHIPPED | OUTPATIENT
Start: 2023-04-21

## 2023-04-21 NOTE — PROGRESS NOTES
Door [facility] 430  Patient Findings       Positives:  Upcoming invasive procedure (WOULD LIKE TO GET GROWTH ON HER RIGHT THUMB REMOVED)    Negatives:  Signs/symptoms of thrombosis, Signs/symptoms of bleeding, Laboratory test error suspected, Change in health, Change in alcohol use, Change in activity, Emergency department visit, Upcoming dental procedure, Missed doses, Extra doses, Change in medications, Change in diet/appetite, Hospital admission, Bruising, Other complaints           Assessment/Plan:  Warfarin indication: atrial fibrillation      INR today is SUBtherapeutic at 1.7, goal is 2-3. NO REASON FOR LOW INR, LAST INR WAS 2.     Warfarin Dose: INCREASE WEEKLY DOSE 7% TO 7.5 MG EVERY FRI.; 5 MG ALL OTHER DAYS    Follow Up: 2 weeks      Conchis Monteiro [facility] PharmD 4/21/2023 9:38 AM  For Pharmacy Admin Tracking Only    Intervention Detail: Dose Adjustment: 1, reason: Therapy Optimization  Total # of Interventions Recommended: 1  Total # of Interventions Accepted: 1  Time Spent (min): 15

## 2023-04-25 ENCOUNTER — HOSPITAL ENCOUNTER (OUTPATIENT)
Dept: CARDIAC REHAB | Age: 68
Setting detail: THERAPIES SERIES
Discharge: HOME | End: 2023-04-25
Payer: MEDICARE

## 2023-04-25 PROCEDURE — 93798 PHYS/QHP OP CAR RHAB W/ECG: CPT

## 2023-04-28 ENCOUNTER — HOSPITAL ENCOUNTER (OUTPATIENT)
Dept: CARDIAC REHAB | Age: 68
Setting detail: THERAPIES SERIES
Discharge: HOME | End: 2023-04-28
Payer: MEDICARE

## 2023-04-28 PROCEDURE — 93798 PHYS/QHP OP CAR RHAB W/ECG: CPT

## 2023-05-02 ENCOUNTER — HOSPITAL ENCOUNTER (OUTPATIENT)
Dept: CARDIAC REHAB | Age: 68
Setting detail: THERAPIES SERIES
Discharge: HOME | End: 2023-05-02
Payer: MEDICARE

## 2023-05-02 PROCEDURE — 93798 PHYS/QHP OP CAR RHAB W/ECG: CPT

## 2023-05-04 ENCOUNTER — HOSPITAL ENCOUNTER (OUTPATIENT)
Dept: PHARMACY | Age: 68
Setting detail: THERAPIES SERIES
Discharge: HOME | End: 2023-05-04
Payer: MEDICARE

## 2023-05-04 VITALS
DIASTOLIC BLOOD PRESSURE: 67 MMHG | WEIGHT: 126.8 LBS | BODY MASS INDEX: 21.77 KG/M2 | SYSTOLIC BLOOD PRESSURE: 145 MMHG | HEART RATE: 56 BPM

## 2023-05-04 DIAGNOSIS — I48.91 POSTOPERATIVE ATRIAL FIBRILLATION (HCC): Primary | ICD-10-CM

## 2023-05-04 DIAGNOSIS — I97.89 POSTOPERATIVE ATRIAL FIBRILLATION (HCC): Primary | ICD-10-CM

## 2023-05-04 LAB — INTERNATIONAL NORMALIZATION RATIO, POC: 1.4

## 2023-05-04 PROCEDURE — 85610 PROTHROMBIN TIME: CPT

## 2023-05-04 PROCEDURE — 99212 OFFICE O/P EST SF 10 MIN: CPT

## 2023-05-04 NOTE — PROGRESS NOTES
Door [facility] 430  Patient Findings       Negatives:  Signs/symptoms of thrombosis, Signs/symptoms of bleeding, Laboratory test error suspected, Change in health, Change in alcohol use, Change in activity, Upcoming invasive procedure, Emergency department visit, Upcoming dental procedure, Missed doses, Extra doses, Change in medications, Change in diet/appetite, Hospital admission, Bruising, Other complaints           Assessment/Plan:  Warfarin indication: atrial fibrillation      INR today is SUBtherapeutic at 1.4, goal is 2-3. NO KNOWN CAUSE FOR LOW INR.     Warfarin Dose: INCREASE 13% TO 7.5 MG EVERY M/W/F; 5 MG ALL OTHER DAYS    Follow Up: 1 week      Stephani France [facility] PharmD 5/4/2023 9:34 AM  For Pharmacy Admin Tracking Only    Intervention Detail: Dose Adjustment: 1, reason: Therapy Optimization  Total # of Interventions Recommended: 1  Total # of Interventions Accepted: 1  Time Spent (min): 15

## 2023-05-05 ENCOUNTER — HOSPITAL ENCOUNTER (OUTPATIENT)
Dept: CARDIAC REHAB | Age: 68
Setting detail: THERAPIES SERIES
Discharge: HOME | End: 2023-05-05
Payer: MEDICARE

## 2023-05-05 PROCEDURE — 93798 PHYS/QHP OP CAR RHAB W/ECG: CPT

## 2023-05-09 ENCOUNTER — HOSPITAL ENCOUNTER (OUTPATIENT)
Dept: CARDIAC REHAB | Age: 68
Setting detail: THERAPIES SERIES
Discharge: HOME | End: 2023-05-09
Payer: MEDICARE

## 2023-05-09 PROCEDURE — 93798 PHYS/QHP OP CAR RHAB W/ECG: CPT

## 2023-05-09 ASSESSMENT — LIFESTYLE VARIABLES
CIGARETTES_PER_DAY: 1/2 PACK PER DAY
SMOKELESS_TOBACCO: NO

## 2023-05-09 ASSESSMENT — PATIENT HEALTH QUESTIONNAIRE - PHQ9
10. IF YOU CHECKED OFF ANY PROBLEMS, HOW DIFFICULT HAVE THESE PROBLEMS MADE IT FOR YOU TO DO YOUR WORK, TAKE CARE OF THINGS AT HOME, OR GET ALONG WITH OTHER PEOPLE: 0
5. POOR APPETITE OR OVEREATING: 0
SUM OF ALL RESPONSES TO PHQ QUESTIONS 1-9: 5
9. THOUGHTS THAT YOU WOULD BE BETTER OFF DEAD, OR OF HURTING YOURSELF: 0
3. TROUBLE FALLING OR STAYING ASLEEP: 0
SUM OF ALL RESPONSES TO PHQ QUESTIONS 1-9: 5
2. FEELING DOWN, DEPRESSED OR HOPELESS: 1
6. FEELING BAD ABOUT YOURSELF - OR THAT YOU ARE A FAILURE OR HAVE LET YOURSELF OR YOUR FAMILY DOWN: 0
1. LITTLE INTEREST OR PLEASURE IN DOING THINGS: 1
4. FEELING TIRED OR HAVING LITTLE ENERGY: 1
8. MOVING OR SPEAKING SO SLOWLY THAT OTHER PEOPLE COULD HAVE NOTICED. OR THE OPPOSITE, BEING SO FIGETY OR RESTLESS THAT YOU HAVE BEEN MOVING AROUND A LOT MORE THAN USUAL: 1
7. TROUBLE CONCENTRATING ON THINGS, SUCH AS READING THE NEWSPAPER OR WATCHING TELEVISION: 1
SUM OF ALL RESPONSES TO PHQ QUESTIONS 1-9: 5
SUM OF ALL RESPONSES TO PHQ9 QUESTIONS 1 & 2: 2
SUM OF ALL RESPONSES TO PHQ QUESTIONS 1-9: 5

## 2023-05-09 ASSESSMENT — EXERCISE STRESS TEST: PEAK_BP: 160/76

## 2023-05-09 ASSESSMENT — EJECTION FRACTION: EF_VALUE: 65

## 2023-05-09 NOTE — PROGRESS NOTES
patient. Spent 10 mins with Q&A for patient.    Education Goal Assessment Recommendations;Treatment time frame

## 2023-05-12 ENCOUNTER — HOSPITAL ENCOUNTER (OUTPATIENT)
Dept: CARDIAC REHAB | Age: 68
Setting detail: THERAPIES SERIES
Discharge: HOME | End: 2023-05-12
Payer: MEDICARE

## 2023-05-12 ENCOUNTER — HOSPITAL ENCOUNTER (OUTPATIENT)
Dept: PHARMACY | Age: 68
Setting detail: THERAPIES SERIES
Discharge: HOME | End: 2023-05-12
Payer: MEDICARE

## 2023-05-12 VITALS
SYSTOLIC BLOOD PRESSURE: 129 MMHG | DIASTOLIC BLOOD PRESSURE: 72 MMHG | WEIGHT: 123.6 LBS | BODY MASS INDEX: 21.22 KG/M2 | HEART RATE: 65 BPM

## 2023-05-12 DIAGNOSIS — I48.91 POSTOPERATIVE ATRIAL FIBRILLATION (HCC): Primary | ICD-10-CM

## 2023-05-12 DIAGNOSIS — I97.89 POSTOPERATIVE ATRIAL FIBRILLATION (HCC): Primary | ICD-10-CM

## 2023-05-12 LAB — INTERNATIONAL NORMALIZATION RATIO, POC: 3.1

## 2023-05-12 PROCEDURE — 99211 OFF/OP EST MAY X REQ PHY/QHP: CPT

## 2023-05-12 PROCEDURE — 85610 PROTHROMBIN TIME: CPT

## 2023-05-12 PROCEDURE — 93798 PHYS/QHP OP CAR RHAB W/ECG: CPT

## 2023-05-12 NOTE — PROGRESS NOTES
Door [facility] 430  Patient Findings       Positives:  Change in diet/appetite (SHE ATE VERY LITTLE VITAMIN K)    Negatives:  Signs/symptoms of thrombosis, Signs/symptoms of bleeding, Laboratory test error suspected, Change in health, Change in alcohol use, Change in activity, Upcoming invasive procedure, Emergency department visit, Upcoming dental procedure, Missed doses, Extra doses, Change in medications, Hospital admission, Bruising, Other complaints           Assessment/Plan:  Warfarin indication: atrial fibrillation      INR today is SLIGHTLY SUPRAtherapeutic at 3.1, goal is 2-3. SHE AVOIDED ALL VITAMIN K (TOOK ICEBURG LETTUCE OFF A SALAD).     Warfarin Dose: same (7.5 mg every M/W/F; 5 mg all other days) - RESUME USUAL VITAMIN K INTAKE    Follow Up: 2 weeks      Gloria Smith [facility] PharmD 5/12/2023 9:55 AM  For Pharmacy Admin Tracking Only    Intervention Detail:   Total # of Interventions Recommended: 0  Total # of Interventions Accepted: 0  Time Spent (min): 15

## 2023-05-16 ENCOUNTER — HOSPITAL ENCOUNTER (OUTPATIENT)
Dept: CARDIAC REHAB | Age: 68
Setting detail: THERAPIES SERIES
Discharge: HOME | End: 2023-05-16
Payer: MEDICARE

## 2023-05-16 PROCEDURE — 93798 PHYS/QHP OP CAR RHAB W/ECG: CPT

## 2023-05-26 ENCOUNTER — HOSPITAL ENCOUNTER (OUTPATIENT)
Dept: CARDIAC REHAB | Age: 68
Setting detail: THERAPIES SERIES
Discharge: HOME | End: 2023-05-26
Payer: MEDICARE

## 2023-05-26 ENCOUNTER — HOSPITAL ENCOUNTER (OUTPATIENT)
Dept: PHARMACY | Age: 68
Setting detail: THERAPIES SERIES
Discharge: HOME | End: 2023-05-26
Payer: MEDICARE

## 2023-05-26 VITALS
BODY MASS INDEX: 20.8 KG/M2 | DIASTOLIC BLOOD PRESSURE: 72 MMHG | HEART RATE: 64 BPM | WEIGHT: 121.2 LBS | SYSTOLIC BLOOD PRESSURE: 131 MMHG

## 2023-05-26 DIAGNOSIS — I97.89 POSTOPERATIVE ATRIAL FIBRILLATION (HCC): Primary | ICD-10-CM

## 2023-05-26 DIAGNOSIS — I48.91 POSTOPERATIVE ATRIAL FIBRILLATION (HCC): Primary | ICD-10-CM

## 2023-05-26 LAB — INTERNATIONAL NORMALIZATION RATIO, POC: 4.3

## 2023-05-26 PROCEDURE — 93798 PHYS/QHP OP CAR RHAB W/ECG: CPT

## 2023-05-26 PROCEDURE — 85610 PROTHROMBIN TIME: CPT

## 2023-05-26 NOTE — PROGRESS NOTES
Door [facility] 430  Patient Findings       Negatives:  Signs/symptoms of thrombosis, Signs/symptoms of bleeding, Laboratory test error suspected, Change in health, Change in alcohol use, Change in activity, Upcoming invasive procedure, Emergency department visit, Upcoming dental procedure, Missed doses, Extra doses, Change in medications, Change in diet/appetite, Hospital admission, Bruising, Other complaints           Assessment/Plan:  Warfarin indication: atrial fibrillation      INR today is SUPRAtherapeutic at 4.3, goal is 2-3. INR'S LABILE, UNSURE OF THE CAUSE.     Warfarin Dose: HOLD X 2 DAYS THEN DECREASE 12% TO 7.5 MG EVERY MON.; 5 MG ALL OTHER DAYS    Follow Up: 2 weeks      Jet Wilson [facility] PharmD 5/26/2023 9:44 AM  For Pharmacy Admin Tracking Only    Intervention Detail: Dose Adjustment: 1, reason: Therapy De-escalation  Total # of Interventions Recommended: 1  Total # of Interventions Accepted: 1  Time Spent (min): 15

## 2023-05-30 ENCOUNTER — HOSPITAL ENCOUNTER (OUTPATIENT)
Dept: CARDIAC REHAB | Age: 68
Setting detail: THERAPIES SERIES
Discharge: HOME | End: 2023-05-30
Payer: MEDICARE

## 2023-05-30 PROCEDURE — 93798 PHYS/QHP OP CAR RHAB W/ECG: CPT

## 2023-05-30 ASSESSMENT — EXERCISE STRESS TEST
PEAK_BP: 106/64
PEAK_BP: 106/64
PEAK_RPE: 9
PEAK_HR: 85

## 2023-05-30 ASSESSMENT — PATIENT HEALTH QUESTIONNAIRE - PHQ9
6. FEELING BAD ABOUT YOURSELF - OR THAT YOU ARE A FAILURE OR HAVE LET YOURSELF OR YOUR FAMILY DOWN: 0
SUM OF ALL RESPONSES TO PHQ QUESTIONS 1-9: 3
SUM OF ALL RESPONSES TO PHQ QUESTIONS 1-9: 3
9. THOUGHTS THAT YOU WOULD BE BETTER OFF DEAD, OR OF HURTING YOURSELF: 0
3. TROUBLE FALLING OR STAYING ASLEEP: 1
8. MOVING OR SPEAKING SO SLOWLY THAT OTHER PEOPLE COULD HAVE NOTICED. OR THE OPPOSITE, BEING SO FIGETY OR RESTLESS THAT YOU HAVE BEEN MOVING AROUND A LOT MORE THAN USUAL: 0
SUM OF ALL RESPONSES TO PHQ9 QUESTIONS 1 & 2: 1
4. FEELING TIRED OR HAVING LITTLE ENERGY: 1
7. TROUBLE CONCENTRATING ON THINGS, SUCH AS READING THE NEWSPAPER OR WATCHING TELEVISION: 0
2. FEELING DOWN, DEPRESSED OR HOPELESS: 1
10. IF YOU CHECKED OFF ANY PROBLEMS, HOW DIFFICULT HAVE THESE PROBLEMS MADE IT FOR YOU TO DO YOUR WORK, TAKE CARE OF THINGS AT HOME, OR GET ALONG WITH OTHER PEOPLE: 0
1. LITTLE INTEREST OR PLEASURE IN DOING THINGS: 0
SUM OF ALL RESPONSES TO PHQ QUESTIONS 1-9: 3
5. POOR APPETITE OR OVEREATING: 0
SUM OF ALL RESPONSES TO PHQ QUESTIONS 1-9: 3

## 2023-05-30 ASSESSMENT — EJECTION FRACTION: EF_VALUE: 65

## 2023-05-30 ASSESSMENT — LIFESTYLE VARIABLES
CIGARETTES_PER_DAY: 1/2 PACK PER DAY
SMOKELESS_TOBACCO: NO

## 2023-05-30 NOTE — PROGRESS NOTES
Final ITP, Pt completed 36/36 sessions in our program averaging 2-4 METS and over 60 minutes per session. 05/30/23 1146   Treatment Diagnosis   Treatment Diagnosis 1 CABG   CABG Date 11/02/23   Referral Date 12/13/22   Co-morbidities Diabetes;Pulmonary disease   Oxygen Saturation / Titration    Stages of Change  Maintenance   Oxygen Intervention   Oxygen Use No   Individual Treatment Plan   ITP Visit Type Discharge, completed program  (Pt completed 36/36 sessions in our program averaging 2-4 METS and over 60 minutes per session)   1st Date of Exercise  03/10/23   Visit #/Total Visits 36/36   EF% 65 %   Risk Stratification Low   ITP Exercise;Psychosocial;Tobacco;Nutrition;Education   Exercise    Stages of Change Action   Assisted Devices None   Test Six minute walk test   Data Measured Before Walk   Heart Rate 62   Blood Pressure 118/52   O2 Saturation 97   O2 Device Room air   RPE 6   Data Measured during Walk   Indicate Mode of RPE 6 minutes   RPE Data Measured During   Treadmill Speed 1.6 mph   Symptoms   (none)   Any problems while exercising none   Do You Have Shortness of Breath No   Describe Type of Pain You Are Having none   Peak RPE 9   Data Measured Immediately After Walk   Distance Walked in  [employer] (miles) 0.12   Distance Walked in Feet (Calculated) 633.6 ft   Peak Heart Rate 85   Peak Blood Pressure 106/64   RPE 9   O2 Saturation 96   Data Measured at 5 Minutes After Walk   Heart Rate 60   Blood Pressure 104/56   SpO2 97 %   Exercise Prescription   Mode Treadmill;Bike;Ergometer; Stepper  (stepper- Nu-Step)   Frequency per week 2   Duration Per Session 60  (continuing to maintain 60 min 2x a week)   Intensity METS       3-4  (pt has reached goal)   RPE 8-13   Progression Work up to 60 mins of exercise at a mod intensity of 3-4 METs bye end of 36 sessions in program.  (Pt has reached goal)   Symptoms with Exercise Leg pain   Target Heart Rate   (UNM -30%)   Resistance Training Yes

## 2023-06-09 ENCOUNTER — HOSPITAL ENCOUNTER (OUTPATIENT)
Dept: PHARMACY | Age: 68
Setting detail: THERAPIES SERIES
Discharge: HOME | End: 2023-06-09
Payer: MEDICARE

## 2023-06-09 VITALS
WEIGHT: 123 LBS | HEART RATE: 58 BPM | DIASTOLIC BLOOD PRESSURE: 57 MMHG | BODY MASS INDEX: 21.11 KG/M2 | SYSTOLIC BLOOD PRESSURE: 137 MMHG

## 2023-06-09 DIAGNOSIS — I97.89 POSTOPERATIVE ATRIAL FIBRILLATION (HCC): Primary | ICD-10-CM

## 2023-06-09 DIAGNOSIS — I48.91 POSTOPERATIVE ATRIAL FIBRILLATION (HCC): Primary | ICD-10-CM

## 2023-06-09 LAB — INTERNATIONAL NORMALIZATION RATIO, POC: 1.6

## 2023-06-09 PROCEDURE — 99212 OFFICE O/P EST SF 10 MIN: CPT

## 2023-06-09 PROCEDURE — 85610 PROTHROMBIN TIME: CPT

## 2023-06-09 NOTE — PROGRESS NOTES
Door [facility] 430  Patient Findings       Positives:  Change in activity [facility] COMPLETED CARD[facility] REHAB)    Negatives:  Signs/symptoms of thrombosis, Signs/symptoms of bleeding, Laboratory test error suspected, Change in health, Change in alcohol use, Upcoming invasive procedure, Emergency department visit, Upcoming dental procedure, Missed doses, Extra doses, Change in medications, Change in diet/appetite, Hospital admission, Bruising, Other complaints           Assessment/Plan:  Warfarin indication: atrial fibrillation      INR today is SUBtherapeutic at 1.6, goal is 2-3. INR'S HAVE BEEN LABILE, NO REASON FOUND.     Warfarin Dose: INCREASE 7% TO 7.5 MG EVERY MON./FRI.; 5 MG ALL OTHER DAYS    Follow Up: 2 weeks      ANJUM Parra [facility] PharmD 6/9/2023 9:35 AM  For Pharmacy Admin Tracking Only    Intervention Detail: Dose Adjustment: 1, reason: Therapy Optimization  Total # of Interventions Recommended: 1  Total # of Interventions Accepted: 1  Time Spent (min): 15

## 2023-06-23 ENCOUNTER — HOSPITAL ENCOUNTER (OUTPATIENT)
Dept: PHARMACY | Age: 68
Setting detail: THERAPIES SERIES
Discharge: HOME | End: 2023-06-23
Payer: MEDICARE

## 2023-06-23 VITALS
SYSTOLIC BLOOD PRESSURE: 125 MMHG | DIASTOLIC BLOOD PRESSURE: 61 MMHG | WEIGHT: 124 LBS | BODY MASS INDEX: 21.28 KG/M2 | HEART RATE: 54 BPM

## 2023-06-23 DIAGNOSIS — I97.89 POSTOPERATIVE ATRIAL FIBRILLATION (HCC): Primary | ICD-10-CM

## 2023-06-23 DIAGNOSIS — I48.91 POSTOPERATIVE ATRIAL FIBRILLATION (HCC): Primary | ICD-10-CM

## 2023-06-23 LAB — INTERNATIONAL NORMALIZATION RATIO, POC: 2.2

## 2023-06-23 PROCEDURE — 85610 PROTHROMBIN TIME: CPT

## 2023-06-23 PROCEDURE — 99211 OFF/OP EST MAY X REQ PHY/QHP: CPT

## 2023-06-23 NOTE — PROGRESS NOTES
[facility] 430  Patient Findings       Negatives:  Signs/symptoms of thrombosis, Signs/symptoms of bleeding, Laboratory test error suspected, Change in health, Change in alcohol use, Change in activity, Upcoming invasive procedure, Emergency department visit, Upcoming dental procedure, Missed doses, Extra doses, Change in medications, Change in diet/appetite, Hospital admission, Bruising, Other complaints    Comments:  PCP WELLNESS VISIT 6/26           Assessment/Plan:  Warfarin indication: atrial fibrillation      INR today is therapeutic at 2.2, goal is 2-3.     Warfarin Dose: same (7.5 mg every Mon./Fri.; 5 mg all other days)    Follow Up: 2 weeks      Edgard Lyles, [address] [place] [address] PharmD 6/23/2023 10:05 AM  For Pharmacy Admin Tracking Only    Intervention Detail:   Total # of Interventions Recommended: 0  Total # of Interventions Accepted: 0  Time Spent (min): 15

## 2023-06-26 ENCOUNTER — OFFICE VISIT (OUTPATIENT)
Dept: PRIMARY CARE CLINIC | Age: 68
End: 2023-06-26
Payer: MEDICARE

## 2023-06-26 VITALS
HEIGHT: 64 IN | SYSTOLIC BLOOD PRESSURE: 138 MMHG | DIASTOLIC BLOOD PRESSURE: 68 MMHG | TEMPERATURE: 97.2 F | HEART RATE: 74 BPM | WEIGHT: 124 LBS | OXYGEN SATURATION: 98 % | BODY MASS INDEX: 21.17 KG/M2

## 2023-06-26 DIAGNOSIS — E03.9 HYPOTHYROIDISM, UNSPECIFIED TYPE: ICD-10-CM

## 2023-06-26 DIAGNOSIS — E11.65 UNCONTROLLED TYPE 2 DIABETES MELLITUS WITH HYPERGLYCEMIA (HCC): ICD-10-CM

## 2023-06-26 DIAGNOSIS — Z12.31 SCREENING MAMMOGRAM FOR BREAST CANCER: ICD-10-CM

## 2023-06-26 DIAGNOSIS — Z00.00 MEDICARE ANNUAL WELLNESS VISIT, SUBSEQUENT: Primary | ICD-10-CM

## 2023-06-26 DIAGNOSIS — M18.11 PRIMARY OSTEOARTHRITIS OF FIRST CARPOMETACARPAL JOINT OF RIGHT HAND: ICD-10-CM

## 2023-06-26 LAB
ALBUMIN SERPL-MCNC: 4.4 G/DL (ref 3.5–5.2)
ALP SERPL-CCNC: 92 U/L (ref 35–104)
ALT SERPL-CCNC: 19 U/L (ref 0–32)
ANION GAP SERPL CALCULATED.3IONS-SCNC: 12 MMOL/L (ref 7–16)
AST SERPL-CCNC: 20 U/L (ref 0–31)
BILIRUB SERPL-MCNC: 0.2 MG/DL (ref 0–1.2)
BUN SERPL-MCNC: 11 MG/DL (ref 6–23)
CALCIUM SERPL-MCNC: 10.5 MG/DL (ref 8.6–10.2)
CHLORIDE SERPL-SCNC: 99 MMOL/L (ref 98–107)
CHOLESTEROL, TOTAL: 133 MG/DL (ref 0–199)
CO2 SERPL-SCNC: 25 MMOL/L (ref 22–29)
CREAT SERPL-MCNC: 0.6 MG/DL (ref 0.5–1)
CREAT UR-MCNC: 25 MG/DL (ref 29–226)
ERYTHROCYTE [DISTWIDTH] IN BLOOD BY AUTOMATED COUNT: 15.7 FL (ref 11.5–15)
GLUCOSE SERPL-MCNC: 156 MG/DL (ref 74–99)
HBA1C MFR BLD: 9 % (ref 4–5.6)
HCT VFR BLD AUTO: 46.5 % (ref 34–48)
HDLC SERPL-MCNC: 42 MG/DL
HGB BLD-MCNC: 14.3 G/DL (ref 11.5–15.5)
LDLC SERPL CALC-MCNC: 54 MG/DL (ref 0–99)
MCH RBC QN AUTO: 28.2 PG (ref 26–35)
MCHC RBC AUTO-ENTMCNC: 30.8 % (ref 32–34.5)
MCV RBC AUTO: 91.7 FL (ref 80–99.9)
MICROALBUMIN UR-MCNC: 34 MG/L
MICROALBUMIN/CREAT UR-RTO: 136 (ref 0–30)
PLATELET # BLD AUTO: 209 E9/L (ref 130–450)
PMV BLD AUTO: 10.6 FL (ref 7–12)
POTASSIUM SERPL-SCNC: 5.4 MMOL/L (ref 3.5–5)
PROT SERPL-MCNC: 7 G/DL (ref 6.4–8.3)
RBC # BLD AUTO: 5.07 E12/L (ref 3.5–5.5)
SODIUM SERPL-SCNC: 136 MMOL/L (ref 132–146)
T4 FREE SERPL-MCNC: 1.27 NG/DL (ref 0.93–1.7)
TRIGL SERPL-MCNC: 187 MG/DL (ref 0–149)
TSH SERPL-MCNC: 1.65 UIU/ML (ref 0.27–4.2)
VLDLC SERPL CALC-MCNC: 37 MG/DL
WBC # BLD: 6.8 E9/L (ref 4.5–11.5)

## 2023-06-26 PROCEDURE — 1123F ACP DISCUSS/DSCN MKR DOCD: CPT | Performed by: FAMILY MEDICINE

## 2023-06-26 PROCEDURE — 1090F PRES/ABSN URINE INCON ASSESS: CPT | Performed by: FAMILY MEDICINE

## 2023-06-26 PROCEDURE — 2022F DILAT RTA XM EVC RTNOPTHY: CPT | Performed by: FAMILY MEDICINE

## 2023-06-26 PROCEDURE — G8427 DOCREV CUR MEDS BY ELIG CLIN: HCPCS | Performed by: FAMILY MEDICINE

## 2023-06-26 PROCEDURE — G0439 PPPS, SUBSEQ VISIT: HCPCS | Performed by: FAMILY MEDICINE

## 2023-06-26 PROCEDURE — G8399 PT W/DXA RESULTS DOCUMENT: HCPCS | Performed by: FAMILY MEDICINE

## 2023-06-26 PROCEDURE — G8420 CALC BMI NORM PARAMETERS: HCPCS | Performed by: FAMILY MEDICINE

## 2023-06-26 PROCEDURE — 3051F HG A1C>EQUAL 7.0%<8.0%: CPT | Performed by: FAMILY MEDICINE

## 2023-06-26 PROCEDURE — 99212 OFFICE O/P EST SF 10 MIN: CPT | Performed by: FAMILY MEDICINE

## 2023-06-26 PROCEDURE — 36415 COLL VENOUS BLD VENIPUNCTURE: CPT | Performed by: FAMILY MEDICINE

## 2023-06-26 PROCEDURE — 3017F COLORECTAL CA SCREEN DOC REV: CPT | Performed by: FAMILY MEDICINE

## 2023-06-26 PROCEDURE — 4004F PT TOBACCO SCREEN RCVD TLK: CPT | Performed by: FAMILY MEDICINE

## 2023-06-26 RX ORDER — LEVOTHYROXINE SODIUM 0.03 MG/1
25 TABLET ORAL DAILY
Qty: 90 TABLET | Refills: 1 | Status: SHIPPED | OUTPATIENT
Start: 2023-06-26

## 2023-06-26 RX ORDER — DICLOFENAC SODIUM 20 MG/G
SOLUTION TOPICAL
Qty: 4 G | Refills: 0 | COMMUNITY
Start: 2023-06-26

## 2023-06-26 SDOH — ECONOMIC STABILITY: HOUSING INSECURITY
IN THE LAST 12 MONTHS, WAS THERE A TIME WHEN YOU DID NOT HAVE A STEADY PLACE TO SLEEP OR SLEPT IN A SHELTER (INCLUDING NOW)?: NO

## 2023-06-26 SDOH — ECONOMIC STABILITY: FOOD INSECURITY: WITHIN THE PAST 12 MONTHS, THE FOOD YOU BOUGHT JUST DIDN'T LAST AND YOU DIDN'T HAVE MONEY TO GET MORE.: NEVER TRUE

## 2023-06-26 SDOH — ECONOMIC STABILITY: FOOD INSECURITY: WITHIN THE PAST 12 MONTHS, YOU WORRIED THAT YOUR FOOD WOULD RUN OUT BEFORE YOU GOT MONEY TO BUY MORE.: NEVER TRUE

## 2023-06-26 SDOH — ECONOMIC STABILITY: INCOME INSECURITY: HOW HARD IS IT FOR YOU TO PAY FOR THE VERY BASICS LIKE FOOD, HOUSING, MEDICAL CARE, AND HEATING?: NOT HARD AT ALL

## 2023-06-26 ASSESSMENT — PATIENT HEALTH QUESTIONNAIRE - PHQ9
SUM OF ALL RESPONSES TO PHQ QUESTIONS 1-9: 0
SUM OF ALL RESPONSES TO PHQ9 QUESTIONS 1 & 2: 0
7. TROUBLE CONCENTRATING ON THINGS, SUCH AS READING THE NEWSPAPER OR WATCHING TELEVISION: 0
1. LITTLE INTEREST OR PLEASURE IN DOING THINGS: 0
10. IF YOU CHECKED OFF ANY PROBLEMS, HOW DIFFICULT HAVE THESE PROBLEMS MADE IT FOR YOU TO DO YOUR WORK, TAKE CARE OF THINGS AT HOME, OR GET ALONG WITH OTHER PEOPLE: 0
SUM OF ALL RESPONSES TO PHQ QUESTIONS 1-9: 0
SUM OF ALL RESPONSES TO PHQ QUESTIONS 1-9: 0
6. FEELING BAD ABOUT YOURSELF - OR THAT YOU ARE A FAILURE OR HAVE LET YOURSELF OR YOUR FAMILY DOWN: 0
2. FEELING DOWN, DEPRESSED OR HOPELESS: 0
9. THOUGHTS THAT YOU WOULD BE BETTER OFF DEAD, OR OF HURTING YOURSELF: 0
SUM OF ALL RESPONSES TO PHQ QUESTIONS 1-9: 0
5. POOR APPETITE OR OVEREATING: 0
8. MOVING OR SPEAKING SO SLOWLY THAT OTHER PEOPLE COULD HAVE NOTICED. OR THE OPPOSITE, BEING SO FIGETY OR RESTLESS THAT YOU HAVE BEEN MOVING AROUND A LOT MORE THAN USUAL: 0
4. FEELING TIRED OR HAVING LITTLE ENERGY: 0
3. TROUBLE FALLING OR STAYING ASLEEP: 0

## 2023-06-26 ASSESSMENT — LIFESTYLE VARIABLES
HOW MANY STANDARD DRINKS CONTAINING ALCOHOL DO YOU HAVE ON A TYPICAL DAY: PATIENT DOES NOT DRINK
HOW OFTEN DO YOU HAVE A DRINK CONTAINING ALCOHOL: NEVER

## 2023-06-26 ASSESSMENT — ENCOUNTER SYMPTOMS
VISUAL CHANGE: 0
BACK PAIN: 1
BLURRED VISION: 0

## 2023-06-28 ENCOUNTER — TELEPHONE (OUTPATIENT)
Dept: PRIMARY CARE CLINIC | Age: 68
End: 2023-06-28

## 2023-07-03 ENCOUNTER — OFFICE VISIT (OUTPATIENT)
Dept: NEUROSURGERY | Age: 68
End: 2023-07-03
Payer: MEDICARE

## 2023-07-03 VITALS
HEIGHT: 64 IN | SYSTOLIC BLOOD PRESSURE: 133 MMHG | TEMPERATURE: 97.3 F | WEIGHT: 124 LBS | BODY MASS INDEX: 21.17 KG/M2 | RESPIRATION RATE: 18 BRPM | OXYGEN SATURATION: 94 % | DIASTOLIC BLOOD PRESSURE: 70 MMHG | HEART RATE: 51 BPM

## 2023-07-03 DIAGNOSIS — G89.29 CHRONIC LEFT-SIDED LOW BACK PAIN WITH LEFT-SIDED SCIATICA: Primary | ICD-10-CM

## 2023-07-03 DIAGNOSIS — M54.16 LUMBAR RADICULOPATHY: ICD-10-CM

## 2023-07-03 DIAGNOSIS — Z98.890 S/P LAMINECTOMY: ICD-10-CM

## 2023-07-03 DIAGNOSIS — M54.42 CHRONIC LEFT-SIDED LOW BACK PAIN WITH LEFT-SIDED SCIATICA: Primary | ICD-10-CM

## 2023-07-03 PROCEDURE — G8427 DOCREV CUR MEDS BY ELIG CLIN: HCPCS | Performed by: STUDENT IN AN ORGANIZED HEALTH CARE EDUCATION/TRAINING PROGRAM

## 2023-07-03 PROCEDURE — 4004F PT TOBACCO SCREEN RCVD TLK: CPT | Performed by: STUDENT IN AN ORGANIZED HEALTH CARE EDUCATION/TRAINING PROGRAM

## 2023-07-03 PROCEDURE — 99213 OFFICE O/P EST LOW 20 MIN: CPT | Performed by: STUDENT IN AN ORGANIZED HEALTH CARE EDUCATION/TRAINING PROGRAM

## 2023-07-03 PROCEDURE — G8420 CALC BMI NORM PARAMETERS: HCPCS | Performed by: STUDENT IN AN ORGANIZED HEALTH CARE EDUCATION/TRAINING PROGRAM

## 2023-07-03 PROCEDURE — 3017F COLORECTAL CA SCREEN DOC REV: CPT | Performed by: STUDENT IN AN ORGANIZED HEALTH CARE EDUCATION/TRAINING PROGRAM

## 2023-07-03 PROCEDURE — 1123F ACP DISCUSS/DSCN MKR DOCD: CPT | Performed by: STUDENT IN AN ORGANIZED HEALTH CARE EDUCATION/TRAINING PROGRAM

## 2023-07-03 PROCEDURE — 1090F PRES/ABSN URINE INCON ASSESS: CPT | Performed by: STUDENT IN AN ORGANIZED HEALTH CARE EDUCATION/TRAINING PROGRAM

## 2023-07-03 PROCEDURE — G8399 PT W/DXA RESULTS DOCUMENT: HCPCS | Performed by: STUDENT IN AN ORGANIZED HEALTH CARE EDUCATION/TRAINING PROGRAM

## 2023-07-03 NOTE — PROGRESS NOTES
Problem Focused Office Visit     Subjective: Jeffrey Ribera is a 80 y.o.  female who has a past medical history of HLD, carotid endarterectomy, DM, and previous L5-S1 left hemilaminectomy at [facility]  who presents today for office follow up. Patient was originally seen by Dr. Gifty Fernandez on 8/25/2022 and a L1-S1 fusion with L4-L5 and L5-S1 interbody fusion was recommended. Patient was later found to have severe coronary artery disease and underwent a CABGx2 on 11/2/2022     Patient presents today for evaluation of low back pain. Patient states the pain has remained the same since last year. She describes this pain as constant sharp pain that goes down both legs. She admits to associated numbness in her left leg which is chronic since last back surgery. Does admit to associated weakness as well. Movement makes the pain worse, sitting and leaning forward makes the pain better. She states she has associated calf cramps with this pain. She has tried PT at [facility] about 1 year ago with no relief, no recent PT for the back, has participated in cardiac rehab a few months ago. She has also tried [facility] many years ago with mild relief. She states she has quit smoking and takes warfarin and ASA daily. Physical Exam  HENT:      Head: Normocephalic. Eyes:      Pupils: Pupils are equal, round, and reactive to light. Cardiovascular:      Rate and Rhythm: Normal rate. Pulmonary:      Effort: Pulmonary effort is normal.   Abdominal:      General: There is no distension. Musculoskeletal:         General: Normal range of motion. Cervical back: Normal range of motion. Skin:     General: Skin is warm and dry. Neurological:      Mental Status: She is alert. Comments: A&Ox3  CN3-12 intact  4/5 LLE secondary to pain otherwise Motor Strength full   Sensation intact to light touch   Reflexes normal   (-)Bilateral Straight Leg test   Psychiatric:         Thought Content:  Thought content normal.        Assessment:

## 2023-07-07 ENCOUNTER — HOSPITAL ENCOUNTER (OUTPATIENT)
Dept: PHARMACY | Age: 68
Setting detail: THERAPIES SERIES
Discharge: HOME | End: 2023-07-07
Payer: MEDICARE

## 2023-07-07 ENCOUNTER — HOSPITAL ENCOUNTER (OUTPATIENT)
Age: 68
Discharge: HOME | End: 2023-07-07
Payer: MEDICARE

## 2023-07-07 VITALS
SYSTOLIC BLOOD PRESSURE: 133 MMHG | DIASTOLIC BLOOD PRESSURE: 60 MMHG | HEART RATE: 50 BPM | WEIGHT: 124.8 LBS | BODY MASS INDEX: 21.42 KG/M2

## 2023-07-07 DIAGNOSIS — I97.89 POSTOPERATIVE ATRIAL FIBRILLATION (HCC): Primary | ICD-10-CM

## 2023-07-07 DIAGNOSIS — I48.91 POSTOPERATIVE ATRIAL FIBRILLATION (HCC): Primary | ICD-10-CM

## 2023-07-07 DIAGNOSIS — M54.42 CHRONIC LEFT-SIDED LOW BACK PAIN WITH LEFT-SIDED SCIATICA: ICD-10-CM

## 2023-07-07 DIAGNOSIS — M54.16 LUMBAR RADICULOPATHY: ICD-10-CM

## 2023-07-07 DIAGNOSIS — G89.29 CHRONIC LEFT-SIDED LOW BACK PAIN WITH LEFT-SIDED SCIATICA: ICD-10-CM

## 2023-07-07 LAB
BUN SERPL-MCNC: 9 MG/DL (ref 6–23)
CREAT SERPL-MCNC: 0.6 MG/DL (ref 0.5–1)
INTERNATIONAL NORMALIZATION RATIO, POC: 2.2

## 2023-07-07 PROCEDURE — 36415 COLL VENOUS BLD VENIPUNCTURE: CPT

## 2023-07-07 PROCEDURE — 85610 PROTHROMBIN TIME: CPT

## 2023-07-07 PROCEDURE — 99211 OFF/OP EST MAY X REQ PHY/QHP: CPT

## 2023-07-07 PROCEDURE — 82565 ASSAY OF CREATININE: CPT

## 2023-07-07 PROCEDURE — 84520 ASSAY OF UREA NITROGEN: CPT

## 2023-07-07 NOTE — PROGRESS NOTES
[address] [facility]  Patient Findings       Positives:  Upcoming invasive procedure (WILL NEED BACK SURGERY THIS SUMMER)    Negatives:  Signs/symptoms of thrombosis, Signs/symptoms of bleeding, Laboratory test error suspected, Change in health, Change in alcohol use, Change in activity, Emergency department visit, Upcoming dental procedure, Missed doses, Extra doses, Change in medications, Change in diet/appetite, Hospital admission, Bruising, Other complaints    Comments:  7/11: DR. Juan Clarke        Assessment/Plan:  Warfarin indication: atrial fibrillation       INR today is therapeutic at 2.2, goal is 2-3.      Warfarin Dose: same (7.5 mg every Mon./Fri.; 5 mg all other days)     Follow Up: 3 weeks       Fabian Jc [facility] PharmD 7/7/2023 10:22 AM  For Pharmacy Admin Tracking Only    Intervention Detail:   Total # of Interventions Recommended: 0  Total # of Interventions Accepted: 0  Time Spent (min): 15

## 2023-07-11 ENCOUNTER — OFFICE VISIT (OUTPATIENT)
Dept: CARDIOLOGY CLINIC | Age: 68
End: 2023-07-11
Payer: MEDICARE

## 2023-07-11 VITALS
DIASTOLIC BLOOD PRESSURE: 62 MMHG | RESPIRATION RATE: 12 BRPM | HEART RATE: 67 BPM | SYSTOLIC BLOOD PRESSURE: 122 MMHG | BODY MASS INDEX: 21.17 KG/M2 | HEIGHT: 64 IN | WEIGHT: 124 LBS

## 2023-07-11 DIAGNOSIS — I25.10 CORONARY ARTERY DISEASE INVOLVING NATIVE CORONARY ARTERY OF NATIVE HEART WITHOUT ANGINA PECTORIS: Primary | ICD-10-CM

## 2023-07-11 DIAGNOSIS — I48.91 POSTOPERATIVE ATRIAL FIBRILLATION (HCC): ICD-10-CM

## 2023-07-11 DIAGNOSIS — I65.23 CAROTID ARTERY STENOSIS, ASYMPTOMATIC, BILATERAL: ICD-10-CM

## 2023-07-11 DIAGNOSIS — E78.2 MIXED HYPERLIPIDEMIA: ICD-10-CM

## 2023-07-11 DIAGNOSIS — I73.9 PVD (PERIPHERAL VASCULAR DISEASE) (HCC): ICD-10-CM

## 2023-07-11 DIAGNOSIS — I97.89 POSTOPERATIVE ATRIAL FIBRILLATION (HCC): ICD-10-CM

## 2023-07-11 DIAGNOSIS — Z95.1 HX OF CABG: ICD-10-CM

## 2023-07-11 DIAGNOSIS — I74.09 AORTOILIAC OCCLUSIVE DISEASE (HCC): ICD-10-CM

## 2023-07-11 PROCEDURE — 3017F COLORECTAL CA SCREEN DOC REV: CPT | Performed by: INTERNAL MEDICINE

## 2023-07-11 PROCEDURE — G8420 CALC BMI NORM PARAMETERS: HCPCS | Performed by: INTERNAL MEDICINE

## 2023-07-11 PROCEDURE — G8399 PT W/DXA RESULTS DOCUMENT: HCPCS | Performed by: INTERNAL MEDICINE

## 2023-07-11 PROCEDURE — 1090F PRES/ABSN URINE INCON ASSESS: CPT | Performed by: INTERNAL MEDICINE

## 2023-07-11 PROCEDURE — 93000 ELECTROCARDIOGRAM COMPLETE: CPT | Performed by: INTERNAL MEDICINE

## 2023-07-11 PROCEDURE — 99214 OFFICE O/P EST MOD 30 MIN: CPT | Performed by: INTERNAL MEDICINE

## 2023-07-11 PROCEDURE — 4004F PT TOBACCO SCREEN RCVD TLK: CPT | Performed by: INTERNAL MEDICINE

## 2023-07-11 PROCEDURE — G8427 DOCREV CUR MEDS BY ELIG CLIN: HCPCS | Performed by: INTERNAL MEDICINE

## 2023-07-11 PROCEDURE — 1123F ACP DISCUSS/DSCN MKR DOCD: CPT | Performed by: INTERNAL MEDICINE

## 2023-07-11 RX ORDER — ROSUVASTATIN CALCIUM 40 MG/1
40 TABLET, COATED ORAL DAILY
Qty: 90 TABLET | Refills: 3 | Status: SHIPPED | OUTPATIENT
Start: 2023-07-11

## 2023-07-11 RX ORDER — METOPROLOL SUCCINATE 25 MG/1
25 TABLET, EXTENDED RELEASE ORAL DAILY
Qty: 90 TABLET | Refills: 3 | Status: SHIPPED | OUTPATIENT
Start: 2023-07-11

## 2023-07-11 NOTE — PROGRESS NOTES
Patient Active Problem List   Diagnosis    Asymptomatic bilateral carotid artery stenosis    History of nuclear stress test    Mixed hyperlipidemia    Aortoiliac occlusive disease ([facility])    PVD (peripheral vascular disease) with claudication ([address])    Carotid artery stenosis, asymptomatic, bilateral    PVD (peripheral vascular disease) ([facility])    S/P carotid endarterectomy    Tobacco abuse    Lesion of true vocal cord    Diabetes mellitus without complication ([address])    Senile osteoporosis    Scoliosis    Lumbar spinal stenosis    CAD in native artery    Postoperative hypertension    Acute pulmonary insufficiency    Hyperglycemia    Status post aorto-coronary artery bypass graft    Postoperative atrial fibrillation ([facility])    Type 2 diabetes mellitus with hyperglycemia    Hypothyroidism    Dietary noncompliance       Current Outpatient Medications   Medication Sig Dispense Refill    metoprolol succinate (TOPROL XL) 25 MG extended release tablet Take 1 tablet by mouth daily 90 tablet 3    rosuvastatin (CRESTOR) 40 MG tablet Take 1 tablet by mouth daily 90 tablet 3    levothyroxine (SYNTHROID) 25 MCG tablet Take 1 tablet by mouth daily 90 tablet 1    warfarin (COUMADIN) 5 MG tablet Take one or one and a half tablets by mouth once daily as directed by Anticoagulation Clinic 100 tablet 3    metFORMIN (GLUCOPHAGE) 500 MG tablet TAKE 1 TABLET TWICE A DAY WITH MEALS 180 tablet 3    insulin glargine, 1 unit dial, (TOUJEO SOLOSTAR) 300 UNIT/ML concentrated injection pen Inject 20 Units into the skin nightly 3 Adjustable Dose Pre-filled Pen Syringe 5    HUMALOG KWIKPEN 100 UNIT/ML SOPN INJECT 6 UNITS INTO THE SKIN 3 TIMES A DAY BEFORE MEALS 5 Adjustable Dose Pre-filled Pen Syringe 5    ibuprofen (ADVIL;MOTRIN) 200 MG tablet Take 1 tablet by mouth every 6 hours as needed for Pain Every couple days      citalopram (CELEXA) 20 MG tablet TAKE 1 TABLET BY MOUTH EVERY DAY 90 tablet 1    Continuous Blood Gluc Sensor (FREESTYLE YADIRA 2

## 2023-07-19 ENCOUNTER — HOSPITAL ENCOUNTER (OUTPATIENT)
Dept: MRI IMAGING | Age: 68
Discharge: HOME | End: 2023-07-21
Payer: MEDICARE

## 2023-07-19 DIAGNOSIS — M54.42 CHRONIC LEFT-SIDED LOW BACK PAIN WITH LEFT-SIDED SCIATICA: ICD-10-CM

## 2023-07-19 DIAGNOSIS — M54.16 LUMBAR RADICULOPATHY: ICD-10-CM

## 2023-07-19 DIAGNOSIS — G89.29 CHRONIC LEFT-SIDED LOW BACK PAIN WITH LEFT-SIDED SCIATICA: ICD-10-CM

## 2023-07-19 PROCEDURE — 6360000004 HC RX CONTRAST MEDICATION: Performed by: RADIOLOGY

## 2023-07-19 PROCEDURE — A9579 GAD-BASE MR CONTRAST NOS,1ML: HCPCS | Performed by: RADIOLOGY

## 2023-07-19 PROCEDURE — 72158 MRI LUMBAR SPINE W/O & W/DYE: CPT

## 2023-07-19 RX ADMIN — GADOTERIDOL 11 ML: 279.3 INJECTION, SOLUTION INTRAVENOUS at 15:10

## 2023-07-21 ENCOUNTER — OFFICE VISIT (OUTPATIENT)
Dept: NEUROSURGERY | Age: 68
End: 2023-07-21
Payer: MEDICARE

## 2023-07-21 VITALS — RESPIRATION RATE: 16 BRPM | HEIGHT: 64 IN | BODY MASS INDEX: 21.17 KG/M2 | WEIGHT: 124 LBS

## 2023-07-21 DIAGNOSIS — G89.29 CHRONIC BILATERAL LOW BACK PAIN WITH BILATERAL SCIATICA: Primary | ICD-10-CM

## 2023-07-21 DIAGNOSIS — M54.42 CHRONIC BILATERAL LOW BACK PAIN WITH BILATERAL SCIATICA: Primary | ICD-10-CM

## 2023-07-21 DIAGNOSIS — M54.16 LUMBAR RADICULOPATHY: ICD-10-CM

## 2023-07-21 DIAGNOSIS — M54.41 CHRONIC BILATERAL LOW BACK PAIN WITH BILATERAL SCIATICA: Primary | ICD-10-CM

## 2023-07-21 PROCEDURE — 1090F PRES/ABSN URINE INCON ASSESS: CPT | Performed by: STUDENT IN AN ORGANIZED HEALTH CARE EDUCATION/TRAINING PROGRAM

## 2023-07-21 PROCEDURE — 99212 OFFICE O/P EST SF 10 MIN: CPT

## 2023-07-21 PROCEDURE — 99213 OFFICE O/P EST LOW 20 MIN: CPT | Performed by: STUDENT IN AN ORGANIZED HEALTH CARE EDUCATION/TRAINING PROGRAM

## 2023-07-21 PROCEDURE — G8427 DOCREV CUR MEDS BY ELIG CLIN: HCPCS | Performed by: STUDENT IN AN ORGANIZED HEALTH CARE EDUCATION/TRAINING PROGRAM

## 2023-07-21 PROCEDURE — 1123F ACP DISCUSS/DSCN MKR DOCD: CPT | Performed by: STUDENT IN AN ORGANIZED HEALTH CARE EDUCATION/TRAINING PROGRAM

## 2023-07-21 PROCEDURE — 3017F COLORECTAL CA SCREEN DOC REV: CPT | Performed by: STUDENT IN AN ORGANIZED HEALTH CARE EDUCATION/TRAINING PROGRAM

## 2023-07-21 PROCEDURE — 1036F TOBACCO NON-USER: CPT | Performed by: STUDENT IN AN ORGANIZED HEALTH CARE EDUCATION/TRAINING PROGRAM

## 2023-07-21 PROCEDURE — G8420 CALC BMI NORM PARAMETERS: HCPCS | Performed by: STUDENT IN AN ORGANIZED HEALTH CARE EDUCATION/TRAINING PROGRAM

## 2023-07-21 PROCEDURE — G8399 PT W/DXA RESULTS DOCUMENT: HCPCS | Performed by: STUDENT IN AN ORGANIZED HEALTH CARE EDUCATION/TRAINING PROGRAM

## 2023-07-21 NOTE — PROGRESS NOTES
Problem Focused Office Visit     Subjective: Ethel López is a 80 y.o.  female who has a past medical history of HLD, carotid endarterectomy, DM, and previous L5-S1 left hemilaminectomy at [facility]  who presents today for office follow up. Patient was originally seen by Dr. Chavez Robertson on 8/25/2022 and a L1-S1 fusion with L4-L5 and L5-S1 interbody fusion was recommended. Patient was later found to have severe coronary artery disease and underwent a CABGx2 on 11/2/2022     Patient presents today for office follow up/review MRI results. Patient states since last visit her pain has remained the same. She describes this pain as constant sharp pain that goes down both legs. She admits to associated numbness in her left leg which is chronic since last back surgery. Does admit to associated weakness as well. Movement makes the pain worse, sitting and leaning forward makes the pain better. She states she has associated calf cramps with this pain. She has tried PT at [facility] in [place] about 1 year ago with no relief, She has also participated in cardiac rehab  at [facility] a few months ago where she states she also did exercises on her back. She has also tried CAROLANN many years ago with mild relief. She states she has quit smoking and takes warfarin and ASA daily. Physical Exam  HENT:      Head: Normocephalic. Eyes:      Pupils: Pupils are equal, round, and reactive to light. Cardiovascular:      Rate and Rhythm: Normal rate. Pulmonary:      Effort: Pulmonary effort is normal.   Abdominal:      General: There is no distension. Musculoskeletal:         General: Normal range of motion. Cervical back: Normal range of motion. Skin:     General: Skin is warm and dry. Neurological:      Mental Status: She is alert.       Comments: A&Ox3  CN3-12 intact  4/5 LLE secondary to pain otherwise Motor Strength full   Sensation intact to light touch   Reflexes normal   (-)Bilateral Straight Leg test

## 2023-07-27 ENCOUNTER — OFFICE VISIT (OUTPATIENT)
Dept: ENT CLINIC | Age: 68
End: 2023-07-27
Payer: MEDICARE

## 2023-07-27 VITALS
OXYGEN SATURATION: 96 % | HEIGHT: 64 IN | SYSTOLIC BLOOD PRESSURE: 149 MMHG | HEART RATE: 51 BPM | WEIGHT: 124 LBS | DIASTOLIC BLOOD PRESSURE: 55 MMHG | BODY MASS INDEX: 21.17 KG/M2

## 2023-07-27 DIAGNOSIS — K14.8 TONGUE LESION: Primary | ICD-10-CM

## 2023-07-27 PROCEDURE — 1090F PRES/ABSN URINE INCON ASSESS: CPT | Performed by: OTOLARYNGOLOGY

## 2023-07-27 PROCEDURE — 99214 OFFICE O/P EST MOD 30 MIN: CPT | Performed by: OTOLARYNGOLOGY

## 2023-07-27 PROCEDURE — G8399 PT W/DXA RESULTS DOCUMENT: HCPCS | Performed by: OTOLARYNGOLOGY

## 2023-07-27 PROCEDURE — G8420 CALC BMI NORM PARAMETERS: HCPCS | Performed by: OTOLARYNGOLOGY

## 2023-07-27 PROCEDURE — 1123F ACP DISCUSS/DSCN MKR DOCD: CPT | Performed by: OTOLARYNGOLOGY

## 2023-07-27 PROCEDURE — 41100 BIOPSY OF TONGUE: CPT | Performed by: OTOLARYNGOLOGY

## 2023-07-27 PROCEDURE — G8427 DOCREV CUR MEDS BY ELIG CLIN: HCPCS | Performed by: OTOLARYNGOLOGY

## 2023-07-27 PROCEDURE — 3017F COLORECTAL CA SCREEN DOC REV: CPT | Performed by: OTOLARYNGOLOGY

## 2023-07-27 PROCEDURE — 1036F TOBACCO NON-USER: CPT | Performed by: OTOLARYNGOLOGY

## 2023-07-27 RX ORDER — CHLORHEXIDINE GLUCONATE 0.12 MG/ML
15 RINSE ORAL 2 TIMES DAILY
Qty: 420 ML | Refills: 0 | Status: SHIPPED | OUTPATIENT
Start: 2023-07-27 | End: 2023-08-10

## 2023-07-27 ASSESSMENT — ENCOUNTER SYMPTOMS
DIARRHEA: 0
SORE THROAT: 0
BACK PAIN: 0
EYE PAIN: 0
COUGH: 0
EYE DISCHARGE: 0
ALLERGIC/IMMUNOLOGIC NEGATIVE: 1
VOMITING: 0
SINUS PRESSURE: 0
SHORTNESS OF BREATH: 0
RHINORRHEA: 0

## 2023-07-27 NOTE — PROGRESS NOTES
Subjective:      Patient ID:  Izzy Kellogg is a 80 y.o. female. HPI:    Patient presents today for throat soreness. Condition has been present for 6 month(s). Pt recently had heart surgery in November. Still recovering. She is also having some throat irritation and left sided jaw pain over the last 3 weeks. Pt is still  taking medicine for reflux as well. 23:  Pt presents for recheck of tongue lesion. Still persists. Also with nasal congestion h/o fess. Intermittently painful. Former lifelong smoker quit 2 weeks ago. H/o BCC of right face. H/o coumadin.     Past Medical History:   Diagnosis Date    Age-related osteoporosis without current pathological fracture 2021    Arthritis     Cataracts, bilateral 10/'19    Colon polyps 2021    tubular adenoma    Diabetes mellitus ([address])     DM type 2 (diabetes mellitus, type 2) ([address])     Facial basal cell cancer 2022    Generalovic    Hyperlipidemia     Hypertension     Hypothyroidism     Migraines     rare    Neuropathy, diabetic ([address])     Peripheral vascular disease ([address])     follows with Dr. Anila Diaz yearly    Positive colorectal cancer screening using Cologuard test     Psoriasiform dermatitis     Seasonal allergies     Thyroid disease      Past Surgical History:   Procedure Laterality Date    BACK SURGERY      L5-S1    CAROTID ENDARTERECTOMY Left 10/11/2018    Kakkasseril    CARPAL TUNNEL RELEASE      bilat     SECTION      2    COLONOSCOPY N/A 2021    COLONOSCOPY WITH BIOPSY with tattooing performed by Ludwin Sandoval MD at [address] [facility] N/A 2022    coronary artery bypass grafting x 2, ZACHARY performed by Arslan Alston DO at [address] [place] [address] ([address] [place] [address])      LARYNGOSCOPY N/A 2020    DIRECT LARYNGOSCOPYY AND CERVICAL ESOPHAGOSCOPY (PATHOLOGY PRESENT) performed by Juan Moreno DO at [facility]- right     OTHER

## 2023-07-28 ENCOUNTER — HOSPITAL ENCOUNTER (OUTPATIENT)
Dept: PHARMACY | Age: 68
Setting detail: THERAPIES SERIES
Discharge: HOME | End: 2023-07-28
Payer: MEDICARE

## 2023-07-28 VITALS — DIASTOLIC BLOOD PRESSURE: 64 MMHG | SYSTOLIC BLOOD PRESSURE: 144 MMHG | HEART RATE: 51 BPM

## 2023-07-28 DIAGNOSIS — I48.91 POSTOPERATIVE ATRIAL FIBRILLATION (HCC): Primary | ICD-10-CM

## 2023-07-28 DIAGNOSIS — I97.89 POSTOPERATIVE ATRIAL FIBRILLATION (HCC): Primary | ICD-10-CM

## 2023-07-28 LAB — INTERNATIONAL NORMALIZATION RATIO, POC: 3

## 2023-07-28 PROCEDURE — 85610 PROTHROMBIN TIME: CPT

## 2023-07-28 PROCEDURE — 99211 OFF/OP EST MAY X REQ PHY/QHP: CPT

## 2023-07-28 NOTE — PROGRESS NOTES
[address] [facility]  Patient Findings       Positives:  Signs/symptoms of bleeding (HAD SOME BLEEDING AFTER BLOOD WORK), Upcoming invasive procedure (WILL NEED BACK SURGERY--WILL BE DISCUSSING DATE THIS THURS), Change in medications (ON CHLORHEXIDINE MOUTH [address] 2/2 TONGUE BIOPSY), Other complaints (HAD TONGUE BIOPSY YESTERDAY)    Negatives:  Signs/symptoms of thrombosis, Laboratory test error suspected, Change in health, Change in alcohol use, Change in activity, Emergency department visit, Upcoming dental procedure, Missed doses, Extra doses, Change in diet/appetite, Hospital admission, Bruising           Assessment/Plan:  Warfarin indication: atrial fibrillation       INR today is therapeutic at 3, goal is 2-3    Warfarin Dose: SAME (7.5MG MON/FRI; 5MG ALL OTHER DAYS)    Follow Up: 3 weeks      ANJUM [facility] PharmD 7/28/2023 9:31 AM    For Pharmacy Admin Tracking Only    Intervention Detail:   Total # of Interventions Recommended: 0  Total # of Interventions Accepted: 0  Time Spent (min): 15

## 2023-08-03 ENCOUNTER — OFFICE VISIT (OUTPATIENT)
Dept: NEUROSURGERY | Age: 68
End: 2023-08-03
Payer: MEDICARE

## 2023-08-03 VITALS
HEIGHT: 64 IN | HEART RATE: 59 BPM | BODY MASS INDEX: 21.17 KG/M2 | DIASTOLIC BLOOD PRESSURE: 54 MMHG | WEIGHT: 124 LBS | OXYGEN SATURATION: 95 % | SYSTOLIC BLOOD PRESSURE: 118 MMHG

## 2023-08-03 DIAGNOSIS — M54.42 CHRONIC MIDLINE LOW BACK PAIN WITH BILATERAL SCIATICA: Primary | ICD-10-CM

## 2023-08-03 DIAGNOSIS — G89.29 CHRONIC MIDLINE LOW BACK PAIN WITH BILATERAL SCIATICA: Primary | ICD-10-CM

## 2023-08-03 DIAGNOSIS — M54.41 CHRONIC MIDLINE LOW BACK PAIN WITH BILATERAL SCIATICA: Primary | ICD-10-CM

## 2023-08-03 LAB — SURGICAL PATHOLOGY REPORT: NORMAL

## 2023-08-03 PROCEDURE — 99212 OFFICE O/P EST SF 10 MIN: CPT

## 2023-08-03 PROCEDURE — G8420 CALC BMI NORM PARAMETERS: HCPCS | Performed by: NEUROLOGICAL SURGERY

## 2023-08-03 PROCEDURE — G8399 PT W/DXA RESULTS DOCUMENT: HCPCS | Performed by: NEUROLOGICAL SURGERY

## 2023-08-03 PROCEDURE — 1090F PRES/ABSN URINE INCON ASSESS: CPT | Performed by: NEUROLOGICAL SURGERY

## 2023-08-03 PROCEDURE — 1123F ACP DISCUSS/DSCN MKR DOCD: CPT | Performed by: NEUROLOGICAL SURGERY

## 2023-08-03 PROCEDURE — G8428 CUR MEDS NOT DOCUMENT: HCPCS | Performed by: NEUROLOGICAL SURGERY

## 2023-08-03 PROCEDURE — 99213 OFFICE O/P EST LOW 20 MIN: CPT | Performed by: NEUROLOGICAL SURGERY

## 2023-08-03 PROCEDURE — 3017F COLORECTAL CA SCREEN DOC REV: CPT | Performed by: NEUROLOGICAL SURGERY

## 2023-08-03 PROCEDURE — 1036F TOBACCO NON-USER: CPT | Performed by: NEUROLOGICAL SURGERY

## 2023-08-03 NOTE — PROGRESS NOTES
Patient is here for follow up consult for: back and bilateral leg pain. She ws supposed to have surgery last year but her pre-operative work up shows severe coronary artery disease. She had coronary bypass grafting. Physical exam  Alert and Oriented X3  PERRLA, EOMI  KENNEY 5/5 except 4/5 in LLE  Sensation intact to LT and PP  Reflexes are 2+ and symmetric     A/P: patient is here for follow up for: back and bilateral leg pain. Her MRI and CT shows lumbar dextroscoliosis and she has sever central and foraminal stenosis from L3-S1 with foraminal stenosis at L1-L2 and L2-L3. She has failed over 3 months of PT and epidurals. I am recommending an L1-S1 fusion with L4-L5 and L5-S1 interbody fusion. The pain is interfering with her activities of daily living. She is a non smoker. Risk, benefits and alternative of surgery have been discussed and she wishes to proceed.      Isaac Caputo MD

## 2023-08-14 ENCOUNTER — PREP FOR PROCEDURE (OUTPATIENT)
Dept: NEUROSURGERY | Age: 68
End: 2023-08-14

## 2023-08-14 ENCOUNTER — TELEPHONE (OUTPATIENT)
Dept: NEUROSURGERY | Age: 68
End: 2023-08-14

## 2023-08-14 DIAGNOSIS — M48.061 SPINAL STENOSIS OF LUMBAR REGION, UNSPECIFIED WHETHER NEUROGENIC CLAUDICATION PRESENT: ICD-10-CM

## 2023-08-14 DIAGNOSIS — M41.86 DEXTROSCOLIOSIS OF LUMBAR SPINE: ICD-10-CM

## 2023-08-14 DIAGNOSIS — Z01.818 PRE-OP TESTING: Primary | ICD-10-CM

## 2023-08-14 DIAGNOSIS — M41.86 DEXTROSCOLIOSIS OF LUMBAR SPINE: Primary | ICD-10-CM

## 2023-08-14 RX ORDER — SODIUM CHLORIDE 9 MG/ML
INJECTION, SOLUTION INTRAVENOUS CONTINUOUS
Status: CANCELLED | OUTPATIENT
Start: 2023-08-14

## 2023-08-14 RX ORDER — SODIUM CHLORIDE 0.9 % (FLUSH) 0.9 %
5-40 SYRINGE (ML) INJECTION EVERY 12 HOURS SCHEDULED
Status: CANCELLED | OUTPATIENT
Start: 2023-08-14

## 2023-08-14 RX ORDER — SODIUM CHLORIDE 0.9 % (FLUSH) 0.9 %
5-40 SYRINGE (ML) INJECTION PRN
Status: CANCELLED | OUTPATIENT
Start: 2023-08-14

## 2023-08-14 RX ORDER — SODIUM CHLORIDE 9 MG/ML
INJECTION, SOLUTION INTRAVENOUS PRN
Status: CANCELLED | OUTPATIENT
Start: 2023-08-14

## 2023-08-14 NOTE — TELEPHONE ENCOUNTER
Prior Authorization Form:      DEMOGRAPHICS:                     Patient Name:  Rosaura Mckeon  Patient :  1955            Insurance:  Payor: MEDICARE / Plan: MEDICARE PART A AND B / Product Type: *No Product type* /   Insurance ID Number:    Payer/Plan Subscr  Sex Relation Sub. Ins. ID Effective Group Num   1. MEDICARE - ME* GARRETT CERDA L* 1955 Female Self [id number] 21                                    [address] [address]   2.  Abby L* 1955 Female Self [id number] 21 plan G                                   [address]         DIAGNOSIS & PROCEDURE:                       Procedure/Operation: L1-S1 laminectomy and posterior fusion, L4-L5 and L5-S1 posterior lumbar interbody fusion           CPT Code: 63276, 81164 x4, 50905, 98201 x4, 40097, 62635, 30060, 06922, 35662    Diagnosis:  Lumbar dextrascolosis, L1-S1 stenosis    ICD10 Code: M41.86, M48.061    Location:  main    Surgeon:  Ruth Bowers INFORMATION:                          Date: 23    Time: 7am              Anesthesia:  general                                                       Status:  Outpatient        Special Comments:  Globus:  KUSHAL Pérez cancellous chips       Electronically signed by Karl Broussard MA on 2023 at 11:15 AM

## 2023-08-18 ENCOUNTER — HOSPITAL ENCOUNTER (OUTPATIENT)
Dept: PHARMACY | Age: 68
Setting detail: THERAPIES SERIES
Discharge: HOME | End: 2023-08-18
Payer: MEDICARE

## 2023-08-18 VITALS
WEIGHT: 128 LBS | SYSTOLIC BLOOD PRESSURE: 110 MMHG | BODY MASS INDEX: 21.97 KG/M2 | DIASTOLIC BLOOD PRESSURE: 56 MMHG | HEART RATE: 51 BPM

## 2023-08-18 DIAGNOSIS — I48.91 POSTOPERATIVE ATRIAL FIBRILLATION (HCC): Primary | ICD-10-CM

## 2023-08-18 DIAGNOSIS — I97.89 POSTOPERATIVE ATRIAL FIBRILLATION (HCC): Primary | ICD-10-CM

## 2023-08-18 LAB — INR BLD: 2.3

## 2023-08-18 PROCEDURE — 85610 PROTHROMBIN TIME: CPT

## 2023-08-18 PROCEDURE — 99211 OFF/OP EST MAY X REQ PHY/QHP: CPT

## 2023-08-18 RX ORDER — CHLORHEXIDINE GLUCONATE 0.12 MG/ML
15 RINSE ORAL 2 TIMES DAILY
COMMUNITY

## 2023-08-18 NOTE — PROGRESS NOTES
[address] (Medication Management)  [address], [place], [address]  Phone: [phone]    Face-To-Face Visit  Patient Findings       Positives:  Signs/symptoms of bleeding (BLEEDING AFTER BLOOD WORK, A LOT OF BLEEDING WITH FINGER STICKS FOR GLUCOSE CHECKS), Change in health (PT WILL GET RESULTS OF TONGUE BIOPSY ON 8/29), Upcoming invasive procedure (BACK SURGERY END OF SEPT. HOLDING WARFARIN x5 DAYS), Change in medications (PT STILL USING CHLORHEXIDINE POST TONGUE-BIOPSY)    Negatives:  Signs/symptoms of thrombosis, Laboratory test error suspected, Change in alcohol use, Change in activity, Emergency department visit, Upcoming dental procedure, Missed doses, Extra doses, Change in diet/appetite, Hospital admission, Bruising, Other complaints    Comments:  8/29 Tongue Biopsy F/U, ENDOCRINE 8/24           Assessment/Plan:  Warfarin indication: atrial fibrillation      INR today is therapeutic at 2.3, goal is 2-3.     Warfarin Dose: same (7.5 mg every Mon, Fri; 5 mg all other days)    Follow Up: 4 weeks    Electronically signed by Zena Solis PharmD Candidate 2024 on 8/18/2023 at 10:16 AM    For Pharmacy Admin Tracking Only    Intervention Detail:   Total # of Interventions Recommended: 0  Total # of Interventions Accepted: 0  Time Spent (min): 15

## 2023-08-24 ENCOUNTER — OFFICE VISIT (OUTPATIENT)
Dept: ENDOCRINOLOGY | Age: 68
End: 2023-08-24

## 2023-08-24 VITALS
OXYGEN SATURATION: 99 % | BODY MASS INDEX: 21.85 KG/M2 | SYSTOLIC BLOOD PRESSURE: 153 MMHG | HEIGHT: 64 IN | HEART RATE: 54 BPM | DIASTOLIC BLOOD PRESSURE: 53 MMHG | WEIGHT: 128 LBS

## 2023-08-24 DIAGNOSIS — Z79.4 TYPE 2 DIABETES MELLITUS WITH HYPERGLYCEMIA, WITH LONG-TERM CURRENT USE OF INSULIN (HCC): Primary | ICD-10-CM

## 2023-08-24 DIAGNOSIS — E11.65 TYPE 2 DIABETES MELLITUS WITH HYPERGLYCEMIA, WITH LONG-TERM CURRENT USE OF INSULIN (HCC): Primary | ICD-10-CM

## 2023-08-24 DIAGNOSIS — Z91.119 DIETARY NONCOMPLIANCE: ICD-10-CM

## 2023-08-24 DIAGNOSIS — E03.9 HYPOTHYROIDISM, UNSPECIFIED TYPE: ICD-10-CM

## 2023-08-24 RX ORDER — INSULIN LISPRO 100 [IU]/ML
INJECTION, SOLUTION INTRAVENOUS; SUBCUTANEOUS
Qty: 5 ADJUSTABLE DOSE PRE-FILLED PEN SYRINGE | Refills: 5
Start: 2023-08-24

## 2023-08-24 RX ORDER — INSULIN GLARGINE 300 U/ML
24 INJECTION, SOLUTION SUBCUTANEOUS NIGHTLY
Qty: 3 ADJUSTABLE DOSE PRE-FILLED PEN SYRINGE | Refills: 5
Start: 2023-08-24

## 2023-08-24 NOTE — PROGRESS NOTES
[facility] Department of Endocrinology Diabetes and Metabolism   [address] [place] [address],[address] [address]   Phone: [phone]  Fax: [phone]       Date of admission: (Not on file)  Date of service: 8/24/2023  Admitting physician: No admitting provider for patient encounter. Primary Care Physician: Breann Anders DO  Consultant physician: INDIRA Cavazos - CNS      Reason for the consultation:  Uncontrolled DM    History of Present Illness: The history is provided by the patient. Accuracy of the patient data is excellent    Sam Iyer is a very pleasant 80 y.o. old female with PMH uncontrolled DM, CAD, HLD seen today for follow up visit     The patient was recently admitted to the hospital and underwent CABG surgery on 11/2/2022      The patient was diagnosed with type 2 DM many years ago. Currently on Toujeo 20 u daily, Humalog 8u with meals + ss 1:50>150. Metformin 500 mg 1 tablet BID.  the Patient has been eating consistent carbohydrate meals, self-blood glucose monitoring has been better recently. In addition, patient reports both macrovascular and  microvascular complications.  The patient is not up to date with yearly diabetic eye exam.   Check BG 4 times per day   Uses jese   Ambulatory continuous glucose monitoring of interstitial tissue fluid via a subcutaneous sensor for a minimum of 72 hours; analysis, interpretation and report  Average sensor glucose 224  Time in range 31%  Hyperglycemia 69%  Hypoglycemia 0%    Lab Results   Component Value Date/Time    LABA1C 9.0 06/26/2023 12:00 PM       Lab Results   Component Value Date/Time    LABA1C 9.0 06/26/2023 12:00 PM    LABA1C 7.9 03/21/2023 10:51 AM    LABA1C 9.7 10/28/2022 07:55 AM    LABA1C 11.3 06/23/2022 10:54 AM       Past medical history:   Past Medical History:   Diagnosis Date    Age-related osteoporosis without current pathological fracture 06/2021    Arthritis     Cataracts, bilateral

## 2023-08-28 ENCOUNTER — TELEPHONE (OUTPATIENT)
Dept: ENDOCRINOLOGY | Age: 68
End: 2023-08-28

## 2023-08-28 NOTE — TELEPHONE ENCOUNTER
The pt does not want to continue with Jardiance. The cost is too high, and the list of side effects makes her uncomfortable.

## 2023-08-29 ENCOUNTER — TELEPHONE (OUTPATIENT)
Dept: ENT CLINIC | Age: 68
End: 2023-08-29

## 2023-08-29 ENCOUNTER — TELEPHONE (OUTPATIENT)
Dept: PRIMARY CARE CLINIC | Age: 68
End: 2023-08-29

## 2023-08-29 ENCOUNTER — OFFICE VISIT (OUTPATIENT)
Dept: ENT CLINIC | Age: 68
End: 2023-08-29
Payer: MEDICARE

## 2023-08-29 VITALS
DIASTOLIC BLOOD PRESSURE: 62 MMHG | BODY MASS INDEX: 21.51 KG/M2 | OXYGEN SATURATION: 97 % | HEIGHT: 64 IN | WEIGHT: 126 LBS | SYSTOLIC BLOOD PRESSURE: 152 MMHG | HEART RATE: 53 BPM

## 2023-08-29 DIAGNOSIS — K14.8 TONGUE LESION: Primary | ICD-10-CM

## 2023-08-29 DIAGNOSIS — J30.1 SEASONAL ALLERGIC RHINITIS DUE TO POLLEN: ICD-10-CM

## 2023-08-29 PROCEDURE — 1090F PRES/ABSN URINE INCON ASSESS: CPT | Performed by: OTOLARYNGOLOGY

## 2023-08-29 PROCEDURE — 1036F TOBACCO NON-USER: CPT | Performed by: OTOLARYNGOLOGY

## 2023-08-29 PROCEDURE — 3017F COLORECTAL CA SCREEN DOC REV: CPT | Performed by: OTOLARYNGOLOGY

## 2023-08-29 PROCEDURE — 1123F ACP DISCUSS/DSCN MKR DOCD: CPT | Performed by: OTOLARYNGOLOGY

## 2023-08-29 PROCEDURE — G8399 PT W/DXA RESULTS DOCUMENT: HCPCS | Performed by: OTOLARYNGOLOGY

## 2023-08-29 PROCEDURE — G8427 DOCREV CUR MEDS BY ELIG CLIN: HCPCS | Performed by: OTOLARYNGOLOGY

## 2023-08-29 PROCEDURE — 99213 OFFICE O/P EST LOW 20 MIN: CPT | Performed by: OTOLARYNGOLOGY

## 2023-08-29 PROCEDURE — G8420 CALC BMI NORM PARAMETERS: HCPCS | Performed by: OTOLARYNGOLOGY

## 2023-08-29 ASSESSMENT — ENCOUNTER SYMPTOMS
DIARRHEA: 0
VOMITING: 0
SHORTNESS OF BREATH: 0
EYE PAIN: 0
COUGH: 0
EYE DISCHARGE: 0
SINUS PRESSURE: 0
ALLERGIC/IMMUNOLOGIC NEGATIVE: 1
SORE THROAT: 0
BACK PAIN: 0
RHINORRHEA: 0

## 2023-08-29 NOTE — TELEPHONE ENCOUNTER
Spoke with medical staff of Dr. Sharona Hernandez making him aware of elevated BP at appt today. Patient refused a recheck.      Electronically signed by Mana Mendes MA on 8/29/23 at 11:29 AM EDT

## 2023-08-29 NOTE — TELEPHONE ENCOUNTER
Thank you for update the blood pressures not too bad we will continue to monitor this with her office visits in the future.

## 2023-08-29 NOTE — TELEPHONE ENCOUNTER
[address] ENT called and wanted to make you aware of the 152/62 blood pressure reading. Pt refused to check it again, please advise and will call pt with any changes.

## 2023-08-29 NOTE — PROGRESS NOTES
Subjective:      Patient ID:  Anurag Martinez is a 80 y.o. female. HPI:  here for post op tongue biopsy surgery 2 week(s) ago. There are not changes since last visit.      Past Medical History:   Diagnosis Date    Age-related osteoporosis without current pathological fracture 2021    Arthritis     Cataracts, bilateral 10/'19    Colon polyps 2021    tubular adenoma    Diabetes mellitus ([address])     DM type 2 (diabetes mellitus, type 2) ([address])     Facial basal cell cancer 2022    Generalovich    Hyperlipidemia     Hypertension     Hypothyroidism     Migraines     rare    Neuropathy, diabetic ([address])     Peripheral vascular disease ([address])     follows with Dr. John Vasquez yearly    Positive colorectal cancer screening using Cologuard test     Psoriasiform dermatitis     Seasonal allergies     Thyroid disease      Past Surgical History:   Procedure Laterality Date    BACK SURGERY      L5-S1    CAROTID ENDARTERECTOMY Left 10/11/2018    Kakkasseril    CARPAL TUNNEL RELEASE      bilat     SECTION      2    COLONOSCOPY N/A 2021    COLONOSCOPY WITH BIOPSY with tattooing performed by Tamir Edward MD at [address] [facility] N/A 2022    coronary artery bypass grafting x 2, ZACHARY performed by Lamin Hayes DO at [address] (CERVIX STATUS UNKNOWN)      LARYNGOSCOPY N/A 2020    DIRECT LARYNGOSCOPYY AND CERVICAL ESOPHAGOSCOPY (PATHOLOGY PRESENT) performed by Susan Ramsey DO at [facility]- right     OTHER SURGICAL HISTORY  2018    Dr. John Vasquez- Bilateral iliac stents iCast [id number]    TX OFFICE/OUTPT VISIT,PROCEDURE ONLY Left 10/11/2018    LEFT CAROTID ENDARTERECTOMY performed by Jamee Ortiz MD at [facility]      TOE SURGERY      bilat - ingrown toe nails    TONGUE BIOPSY N/A 2023    Dr. Lancaster Felt History   Problem Relation Age of Onset

## 2023-08-30 ENCOUNTER — HOSPITAL ENCOUNTER (OUTPATIENT)
Dept: CT IMAGING | Age: 68
Discharge: HOME | End: 2023-09-01
Attending: INTERNAL MEDICINE
Payer: MEDICARE

## 2023-08-30 DIAGNOSIS — R91.1 PULMONARY NODULE: ICD-10-CM

## 2023-08-30 PROCEDURE — 71250 CT THORAX DX C-: CPT

## 2023-09-12 ENCOUNTER — TELEPHONE (OUTPATIENT)
Dept: PRIMARY CARE CLINIC | Age: 68
End: 2023-09-12

## 2023-09-12 NOTE — TELEPHONE ENCOUNTER
Message left with Dr. Fawn Stewart office to return call we need surgical clearance papers faxed for pt

## 2023-09-15 ENCOUNTER — HOSPITAL ENCOUNTER (OUTPATIENT)
Dept: PHARMACY | Age: 68
Setting detail: THERAPIES SERIES
Discharge: HOME | End: 2023-09-15
Payer: MEDICARE

## 2023-09-15 VITALS
WEIGHT: 134 LBS | BODY MASS INDEX: 23 KG/M2 | HEART RATE: 56 BPM | SYSTOLIC BLOOD PRESSURE: 135 MMHG | DIASTOLIC BLOOD PRESSURE: 67 MMHG

## 2023-09-15 DIAGNOSIS — I48.91 POSTOPERATIVE ATRIAL FIBRILLATION (HCC): Primary | ICD-10-CM

## 2023-09-15 DIAGNOSIS — I97.89 POSTOPERATIVE ATRIAL FIBRILLATION (HCC): Primary | ICD-10-CM

## 2023-09-15 LAB — INTERNATIONAL NORMALIZATION RATIO, POC: 3.1

## 2023-09-15 PROCEDURE — 99211 OFF/OP EST MAY X REQ PHY/QHP: CPT

## 2023-09-15 PROCEDURE — 85610 PROTHROMBIN TIME: CPT

## 2023-09-15 NOTE — PROGRESS NOTES
[address] (Medication Management)  [address] [place], [place], [address]  Phone: [phone]    Face-To-Face Visit  Patient Findings       Positives:  Change in health (SHE HAD A FALL OFF HER BED AND HIT HER HEAD ON THE CLOSET DOOR, SHE DID NOT GO TO ER. THIS WAS 2 WEEKS AGO. SHE DENIES NEW H/A, VOMITING, OR CHANGE IN VISION. SHE WAS RE-EDUCATED IN REGARD TO THE IMPORTANCE OF GOING TO ER FOR FALLS AND HEAD INJURIES.), Upcoming invasive procedure (BACK SURGERY END OF SEPT. HOLDING WARFARIN x7 DAYS)    Negatives:  Signs/symptoms of thrombosis, Signs/symptoms of bleeding, Laboratory test error suspected, Change in alcohol use, Change in activity, Emergency department visit, Upcoming dental procedure, Missed doses, Extra doses, Change in medications, Change in diet/appetite, Hospital admission, Bruising, Other complaints           Assessment/Plan:  Warfarin indication: atrial fibrillation      INR today is SLIGHTLY SUPRAtherapeutic at 3.1, goal is 2-3. Warfarin Dose: same (7.5 mg every Mon./Fri.; 5 mg all other days) - HOLDING 7 DAYS PRIOR TO BACK SURGERY STARTING 9/18/23. HER CHADSVASC SCORE IS 5.     Follow Up: TBD AFTER SURGERY      Jackie Colón, [facility] PharmD 9/15/2023 9:19 AM    For Pharmacy Admin Tracking Only    Intervention Detail:   Total # of Interventions Recommended: 0  Total # of Interventions Accepted: 0  Time Spent (min): 15

## 2023-09-18 ENCOUNTER — HOSPITAL ENCOUNTER (OUTPATIENT)
Dept: GENERAL RADIOLOGY | Age: 68
Discharge: HOME | End: 2023-09-20
Payer: MEDICARE

## 2023-09-18 ENCOUNTER — HOSPITAL ENCOUNTER (OUTPATIENT)
Dept: PREADMISSION TESTING | Age: 68
Discharge: HOME | End: 2023-09-18
Payer: MEDICARE

## 2023-09-18 VITALS
BODY MASS INDEX: 22.66 KG/M2 | TEMPERATURE: 97.4 F | WEIGHT: 132 LBS | SYSTOLIC BLOOD PRESSURE: 128 MMHG | DIASTOLIC BLOOD PRESSURE: 60 MMHG | RESPIRATION RATE: 20 BRPM | HEART RATE: 65 BPM | OXYGEN SATURATION: 99 %

## 2023-09-18 DIAGNOSIS — F34.1 DYSTHYMIA: ICD-10-CM

## 2023-09-18 DIAGNOSIS — Z01.818 PRE-OP TESTING: ICD-10-CM

## 2023-09-18 DIAGNOSIS — Z01.812 PRE-OPERATIVE LABORATORY EXAMINATION: ICD-10-CM

## 2023-09-18 LAB
ABO + RH BLD: NORMAL
ANION GAP SERPL CALCULATED.3IONS-SCNC: 9 MMOL/L (ref 7–16)
ARM BAND NUMBER: NORMAL
BASOPHILS # BLD: 0.03 K/UL (ref 0–0.2)
BASOPHILS NFR BLD: 0 % (ref 0–2)
BILIRUB UR QL STRIP: NEGATIVE
BLOOD BANK SAMPLE EXPIRATION: NORMAL
BLOOD GROUP ANTIBODIES SERPL: NEGATIVE
BUN SERPL-MCNC: 11 MG/DL (ref 6–23)
CALCIUM SERPL-MCNC: 10.5 MG/DL (ref 8.6–10.2)
CHLORIDE SERPL-SCNC: 102 MMOL/L (ref 98–107)
CLARITY UR: CLEAR
CO2 SERPL-SCNC: 26 MMOL/L (ref 22–29)
COLOR UR: YELLOW
COMMENT: ABNORMAL
CREAT SERPL-MCNC: 0.6 MG/DL (ref 0.5–1)
EOSINOPHIL # BLD: 0.13 K/UL (ref 0.05–0.5)
EOSINOPHILS RELATIVE PERCENT: 2 % (ref 0–6)
ERYTHROCYTE [DISTWIDTH] IN BLOOD BY AUTOMATED COUNT: 15.3 % (ref 11.5–15)
GFR SERPL CREATININE-BSD FRML MDRD: >60 ML/MIN/1.73M2
GLUCOSE SERPL-MCNC: 79 MG/DL (ref 74–99)
GLUCOSE UR STRIP-MCNC: NEGATIVE MG/DL
HCT VFR BLD AUTO: 42.3 % (ref 34–48)
HGB BLD-MCNC: 13.7 G/DL (ref 11.5–15.5)
HGB UR QL STRIP.AUTO: NEGATIVE
IMM GRANULOCYTES # BLD AUTO: <0.03 K/UL (ref 0–0.58)
IMM GRANULOCYTES NFR BLD: 0 % (ref 0–5)
INR PPP: 3.4
KETONES UR STRIP-MCNC: NEGATIVE MG/DL
LEUKOCYTE ESTERASE UR QL STRIP: NEGATIVE
LYMPHOCYTES NFR BLD: 1.8 K/UL (ref 1.5–4)
LYMPHOCYTES RELATIVE PERCENT: 24 % (ref 20–42)
MCH RBC QN AUTO: 29.3 PG (ref 26–35)
MCHC RBC AUTO-ENTMCNC: 32.4 G/DL (ref 32–34.5)
MCV RBC AUTO: 90.4 FL (ref 80–99.9)
MONOCYTES NFR BLD: 0.62 K/UL (ref 0.1–0.95)
MONOCYTES NFR BLD: 8 % (ref 2–12)
NEUTROPHILS NFR BLD: 65 % (ref 43–80)
NEUTS SEG NFR BLD: 4.85 K/UL (ref 1.8–7.3)
NITRITE UR QL STRIP: NEGATIVE
PH UR STRIP: 6 [PH] (ref 5–9)
PLATELET # BLD AUTO: 193 K/UL (ref 130–450)
PMV BLD AUTO: 10.7 FL (ref 7–12)
POTASSIUM SERPL-SCNC: 4.7 MMOL/L (ref 3.5–5)
PROT UR STRIP-MCNC: NEGATIVE MG/DL
PROTHROMBIN TIME: 36.7 SEC (ref 9.3–12.4)
RBC # BLD AUTO: 4.68 M/UL (ref 3.5–5.5)
SODIUM SERPL-SCNC: 137 MMOL/L (ref 132–146)
SP GR UR STRIP: <1.005 (ref 1–1.03)
UROBILINOGEN UR STRIP-ACNC: 0.2 EU/DL (ref 0–1)
WBC OTHER # BLD: 7.5 K/UL (ref 4.5–11.5)

## 2023-09-18 PROCEDURE — 86900 BLOOD TYPING SEROLOGIC ABO: CPT

## 2023-09-18 PROCEDURE — 87077 CULTURE AEROBIC IDENTIFY: CPT

## 2023-09-18 PROCEDURE — 85610 PROTHROMBIN TIME: CPT

## 2023-09-18 PROCEDURE — 87086 URINE CULTURE/COLONY COUNT: CPT

## 2023-09-18 PROCEDURE — 85025 COMPLETE CBC W/AUTO DIFF WBC: CPT

## 2023-09-18 PROCEDURE — 80048 BASIC METABOLIC PNL TOTAL CA: CPT

## 2023-09-18 PROCEDURE — 81003 URINALYSIS AUTO W/O SCOPE: CPT

## 2023-09-18 PROCEDURE — 87081 CULTURE SCREEN ONLY: CPT

## 2023-09-18 PROCEDURE — 36415 COLL VENOUS BLD VENIPUNCTURE: CPT

## 2023-09-18 PROCEDURE — 86850 RBC ANTIBODY SCREEN: CPT

## 2023-09-18 PROCEDURE — 86901 BLOOD TYPING SEROLOGIC RH(D): CPT

## 2023-09-18 PROCEDURE — 71046 X-RAY EXAM CHEST 2 VIEWS: CPT

## 2023-09-18 RX ORDER — CITALOPRAM 20 MG/1
TABLET ORAL
Qty: 90 TABLET | Refills: 1 | Status: SHIPPED | OUTPATIENT
Start: 2023-09-18

## 2023-09-19 DIAGNOSIS — Z22.322 MRSA NASAL COLONIZATION: Primary | ICD-10-CM

## 2023-09-19 LAB
MICROORGANISM SPEC CULT: ABNORMAL
SPECIMEN DESCRIPTION: ABNORMAL

## 2023-09-19 NOTE — PROGRESS NOTES
I left a voicemail message for Monica to notify her that I called the primary care doctor office regarding medical clearance. The staff stated that the patient never called and set up an appointment for the clearance.

## 2023-09-19 NOTE — PROGRESS NOTES
Monica called PAT to notify us that Dr. Stan Vazquez had seen the patient and he recommended that the patient get cardiac clearance for surgery that is scheduled on 9/25. Patient received cardiac clearance on 7/11/23 for upcoming surgery.

## 2023-09-20 PROBLEM — M48.061 STENOSIS, SPINAL, LUMBAR: Status: ACTIVE | Noted: 2023-08-14

## 2023-09-20 PROBLEM — M41.86 DEXTROSCOLIOSIS OF LUMBAR SPINE: Status: ACTIVE | Noted: 2023-08-14

## 2023-09-20 LAB
MICROORGANISM SPEC CULT: ABNORMAL
SPECIMEN DESCRIPTION: ABNORMAL

## 2023-09-22 ENCOUNTER — ANESTHESIA EVENT (OUTPATIENT)
Dept: OPERATING ROOM | Age: 68
End: 2023-09-22
Payer: MEDICARE

## 2023-09-22 NOTE — H&P
Patient ID: Alexa Forbes is a 78 y.o. female with a PMH of HLD, carotid endarterectomy, DM and previous micro[facility] and Dr. Riki Baer who presents for evaluation of low back pain. Patient states her low back pain has been worsening since February when she fell on black ice. She describes this pain as constant robb pain that goes down both legs. She admits to associated numbness in her left leg which is chronic since last surgery. Does admit to associated weakness as well. Movement makes the pain worse, sitting and leaning forward makes the pain better. She states she has associated calf cramps with this pain. She has tried PT at [facility] with no relief. She has also tried CAROLANN many years ago with mild relief. She is a current smoker, smoking about 1ppd and is current on Plavix daily d/t her stent in her aorta. Denies loss of bowel or bladder, saddle anesthesia, headache, loss of dexterity, abnormal gait, fever, chills, N/V, SOB, or chest pain.     Past Medical History:   Diagnosis Date    Age-related osteoporosis without current pathological fracture 2021    Arthritis     Cataracts, bilateral 10/'19    Colon polyps 2021    tubular adenoma    Diabetes mellitus ([address])     DM type 2 (diabetes mellitus, type 2) ([address])     Facial basal cell cancer 2022    [facility]    Hyperlipidemia     Hypertension     Hypothyroidism     Migraines     rare    Neuropathy, diabetic ([address])     Peripheral vascular disease ([address])     follows with Dr. Jocelin Ulrich yearly    Positive colorectal cancer screening using Cologuard test     Psoriasiform dermatitis     Seasonal allergies     Thyroid disease      Past Surgical History:   Procedure Laterality Date    BACK SURGERY      L5-S1    CAROTID ENDARTERECTOMY Left 10/11/2018    Kakkasseril    CARPAL TUNNEL RELEASE      bilat     SECTION      2    COLONOSCOPY N/A 2021    COLONOSCOPY WITH BIOPSY with tattooing performed by Duane Briar

## 2023-09-25 ENCOUNTER — ANESTHESIA (OUTPATIENT)
Dept: OPERATING ROOM | Age: 68
End: 2023-09-25
Payer: MEDICARE

## 2023-09-25 ENCOUNTER — APPOINTMENT (OUTPATIENT)
Dept: GENERAL RADIOLOGY | Age: 68
DRG: 453 | End: 2023-09-25
Attending: NEUROLOGICAL SURGERY
Payer: MEDICARE

## 2023-09-25 ENCOUNTER — HOSPITAL ENCOUNTER (INPATIENT)
Age: 68
LOS: 1 days | Discharge: NURSING, REHAB OR LONG-TERM CARE | DRG: 453 | End: 2023-09-28
Attending: NEUROLOGICAL SURGERY | Admitting: NEUROLOGICAL SURGERY
Payer: MEDICARE

## 2023-09-25 DIAGNOSIS — M41.86 DEXTROSCOLIOSIS OF LUMBAR SPINE: ICD-10-CM

## 2023-09-25 DIAGNOSIS — Z01.812 PRE-OPERATIVE LABORATORY EXAMINATION: Primary | ICD-10-CM

## 2023-09-25 DIAGNOSIS — M48.061 STENOSIS, SPINAL, LUMBAR: ICD-10-CM

## 2023-09-25 DIAGNOSIS — Z72.0 TOBACCO ABUSE: ICD-10-CM

## 2023-09-25 PROBLEM — M48.062 LUMBAR STENOSIS WITH NEUROGENIC CLAUDICATION: Status: ACTIVE | Noted: 2023-09-25

## 2023-09-25 LAB
GLUCOSE BLD-MCNC: 165 MG/DL (ref 74–99)
GLUCOSE BLD-MCNC: 231 MG/DL (ref 74–99)
GLUCOSE BLD-MCNC: 299 MG/DL (ref 74–99)
INR PPP: 1
PROTHROMBIN TIME: 10.5 SEC (ref 9.3–12.4)

## 2023-09-25 PROCEDURE — 61783 SCAN PROC SPINAL: CPT | Performed by: NEUROLOGICAL SURGERY

## 2023-09-25 PROCEDURE — C1889 IMPLANT/INSERT DEVICE, NOC: HCPCS | Performed by: NEUROLOGICAL SURGERY

## 2023-09-25 PROCEDURE — 3600000015 HC SURGERY LEVEL 5 ADDTL 15MIN: Performed by: NEUROLOGICAL SURGERY

## 2023-09-25 PROCEDURE — 3700000001 HC ADD 15 MINUTES (ANESTHESIA): Performed by: NEUROLOGICAL SURGERY

## 2023-09-25 PROCEDURE — 2580000003 HC RX 258: Performed by: NEUROLOGICAL SURGERY

## 2023-09-25 PROCEDURE — 2500000003 HC RX 250 WO HCPCS: Performed by: NEUROLOGICAL SURGERY

## 2023-09-25 PROCEDURE — 2500000003 HC RX 250 WO HCPCS: Performed by: STUDENT IN AN ORGANIZED HEALTH CARE EDUCATION/TRAINING PROGRAM

## 2023-09-25 PROCEDURE — 2720000010 HC SURG SUPPLY STERILE: Performed by: NEUROLOGICAL SURGERY

## 2023-09-25 PROCEDURE — C1729 CATH, DRAINAGE: HCPCS | Performed by: NEUROLOGICAL SURGERY

## 2023-09-25 PROCEDURE — 22614 ARTHRD PST TQ 1NTRSPC EA ADD: CPT | Performed by: NEUROLOGICAL SURGERY

## 2023-09-25 PROCEDURE — 6370000000 HC RX 637 (ALT 250 FOR IP): Performed by: NEUROLOGICAL SURGERY

## 2023-09-25 PROCEDURE — C1713 ANCHOR/SCREW BN/BN,TIS/BN: HCPCS | Performed by: NEUROLOGICAL SURGERY

## 2023-09-25 PROCEDURE — 00NY0ZZ RELEASE LUMBAR SPINAL CORD, OPEN APPROACH: ICD-10-PCS | Performed by: NEUROLOGICAL SURGERY

## 2023-09-25 PROCEDURE — 7100000000 HC PACU RECOVERY - FIRST 15 MIN: Performed by: NEUROLOGICAL SURGERY

## 2023-09-25 PROCEDURE — 88304 TISSUE EXAM BY PATHOLOGIST: CPT

## 2023-09-25 PROCEDURE — 95940 IONM IN OPERATNG ROOM 15 MIN: CPT | Performed by: AUDIOLOGIST

## 2023-09-25 PROCEDURE — 3700000000 HC ANESTHESIA ATTENDED CARE: Performed by: NEUROLOGICAL SURGERY

## 2023-09-25 PROCEDURE — A4217 STERILE WATER/SALINE, 500 ML: HCPCS | Performed by: NEUROLOGICAL SURGERY

## 2023-09-25 PROCEDURE — 63052 LAM FACETC/FRMT ARTHRD LUM 1: CPT | Performed by: NEUROLOGICAL SURGERY

## 2023-09-25 PROCEDURE — 0SG30AJ FUSION OF LUMBOSACRAL JOINT WITH INTERBODY FUSION DEVICE, POSTERIOR APPROACH, ANTERIOR COLUMN, OPEN APPROACH: ICD-10-PCS | Performed by: NEUROLOGICAL SURGERY

## 2023-09-25 PROCEDURE — 0SG3071 FUSION OF LUMBOSACRAL JOINT WITH AUTOLOGOUS TISSUE SUBSTITUTE, POSTERIOR APPROACH, POSTERIOR COLUMN, OPEN APPROACH: ICD-10-PCS | Performed by: NEUROLOGICAL SURGERY

## 2023-09-25 PROCEDURE — 95912 NRV CNDJ TEST 11-12 STUDIES: CPT | Performed by: AUDIOLOGIST

## 2023-09-25 PROCEDURE — 2500000003 HC RX 250 WO HCPCS: Performed by: NURSE ANESTHETIST, CERTIFIED REGISTERED

## 2023-09-25 PROCEDURE — 82962 GLUCOSE BLOOD TEST: CPT

## 2023-09-25 PROCEDURE — 0SG1071 FUSION OF 2 OR MORE LUMBAR VERTEBRAL JOINTS WITH AUTOLOGOUS TISSUE SUBSTITUTE, POSTERIOR APPROACH, POSTERIOR COLUMN, OPEN APPROACH: ICD-10-PCS | Performed by: NEUROLOGICAL SURGERY

## 2023-09-25 PROCEDURE — 6360000002 HC RX W HCPCS: Performed by: STUDENT IN AN ORGANIZED HEALTH CARE EDUCATION/TRAINING PROGRAM

## 2023-09-25 PROCEDURE — 22853 INSJ BIOMECHANICAL DEVICE: CPT | Performed by: NEUROLOGICAL SURGERY

## 2023-09-25 PROCEDURE — 2709999900 HC NON-CHARGEABLE SUPPLY: Performed by: NEUROLOGICAL SURGERY

## 2023-09-25 PROCEDURE — 63053 LAM FACTC/FRMT ARTHRD LUM EA: CPT | Performed by: NEUROLOGICAL SURGERY

## 2023-09-25 PROCEDURE — G0378 HOSPITAL OBSERVATION PER HR: HCPCS

## 2023-09-25 PROCEDURE — 6360000002 HC RX W HCPCS: Performed by: NEUROLOGICAL SURGERY

## 2023-09-25 PROCEDURE — 2580000003 HC RX 258: Performed by: STUDENT IN AN ORGANIZED HEALTH CARE EDUCATION/TRAINING PROGRAM

## 2023-09-25 PROCEDURE — 0SG00AJ FUSION OF LUMBAR VERTEBRAL JOINT WITH INTERBODY FUSION DEVICE, POSTERIOR APPROACH, ANTERIOR COLUMN, OPEN APPROACH: ICD-10-PCS | Performed by: NEUROLOGICAL SURGERY

## 2023-09-25 PROCEDURE — 63047 LAM FACETEC & FORAMOT LUMBAR: CPT | Performed by: NEUROLOGICAL SURGERY

## 2023-09-25 PROCEDURE — 3E0U0GB INTRODUCTION OF RECOMBINANT BONE MORPHOGENETIC PROTEIN INTO JOINTS, OPEN APPROACH: ICD-10-PCS | Performed by: NEUROLOGICAL SURGERY

## 2023-09-25 PROCEDURE — 6360000002 HC RX W HCPCS: Performed by: NURSE ANESTHETIST, CERTIFIED REGISTERED

## 2023-09-25 PROCEDURE — 0SB40ZZ EXCISION OF LUMBOSACRAL DISC, OPEN APPROACH: ICD-10-PCS | Performed by: NEUROLOGICAL SURGERY

## 2023-09-25 PROCEDURE — 22634 ARTHRD CMBN 1NTRSPC EA ADDL: CPT | Performed by: NEUROLOGICAL SURGERY

## 2023-09-25 PROCEDURE — 22842 INSERT SPINE FIXATION DEVICE: CPT | Performed by: NEUROLOGICAL SURGERY

## 2023-09-25 PROCEDURE — 22633 ARTHRD CMBN 1NTRSPC LUMBAR: CPT | Performed by: NEUROLOGICAL SURGERY

## 2023-09-25 PROCEDURE — 7100000001 HC PACU RECOVERY - ADDTL 15 MIN: Performed by: NEUROLOGICAL SURGERY

## 2023-09-25 PROCEDURE — 85610 PROTHROMBIN TIME: CPT

## 2023-09-25 PROCEDURE — 0SB20ZZ EXCISION OF LUMBAR VERTEBRAL DISC, OPEN APPROACH: ICD-10-PCS | Performed by: NEUROLOGICAL SURGERY

## 2023-09-25 PROCEDURE — 3600000005 HC SURGERY LEVEL 5 BASE: Performed by: NEUROLOGICAL SURGERY

## 2023-09-25 PROCEDURE — 63048 LAM FACETEC &FORAMOT EA ADDL: CPT | Performed by: NEUROLOGICAL SURGERY

## 2023-09-25 DEVICE — CREO® THREADED 6.5 X 40MM POLYAXIAL SCREW
Type: IMPLANTABLE DEVICE | Site: SPINE LUMBAR | Status: FUNCTIONAL
Brand: CREO

## 2023-09-25 DEVICE — VIASORB STRP 20X50X5MM: Type: IMPLANTABLE DEVICE | Site: SPINE LUMBAR | Status: FUNCTIONAL

## 2023-09-25 DEVICE — CREO® THREADED 5.5 X 45MM POLYAXIAL SCREW
Type: IMPLANTABLE DEVICE | Site: SPINE LUMBAR | Status: FUNCTIONAL
Brand: CREO

## 2023-09-25 DEVICE — CEMENT C01A KYPHX HV-R BONE CEMENT EN
Type: IMPLANTABLE DEVICE | Site: SPINE LUMBAR | Status: FUNCTIONAL
Brand: KYPHON® HV-R® BONE CEMENT

## 2023-09-25 DEVICE — SABLE SPACER, 10X22, 7-13MM, 15°
Type: IMPLANTABLE DEVICE | Site: SPINE LUMBAR | Status: FUNCTIONAL
Brand: SABLE

## 2023-09-25 DEVICE — CREO FENESTRATED, 7.5 X 35MM POLYAXIAL SCREW, THREADED
Type: IMPLANTABLE DEVICE | Site: SPINE LUMBAR | Status: FUNCTIONAL
Brand: CREO

## 2023-09-25 DEVICE — CREO® THREADED 6.5 X 45MM POLYAXIAL SCREW
Type: IMPLANTABLE DEVICE | Site: SPINE LUMBAR | Status: FUNCTIONAL
Brand: CREO

## 2023-09-25 DEVICE — CREO FENESTRATED, 5.5 X 45MM POLYAXIAL SCREW, THREADED
Type: IMPLANTABLE DEVICE | Site: SPINE LUMBAR | Status: FUNCTIONAL
Brand: CREO

## 2023-09-25 DEVICE — BONE GRAFT KIT 7510800 INFUSE LARGE II
Type: IMPLANTABLE DEVICE | Site: SPINE LUMBAR | Status: FUNCTIONAL
Brand: INFUSE® BONE GRAFT

## 2023-09-25 DEVICE — THREADED LOCKING CAP, CREO
Type: IMPLANTABLE DEVICE | Site: SPINE LUMBAR | Status: FUNCTIONAL
Brand: CREO

## 2023-09-25 DEVICE — SABLE SPACER, 10X22, 9-15MM, 8°
Type: IMPLANTABLE DEVICE | Site: SPINE LUMBAR | Status: FUNCTIONAL
Brand: SABLE

## 2023-09-25 DEVICE — 5.5MM CURVED ROD, 130MM
Type: IMPLANTABLE DEVICE | Site: SPINE LUMBAR | Status: FUNCTIONAL
Brand: REVERE

## 2023-09-25 RX ORDER — PROPOFOL 10 MG/ML
INJECTION, EMULSION INTRAVENOUS PRN
Status: DISCONTINUED | OUTPATIENT
Start: 2023-09-25 | End: 2023-09-25 | Stop reason: SDUPTHER

## 2023-09-25 RX ORDER — POLYETHYLENE GLYCOL 3350 17 G/17G
17 POWDER, FOR SOLUTION ORAL DAILY
Status: DISCONTINUED | OUTPATIENT
Start: 2023-09-25 | End: 2023-09-28 | Stop reason: HOSPADM

## 2023-09-25 RX ORDER — SODIUM CHLORIDE 9 MG/ML
INJECTION, SOLUTION INTRAVENOUS PRN
Status: DISCONTINUED | OUTPATIENT
Start: 2023-09-25 | End: 2023-09-25 | Stop reason: HOSPADM

## 2023-09-25 RX ORDER — BACITRACIN ZINC 500 [USP'U]/G
OINTMENT TOPICAL PRN
Status: DISCONTINUED | OUTPATIENT
Start: 2023-09-25 | End: 2023-09-25 | Stop reason: ALTCHOICE

## 2023-09-25 RX ORDER — ONDANSETRON 4 MG/1
4 TABLET, ORALLY DISINTEGRATING ORAL EVERY 8 HOURS PRN
Status: DISCONTINUED | OUTPATIENT
Start: 2023-09-25 | End: 2023-09-28 | Stop reason: HOSPADM

## 2023-09-25 RX ORDER — MORPHINE SULFATE 2 MG/ML
2 INJECTION, SOLUTION INTRAMUSCULAR; INTRAVENOUS
Status: DISCONTINUED | OUTPATIENT
Start: 2023-09-25 | End: 2023-09-28 | Stop reason: HOSPADM

## 2023-09-25 RX ORDER — LIDOCAINE HYDROCHLORIDE 20 MG/ML
INJECTION, SOLUTION INTRAVENOUS PRN
Status: DISCONTINUED | OUTPATIENT
Start: 2023-09-25 | End: 2023-09-25 | Stop reason: SDUPTHER

## 2023-09-25 RX ORDER — FENTANYL CITRATE 50 UG/ML
INJECTION, SOLUTION INTRAMUSCULAR; INTRAVENOUS PRN
Status: DISCONTINUED | OUTPATIENT
Start: 2023-09-25 | End: 2023-09-25 | Stop reason: SDUPTHER

## 2023-09-25 RX ORDER — SODIUM CHLORIDE 0.9 % (FLUSH) 0.9 %
5-40 SYRINGE (ML) INJECTION EVERY 12 HOURS SCHEDULED
Status: DISCONTINUED | OUTPATIENT
Start: 2023-09-25 | End: 2023-09-25 | Stop reason: HOSPADM

## 2023-09-25 RX ORDER — DIPHENHYDRAMINE HCL 25 MG
25 TABLET ORAL EVERY 6 HOURS PRN
Status: DISCONTINUED | OUTPATIENT
Start: 2023-09-25 | End: 2023-09-28 | Stop reason: HOSPADM

## 2023-09-25 RX ORDER — ONDANSETRON 2 MG/ML
4 INJECTION INTRAMUSCULAR; INTRAVENOUS EVERY 6 HOURS PRN
Status: DISCONTINUED | OUTPATIENT
Start: 2023-09-25 | End: 2023-09-28 | Stop reason: HOSPADM

## 2023-09-25 RX ORDER — INSULIN LISPRO 100 [IU]/ML
0-4 INJECTION, SOLUTION INTRAVENOUS; SUBCUTANEOUS NIGHTLY
Status: DISCONTINUED | OUTPATIENT
Start: 2023-09-25 | End: 2023-09-28 | Stop reason: HOSPADM

## 2023-09-25 RX ORDER — INSULIN LISPRO 100 [IU]/ML
0-8 INJECTION, SOLUTION INTRAVENOUS; SUBCUTANEOUS
Status: DISCONTINUED | OUTPATIENT
Start: 2023-09-25 | End: 2023-09-28 | Stop reason: HOSPADM

## 2023-09-25 RX ORDER — LIDOCAINE HYDROCHLORIDE AND EPINEPHRINE 5; 5 MG/ML; UG/ML
INJECTION, SOLUTION INFILTRATION; PERINEURAL PRN
Status: DISCONTINUED | OUTPATIENT
Start: 2023-09-25 | End: 2023-09-25 | Stop reason: ALTCHOICE

## 2023-09-25 RX ORDER — ACETAMINOPHEN 325 MG/1
650 TABLET ORAL EVERY 6 HOURS
Status: DISCONTINUED | OUTPATIENT
Start: 2023-09-25 | End: 2023-09-28 | Stop reason: HOSPADM

## 2023-09-25 RX ORDER — MORPHINE SULFATE 4 MG/ML
4 INJECTION, SOLUTION INTRAMUSCULAR; INTRAVENOUS
Status: DISCONTINUED | OUTPATIENT
Start: 2023-09-25 | End: 2023-09-28 | Stop reason: HOSPADM

## 2023-09-25 RX ORDER — HYDROMORPHONE HYDROCHLORIDE 1 MG/ML
0.5 INJECTION, SOLUTION INTRAMUSCULAR; INTRAVENOUS; SUBCUTANEOUS EVERY 5 MIN PRN
Status: DISCONTINUED | OUTPATIENT
Start: 2023-09-25 | End: 2023-09-25 | Stop reason: HOSPADM

## 2023-09-25 RX ORDER — ENEMA 19; 7 G/133ML; G/133ML
1 ENEMA RECTAL DAILY PRN
Status: DISCONTINUED | OUTPATIENT
Start: 2023-09-25 | End: 2023-09-28 | Stop reason: HOSPADM

## 2023-09-25 RX ORDER — DIPHENHYDRAMINE HYDROCHLORIDE 50 MG/ML
25 INJECTION INTRAMUSCULAR; INTRAVENOUS EVERY 6 HOURS PRN
Status: DISCONTINUED | OUTPATIENT
Start: 2023-09-25 | End: 2023-09-28 | Stop reason: HOSPADM

## 2023-09-25 RX ORDER — BISACODYL 10 MG
10 SUPPOSITORY, RECTAL RECTAL DAILY PRN
Status: DISCONTINUED | OUTPATIENT
Start: 2023-09-25 | End: 2023-09-28 | Stop reason: HOSPADM

## 2023-09-25 RX ORDER — ROSUVASTATIN CALCIUM 20 MG/1
40 TABLET, COATED ORAL DAILY
Status: DISCONTINUED | OUTPATIENT
Start: 2023-09-25 | End: 2023-09-28 | Stop reason: HOSPADM

## 2023-09-25 RX ORDER — SODIUM CHLORIDE 9 MG/ML
INJECTION, SOLUTION INTRAVENOUS CONTINUOUS
Status: DISCONTINUED | OUTPATIENT
Start: 2023-09-25 | End: 2023-09-27

## 2023-09-25 RX ORDER — SODIUM CHLORIDE 0.9 % (FLUSH) 0.9 %
5-40 SYRINGE (ML) INJECTION PRN
Status: DISCONTINUED | OUTPATIENT
Start: 2023-09-25 | End: 2023-09-28 | Stop reason: HOSPADM

## 2023-09-25 RX ORDER — HYDRALAZINE HYDROCHLORIDE 20 MG/ML
10 INJECTION INTRAMUSCULAR; INTRAVENOUS
Status: DISCONTINUED | OUTPATIENT
Start: 2023-09-25 | End: 2023-09-25 | Stop reason: HOSPADM

## 2023-09-25 RX ORDER — DROPERIDOL 2.5 MG/ML
0.62 INJECTION, SOLUTION INTRAMUSCULAR; INTRAVENOUS
Status: DISCONTINUED | OUTPATIENT
Start: 2023-09-25 | End: 2023-09-25 | Stop reason: HOSPADM

## 2023-09-25 RX ORDER — DEXTROSE MONOHYDRATE 100 MG/ML
INJECTION, SOLUTION INTRAVENOUS CONTINUOUS PRN
Status: DISCONTINUED | OUTPATIENT
Start: 2023-09-25 | End: 2023-09-25 | Stop reason: HOSPADM

## 2023-09-25 RX ORDER — ROCURONIUM BROMIDE 10 MG/ML
INJECTION, SOLUTION INTRAVENOUS PRN
Status: DISCONTINUED | OUTPATIENT
Start: 2023-09-25 | End: 2023-09-25 | Stop reason: SDUPTHER

## 2023-09-25 RX ORDER — SODIUM CHLORIDE 9 MG/ML
INJECTION, SOLUTION INTRAVENOUS PRN
Status: DISCONTINUED | OUTPATIENT
Start: 2023-09-25 | End: 2023-09-28 | Stop reason: HOSPADM

## 2023-09-25 RX ORDER — FENTANYL CITRATE 50 UG/ML
INJECTION, SOLUTION INTRAMUSCULAR; INTRAVENOUS PRN
Status: DISCONTINUED | OUTPATIENT
Start: 2023-09-25 | End: 2023-09-25

## 2023-09-25 RX ORDER — ACETAMINOPHEN 325 MG/1
650 TABLET ORAL
Status: DISCONTINUED | OUTPATIENT
Start: 2023-09-25 | End: 2023-09-25 | Stop reason: HOSPADM

## 2023-09-25 RX ORDER — BUPIVACAINE HYDROCHLORIDE 2.5 MG/ML
INJECTION, SOLUTION EPIDURAL; INFILTRATION; INTRACAUDAL PRN
Status: DISCONTINUED | OUTPATIENT
Start: 2023-09-25 | End: 2023-09-25 | Stop reason: ALTCHOICE

## 2023-09-25 RX ORDER — OXYCODONE HYDROCHLORIDE 5 MG/1
5 TABLET ORAL EVERY 4 HOURS PRN
Status: DISCONTINUED | OUTPATIENT
Start: 2023-09-25 | End: 2023-09-28 | Stop reason: HOSPADM

## 2023-09-25 RX ORDER — ONDANSETRON 2 MG/ML
4 INJECTION INTRAMUSCULAR; INTRAVENOUS
Status: DISCONTINUED | OUTPATIENT
Start: 2023-09-25 | End: 2023-09-25 | Stop reason: HOSPADM

## 2023-09-25 RX ORDER — BISACODYL 5 MG/1
5 TABLET, DELAYED RELEASE ORAL DAILY
Status: DISCONTINUED | OUTPATIENT
Start: 2023-09-25 | End: 2023-09-28 | Stop reason: HOSPADM

## 2023-09-25 RX ORDER — SODIUM CHLORIDE 0.9 % (FLUSH) 0.9 %
5-40 SYRINGE (ML) INJECTION PRN
Status: DISCONTINUED | OUTPATIENT
Start: 2023-09-25 | End: 2023-09-25 | Stop reason: HOSPADM

## 2023-09-25 RX ORDER — SODIUM CHLORIDE, SODIUM LACTATE, POTASSIUM CHLORIDE, CALCIUM CHLORIDE 600; 310; 30; 20 MG/100ML; MG/100ML; MG/100ML; MG/100ML
INJECTION, SOLUTION INTRAVENOUS CONTINUOUS
Status: DISCONTINUED | OUTPATIENT
Start: 2023-09-25 | End: 2023-09-28 | Stop reason: HOSPADM

## 2023-09-25 RX ORDER — SODIUM CHLORIDE 0.9 % (FLUSH) 0.9 %
5-40 SYRINGE (ML) INJECTION EVERY 12 HOURS SCHEDULED
Status: DISCONTINUED | OUTPATIENT
Start: 2023-09-25 | End: 2023-09-28 | Stop reason: HOSPADM

## 2023-09-25 RX ORDER — LEVOTHYROXINE SODIUM 0.03 MG/1
25 TABLET ORAL DAILY
Status: DISCONTINUED | OUTPATIENT
Start: 2023-09-25 | End: 2023-09-28 | Stop reason: HOSPADM

## 2023-09-25 RX ORDER — ONDANSETRON 2 MG/ML
INJECTION INTRAMUSCULAR; INTRAVENOUS PRN
Status: DISCONTINUED | OUTPATIENT
Start: 2023-09-25 | End: 2023-09-25 | Stop reason: SDUPTHER

## 2023-09-25 RX ORDER — METOPROLOL SUCCINATE 25 MG/1
25 TABLET, EXTENDED RELEASE ORAL DAILY
Status: DISCONTINUED | OUTPATIENT
Start: 2023-09-26 | End: 2023-09-28 | Stop reason: HOSPADM

## 2023-09-25 RX ORDER — OXYCODONE HYDROCHLORIDE 10 MG/1
10 TABLET ORAL EVERY 4 HOURS PRN
Status: DISCONTINUED | OUTPATIENT
Start: 2023-09-25 | End: 2023-09-28 | Stop reason: HOSPADM

## 2023-09-25 RX ORDER — OXYCODONE HYDROCHLORIDE 5 MG/1
5 TABLET ORAL
Status: DISCONTINUED | OUTPATIENT
Start: 2023-09-25 | End: 2023-09-25 | Stop reason: HOSPADM

## 2023-09-25 RX ORDER — GLYCOPYRROLATE 1 MG/5 ML
SYRINGE (ML) INTRAVENOUS PRN
Status: DISCONTINUED | OUTPATIENT
Start: 2023-09-25 | End: 2023-09-25 | Stop reason: SDUPTHER

## 2023-09-25 RX ORDER — CITALOPRAM 20 MG/1
20 TABLET ORAL DAILY
Status: DISCONTINUED | OUTPATIENT
Start: 2023-09-26 | End: 2023-09-28 | Stop reason: HOSPADM

## 2023-09-25 RX ORDER — PANTOPRAZOLE SODIUM 40 MG/1
40 TABLET, DELAYED RELEASE ORAL DAILY
Status: DISCONTINUED | OUTPATIENT
Start: 2023-09-25 | End: 2023-09-28 | Stop reason: HOSPADM

## 2023-09-25 RX ORDER — SENNA AND DOCUSATE SODIUM 50; 8.6 MG/1; MG/1
1 TABLET, FILM COATED ORAL 2 TIMES DAILY
Status: DISCONTINUED | OUTPATIENT
Start: 2023-09-25 | End: 2023-09-28 | Stop reason: HOSPADM

## 2023-09-25 RX ORDER — MIDAZOLAM HYDROCHLORIDE 1 MG/ML
INJECTION INTRAMUSCULAR; INTRAVENOUS PRN
Status: DISCONTINUED | OUTPATIENT
Start: 2023-09-25 | End: 2023-09-25 | Stop reason: SDUPTHER

## 2023-09-25 RX ORDER — VANCOMYCIN HYDROCHLORIDE 500 MG/10ML
INJECTION, POWDER, LYOPHILIZED, FOR SOLUTION INTRAVENOUS PRN
Status: DISCONTINUED | OUTPATIENT
Start: 2023-09-25 | End: 2023-09-25 | Stop reason: ALTCHOICE

## 2023-09-25 RX ORDER — DIPHENHYDRAMINE HYDROCHLORIDE 50 MG/ML
12.5 INJECTION INTRAMUSCULAR; INTRAVENOUS
Status: DISCONTINUED | OUTPATIENT
Start: 2023-09-25 | End: 2023-09-25 | Stop reason: HOSPADM

## 2023-09-25 RX ORDER — DIAZEPAM 5 MG/1
5 TABLET ORAL EVERY 6 HOURS PRN
Status: DISCONTINUED | OUTPATIENT
Start: 2023-09-25 | End: 2023-09-28 | Stop reason: HOSPADM

## 2023-09-25 RX ORDER — SODIUM CHLORIDE 9 MG/ML
INJECTION, SOLUTION INTRAVENOUS CONTINUOUS
Status: DISCONTINUED | OUTPATIENT
Start: 2023-09-25 | End: 2023-09-25 | Stop reason: HOSPADM

## 2023-09-25 RX ORDER — LABETALOL HYDROCHLORIDE 5 MG/ML
10 INJECTION, SOLUTION INTRAVENOUS
Status: DISCONTINUED | OUTPATIENT
Start: 2023-09-25 | End: 2023-09-25 | Stop reason: HOSPADM

## 2023-09-25 RX ORDER — HYDROMORPHONE HYDROCHLORIDE 1 MG/ML
0.25 INJECTION, SOLUTION INTRAMUSCULAR; INTRAVENOUS; SUBCUTANEOUS EVERY 5 MIN PRN
Status: DISCONTINUED | OUTPATIENT
Start: 2023-09-25 | End: 2023-09-25 | Stop reason: HOSPADM

## 2023-09-25 RX ADMIN — LIDOCAINE HYDROCHLORIDE 100 MG: 20 INJECTION, SOLUTION INTRAVENOUS at 06:38

## 2023-09-25 RX ADMIN — FENTANYL CITRATE 50 MCG: 0.05 INJECTION, SOLUTION INTRAMUSCULAR; INTRAVENOUS at 08:45

## 2023-09-25 RX ADMIN — ONDANSETRON 4 MG: 2 INJECTION INTRAMUSCULAR; INTRAVENOUS at 10:50

## 2023-09-25 RX ADMIN — METFORMIN HYDROCHLORIDE 500 MG: 500 TABLET, FILM COATED ORAL at 16:13

## 2023-09-25 RX ADMIN — LEVOTHYROXINE SODIUM 25 MCG: 0.03 TABLET ORAL at 13:22

## 2023-09-25 RX ADMIN — FENTANYL CITRATE 25 MCG: 0.05 INJECTION, SOLUTION INTRAMUSCULAR; INTRAVENOUS at 11:15

## 2023-09-25 RX ADMIN — SENNOSIDES AND DOCUSATE SODIUM 1 TABLET: 50; 8.6 TABLET ORAL at 13:22

## 2023-09-25 RX ADMIN — SODIUM CHLORIDE: 9 INJECTION, SOLUTION INTRAVENOUS at 05:35

## 2023-09-25 RX ADMIN — SENNOSIDES AND DOCUSATE SODIUM 1 TABLET: 50; 8.6 TABLET ORAL at 20:47

## 2023-09-25 RX ADMIN — WATER 2000 MG: 1 INJECTION INTRAMUSCULAR; INTRAVENOUS; SUBCUTANEOUS at 18:53

## 2023-09-25 RX ADMIN — FENTANYL CITRATE 50 MCG: 0.05 INJECTION, SOLUTION INTRAMUSCULAR; INTRAVENOUS at 09:17

## 2023-09-25 RX ADMIN — Medication 0.2 MG: at 06:58

## 2023-09-25 RX ADMIN — INSULIN LISPRO 4 UNITS: 100 INJECTION, SOLUTION INTRAVENOUS; SUBCUTANEOUS at 16:31

## 2023-09-25 RX ADMIN — MORPHINE SULFATE 4 MG: 4 INJECTION, SOLUTION INTRAMUSCULAR; INTRAVENOUS at 16:13

## 2023-09-25 RX ADMIN — CEFAZOLIN 2000 MG: 2 INJECTION, POWDER, FOR SOLUTION INTRAMUSCULAR; INTRAVENOUS at 06:44

## 2023-09-25 RX ADMIN — BISACODYL 5 MG: 5 TABLET, COATED ORAL at 13:21

## 2023-09-25 RX ADMIN — HYDROMORPHONE HYDROCHLORIDE 0.25 MG: 1 INJECTION, SOLUTION INTRAMUSCULAR; INTRAVENOUS; SUBCUTANEOUS at 11:49

## 2023-09-25 RX ADMIN — FENTANYL CITRATE 50 MCG: 0.05 INJECTION, SOLUTION INTRAMUSCULAR; INTRAVENOUS at 06:38

## 2023-09-25 RX ADMIN — PHENYLEPHRINE HYDROCHLORIDE 100 MCG: 10 INJECTION INTRAVENOUS at 08:22

## 2023-09-25 RX ADMIN — SUGAMMADEX 120 MG: 100 INJECTION, SOLUTION INTRAVENOUS at 10:50

## 2023-09-25 RX ADMIN — ROCURONIUM BROMIDE 50 MG: 10 INJECTION, SOLUTION INTRAVENOUS at 06:39

## 2023-09-25 RX ADMIN — FENTANYL CITRATE 50 MCG: 0.05 INJECTION, SOLUTION INTRAMUSCULAR; INTRAVENOUS at 09:34

## 2023-09-25 RX ADMIN — DIAZEPAM 5 MG: 5 TABLET ORAL at 16:13

## 2023-09-25 RX ADMIN — SODIUM CHLORIDE: 9 INJECTION, SOLUTION INTRAVENOUS at 06:27

## 2023-09-25 RX ADMIN — PANTOPRAZOLE SODIUM 40 MG: 40 TABLET, DELAYED RELEASE ORAL at 13:21

## 2023-09-25 RX ADMIN — SODIUM CHLORIDE: 9 INJECTION, SOLUTION INTRAVENOUS at 07:51

## 2023-09-25 RX ADMIN — SODIUM CHLORIDE, PRESERVATIVE FREE 10 ML: 5 INJECTION INTRAVENOUS at 16:14

## 2023-09-25 RX ADMIN — PROPOFOL 40 MG: 10 INJECTION, EMULSION INTRAVENOUS at 09:51

## 2023-09-25 RX ADMIN — SODIUM CHLORIDE, PRESERVATIVE FREE 10 ML: 5 INJECTION INTRAVENOUS at 18:53

## 2023-09-25 RX ADMIN — MUPIROCIN: 20 OINTMENT TOPICAL at 20:47

## 2023-09-25 RX ADMIN — PHENYLEPHRINE HYDROCHLORIDE 100 MCG: 10 INJECTION INTRAVENOUS at 07:54

## 2023-09-25 RX ADMIN — ACETAMINOPHEN 650 MG: 325 TABLET ORAL at 20:47

## 2023-09-25 RX ADMIN — OXYCODONE HYDROCHLORIDE 10 MG: 10 TABLET ORAL at 13:20

## 2023-09-25 RX ADMIN — FENTANYL CITRATE 25 MCG: 0.05 INJECTION, SOLUTION INTRAMUSCULAR; INTRAVENOUS at 11:02

## 2023-09-25 RX ADMIN — MIDAZOLAM 2 MG: 1 INJECTION INTRAMUSCULAR; INTRAVENOUS at 06:27

## 2023-09-25 RX ADMIN — CEFAZOLIN 2000 MG: 2 INJECTION, POWDER, FOR SOLUTION INTRAMUSCULAR; INTRAVENOUS at 10:35

## 2023-09-25 RX ADMIN — OXYCODONE HYDROCHLORIDE 10 MG: 10 TABLET ORAL at 20:47

## 2023-09-25 RX ADMIN — POLYETHYLENE GLYCOL 3350 17 G: 17 POWDER, FOR SOLUTION ORAL at 13:22

## 2023-09-25 RX ADMIN — PROPOFOL 150 MG: 10 INJECTION, EMULSION INTRAVENOUS at 06:38

## 2023-09-25 RX ADMIN — ACETAMINOPHEN 650 MG: 325 TABLET ORAL at 13:21

## 2023-09-25 RX ADMIN — ROSUVASTATIN 40 MG: 20 TABLET, FILM COATED ORAL at 13:22

## 2023-09-25 ASSESSMENT — PAIN SCALES - GENERAL
PAINLEVEL_OUTOF10: 5
PAINLEVEL_OUTOF10: 6
PAINLEVEL_OUTOF10: 4
PAINLEVEL_OUTOF10: 5
PAINLEVEL_OUTOF10: 4
PAINLEVEL_OUTOF10: 10
PAINLEVEL_OUTOF10: 5
PAINLEVEL_OUTOF10: 10
PAINLEVEL_OUTOF10: 7
PAINLEVEL_OUTOF10: 5

## 2023-09-25 ASSESSMENT — PAIN DESCRIPTION - LOCATION
LOCATION: BACK;HIP
LOCATION: BACK

## 2023-09-25 ASSESSMENT — PAIN DESCRIPTION - ORIENTATION
ORIENTATION: LOWER;POSTERIOR
ORIENTATION: POSTERIOR;LOWER;MID
ORIENTATION: LOWER;MID
ORIENTATION: POSTERIOR;LOWER;MID

## 2023-09-25 ASSESSMENT — PAIN - FUNCTIONAL ASSESSMENT
PAIN_FUNCTIONAL_ASSESSMENT: 0-10
PAIN_FUNCTIONAL_ASSESSMENT: PREVENTS OR INTERFERES SOME ACTIVE ACTIVITIES AND ADLS

## 2023-09-25 ASSESSMENT — PAIN SCALES - WONG BAKER
WONGBAKER_NUMERICALRESPONSE: 0

## 2023-09-25 ASSESSMENT — PAIN DESCRIPTION - DESCRIPTORS
DESCRIPTORS: ACHING;SHARP;STABBING
DESCRIPTORS: ACHING;DISCOMFORT;SORE
DESCRIPTORS: ACHING;SHOOTING;THROBBING

## 2023-09-25 ASSESSMENT — PAIN DESCRIPTION - PAIN TYPE: TYPE: SURGICAL PAIN

## 2023-09-25 NOTE — BRIEF OP NOTE
Brief Postoperative Note      Patient: Margarette Coley  YOB: 1955  MRN: [id number]    Date of Procedure: 9/25/2023    Pre-Op Diagnosis Codes:     * Dextroscoliosis of lumbar spine [M41.86]     * Stenosis, spinal, lumbar [M48.061]    Post-Op Diagnosis: Same       Procedure(s):  L1-S1 laminectomy and posterior fusion; L4-L5, L5-S1 posterior lumbar interbody fusion    Surgeon(s):  Antelmo Coello MD    Assistant:  Resident: Yuan Daugherty DO    Anesthesia: General    Estimated Blood Loss (mL): 905     Complications: None    Specimens:   ID Type Source Tests Collected by Time Destination   A : disc L4,5 Tissue Back SURGICAL PATHOLOGY Antelmo Coello MD 9/25/2023 1720        Implants:  Implant Name Type Inv. Item Serial No.  Lot No. LRB No. Used Action   VIASORB STRP [id number] - [id number]  VIASORB STRP [id number] [facility] DIY  N/A 1 Implanted   VIASORB STRP [id number] - [id number]  VIASORB STRP [id number] [employer]  N/A 1 Implanted   VIASORB STRP [id number] - [id number]  VIASORB STRP [id number] [employer]  N/A 1 Implanted   CEMENT BNE HI VISC RADIOPAQUE KYPHON HV-R - [id number]  CEMENT BNE HI VISC RADIOPAQUE KYPHON HV-R  MEDTRONIC SOFAMOR DANEK- [id number] N/A 1 Implanted   SCREW SPNL POLYAX 5.5X45 MM FEN THRD CREO - [id number]  SCREW SPNL POLYAX 5.5X45 MM FEN THRD CREO  InflowControl-  N/A 2 Implanted   SCREW BNE POLYAX 7.5X35 MM CREO FEN THRD - [id number]  SCREW BNE POLYAX 7.5X35 MM CREO FEN THRD  InflowControl-WD  N/A 2 Implanted   SCREW SPNL L45MM DIA5. 5MM THORLUM THRD PREASSEMBLED FOR - [id number]  SCREW SPNL L45MM DIA5. 5MM THORLUM THRD PREASSEMBLED FOR  [employer]-WD  N/A 2 Implanted   SCREW SPNL L40MM DIA6.5MM THORLUM PEDCL NONCANNULATED ADEOLA - [id number]  SCREW SPNL L40MM DIA6.5MM THORLUM PEDCL NONCANNULATED ADEOLA  [employer]-WD  N/A 1 Implanted   SCREW SPNL L45MM DIA6.5MM THORLUM PEDCL NONCANNULATED ADEOLA - [id number]  SCREW SPNL L45MM DIA6.5MM THORLUM PEDCL NONCANNULATED ADEOLA  [employer]-  N/A 5 Implanted   CAP SPNL THORLUM ADEOLA THRD CREO - [id number]  CAP SPNL THORLUM ADEOLA THRD CREO  [employer] YouNoodle-  N/A 12 Implanted   KIT BNE GRFT L RHBMP-2 12MG INJ 10ML CONTAIN NDL 20GA - [id number]  KIT BNE GRFT L RHBMP-2 12MG INJ 10ML CONTAIN NDL 20GA  ItsOn SPINALGRAFT TECH- [id number] N/A 1 Implanted   SPACER SPNL 15 DEG 02F54V1-04 MM SABLE - [id number]  SPACER SPNL 15 DEG 86D86I1-01 MM SABLE  GLOBUS [place] Feint- [id number] Right 1 Implanted   SPACER SPNL 8 DEG 97Y27J6-22 MM SABLE - [id number]  SPACER SPNL 8 DEG 06P44J5-25 MM SABLE  [employer] [facility] [id number] Right 1 Implanted   SPACER SPNL 8 DEG 87S00K3-10 MM SABLE - [id number]  SPACER SPNL 8 DEG 03Q13N6-46 MM SABLE  [employer] [place] Feint- [id number] Left 1 Implanted   ALICIA SPNL L130MM DIA5. 5MM TI CVD FOR REVERE 5.5 DEGAN SYS - [id number]  ALICIA SPNL L130MM DIA5. 5MM TI CVD FOR REVERE 5.5 DEGAN SYS  [employer]-  N/A 2 Implanted         Drains:   Closed/Suction Drain Inferior Back Accordion (Active)       Urinary Catheter 09/25/23 (Active)       Findings: see dictated op note      Electronically signed by Tiffanie Palencia MD on 9/25/2023 at 11:07 AM

## 2023-09-25 NOTE — ANESTHESIA POSTPROCEDURE EVALUATION
Department of Anesthesiology  Postprocedure Note    Patient: Dami Tomlinson  MRN: [id number]  YOB: 1955  Date of evaluation: 9/25/2023      Procedure Summary     Date: 09/25/23 [facility] / [employer] [facility]    Anesthesia Start: 6454 Anesthesia Stop: 4587    Procedure: L1-S1 laminectomy and posterior fusion; L4-L5, L5-S1 posterior lumbar interbody fusion (Spine Lumbar) Diagnosis:       Dextroscoliosis of lumbar spine      Stenosis, spinal, lumbar      (Dextroscoliosis of lumbar spine [M41.86])      (Stenosis, spinal, lumbar [M48.061])    Surgeons: Lawence Pallas, MD Responsible Provider: Krzysztof Mcguire MD    Anesthesia Type: General ASA Status: 3          Anesthesia Type: General    Amelia Phase I: Amelia Score: 8    Amelia Phase II:        Anesthesia Post Evaluation    Patient location during evaluation: PACU  Patient participation: complete - patient participated  Level of consciousness: awake and alert  Airway patency: patent  Nausea & Vomiting: no nausea and no vomiting  Complications: no  Cardiovascular status: blood pressure returned to baseline and hemodynamically stable  Respiratory status: acceptable and spontaneous ventilation  Hydration status: euvolemic  Multimodal analgesia pain management approach  Pain management: adequate

## 2023-09-25 NOTE — ANESTHESIA PRE PROCEDURE
Department of Anesthesiology  Preprocedure Note       Name:  Zully Mcdowell   Age:  80 y.o.  :  1955                                          MRN:  [id number]         Date:  2023      Surgeon: Dafne Holly):  Serene Melara MD    Procedure: Procedure(s):  L1-S1 laminectomy and posterior fusion, L4-L5, L5-S1 posterior lumbar interbody fusion/ o-arm, c-arm, tutu table, cell saver, platelet gel, cages and posterior instrumentation, Globus    Medications prior to admission:   Prior to Admission medications    Medication Sig Start Date End Date Taking? Authorizing Provider   mupirocin (BACTROBAN) 2 % ointment Apply topically 3 times daily.  23  LASHAUN Varela   citalopram (CELEXA) 20 MG tablet TAKE 1 TABLET BY MOUTH EVERY DAY 23   Belinda Garcia DO   Insulin Pen Needle 32G X 4 MM MISC 1 each by Does not apply route in the morning, at noon, in the evening, and at bedtime 23   INDIRA Avendaño   insulin glargine, 1 unit dial, (TOUJEO SOLOSTAR) 300 UNIT/ML concentrated injection pen Inject 24 Units into the skin nightly 23   INDIRA Avendaño   HUMALOG KWIKPEN 100 UNIT/ML SOPN INJECT 10 units with meals plus mod dose ISS max dose per day 60 units 23   INDIRA Avendaño   metoprolol succinate (TOPROL XL) 25 MG extended release tablet Take 1 tablet by mouth daily 23   Kira Billings MD   rosuvastatin (CRESTOR) 40 MG tablet Take 1 tablet by mouth daily 23   Kira Billings MD   levothyroxine (SYNTHROID) 25 MCG tablet Take 1 tablet by mouth daily 23   Belinda Garcia DO   warfarin (COUMADIN) 5 MG tablet Take one or one and a half tablets by mouth once daily as directed by Anticoagulation Clinic  Patient taking differently: Take one or one and a half tablets by mouth once daily as directed by Anticoagulation Clinic  9/15/23: Current dose is 7.5 mg Mon./Fri.; 5 mg all other days 23   Tasia Martínez MD   metFORMIN

## 2023-09-25 NOTE — ANESTHESIA PROCEDURE NOTES
Arterial Line:    An arterial line was placed using surface landmarks, in the OR for the following indication(s): continuous blood pressure monitoring and blood sampling needed. A 20 gauge (size), 1 and 3/8 inch (length), Arrow (type) catheter was placed, Seldinger technique not used, into the radial artery, secured by tape. Anesthesia type: Local  Local infiltration: Injection    Events:  patient tolerated procedure well with no complications.   Performed: Resident/CRNA   Preanesthetic Checklist  Completed: patient identified, IV checked, site marked, risks and benefits discussed, surgical/procedural consents, equipment checked, pre-op evaluation, timeout performed, anesthesia consent given, oxygen available and monitors applied/VS acknowledged

## 2023-09-25 NOTE — PROGRESS NOTES
Called medical management consult to Dr PADILLA [facility] answering service.      Added to treatment team.

## 2023-09-25 NOTE — H&P
I have examined the patient and reviewed the H and P and no changes are noted.   Left leg is worse as it has always been    Deadcaleb Rodrigez MD

## 2023-09-25 NOTE — PLAN OF CARE

## 2023-09-25 NOTE — PROGRESS NOTES
INTRAOPERATIVE MONITORING EMG REPORT    Diagnosis: Dextroscoliosis, stenosis  Procedure: Lumbar fusion L1-S1, PLIF L4-5, L5-S1   Anesthesia: Isoflurane  Surgeon: Serene Melara M.D. Intra-op Monitoring:  3.5 hours    Procedure:     EMG recording electrodes were placed over the adductors, vastus medialis, vastus lateralis, anterior tibialis, and medial gastrocnemius muscles for recording spontaneous EMG activity. A silent control was performed to test the integrity of the system prior to the procedure. Results:  Spontaneous EMG: No EMG activity was observed throughout the procedure.     Evoked EMG:   LEFT           Screw (mA)   L1               >30                                         L2               >30                                         L3               18                                         L4               19                                         L5               28          S1                 30                                            RIGHT     L1               >30                                         L2               >30                                         L3               >30                                         L4               >30                                         L5               28          S1                 >30

## 2023-09-26 LAB
ANION GAP SERPL CALCULATED.3IONS-SCNC: 10 MMOL/L (ref 7–16)
BASOPHILS # BLD: 0.01 K/UL (ref 0–0.2)
BASOPHILS NFR BLD: 0 % (ref 0–2)
BUN SERPL-MCNC: 14 MG/DL (ref 6–23)
CALCIUM SERPL-MCNC: 9.2 MG/DL (ref 8.6–10.2)
CHLORIDE SERPL-SCNC: 106 MMOL/L (ref 98–107)
CO2 SERPL-SCNC: 22 MMOL/L (ref 22–29)
CREAT SERPL-MCNC: 0.6 MG/DL (ref 0.5–1)
EOSINOPHIL # BLD: 0.01 K/UL (ref 0.05–0.5)
EOSINOPHILS RELATIVE PERCENT: 0 % (ref 0–6)
ERYTHROCYTE [DISTWIDTH] IN BLOOD BY AUTOMATED COUNT: 15.6 % (ref 11.5–15)
ERYTHROCYTE [DISTWIDTH] IN BLOOD BY AUTOMATED COUNT: 15.9 % (ref 11.5–15)
GFR SERPL CREATININE-BSD FRML MDRD: >60 ML/MIN/1.73M2
GLUCOSE BLD-MCNC: 195 MG/DL (ref 74–99)
GLUCOSE BLD-MCNC: 197 MG/DL (ref 74–99)
GLUCOSE BLD-MCNC: 264 MG/DL (ref 74–99)
GLUCOSE BLD-MCNC: 269 MG/DL (ref 74–99)
GLUCOSE SERPL-MCNC: 190 MG/DL (ref 74–99)
HCT VFR BLD AUTO: 27.7 % (ref 34–48)
HCT VFR BLD AUTO: 29.5 % (ref 34–48)
HGB BLD-MCNC: 8.8 G/DL (ref 11.5–15.5)
HGB BLD-MCNC: 9.3 G/DL (ref 11.5–15.5)
IMM GRANULOCYTES # BLD AUTO: 0.05 K/UL (ref 0–0.58)
IMM GRANULOCYTES NFR BLD: 1 % (ref 0–5)
LYMPHOCYTES NFR BLD: 1.29 K/UL (ref 1.5–4)
LYMPHOCYTES RELATIVE PERCENT: 14 % (ref 20–42)
MCH RBC QN AUTO: 29 PG (ref 26–35)
MCH RBC QN AUTO: 29.3 PG (ref 26–35)
MCHC RBC AUTO-ENTMCNC: 31.5 G/DL (ref 32–34.5)
MCHC RBC AUTO-ENTMCNC: 31.8 G/DL (ref 32–34.5)
MCV RBC AUTO: 91.4 FL (ref 80–99.9)
MCV RBC AUTO: 93.1 FL (ref 80–99.9)
MONOCYTES NFR BLD: 0.66 K/UL (ref 0.1–0.95)
MONOCYTES NFR BLD: 7 % (ref 2–12)
NEUTROPHILS NFR BLD: 78 % (ref 43–80)
NEUTS SEG NFR BLD: 7.13 K/UL (ref 1.8–7.3)
PLATELET # BLD AUTO: 152 K/UL (ref 130–450)
PLATELET # BLD AUTO: 157 K/UL (ref 130–450)
PMV BLD AUTO: 10.1 FL (ref 7–12)
PMV BLD AUTO: 9.9 FL (ref 7–12)
POTASSIUM SERPL-SCNC: 4.5 MMOL/L (ref 3.5–5)
RBC # BLD AUTO: 3.03 M/UL (ref 3.5–5.5)
RBC # BLD AUTO: 3.17 M/UL (ref 3.5–5.5)
SODIUM SERPL-SCNC: 138 MMOL/L (ref 132–146)
WBC OTHER # BLD: 9.2 K/UL (ref 4.5–11.5)
WBC OTHER # BLD: 9.8 K/UL (ref 4.5–11.5)

## 2023-09-26 PROCEDURE — 94640 AIRWAY INHALATION TREATMENT: CPT

## 2023-09-26 PROCEDURE — 97530 THERAPEUTIC ACTIVITIES: CPT

## 2023-09-26 PROCEDURE — G0378 HOSPITAL OBSERVATION PER HR: HCPCS

## 2023-09-26 PROCEDURE — 6360000002 HC RX W HCPCS: Performed by: NEUROLOGICAL SURGERY

## 2023-09-26 PROCEDURE — 85025 COMPLETE CBC W/AUTO DIFF WBC: CPT

## 2023-09-26 PROCEDURE — 6370000000 HC RX 637 (ALT 250 FOR IP): Performed by: NEUROLOGICAL SURGERY

## 2023-09-26 PROCEDURE — 97165 OT EVAL LOW COMPLEX 30 MIN: CPT

## 2023-09-26 PROCEDURE — 2580000003 HC RX 258: Performed by: NEUROLOGICAL SURGERY

## 2023-09-26 PROCEDURE — 80048 BASIC METABOLIC PNL TOTAL CA: CPT

## 2023-09-26 PROCEDURE — 6360000002 HC RX W HCPCS: Performed by: INTERNAL MEDICINE

## 2023-09-26 PROCEDURE — 2700000000 HC OXYGEN THERAPY PER DAY

## 2023-09-26 PROCEDURE — 85027 COMPLETE CBC AUTOMATED: CPT

## 2023-09-26 PROCEDURE — 2580000003 HC RX 258: Performed by: INTERNAL MEDICINE

## 2023-09-26 PROCEDURE — 97161 PT EVAL LOW COMPLEX 20 MIN: CPT

## 2023-09-26 PROCEDURE — 82962 GLUCOSE BLOOD TEST: CPT

## 2023-09-26 PROCEDURE — 36415 COLL VENOUS BLD VENIPUNCTURE: CPT

## 2023-09-26 RX ORDER — DEXTROSE MONOHYDRATE 100 MG/ML
INJECTION, SOLUTION INTRAVENOUS CONTINUOUS PRN
Status: DISCONTINUED | OUTPATIENT
Start: 2023-09-26 | End: 2023-09-28 | Stop reason: HOSPADM

## 2023-09-26 RX ORDER — 0.9 % SODIUM CHLORIDE 0.9 %
500 INTRAVENOUS SOLUTION INTRAVENOUS ONCE
Status: COMPLETED | OUTPATIENT
Start: 2023-09-26 | End: 2023-09-26

## 2023-09-26 RX ORDER — ALBUTEROL SULFATE 2.5 MG/3ML
2.5 SOLUTION RESPIRATORY (INHALATION) EVERY 6 HOURS PRN
Status: DISCONTINUED | OUTPATIENT
Start: 2023-09-26 | End: 2023-09-28 | Stop reason: HOSPADM

## 2023-09-26 RX ADMIN — ACETAMINOPHEN 650 MG: 325 TABLET ORAL at 02:39

## 2023-09-26 RX ADMIN — OXYCODONE HYDROCHLORIDE 10 MG: 10 TABLET ORAL at 22:00

## 2023-09-26 RX ADMIN — SENNOSIDES AND DOCUSATE SODIUM 1 TABLET: 50; 8.6 TABLET ORAL at 21:59

## 2023-09-26 RX ADMIN — MUPIROCIN: 20 OINTMENT TOPICAL at 22:00

## 2023-09-26 RX ADMIN — ROSUVASTATIN 40 MG: 20 TABLET, FILM COATED ORAL at 08:48

## 2023-09-26 RX ADMIN — WATER 2000 MG: 1 INJECTION INTRAMUSCULAR; INTRAVENOUS; SUBCUTANEOUS at 18:14

## 2023-09-26 RX ADMIN — ACETAMINOPHEN 650 MG: 325 TABLET ORAL at 14:05

## 2023-09-26 RX ADMIN — OXYCODONE HYDROCHLORIDE 10 MG: 10 TABLET ORAL at 11:27

## 2023-09-26 RX ADMIN — METOPROLOL SUCCINATE 25 MG: 25 TABLET, EXTENDED RELEASE ORAL at 08:48

## 2023-09-26 RX ADMIN — DIAZEPAM 5 MG: 5 TABLET ORAL at 14:52

## 2023-09-26 RX ADMIN — ACETAMINOPHEN 650 MG: 325 TABLET ORAL at 06:27

## 2023-09-26 RX ADMIN — BISACODYL 5 MG: 5 TABLET, COATED ORAL at 08:48

## 2023-09-26 RX ADMIN — LEVOTHYROXINE SODIUM 25 MCG: 0.03 TABLET ORAL at 08:48

## 2023-09-26 RX ADMIN — CITALOPRAM HYDROBROMIDE 20 MG: 20 TABLET ORAL at 08:48

## 2023-09-26 RX ADMIN — WATER 2000 MG: 1 INJECTION INTRAMUSCULAR; INTRAVENOUS; SUBCUTANEOUS at 10:49

## 2023-09-26 RX ADMIN — DIAZEPAM 5 MG: 5 TABLET ORAL at 08:48

## 2023-09-26 RX ADMIN — SENNOSIDES AND DOCUSATE SODIUM 1 TABLET: 50; 8.6 TABLET ORAL at 08:48

## 2023-09-26 RX ADMIN — ACETAMINOPHEN 650 MG: 325 TABLET ORAL at 21:59

## 2023-09-26 RX ADMIN — SODIUM CHLORIDE, PRESERVATIVE FREE 10 ML: 5 INJECTION INTRAVENOUS at 08:52

## 2023-09-26 RX ADMIN — SODIUM CHLORIDE 500 ML: 9 INJECTION, SOLUTION INTRAVENOUS at 08:03

## 2023-09-26 RX ADMIN — METFORMIN HYDROCHLORIDE 500 MG: 500 TABLET, FILM COATED ORAL at 16:32

## 2023-09-26 RX ADMIN — ALBUTEROL SULFATE 2.5 MG: 2.5 SOLUTION RESPIRATORY (INHALATION) at 23:45

## 2023-09-26 RX ADMIN — INSULIN LISPRO 4 UNITS: 100 INJECTION, SOLUTION INTRAVENOUS; SUBCUTANEOUS at 11:21

## 2023-09-26 RX ADMIN — METFORMIN HYDROCHLORIDE 500 MG: 500 TABLET, FILM COATED ORAL at 08:48

## 2023-09-26 RX ADMIN — POLYETHYLENE GLYCOL 3350 17 G: 17 POWDER, FOR SOLUTION ORAL at 08:49

## 2023-09-26 RX ADMIN — PANTOPRAZOLE SODIUM 40 MG: 40 TABLET, DELAYED RELEASE ORAL at 08:48

## 2023-09-26 RX ADMIN — WATER 2000 MG: 1 INJECTION INTRAMUSCULAR; INTRAVENOUS; SUBCUTANEOUS at 02:39

## 2023-09-26 ASSESSMENT — PAIN - FUNCTIONAL ASSESSMENT
PAIN_FUNCTIONAL_ASSESSMENT: PREVENTS OR INTERFERES SOME ACTIVE ACTIVITIES AND ADLS
PAIN_FUNCTIONAL_ASSESSMENT: PREVENTS OR INTERFERES SOME ACTIVE ACTIVITIES AND ADLS

## 2023-09-26 ASSESSMENT — PAIN SCALES - GENERAL
PAINLEVEL_OUTOF10: 7
PAINLEVEL_OUTOF10: 6
PAINLEVEL_OUTOF10: 7
PAINLEVEL_OUTOF10: 6
PAINLEVEL_OUTOF10: 5

## 2023-09-26 ASSESSMENT — PAIN DESCRIPTION - DESCRIPTORS
DESCRIPTORS: ACHING;DISCOMFORT;DULL
DESCRIPTORS: ACHING;DISCOMFORT;DULL
DESCRIPTORS: DISCOMFORT;SORE;PRESSURE

## 2023-09-26 ASSESSMENT — PAIN DESCRIPTION - ORIENTATION
ORIENTATION: LOWER;POSTERIOR
ORIENTATION: LEFT;DISTAL
ORIENTATION: LEFT;DISTAL
ORIENTATION: LEFT;RIGHT
ORIENTATION: RIGHT;LEFT

## 2023-09-26 ASSESSMENT — PAIN DESCRIPTION - LOCATION
LOCATION: LEG
LOCATION: BACK

## 2023-09-26 ASSESSMENT — PAIN SCALES - WONG BAKER: WONGBAKER_NUMERICALRESPONSE: 0

## 2023-09-26 NOTE — CONSULTS
[address], [address] [place] [address]                                  CONSULTATION    PATIENT NAME: Dameon Ding                 :        1955  MED REC NO:   [id number]                            ROOM:       8204  ACCOUNT NO:   [de-identified]                           ADMIT DATE: 2023  PROVIDER:     Sarah Mcintosh DO    CONSULT DATE:  2023    ADMITTING PHYSICIAN:  Jagruti Washington MD    PRIMARY CARE PHYSICIAN:  Eliel Bhat DO    REASON FOR CONSULTATION:  Medical management. CHIEF COMPLAINT:  Postop lumbar fusion. HISTORY OF PRESENT ILLNESS:  The patient is a 72-year-old   female who was admitted to the hospital on 2023 after undergoing  lumbar fusion by Dr. Kuldip Suero. PAST MEDICAL HISTORY:  Arthritis; colon polyps; diabetes mellitus, on  insulin; skin cancer; hyperlipidemia; hypertension; hypothyroidism;  migraine headache; chronic warfarin therapy; neuropathy; peripheral  vascular disease; cerebrovascular disease; and psoriasis. PAST SURGICAL HISTORY:  Low back surgery, left carotid endarterectomy,  bilateral carpal tunnel surgery, , coronary artery bypass  graft, hysterectomy, laryngoscopy, bilateral iliac artery stents, sinus  surgery, bilateral ingrown toenail surgery, tongue biopsy, and  tonsillectomy. SOCIAL HISTORY:  The patient quit tobacco.  No alcohol. REVIEW OF SYSTEMS:  Remarkable for above-stated chief complaint. ALLERGIES:  None known. MEDICATIONS:  Prior to admission; Bactroban ointment, Celexa, Toujeo,  Humalog, Toprol XL, Crestor, Synthroid, metformin, warfarin, Advil,  Taltz subcu, omeprazole, and aspirin. PHYSICAL EXAMINATION:  GENERAL APPEARANCE:  Reveals a 72-year-old  female who is alert  and oriented x3, cooperative and a good historian. VITAL SIGNS:  Temperature 97.8, pulse 90, respirations 17, blood  pressure 95/49.   HEENT:

## 2023-09-26 NOTE — PROGRESS NOTES
[address]      Date:2023                                                Patient Name: Gabby Du  MRN: [id number]  : 1955  Room: 94/6766-J     Evaluating OT:Ria Flores, OTR/L   License #  [id number]       Referring Provider: Aliya Hendrickson MD    Specific Provider Orders/Date: OT evaluation & treatment        Diagnosis: Dextroscoliosis of lumbar spine [M41.86]  Stenosis, spinal, lumbar [M48.061]  Lumbar stenosis with neurogenic claudication [M48.062]      Pertinent Medical History:  has a past medical history of Age-related osteoporosis without current pathological fracture, Arthritis, Cataracts, bilateral, Colon polyps, Diabetes mellitus ([address]), DM type 2 (diabetes mellitus, type 2) ([address]), Facial basal cell cancer, Hyperlipidemia, Hypertension, Hypothyroidism, Migraines, Neuropathy, diabetic ([address]), Peripheral vascular disease ([address]), Positive colorectal cancer screening using Cologuard test, Psoriasiform dermatitis, Seasonal allergies, and Thyroid disease. Surgery: 23:   L1-S1 laminectomy and posterior fusion; L4-L5, L5-S1 posterior lumbar interbody fusion    Past Surgical History:  has a past surgical history that includes Carpal tunnel release; back surgery; Hysterectomy;  section; Leg Surgery; Tonsillectomy; sinus surgery; pr office/outpt visit,procedure only (Left, 10/11/2018); Carotid endarterectomy (Left, 10/11/2018); other surgical history (2018); Toe Surgery; laryngoscopy (N/A, 2020); Colonoscopy (N/A, 2021); Coronary artery bypass graft (N/A, 2022); Tongue Biopsy (N/A, 2023); and Lumbar spine surgery (N/A, 2023).        Precautions:  Fall Risk, neutral spine, LSO brace, O2      Assessment of current deficits    [x] Functional mobility            [x]ADLs           [x] Strength back pain, L LE & buttock  Cognition: A&O: 4/4; Follows multi- step directions              Memory:  G              Sequencing:  G              Problem solving:  G              Judgement/safety:  G                Functional Assessment:  AM-PAC Daily Activity Raw Score: 15/24    Initial Eval Status  Date: 9-26-23 Treatment Status  Date: STGs = LTGs  Time frame: 10-14 days   Feeding Ind. Mod I/ Ind   Grooming Set up seated   Modified [place]    UB Dressing SBA   Modified [place]    LB Dressing Max A with attempt figure 4 tech. Modified [place]    Bathing Mod A with sim. task   Modified [place]    Toileting NT, rader   Modified [place]    Bed Mobility  Supine to sit: NT  Sit to supine: NT  Pt. already seated, assisted by RN earlier this a.m. Requested back to chair following session. Supine to sit: Modified [place]   Sit to supine: Modified [place]    Functional Transfers Min A sit <> stand  Mod A SPT with ww   Modified [place]    Functional Mobility Mod A with ww < home distance   Modified [place]    Balance Sitting:     Static:  SBA    Dynamic:Min A  Standing: Min/Mod A       Activity Tolerance F/-   F+   Visual/  Perceptual Glasses: readers          Vitals /45  spO2 remained 93% + on 2L O2 via NC   WFL      Hand Dominance R    AROM (PROM) Strength Additional Info:    RUE  WFL WFL good  and wfl FMC/dexterity noted during ADL tasks      LUE WFL WFL good  and wfl FMC/dexterity noted during ADL tasks         Hearing: WFL   Sensation:  No c/o numbness or tingling B UE  Tone: WFL BUE  Edema: none noted B UE     Comments: Upon arrival patient seated in bedside chair, agreeable to OT, cleared by Nursing. Therapist facilitated ADL training/ Functional transfer/mobility training with focus on safety, technique & precautions. Pt. Instructed RE: safe transfers/mobility, ADLs, role of OT, treatment plan, recs. , prec.    At end of session, patient seated in

## 2023-09-26 NOTE — OP NOTE
[address], [address] [facility]                                OPERATIVE REPORT    PATIENT NAME: Haven Duval                 :        1955  MED REC NO:   [id number]                            ROOM:       8204  ACCOUNT NO:   [de-identified]                           ADMIT DATE: 2023  PROVIDER:     Alberto Hopkins MD    DATE OF PROCEDURE:  2023    PREOPERATIVE DIAGNOSES:  1. Lumbar canal stenosis from L1 to S1.  2.  Lumbar scoliosis. POSTOPERATIVE DIAGNOSES:  1. Lumbar canal stenosis from L1 to S1.  2.  Lumbar scoliosis. OPERATIVE PROCEDURE:  1.  Bilateral segmental arthrodesis and fusion from L1 to S1 with use of  bilateral L1, L2, L3, L4, L5 and S1 pedicle screws with use of locally  harvested autograft plus allograft in the form of BioZorb strips and  recombinant BMP-2 (Infuse) posterolateral fusion from L1 to S1.  2.  A 360-degree fusion with posterior lumbar interbody fusion at L4-L5  with use of bilateral Globus Sable expandable cages packed with  recombinant BMP-2 (Infuse) and 360-degree fusion at L5-S1 with  right-sided transforaminal lumbar interbody fusion at L5-S1 with use of  Globus Sable expandable cage packed with recombinant BMP-2 (Infuse). 3.  Bilateral L1, L2, L3, L4, L5, and S1 laminectomy, bilateral L1-L2,  L2-L3, L3-L4, L4-L5 and L5-S1 medial facetectomy and bilateral L1, L2,  L3, L4, L5 and S1 foraminotomy and bilateral L4-L5 diskectomy and  right-sided L5-S1 diskectomy. 4.  Use of O-arm assisted navigation with placement of pedicle screws. 5.  Use of free-running EMG to test pedicle screw. 6.  Use of intraoperative fluoroscopy interpreted by myself, the  surgeon. 7.  Use of methyl methacrylate for pedicle screw augmentation at L1 and  S1.    ANESTHESIA:  Generalized endotracheal anesthesia.     SURGEON:  Alberto Hopkins MD    ASSISTANT:  Kenia Kirkpatrick DO    COMPLICATIONS: I  completed placement of pedicle screws, I performed another O-arm spin  that showed that screws were within the pedicles. I then used a  handheld stimulator to test pedicle screw heads. There was no evidence  of pedicle breach. I then subsequently proceeded to then inject methyl  methacrylate into the bilateral L1 and S1 pedicle screws. After this  was done, I then replaced self-retaining angled cerebellar retractors  into the wound. I used a Leksell rongeur to bite up the spinous  processes at L1, L2, L3, L4, L5 and S1. I used a high-speed dottie to  then thin out the lamina bilaterally at L1, L2, L3, L4, L5 and S1. After this was done, I then used #4 Kerrison punch to start my central  decompression. I performed bilateral L1, L2, L3, L4, L5 and S1  laminectomy. After this was completed, I then used a #3 Kerrison punch  to perform bilateral L1-L2, L2-L3, L3-L4, L4-L5 and L5-S1 medial  facetectomy and bilateral L1, L2, L3, L4, L5 and S1 foraminotomy. After  this was done, I identified the L4-L5 disk space on the left, retracted  thecal sac medially, performed an annulotomy with an 11-blade. I  removed disk material with pituitary rongeurs and curettes. I then  inserted #8 and #10 sulaiman that rotated 360 degrees. After this done,  I then proceeded to then insert #9 Globus Sable expandable cage packed  with recombinant BMP-2 (Infuse). I then went over the patient's right  side, identified the L4-L5 disk space, performed an annulotomy with an  11-blade. I removed disk material with pituitary rongeurs and curettes. I prepared both the superior and inferior endplates. After this was  done, I inserted #8 and #10 sulaiman, rotated 360 degrees. After this  was done, I inserted #9 Globus Sable expandable cage packed with  recombinant BMP-2 (Infuse). Next, I then identified the L5-S1 disk  space on the patient's right side, retracted thecal sac medially,  performed an annulotomy with an 11-blade.   I

## 2023-09-26 NOTE — CARE COORDINATION
09/26/23 Transition of Care: Patient is observation POD 1 Lumbar fusion. Drain remains intact with high outpatient of 630cc. Molina to be out. She is getting iv ancef/iv flds. BP low and iv flds given with improvement. She is on 3lnc as at home. She resides at home with her  in a ranch home. She has 3 steps and rails to enter the home. She does drive and is independent. She has a rollator at home and a cane. She follows with Dr Humera Montero and her pharmacy [facility] not have a preference. Referral called to Anju at [facility] to follow for discharge and orders are in chart. Referral called to [facility] for wheeled walker for discharge. Electronically signed by Darlynn Hatchet, RN  on 9/26/2023 at 10:36 AM        Case Management Assessment  Initial Evaluation    Date/Time of Evaluation: 9/26/2023 10:45 AM  Assessment Completed by: Darlynn Hatchet, RN    If patient is discharged prior to next notation, then this note serves as note for discharge by case management. Patient Name: Kristi Kidd                   YOB: 1955  Diagnosis: Dextroscoliosis of lumbar spine [M41.86]  Stenosis, spinal, lumbar [M48.061]  Lumbar stenosis with neurogenic claudication [M48.062]                   Date / Time: 9/25/2023  5:14 AM    Patient Admission Status: Observation   Readmission Risk (Low < 19, Mod (19-27), High > 27): Readmission Risk Score: 13.5    Current PCP: Efra Rosario, DO  PCP verified by ? Chart Reviewed: Yes      History Provided by:    Patient Orientation:      Patient Cognition:      Hospitalization in the last 30 days (Readmission):  No    If yes, Readmission Assessment in  Navigator will be completed.     Advance Directives:      Code Status: Full Code   Patient's Primary Decision Maker is:      Primary Decision Maker: YoungYvette gutierrezy - Spouse - [phone]    Secondary Decision Maker: Laina Parra - patient's individualized plan of care/goals and shares the quality data associated with the providers was provided to:  patient     The Patient and/or Patient Representative Agree with the Discharge Plan?   Yes    Cony Guzman RN  Case Management Department  Ph: [phone] Fax:

## 2023-09-26 NOTE — PROGRESS NOTES
Physical Therapy Initial Evaluation    Name: Haven Duval  : 1955  MRN: [id number]      Date of Service: 2023    Evaluating PT:  Montrell Solorio PT [id number]    Referring provider/PT Order:  PT Eval and Treat   23 1315  PT eval and treat        Alberto Hopkins MD     Room #:  3618/14-I  Diagnosis:  Dextroscoliosis of lumbar spine [M41.86]  Stenosis, spinal, lumbar [M48.061]  Lumbar stenosis with neurogenic claudication [M48.062]  PMHx/PSHx:     has a past medical history of Age-related osteoporosis without current pathological fracture, Arthritis, Cataracts, bilateral, Colon polyps, Diabetes mellitus ([address]), DM type 2 (diabetes mellitus, type 2) ([address]), Facial basal cell cancer, Hyperlipidemia, Hypertension, Hypothyroidism, Migraines, Neuropathy, diabetic ([address]), Peripheral vascular disease ([address]), Positive colorectal cancer screening using Cologuard test, Psoriasiform dermatitis, Seasonal allergies, and Thyroid disease. has a past surgical history that includes Carpal tunnel release; back surgery; Hysterectomy;  section; Leg Surgery; Tonsillectomy; sinus surgery; pr office/outpt visit,procedure only (Left, 10/11/2018); Carotid endarterectomy (Left, 10/11/2018); other surgical history (2018); Toe Surgery; laryngoscopy (N/A, 2020); Colonoscopy (N/A, 2021); Coronary artery bypass graft (N/A, 2022); Tongue Biopsy (N/A, 2023); and Lumbar spine surgery (N/A, 2023). Procedure/Surgery:  23 L4-L5, L5-S1 posterior lumbar interbody fusion  Precautions:  Falls,  FWB (full weight bearing) , Hemovac drain and LSO on when greater than 45degrees (elective)  Equipment Needs: None anticipated,    SUBJECTIVE:    Patient lives with spouse  in a ranch home  with 3 steps to enter without Rail  Bed is on 1 floor and bath is on 1 floor. Patient ambulated independently  PTA.  Equipment owned: Wilson Pak and Ac Carmichael,      OBJECTIVE:   Initial Evaluation  Date: 23 negotiation training, and Review spinal precautions  Current Treatment Recommendations:     [x] Strengthening to improve independence with functional mobility   [x] ROM to improve independence with functional mobility   [x] Balance Training to improve static/dynamic balance and to reduce fall risk  [x] Endurance Training to improve activity tolerance during functional mobility   [x] Transfer Training to improve safety and independence with all functional transfers   [x] Gait Training to improve gait mechanics, endurance and asses need for appropriate assistive device  [x] Stair Training in preparation for safe discharge home and/or into the community when appropriate  [] Positioning to prevent skin breakdown and contractures  [x] Safety and Education Training   [] Patient/Caregiver Education   [] HEP  [] Gait Team to be added to POC  [] Other     PT long term treatment goals are located in above grid    Frequency of treatments: 2-5x/week x 1-2 weeks. Time in  0755  Time out  0820    Total Treatment Time  10 minutes     Evaluation Time includes thorough review of current medical information, gathering information on past medical history/social history and prior level of function, completion of standardized testing/informal observation of tasks, assessment of data and education on plan of care and goals.     CPT codes:  [x] Low Complexity PT evaluation 49962  [] Moderate Complexity PT evaluation 23100  [] High Complexity PT evaluation 87091  [] PT Re-evaluation 04111  [] Gait training 28534 - minutes  [] Manual therapy 60322  minutes  [x] Therapeutic activities 51106 -10 minutes  [] Therapeutic exercises 12885 - minutes  [] Neuromuscular reeducation [address] yusuf Orr, [address] [place] [address]

## 2023-09-26 NOTE — HOME CARE
[address] [facility] referral received, awaiting final orders. Spoke with patient and verified demographics. Will follow. Radha Vazquez  [address] [facility].

## 2023-09-26 NOTE — PROGRESS NOTES
4 Eyes Skin Assessment     NAME:  Tam Pacheco  YOB: 1955  MEDICAL RECORD NUMBER:  [id number]    The patient is being assessed for  Admission    I agree that at least one RN has performed a thorough Head to Toe Skin Assessment on the patient. ALL assessment sites listed below have been assessed. Areas assessed by both nurses:    Head, Face, Ears, Shoulders, Back, Chest, Arms, Elbows, Hands, Sacrum. Buttock, Coccyx, Ischium, Legs. Feet and Heels, and Under Medical Devices         Does the Patient have a Wound?  No noted wound(s)  Surgical incision to lower lumbar        Devon Prevention initiated by RN: Yes  Wound Care Orders initiated by RN: No    Pressure Injury (Stage 3,4, Unstageable, DTI, NWPT, and Complex wounds) if present, place Wound referral order by RN under : No    New Ostomies, if present place, Ostomy referral order under : No     Nurse 1 eSignature: Electronically signed by Cheryl Amaya RN on 9/26/23 at 7:30 AM EDT    **SHARE this note so that the co-signing nurse can place an eSignature**    Nurse 2 eSignature: Electronically signed by Martha Phelps RN on 9/26/23 at 3:47 PM EDT

## 2023-09-26 NOTE — CARE COORDINATION
Spoke with patient at the bedside and discussed ARU. Patient stated that right now, she would like to have home therapy. We agreed that I will come back tomorrow and speak to her and review updated therapies for tomorrow. Patient lives with  in a ranch home with 3 steps to enter and ambulated independently PTA. Will discuss with Dr. Keenna Nearing and continue to follow.

## 2023-09-26 NOTE — PROGRESS NOTES
Called Yuli Beatty PA-C to report the output of drain being approx 630mL overnight. Repeat labs to be performed.

## 2023-09-27 ENCOUNTER — APPOINTMENT (OUTPATIENT)
Dept: CT IMAGING | Age: 68
DRG: 453 | End: 2023-09-27
Attending: NEUROLOGICAL SURGERY
Payer: MEDICARE

## 2023-09-27 PROBLEM — M48.061 LUMBAR FORAMINAL STENOSIS: Status: ACTIVE | Noted: 2023-09-27

## 2023-09-27 LAB
ANION GAP SERPL CALCULATED.3IONS-SCNC: 7 MMOL/L (ref 7–16)
BUN SERPL-MCNC: 9 MG/DL (ref 6–23)
CALCIUM SERPL-MCNC: 9.1 MG/DL (ref 8.6–10.2)
CHLORIDE SERPL-SCNC: 100 MMOL/L (ref 98–107)
CO2 SERPL-SCNC: 24 MMOL/L (ref 22–29)
CREAT SERPL-MCNC: 0.4 MG/DL (ref 0.5–1)
ERYTHROCYTE [DISTWIDTH] IN BLOOD BY AUTOMATED COUNT: 15.1 % (ref 11.5–15)
GFR SERPL CREATININE-BSD FRML MDRD: >60 ML/MIN/1.73M2
GLUCOSE BLD-MCNC: 190 MG/DL (ref 74–99)
GLUCOSE BLD-MCNC: 203 MG/DL (ref 74–99)
GLUCOSE BLD-MCNC: 245 MG/DL (ref 74–99)
GLUCOSE BLD-MCNC: 257 MG/DL (ref 74–99)
GLUCOSE SERPL-MCNC: 180 MG/DL (ref 74–99)
HCT VFR BLD AUTO: 26.1 % (ref 34–48)
HGB BLD-MCNC: 8.3 G/DL (ref 11.5–15.5)
MCH RBC QN AUTO: 29.3 PG (ref 26–35)
MCHC RBC AUTO-ENTMCNC: 31.8 G/DL (ref 32–34.5)
MCV RBC AUTO: 92.2 FL (ref 80–99.9)
PLATELET # BLD AUTO: 137 K/UL (ref 130–450)
PMV BLD AUTO: 10.3 FL (ref 7–12)
POTASSIUM SERPL-SCNC: 4 MMOL/L (ref 3.5–5)
RBC # BLD AUTO: 2.83 M/UL (ref 3.5–5.5)
SODIUM SERPL-SCNC: 131 MMOL/L (ref 132–146)
SURGICAL PATHOLOGY REPORT: NORMAL
WBC OTHER # BLD: 8.2 K/UL (ref 4.5–11.5)

## 2023-09-27 PROCEDURE — 2580000003 HC RX 258: Performed by: INTERNAL MEDICINE

## 2023-09-27 PROCEDURE — 96360 HYDRATION IV INFUSION INIT: CPT

## 2023-09-27 PROCEDURE — 97530 THERAPEUTIC ACTIVITIES: CPT

## 2023-09-27 PROCEDURE — 2700000000 HC OXYGEN THERAPY PER DAY

## 2023-09-27 PROCEDURE — 6370000000 HC RX 637 (ALT 250 FOR IP): Performed by: NURSE PRACTITIONER

## 2023-09-27 PROCEDURE — 94640 AIRWAY INHALATION TREATMENT: CPT

## 2023-09-27 PROCEDURE — 6370000000 HC RX 637 (ALT 250 FOR IP): Performed by: NEUROLOGICAL SURGERY

## 2023-09-27 PROCEDURE — 6360000002 HC RX W HCPCS: Performed by: NEUROLOGICAL SURGERY

## 2023-09-27 PROCEDURE — 96361 HYDRATE IV INFUSION ADD-ON: CPT

## 2023-09-27 PROCEDURE — 80048 BASIC METABOLIC PNL TOTAL CA: CPT

## 2023-09-27 PROCEDURE — 6360000004 HC RX CONTRAST MEDICATION: Performed by: RADIOLOGY

## 2023-09-27 PROCEDURE — 85027 COMPLETE CBC AUTOMATED: CPT

## 2023-09-27 PROCEDURE — 1200000000 HC SEMI PRIVATE

## 2023-09-27 PROCEDURE — 36415 COLL VENOUS BLD VENIPUNCTURE: CPT

## 2023-09-27 PROCEDURE — 71275 CT ANGIOGRAPHY CHEST: CPT

## 2023-09-27 PROCEDURE — 2580000003 HC RX 258: Performed by: NEUROLOGICAL SURGERY

## 2023-09-27 PROCEDURE — 82962 GLUCOSE BLOOD TEST: CPT

## 2023-09-27 PROCEDURE — 97535 SELF CARE MNGMENT TRAINING: CPT

## 2023-09-27 RX ORDER — IPRATROPIUM BROMIDE AND ALBUTEROL SULFATE 2.5; .5 MG/3ML; MG/3ML
1 SOLUTION RESPIRATORY (INHALATION)
Status: DISCONTINUED | OUTPATIENT
Start: 2023-09-27 | End: 2023-09-28 | Stop reason: HOSPADM

## 2023-09-27 RX ORDER — ALBUTEROL SULFATE 90 UG/1
2 AEROSOL, METERED RESPIRATORY (INHALATION) EVERY 6 HOURS PRN
Qty: 54 G | Refills: 1 | Status: SHIPPED | OUTPATIENT
Start: 2023-09-27

## 2023-09-27 RX ORDER — SODIUM CHLORIDE 0.9 % (FLUSH) 0.9 %
10 SYRINGE (ML) INJECTION PRN
Status: DISCONTINUED | OUTPATIENT
Start: 2023-09-27 | End: 2023-09-28 | Stop reason: HOSPADM

## 2023-09-27 RX ADMIN — OXYCODONE HYDROCHLORIDE 5 MG: 5 TABLET ORAL at 22:57

## 2023-09-27 RX ADMIN — POLYETHYLENE GLYCOL 3350 17 G: 17 POWDER, FOR SOLUTION ORAL at 09:51

## 2023-09-27 RX ADMIN — ROSUVASTATIN 40 MG: 20 TABLET, FILM COATED ORAL at 09:52

## 2023-09-27 RX ADMIN — METFORMIN HYDROCHLORIDE 500 MG: 500 TABLET, FILM COATED ORAL at 09:52

## 2023-09-27 RX ADMIN — MUPIROCIN: 20 OINTMENT TOPICAL at 16:06

## 2023-09-27 RX ADMIN — WATER 2000 MG: 1 INJECTION INTRAMUSCULAR; INTRAVENOUS; SUBCUTANEOUS at 18:14

## 2023-09-27 RX ADMIN — IPRATROPIUM BROMIDE AND ALBUTEROL SULFATE 1 DOSE: .5; 3 SOLUTION RESPIRATORY (INHALATION) at 17:11

## 2023-09-27 RX ADMIN — BISACODYL 5 MG: 5 TABLET, COATED ORAL at 09:52

## 2023-09-27 RX ADMIN — CITALOPRAM HYDROBROMIDE 20 MG: 20 TABLET ORAL at 09:52

## 2023-09-27 RX ADMIN — SENNOSIDES AND DOCUSATE SODIUM 1 TABLET: 50; 8.6 TABLET ORAL at 09:51

## 2023-09-27 RX ADMIN — MUPIROCIN: 20 OINTMENT TOPICAL at 19:37

## 2023-09-27 RX ADMIN — INSULIN LISPRO 4 UNITS: 100 INJECTION, SOLUTION INTRAVENOUS; SUBCUTANEOUS at 11:22

## 2023-09-27 RX ADMIN — LEVOTHYROXINE SODIUM 25 MCG: 0.03 TABLET ORAL at 09:52

## 2023-09-27 RX ADMIN — IOPAMIDOL 70 ML: 755 INJECTION, SOLUTION INTRAVENOUS at 01:13

## 2023-09-27 RX ADMIN — METOPROLOL SUCCINATE 25 MG: 25 TABLET, EXTENDED RELEASE ORAL at 09:51

## 2023-09-27 RX ADMIN — ACETAMINOPHEN 650 MG: 325 TABLET ORAL at 01:33

## 2023-09-27 RX ADMIN — PANTOPRAZOLE SODIUM 40 MG: 40 TABLET, DELAYED RELEASE ORAL at 09:51

## 2023-09-27 RX ADMIN — IPRATROPIUM BROMIDE AND ALBUTEROL SULFATE 1 DOSE: .5; 3 SOLUTION RESPIRATORY (INHALATION) at 12:51

## 2023-09-27 RX ADMIN — OXYCODONE HYDROCHLORIDE 5 MG: 5 TABLET ORAL at 16:14

## 2023-09-27 RX ADMIN — ACETAMINOPHEN 650 MG: 325 TABLET ORAL at 18:13

## 2023-09-27 RX ADMIN — INSULIN LISPRO 2 UNITS: 100 INJECTION, SOLUTION INTRAVENOUS; SUBCUTANEOUS at 09:52

## 2023-09-27 RX ADMIN — ACETAMINOPHEN 650 MG: 325 TABLET ORAL at 13:51

## 2023-09-27 RX ADMIN — SODIUM CHLORIDE, PRESERVATIVE FREE 10 ML: 5 INJECTION INTRAVENOUS at 19:38

## 2023-09-27 RX ADMIN — METFORMIN HYDROCHLORIDE 500 MG: 500 TABLET, FILM COATED ORAL at 16:06

## 2023-09-27 RX ADMIN — SODIUM CHLORIDE: 9 INJECTION, SOLUTION INTRAVENOUS at 01:36

## 2023-09-27 RX ADMIN — ACETAMINOPHEN 650 MG: 325 TABLET ORAL at 06:02

## 2023-09-27 RX ADMIN — IPRATROPIUM BROMIDE AND ALBUTEROL SULFATE 1 DOSE: .5; 3 SOLUTION RESPIRATORY (INHALATION) at 20:37

## 2023-09-27 RX ADMIN — OXYCODONE HYDROCHLORIDE 10 MG: 10 TABLET ORAL at 10:06

## 2023-09-27 RX ADMIN — WATER 2000 MG: 1 INJECTION INTRAMUSCULAR; INTRAVENOUS; SUBCUTANEOUS at 01:33

## 2023-09-27 RX ADMIN — WATER 2000 MG: 1 INJECTION INTRAMUSCULAR; INTRAVENOUS; SUBCUTANEOUS at 10:00

## 2023-09-27 RX ADMIN — SODIUM CHLORIDE, PRESERVATIVE FREE 10 ML: 5 INJECTION INTRAVENOUS at 09:53

## 2023-09-27 ASSESSMENT — PAIN SCALES - GENERAL
PAINLEVEL_OUTOF10: 4
PAINLEVEL_OUTOF10: 3
PAINLEVEL_OUTOF10: 7
PAINLEVEL_OUTOF10: 4
PAINLEVEL_OUTOF10: 6
PAINLEVEL_OUTOF10: 6
PAINLEVEL_OUTOF10: 7
PAINLEVEL_OUTOF10: 6

## 2023-09-27 ASSESSMENT — PAIN DESCRIPTION - LOCATION
LOCATION: BACK
LOCATION: BACK
LOCATION: LEG
LOCATION: LEG
LOCATION: BACK
LOCATION: BACK

## 2023-09-27 ASSESSMENT — PAIN DESCRIPTION - ORIENTATION
ORIENTATION: LOWER;POSTERIOR
ORIENTATION: POSTERIOR
ORIENTATION: POSTERIOR
ORIENTATION: LEFT;DISTAL
ORIENTATION: LEFT;DISTAL

## 2023-09-27 ASSESSMENT — PAIN DESCRIPTION - DESCRIPTORS
DESCRIPTORS: ACHING;DISCOMFORT;SHARP;SORE
DESCRIPTORS: ACHING;DISCOMFORT;SHARP;SORE
DESCRIPTORS: ACHING

## 2023-09-27 ASSESSMENT — PAIN - FUNCTIONAL ASSESSMENT
PAIN_FUNCTIONAL_ASSESSMENT: PREVENTS OR INTERFERES SOME ACTIVE ACTIVITIES AND ADLS

## 2023-09-27 NOTE — PROGRESS NOTES
I contacted Dr. Joseline Murphy and notified him of patient spo2 being at 87% while on 3L. I raised it to 6L and patient is at 96%. Per doctors orders patient is to receive Albuterol aerosol q6h prn.

## 2023-09-27 NOTE — CARE COORDINATION
09/27/23 Update CM Note: Patient upgraded to inpatient due to hypoxia. She is POD 2 PLIF. Drain remains intact. IV ancef/iv flds continues. Patient with drop in oxygen sat last pm with activity to 87%. Placed on 3lnc and up to [address]. She is currently weaned back to [facility] with sat 97%. SMI is being used by patient. She is alert and oriented. She was started on aerosols and ezpap. She has a pending pulmonary consult due to findings on CT chest. Her plan remains to home and she is being followed by [facility] for discharge. Will follow for home going needs. Patient will need oxygen parameters completed. Electronically signed by Pily Chapa RN CM on 9/27/2023 at 9:40 AM      Case Management Assessment  Initial Evaluation    Date/Time of Evaluation: 9/27/2023 9:44 AM  Assessment Completed by: Pily Chapa RN    If patient is discharged prior to next notation, then this note serves as note for discharge by case management. Patient Name: Dami Tomlinson                   YOB: 1955  Diagnosis: Dextroscoliosis of lumbar spine [M41.86]  Stenosis, spinal, lumbar [M48.061]  Lumbar stenosis with neurogenic claudication [M48.062]  Lumbar foraminal stenosis [M48.061]                   Date / Time: 9/25/2023  5:14 AM    Patient Admission Status: Inpatient   Readmission Risk (Low < 19, Mod (19-27), High > 27): Readmission Risk Score: 13.5    Current PCP: Ellene Olszewski, DO  PCP verified by CM? Yes    Chart Reviewed: Yes      History Provided by: Patient  Patient Orientation: Alert and Oriented    Patient Cognition: Alert    Hospitalization in the last 30 days (Readmission):  No    If yes, Readmission Assessment in CM Navigator will be completed.     Advance Directives:      Code Status: Full Code   Patient's Primary Decision Maker is: Legal Next of Kin    Primary Decision Maker: Rigoberto Pelletier - Spouse - [phone]    Secondary Decision Maker: Bora Kingston Child - [phone]    Discharge choice of provider and agrees with the discharge plan. Freedom of choice list with basic dialogue that supports the patient's individualized plan of care/goals and shares the quality data associated with the providers was provided to: Patient        The Patient and/or Patient Representative Agree with the Discharge Plan?  Yes     Aundrea Agarwal RN  Case Management Department  Ph: [phone] Fax:

## 2023-09-27 NOTE — PROGRESS NOTES
[x]Cognition    [x] Functional transfers          [x] IADLs         [x] Safety Awareness   [x]Endurance    [x] Fine Coordination                         [x] Balance      [] Vision/perception   [x]Sensation      []Gross Motor Coordination             [] ROM           [] Delirium                   [] Motor Control      OT PLAN OF CARE   OT POC based on physician orders, patient diagnosis and results of clinical assessment     Frequency/Duration: 2-4 days/wk for 2 weeks PRN   Specific OT Treatment Interventions to include: Instruction/training on adapted ADL techniques and AE recommendations to increase functional independence within precautions  Training on energy conservation strategies, correct breathing pattern and techniques to improve independence/tolerance for self-care routine  Functional transfer/mobility training/DME recommendations for increased independence, safety, and fall prevention  Patient/Family education to increase follow through with safety techniques and functional independence  Recommendation of environmental modifications for increased safety with functional transfers/mobility and ADLs  Therapeutic exercise to improve motor endurance, ROM, and functional strength for ADLs/functional transfers  Therapeutic activities to facilitate/challenge dynamic balance, stand tolerance for increased safety and independence with ADLs  Therapeutic activities to facilitate gross/fine motor skills for increased independence with ADLs  Positioning to improve skin integrity, interaction with environment and functional independence     Recommended Adaptive Equipment:  pt owns reacher & LH sponge , no needs at this time      Home Living: Pt lives with spouse in a 1 story with 3 steps to enter with no HR. B&B on main level. Bathroom setup: tub/shower, std. commode   Equipment owned: ww, spc, tub seat     Prior Level of Function: Ind. with ADLs , Ind. with IADLs; ambulated no A.D.   Driving: active  Occupation: enjoys watching dewayne     Pain Level: reports 5/10; low back pain    Cognition: A&O: 4/4; Follows multi- step directions, pleasant and cooperative              Memory:  G              Sequencing:  G              Problem solving:  G              Judgement/safety:  G                Functional Assessment:  AM-PAC Daily Activity Raw Score: 18/24    Initial Eval Status  Date: 9-26-23 Treatment Status  Date:9/27/23 STGs = LTGs  Time frame: 10-14 days   Feeding Ind. Ind. Mod I/ Ind   Grooming Set up seated Set up  Seated in chair at sink to wash face and hands Modified Amelia    UB Dressing SBA  SBA   To doff/efren gown seated in chair  Min A   Don LSO brace seated in chair  Modified Amelia    LB Dressing Max A with attempt figure 4 tech. Min A  Bring leg up while seated to manage socks     Modified Amelia    Bathing Mod A with sim. task Mod A  Seated in chair to sponge bathe, wash UB, assistance for LB, Performed anterior and posterior beatrice care standing, 1 slight LOB CGA to recover    Modified Amelia    Toileting NT, dior Min A  simulated due to lines & drain    Modified Amelia    Bed Mobility  Supine to sit: NT  Sit to supine: NT  Pt. already seated, assisted by RN earlier this a.m. Requested back to chair following session.  Supine to sit: Min A  Supine to sit: Modified Amelia   Sit to supine: Modified Amelia    Functional Transfers Min A sit <> stand  Mod A SPT with ww Min A sit <> stand  Min A SPT with ww , v/c for walker management in small spaces Modified Amelia    Functional Mobility Mod A with ww < home distance Min/CGA  Using ww, short household distance, assist needed with O2 line management   Modified Amelia    Balance Sitting:     Static:  SBA    Dynamic:Min A  Standing: Min/Mod A Sitting:     Static:  SBA    Dynamic:SBA  Standing: Min A      Activity Tolerance F/- Fair   Perform ADL seated in chair for energy conservation F+   Visual/  Perceptual

## 2023-09-27 NOTE — CONSULTS
Noreen Osei M.D.,[facility], PRAVIN., F.SUKUMAR.AMY.O.BLANKA., Greg Lyles M.D. Shadi Garrett M.D. Lara Starr D.O. Patient:  Mayo Dandy 80 y.o. female MRN: [id number]     Date of Service: 9/27/2023      PULMONARY CONSULTATION    Reason for Consultation: Abnormal CT chest, acute respiratory failure with hypoxia  Referring Physician: Dr. Buddy Lucio DO    Communication with the referring physician will be sent via the electronic medical record. Chief Complaint: Back pain    CODE STATUS: Full    SUBJECTIVE:  HPI:  Mayo Dandy is a 80 y.o. female who we are asked to evaluate for abnormal CT chest and acute respiratory failure with hypoxia. She has a past medical history significant for osteoporosis, arthritis, chronic back pain, diabetes mellitus type 2, colon polyps, hypertension, scoliosis, hyperlipidemia, hypothyroidism, CAD with history of CABG, carotid stenosis requiring endarterectomy, peripheral vascular disease, seasonal allergies, bilateral cataracts, prior back surgery, lumbar spine surgery requiring laminectomy and fusion of L4 and L5, S1, prior nicotine dependence 20 pack years quit approximately 4 years ago, diabetic neuropathy, occasional migraine headaches, basal cell cancer on face. She has known patient to our service followed by Dr. Romel Dexter for pulmonary nodules last seen in office February 2023. She has stage I mild COPD by Gold classification. Last PFTs November 2022 completed for preop clearance prior to CABG. Her pulmonary regimen includes Stiolto. She was previously given samples through our office but does not take any inhalers regularly. Prior CT chest March 2023 showed stability of pulmonary nodules within left upper lobe and posterior right lower lobe. Routine 6-month follow-up August 30, 2023 reviewed showing stability of all nodules. .    Lab testing: Sodium 131, potassium 4.0, BUN 9, creatinine 0.4, WBCs 8.2, hemoglobin 8.3. you for allowing me to participate in the care of [facility]. Please feel free to call with questions. This plan of care was reviewed in collaboration with Dr. Juan Antonio Muñoz    Electronically signed by INDIRA Hull CNP on 9/27/2023 at 8:43 AM      Note: This report was completed utilizing computer voice recognition software. Every effort has been made to ensure accuracy, however; inadvertent computerized transcription errors may be present    I personally saw, examined, and cared for the patient. I spoke with bedside nursing, therapists and consultants. The case was discussed in detail and plans for care were established. Review of CNP documentation was conducted and revisions were made as appropriate. I agree with the above documented exam, problem list and plan of care with the following additions: We are asked to see Ms. Pelletier for post operative respiratory insufficiency with hypoxia after lumbar surgery. This is likely d/t atelectasis and hypoventilation due administration of opiates and valium. Recommend limiting these as able, providing her with incentive spirometry and EZPAP. We did take her to 2L in the room and her oxygenation was stable. In terms of the AGUSTIN nodule, this appears slightly larger in comparison to prior films. I would recommend outpatient PET and nodify xl2 blood work. She will follow up with Dr. Anand Brown as scheduled in early November.     Ish Jc MD

## 2023-09-27 NOTE — PROGRESS NOTES
Physician Progress Note      Jhonny Madrigal  [facility] #:                  [id number]  :                       1955  ADMIT DATE:       2023 5:14 AM  DISCH DATE:  RESPONDING  PROVIDER #: Fran Brownlee DO          QUERY TEXT:    Noted documentation of acute respiratory failure in 23 progress note. In   order to support the diagnosis of acute respiratory failure, please include   additional clinical indicators in your documentation. Or please document if   the diagnosis of acute respiratory failure has been ruled out after further   study. The medical record reflects the following:  Risk Factors: post op  Clinical Indicators: 23 note \"pt with hypoxia overnite cta chest abnl\",   \"Acute hypoxic resp failure\". No documented Distress. Nursing noting unlabored   regular breathing. Treatment: On oxygen ranging from 3L-6L, currently on 3L NC. Thanks,  Clem Langley, RN, BSN, CRCR, Clinical Documentation Improvement    Acute Respiratory Failure Clinical Indicators per 3M MS-DRG Training Guide and   Quick Reference Guide:  pCO2 > 50 and pH < 7.35  P/F ratio (pO2 / FIO2) < 300  pO2 decrease or pCO2 increase by 10 mmHg from baseline (if known)  Supplemental oxygen of 40% or more  Presence of respiratory distress, tachypnea, dyspnea, shortness of breath,   wheezing  Unable to speak in complete sentences  Use of accessory muscles to breathe  Extreme anxiety and feeling of impending doom  Tripod position  Confusion/altered mental status/obtunded  Options provided:  -- Acute Respiratory Failure as evidenced by, Please document evidence.   -- Acute Respiratory Failure ruled out, patient has Acute Pulmonary   Insufficiency  -- Acute Respiratory Failure has been ruled out after study  -- Other - I will add my own diagnosis  -- Disagree - Not applicable / Not valid  -- Disagree - Clinically unable to determine / Unknown  -- Refer to Clinical Documentation Reviewer    PROVIDER

## 2023-09-27 NOTE — PROGRESS NOTES
Physical Therapy Initial Evaluation    Name: Victoriano Self  : 1955  MRN: [id number]      Date of Service: 2023    Evaluating PT:  Heather Orr, PT [id number]    Referring provider/PT Order:  PT Eval and Treat   23 1315  PT eval and treat        Thaddeus Hernandez MD     Room #:  8220/8415-X  Diagnosis:  Dextroscoliosis of lumbar spine [M41.86]  Stenosis, spinal, lumbar [M48.061]  Lumbar stenosis with neurogenic claudication [M48.062]  Lumbar foraminal stenosis [M48.061]  PMHx/PSHx:     has a past medical history of Age-related osteoporosis without current pathological fracture, Arthritis, Cataracts, bilateral, Colon polyps, Diabetes mellitus ([address]), DM type 2 (diabetes mellitus, type 2) ([address]), Facial basal cell cancer, Hyperlipidemia, Hypertension, Hypothyroidism, Migraines, Neuropathy, diabetic ([address]), Peripheral vascular disease ([address]), Positive colorectal cancer screening using Cologuard test, Psoriasiform dermatitis, Seasonal allergies, and Thyroid disease. has a past surgical history that includes Carpal tunnel release; back surgery; Hysterectomy;  section; Leg Surgery; Tonsillectomy; sinus surgery; pr office/outpt visit,procedure only (Left, 10/11/2018); Carotid endarterectomy (Left, 10/11/2018); other surgical history (2018); Toe Surgery; laryngoscopy (N/A, 2020); Colonoscopy (N/A, 2021); Coronary artery bypass graft (N/A, 2022); Tongue Biopsy (N/A, 2023); and Lumbar spine surgery (N/A, 2023). Procedure/Surgery:  23 L4-L5, L5-S1 posterior lumbar interbody fusion  Precautions:  Falls,  FWB (full weight bearing) , Hemovac drain and LSO on when greater than 45degrees (elective)  Equipment Needs: None anticipated,    SUBJECTIVE:    Patient lives with spouse  in a ranch home  with 3 steps to enter without Rail  Bed is on 1 floor and bath is on 1 floor. Patient ambulated independently  PTA.  Equipment owned: Cane and [address],      OBJECTIVE:

## 2023-09-27 NOTE — CARE COORDINATION
Spoke with patient at the bedside who would like to come to ARU for therapy. Patient's drain comes out tomorrow. Will need updated therapies. CM on unit aware.

## 2023-09-27 NOTE — PROGRESS NOTES
Hospital Medicine    Subjective:  pt with hypoxia overnite cta chest abnl      Current Facility-Administered Medications:     sodium chloride flush 0.9 % injection 10 mL, 10 mL, IntraVENous, PRN, Teja Flores DO    glucose chewable tablet 16 g, 4 tablet, Oral, PRN, Jamie Lynn, DO    dextrose bolus 10% 125 mL, 125 mL, IntraVENous, PRN **OR** dextrose bolus 10% 250 mL, 250 mL, IntraVENous, PRN, Jamie Lynn, DO    glucagon injection 1 mg, 1 mg, SubCUTAneous, PRN, Jamie Lynn, DO    dextrose 10 % infusion, , IntraVENous, Continuous PRN, Jamie Lynn, DO    albuterol (PROVENTIL) (2.5 MG/3ML) 0.083% nebulizer solution 2.5 mg, 2.5 mg, Nebulization, Q6H PRN, Jamie Lynn, , 2.5 mg at 09/26/23 2345    lactated ringers IV soln infusion, , IntraVENous, Continuous, Ele Auguste MD, Stopped at 09/25/23 1322    citalopram (CELEXA) tablet 20 mg, 20 mg, Oral, Daily, Jagruti Washington MD, 20 mg at 09/26/23 0848    levothyroxine (SYNTHROID) tablet 25 mcg, 25 mcg, Oral, Daily, Jagruti Washington MD, 25 mcg at 09/26/23 0848    metFORMIN (GLUCOPHAGE) tablet 500 mg, 500 mg, Oral, BID WC, Jagruti Washington MD, 500 mg at 09/26/23 1632    metoprolol succinate (TOPROL XL) extended release tablet 25 mg, 25 mg, Oral, Daily, Jagruti Washington MD, 25 mg at 09/26/23 0848    mupirocin (BACTROBAN) 2 % ointment, , Each Nostril, BID, Jagruti Washington MD, Given at 09/26/23 2200    pantoprazole (PROTONIX) tablet 40 mg, 40 mg, Oral, Daily, Jagruti Washington MD, 40 mg at 09/26/23 0848    rosuvastatin (CRESTOR) tablet 40 mg, 40 mg, Oral, Daily, Jagruti Washington MD, 40 mg at 09/26/23 0848    sodium chloride flush 0.9 % injection 5-40 mL, 5-40 mL, IntraVENous, 2 times per day, Jagruti Washington MD, 10 mL at 09/26/23 0852    sodium chloride flush 0.9 % injection 5-40 mL, 5-40 mL, IntraVENous, PRN, Jagruti Washington MD, 10 mL at 09/25/23 1853    0.9 % sodium chloride infusion, , IntraVENous, PRN, Jagruti Washington MD    acetaminophen (TYLENOL) tablet 650 mg, 650 mg, lumbar  Resolved Problems:    * No resolved hospital problems.  *  Acute hypoxic resp failure    Plan:  Aerosols incentive spirometry pulm to see        Jessica Marshall DO  7:03 AM  9/27/2023

## 2023-09-28 ENCOUNTER — HOSPITAL ENCOUNTER (INPATIENT)
Age: 68
LOS: 1 days | Discharge: ANOTHER HOSPITAL | End: 2023-09-29
Attending: PHYSICAL MEDICINE & REHABILITATION | Admitting: PHYSICAL MEDICINE & REHABILITATION
Payer: MEDICARE

## 2023-09-28 PROBLEM — G95.9 LUMBAR MYELOPATHY (HCC): Status: ACTIVE | Noted: 2023-09-28

## 2023-09-28 PROBLEM — Z78.9 SELF-CARE DEFICIT: Status: ACTIVE | Noted: 2023-09-28

## 2023-09-28 PROBLEM — R26.9 ABNORMALITY OF GAIT AND MOBILITY: Status: ACTIVE | Noted: 2023-09-28

## 2023-09-28 LAB
GLUCOSE BLD-MCNC: 197 MG/DL (ref 74–99)
GLUCOSE BLD-MCNC: 237 MG/DL (ref 74–99)
GLUCOSE BLD-MCNC: 274 MG/DL (ref 74–99)
GLUCOSE BLD-MCNC: 323 MG/DL (ref 74–99)

## 2023-09-28 PROCEDURE — 94640 AIRWAY INHALATION TREATMENT: CPT

## 2023-09-28 PROCEDURE — 82962 GLUCOSE BLOOD TEST: CPT

## 2023-09-28 PROCEDURE — 6370000000 HC RX 637 (ALT 250 FOR IP): Performed by: NEUROLOGICAL SURGERY

## 2023-09-28 PROCEDURE — 2700000000 HC OXYGEN THERAPY PER DAY

## 2023-09-28 PROCEDURE — 1280000000 HC REHAB R&B

## 2023-09-28 PROCEDURE — 6370000000 HC RX 637 (ALT 250 FOR IP): Performed by: PHYSICIAN ASSISTANT

## 2023-09-28 PROCEDURE — 2580000003 HC RX 258: Performed by: NEUROLOGICAL SURGERY

## 2023-09-28 PROCEDURE — 6360000002 HC RX W HCPCS: Performed by: NEUROLOGICAL SURGERY

## 2023-09-28 PROCEDURE — 6370000000 HC RX 637 (ALT 250 FOR IP): Performed by: NURSE PRACTITIONER

## 2023-09-28 RX ORDER — ENEMA 19; 7 G/133ML; G/133ML
1 ENEMA RECTAL DAILY PRN
Status: DISCONTINUED | OUTPATIENT
Start: 2023-09-28 | End: 2023-09-28

## 2023-09-28 RX ORDER — SODIUM CHLORIDE 9 MG/ML
INJECTION, SOLUTION INTRAVENOUS PRN
Status: DISCONTINUED | OUTPATIENT
Start: 2023-09-28 | End: 2023-09-28

## 2023-09-28 RX ORDER — SODIUM CHLORIDE 0.9 % (FLUSH) 0.9 %
5-40 SYRINGE (ML) INJECTION PRN
Status: DISCONTINUED | OUTPATIENT
Start: 2023-09-28 | End: 2023-09-29 | Stop reason: HOSPADM

## 2023-09-28 RX ORDER — IPRATROPIUM BROMIDE AND ALBUTEROL SULFATE 2.5; .5 MG/3ML; MG/3ML
1 SOLUTION RESPIRATORY (INHALATION)
Status: DISCONTINUED | OUTPATIENT
Start: 2023-09-28 | End: 2023-09-29 | Stop reason: HOSPADM

## 2023-09-28 RX ORDER — SENNA AND DOCUSATE SODIUM 50; 8.6 MG/1; MG/1
1 TABLET, FILM COATED ORAL 2 TIMES DAILY
Status: DISCONTINUED | OUTPATIENT
Start: 2023-09-28 | End: 2023-09-29 | Stop reason: HOSPADM

## 2023-09-28 RX ORDER — INSULIN LISPRO 100 [IU]/ML
0-8 INJECTION, SOLUTION INTRAVENOUS; SUBCUTANEOUS
Status: CANCELLED | OUTPATIENT
Start: 2023-09-28

## 2023-09-28 RX ORDER — SODIUM CHLORIDE 0.9 % (FLUSH) 0.9 %
5-40 SYRINGE (ML) INJECTION EVERY 12 HOURS SCHEDULED
Status: DISCONTINUED | OUTPATIENT
Start: 2023-09-28 | End: 2023-09-29 | Stop reason: HOSPADM

## 2023-09-28 RX ORDER — MORPHINE SULFATE 2 MG/ML
2 INJECTION, SOLUTION INTRAMUSCULAR; INTRAVENOUS
Status: CANCELLED | OUTPATIENT
Start: 2023-09-28

## 2023-09-28 RX ORDER — ACETAMINOPHEN 325 MG/1
650 TABLET ORAL EVERY 6 HOURS
Status: CANCELLED | OUTPATIENT
Start: 2023-09-28

## 2023-09-28 RX ORDER — SODIUM CHLORIDE, SODIUM LACTATE, POTASSIUM CHLORIDE, CALCIUM CHLORIDE 600; 310; 30; 20 MG/100ML; MG/100ML; MG/100ML; MG/100ML
INJECTION, SOLUTION INTRAVENOUS CONTINUOUS
Status: CANCELLED | OUTPATIENT
Start: 2023-09-28

## 2023-09-28 RX ORDER — SENNA AND DOCUSATE SODIUM 50; 8.6 MG/1; MG/1
1 TABLET, FILM COATED ORAL 2 TIMES DAILY
Status: CANCELLED | OUTPATIENT
Start: 2023-09-28

## 2023-09-28 RX ORDER — SODIUM CHLORIDE 0.9 % (FLUSH) 0.9 %
10 SYRINGE (ML) INJECTION PRN
Status: CANCELLED | OUTPATIENT
Start: 2023-09-28

## 2023-09-28 RX ORDER — MORPHINE SULFATE 4 MG/ML
4 INJECTION, SOLUTION INTRAMUSCULAR; INTRAVENOUS
Status: CANCELLED | OUTPATIENT
Start: 2023-09-28

## 2023-09-28 RX ORDER — OXYCODONE HYDROCHLORIDE 10 MG/1
10 TABLET ORAL EVERY 4 HOURS PRN
Status: DISCONTINUED | OUTPATIENT
Start: 2023-09-28 | End: 2023-09-29 | Stop reason: HOSPADM

## 2023-09-28 RX ORDER — SODIUM CHLORIDE, SODIUM LACTATE, POTASSIUM CHLORIDE, CALCIUM CHLORIDE 600; 310; 30; 20 MG/100ML; MG/100ML; MG/100ML; MG/100ML
INJECTION, SOLUTION INTRAVENOUS CONTINUOUS
Status: DISCONTINUED | OUTPATIENT
Start: 2023-09-28 | End: 2023-09-28

## 2023-09-28 RX ORDER — DEXTROSE MONOHYDRATE 100 MG/ML
INJECTION, SOLUTION INTRAVENOUS CONTINUOUS PRN
Status: CANCELLED | OUTPATIENT
Start: 2023-09-28

## 2023-09-28 RX ORDER — SODIUM CHLORIDE 0.9 % (FLUSH) 0.9 %
5-40 SYRINGE (ML) INJECTION EVERY 12 HOURS SCHEDULED
Status: CANCELLED | OUTPATIENT
Start: 2023-09-28

## 2023-09-28 RX ORDER — DIAZEPAM 5 MG/1
5 TABLET ORAL EVERY 6 HOURS PRN
Status: CANCELLED | OUTPATIENT
Start: 2023-09-28

## 2023-09-28 RX ORDER — POLYETHYLENE GLYCOL 3350 17 G/17G
17 POWDER, FOR SOLUTION ORAL DAILY
Status: DISCONTINUED | OUTPATIENT
Start: 2023-09-29 | End: 2023-09-29 | Stop reason: HOSPADM

## 2023-09-28 RX ORDER — DIPHENHYDRAMINE HYDROCHLORIDE 50 MG/ML
25 INJECTION INTRAMUSCULAR; INTRAVENOUS EVERY 6 HOURS PRN
Status: CANCELLED | OUTPATIENT
Start: 2023-09-28

## 2023-09-28 RX ORDER — PANTOPRAZOLE SODIUM 40 MG/1
40 TABLET, DELAYED RELEASE ORAL DAILY
Status: CANCELLED | OUTPATIENT
Start: 2023-09-29

## 2023-09-28 RX ORDER — METOPROLOL SUCCINATE 25 MG/1
25 TABLET, EXTENDED RELEASE ORAL DAILY
Status: CANCELLED | OUTPATIENT
Start: 2023-09-29

## 2023-09-28 RX ORDER — SODIUM CHLORIDE 9 MG/ML
INJECTION, SOLUTION INTRAVENOUS PRN
Status: CANCELLED | OUTPATIENT
Start: 2023-09-28

## 2023-09-28 RX ORDER — BISACODYL 5 MG/1
5 TABLET, DELAYED RELEASE ORAL DAILY
Status: DISCONTINUED | OUTPATIENT
Start: 2023-09-29 | End: 2023-09-29 | Stop reason: HOSPADM

## 2023-09-28 RX ORDER — BISACODYL 10 MG
10 SUPPOSITORY, RECTAL RECTAL DAILY PRN
Status: CANCELLED | OUTPATIENT
Start: 2023-09-28

## 2023-09-28 RX ORDER — ACETAMINOPHEN 325 MG/1
650 TABLET ORAL EVERY 6 HOURS
Status: DISCONTINUED | OUTPATIENT
Start: 2023-09-28 | End: 2023-09-29 | Stop reason: HOSPADM

## 2023-09-28 RX ORDER — MORPHINE SULFATE 2 MG/ML
4 INJECTION, SOLUTION INTRAMUSCULAR; INTRAVENOUS
Status: DISCONTINUED | OUTPATIENT
Start: 2023-09-28 | End: 2023-09-28

## 2023-09-28 RX ORDER — LEVOTHYROXINE SODIUM 0.03 MG/1
25 TABLET ORAL DAILY
Status: CANCELLED | OUTPATIENT
Start: 2023-09-29

## 2023-09-28 RX ORDER — PANTOPRAZOLE SODIUM 40 MG/1
40 TABLET, DELAYED RELEASE ORAL DAILY
Status: DISCONTINUED | OUTPATIENT
Start: 2023-09-29 | End: 2023-09-29 | Stop reason: HOSPADM

## 2023-09-28 RX ORDER — CITALOPRAM 20 MG/1
20 TABLET ORAL DAILY
Status: DISCONTINUED | OUTPATIENT
Start: 2023-09-29 | End: 2023-09-29 | Stop reason: HOSPADM

## 2023-09-28 RX ORDER — DIPHENHYDRAMINE HYDROCHLORIDE 50 MG/ML
25 INJECTION INTRAMUSCULAR; INTRAVENOUS EVERY 6 HOURS PRN
Status: DISCONTINUED | OUTPATIENT
Start: 2023-09-28 | End: 2023-09-28

## 2023-09-28 RX ORDER — DIAZEPAM 5 MG/1
5 TABLET ORAL EVERY 6 HOURS PRN
Status: DISCONTINUED | OUTPATIENT
Start: 2023-09-28 | End: 2023-09-29 | Stop reason: HOSPADM

## 2023-09-28 RX ORDER — OXYCODONE HYDROCHLORIDE 10 MG/1
10 TABLET ORAL EVERY 4 HOURS PRN
Status: CANCELLED | OUTPATIENT
Start: 2023-09-28

## 2023-09-28 RX ORDER — OXYCODONE HYDROCHLORIDE 5 MG/1
5 TABLET ORAL EVERY 4 HOURS PRN
Status: DISCONTINUED | OUTPATIENT
Start: 2023-09-28 | End: 2023-09-29 | Stop reason: HOSPADM

## 2023-09-28 RX ORDER — ENEMA 19; 7 G/133ML; G/133ML
1 ENEMA RECTAL DAILY PRN
Status: CANCELLED | OUTPATIENT
Start: 2023-09-28

## 2023-09-28 RX ORDER — ALBUTEROL SULFATE 2.5 MG/3ML
2.5 SOLUTION RESPIRATORY (INHALATION) EVERY 6 HOURS PRN
Status: DISCONTINUED | OUTPATIENT
Start: 2023-09-28 | End: 2023-09-29 | Stop reason: HOSPADM

## 2023-09-28 RX ORDER — BISACODYL 10 MG
10 SUPPOSITORY, RECTAL RECTAL DAILY PRN
Status: DISCONTINUED | OUTPATIENT
Start: 2023-09-28 | End: 2023-09-28

## 2023-09-28 RX ORDER — ONDANSETRON 4 MG/1
4 TABLET, ORALLY DISINTEGRATING ORAL EVERY 8 HOURS PRN
Status: CANCELLED | OUTPATIENT
Start: 2023-09-28

## 2023-09-28 RX ORDER — LEVOTHYROXINE SODIUM 0.03 MG/1
25 TABLET ORAL DAILY
Status: DISCONTINUED | OUTPATIENT
Start: 2023-09-29 | End: 2023-09-29 | Stop reason: HOSPADM

## 2023-09-28 RX ORDER — OXYCODONE HYDROCHLORIDE 5 MG/1
5 TABLET ORAL EVERY 4 HOURS PRN
Status: CANCELLED | OUTPATIENT
Start: 2023-09-28

## 2023-09-28 RX ORDER — ALBUTEROL SULFATE 2.5 MG/3ML
2.5 SOLUTION RESPIRATORY (INHALATION) EVERY 6 HOURS PRN
Status: CANCELLED | OUTPATIENT
Start: 2023-09-28

## 2023-09-28 RX ORDER — BISACODYL 5 MG/1
5 TABLET, DELAYED RELEASE ORAL DAILY
Status: CANCELLED | OUTPATIENT
Start: 2023-09-29

## 2023-09-28 RX ORDER — DIPHENHYDRAMINE HCL 25 MG
25 TABLET ORAL EVERY 6 HOURS PRN
Status: CANCELLED | OUTPATIENT
Start: 2023-09-28

## 2023-09-28 RX ORDER — IPRATROPIUM BROMIDE AND ALBUTEROL SULFATE 2.5; .5 MG/3ML; MG/3ML
1 SOLUTION RESPIRATORY (INHALATION)
Status: CANCELLED | OUTPATIENT
Start: 2023-09-28

## 2023-09-28 RX ORDER — ONDANSETRON 4 MG/1
4 TABLET, ORALLY DISINTEGRATING ORAL EVERY 8 HOURS PRN
Status: DISCONTINUED | OUTPATIENT
Start: 2023-09-28 | End: 2023-09-29 | Stop reason: HOSPADM

## 2023-09-28 RX ORDER — INSULIN LISPRO 100 [IU]/ML
0-4 INJECTION, SOLUTION INTRAVENOUS; SUBCUTANEOUS NIGHTLY
Status: CANCELLED | OUTPATIENT
Start: 2023-09-28

## 2023-09-28 RX ORDER — POLYETHYLENE GLYCOL 3350 17 G/17G
17 POWDER, FOR SOLUTION ORAL DAILY
Status: CANCELLED | OUTPATIENT
Start: 2023-09-29

## 2023-09-28 RX ORDER — DEXTROSE MONOHYDRATE 100 MG/ML
INJECTION, SOLUTION INTRAVENOUS CONTINUOUS PRN
Status: DISCONTINUED | OUTPATIENT
Start: 2023-09-28 | End: 2023-09-29 | Stop reason: HOSPADM

## 2023-09-28 RX ORDER — CITALOPRAM 20 MG/1
20 TABLET ORAL DAILY
Status: CANCELLED | OUTPATIENT
Start: 2023-09-29

## 2023-09-28 RX ORDER — ROSUVASTATIN CALCIUM 20 MG/1
40 TABLET, COATED ORAL DAILY
Status: DISCONTINUED | OUTPATIENT
Start: 2023-09-29 | End: 2023-09-29 | Stop reason: HOSPADM

## 2023-09-28 RX ORDER — INSULIN LISPRO 100 [IU]/ML
0-4 INJECTION, SOLUTION INTRAVENOUS; SUBCUTANEOUS NIGHTLY
Status: DISCONTINUED | OUTPATIENT
Start: 2023-09-28 | End: 2023-09-29 | Stop reason: HOSPADM

## 2023-09-28 RX ORDER — ONDANSETRON 2 MG/ML
4 INJECTION INTRAMUSCULAR; INTRAVENOUS EVERY 6 HOURS PRN
Status: DISCONTINUED | OUTPATIENT
Start: 2023-09-28 | End: 2023-09-28

## 2023-09-28 RX ORDER — SODIUM CHLORIDE 0.9 % (FLUSH) 0.9 %
10 SYRINGE (ML) INJECTION PRN
Status: DISCONTINUED | OUTPATIENT
Start: 2023-09-28 | End: 2023-09-29 | Stop reason: HOSPADM

## 2023-09-28 RX ORDER — DIPHENHYDRAMINE HCL 25 MG
25 TABLET ORAL EVERY 6 HOURS PRN
Status: DISCONTINUED | OUTPATIENT
Start: 2023-09-28 | End: 2023-09-29 | Stop reason: HOSPADM

## 2023-09-28 RX ORDER — MORPHINE SULFATE 2 MG/ML
2 INJECTION, SOLUTION INTRAMUSCULAR; INTRAVENOUS
Status: DISCONTINUED | OUTPATIENT
Start: 2023-09-28 | End: 2023-09-28

## 2023-09-28 RX ORDER — SODIUM CHLORIDE 0.9 % (FLUSH) 0.9 %
5-40 SYRINGE (ML) INJECTION PRN
Status: CANCELLED | OUTPATIENT
Start: 2023-09-28

## 2023-09-28 RX ORDER — METOPROLOL SUCCINATE 25 MG/1
25 TABLET, EXTENDED RELEASE ORAL DAILY
Status: DISCONTINUED | OUTPATIENT
Start: 2023-09-29 | End: 2023-09-29 | Stop reason: HOSPADM

## 2023-09-28 RX ORDER — INSULIN LISPRO 100 [IU]/ML
0-8 INJECTION, SOLUTION INTRAVENOUS; SUBCUTANEOUS
Status: DISCONTINUED | OUTPATIENT
Start: 2023-09-28 | End: 2023-09-29 | Stop reason: HOSPADM

## 2023-09-28 RX ORDER — ROSUVASTATIN CALCIUM 20 MG/1
40 TABLET, COATED ORAL DAILY
Status: CANCELLED | OUTPATIENT
Start: 2023-09-29

## 2023-09-28 RX ORDER — ONDANSETRON 2 MG/ML
4 INJECTION INTRAMUSCULAR; INTRAVENOUS EVERY 6 HOURS PRN
Status: CANCELLED | OUTPATIENT
Start: 2023-09-28

## 2023-09-28 RX ADMIN — INSULIN LISPRO 4 UNITS: 100 INJECTION, SOLUTION INTRAVENOUS; SUBCUTANEOUS at 12:00

## 2023-09-28 RX ADMIN — METOPROLOL SUCCINATE 25 MG: 25 TABLET, EXTENDED RELEASE ORAL at 08:50

## 2023-09-28 RX ADMIN — OXYCODONE HYDROCHLORIDE 5 MG: 5 TABLET ORAL at 12:36

## 2023-09-28 RX ADMIN — ACETAMINOPHEN 650 MG: 325 TABLET ORAL at 02:11

## 2023-09-28 RX ADMIN — OXYCODONE HYDROCHLORIDE 5 MG: 5 TABLET ORAL at 06:18

## 2023-09-28 RX ADMIN — IPRATROPIUM BROMIDE AND ALBUTEROL SULFATE 1 DOSE: .5; 3 SOLUTION RESPIRATORY (INHALATION) at 09:13

## 2023-09-28 RX ADMIN — MUPIROCIN: 20 OINTMENT TOPICAL at 23:07

## 2023-09-28 RX ADMIN — IPRATROPIUM BROMIDE AND ALBUTEROL SULFATE 1 DOSE: .5; 3 SOLUTION RESPIRATORY (INHALATION) at 21:19

## 2023-09-28 RX ADMIN — ROSUVASTATIN 40 MG: 20 TABLET, FILM COATED ORAL at 08:50

## 2023-09-28 RX ADMIN — ACETAMINOPHEN 650 MG: 325 TABLET ORAL at 06:16

## 2023-09-28 RX ADMIN — INSULIN LISPRO 2 UNITS: 100 INJECTION, SOLUTION INTRAVENOUS; SUBCUTANEOUS at 08:50

## 2023-09-28 RX ADMIN — ACETAMINOPHEN 650 MG: 325 TABLET ORAL at 13:36

## 2023-09-28 RX ADMIN — OXYCODONE HYDROCHLORIDE 10 MG: 10 TABLET ORAL at 21:57

## 2023-09-28 RX ADMIN — CITALOPRAM HYDROBROMIDE 20 MG: 20 TABLET ORAL at 08:50

## 2023-09-28 RX ADMIN — WATER 2000 MG: 1 INJECTION INTRAMUSCULAR; INTRAVENOUS; SUBCUTANEOUS at 02:11

## 2023-09-28 RX ADMIN — WATER 2000 MG: 1 INJECTION INTRAMUSCULAR; INTRAVENOUS; SUBCUTANEOUS at 10:43

## 2023-09-28 RX ADMIN — METFORMIN HYDROCHLORIDE 500 MG: 500 TABLET ORAL at 18:02

## 2023-09-28 RX ADMIN — SENNOSIDES AND DOCUSATE SODIUM 1 TABLET: 50; 8.6 TABLET ORAL at 21:57

## 2023-09-28 RX ADMIN — LEVOTHYROXINE SODIUM 25 MCG: 0.03 TABLET ORAL at 08:50

## 2023-09-28 RX ADMIN — INSULIN LISPRO 6 UNITS: 100 INJECTION, SOLUTION INTRAVENOUS; SUBCUTANEOUS at 18:02

## 2023-09-28 RX ADMIN — SODIUM CHLORIDE, PRESERVATIVE FREE 10 ML: 5 INJECTION INTRAVENOUS at 08:55

## 2023-09-28 RX ADMIN — METFORMIN HYDROCHLORIDE 500 MG: 500 TABLET, FILM COATED ORAL at 08:50

## 2023-09-28 RX ADMIN — ACETAMINOPHEN 650 MG: 325 TABLET ORAL at 18:02

## 2023-09-28 RX ADMIN — PANTOPRAZOLE SODIUM 40 MG: 40 TABLET, DELAYED RELEASE ORAL at 08:50

## 2023-09-28 ASSESSMENT — PAIN DESCRIPTION - DESCRIPTORS
DESCRIPTORS: ACHING
DESCRIPTORS: ACHING;DISCOMFORT;DULL
DESCRIPTORS: ACHING
DESCRIPTORS: ACHING

## 2023-09-28 ASSESSMENT — PAIN DESCRIPTION - ORIENTATION: ORIENTATION: POSTERIOR

## 2023-09-28 ASSESSMENT — PAIN SCALES - GENERAL
PAINLEVEL_OUTOF10: 4
PAINLEVEL_OUTOF10: 5
PAINLEVEL_OUTOF10: 7
PAINLEVEL_OUTOF10: 5
PAINLEVEL_OUTOF10: 5

## 2023-09-28 ASSESSMENT — PAIN DESCRIPTION - LOCATION
LOCATION: BACK
LOCATION: LEG;HIP;OTHER (COMMENT)
LOCATION: BACK

## 2023-09-28 ASSESSMENT — PAIN SCALES - WONG BAKER: WONGBAKER_NUMERICALRESPONSE: 4

## 2023-09-28 NOTE — CARE COORDINATION
Acute Rehab Social Work Assessment     PCP: Dr. Laveta Kawasaki     Patient Resides: with her  Yolande Cushing. They have been  for 47 years. Home Architecture : Pt lives in a ranch with 3 steps to enter. Once inside the bed and bath are on the first floor. The bathroom is a tub shower combo with doors. Pt does use the basement to do laundry. There is 11 steps to the basement with 1 rail. Will you return to your own home? Yes         Primary Caregivers: her  Yolande Cushing is 71. He is healthy, drives and he is retired. Sully Mosley has verbalized that he will not be helping her. Her daughter Lindsey Palomo works full time at the [employer] as a . She is healthy and drives. Pt wants Lillion Joe to be the main . Her friend Sam Strauss iis 76- is retired, healthy and drives. Also has a son Zeinab Harris but they no longer talk. Level of Function PTA : she was independent. She did graduate high school. Employment: She worked at the [employer] until she retired in 2012. DME Pta  : [employer] bed, rollator and straight cane. Community Agency Involvement PTA : She had [facility] in 11/2022    Family Teaching: To be scheduled    IRF ERIKA - Transportation, Mood, Social isolation, health literacy completed under flowsheets.      NAME RELATION PRIMARY # ADDITIONAL #    Lindsey Palomo  Daughter  [address]  H:[phone]        Height: 5'4   Weight: [address], SW Intern  [place], [place]  9/28/2023

## 2023-09-28 NOTE — PROGRESS NOTES
Hospital Medicine    Subjective:  pt alert conversive sitting up in chair on o2      Current Facility-Administered Medications:     sodium chloride flush 0.9 % injection 10 mL, 10 mL, IntraVENous, PRN, Teja Flores,     ipratropium 0.5 mg-albuterol 2.5 mg (DUONEB) nebulizer solution 1 Dose, 1 Dose, Inhalation, Q4H WA RT, Luis Littler, APRN - CNP, 1 Dose at 09/27/23 2037    glucose chewable tablet 16 g, 4 tablet, Oral, PRN, Izzy Tianmer, DO    dextrose bolus 10% 125 mL, 125 mL, IntraVENous, PRN **OR** dextrose bolus 10% 250 mL, 250 mL, IntraVENous, PRN, Izzy Tianmer, DO    glucagon injection 1 mg, 1 mg, SubCUTAneous, PRN, Chynaa Radha Tianmer, DO    dextrose 10 % infusion, , IntraVENous, Continuous PRN, Atha Perking Jaylanmer, DO    albuterol (PROVENTIL) (2.5 MG/3ML) 0.083% nebulizer solution 2.5 mg, 2.5 mg, Nebulization, Q6H PRN, Izzy Lynn, DO, 2.5 mg at 09/26/23 2345    lactated ringers IV soln infusion, , IntraVENous, Continuous, Norman Cooper MD, Stopped at 09/25/23 1322    citalopram (CELEXA) tablet 20 mg, 20 mg, Oral, Daily, Linda Campuzano MD, 20 mg at 09/27/23 8749    levothyroxine (SYNTHROID) tablet 25 mcg, 25 mcg, Oral, Daily, Linda Campuzano MD, 25 mcg at 09/27/23 5925    metFORMIN (GLUCOPHAGE) tablet 500 mg, 500 mg, Oral, BID WC, Linda Campuzano MD, 500 mg at 09/27/23 1606    metoprolol succinate (TOPROL XL) extended release tablet 25 mg, 25 mg, Oral, Daily, Linda Campuzano MD, 25 mg at 09/27/23 0951    mupirocin (BACTROBAN) 2 % ointment, , Each Nostril, BID, Linda Campuzano MD, Given at 09/27/23 1937    pantoprazole (PROTONIX) tablet 40 mg, 40 mg, Oral, Daily, Linda Campuzano MD, 40 mg at 09/27/23 0951    rosuvastatin (CRESTOR) tablet 40 mg, 40 mg, Oral, Daily, Linda Campuzano MD, 40 mg at 09/27/23 0952    sodium chloride flush 0.9 % injection 5-40 mL, 5-40 mL, IntraVENous, 2 times per day, Linda Campuzano MD, 10 mL at 09/27/23 1938    sodium chloride flush 0.9 % injection 5-40 mL, 5-40 mL, IntraVENous, PRN, Wilner Renteria MD, 10 mL at 09/25/23 1853    0.9 % sodium chloride infusion, , IntraVENous, PRN, Wilner Renteria MD    acetaminophen (TYLENOL) tablet 650 mg, 650 mg, Oral, Q6H, Wilner Renteria MD, 650 mg at 09/28/23 0616    ondansetron (ZOFRAN-ODT) disintegrating tablet 4 mg, 4 mg, Oral, Q8H PRN **OR** ondansetron (ZOFRAN) injection 4 mg, 4 mg, IntraVENous, Q6H PRN, Wilner Renteria MD    ceFAZolin (ANCEF) 2,000 mg in sterile water 20 mL IV syringe, 2,000 mg, IntraVENous, Q8H, Wilner Renteria MD, 2,000 mg at 09/28/23 0211    oxyCODONE (ROXICODONE) immediate release tablet 5 mg, 5 mg, Oral, Q4H PRN, 5 mg at 09/28/23 0618 **OR** oxyCODONE HCl (OXY-IR) immediate release tablet 10 mg, 10 mg, Oral, Q4H PRN, Wilner Renteria MD, 10 mg at 09/27/23 1006    morphine (PF) injection 2 mg, 2 mg, IntraVENous, Q2H PRN **OR** morphine sulfate (PF) injection 4 mg, 4 mg, IntraVENous, Q2H PRN, Wilner Renteria MD, 4 mg at 09/25/23 1613    diazePAM (VALIUM) tablet 5 mg, 5 mg, Oral, Q6H PRN, Wilner Renteria MD, 5 mg at 09/26/23 1452    diphenhydrAMINE (BENADRYL) tablet 25 mg, 25 mg, Oral, Q6H PRN **OR** diphenhydrAMINE (BENADRYL) injection 25 mg, 25 mg, IntraVENous, Q6H PRN, Wilner Renteria MD    polyethylene glycol (GLYCOLAX) packet 17 g, 17 g, Oral, Daily, Wilner Renteria MD, 17 g at 09/27/23 0951    bisacodyl (DULCOLAX) EC tablet 5 mg, 5 mg, Oral, Daily, Wilner Renteria MD, 5 mg at 09/27/23 1354    sennosides-docusate sodium (SENOKOT-S) 8.6-50 MG tablet 1 tablet, 1 tablet, Oral, BID, Wilner Renteria MD, 1 tablet at 09/27/23 0951    bisacodyl (DULCOLAX) suppository 10 mg, 10 mg, Rectal, Daily PRN, Wilner Renteria MD    sodium phosphate (FLEET) rectal enema 1 enema, 1 enema, Rectal, Daily PRN, Wilner Renteria MD    insulin lispro (HUMALOG) injection vial 0-8 Units, 0-8 Units, SubCUTAneous, TID WC, Wilner Renteria MD, 4 Units at 09/27/23 1122    insulin lispro (HUMALOG) injection vial 0-4 Units, 0-4 Units, SubCUTAneous, Nightly, Wilner Renteria MD    Objective:    /61

## 2023-09-28 NOTE — PROGRESS NOTES
Met with patient at bedside. She is aware that she will admit to room 5516B later today. She is aware of the visiting hours and that she will need clothes and shoes for therapy. She said she has them here.    Don Ramirez RN  ARU Pre-screener/  [phone]

## 2023-09-28 NOTE — PROGRESS NOTES
4 Eyes Skin Assessment     NAME:  Damian Levin  YOB: 1955  MEDICAL RECORD NUMBER:  [id number]    The patient is being assessed for  {Reason for Assessment:40133}    I agree that at least one RN has performed a thorough Head to Toe Skin Assessment on the patient. ALL assessment sites listed below have been assessed. Areas assessed by both nurses:    Head, Face, Ears, Shoulders, Back, Chest, Arms, Elbows, Hands, Sacrum. Buttock, Coccyx, Ischium, Legs. Feet and Heels, and Under Medical Devices         Does the Patient have a Wound? Yes wound(s) were present on assessment.  LDA wound assessment was Initiated and completed by RN       Devon Prevention initiated by RN: Yes  Wound Care Orders initiated by RN: No    Pressure Injury (Stage 3,4, Unstageable, DTI, NWPT, and Complex wounds) if present, place Wound referral order by RN under : No    New Ostomies, if present place, Ostomy referral order under : No     Nurse 1 eSignature: Electronically signed by Cristina Bonilla RN on 9/28/23 at 2:15 PM EDT    **SHARE this note so that the co-signing nurse can place an eSignature**    Nurse 2 eSignature: {Esignature:[id number]}

## 2023-09-28 NOTE — CARE COORDINATION
09/28/23 Update CM Note: patient is accepted and can go to ARU today, Bed 5516B. Charge nurse to get discharge readmit order.  Electronically signed by Caitlyn Byrnes RN CM on 9/28/2023 at 9:08 AM

## 2023-09-28 NOTE — PROGRESS NOTES
chloride       sodium chloride flush, glucose, dextrose bolus **OR** dextrose bolus, glucagon (rDNA), dextrose, albuterol, sodium chloride flush, sodium chloride, ondansetron **OR** ondansetron, oxyCODONE **OR** oxyCODONE, morphine **OR** morphine, diazePAM, diphenhydrAMINE **OR** diphenhydrAMINE, bisacodyl, sodium phosphate                  PRECAUTIONS  Special Precautions:  no current precautions    Weight Bearing Precautions:          [] Non Weight Bearing :          [] Toe Touch Weight Bearing :         [] Partial Weight Bearing :          [] Weight Bearing as Tolerated :         Fall Risk:   High fall risk, Do not leave alone in the bathroom, and Decreased balance         REHABILITATION NEEDS:  IV therapy:    PRN Adapter         Oxygen:  Nasal cannula   2 liters       Wound care:    Wound or incision care     Pain management:  (level of pain, meds): Oxycodone PRN     Bladder/Bowel:  Last bowel movement : 9/24/2023  No current bladder/bowel issues    Substance use history: tobacco    Special rehabilitation needs:   no special needs needed    Special equipment:  No special equipment needed                    FUNCTIONAL STATUS PT / Chriss Panda / Jamison Fabry:      Nicol Guard / EVAL Discipline Initial: 9/26/2023 Follow Up: 9/27/2023 Current:    Eating OT Independent Independent    Grooming OT Setup Set up    Bathing OT Moderate Assistance Moderate assist      Dressing Upper Extremity OT Standby Assistance Minimal assist  Don LSO brace    Dressing Lower Extremity OT Maximal Assistance Minimal assist      Toileting OT not tested Minimal assist      Toilet Transfers OT not tested Minimal assist      Tub/Shower Transfers OT not tested NT    Homemaking OT not tested NT           Bed Mobility PT not tested Minimal assist      Bed/Wheelchair Transfers PT Moderate Assistance Minimal assist      Locomotion Walk / Wheelchair  Device:  Distance: PT Moderate Assistance 25 feet  Decreased endurance level FWW 48 feet x 2 [employer] Minimal assist /CGA Endurance PT fair - fair           Expression SP NA NA    Social Interaction SP NA NA    Problem Solving SP good good    Memory SP good good    Comprehension SP good good    Swallowing SP NA NA      Comments on Functional Status:      [x] Able to participate a minimum of 3 hours per day of therapy intervention    Required treatments/services: Rehabilitation Nursing, Case Management, and Social work       Required Therapy:  Therapy Hours per Day Days per Week Therapeutic Interventions Required   [x] Physical Therapy 1 1/2 5-7 Gait, transfers, Safety, strength, education, endurance   [x] Occupational Therapy 1 1/2 5-7 ADLs, IADLs, Safety, strength, education, endurance   [] Speech Pathology      [] Prosthetics / Orthotics       []                         DISCHARGE NEEDS  Anticipated Discharge Plan:    Home with supervision      Anticipated DME Needs: To be determined         Anticipated Home Health Services: To be determined     Anticipated Outpatient Services: To be determined    Anticipated support group:   No support groups recommended    Barriers to discharge: Impaired self care and impaired mobility    Discharge Support:  Discharge plan has been verified with patient's caregiver  Caregiver is in agreement with the discharge plan      Expected functional status for safe discharge: modified independence    Patient/support person goals:  To get stronger and go home    Expected length of stay: 5-7 days    Discussed expected length of stay patient and/or family is agreeable to IRF plan    Impairment Group Category: 8.9    Etiological Diagnosis: Lumbar stenosis    Primary Rehabilitation Diagnosis: Lumbar stenosis                    Electronically signed by Guru Ignacio RN on 9/28/2023 at 9:21 AM    Prescreen completed __________________________________ (signature of prescreener)    Date:   9/28/2023 Time:  9:21 AM    JUSTIFICATION FOR ADMISSION TO ACUTE REHABILITATION:  Patient has suffered decline in

## 2023-09-28 NOTE — H&P
PM&R Admission History & Physical Examination    Patient: Mayo Dandy  Age/sex: 80 y.o. female  Medical Record #: [id number]    Admission to Acute Rehab Unit: Date: 2023 Diagnosis: Lumbar myelopathy ([address]) [G95.9]  Admitting Physician: Deja Sky MD  Consulting physician: ***  Primary care provider: Jelly Tang DO    Procedures performed on/related to this admission:  ***    Chief complaint:   Impairments and acitivities limitations {Functional limitations:35530}    HPI:   Mayo Dandy is a 80 y.o. female with past medical history significant for *** who presented to [facility] on ***. Hospital course was complicated by ***. Present status:   Pain: ***  PO intake: ***  BM: ***  Micturition: ***   DVT prophylaxis with ***.    Weight bearing status: ***      Past Medical History:   Diagnosis Date    Age-related osteoporosis without current pathological fracture 2021    Arthritis     Cataracts, bilateral 10/'19    Colon polyps 2021    tubular adenoma    Diabetes mellitus ([address])     DM type 2 (diabetes mellitus, type 2) ([address])     Facial basal cell cancer 2022    [facility]    Hyperlipidemia     Hypertension     Hypothyroidism     Migraines     rare    Neuropathy, diabetic ([address])     Peripheral vascular disease ([address])     follows with Dr. Tabby Sharpe yearly    Positive colorectal cancer screening using Cologuard test     Psoriasiform dermatitis     Seasonal allergies     Thyroid disease        Past Surgical History:   Procedure Laterality Date    BACK SURGERY      L5-S1    CAROTID ENDARTERECTOMY Left 10/11/2018    [place]    CARPAL TUNNEL RELEASE      bilat     SECTION      2    COLONOSCOPY N/A 2021    COLONOSCOPY WITH BIOPSY with tattooing performed by Thurman Severin, MD at [address] [facility] N/A 2022    coronary artery bypass grafting x 2, ZACHARY performed by Brad Quintana DO at [address] DTRs:  Reflex Right Left   Biceps {Numbers; 0-4 (with +):17912} {Numbers; 0-4 (with +):86440}   Triceps {Numbers; 0-4 (with +):35494} {Numbers; 0-4 (with +):47689}   Brachioradialis {Numbers; 0-4 (with +):58267} {Numbers; 0-4 (with +):61046}   Patella {Numbers; 0-4 (with +):56180} {Numbers; 0-4 (with +):20277}   Achilles  {Numbers; 0-4 (with +):14642} {Numbers; 0-4 (with +):48351}   Babinski {positive/negative/pending/unknown:23219} {positive/negative/pending/unknown:84510}   Clonus {positive/negative/pending/unknown:82508} {positive/negative/pending/unknown:59405}   Scotty {positive/negative/pending/unknown:98388} {positive/negative/pending/unknown:42668}       Balance/Gait:  Gait: ***    Laboratory:  Lab Results   Component Value Date    WBC 8.2 09/27/2023    HGB 8.3 (L) 09/27/2023    HCT 26.1 (L) 09/27/2023    MCV 92.2 09/27/2023     09/27/2023     Lab Results   Component Value Date/Time     09/27/2023 05:32 AM    K 4.0 09/27/2023 05:32 AM    K 4.5 12/04/2018 08:10 AM     09/27/2023 05:32 AM    CO2 24 09/27/2023 05:32 AM    BUN 9 09/27/2023 05:32 AM    CREATININE 0.4 09/27/2023 05:32 AM    GLUCOSE 180 09/27/2023 05:32 AM    GLUCOSE 157 07/21/2011 04:06 PM    CALCIUM 9.1 09/27/2023 05:32 AM    LABGLOM >60 09/27/2023 05:32 AM      Lab Results   Component Value Date    ALT 19 06/26/2023    AST 20 06/26/2023    ALKPHOS 92 06/26/2023    BILITOT 0.2 06/26/2023     Lab Results   Component Value Date/Time    COLORU Yellow 09/18/2023 08:23 AM    NITRU NEGATIVE 09/18/2023 08:23 AM    GLUCOSEU NEGATIVE 09/18/2023 08:23 AM    KETUA NEGATIVE 09/18/2023 08:23 AM    UROBILINOGEN 0.2 09/18/2023 08:23 AM    BILIRUBINUR NEGATIVE 09/18/2023 08:23 AM    BILIRUBINUR neg 08/09/2022 11:23 AM     Hemoglobin A1C   Date Value Ref Range Status   06/26/2023 9.0 (H) 4.0 - 5.6 % Final       Microbiology:   No results for input(s): \"BC\" in the last 72 hours.   No results for input(s): \"BLOODCULT2\" in the last 72

## 2023-09-28 NOTE — PLAN OF CARE
Problem: Discharge Planning  Goal: Discharge to home or other facility with appropriate resources  Outcome: Adequate for Discharge     Problem: Chronic Conditions and Co-morbidities  Goal: Patient's chronic conditions and co-morbidity symptoms are monitored and maintained or improved  Outcome: Adequate for Discharge     Problem: Pain  Goal: Verbalizes/displays adequate comfort level or baseline comfort level  Outcome: Adequate for Discharge     Problem: ABCDS Injury Assessment  Goal: Absence of physical injury  Outcome: Adequate for Discharge     Problem: Skin/Tissue Integrity  Goal: Absence of new skin breakdown  Description: 1. Monitor for areas of redness and/or skin breakdown  2. Assess vascular access sites hourly  3. Every 4-6 hours minimum:  Change oxygen saturation probe site  4. Every 4-6 hours:  If on nasal continuous positive airway pressure, respiratory therapy assess nares and determine need for appliance change or resting period.   Outcome: Adequate for Discharge     Problem: Safety - Adult  Goal: Free from fall injury  Outcome: Adequate for Discharge

## 2023-09-29 ENCOUNTER — HOSPITAL ENCOUNTER (INPATIENT)
Age: 68
LOS: 6 days | Discharge: HOME | End: 2023-10-05
Attending: INTERNAL MEDICINE | Admitting: INTERNAL MEDICINE
Payer: MEDICARE

## 2023-09-29 VITALS
TEMPERATURE: 97.7 F | HEIGHT: 64 IN | WEIGHT: 132 LBS | BODY MASS INDEX: 22.53 KG/M2 | DIASTOLIC BLOOD PRESSURE: 57 MMHG | HEART RATE: 122 BPM | SYSTOLIC BLOOD PRESSURE: 89 MMHG | OXYGEN SATURATION: 91 % | RESPIRATION RATE: 16 BRPM

## 2023-09-29 VITALS
HEIGHT: 64 IN | RESPIRATION RATE: 16 BRPM | TEMPERATURE: 96.9 F | BODY MASS INDEX: 22.2 KG/M2 | WEIGHT: 130 LBS | SYSTOLIC BLOOD PRESSURE: 92 MMHG | DIASTOLIC BLOOD PRESSURE: 56 MMHG | HEART RATE: 124 BPM | OXYGEN SATURATION: 93 %

## 2023-09-29 PROBLEM — I48.91 ATRIAL FIBRILLATION WITH RAPID VENTRICULAR RESPONSE (HCC): Status: ACTIVE | Noted: 2023-09-29

## 2023-09-29 PROBLEM — I21.4 NSTEMI (NON-ST ELEVATED MYOCARDIAL INFARCTION) (HCC): Status: ACTIVE | Noted: 2023-09-29

## 2023-09-29 LAB
ANION GAP SERPL CALCULATED.3IONS-SCNC: 11 MMOL/L (ref 7–16)
BASOPHILS # BLD: 0.01 K/UL (ref 0–0.2)
BASOPHILS # BLD: 0.01 K/UL (ref 0–0.2)
BASOPHILS NFR BLD: 0 % (ref 0–2)
BASOPHILS NFR BLD: 0 % (ref 0–2)
BUN SERPL-MCNC: 10 MG/DL (ref 6–23)
CALCIUM SERPL-MCNC: 8.7 MG/DL (ref 8.6–10.2)
CHLORIDE SERPL-SCNC: 100 MMOL/L (ref 98–107)
CO2 SERPL-SCNC: 24 MMOL/L (ref 22–29)
CREAT SERPL-MCNC: 0.5 MG/DL (ref 0.5–1)
EKG ATRIAL RATE: 102 BPM
EKG ATRIAL RATE: 76 BPM
EKG P AXIS: 57 DEGREES
EKG P-R INTERVAL: 96 MS
EKG Q-T INTERVAL: 286 MS
EKG Q-T INTERVAL: 398 MS
EKG QRS DURATION: 90 MS
EKG QRS DURATION: 92 MS
EKG QTC CALCULATION (BAZETT): 447 MS
EKG QTC CALCULATION (BAZETT): 456 MS
EKG R AXIS: 55 DEGREES
EKG R AXIS: 60 DEGREES
EKG T AXIS: -122 DEGREES
EKG T AXIS: 98 DEGREES
EKG VENTRICULAR RATE: 153 BPM
EKG VENTRICULAR RATE: 76 BPM
EOSINOPHIL # BLD: 0.02 K/UL (ref 0.05–0.5)
EOSINOPHIL # BLD: 0.03 K/UL (ref 0.05–0.5)
EOSINOPHILS RELATIVE PERCENT: 0 % (ref 0–6)
EOSINOPHILS RELATIVE PERCENT: 0 % (ref 0–6)
ERYTHROCYTE [DISTWIDTH] IN BLOOD BY AUTOMATED COUNT: 15.2 % (ref 11.5–15)
ERYTHROCYTE [DISTWIDTH] IN BLOOD BY AUTOMATED COUNT: 15.2 % (ref 11.5–15)
GFR SERPL CREATININE-BSD FRML MDRD: >60 ML/MIN/1.73M2
GLUCOSE BLD-MCNC: 247 MG/DL (ref 74–99)
GLUCOSE BLD-MCNC: 258 MG/DL (ref 74–99)
GLUCOSE BLD-MCNC: 267 MG/DL (ref 74–99)
GLUCOSE BLD-MCNC: 308 MG/DL (ref 74–99)
GLUCOSE SERPL-MCNC: 192 MG/DL (ref 74–99)
HCT VFR BLD AUTO: 21.7 % (ref 34–48)
HCT VFR BLD AUTO: 22.7 % (ref 34–48)
HCT VFR BLD AUTO: 24.1 % (ref 34–48)
HGB BLD-MCNC: 7 G/DL (ref 11.5–15.5)
HGB BLD-MCNC: 7.3 G/DL (ref 11.5–15.5)
HGB BLD-MCNC: 8.1 G/DL (ref 11.5–15.5)
IMM GRANULOCYTES # BLD AUTO: 0.04 K/UL (ref 0–0.58)
IMM GRANULOCYTES # BLD AUTO: 0.04 K/UL (ref 0–0.58)
IMM GRANULOCYTES NFR BLD: 1 % (ref 0–5)
IMM GRANULOCYTES NFR BLD: 1 % (ref 0–5)
LACTATE BLDV-SCNC: 1.4 MMOL/L (ref 0.5–2.2)
LYMPHOCYTES NFR BLD: 0.82 K/UL (ref 1.5–4)
LYMPHOCYTES NFR BLD: 1.06 K/UL (ref 1.5–4)
LYMPHOCYTES RELATIVE PERCENT: 12 % (ref 20–42)
LYMPHOCYTES RELATIVE PERCENT: 14 % (ref 20–42)
MAGNESIUM SERPL-MCNC: 1.9 MG/DL (ref 1.6–2.6)
MCH RBC QN AUTO: 28.6 PG (ref 26–35)
MCH RBC QN AUTO: 29.5 PG (ref 26–35)
MCHC RBC AUTO-ENTMCNC: 32.2 G/DL (ref 32–34.5)
MCHC RBC AUTO-ENTMCNC: 32.3 G/DL (ref 32–34.5)
MCV RBC AUTO: 89 FL (ref 80–99.9)
MCV RBC AUTO: 91.6 FL (ref 80–99.9)
MONOCYTES NFR BLD: 0.56 K/UL (ref 0.1–0.95)
MONOCYTES NFR BLD: 0.62 K/UL (ref 0.1–0.95)
MONOCYTES NFR BLD: 7 % (ref 2–12)
MONOCYTES NFR BLD: 9 % (ref 2–12)
NEUTROPHILS NFR BLD: 78 % (ref 43–80)
NEUTROPHILS NFR BLD: 79 % (ref 43–80)
NEUTS SEG NFR BLD: 5.57 K/UL (ref 1.8–7.3)
NEUTS SEG NFR BLD: 5.89 K/UL (ref 1.8–7.3)
PHOSPHATE SERPL-MCNC: 1.9 MG/DL (ref 2.5–4.5)
PLATELET # BLD AUTO: 143 K/UL (ref 130–450)
PLATELET # BLD AUTO: 147 K/UL (ref 130–450)
PMV BLD AUTO: 10.1 FL (ref 7–12)
PMV BLD AUTO: 10.2 FL (ref 7–12)
POTASSIUM SERPL-SCNC: 4.1 MMOL/L (ref 3.5–5)
RBC # BLD AUTO: 2.37 M/UL (ref 3.5–5.5)
RBC # BLD AUTO: 2.55 M/UL (ref 3.5–5.5)
SODIUM SERPL-SCNC: 135 MMOL/L (ref 132–146)
TROPONIN I SERPL HS-MCNC: 65 NG/L (ref 0–9)
TROPONIN I SERPL HS-MCNC: 81 NG/L (ref 0–9)
TSH SERPL DL<=0.05 MIU/L-ACNC: 1.91 UIU/ML (ref 0.27–4.2)
WBC OTHER # BLD: 7.1 K/UL (ref 4.5–11.5)
WBC OTHER # BLD: 7.6 K/UL (ref 4.5–11.5)

## 2023-09-29 PROCEDURE — 85018 HEMOGLOBIN: CPT

## 2023-09-29 PROCEDURE — 97535 SELF CARE MNGMENT TRAINING: CPT

## 2023-09-29 PROCEDURE — 86850 RBC ANTIBODY SCREEN: CPT

## 2023-09-29 PROCEDURE — 2580000003 HC RX 258

## 2023-09-29 PROCEDURE — 36415 COLL VENOUS BLD VENIPUNCTURE: CPT

## 2023-09-29 PROCEDURE — 84484 ASSAY OF TROPONIN QUANT: CPT

## 2023-09-29 PROCEDURE — 82962 GLUCOSE BLOOD TEST: CPT

## 2023-09-29 PROCEDURE — 86923 COMPATIBILITY TEST ELECTRIC: CPT

## 2023-09-29 PROCEDURE — 87040 BLOOD CULTURE FOR BACTERIA: CPT

## 2023-09-29 PROCEDURE — 36430 TRANSFUSION BLD/BLD COMPNT: CPT

## 2023-09-29 PROCEDURE — 83735 ASSAY OF MAGNESIUM: CPT

## 2023-09-29 PROCEDURE — 80048 BASIC METABOLIC PNL TOTAL CA: CPT

## 2023-09-29 PROCEDURE — 30233N1 TRANSFUSION OF NONAUTOLOGOUS RED BLOOD CELLS INTO PERIPHERAL VEIN, PERCUTANEOUS APPROACH: ICD-10-PCS | Performed by: INTERNAL MEDICINE

## 2023-09-29 PROCEDURE — P9016 RBC LEUKOCYTES REDUCED: HCPCS

## 2023-09-29 PROCEDURE — 84443 ASSAY THYROID STIM HORMONE: CPT

## 2023-09-29 PROCEDURE — 84100 ASSAY OF PHOSPHORUS: CPT

## 2023-09-29 PROCEDURE — 86900 BLOOD TYPING SEROLOGIC ABO: CPT

## 2023-09-29 PROCEDURE — 93010 ELECTROCARDIOGRAM REPORT: CPT | Performed by: INTERNAL MEDICINE

## 2023-09-29 PROCEDURE — 2700000000 HC OXYGEN THERAPY PER DAY

## 2023-09-29 PROCEDURE — 6370000000 HC RX 637 (ALT 250 FOR IP): Performed by: INTERNAL MEDICINE

## 2023-09-29 PROCEDURE — 97165 OT EVAL LOW COMPLEX 30 MIN: CPT

## 2023-09-29 PROCEDURE — 94640 AIRWAY INHALATION TREATMENT: CPT

## 2023-09-29 PROCEDURE — 97161 PT EVAL LOW COMPLEX 20 MIN: CPT

## 2023-09-29 PROCEDURE — 86901 BLOOD TYPING SEROLOGIC RH(D): CPT

## 2023-09-29 PROCEDURE — 6360000002 HC RX W HCPCS

## 2023-09-29 PROCEDURE — 2140000000 HC CCU INTERMEDIATE R&B

## 2023-09-29 PROCEDURE — 83605 ASSAY OF LACTIC ACID: CPT

## 2023-09-29 PROCEDURE — 85025 COMPLETE CBC W/AUTO DIFF WBC: CPT

## 2023-09-29 PROCEDURE — 85014 HEMATOCRIT: CPT

## 2023-09-29 PROCEDURE — 97530 THERAPEUTIC ACTIVITIES: CPT

## 2023-09-29 PROCEDURE — 6370000000 HC RX 637 (ALT 250 FOR IP): Performed by: PHYSICIAN ASSISTANT

## 2023-09-29 PROCEDURE — APPSS60 APP SPLIT SHARED TIME 46-60 MINUTES: Performed by: CLINICAL NURSE SPECIALIST

## 2023-09-29 PROCEDURE — 93005 ELECTROCARDIOGRAM TRACING: CPT

## 2023-09-29 PROCEDURE — 99223 1ST HOSP IP/OBS HIGH 75: CPT | Performed by: INTERNAL MEDICINE

## 2023-09-29 PROCEDURE — 2500000003 HC RX 250 WO HCPCS

## 2023-09-29 RX ORDER — METOPROLOL TARTRATE 5 MG/5ML
INJECTION INTRAVENOUS
Status: COMPLETED
Start: 2023-09-29 | End: 2023-09-29

## 2023-09-29 RX ORDER — CITALOPRAM 20 MG/1
20 TABLET ORAL DAILY
Status: DISCONTINUED | OUTPATIENT
Start: 2023-09-29 | End: 2023-10-05 | Stop reason: HOSPADM

## 2023-09-29 RX ORDER — 0.9 % SODIUM CHLORIDE 0.9 %
500 INTRAVENOUS SOLUTION INTRAVENOUS ONCE
Status: DISCONTINUED | OUTPATIENT
Start: 2023-09-29 | End: 2023-09-29

## 2023-09-29 RX ORDER — SODIUM CHLORIDE 9 MG/ML
INJECTION, SOLUTION INTRAVENOUS PRN
Status: DISCONTINUED | OUTPATIENT
Start: 2023-09-29 | End: 2023-10-05 | Stop reason: HOSPADM

## 2023-09-29 RX ORDER — SODIUM CHLORIDE 0.9 % (FLUSH) 0.9 %
10 SYRINGE (ML) INJECTION PRN
Status: DISCONTINUED | OUTPATIENT
Start: 2023-09-29 | End: 2023-10-05 | Stop reason: HOSPADM

## 2023-09-29 RX ORDER — METOPROLOL TARTRATE 5 MG/5ML
5 INJECTION INTRAVENOUS ONCE
Status: COMPLETED | OUTPATIENT
Start: 2023-09-29 | End: 2023-09-29

## 2023-09-29 RX ORDER — 0.9 % SODIUM CHLORIDE 0.9 %
1000 INTRAVENOUS SOLUTION INTRAVENOUS ONCE
Status: DISCONTINUED | OUTPATIENT
Start: 2023-09-29 | End: 2023-09-29 | Stop reason: HOSPADM

## 2023-09-29 RX ORDER — SODIUM CHLORIDE 0.9 % (FLUSH) 0.9 %
5-40 SYRINGE (ML) INJECTION EVERY 12 HOURS SCHEDULED
Status: DISCONTINUED | OUTPATIENT
Start: 2023-09-29 | End: 2023-10-05 | Stop reason: HOSPADM

## 2023-09-29 RX ORDER — ONDANSETRON 4 MG/1
4 TABLET, ORALLY DISINTEGRATING ORAL EVERY 8 HOURS PRN
Status: DISCONTINUED | OUTPATIENT
Start: 2023-09-29 | End: 2023-10-02

## 2023-09-29 RX ORDER — LEVOTHYROXINE SODIUM 0.03 MG/1
25 TABLET ORAL DAILY
Status: DISCONTINUED | OUTPATIENT
Start: 2023-09-29 | End: 2023-10-05 | Stop reason: HOSPADM

## 2023-09-29 RX ORDER — SENNA AND DOCUSATE SODIUM 50; 8.6 MG/1; MG/1
1 TABLET, FILM COATED ORAL 2 TIMES DAILY
Status: DISCONTINUED | OUTPATIENT
Start: 2023-09-29 | End: 2023-10-05 | Stop reason: HOSPADM

## 2023-09-29 RX ORDER — ROSUVASTATIN CALCIUM 20 MG/1
40 TABLET, COATED ORAL DAILY
Status: DISCONTINUED | OUTPATIENT
Start: 2023-09-29 | End: 2023-10-05 | Stop reason: HOSPADM

## 2023-09-29 RX ORDER — ALBUTEROL SULFATE 2.5 MG/3ML
2.5 SOLUTION RESPIRATORY (INHALATION) EVERY 6 HOURS PRN
Status: DISCONTINUED | OUTPATIENT
Start: 2023-09-29 | End: 2023-10-05 | Stop reason: HOSPADM

## 2023-09-29 RX ORDER — INSULIN LISPRO 100 [IU]/ML
0-4 INJECTION, SOLUTION INTRAVENOUS; SUBCUTANEOUS NIGHTLY
Status: DISCONTINUED | OUTPATIENT
Start: 2023-09-29 | End: 2023-10-05 | Stop reason: HOSPADM

## 2023-09-29 RX ORDER — DEXTROSE MONOHYDRATE 100 MG/ML
INJECTION, SOLUTION INTRAVENOUS CONTINUOUS PRN
Status: DISCONTINUED | OUTPATIENT
Start: 2023-09-29 | End: 2023-10-05 | Stop reason: HOSPADM

## 2023-09-29 RX ORDER — SODIUM CHLORIDE 0.9 % (FLUSH) 0.9 %
5-40 SYRINGE (ML) INJECTION PRN
Status: DISCONTINUED | OUTPATIENT
Start: 2023-09-29 | End: 2023-10-05 | Stop reason: HOSPADM

## 2023-09-29 RX ORDER — DIPHENHYDRAMINE HCL 25 MG
25 TABLET ORAL NIGHTLY PRN
Status: DISCONTINUED | OUTPATIENT
Start: 2023-09-29 | End: 2023-10-05 | Stop reason: HOSPADM

## 2023-09-29 RX ORDER — INSULIN LISPRO 100 [IU]/ML
0-8 INJECTION, SOLUTION INTRAVENOUS; SUBCUTANEOUS
Status: DISCONTINUED | OUTPATIENT
Start: 2023-09-29 | End: 2023-10-05 | Stop reason: HOSPADM

## 2023-09-29 RX ORDER — METOPROLOL SUCCINATE 25 MG/1
25 TABLET, EXTENDED RELEASE ORAL DAILY
Status: DISCONTINUED | OUTPATIENT
Start: 2023-09-29 | End: 2023-09-29

## 2023-09-29 RX ORDER — POLYETHYLENE GLYCOL 3350 17 G/17G
17 POWDER, FOR SOLUTION ORAL DAILY
Status: DISCONTINUED | OUTPATIENT
Start: 2023-09-29 | End: 2023-10-05 | Stop reason: HOSPADM

## 2023-09-29 RX ORDER — MAGNESIUM SULFATE 1 G/100ML
1000 INJECTION INTRAVENOUS ONCE
Status: COMPLETED | OUTPATIENT
Start: 2023-09-29 | End: 2023-09-29

## 2023-09-29 RX ORDER — OXYCODONE HYDROCHLORIDE 5 MG/1
5 TABLET ORAL EVERY 4 HOURS PRN
Status: DISCONTINUED | OUTPATIENT
Start: 2023-09-29 | End: 2023-10-04

## 2023-09-29 RX ORDER — BISACODYL 5 MG/1
5 TABLET, DELAYED RELEASE ORAL DAILY
Status: DISCONTINUED | OUTPATIENT
Start: 2023-09-29 | End: 2023-10-05 | Stop reason: HOSPADM

## 2023-09-29 RX ORDER — METOPROLOL SUCCINATE 25 MG/1
25 TABLET, EXTENDED RELEASE ORAL 2 TIMES DAILY
Status: DISCONTINUED | OUTPATIENT
Start: 2023-09-29 | End: 2023-10-02

## 2023-09-29 RX ORDER — PANTOPRAZOLE SODIUM 40 MG/1
40 TABLET, DELAYED RELEASE ORAL DAILY
Status: DISCONTINUED | OUTPATIENT
Start: 2023-09-29 | End: 2023-10-05 | Stop reason: HOSPADM

## 2023-09-29 RX ORDER — ACETAMINOPHEN 325 MG/1
650 TABLET ORAL EVERY 6 HOURS PRN
Status: DISCONTINUED | OUTPATIENT
Start: 2023-09-29 | End: 2023-10-05 | Stop reason: HOSPADM

## 2023-09-29 RX ORDER — IPRATROPIUM BROMIDE AND ALBUTEROL SULFATE 2.5; .5 MG/3ML; MG/3ML
1 SOLUTION RESPIRATORY (INHALATION)
Status: DISCONTINUED | OUTPATIENT
Start: 2023-09-29 | End: 2023-10-02

## 2023-09-29 RX ORDER — 0.9 % SODIUM CHLORIDE 0.9 %
500 INTRAVENOUS SOLUTION INTRAVENOUS ONCE
Status: COMPLETED | OUTPATIENT
Start: 2023-09-29 | End: 2023-09-29

## 2023-09-29 RX ORDER — OXYCODONE HYDROCHLORIDE 10 MG/1
10 TABLET ORAL EVERY 4 HOURS PRN
Status: DISCONTINUED | OUTPATIENT
Start: 2023-09-29 | End: 2023-10-04

## 2023-09-29 RX ADMIN — ACETAMINOPHEN 650 MG: 325 TABLET ORAL at 01:17

## 2023-09-29 RX ADMIN — MAGNESIUM SULFATE HEPTAHYDRATE 1000 MG: 1 INJECTION, SOLUTION INTRAVENOUS at 04:20

## 2023-09-29 RX ADMIN — ACETAMINOPHEN 650 MG: 325 TABLET ORAL at 22:04

## 2023-09-29 RX ADMIN — CITALOPRAM HYDROBROMIDE 20 MG: 20 TABLET ORAL at 09:23

## 2023-09-29 RX ADMIN — IPRATROPIUM BROMIDE AND ALBUTEROL SULFATE 1 DOSE: .5; 2.5 SOLUTION RESPIRATORY (INHALATION) at 11:46

## 2023-09-29 RX ADMIN — METFORMIN HYDROCHLORIDE 500 MG: 500 TABLET ORAL at 16:32

## 2023-09-29 RX ADMIN — BISACODYL 5 MG: 5 TABLET, COATED ORAL at 09:25

## 2023-09-29 RX ADMIN — OXYCODONE HYDROCHLORIDE 5 MG: 5 TABLET ORAL at 18:03

## 2023-09-29 RX ADMIN — INSULIN LISPRO 4 UNITS: 100 INJECTION, SOLUTION INTRAVENOUS; SUBCUTANEOUS at 09:22

## 2023-09-29 RX ADMIN — ACETAMINOPHEN 650 MG: 325 TABLET ORAL at 09:21

## 2023-09-29 RX ADMIN — LEVOTHYROXINE SODIUM 25 MCG: 0.03 TABLET ORAL at 09:24

## 2023-09-29 RX ADMIN — DEXTROSE MONOHYDRATE 150 MG: 50 INJECTION, SOLUTION INTRAVENOUS at 03:17

## 2023-09-29 RX ADMIN — METFORMIN HYDROCHLORIDE 500 MG: 500 TABLET ORAL at 09:23

## 2023-09-29 RX ADMIN — ROSUVASTATIN CALCIUM 40 MG: 20 TABLET, FILM COATED ORAL at 09:23

## 2023-09-29 RX ADMIN — SODIUM CHLORIDE 1000 ML: 9 INJECTION, SOLUTION INTRAVENOUS at 02:52

## 2023-09-29 RX ADMIN — IPRATROPIUM BROMIDE AND ALBUTEROL SULFATE 1 DOSE: .5; 2.5 SOLUTION RESPIRATORY (INHALATION) at 19:46

## 2023-09-29 RX ADMIN — SODIUM CHLORIDE 500 ML: 9 INJECTION, SOLUTION INTRAVENOUS at 04:19

## 2023-09-29 RX ADMIN — INSULIN LISPRO 4 UNITS: 100 INJECTION, SOLUTION INTRAVENOUS; SUBCUTANEOUS at 21:01

## 2023-09-29 RX ADMIN — OXYCODONE HYDROCHLORIDE 5 MG: 5 TABLET ORAL at 22:04

## 2023-09-29 RX ADMIN — IPRATROPIUM BROMIDE AND ALBUTEROL SULFATE 1 DOSE: .5; 2.5 SOLUTION RESPIRATORY (INHALATION) at 16:16

## 2023-09-29 RX ADMIN — POTASSIUM PHOSPHATE, MONOBASIC AND POTASSIUM PHOSPHATE, DIBASIC 15 MMOL: 224; 236 INJECTION, SOLUTION, CONCENTRATE INTRAVENOUS at 05:29

## 2023-09-29 RX ADMIN — INSULIN LISPRO 4 UNITS: 100 INJECTION, SOLUTION INTRAVENOUS; SUBCUTANEOUS at 12:31

## 2023-09-29 RX ADMIN — MUPIROCIN: 20 OINTMENT TOPICAL at 22:09

## 2023-09-29 RX ADMIN — METOPROLOL TARTRATE 5 MG: 5 INJECTION INTRAVENOUS at 02:51

## 2023-09-29 RX ADMIN — INSULIN LISPRO 2 UNITS: 100 INJECTION, SOLUTION INTRAVENOUS; SUBCUTANEOUS at 16:32

## 2023-09-29 RX ADMIN — PANTOPRAZOLE SODIUM 40 MG: 40 TABLET, DELAYED RELEASE ORAL at 09:24

## 2023-09-29 RX ADMIN — IPRATROPIUM BROMIDE AND ALBUTEROL SULFATE 1 DOSE: .5; 2.5 SOLUTION RESPIRATORY (INHALATION) at 08:31

## 2023-09-29 RX ADMIN — METOPROLOL SUCCINATE 25 MG: 25 TABLET, EXTENDED RELEASE ORAL at 20:54

## 2023-09-29 RX ADMIN — METOPROLOL SUCCINATE 25 MG: 25 TABLET, EXTENDED RELEASE ORAL at 09:24

## 2023-09-29 RX ADMIN — SENNOSIDES AND DOCUSATE SODIUM 1 TABLET: 50; 8.6 TABLET ORAL at 20:54

## 2023-09-29 RX ADMIN — SENNOSIDES AND DOCUSATE SODIUM 1 TABLET: 50; 8.6 TABLET ORAL at 09:23

## 2023-09-29 ASSESSMENT — PAIN DESCRIPTION - ORIENTATION
ORIENTATION: RIGHT;LEFT
ORIENTATION: LOWER
ORIENTATION: LOWER;MID
ORIENTATION: LOWER
ORIENTATION: LOWER;MID

## 2023-09-29 ASSESSMENT — PAIN SCALES - GENERAL
PAINLEVEL_OUTOF10: 7
PAINLEVEL_OUTOF10: 7
PAINLEVEL_OUTOF10: 5
PAINLEVEL_OUTOF10: 2
PAINLEVEL_OUTOF10: 0
PAINLEVEL_OUTOF10: 6
PAINLEVEL_OUTOF10: 6
PAINLEVEL_OUTOF10: 3

## 2023-09-29 ASSESSMENT — PAIN DESCRIPTION - LOCATION
LOCATION: BACK
LOCATION: BACK;OTHER (COMMENT)
LOCATION: LEG
LOCATION: BACK;LEG
LOCATION: BACK;LEG
LOCATION: LEG

## 2023-09-29 ASSESSMENT — PAIN SCALES - WONG BAKER
WONGBAKER_NUMERICALRESPONSE: 2
WONGBAKER_NUMERICALRESPONSE: 2

## 2023-09-29 ASSESSMENT — PAIN DESCRIPTION - DESCRIPTORS
DESCRIPTORS: ACHING;DISCOMFORT;SHARP
DESCRIPTORS: ACHING
DESCRIPTORS: ACHING;DISCOMFORT;SHARP
DESCRIPTORS: ACHING;DISCOMFORT;SORE
DESCRIPTORS: ACHING;DISCOMFORT;SORE

## 2023-09-29 ASSESSMENT — PAIN DESCRIPTION - PAIN TYPE: TYPE: SURGICAL PAIN;ACUTE PAIN

## 2023-09-29 NOTE — PROGRESS NOTES
ADL/functional transfer/mobility tasks. Therapist facilitated ADL tasks, functional transfers, functional mobility, bed mobility to address safety awareness, implementation of fall prevention strategies, & functional engagement throughout daily activities. Pt would benefit from continued skilled OT to increase safety and independence with completion of ADL/IADL tasks for functional independence and quality of life. Treatment: Pt required min vc's for proper technique/safety with hand placement/body mechanics/posture for bed mobility/ADLs/functional tranfers/mobility/ww management. Pt required min vc's for sequencing of ADLs/functional transfers to implement fall prevention strategies. Pt able to sit EOB ~10 mins to increase core strength/balance/activity tolerance for ease with ADLs. Pt educated on spinal precautions & activity modifications/adaptations to promote implementation of proper body mechanics, fall prevention strategies, & independence throughout ADLs. Pt required increased time to complete ADLs/functional transfers d/t deconditioning. Pt required skilled monitoring of vitals during session to maximize safe participation throughout functional activities. Pt appeared to have tolerated session well and appears motivated/cooperative/pleasant. Pt instructed on use of call light for assistance and fall prevention. Pt demo'ing fair understanding of education provided. Continue to educate. Rehab Potential: Good for established goals     LTG: maximize independence with ADLs to return to PLOF    Patient and/or family were instructed on functional diagnosis, prognosis/goals and OT plan of care. Demonstrated fair understanding.        Eval Complexity: Low  History: Expanded chart review of medical records and additional review of physical, cognitive, or psychosocial history related to current functional performance  Exam: 3+ performance deficits  Assistance/Modification: Min/mod assistance or modifications required to perform tasks. May have comorbidities that affect occupational performance. Evaluation time includes thorough review of current medical information, gathering information on past medical & social history & PLOF, completion of standardized testing, informal observation of tasks, consultation with other medical professions/disciplines, assessment of data & development of POC/goals. Time In: 9:35a  Time Out: 9:58a  Total Treatment Time: 8 minutes    Min Units   OT Eval Low 78555  x     OT Eval Medium 24968      OT Eval High 05747      OT Re-Eval [id number]       Therapeutic Ex 26602       Therapeutic Activities 55880       ADL/Self Care 33308  8 1    Orthotic Management 70931       Manual 26877     Neuro Re-Ed 59794       Non-Billable Time          Evaluation Time additionally includes thorough review of current medical information, gathering information on past medical history/social history and prior level of function, interpretation of standardized testing/informal observation of tasks, assessment of data and development of plan of care and goals.             Robyn Boone OTR/L; [id number]

## 2023-09-29 NOTE — PLAN OF CARE

## 2023-09-29 NOTE — PROGRESS NOTES
Physical Therapy  Physical Therapy Initial Assessment     Name: Dameon Ding  : 1955  MRN: [id number]      Date of Service: 2023    Evaluating PT:  Shannan Oliveira PT, DPT    Room #:  9099/3397-J  Diagnosis:  Atrial fibrillation with rapid ventricular response (HCC) [I48.91]  PMHx/PSHx:  CAD, HTN, DM II, HLD, PVD, tobacco abuse  Procedure/Surgery:  s/p L4-L5, L5-S1 PLIF   Precautions:  spine, LSO, O2, fall risk  Equipment Owned: cane, ww  Equipment Needs:  TBD    SUBJECTIVE:    Pt lives with spouse in a 3 story home with no steps to enter. Bed/bath is on 1st floor. Pt ambulated with no AD PTA. OBJECTIVE:   Initial Evaluation  Date: 23 Treatment Short Term/ Long Term   Goals   AM-PAC 6 Clicks 07/92     Was pt agreeable to Eval/treatment? yes     Does pt have pain?  7/10 BLE     Bed Mobility  Rolling: NT  Supine to sit: NT  Sit to supine: NT  Scooting: SBA  Rolling: mod I  Supine to sit: mod I  Sit to supine: mod I  Scooting: mod I   Transfers Sit to stand: CGA  Stand to sit: CGA  Stand pivot: CGA with ww  Sit to stand: mod I  Stand to sit: mod I  Stand pivot: mod I with AAD   Ambulation    50'x2 with ww CGA  200'+ with AAD mod I   Stair negotiation: ascended and descended  NT  3 steps with no handrail with AAD mod I     Strength/ROM:   BLE grossly 4/5  BLE AROM WFL    Balance:   Static Sitting: IND  Dynamic Sitting: Supervision  Static Standing: SBA with ww  Dynamic Standing: CGA with ww    Pt is A & O x 3  Sensation:  Pt denies numbness and tingling to extremities  Edema:  unremarkable    Vitals:  SpO2 and HR were stable during session    Therapeutic Exercises:    Transfers: STS x2, cued for hand placement and postural correction  Ambulation: 50'x2 with ww  BLE AROM    Patient education  Pt educated on role of PT, importance of functional mobility during hospital stay, safety with functional mobility    Patient response to education:   Pt verbalized understanding Pt demonstrated skill Pt requires further education in this area   yes yes reinforce     ASSESSMENT:    Conditions Requiring Skilled Therapeutic Intervention:    [x]Decreased strength     []Decreased ROM  [x]Decreased functional mobility  [x]Decreased balance   [x]Decreased endurance   [x]Decreased posture  []Decreased sensation  []Decreased coordination   []Decreased vision  []Decreased safety awareness   [x]Increased pain       Comments:  Pt sitting at EOB upon entering, pt agreeable to participate. Pt assisted with donning LSO. Pt instructed to transfer from EOB, cued for hand placement. Pt standing with good balance with ww. Pt instructed to ambulate to tolerance. Pt ambulating with decreased barb and flexed trunk, cueing provided for ww spacing and postural correction. Pt demonstrated fair tolerance to ambulation bout. Pt was assisted back to bedside and was transferred to bedside chair. Pt positioned for comfort with all needs met and call bell in reach prior to exiting. Nursing aware of pt performance. Treatment:  Patient practiced and was instructed in the following treatment:    STS and pivot transfer training - pt educated on proper hand and foot placement, safety and sequencing, and use of verbal and tactile cues to safely complete sit<>stand and pivot transfers with hands on assistance to complete task safely   Gait training- pt was given verbal and tactile cues to facilitate pt safety with ww during ambulation as well as provided with physical assistance. Pt's/ family goals   1. ARU    Prognosis is good for reaching above PT goals. Patient and or family understand(s) diagnosis, prognosis, and plan of care.   yes    PHYSICAL THERAPY PLAN OF CARE:    PT POC is established based on physician order and patient diagnosis     Referring provider/PT Order:  Joey Muro DO  Diagnosis:  Atrial fibrillation with rapid ventricular response ([address]) [I48.91]  Specific instructions for next treatment:  Progress as

## 2023-09-29 NOTE — CONSULTS
Patient seen and examined. Chart, labs, imaging studies, EKG and rhythm strips reviewed. Full consult to follow. We are consulted for:  A Fib RVR >> converted to NSR / Abnormal EKG     IMPRESSION:  PAF RVR >> NSR with IV amiodarone bolus. Hx PAF in postoperative period after CAGB in 11/2022;  [address] with Coumadin (patient choice due to finances) on hold. No anticoagulants can be resumed until 10/2/2023 per neurosurgery. Type II NSTEMI secondary to demand ischemia. Elevated Troponin (65>>81). Denies Chest pain. EKG with lateral ST / T wave changes suggestive of ischemia  Lumbar canal stenosis from L1 to S1 / Lumbar scoliosis s/p L1-S1 fusion and L4-S1 PLIF 9/25 Dr. Parish Quiroz op anemia Hgb 13.7 on 9/18/23 >> 7.0 today  CAD s/p CABG X 2  (LIMA> LAD, reverse SVT > Dominant obtuse marginal branch of circumflex. Left atrial appendage ligation using a 40-mm AtriCure appendage clip.11/2022 Dr. Dixie Veliz  Mild VHD:  Mild MR,AS,TR, pulm regurg per TTE 10/2022  Hypotensive when in A Fib RVR -now resolved; Hx HTN  DM II insulin requiring with neuropathy;HgA1C 9 6/23; Follows with Endocrine Dr. Baldev Gunn  PAD / PVD - follows with Dr. Mayur Enciso   s/p L CEA 10/2018; Bilateral Carotid stenosis  Right ICA 60 to 69% stenosis; Left ICA 1 to 49% stenosis 3/7/2022 US  s/p bilateral common iliac stent placement and R external iliac angioplasty 12/2018  Mixed HLD - on statin   Hypothyroidism - on HRT  Former Tobacco abuse - quit 6/2023   Mild COPD / Pulmonary Nodule - followed by Dr. Allen El  CTA chest 9/27/2023 Small right pleural effusion 8 mm round nodule right lower lobe. 9 mm spiculated lesion left upper lobe. Findings worrisome for malignancy. Patient ordered PET CT - not yet done - scheduled for Nov per patient   GERD    Hx Vocal cord leukoplakia      PLAN:   Transfuse 1 unit PRBC. Keep Hgb =/> 8.0  Increase Toprol XL to 25mg BID.   Monitor HR & BP  Consider oral nitrates   Not a candidate for antiplatelet therapy or anticoagulation therapy at present (s/p lumbar spine surgery). Coumadin & Aspirin to be resumed on 10/2/23, as advised by neurosurgery  Cardiology will sign off, please call if needed.      Electronically signed by Hope Lawton MD on 9/29/2023 at 12:19 PM

## 2023-09-29 NOTE — CONSULTS
Dose, 1 Dose, Inhalation, Q4H WA RT  levothyroxine (SYNTHROID) tablet 25 mcg, 25 mcg, Oral, Daily  metFORMIN (GLUCOPHAGE) tablet 500 mg, 500 mg, Oral, BID WC  metoprolol succinate (TOPROL XL) extended release tablet 25 mg, 25 mg, Oral, Daily  mupirocin (BACTROBAN) 2 % ointment, , Each Nostril, BID  pantoprazole (PROTONIX) tablet 40 mg, 40 mg, Oral, Daily  polyethylene glycol (GLYCOLAX) packet 17 g, 17 g, Oral, Daily  rosuvastatin (CRESTOR) tablet 40 mg, 40 mg, Oral, Daily  sennosides-docusate sodium (SENOKOT-S) 8.6-50 MG tablet 1 tablet, 1 tablet, Oral, BID  sodium chloride flush 0.9 % injection 5-40 mL, 5-40 mL, IntraVENous, 2 times per day  albuterol (PROVENTIL) (2.5 MG/3ML) 0.083% nebulizer solution 2.5 mg, 2.5 mg, Nebulization, Q6H PRN  dextrose 10 % infusion, , IntraVENous, Continuous PRN  dextrose bolus 10% 125 mL, 125 mL, IntraVENous, PRN **OR** dextrose bolus 10% 250 mL, 250 mL, IntraVENous, PRN  diphenhydrAMINE (BENADRYL) tablet 25 mg, 25 mg, Oral, Nightly PRN  glucagon injection 1 mg, 1 mg, SubCUTAneous, PRN  glucose chewable tablet 16 g, 4 tablet, Oral, PRN  magnesium hydroxide (MILK OF MAGNESIA) 400 MG/5ML suspension 30 mL, 30 mL, Oral, Daily PRN  ondansetron (ZOFRAN-ODT) disintegrating tablet 4 mg, 4 mg, Oral, Q8H PRN  oxyCODONE (ROXICODONE) immediate release tablet 5 mg, 5 mg, Oral, Q4H PRN **OR** oxyCODONE HCl (OXY-IR) immediate release tablet 10 mg, 10 mg, Oral, Q4H PRN  sodium chloride flush 0.9 % injection 10 mL, 10 mL, IntraVENous, PRN  sodium chloride flush 0.9 % injection 5-40 mL, 5-40 mL, IntraVENous, PRN    Allergies:  Pcn [penicillins], Fosamax [alendronate], and Lipitor [atorvastatin]    Social History:       Lives at home with   Tobacco abuse - 0.5 PPD for 40 years - quit 6/2023   Rare ETOH   No marijuana or illicit drugs   Functional Capacity:  Limited due to chronic back pain. Does not use assistive device (prior to admit).   Has basement steps -She is able to go up and down procedures  Formulating the assessment/plan and reviewing the rationale for the above recommendations  Reviewing available records, results of all previously ordered testing/procedures and current problem list  Counseling/educating the patient  Coordinating care with other healthcare professionals  Communicating results to the patient's family/caregiver  Documenting clinical information in the patient's electronic health records     Electronically signed by Ted Aaron MD on 9/29/2023 at 2:42 PM

## 2023-09-29 NOTE — PROGRESS NOTES
4 Eyes Skin Assessment     NAME:  Avril Vazquez  YOB: 1955  MEDICAL RECORD NUMBER:  [id number]    The patient is being assessed for  {Reason for Assessment:20080}    I agree that at least one RN has performed a thorough Head to Toe Skin Assessment on the patient. ALL assessment sites listed below have been assessed. Areas assessed by both nurses:    Head, Face, Ears, Shoulders, Back, Chest, Arms, Elbows, Hands, Sacrum. Buttock, Coccyx, Ischium, and Legs. Feet and Heels        Does the Patient have a Wound? Yes wound(s) were present on assessment.  LDA wound assessment was Initiated and completed by RN       Devon Prevention initiated by RN: Yes  Wound Care Orders initiated by RN: Yes    Pressure Injury (Stage 3,4, Unstageable, DTI, NWPT, and Complex wounds) if present, place Wound referral order by RN under : No    New Ostomies, if present place, Ostomy referral order under : No     Nurse 1 eSignature: Electronically signed by Napoleon Gonzalez RN on 9/29/23 at 4:45 AM EDT    **SHARE this note so that the co-signing nurse can place an eSignature**    Nurse 2 eSignature: {Esignature:[id number]}

## 2023-09-29 NOTE — PROGRESS NOTES
3868: Patient received to unit in Afib RVR accompanied by Dr. Steffi Cedeño. Patient denies shortness of breath, chest pain or dizziness. 0355: EKG completed and pt in SR with a heart rate of 76.   0400: Blood pressure 76/40 manually. Dr. Steffi Cedeño notified, orders placed and Dr at bedside.

## 2023-09-29 NOTE — PROGRESS NOTES
Blood administered. Present at bedside while the first fifteen minutes of blood administering. VSS and no adverse reactions at this time.      Reynaldo Izquierdo RN

## 2023-09-29 NOTE — PATIENT CARE CONFERENCE
P Quality Flow/Interdisciplinary Rounds Progress Note        Quality Flow Rounds held on September 29, 2023    Disciplines Attending:  Bedside Nurse, , , and Nursing Unit Leadership    Tam Pacheco was admitted on 9/29/2023  3:22 AM    Anticipated Discharge Date:       Disposition:    Devon Score:  Devon Scale Score: 20    Readmission Risk              Risk of Unplanned Readmission:  12           Discussed patient goal for the day, patient clinical progression, and barriers to discharge.   The following Goal(s) of the Day/Commitment(s) have been identified:  Labs - Report Results      Sedrick Chi RN  September 29, 2023

## 2023-09-29 NOTE — H&P
[address], [address] [place] [address]                              HISTORY AND PHYSICAL    PATIENT NAME: Haven Duval                 :        1955  MED REC NO:   [id number]                            ROOM:       [id number]  ACCOUNT NO:   [de-identified]                           ADMIT DATE: 2023  PROVIDER:     Diana Ludwig DO    CHIEF COMPLAINT:  Atrial fibrillation with RVR. HISTORY OF PRESENT ILLNESS:  The patient is a 76-year-old   female who was in the acute rehab floor after undergoing a lumbar fusion  on 2023. She was noted to be in atrial fibrillation with RVR and  transferred off the acute rehab floor to the acute inpatient unit. She  converted after one dose of amiodarone upon arrival to the acute  inpatient unit. PAST MEDICAL HISTORY:  Paroxysmal atrial fibrillation after cardiac  surgery in , chronic anticoagulation which is on hold, post lumbar  fusion, arthritis, colon polyps, diabetes mellitus on insulin, skin  cancer, hyperlipidemia, hypertension, hypothyroidism, migraine headache,  neuropathy, peripheral vascular disease, cerebrovascular disease,  psoriasis. PAST SURGICAL HISTORY:  Low back surgery, left carotid endarterectomy,  bilateral carpal tunnel surgery, , coronary artery bypass  graft, hysterectomy, laryngoscopy, bilateral iliac artery stents, sinus  surgery, bilateral ingrown toenail surgery, tongue biopsy,  tonsillectomy. SOCIAL HISTORY:  The patient quit tobacco, no alcohol. REVIEW OF SYSTEMS:  Remarkable for above-stated chief complaint. ALLERGIES:  None known. MEDICATIONS PRIOR TO ADMISSION:  Stiolto, Ventolin inhaler p.r.n.,  Celexa, Humalog, Toprol XL, Crestor, Synthroid, Coumadin will which was  on hold postoperatively, metformin, Advil p.r.n., Taltz subcutaneous,  omeprazole, aspirin.     PRIMARY CARE PHYSICIAN:  Kemar Solorzano,

## 2023-09-30 LAB
BASOPHILS # BLD: 0.01 K/UL (ref 0–0.2)
BASOPHILS NFR BLD: 0 % (ref 0–2)
EOSINOPHIL # BLD: 0.09 K/UL (ref 0.05–0.5)
EOSINOPHILS RELATIVE PERCENT: 1 % (ref 0–6)
ERYTHROCYTE [DISTWIDTH] IN BLOOD BY AUTOMATED COUNT: 15.8 % (ref 11.5–15)
GLUCOSE BLD-MCNC: 202 MG/DL (ref 74–99)
GLUCOSE BLD-MCNC: 218 MG/DL (ref 74–99)
GLUCOSE BLD-MCNC: 235 MG/DL (ref 74–99)
GLUCOSE BLD-MCNC: 245 MG/DL (ref 74–99)
HCT VFR BLD AUTO: 23.9 % (ref 34–48)
HCT VFR BLD AUTO: 26.6 % (ref 34–48)
HGB BLD-MCNC: 7.9 G/DL (ref 11.5–15.5)
HGB BLD-MCNC: 8.6 G/DL (ref 11.5–15.5)
IMM GRANULOCYTES # BLD AUTO: 0.04 K/UL (ref 0–0.58)
IMM GRANULOCYTES NFR BLD: 1 % (ref 0–5)
LYMPHOCYTES NFR BLD: 0.93 K/UL (ref 1.5–4)
LYMPHOCYTES RELATIVE PERCENT: 14 % (ref 20–42)
MCH RBC QN AUTO: 29.2 PG (ref 26–35)
MCHC RBC AUTO-ENTMCNC: 33.1 G/DL (ref 32–34.5)
MCV RBC AUTO: 88.2 FL (ref 80–99.9)
MONOCYTES NFR BLD: 0.64 K/UL (ref 0.1–0.95)
MONOCYTES NFR BLD: 9 % (ref 2–12)
NEUTROPHILS NFR BLD: 75 % (ref 43–80)
NEUTS SEG NFR BLD: 5.19 K/UL (ref 1.8–7.3)
PLATELET # BLD AUTO: 155 K/UL (ref 130–450)
PMV BLD AUTO: 10 FL (ref 7–12)
RBC # BLD AUTO: 2.71 M/UL (ref 3.5–5.5)
WBC OTHER # BLD: 6.9 K/UL (ref 4.5–11.5)

## 2023-09-30 PROCEDURE — 93005 ELECTROCARDIOGRAM TRACING: CPT | Performed by: INTERNAL MEDICINE

## 2023-09-30 PROCEDURE — 2580000003 HC RX 258: Performed by: INTERNAL MEDICINE

## 2023-09-30 PROCEDURE — 2700000000 HC OXYGEN THERAPY PER DAY

## 2023-09-30 PROCEDURE — 36415 COLL VENOUS BLD VENIPUNCTURE: CPT

## 2023-09-30 PROCEDURE — 82962 GLUCOSE BLOOD TEST: CPT

## 2023-09-30 PROCEDURE — 85025 COMPLETE CBC W/AUTO DIFF WBC: CPT

## 2023-09-30 PROCEDURE — 6370000000 HC RX 637 (ALT 250 FOR IP): Performed by: INTERNAL MEDICINE

## 2023-09-30 PROCEDURE — 2140000000 HC CCU INTERMEDIATE R&B

## 2023-09-30 PROCEDURE — 85018 HEMOGLOBIN: CPT

## 2023-09-30 PROCEDURE — 2500000003 HC RX 250 WO HCPCS: Performed by: INTERNAL MEDICINE

## 2023-09-30 PROCEDURE — 85014 HEMATOCRIT: CPT

## 2023-09-30 PROCEDURE — 94640 AIRWAY INHALATION TREATMENT: CPT

## 2023-09-30 RX ORDER — METOPROLOL TARTRATE 5 MG/5ML
5 INJECTION INTRAVENOUS ONCE
Status: COMPLETED | OUTPATIENT
Start: 2023-10-01 | End: 2023-09-30

## 2023-09-30 RX ADMIN — SENNOSIDES AND DOCUSATE SODIUM 1 TABLET: 50; 8.6 TABLET ORAL at 21:19

## 2023-09-30 RX ADMIN — LEVOTHYROXINE SODIUM 25 MCG: 0.03 TABLET ORAL at 08:24

## 2023-09-30 RX ADMIN — METOPROLOL SUCCINATE 25 MG: 25 TABLET, EXTENDED RELEASE ORAL at 21:19

## 2023-09-30 RX ADMIN — IPRATROPIUM BROMIDE AND ALBUTEROL SULFATE 1 DOSE: .5; 2.5 SOLUTION RESPIRATORY (INHALATION) at 17:22

## 2023-09-30 RX ADMIN — ROSUVASTATIN CALCIUM 40 MG: 20 TABLET, FILM COATED ORAL at 08:24

## 2023-09-30 RX ADMIN — SODIUM CHLORIDE, PRESERVATIVE FREE 10 ML: 5 INJECTION INTRAVENOUS at 08:25

## 2023-09-30 RX ADMIN — OXYCODONE HYDROCHLORIDE 10 MG: 10 TABLET ORAL at 19:15

## 2023-09-30 RX ADMIN — INSULIN LISPRO 2 UNITS: 100 INJECTION, SOLUTION INTRAVENOUS; SUBCUTANEOUS at 17:03

## 2023-09-30 RX ADMIN — OXYCODONE HYDROCHLORIDE 10 MG: 10 TABLET ORAL at 14:40

## 2023-09-30 RX ADMIN — PANTOPRAZOLE SODIUM 40 MG: 40 TABLET, DELAYED RELEASE ORAL at 08:24

## 2023-09-30 RX ADMIN — ACETAMINOPHEN 650 MG: 325 TABLET ORAL at 04:40

## 2023-09-30 RX ADMIN — SODIUM CHLORIDE, PRESERVATIVE FREE 10 ML: 5 INJECTION INTRAVENOUS at 21:19

## 2023-09-30 RX ADMIN — METOPROLOL TARTRATE 5 MG: 5 INJECTION INTRAVENOUS at 23:57

## 2023-09-30 RX ADMIN — CITALOPRAM HYDROBROMIDE 20 MG: 20 TABLET ORAL at 08:25

## 2023-09-30 RX ADMIN — BISACODYL 5 MG: 5 TABLET, COATED ORAL at 08:25

## 2023-09-30 RX ADMIN — IPRATROPIUM BROMIDE AND ALBUTEROL SULFATE 1 DOSE: .5; 2.5 SOLUTION RESPIRATORY (INHALATION) at 20:49

## 2023-09-30 RX ADMIN — METFORMIN HYDROCHLORIDE 500 MG: 500 TABLET ORAL at 08:25

## 2023-09-30 RX ADMIN — ACETAMINOPHEN 650 MG: 325 TABLET ORAL at 21:19

## 2023-09-30 RX ADMIN — METFORMIN HYDROCHLORIDE 500 MG: 500 TABLET ORAL at 17:03

## 2023-09-30 RX ADMIN — OXYCODONE HYDROCHLORIDE 10 MG: 10 TABLET ORAL at 10:17

## 2023-09-30 RX ADMIN — MUPIROCIN: 20 OINTMENT TOPICAL at 08:26

## 2023-09-30 RX ADMIN — ACETAMINOPHEN 650 MG: 325 TABLET ORAL at 13:19

## 2023-09-30 RX ADMIN — OXYCODONE HYDROCHLORIDE 10 MG: 10 TABLET ORAL at 23:57

## 2023-09-30 RX ADMIN — IPRATROPIUM BROMIDE AND ALBUTEROL SULFATE 1 DOSE: .5; 2.5 SOLUTION RESPIRATORY (INHALATION) at 10:32

## 2023-09-30 RX ADMIN — MUPIROCIN: 20 OINTMENT TOPICAL at 21:29

## 2023-09-30 RX ADMIN — SENNOSIDES AND DOCUSATE SODIUM 1 TABLET: 50; 8.6 TABLET ORAL at 08:24

## 2023-09-30 RX ADMIN — METOPROLOL SUCCINATE 25 MG: 25 TABLET, EXTENDED RELEASE ORAL at 08:24

## 2023-09-30 RX ADMIN — OXYCODONE HYDROCHLORIDE 5 MG: 5 TABLET ORAL at 04:39

## 2023-09-30 RX ADMIN — INSULIN LISPRO 2 UNITS: 100 INJECTION, SOLUTION INTRAVENOUS; SUBCUTANEOUS at 08:25

## 2023-09-30 RX ADMIN — INSULIN LISPRO 2 UNITS: 100 INJECTION, SOLUTION INTRAVENOUS; SUBCUTANEOUS at 11:31

## 2023-09-30 ASSESSMENT — PAIN SCALES - GENERAL
PAINLEVEL_OUTOF10: 7
PAINLEVEL_OUTOF10: 7
PAINLEVEL_OUTOF10: 4
PAINLEVEL_OUTOF10: 7
PAINLEVEL_OUTOF10: 7
PAINLEVEL_OUTOF10: 3
PAINLEVEL_OUTOF10: 7
PAINLEVEL_OUTOF10: 7
PAINLEVEL_OUTOF10: 6
PAINLEVEL_OUTOF10: 4

## 2023-09-30 ASSESSMENT — PAIN DESCRIPTION - LOCATION
LOCATION: BACK
LOCATION: BACK;LEG;FOOT
LOCATION: BACK;LEG;FOOT
LOCATION: BACK;LEG
LOCATION: BACK
LOCATION: BACK;LEG
LOCATION: BACK
LOCATION: LEG;BACK
LOCATION: LEG;BACK
LOCATION: BACK;LEG

## 2023-09-30 ASSESSMENT — PAIN - FUNCTIONAL ASSESSMENT
PAIN_FUNCTIONAL_ASSESSMENT: ACTIVITIES ARE NOT PREVENTED

## 2023-09-30 ASSESSMENT — PAIN SCALES - WONG BAKER
WONGBAKER_NUMERICALRESPONSE: 2

## 2023-09-30 ASSESSMENT — PAIN DESCRIPTION - PAIN TYPE
TYPE: ACUTE PAIN;SURGICAL PAIN

## 2023-09-30 ASSESSMENT — PAIN DESCRIPTION - ORIENTATION
ORIENTATION: LOWER;MID
ORIENTATION: LOWER
ORIENTATION: OTHER (COMMENT)
ORIENTATION: LOWER;MID
ORIENTATION: LEFT;MID
ORIENTATION: LOWER
ORIENTATION: LEFT
ORIENTATION: LOWER;MID

## 2023-09-30 ASSESSMENT — PAIN DESCRIPTION - DESCRIPTORS
DESCRIPTORS: ACHING;DISCOMFORT;STABBING
DESCRIPTORS: ACHING;DISCOMFORT;STABBING
DESCRIPTORS: ACHING;DISCOMFORT;SORE
DESCRIPTORS: ACHING;DISCOMFORT;SORE
DESCRIPTORS: ACHING;SORE;DISCOMFORT
DESCRIPTORS: ACHING;SHARP;DISCOMFORT
DESCRIPTORS: ACHING;DISCOMFORT;SORE
DESCRIPTORS: DISCOMFORT;NUMBNESS;SORE
DESCRIPTORS: ACHING;SORE;DISCOMFORT

## 2023-09-30 NOTE — PLAN OF CARE
Problem: Chronic Conditions and Co-morbidities  Goal: Patient's chronic conditions and co-morbidity symptoms are monitored and maintained or improved  9/29/2023 2245 by Chantal Butler RN  Outcome: Progressing     Problem: Discharge Planning  Goal: Discharge to home or other facility with appropriate resources  9/29/2023 2245 by Chantal Butler RN  Outcome: Progressing     Problem: Safety - Adult  Goal: Free from fall injury  9/29/2023 2245 by Chantal Butler RN  Outcome: Progressing     Problem: Pain  Goal: Verbalizes/displays adequate comfort level or baseline comfort level  9/29/2023 2245 by Chantal Butler RN  Outcome: Progressing     Problem: ABCDS Injury Assessment  Goal: Absence of physical injury  9/29/2023 2245 by Chantal Butler RN  Outcome: Progressing     Problem: Skin/Tissue Integrity  Goal: Absence of new skin breakdown  Description: 1. Monitor for areas of redness and/or skin breakdown  2. Assess vascular access sites hourly  3. Every 4-6 hours minimum:  Change oxygen saturation probe site  4. Every 4-6 hours:  If on nasal continuous positive airway pressure, respiratory therapy assess nares and determine need for appliance change or resting period.   9/29/2023 2245 by Chantal Butler RN  Outcome: Progressing

## 2023-09-30 NOTE — PLAN OF CARE
Problem: Chronic Conditions and Co-morbidities  Goal: Patient's chronic conditions and co-morbidity symptoms are monitored and maintained or improved  9/30/2023 0855 by Bassem Holbrook RN  Outcome: Progressing     Problem: Discharge Planning  Goal: Discharge to home or other facility with appropriate resources  9/30/2023 0855 by Lu Madrigal RN  Outcome: Progressing     Problem: Safety - Adult  Goal: Free from fall injury  9/30/2023 0855 by Bassem Holbrook RN  Outcome: Progressing     Problem: Pain  Goal: Verbalizes/displays adequate comfort level or baseline comfort level  9/30/2023 0855 by Bassem Holbrook RN  Outcome: Progressing     Problem: ABCDS Injury Assessment  Goal: Absence of physical injury  9/30/2023 0855 by Bassem Holbrook RN  Outcome: Progressing     Problem: Skin/Tissue Integrity  Goal: Absence of new skin breakdown  Description: 1. Monitor for areas of redness and/or skin breakdown  2. Assess vascular access sites hourly  3. Every 4-6 hours minimum:  Change oxygen saturation probe site  4. Every 4-6 hours:  If on nasal continuous positive airway pressure, respiratory therapy assess nares and determine need for appliance change or resting period.   9/30/2023 0855 by Lu Madrigal RN  Outcome: Progressing

## 2023-10-01 LAB
BASOPHILS # BLD: 0.02 K/UL (ref 0–0.2)
BASOPHILS NFR BLD: 0 % (ref 0–2)
EOSINOPHIL # BLD: 0.1 K/UL (ref 0.05–0.5)
EOSINOPHILS RELATIVE PERCENT: 1 % (ref 0–6)
ERYTHROCYTE [DISTWIDTH] IN BLOOD BY AUTOMATED COUNT: 15.4 % (ref 11.5–15)
GLUCOSE BLD-MCNC: 180 MG/DL (ref 74–99)
GLUCOSE BLD-MCNC: 207 MG/DL (ref 74–99)
GLUCOSE BLD-MCNC: 232 MG/DL (ref 74–99)
GLUCOSE BLD-MCNC: 263 MG/DL (ref 74–99)
HCT VFR BLD AUTO: 24.4 % (ref 34–48)
HCT VFR BLD AUTO: 26.9 % (ref 34–48)
HGB BLD-MCNC: 7.9 G/DL (ref 11.5–15.5)
HGB BLD-MCNC: 8.7 G/DL (ref 11.5–15.5)
IMM GRANULOCYTES # BLD AUTO: 0.05 K/UL (ref 0–0.58)
IMM GRANULOCYTES NFR BLD: 1 % (ref 0–5)
LYMPHOCYTES NFR BLD: 1.2 K/UL (ref 1.5–4)
LYMPHOCYTES RELATIVE PERCENT: 17 % (ref 20–42)
MCH RBC QN AUTO: 28.8 PG (ref 26–35)
MCHC RBC AUTO-ENTMCNC: 32.4 G/DL (ref 32–34.5)
MCV RBC AUTO: 89.1 FL (ref 80–99.9)
MICROORGANISM SPEC CULT: NORMAL
MICROORGANISM SPEC CULT: NORMAL
MONOCYTES NFR BLD: 0.75 K/UL (ref 0.1–0.95)
MONOCYTES NFR BLD: 11 % (ref 2–12)
NEUTROPHILS NFR BLD: 70 % (ref 43–80)
NEUTS SEG NFR BLD: 5.03 K/UL (ref 1.8–7.3)
PLATELET # BLD AUTO: 169 K/UL (ref 130–450)
PMV BLD AUTO: 9.8 FL (ref 7–12)
RBC # BLD AUTO: 2.74 M/UL (ref 3.5–5.5)
SERVICE CMNT-IMP: NORMAL
SERVICE CMNT-IMP: NORMAL
SPECIMEN DESCRIPTION: NORMAL
SPECIMEN DESCRIPTION: NORMAL
WBC OTHER # BLD: 7.2 K/UL (ref 4.5–11.5)

## 2023-10-01 PROCEDURE — 85025 COMPLETE CBC W/AUTO DIFF WBC: CPT

## 2023-10-01 PROCEDURE — 6370000000 HC RX 637 (ALT 250 FOR IP): Performed by: INTERNAL MEDICINE

## 2023-10-01 PROCEDURE — 2500000003 HC RX 250 WO HCPCS: Performed by: INTERNAL MEDICINE

## 2023-10-01 PROCEDURE — 85018 HEMOGLOBIN: CPT

## 2023-10-01 PROCEDURE — 36415 COLL VENOUS BLD VENIPUNCTURE: CPT

## 2023-10-01 PROCEDURE — 2140000000 HC CCU INTERMEDIATE R&B

## 2023-10-01 PROCEDURE — 2700000000 HC OXYGEN THERAPY PER DAY

## 2023-10-01 PROCEDURE — 6360000002 HC RX W HCPCS: Performed by: INTERNAL MEDICINE

## 2023-10-01 PROCEDURE — 85014 HEMATOCRIT: CPT

## 2023-10-01 PROCEDURE — 2580000003 HC RX 258: Performed by: INTERNAL MEDICINE

## 2023-10-01 PROCEDURE — 94640 AIRWAY INHALATION TREATMENT: CPT

## 2023-10-01 PROCEDURE — 82962 GLUCOSE BLOOD TEST: CPT

## 2023-10-01 RX ORDER — SODIUM CHLORIDE 9 MG/ML
INJECTION, SOLUTION INTRAVENOUS CONTINUOUS
Status: DISCONTINUED | OUTPATIENT
Start: 2023-10-01 | End: 2023-10-02

## 2023-10-01 RX ORDER — DIGOXIN 0.25 MG/ML
250 INJECTION INTRAMUSCULAR; INTRAVENOUS ONCE
Status: COMPLETED | OUTPATIENT
Start: 2023-10-01 | End: 2023-10-01

## 2023-10-01 RX ADMIN — IPRATROPIUM BROMIDE AND ALBUTEROL SULFATE 1 DOSE: .5; 2.5 SOLUTION RESPIRATORY (INHALATION) at 12:42

## 2023-10-01 RX ADMIN — ROSUVASTATIN CALCIUM 40 MG: 20 TABLET, FILM COATED ORAL at 08:52

## 2023-10-01 RX ADMIN — IPRATROPIUM BROMIDE AND ALBUTEROL SULFATE 1 DOSE: .5; 2.5 SOLUTION RESPIRATORY (INHALATION) at 20:43

## 2023-10-01 RX ADMIN — SODIUM CHLORIDE: 9 INJECTION, SOLUTION INTRAVENOUS at 23:00

## 2023-10-01 RX ADMIN — SODIUM CHLORIDE 2.5 MG/HR: 900 INJECTION, SOLUTION INTRAVENOUS at 21:50

## 2023-10-01 RX ADMIN — METOPROLOL SUCCINATE 25 MG: 25 TABLET, EXTENDED RELEASE ORAL at 20:19

## 2023-10-01 RX ADMIN — SODIUM CHLORIDE 5 MG/HR: 900 INJECTION, SOLUTION INTRAVENOUS at 03:17

## 2023-10-01 RX ADMIN — LEVOTHYROXINE SODIUM 25 MCG: 0.03 TABLET ORAL at 08:52

## 2023-10-01 RX ADMIN — IPRATROPIUM BROMIDE AND ALBUTEROL SULFATE 1 DOSE: .5; 2.5 SOLUTION RESPIRATORY (INHALATION) at 09:00

## 2023-10-01 RX ADMIN — PANTOPRAZOLE SODIUM 40 MG: 40 TABLET, DELAYED RELEASE ORAL at 08:52

## 2023-10-01 RX ADMIN — OXYCODONE HYDROCHLORIDE 10 MG: 10 TABLET ORAL at 13:10

## 2023-10-01 RX ADMIN — ACETAMINOPHEN 650 MG: 325 TABLET ORAL at 05:56

## 2023-10-01 RX ADMIN — IPRATROPIUM BROMIDE AND ALBUTEROL SULFATE 1 DOSE: .5; 2.5 SOLUTION RESPIRATORY (INHALATION) at 16:20

## 2023-10-01 RX ADMIN — OXYCODONE HYDROCHLORIDE 5 MG: 5 TABLET ORAL at 21:48

## 2023-10-01 RX ADMIN — CITALOPRAM HYDROBROMIDE 20 MG: 20 TABLET ORAL at 08:52

## 2023-10-01 RX ADMIN — DIGOXIN 250 MCG: 0.25 INJECTION INTRAMUSCULAR; INTRAVENOUS at 03:13

## 2023-10-01 RX ADMIN — METFORMIN HYDROCHLORIDE 500 MG: 500 TABLET ORAL at 17:34

## 2023-10-01 RX ADMIN — SENNOSIDES AND DOCUSATE SODIUM 1 TABLET: 50; 8.6 TABLET ORAL at 20:19

## 2023-10-01 RX ADMIN — INSULIN LISPRO 4 UNITS: 100 INJECTION, SOLUTION INTRAVENOUS; SUBCUTANEOUS at 12:28

## 2023-10-01 RX ADMIN — INSULIN LISPRO 2 UNITS: 100 INJECTION, SOLUTION INTRAVENOUS; SUBCUTANEOUS at 08:56

## 2023-10-01 RX ADMIN — OXYCODONE HYDROCHLORIDE 10 MG: 10 TABLET ORAL at 08:55

## 2023-10-01 RX ADMIN — ACETAMINOPHEN 650 MG: 325 TABLET ORAL at 20:23

## 2023-10-01 RX ADMIN — BISACODYL 5 MG: 5 TABLET, COATED ORAL at 08:55

## 2023-10-01 RX ADMIN — SENNOSIDES AND DOCUSATE SODIUM 1 TABLET: 50; 8.6 TABLET ORAL at 08:52

## 2023-10-01 RX ADMIN — SODIUM CHLORIDE: 9 INJECTION, SOLUTION INTRAVENOUS at 12:30

## 2023-10-01 RX ADMIN — METFORMIN HYDROCHLORIDE 500 MG: 500 TABLET ORAL at 08:52

## 2023-10-01 RX ADMIN — MUPIROCIN: 20 OINTMENT TOPICAL at 08:56

## 2023-10-01 RX ADMIN — MUPIROCIN: 20 OINTMENT TOPICAL at 20:19

## 2023-10-01 RX ADMIN — SODIUM CHLORIDE: 9 INJECTION, SOLUTION INTRAVENOUS at 03:11

## 2023-10-01 RX ADMIN — INSULIN LISPRO 2 UNITS: 100 INJECTION, SOLUTION INTRAVENOUS; SUBCUTANEOUS at 17:34

## 2023-10-01 RX ADMIN — OXYCODONE HYDROCHLORIDE 10 MG: 10 TABLET ORAL at 17:38

## 2023-10-01 RX ADMIN — SODIUM CHLORIDE, PRESERVATIVE FREE 10 ML: 5 INJECTION INTRAVENOUS at 20:24

## 2023-10-01 RX ADMIN — METOPROLOL SUCCINATE 25 MG: 25 TABLET, EXTENDED RELEASE ORAL at 08:54

## 2023-10-01 RX ADMIN — SODIUM CHLORIDE, PRESERVATIVE FREE 10 ML: 5 INJECTION INTRAVENOUS at 08:58

## 2023-10-01 ASSESSMENT — PAIN DESCRIPTION - DESCRIPTORS
DESCRIPTORS: ACHING;DISCOMFORT;CRAMPING
DESCRIPTORS: ACHING;SORE;DISCOMFORT
DESCRIPTORS: ACHING;DISCOMFORT;SORE;SHARP
DESCRIPTORS: ACHING;DISCOMFORT;SORE
DESCRIPTORS: ACHING;DISCOMFORT;SHARP
DESCRIPTORS: ACHING;SHARP;DISCOMFORT
DESCRIPTORS: ACHING;DISCOMFORT;SORE

## 2023-10-01 ASSESSMENT — PAIN - FUNCTIONAL ASSESSMENT
PAIN_FUNCTIONAL_ASSESSMENT: PREVENTS OR INTERFERES SOME ACTIVE ACTIVITIES AND ADLS
PAIN_FUNCTIONAL_ASSESSMENT: ACTIVITIES ARE NOT PREVENTED

## 2023-10-01 ASSESSMENT — PAIN DESCRIPTION - LOCATION
LOCATION: BACK
LOCATION: BACK
LOCATION: BACK;LEG
LOCATION: BACK
LOCATION: FOOT

## 2023-10-01 ASSESSMENT — PAIN DESCRIPTION - ORIENTATION
ORIENTATION: LOWER
ORIENTATION: MID;LOWER;RIGHT;LEFT
ORIENTATION: MID;LEFT;RIGHT
ORIENTATION: MID;LOWER
ORIENTATION: RIGHT
ORIENTATION: MID;LOWER

## 2023-10-01 ASSESSMENT — PAIN SCALES - GENERAL
PAINLEVEL_OUTOF10: 7
PAINLEVEL_OUTOF10: 7
PAINLEVEL_OUTOF10: 6
PAINLEVEL_OUTOF10: 7
PAINLEVEL_OUTOF10: 7
PAINLEVEL_OUTOF10: 5
PAINLEVEL_OUTOF10: 2

## 2023-10-01 NOTE — PLAN OF CARE
Problem: Chronic Conditions and Co-morbidities  Goal: Patient's chronic conditions and co-morbidity symptoms are monitored and maintained or improved  9/30/2023 2233 by Delfina Bosch RN  Outcome: Progressing     Problem: Discharge Planning  Goal: Discharge to home or other facility with appropriate resources  9/30/2023 2233 by Delfina Bosch RN  Outcome: Progressing     Problem: Safety - Adult  Goal: Free from fall injury  9/30/2023 2233 by Delfina Bosch RN  Outcome: Progressing     Problem: Pain  Goal: Verbalizes/displays adequate comfort level or baseline comfort level  9/30/2023 2233 by Delfina Bosch RN  Outcome: Progressing     Problem: ABCDS Injury Assessment  Goal: Absence of physical injury  9/30/2023 2233 by Delfina Bosch RN  Outcome: Progressing

## 2023-10-01 NOTE — PROGRESS NOTES
Messaged Dr. Noemi Lopez via perfect serve as pt went back into afib rvr with HR ranging 140s-180s. Pt asymptomatic. Nabeel Lopez via perfect serve as pt HR sustaining 130s peaking to 160.  Manual BP now 88/54.  @ [phone]

## 2023-10-01 NOTE — PROGRESS NOTES
Hospital Medicine    Subjective:  pt with recurrent at fib overnite      Current Facility-Administered Medications:     0.9 % sodium chloride infusion, , IntraVENous, Continuous, Mamie Skaggs MD, Last Rate: 100 mL/hr at 10/01/23 0311, New Bag at 10/01/23 0311    dilTIAZem 100 mg in sodium chloride 0.9 % 100 mL infusion (ADD-[place]), 2.5-15 mg/hr, IntraVENous, Continuous, Mamei Skaggs MD, Last Rate: 5 mL/hr at 10/01/23 0317, 5 mg/hr at 10/01/23 0317    acetaminophen (TYLENOL) tablet 650 mg, 650 mg, Oral, Q6H PRN, Eileen Lynn DO, 650 mg at 10/01/23 0556    bisacodyl (DULCOLAX) EC tablet 5 mg, 5 mg, Oral, Daily, Eileen Lynn DO, 5 mg at 10/01/23 0855    citalopram (CELEXA) tablet 20 mg, 20 mg, Oral, Daily, Eileen Lynn DO, 20 mg at 10/01/23 8575    insulin lispro (HUMALOG) injection vial 0-4 Units, 0-4 Units, SubCUTAneous, Nightly, Eileen Lynn DO, 4 Units at 09/29/23 2101    insulin lispro (HUMALOG) injection vial 0-8 Units, 0-8 Units, SubCUTAneous, TID WC, Eileen Lynn DO, 2 Units at 10/01/23 0856    ipratropium 0.5 mg-albuterol 2.5 mg (DUONEB) nebulizer solution 1 Dose, 1 Dose, Inhalation, Q4H WA RT, Eileen Lynn DO, 1 Dose at 10/01/23 0900    levothyroxine (SYNTHROID) tablet 25 mcg, 25 mcg, Oral, Daily, Eileen Lynn DO, 25 mcg at 10/01/23 6561    metFORMIN (GLUCOPHAGE) tablet 500 mg, 500 mg, Oral, BID WC, Eileen Lynn DO, 500 mg at 10/01/23 2055    mupirocin (BACTROBAN) 2 % ointment, , Each Nostril, BID, Acquanetta Bhaskar, DO, Given at 10/01/23 0856    pantoprazole (PROTONIX) tablet 40 mg, 40 mg, Oral, Daily, Eileen Lynn DO, 40 mg at 10/01/23 3006    polyethylene glycol (GLYCOLAX) packet 17 g, 17 g, Oral, Daily, Eileen Lynn DO    rosuvastatin (CRESTOR) tablet 40 mg, 40 mg, Oral, Daily, Eileen Lynn DO, 40 mg at 10/01/23 3912    sennosides-docusate sodium (SENOKOT-S) 8.6-50 MG tablet 1 tablet, 1 tablet, Oral, BID, Eileen Lynn DO, 1 tablet

## 2023-10-02 LAB
ANION GAP SERPL CALCULATED.3IONS-SCNC: 14 MMOL/L (ref 7–16)
BUN SERPL-MCNC: 9 MG/DL (ref 6–23)
CALCIUM SERPL-MCNC: 8.8 MG/DL (ref 8.6–10.2)
CHLORIDE SERPL-SCNC: 96 MMOL/L (ref 98–107)
CO2 SERPL-SCNC: 25 MMOL/L (ref 22–29)
CREAT SERPL-MCNC: 0.5 MG/DL (ref 0.5–1)
EKG ATRIAL RATE: 115 BPM
EKG Q-T INTERVAL: 298 MS
EKG QRS DURATION: 100 MS
EKG QTC CALCULATION (BAZETT): 473 MS
EKG R AXIS: 60 DEGREES
EKG T AXIS: -146 DEGREES
EKG VENTRICULAR RATE: 152 BPM
GFR SERPL CREATININE-BSD FRML MDRD: >60 ML/MIN/1.73M2
GLUCOSE BLD-MCNC: 168 MG/DL (ref 74–99)
GLUCOSE BLD-MCNC: 174 MG/DL (ref 74–99)
GLUCOSE BLD-MCNC: 213 MG/DL (ref 74–99)
GLUCOSE BLD-MCNC: 256 MG/DL (ref 74–99)
GLUCOSE SERPL-MCNC: 187 MG/DL (ref 74–99)
HCT VFR BLD AUTO: 25.9 % (ref 34–48)
HGB BLD-MCNC: 8.2 G/DL (ref 11.5–15.5)
POTASSIUM SERPL-SCNC: 4.2 MMOL/L (ref 3.5–5)
SODIUM SERPL-SCNC: 135 MMOL/L (ref 132–146)

## 2023-10-02 PROCEDURE — 36415 COLL VENOUS BLD VENIPUNCTURE: CPT

## 2023-10-02 PROCEDURE — 6370000000 HC RX 637 (ALT 250 FOR IP): Performed by: INTERNAL MEDICINE

## 2023-10-02 PROCEDURE — 6360000002 HC RX W HCPCS: Performed by: INTERNAL MEDICINE

## 2023-10-02 PROCEDURE — 93005 ELECTROCARDIOGRAM TRACING: CPT | Performed by: INTERNAL MEDICINE

## 2023-10-02 PROCEDURE — 85014 HEMATOCRIT: CPT

## 2023-10-02 PROCEDURE — 2580000003 HC RX 258: Performed by: INTERNAL MEDICINE

## 2023-10-02 PROCEDURE — 2140000000 HC CCU INTERMEDIATE R&B

## 2023-10-02 PROCEDURE — 93010 ELECTROCARDIOGRAM REPORT: CPT | Performed by: INTERNAL MEDICINE

## 2023-10-02 PROCEDURE — 85018 HEMOGLOBIN: CPT

## 2023-10-02 PROCEDURE — 99223 1ST HOSP IP/OBS HIGH 75: CPT | Performed by: INTERNAL MEDICINE

## 2023-10-02 PROCEDURE — 80048 BASIC METABOLIC PNL TOTAL CA: CPT

## 2023-10-02 PROCEDURE — 2700000000 HC OXYGEN THERAPY PER DAY

## 2023-10-02 PROCEDURE — 82962 GLUCOSE BLOOD TEST: CPT

## 2023-10-02 RX ORDER — SODIUM CHLORIDE 9 MG/ML
INJECTION, SOLUTION INTRAVENOUS PRN
Status: CANCELLED | OUTPATIENT
Start: 2023-10-02

## 2023-10-02 RX ORDER — OXYCODONE HYDROCHLORIDE 10 MG/1
10 TABLET ORAL EVERY 4 HOURS PRN
Status: CANCELLED | OUTPATIENT
Start: 2023-10-02

## 2023-10-02 RX ORDER — POTASSIUM CHLORIDE 20 MEQ/1
40 TABLET, EXTENDED RELEASE ORAL ONCE
Status: CANCELLED | OUTPATIENT
Start: 2023-10-02 | End: 2023-10-02

## 2023-10-02 RX ORDER — SODIUM CHLORIDE 0.9 % (FLUSH) 0.9 %
5-40 SYRINGE (ML) INJECTION EVERY 12 HOURS SCHEDULED
Status: CANCELLED | OUTPATIENT
Start: 2023-10-02

## 2023-10-02 RX ORDER — LANOLIN ALCOHOL/MO/W.PET/CERES
400 CREAM (GRAM) TOPICAL 2 TIMES DAILY
Status: DISCONTINUED | OUTPATIENT
Start: 2023-10-02 | End: 2023-10-05 | Stop reason: HOSPADM

## 2023-10-02 RX ORDER — SENNA AND DOCUSATE SODIUM 50; 8.6 MG/1; MG/1
1 TABLET, FILM COATED ORAL 2 TIMES DAILY
OUTPATIENT
Start: 2023-10-02

## 2023-10-02 RX ORDER — ALBUTEROL SULFATE 2.5 MG/3ML
2.5 SOLUTION RESPIRATORY (INHALATION) EVERY 6 HOURS PRN
OUTPATIENT
Start: 2023-10-02

## 2023-10-02 RX ORDER — INSULIN LISPRO 100 [IU]/ML
0-8 INJECTION, SOLUTION INTRAVENOUS; SUBCUTANEOUS
OUTPATIENT
Start: 2023-10-02

## 2023-10-02 RX ORDER — ASPIRIN 81 MG/1
81 TABLET ORAL DAILY
Status: DISCONTINUED | OUTPATIENT
Start: 2023-10-02 | End: 2023-10-05 | Stop reason: HOSPADM

## 2023-10-02 RX ORDER — DIPHENHYDRAMINE HCL 25 MG
25 TABLET ORAL NIGHTLY PRN
OUTPATIENT
Start: 2023-10-02

## 2023-10-02 RX ORDER — DEXTROSE MONOHYDRATE 100 MG/ML
INJECTION, SOLUTION INTRAVENOUS CONTINUOUS PRN
OUTPATIENT
Start: 2023-10-02

## 2023-10-02 RX ORDER — LEVOTHYROXINE SODIUM 0.03 MG/1
25 TABLET ORAL DAILY
OUTPATIENT
Start: 2023-10-03

## 2023-10-02 RX ORDER — SODIUM CHLORIDE 0.9 % (FLUSH) 0.9 %
10 SYRINGE (ML) INJECTION PRN
Status: CANCELLED | OUTPATIENT
Start: 2023-10-02

## 2023-10-02 RX ORDER — MAGNESIUM SULFATE IN WATER 40 MG/ML
2000 INJECTION, SOLUTION INTRAVENOUS ONCE
Status: CANCELLED | OUTPATIENT
Start: 2023-10-02 | End: 2023-10-02

## 2023-10-02 RX ORDER — INSULIN LISPRO 100 [IU]/ML
0-4 INJECTION, SOLUTION INTRAVENOUS; SUBCUTANEOUS NIGHTLY
OUTPATIENT
Start: 2023-10-02

## 2023-10-02 RX ORDER — SODIUM CHLORIDE 0.9 % (FLUSH) 0.9 %
5-40 SYRINGE (ML) INJECTION PRN
Status: CANCELLED | OUTPATIENT
Start: 2023-10-02

## 2023-10-02 RX ORDER — PANTOPRAZOLE SODIUM 40 MG/1
40 TABLET, DELAYED RELEASE ORAL DAILY
OUTPATIENT
Start: 2023-10-03

## 2023-10-02 RX ORDER — MAGNESIUM SULFATE IN WATER 40 MG/ML
2000 INJECTION, SOLUTION INTRAVENOUS ONCE
Status: COMPLETED | OUTPATIENT
Start: 2023-10-02 | End: 2023-10-02

## 2023-10-02 RX ORDER — POLYETHYLENE GLYCOL 3350 17 G/17G
17 POWDER, FOR SOLUTION ORAL DAILY
OUTPATIENT
Start: 2023-10-03

## 2023-10-02 RX ORDER — FUROSEMIDE 10 MG/ML
20 INJECTION INTRAMUSCULAR; INTRAVENOUS ONCE
Status: COMPLETED | OUTPATIENT
Start: 2023-10-02 | End: 2023-10-02

## 2023-10-02 RX ORDER — WARFARIN SODIUM 5 MG/1
5 TABLET ORAL DAILY
Status: CANCELLED | OUTPATIENT
Start: 2023-10-02

## 2023-10-02 RX ORDER — CITALOPRAM 20 MG/1
20 TABLET ORAL DAILY
OUTPATIENT
Start: 2023-10-03

## 2023-10-02 RX ORDER — ROSUVASTATIN CALCIUM 20 MG/1
40 TABLET, COATED ORAL DAILY
OUTPATIENT
Start: 2023-10-03

## 2023-10-02 RX ORDER — ACETAMINOPHEN 325 MG/1
650 TABLET ORAL EVERY 6 HOURS PRN
OUTPATIENT
Start: 2023-10-02

## 2023-10-02 RX ORDER — BISACODYL 5 MG/1
5 TABLET, DELAYED RELEASE ORAL DAILY
OUTPATIENT
Start: 2023-10-03

## 2023-10-02 RX ORDER — WARFARIN SODIUM 5 MG/1
5 TABLET ORAL DAILY
Status: DISCONTINUED | OUTPATIENT
Start: 2023-10-02 | End: 2023-10-04

## 2023-10-02 RX ORDER — ASPIRIN 81 MG/1
81 TABLET ORAL DAILY
OUTPATIENT
Start: 2023-10-03

## 2023-10-02 RX ORDER — OXYCODONE HYDROCHLORIDE 5 MG/1
5 TABLET ORAL EVERY 4 HOURS PRN
Status: CANCELLED | OUTPATIENT
Start: 2023-10-02

## 2023-10-02 RX ORDER — SOTALOL HYDROCHLORIDE 80 MG/1
80 TABLET ORAL 2 TIMES DAILY
Status: DISCONTINUED | OUTPATIENT
Start: 2023-10-02 | End: 2023-10-05

## 2023-10-02 RX ORDER — POTASSIUM CHLORIDE 20 MEQ/1
40 TABLET, EXTENDED RELEASE ORAL ONCE
Status: COMPLETED | OUTPATIENT
Start: 2023-10-02 | End: 2023-10-02

## 2023-10-02 RX ORDER — LANOLIN ALCOHOL/MO/W.PET/CERES
400 CREAM (GRAM) TOPICAL 2 TIMES DAILY
OUTPATIENT
Start: 2023-10-02

## 2023-10-02 RX ADMIN — ACETAMINOPHEN 650 MG: 325 TABLET ORAL at 19:14

## 2023-10-02 RX ADMIN — MAGNESIUM SULFATE HEPTAHYDRATE 2000 MG: 40 INJECTION, SOLUTION INTRAVENOUS at 10:26

## 2023-10-02 RX ADMIN — MUPIROCIN: 20 OINTMENT TOPICAL at 09:00

## 2023-10-02 RX ADMIN — ASPIRIN 81 MG: 81 TABLET, COATED ORAL at 09:10

## 2023-10-02 RX ADMIN — SENNOSIDES AND DOCUSATE SODIUM 1 TABLET: 50; 8.6 TABLET ORAL at 19:15

## 2023-10-02 RX ADMIN — OXYCODONE HYDROCHLORIDE 5 MG: 5 TABLET ORAL at 03:22

## 2023-10-02 RX ADMIN — OXYCODONE HYDROCHLORIDE 10 MG: 10 TABLET ORAL at 14:39

## 2023-10-02 RX ADMIN — SODIUM CHLORIDE, PRESERVATIVE FREE 10 ML: 5 INJECTION INTRAVENOUS at 09:00

## 2023-10-02 RX ADMIN — SOTALOL HYDROCHLORIDE 80 MG: 80 TABLET ORAL at 19:16

## 2023-10-02 RX ADMIN — WARFARIN SODIUM 5 MG: 5 TABLET ORAL at 17:05

## 2023-10-02 RX ADMIN — PANTOPRAZOLE SODIUM 40 MG: 40 TABLET, DELAYED RELEASE ORAL at 08:59

## 2023-10-02 RX ADMIN — METFORMIN HYDROCHLORIDE 500 MG: 500 TABLET ORAL at 08:59

## 2023-10-02 RX ADMIN — SENNOSIDES AND DOCUSATE SODIUM 1 TABLET: 50; 8.6 TABLET ORAL at 08:59

## 2023-10-02 RX ADMIN — CITALOPRAM HYDROBROMIDE 20 MG: 20 TABLET ORAL at 08:58

## 2023-10-02 RX ADMIN — FUROSEMIDE 20 MG: 10 INJECTION, SOLUTION INTRAMUSCULAR; INTRAVENOUS at 10:17

## 2023-10-02 RX ADMIN — ROSUVASTATIN CALCIUM 40 MG: 20 TABLET, FILM COATED ORAL at 08:58

## 2023-10-02 RX ADMIN — Medication 400 MG: at 10:17

## 2023-10-02 RX ADMIN — METOPROLOL SUCCINATE 25 MG: 25 TABLET, EXTENDED RELEASE ORAL at 08:59

## 2023-10-02 RX ADMIN — OXYCODONE HYDROCHLORIDE 5 MG: 5 TABLET ORAL at 23:23

## 2023-10-02 RX ADMIN — ACETAMINOPHEN 650 MG: 325 TABLET ORAL at 03:22

## 2023-10-02 RX ADMIN — MUPIROCIN: 20 OINTMENT TOPICAL at 19:17

## 2023-10-02 RX ADMIN — POTASSIUM CHLORIDE 40 MEQ: 1500 TABLET, EXTENDED RELEASE ORAL at 10:17

## 2023-10-02 RX ADMIN — SODIUM CHLORIDE, PRESERVATIVE FREE 10 ML: 5 INJECTION INTRAVENOUS at 19:18

## 2023-10-02 RX ADMIN — INSULIN LISPRO 4 UNITS: 100 INJECTION, SOLUTION INTRAVENOUS; SUBCUTANEOUS at 12:09

## 2023-10-02 RX ADMIN — POLYETHYLENE GLYCOL 3350 17 G: 17 POWDER, FOR SOLUTION ORAL at 08:59

## 2023-10-02 RX ADMIN — OXYCODONE HYDROCHLORIDE 5 MG: 5 TABLET ORAL at 10:22

## 2023-10-02 RX ADMIN — OXYCODONE HYDROCHLORIDE 10 MG: 10 TABLET ORAL at 19:14

## 2023-10-02 RX ADMIN — METFORMIN HYDROCHLORIDE 500 MG: 500 TABLET ORAL at 17:05

## 2023-10-02 RX ADMIN — INSULIN LISPRO 2 UNITS: 100 INJECTION, SOLUTION INTRAVENOUS; SUBCUTANEOUS at 08:59

## 2023-10-02 RX ADMIN — Medication 400 MG: at 19:15

## 2023-10-02 RX ADMIN — LEVOTHYROXINE SODIUM 25 MCG: 0.03 TABLET ORAL at 08:59

## 2023-10-02 RX ADMIN — BISACODYL 5 MG: 5 TABLET, COATED ORAL at 08:59

## 2023-10-02 ASSESSMENT — PAIN DESCRIPTION - LOCATION
LOCATION: BACK;LEG
LOCATION: BACK;LEG
LOCATION: BACK
LOCATION: BACK;LEG
LOCATION: BACK;LEG

## 2023-10-02 ASSESSMENT — PAIN SCALES - GENERAL
PAINLEVEL_OUTOF10: 2
PAINLEVEL_OUTOF10: 8
PAINLEVEL_OUTOF10: 6
PAINLEVEL_OUTOF10: 8
PAINLEVEL_OUTOF10: 2
PAINLEVEL_OUTOF10: 5
PAINLEVEL_OUTOF10: 7

## 2023-10-02 ASSESSMENT — PAIN DESCRIPTION - DESCRIPTORS
DESCRIPTORS: ACHING;DISCOMFORT;STABBING
DESCRIPTORS: ACHING;DISCOMFORT;STABBING
DESCRIPTORS: ACHING;SHARP;DISCOMFORT
DESCRIPTORS: ACHING;DISCOMFORT;STABBING
DESCRIPTORS: ACHING;SHARP;DISCOMFORT

## 2023-10-02 ASSESSMENT — PAIN DESCRIPTION - ORIENTATION
ORIENTATION: MID;LOWER
ORIENTATION: LOWER;LEFT
ORIENTATION: LOWER
ORIENTATION: MID;LOWER;LEFT
ORIENTATION: LOWER

## 2023-10-02 NOTE — DISCHARGE SUMMARY
Neurosurgery Surgery Discharge Summary    [address] [facility] SUMMARY:                The patient is a 80 y.o. female who was admitted to the hospital on 9/25/2023  5:14 AM for treatment of lumbar stenosis with neurogenic claudication. On the day of admission, an L1-S1 fusion was performed. The patient's hospital course was consisted of physical therapy, incision observation, and a return to normal oral intake. Patient did develop Afib and was transferred to a monitored floor. The patient was discharged on 9/28/2023 12:35 PM tolerating a diet, moving bowels, and urinating without difficulty. The incisions were clean and intact. The patient was discharged to acute rehab in satisfactory condition with instructions to call the office for a follow up appointment. Hospital Problem List:  Principal Problem:    Lumbar stenosis with neurogenic claudication  Active Problems:    Scoliosis    Lumbar spinal stenosis    Dextroscoliosis of lumbar spine    Stenosis, spinal, lumbar    Lumbar foraminal stenosis  Resolved Problems:    * No resolved hospital problems.  *     Procedure(s) (LRB):  L1-S1 laminectomy and posterior fusion; L4-L5, L5-S1 posterior lumbar interbody fusion (N/A)    Discharge Medications:   Discharge Medication List as of 9/28/2023 12:35 PM        START taking these medications    Details   tiotropium-olodaterol (STIOLTO RESPIMAT) 2.5-2.5 MCG/ACT AERS Inhale 2 puffs into the lungs daily, Disp-1 each, R-3Normal      albuterol sulfate HFA (VENTOLIN HFA) 108 (90 Base) MCG/ACT inhaler Inhale 2 puffs into the lungs every 6 hours as needed for Wheezing or Shortness of Breath, Disp-54 g, R-1Normal           CONTINUE these medications which have NOT CHANGED    Details   citalopram (CELEXA) 20 MG tablet TAKE 1 TABLET BY MOUTH EVERY DAY, Disp-90 tablet, R-1Normal      Insulin Pen Needle 32G X 4 MM MISC 4 times daily Starting u 8/24/2023, Disp-200 each, R-5, Normal      insulin glargine, 1

## 2023-10-02 NOTE — CONSULTS
[address] [facility] Electrophysiology  Consultation Report  PATIENT: [facility]  MEDICAL RECORD NUMBER: [id number]  DATE OF SERVICE:  10/2/2023  ATTENDING ELECTROPHYSIOLOGIST: Chely Stevens MD   PRIMARY ELECTROPHYSIOLOGIST: Chely Stevens MD   REFERRING PHYSICIAN:  Richar Garcia DO  CHIEF COMPLAINT: Atrial Fibrillation with hypotension. HPI: This is a 80 y.o. female with a history of Paroxsymal atrial fibrillation noted in the post CABG period,  Coronary artery disease with prior CABG times 2 (LIMA-LAD, SVG to OM) 11/2022, left atrial appendage clip with the AtriCure clip, Mild mitral regurgitation, Hypertension, Type 2 diabetes milltus, Peripheral vascular disease with prior left carotid enterectomy 40/7451, prior ilica stent placement 12/2018, Hyperlipidemia,  Hypothyroidism, Prior heavy Tobacco use stopping in June 2023, COPD, Pulmonary Nodule, GERD, Lumbar stenosis S/p L1-S1 fusion, Migraines, Basal cell Ca, Psoriasiform dermatis, Cataracts. She was noted to have Atrial fibrillation post CABG in November 2022, seen by Dr. Esther Fairbanks who recommended Eliquis, ASA PPI, and short term Amiodarone. In December 2022 Eliquis was found too expensive and she was transitioned to Warfarin, managed by the Anticoagulation clinic with the discontinuation of Amiodarone at that time. In January 2023 she was found to have Hypertension and was started on Toprol 25mg QD by Dr. Kate Hernandez. Due to ongoing  back pain she was seen by Dr. Gage Guerrero who then performed a L1-S1 laminectomy and posterior fusion of L4-L5 L5-S1 fusion on 09/25/2023 with Warfarin being stopped at that time. Dr. Aimee Asif was consulted on the 27th for post op respiratory insufficiency a repeat CTA showed no PE however an increased left upper lode nodule, she was weaned from 6L to 1L nasal cannula. She wwas then admitted to Acute Rehab on 09/28/2023.      On 09/29/2023 the patient had an RRT in acute

## 2023-10-02 NOTE — PATIENT CARE CONFERENCE
P Quality Flow/Interdisciplinary Rounds Progress Note        Quality Flow Rounds held on October 2, 2023    Disciplines Attending:  Bedside Nurse, , , and Nursing Unit Leadership    Mayo Dandy was admitted on 9/29/2023  3:22 AM    Anticipated Discharge Date:       Disposition:    Devon Score:  Devon Scale Score: 20    Readmission Risk              Risk of Unplanned Readmission:  11           Discussed patient goal for the day, patient clinical progression, and barriers to discharge.   The following Goal(s) of the Day/Commitment(s) have been identified:  discharge [address] Isis Dent RN  October 2, 2023

## 2023-10-02 NOTE — PROGRESS NOTES
[facility] Medicine    Subjective:  pt alert conversive in sinus rhythm      Current Facility-Administered Medications:     warfarin (COUMADIN) tablet 5 mg, 5 mg, Oral, Daily, Eileen Lynn DO    aspirin EC tablet 81 mg, 81 mg, Oral, Daily, Eileen Lynn DO    0.9 % sodium chloride infusion, , IntraVENous, Continuous, Mamie Skaggs MD, Last Rate: 100 mL/hr at 10/01/23 2300, New Bag at 10/01/23 2300    dilTIAZem 100 mg in sodium chloride 0.9 % 100 mL infusion (ADD-[place]), 2.5-15 mg/hr, IntraVENous, Continuous, Mamie Skaggs MD, Last Rate: 2.5 mL/hr at 10/01/23 2150, 2.5 mg/hr at 10/01/23 2150    acetaminophen (TYLENOL) tablet 650 mg, 650 mg, Oral, Q6H PRN, Eileen Lynn DO, 650 mg at 10/02/23 0322    bisacodyl (DULCOLAX) EC tablet 5 mg, 5 mg, Oral, Daily, Eileen Lynn DO, 5 mg at 10/01/23 0855    citalopram (CELEXA) tablet 20 mg, 20 mg, Oral, Daily, Eileen Lynn DO, 20 mg at 10/01/23 5283    insulin lispro (HUMALOG) injection vial 0-4 Units, 0-4 Units, SubCUTAneous, Nightly, Eileen Lynn DO, 4 Units at 09/29/23 2101    insulin lispro (HUMALOG) injection vial 0-8 Units, 0-8 Units, SubCUTAneous, TID WC, Eileen Lynn DO, 2 Units at 10/01/23 1734    levothyroxine (SYNTHROID) tablet 25 mcg, 25 mcg, Oral, Daily, Eileen Lynn DO, 25 mcg at 10/01/23 0744    metFORMIN (GLUCOPHAGE) tablet 500 mg, 500 mg, Oral, BID WC, Eileen Lynn DO, 500 mg at 10/01/23 1734    mupirocin (BACTROBAN) 2 % ointment, , Each Nostril, BID, Eileen Lynn DO, Given at 10/01/23 2019    pantoprazole (PROTONIX) tablet 40 mg, 40 mg, Oral, Daily, Eileen Lynn DO, 40 mg at 10/01/23 4958    polyethylene glycol (GLYCOLAX) packet 17 g, 17 g, Oral, Daily, Eileen Lynn DO    rosuvastatin (CRESTOR) tablet 40 mg, 40 mg, Oral, Daily, Eileen Lynn DO, 40 mg at 10/01/23 0509    sennosides-docusate sodium (SENOKOT-S) 8.6-50 MG tablet 1 tablet, 1 tablet, Oral, BID, Eileen Lynn DO, 1 tablet

## 2023-10-02 NOTE — PLAN OF CARE
Problem: Chronic Conditions and Co-morbidities  Goal: Patient's chronic conditions and co-morbidity symptoms are monitored and maintained or improved  10/2/2023 1039 by Patt Santamaria RN  Outcome: Progressing  10/1/2023 2138 by Isidra Mckinley RN  Outcome: Progressing     Problem: Safety - Adult  Goal: Free from fall injury  10/2/2023 1039 by Patt Santamaria RN  Outcome: Progressing  10/1/2023 2138 by Isidra Mckinley RN  Outcome: Progressing     Problem: Pain  Goal: Verbalizes/displays adequate comfort level or baseline comfort level  10/2/2023 1039 by Patt Santamaria RN  Outcome: Progressing  10/1/2023 2138 by Isidra Mckinley RN  Outcome: Progressing     Problem: ABCDS Injury Assessment  Goal: Absence of physical injury  10/2/2023 1039 by Patt Santamaria RN  Outcome: Progressing  10/1/2023 2138 by Isidra Mckinley RN  Outcome: Progressing

## 2023-10-02 NOTE — PROGRESS NOTES
Patient converted to normal sinus at 1900 last night. Patient NPO and Cardizem drip running. Fletcher Lopez notified via perfect serve. Per Fletcher Lopez continue Khushi anderson.

## 2023-10-03 LAB
ABO/RH: NORMAL
ANION GAP SERPL CALCULATED.3IONS-SCNC: 11 MMOL/L (ref 7–16)
ANTIBODY SCREEN: NEGATIVE
ARM BAND NUMBER: NORMAL
BLOOD BANK BLOOD PRODUCT EXPIRATION DATE: NORMAL
BLOOD BANK DISPENSE STATUS: NORMAL
BLOOD BANK ISBT PRODUCT BLOOD TYPE: 9500
BLOOD BANK PRODUCT CODE: NORMAL
BLOOD BANK SAMPLE EXPIRATION: NORMAL
BLOOD BANK UNIT TYPE AND RH: NORMAL
BPU ID: NORMAL
BUN SERPL-MCNC: 8 MG/DL (ref 6–23)
CALCIUM SERPL-MCNC: 8.9 MG/DL (ref 8.6–10.2)
CHLORIDE SERPL-SCNC: 97 MMOL/L (ref 98–107)
CO2 SERPL-SCNC: 27 MMOL/L (ref 22–29)
COMPONENT: NORMAL
CREAT SERPL-MCNC: 0.5 MG/DL (ref 0.5–1)
CROSSMATCH RESULT: NORMAL
EKG ATRIAL RATE: 58 BPM
EKG ATRIAL RATE: 65 BPM
EKG P AXIS: 68 DEGREES
EKG P AXIS: 75 DEGREES
EKG P-R INTERVAL: 112 MS
EKG P-R INTERVAL: 114 MS
EKG Q-T INTERVAL: 416 MS
EKG Q-T INTERVAL: 476 MS
EKG QRS DURATION: 88 MS
EKG QRS DURATION: 94 MS
EKG QTC CALCULATION (BAZETT): 432 MS
EKG QTC CALCULATION (BAZETT): 467 MS
EKG R AXIS: 45 DEGREES
EKG R AXIS: 47 DEGREES
EKG T AXIS: 114 DEGREES
EKG T AXIS: 138 DEGREES
EKG VENTRICULAR RATE: 58 BPM
EKG VENTRICULAR RATE: 65 BPM
GFR SERPL CREATININE-BSD FRML MDRD: >60 ML/MIN/1.73M2
GLUCOSE BLD-MCNC: 135 MG/DL (ref 74–99)
GLUCOSE BLD-MCNC: 206 MG/DL (ref 74–99)
GLUCOSE BLD-MCNC: 224 MG/DL (ref 74–99)
GLUCOSE BLD-MCNC: 237 MG/DL (ref 74–99)
GLUCOSE SERPL-MCNC: 182 MG/DL (ref 74–99)
INR PPP: 1.3
POTASSIUM SERPL-SCNC: 4.5 MMOL/L (ref 3.5–5)
PROTHROMBIN TIME: 14.6 SEC (ref 9.3–12.4)
SODIUM SERPL-SCNC: 135 MMOL/L (ref 132–146)
TRANSFUSION STATUS: NORMAL
UNIT DIVISION: 0
UNIT ISSUE DATE/TIME: NORMAL

## 2023-10-03 PROCEDURE — 93005 ELECTROCARDIOGRAM TRACING: CPT | Performed by: INTERNAL MEDICINE

## 2023-10-03 PROCEDURE — 2140000000 HC CCU INTERMEDIATE R&B

## 2023-10-03 PROCEDURE — 2580000003 HC RX 258: Performed by: INTERNAL MEDICINE

## 2023-10-03 PROCEDURE — 99233 SBSQ HOSP IP/OBS HIGH 50: CPT | Performed by: INTERNAL MEDICINE

## 2023-10-03 PROCEDURE — 85610 PROTHROMBIN TIME: CPT

## 2023-10-03 PROCEDURE — 80048 BASIC METABOLIC PNL TOTAL CA: CPT

## 2023-10-03 PROCEDURE — 6370000000 HC RX 637 (ALT 250 FOR IP): Performed by: INTERNAL MEDICINE

## 2023-10-03 PROCEDURE — 97530 THERAPEUTIC ACTIVITIES: CPT

## 2023-10-03 PROCEDURE — 82962 GLUCOSE BLOOD TEST: CPT

## 2023-10-03 PROCEDURE — 36415 COLL VENOUS BLD VENIPUNCTURE: CPT

## 2023-10-03 PROCEDURE — 97535 SELF CARE MNGMENT TRAINING: CPT

## 2023-10-03 PROCEDURE — 2700000000 HC OXYGEN THERAPY PER DAY

## 2023-10-03 PROCEDURE — 93010 ELECTROCARDIOGRAM REPORT: CPT | Performed by: INTERNAL MEDICINE

## 2023-10-03 RX ADMIN — OXYCODONE HYDROCHLORIDE 10 MG: 10 TABLET ORAL at 09:35

## 2023-10-03 RX ADMIN — OXYCODONE HYDROCHLORIDE 10 MG: 10 TABLET ORAL at 03:44

## 2023-10-03 RX ADMIN — MUPIROCIN: 20 OINTMENT TOPICAL at 20:21

## 2023-10-03 RX ADMIN — LEVOTHYROXINE SODIUM 25 MCG: 0.03 TABLET ORAL at 09:36

## 2023-10-03 RX ADMIN — MUPIROCIN: 20 OINTMENT TOPICAL at 09:37

## 2023-10-03 RX ADMIN — ACETAMINOPHEN 650 MG: 325 TABLET ORAL at 03:44

## 2023-10-03 RX ADMIN — ASPIRIN 81 MG: 81 TABLET, COATED ORAL at 09:36

## 2023-10-03 RX ADMIN — ROSUVASTATIN CALCIUM 40 MG: 20 TABLET, FILM COATED ORAL at 09:34

## 2023-10-03 RX ADMIN — METFORMIN HYDROCHLORIDE 500 MG: 500 TABLET ORAL at 16:16

## 2023-10-03 RX ADMIN — BISACODYL 5 MG: 5 TABLET, COATED ORAL at 09:36

## 2023-10-03 RX ADMIN — Medication 400 MG: at 20:19

## 2023-10-03 RX ADMIN — INSULIN LISPRO 2 UNITS: 100 INJECTION, SOLUTION INTRAVENOUS; SUBCUTANEOUS at 12:18

## 2023-10-03 RX ADMIN — SOTALOL HYDROCHLORIDE 80 MG: 80 TABLET ORAL at 18:08

## 2023-10-03 RX ADMIN — SENNOSIDES AND DOCUSATE SODIUM 1 TABLET: 50; 8.6 TABLET ORAL at 20:19

## 2023-10-03 RX ADMIN — OXYCODONE HYDROCHLORIDE 10 MG: 10 TABLET ORAL at 16:16

## 2023-10-03 RX ADMIN — INSULIN LISPRO 2 UNITS: 100 INJECTION, SOLUTION INTRAVENOUS; SUBCUTANEOUS at 09:37

## 2023-10-03 RX ADMIN — WARFARIN SODIUM 5 MG: 5 TABLET ORAL at 18:08

## 2023-10-03 RX ADMIN — METFORMIN HYDROCHLORIDE 500 MG: 500 TABLET ORAL at 09:34

## 2023-10-03 RX ADMIN — OXYCODONE HYDROCHLORIDE 5 MG: 5 TABLET ORAL at 20:19

## 2023-10-03 RX ADMIN — SODIUM CHLORIDE, PRESERVATIVE FREE 10 ML: 5 INJECTION INTRAVENOUS at 20:19

## 2023-10-03 RX ADMIN — PANTOPRAZOLE SODIUM 40 MG: 40 TABLET, DELAYED RELEASE ORAL at 09:43

## 2023-10-03 RX ADMIN — CITALOPRAM HYDROBROMIDE 20 MG: 20 TABLET ORAL at 09:42

## 2023-10-03 RX ADMIN — SOTALOL HYDROCHLORIDE 80 MG: 80 TABLET ORAL at 06:09

## 2023-10-03 RX ADMIN — SODIUM CHLORIDE, PRESERVATIVE FREE 10 ML: 5 INJECTION INTRAVENOUS at 09:37

## 2023-10-03 RX ADMIN — SENNOSIDES AND DOCUSATE SODIUM 1 TABLET: 50; 8.6 TABLET ORAL at 09:34

## 2023-10-03 RX ADMIN — POLYETHYLENE GLYCOL 3350 17 G: 17 POWDER, FOR SOLUTION ORAL at 09:36

## 2023-10-03 RX ADMIN — Medication 400 MG: at 09:36

## 2023-10-03 ASSESSMENT — PAIN DESCRIPTION - DESCRIPTORS
DESCRIPTORS: ACHING;STABBING;SORE
DESCRIPTORS: ACHING;DISCOMFORT;SORE
DESCRIPTORS: ACHING;DISCOMFORT;SHARP
DESCRIPTORS: ACHING;DISCOMFORT;SORE;STABBING
DESCRIPTORS: SORE;SHARP;ACHING

## 2023-10-03 ASSESSMENT — PAIN DESCRIPTION - ORIENTATION
ORIENTATION: LEFT;MID;LOWER
ORIENTATION: LEFT;MID;LOWER
ORIENTATION: LOWER
ORIENTATION: LEFT;LOWER
ORIENTATION: LEFT;MID;LOWER

## 2023-10-03 ASSESSMENT — PAIN SCALES - GENERAL
PAINLEVEL_OUTOF10: 7
PAINLEVEL_OUTOF10: 6
PAINLEVEL_OUTOF10: 7
PAINLEVEL_OUTOF10: 3
PAINLEVEL_OUTOF10: 4
PAINLEVEL_OUTOF10: 7
PAINLEVEL_OUTOF10: 2

## 2023-10-03 ASSESSMENT — PAIN DESCRIPTION - LOCATION
LOCATION: BACK;LEG
LOCATION: BACK
LOCATION: BACK;LEG
LOCATION: BACK

## 2023-10-03 ASSESSMENT — PAIN SCALES - WONG BAKER: WONGBAKER_NUMERICALRESPONSE: 0

## 2023-10-03 NOTE — PLAN OF CARE
Problem: Chronic Conditions and Co-morbidities  Goal: Patient's chronic conditions and co-morbidity symptoms are monitored and maintained or improved  10/2/2023 2248 by Delfina Bosch RN  Outcome: Progressing     Problem: Discharge Planning  Goal: Discharge to home or other facility with appropriate resources  10/2/2023 2248 by Delfina Bosch RN  Outcome: Progressing     Problem: Safety - Adult  Goal: Free from fall injury  10/2/2023 2248 by Delfina Bosch RN  Outcome: Progressing     Problem: Pain  Goal: Verbalizes/displays adequate comfort level or baseline comfort level  10/2/2023 2248 by Delfina Bosch RN  Outcome: Progressing     Problem: ABCDS Injury Assessment  Goal: Absence of physical injury  10/2/2023 2248 by Delfina Bosch RN  Outcome: Progressing     Problem: Skin/Tissue Integrity  Goal: Absence of new skin breakdown  Description: 1. Monitor for areas of redness and/or skin breakdown  2. Assess vascular access sites hourly  3. Every 4-6 hours minimum:  Change oxygen saturation probe site  4. Every 4-6 hours:  If on nasal continuous positive airway pressure, respiratory therapy assess nares and determine need for appliance change or resting period.   10/2/2023 2248 by Delfina Bosch RN  Outcome: Progressing

## 2023-10-03 NOTE — PROGRESS NOTES
[address] [facility] Electrophysiology  Inpatient progress note  PATIENT: Aundrea Michelle  MEDICAL RECORD NUMBER: [id number]  DATE OF SERVICE:  10/3/2023  ATTENDING ELECTROPHYSIOLOGIST: Lennette Lefort, MD   PRIMARY ELECTROPHYSIOLOGIST: Lennette Lefort, MD   REFERRING PHYSICIAN:  Kristen Garcia DO  CHIEF COMPLAINT: Atrial Fibrillation with hypotension. HPI: This is a 80 y.o. female with a history of Paroxsymal atrial fibrillation noted in the post CABG period,  Coronary artery disease with prior CABG times 2 (LIMA-LAD, SVG to OM) 11/2022, left atrial appendage clip with the AtriCure clip, Mild mitral regurgitation, Hypertension, Type 2 diabetes milltus, Peripheral vascular disease with prior left carotid enterectomy 60/4193, prior ilica stent placement 12/2018, Hyperlipidemia,  Hypothyroidism, Prior heavy Tobacco use stopping in June 2023, COPD, Pulmonary Nodule, GERD, Lumbar stenosis S/p L1-S1 fusion, Migraines, Basal cell Ca, Psoriasiform dermatis, Cataracts. She was noted to have Atrial fibrillation post CABG in November 2022, seen by Dr. Lakisha Cooper who recommended Eliquis, ASA PPI, and short term Amiodarone. In December 2022 Eliquis was found too expensive and she was transitioned to Warfarin, managed by the Anticoagulation clinic with the discontinuation of Amiodarone at that time. In January 2023 she was found to have Hypertension and was started on Toprol 25mg QD by Dr. Sen Goode. Due to ongoing  back pain she was seen by Dr. Rony Hawkins who then performed a L1-S1 laminectomy and posterior fusion of L4-L5 L5-S1 fusion on 09/25/2023 with Warfarin being stopped at that time. Dr. Anurag Urbina was consulted on the 27th for post op respiratory insufficiency a repeat CTA showed no PE however an increased left upper lode nodule, she was weaned from 6L to 1L nasal cannula. She wwas then admitted to Acute Rehab on 09/28/2023.      On 09/29/2023 the patient had an RRT in I answered her question about the appt, she now wants to know her lab  Results.  Some are elevated

## 2023-10-03 NOTE — PROGRESS NOTES
Physical Therapy  Physical Therapy Treatment    Name: Damian Levin  : 1955  MRN: [id number]      Date of Service: 10/3/2023    Evaluating PT:  Dixie Rosenthal PT, DPT    Room #:  3961/3348-E  Diagnosis:  Atrial fibrillation with rapid ventricular response (HCC) [I48.91]  PMHx/PSHx:  CAD, HTN, DM II, HLD, PVD, tobacco abuse  Procedure/Surgery:  s/p L4-L5, L5-S1 PLIF p/25  Precautions:  spine, LSO, O2, fall risk, poor safety awareness   Equipment Owned: cane, ww  Equipment Needs:  TBD    SUBJECTIVE:    Pt lives with spouse in a 3 story home with no steps to enter. Bed/bath is on 1st floor. Pt ambulated with no AD PTA. OBJECTIVE:   Initial Evaluation  Date: 23 Treatment Date: 10/3/23 Short Term/ Long Term   Goals   AM-PAC 6 Clicks     Was pt agreeable to Eval/treatment? yes yes    Does pt have pain?  7/10 BLE /10 back pain    Bed Mobility  Rolling: NT  Supine to sit: NT  Sit to supine: NT  Scooting: SBA Rolling: SBA  Supine to sit: SBA  Sit to supine: NT  Scooting: SBA Rolling: mod I  Supine to sit: mod I  Sit to supine: mod I  Scooting: mod I   Transfers Sit to stand: CGA  Stand to sit: CGA  Stand pivot: CGA with ww Sit to stand: Kristine  Stand to sit: Kristine  Stand pivot: Kristine with HHA Sit to stand: mod I  Stand to sit: mod I  Stand pivot: mod I with AAD   Ambulation    50'x2 with ww CGA 25'x2 with HHA Kristine 200'+ with AAD mod I   Stair negotiation: ascended and descended  NT NT 3 steps with no handrail with AAD mod I     Strength/ROM:   BLE grossly 4/5  BLE AROM WFL    Balance:   Static Sitting: IND  Dynamic Sitting: Supervision  Static Standing: SBA with ww  Dynamic Standing: CGA with ww    Pt is A & O x 3  Sensation:  Pt denies numbness and tingling to extremities  Edema:  unremarkable    Vitals:  SpO2 and HR were stable during session    Patient education  Pt educated on role of PT, importance of functional mobility during hospital stay, safety with functional mobility    Patient response 1144  Time out  1207    Total Treatment Time  23 minutes     CPT codes:  [] Gait training 90528 0 minutes  [] Manual therapy 36085 0 minutes  [x] Therapeutic activities 55184 23 minutes  [] Therapeutic exercises 08724 0 minutes  [] Neuromuscular reeducation [id number] 0 minutes    Miguel Ángel Walker PT, DPT  [id number]

## 2023-10-03 NOTE — PROGRESS NOTES
OCCUPATIONAL THERAPY TREATMENT NOTE    [facility]  OT BEDSIDE TREATMENT NOTE      Date:10/3/2023  Patient Name: Victoriano Self  MRN: [id number]  : 1955  Room: [address] [place] [address]     Per OT Eval:   Evaluating OT: Antonia Ramos OTR/L; [id number]        Referring Provider: Richelle Davidson DO    Specific Provider Orders/Date: OT Eval and Treat 23        Diagnosis: Atrial fibrillation with rapid ventricular response     Surgery: None this admission     Pertinent Medical History:  has a past medical history of Age-related osteoporosis without current pathological fracture, Arthritis, Cataracts, bilateral, Colon polyps, Diabetes mellitus ([address]), DM type 2 (diabetes mellitus, type 2) ([address]), Facial basal cell cancer, Hyperlipidemia, Hypertension, Hypothyroidism, Migraines, Neuropathy, diabetic ([address]), Peripheral vascular disease ([address]), Positive colorectal cancer screening using Cologuard test, Psoriasiform dermatitis, Seasonal allergies, and Thyroid disease. 23 L1-S1 laminectomy and posterior fusion; L4-L5, L5-S1 posterior lumbar interbody fusion.      Recommended Adaptive Equipment: AE LB dressing/bathing/toileting PRN, extended tub bench      Precautions:  Fall Risk, spinal precautions, LSO, O2      Assessment of current deficits    [x] Functional mobility            [x]ADLs           [x] Strength                  []Cognition    [x] Functional transfers          [x] IADLs         [x] Safety Awareness   [x]Endurance    [x] Fine Coordination                         [x] Balance      [] Vision/perception   []Sensation      []Gross Motor Coordination             [] ROM           [] Delirium                   [] Motor Control      OT PLAN OF CARE   OT POC based on physician orders, patient diagnosis and results of clinical assessment     Frequency/Duration 3-5 days/wk for 2 weeks PRN   Specific OT Treatment Interventions to include:   * Instruction/training on adapted ADL techniques and AE Hearing: WFL  Sensation: No c/o numbness or tingling  Tone: WFL  Edema: Unremarkable    Comments:  Cleared by RN to see pt. Upon arrival patient supine in bed and agreeable to OT session w/ family in room. At end of session, patient seated in bedside chair with call light and phone within reach, all lines and tubes intact. Overall patient demonstrated decreased independence, standing balance, and safety during completion of ADL/functional transfer/mobility tasks. Therapist facilitated ADL tasks, functional transfers, functional mobility, bed mobility to address safety awareness, implementation of fall prevention strategies, & functional engagement throughout daily activities. Pt would benefit from continued skilled OT to increase safety and independence with completion of ADL/IADL tasks for functional independence and quality of life. Treatment: Pt required min vc's for proper technique/safety with hand placement/body mechanics/posture for bed mobility/ADLs/functional tranfers/mobility to implement spinal precautions. Pt required min vc's for sequencing of ADLs/functional transfers to maintain safe transfer progressions & joint protection. Pt able to sit EOB ~6 mins to increase core strength/balance/activity tolerance for ease with ADLs. Pt educated on spinal precautions & activity modifications/adaptations to promote implementation of joint integrity, fall prevention strategies, & safety awareness throughout daily activities. Pt appeared to have tolerated session well and appears motivated/cooperative/pleasant. Pt instructed on use of call light for assistance and fall prevention. Pt demo'ing fair understanding of education provided. Continue to educate. Pt has made fair progress towards set goals.    Continue with current plan of care      Treatment Time In:11:44a            Treatment Time Out: 12:07p                Treatment Charges: Mins Units   Ther Ex  [id number]     Manual Therapy [id number]     Thera

## 2023-10-03 NOTE — PROGRESS NOTES
Physician Progress Note      Saw Brito  CSN #:                  [id number]  :                       1955  ADMIT DATE:       2023 3:22 AM  DISCH DATE:  RESPONDING  PROVIDER #:        Edita Oliva MD        QUERY TEXT:    Type of Anemia: Please provide further specificity, if known. Clinical indicators include: transfused 1 unit prbc, cancer, 4 units, 8 units,   2 units, epistaxis, blood in stool, hgb, hct, malignancy, anemia, transfused  Options provided:  -- Anemia due to acute blood loss  -- Anemia due to chronic blood loss  -- Anemia due to iron deficiency  -- Anemia due to postoperative blood loss  -- Anemia due to chronic disease  -- Other - I will add my own diagnosis  -- Disagree - Not applicable / Not valid  -- Disagree - Clinically Unable to determine / Unknown        PROVIDER RESPONSE TEXT:    The patient has anemia due to postoperative blood loss.       Electronically signed by:  Edita Oliva MD 10/3/2023 9:45 AM

## 2023-10-03 NOTE — PATIENT CARE CONFERENCE
P Quality Flow/Interdisciplinary Rounds Progress Note        Quality Flow Rounds held on October 3, 2023    Disciplines Attending:  Bedside Nurse, , , and Nursing Unit Leadership    Reinier Romero was admitted on 9/29/2023  3:22 AM    Anticipated Discharge Date:       Disposition:    Devon Score:  Devon Scale Score: 20    Readmission Risk              Risk of Unplanned Readmission:  12           Discussed patient goal for the day, patient clinical progression, and barriers to discharge.   The following Goal(s) of the Day/Commitment(s) have been identified:  discharge [address] Isis Dent RN  October 3, 2023

## 2023-10-04 LAB
ANION GAP SERPL CALCULATED.3IONS-SCNC: 10 MMOL/L (ref 7–16)
BUN SERPL-MCNC: 8 MG/DL (ref 6–23)
CALCIUM SERPL-MCNC: 9.2 MG/DL (ref 8.6–10.2)
CHLORIDE SERPL-SCNC: 95 MMOL/L (ref 98–107)
CO2 SERPL-SCNC: 27 MMOL/L (ref 22–29)
CREAT SERPL-MCNC: 0.4 MG/DL (ref 0.5–1)
EKG ATRIAL RATE: 55 BPM
EKG ATRIAL RATE: 61 BPM
EKG P AXIS: 73 DEGREES
EKG P AXIS: 81 DEGREES
EKG P-R INTERVAL: 112 MS
EKG P-R INTERVAL: 114 MS
EKG Q-T INTERVAL: 440 MS
EKG Q-T INTERVAL: 482 MS
EKG QRS DURATION: 90 MS
EKG QRS DURATION: 90 MS
EKG QTC CALCULATION (BAZETT): 442 MS
EKG QTC CALCULATION (BAZETT): 461 MS
EKG R AXIS: 47 DEGREES
EKG R AXIS: 49 DEGREES
EKG T AXIS: 85 DEGREES
EKG T AXIS: 87 DEGREES
EKG VENTRICULAR RATE: 55 BPM
EKG VENTRICULAR RATE: 61 BPM
GFR SERPL CREATININE-BSD FRML MDRD: >60 ML/MIN/1.73M2
GLUCOSE BLD-MCNC: 156 MG/DL (ref 74–99)
GLUCOSE BLD-MCNC: 185 MG/DL (ref 74–99)
GLUCOSE BLD-MCNC: 219 MG/DL (ref 74–99)
GLUCOSE BLD-MCNC: 221 MG/DL (ref 74–99)
GLUCOSE SERPL-MCNC: 199 MG/DL (ref 74–99)
INR PPP: 2.1
MICROORGANISM SPEC CULT: NORMAL
MICROORGANISM SPEC CULT: NORMAL
POTASSIUM SERPL-SCNC: 4.8 MMOL/L (ref 3.5–5)
PROTHROMBIN TIME: 22.9 SEC (ref 9.3–12.4)
SERVICE CMNT-IMP: NORMAL
SERVICE CMNT-IMP: NORMAL
SODIUM SERPL-SCNC: 132 MMOL/L (ref 132–146)
SPECIMEN DESCRIPTION: NORMAL
SPECIMEN DESCRIPTION: NORMAL

## 2023-10-04 PROCEDURE — 93010 ELECTROCARDIOGRAM REPORT: CPT | Performed by: INTERNAL MEDICINE

## 2023-10-04 PROCEDURE — 82962 GLUCOSE BLOOD TEST: CPT

## 2023-10-04 PROCEDURE — 85610 PROTHROMBIN TIME: CPT

## 2023-10-04 PROCEDURE — 97535 SELF CARE MNGMENT TRAINING: CPT

## 2023-10-04 PROCEDURE — 97530 THERAPEUTIC ACTIVITIES: CPT

## 2023-10-04 PROCEDURE — 6370000000 HC RX 637 (ALT 250 FOR IP): Performed by: INTERNAL MEDICINE

## 2023-10-04 PROCEDURE — 36415 COLL VENOUS BLD VENIPUNCTURE: CPT

## 2023-10-04 PROCEDURE — 93005 ELECTROCARDIOGRAM TRACING: CPT | Performed by: NURSE PRACTITIONER

## 2023-10-04 PROCEDURE — 2580000003 HC RX 258: Performed by: INTERNAL MEDICINE

## 2023-10-04 PROCEDURE — 93005 ELECTROCARDIOGRAM TRACING: CPT | Performed by: INTERNAL MEDICINE

## 2023-10-04 PROCEDURE — 2140000000 HC CCU INTERMEDIATE R&B

## 2023-10-04 PROCEDURE — 99232 SBSQ HOSP IP/OBS MODERATE 35: CPT | Performed by: INTERNAL MEDICINE

## 2023-10-04 PROCEDURE — 80048 BASIC METABOLIC PNL TOTAL CA: CPT

## 2023-10-04 PROCEDURE — 2700000000 HC OXYGEN THERAPY PER DAY

## 2023-10-04 RX ORDER — WARFARIN SODIUM 5 MG/1
2.5 TABLET ORAL DAILY
Status: DISCONTINUED | OUTPATIENT
Start: 2023-10-04 | End: 2023-10-05 | Stop reason: HOSPADM

## 2023-10-04 RX ORDER — HYDROCODONE BITARTRATE AND ACETAMINOPHEN 5; 325 MG/1; MG/1
1 TABLET ORAL EVERY 6 HOURS PRN
Status: DISCONTINUED | OUTPATIENT
Start: 2023-10-04 | End: 2023-10-05 | Stop reason: HOSPADM

## 2023-10-04 RX ADMIN — Medication 400 MG: at 20:23

## 2023-10-04 RX ADMIN — SENNOSIDES AND DOCUSATE SODIUM 1 TABLET: 50; 8.6 TABLET ORAL at 20:24

## 2023-10-04 RX ADMIN — SODIUM CHLORIDE, PRESERVATIVE FREE 10 ML: 5 INJECTION INTRAVENOUS at 08:43

## 2023-10-04 RX ADMIN — OXYCODONE HYDROCHLORIDE 5 MG: 5 TABLET ORAL at 06:15

## 2023-10-04 RX ADMIN — SODIUM CHLORIDE, PRESERVATIVE FREE 10 ML: 5 INJECTION INTRAVENOUS at 20:23

## 2023-10-04 RX ADMIN — ROSUVASTATIN CALCIUM 40 MG: 20 TABLET, FILM COATED ORAL at 08:42

## 2023-10-04 RX ADMIN — SOTALOL HYDROCHLORIDE 80 MG: 80 TABLET ORAL at 06:15

## 2023-10-04 RX ADMIN — MUPIROCIN: 20 OINTMENT TOPICAL at 08:44

## 2023-10-04 RX ADMIN — HYDROCODONE BITARTRATE AND ACETAMINOPHEN 1 TABLET: 5; 325 TABLET ORAL at 23:12

## 2023-10-04 RX ADMIN — METFORMIN HYDROCHLORIDE 500 MG: 500 TABLET ORAL at 08:42

## 2023-10-04 RX ADMIN — SOTALOL HYDROCHLORIDE 80 MG: 80 TABLET ORAL at 17:56

## 2023-10-04 RX ADMIN — ACETAMINOPHEN 650 MG: 325 TABLET ORAL at 20:23

## 2023-10-04 RX ADMIN — PANTOPRAZOLE SODIUM 40 MG: 40 TABLET, DELAYED RELEASE ORAL at 08:43

## 2023-10-04 RX ADMIN — ASPIRIN 81 MG: 81 TABLET, COATED ORAL at 08:41

## 2023-10-04 RX ADMIN — MUPIROCIN: 20 OINTMENT TOPICAL at 20:24

## 2023-10-04 RX ADMIN — CITALOPRAM HYDROBROMIDE 20 MG: 20 TABLET ORAL at 08:42

## 2023-10-04 RX ADMIN — INSULIN LISPRO 2 UNITS: 100 INJECTION, SOLUTION INTRAVENOUS; SUBCUTANEOUS at 08:42

## 2023-10-04 RX ADMIN — Medication 400 MG: at 08:42

## 2023-10-04 RX ADMIN — INSULIN LISPRO 2 UNITS: 100 INJECTION, SOLUTION INTRAVENOUS; SUBCUTANEOUS at 11:27

## 2023-10-04 RX ADMIN — HYDROCODONE BITARTRATE AND ACETAMINOPHEN 1 TABLET: 5; 325 TABLET ORAL at 16:33

## 2023-10-04 RX ADMIN — LEVOTHYROXINE SODIUM 25 MCG: 0.03 TABLET ORAL at 08:42

## 2023-10-04 RX ADMIN — WARFARIN SODIUM 2.5 MG: 5 TABLET ORAL at 17:56

## 2023-10-04 RX ADMIN — HYDROCODONE BITARTRATE AND ACETAMINOPHEN 1 TABLET: 5; 325 TABLET ORAL at 10:27

## 2023-10-04 RX ADMIN — METFORMIN HYDROCHLORIDE 500 MG: 500 TABLET ORAL at 16:33

## 2023-10-04 ASSESSMENT — PAIN SCALES - GENERAL
PAINLEVEL_OUTOF10: 7
PAINLEVEL_OUTOF10: 3
PAINLEVEL_OUTOF10: 6
PAINLEVEL_OUTOF10: 3
PAINLEVEL_OUTOF10: 6
PAINLEVEL_OUTOF10: 5

## 2023-10-04 ASSESSMENT — PAIN DESCRIPTION - ORIENTATION
ORIENTATION: LOWER
ORIENTATION: LOWER
ORIENTATION: MID
ORIENTATION: RIGHT;LEFT

## 2023-10-04 ASSESSMENT — PAIN DESCRIPTION - LOCATION
LOCATION: BACK
LOCATION: BACK;LEG
LOCATION: BACK
LOCATION: BACK;LEG

## 2023-10-04 ASSESSMENT — PAIN DESCRIPTION - DESCRIPTORS
DESCRIPTORS: ACHING;DISCOMFORT;SHOOTING
DESCRIPTORS: ACHING;DISCOMFORT;SHOOTING;SPASM
DESCRIPTORS: ACHING;STABBING;THROBBING

## 2023-10-04 ASSESSMENT — PAIN SCALES - WONG BAKER: WONGBAKER_NUMERICALRESPONSE: 0

## 2023-10-04 NOTE — PLAN OF CARE
Problem: Chronic Conditions and Co-morbidities  Goal: Patient's chronic conditions and co-morbidity symptoms are monitored and maintained or improved  10/4/2023 1053 by Ibrahima Simons RN  Outcome: Progressing  10/4/2023 0042 by Francisca Gabriel RN  Outcome: Progressing     Problem: Discharge Planning  Goal: Discharge to home or other facility with appropriate resources  10/4/2023 1053 by Call, Duwayne Essex  Outcome: Progressing  10/4/2023 0042 by Francisca Gabriel RN  Outcome: Progressing     Problem: Safety - Adult  Goal: Free from fall injury  10/4/2023 1053 by Ibrahima Simons RN  Outcome: Progressing  10/4/2023 0042 by Francisca Gabriel RN  Outcome: Progressing     Problem: Pain  Goal: Verbalizes/displays adequate comfort level or baseline comfort level  10/4/2023 1053 by Ibrahima Simons, [address] [place] [address]  Outcome: Progressing  10/4/2023 0042 by Francisca Gabriel RN  Outcome: Progressing     Problem: ABCDS Injury Assessment  Goal: Absence of physical injury  Outcome: Progressing     Problem: Skin/Tissue Integrity  Goal: Absence of new skin breakdown  Description: 1. Monitor for areas of redness and/or skin breakdown  2. Assess vascular access sites hourly  3. Every 4-6 hours minimum:  Change oxygen saturation probe site  4. Every 4-6 hours:  If on nasal continuous positive airway pressure, respiratory therapy assess nares and determine need for appliance change or resting period.   10/4/2023 1053 by Call, Duwayne Essex  Outcome: Progressing  10/4/2023 0042 by Francisca Gabriel RN  Outcome: Progressing

## 2023-10-04 NOTE — PROGRESS NOTES
BASOSABS 0.02 10/01/2023 05:36 AM     CMP:    Lab Results   Component Value Date/Time     10/04/2023 05:12 AM    K 4.8 10/04/2023 05:12 AM    K 4.5 12/04/2018 08:10 AM    CL 95 10/04/2023 05:12 AM    CO2 27 10/04/2023 05:12 AM    BUN 8 10/04/2023 05:12 AM    CREATININE 0.4 10/04/2023 05:12 AM    GFRAA >60 10/05/2022 09:30 AM    LABGLOM >60 10/04/2023 05:12 AM    GLUCOSE 199 10/04/2023 05:12 AM    GLUCOSE 157 07/21/2011 04:06 PM    PROT 7.0 06/26/2023 12:00 PM    LABALBU 4.4 06/26/2023 12:00 PM    LABALBU 4.1 07/21/2011 04:06 PM    CALCIUM 9.2 10/04/2023 05:12 AM    BILITOT 0.2 06/26/2023 12:00 PM    ALKPHOS 92 06/26/2023 12:00 PM    AST 20 06/26/2023 12:00 PM    ALT 19 06/26/2023 12:00 PM     Warfarin PT/INR:    Lab Results   Component Value Date    INR 2.1 10/04/2023    INR 1.3 10/03/2023    INR 1.0 09/25/2023    PROTIME 22.9 (H) 10/04/2023    PROTIME 14.6 (H) 10/03/2023    PROTIME 10.5 09/25/2023       Assessment:    Principal Problem:    Atrial fibrillation with rapid ventricular response (HCC)  Active Problems:    NSTEMI (non-ST elevated myocardial infarction) ([address])  Resolved Problems:    * No resolved hospital problems.  *      Plan:  Dc to rehab cont coumadin decrease dose increase activity        Ketan Francis DO  7:29 AM  10/4/2023

## 2023-10-04 NOTE — PLAN OF CARE
Problem: Chronic Conditions and Co-morbidities  Goal: Patient's chronic conditions and co-morbidity symptoms are monitored and maintained or improved  10/4/2023 0042 by Galen Newsome RN  Outcome: Progressing     Problem: Discharge Planning  Goal: Discharge to home or other facility with appropriate resources  10/4/2023 0042 by Galen Newsome RN  Outcome: Progressing     Problem: Safety - Adult  Goal: Free from fall injury  10/4/2023 0042 by Galen Newsome RN  Outcome: Progressing     Problem: Pain  Goal: Verbalizes/displays adequate comfort level or baseline comfort level  10/4/2023 0042 by Galen Newsome RN  Outcome: Progressing     Problem: Skin/Tissue Integrity  Goal: Absence of new skin breakdown  Description: 1. Monitor for areas of redness and/or skin breakdown  2. Assess vascular access sites hourly  3. Every 4-6 hours minimum:  Change oxygen saturation probe site  4. Every 4-6 hours:  If on nasal continuous positive airway pressure, respiratory therapy assess nares and determine need for appliance change or resting period.   10/4/2023 0042 by Galen Newsome RN  Outcome: Progressing

## 2023-10-04 NOTE — PROGRESS NOTES
Occupational Therapy  OT BEDSIDE TREATMENT NOTE    [facility] [address] [place] [address]       DCRA:  Patient Name: Haven Duval  MRN: [id number]  : 1955  Room: [address] [place] [address]     Per OT Eval:  Per OT Eval:   Evaluating OT: Tyna Paget OTR/L; [id number]        Referring Provider: Samy Brown DO    Specific Provider Orders/Date: OT Eval and Treat 23        Diagnosis: Atrial fibrillation with rapid ventricular response     Surgery: None this admission     Pertinent Medical History:  has a past medical history of Age-related osteoporosis without current pathological fracture, Arthritis, Cataracts, bilateral, Colon polyps, Diabetes mellitus ([address]), DM type 2 (diabetes mellitus, type 2) ([address]), Facial basal cell cancer, Hyperlipidemia, Hypertension, Hypothyroidism, Migraines, Neuropathy, diabetic ([address]), Peripheral vascular disease ([address]), Positive colorectal cancer screening using Cologuard test, Psoriasiform dermatitis, Seasonal allergies, and Thyroid disease. 23 L1-S1 laminectomy and posterior fusion; L4-L5, L5-S1 posterior lumbar interbody fusion.      Recommended Adaptive Equipment: AE LB dressing/bathing/toileting PRN, extended tub bench      Precautions:  Fall Risk, spinal precautions, LSO, O2      Assessment of current deficits    [x] Functional mobility            [x]ADLs           [x] Strength                  []Cognition    [x] Functional transfers          [x] IADLs         [x] Safety Awareness   [x]Endurance    [x] Fine Coordination                         [x] Balance      [] Vision/perception   []Sensation      []Gross Motor Coordination             [] ROM           [] Delirium                   [] Motor Control      OT PLAN OF CARE   OT POC based on physician orders, patient diagnosis and results of clinical assessment     Frequency/Duration 3-5 days/wk for 2 weeks PRN   Specific OT Treatment Interventions to seated in bedside chair   Setup  Encourage meals seated in chair Independent    Grooming Stand by Assist   To brush hear seated in bedside chair SBA    Seated in chair to perform face washing task Modified [place]    UB Dressing Minimal Assist   To don LSO seated EOB  Min A  To don LSO seated EOB assist to position brace posteriorly  Modified [place]    LB Dressing Moderate Assist   To don RLE sock d/t increased LE pain - pt able to don LLE sock utilizing figure-4 technique Min A  Don/doff socks, attempted figure 4 tech reporting moderate pain, educated on sock aid with good understanding/ demonstration  Modified [place]    Bathing Moderate Assist  Simulated seated EOB  Mod A  Simulated Modified [place]    Toileting Minimal Assist   Pt able to complete hygiene care while seated however required assist for clothing management  Min A  simulated Modified [place]    Bed Mobility  Supine to sit: NT   Sit to supine: NT  Supine to sit: SBA   v/c to implement spinal precautions  Supine to sit:  Independent   Sit to supine: Independent    Functional Transfers Sit to stand:CGA   Stand to sit:CGA  Stand pivot: CGA  Commode: CGA  Sit<>stand: CGA   Sit to stand:Modified [place]    Stand to sit:Modified [place]   Stand pivot: Modified [place]   Commode: Modified [place]     Functional Mobility CGA  Use of ww within household distance  Min A  With walker, able to complete household distances with standing rest periods Modified [place] w/ use of Appropriate AD   Balance Sitting:     Static - Supervision     Dynamic - SBA  Standing: CGA w/ ww Sitting:     Static - Supervision     Dynamic - SBA  Standing: Min A w/ HHA Sitting:     Static:  Independent     Dynamic:  Independent  Standing: Modified [place]    Activity Tolerance Fair  Fair Good   Visual/  Perceptual Appears WFL          Safety Fair                    Fair  Upon arrival pt O2 2L: 95% removed O2 to trail on RA,

## 2023-10-04 NOTE — PATIENT CARE CONFERENCE
P Quality Flow/Interdisciplinary Rounds Progress Note        Quality Flow Rounds held on October 4, 2023    Disciplines Attending:  Bedside Nurse, , , and Nursing Unit Leadership    Tam Pacheco was admitted on 9/29/2023  3:22 AM    Anticipated Discharge Date:       Disposition:    Devon Score:  Devon Scale Score: 20    Readmission Risk              Risk of Unplanned Readmission:  12           Discussed patient goal for the day, patient clinical progression, and barriers to discharge.   The following Goal(s) of the Day/Commitment(s) have been identified:  Labs - Report Results      Sedrick Chi RN  October 4, 2023

## 2023-10-05 VITALS
RESPIRATION RATE: 18 BRPM | HEART RATE: 55 BPM | TEMPERATURE: 97.5 F | SYSTOLIC BLOOD PRESSURE: 117 MMHG | OXYGEN SATURATION: 99 % | DIASTOLIC BLOOD PRESSURE: 67 MMHG

## 2023-10-05 LAB
ANION GAP SERPL CALCULATED.3IONS-SCNC: 12 MMOL/L (ref 7–16)
BUN SERPL-MCNC: 9 MG/DL (ref 6–23)
CALCIUM SERPL-MCNC: 9.2 MG/DL (ref 8.6–10.2)
CHLORIDE SERPL-SCNC: 96 MMOL/L (ref 98–107)
CO2 SERPL-SCNC: 26 MMOL/L (ref 22–29)
CREAT SERPL-MCNC: 0.4 MG/DL (ref 0.5–1)
EKG ATRIAL RATE: 53 BPM
EKG ATRIAL RATE: 63 BPM
EKG P AXIS: 74 DEGREES
EKG P AXIS: 75 DEGREES
EKG P-R INTERVAL: 112 MS
EKG P-R INTERVAL: 114 MS
EKG Q-T INTERVAL: 436 MS
EKG Q-T INTERVAL: 480 MS
EKG QRS DURATION: 88 MS
EKG QRS DURATION: 90 MS
EKG QTC CALCULATION (BAZETT): 446 MS
EKG QTC CALCULATION (BAZETT): 451 MS
EKG R AXIS: 46 DEGREES
EKG R AXIS: 50 DEGREES
EKG T AXIS: 1 DEGREES
EKG T AXIS: 48 DEGREES
EKG VENTRICULAR RATE: 53 BPM
EKG VENTRICULAR RATE: 63 BPM
GFR SERPL CREATININE-BSD FRML MDRD: >60 ML/MIN/1.73M2
GLUCOSE BLD-MCNC: 158 MG/DL (ref 74–99)
GLUCOSE BLD-MCNC: 219 MG/DL (ref 74–99)
GLUCOSE BLD-MCNC: 248 MG/DL (ref 74–99)
GLUCOSE SERPL-MCNC: 193 MG/DL (ref 74–99)
INR PPP: 3.1
POTASSIUM SERPL-SCNC: 4.7 MMOL/L (ref 3.5–5)
PROTHROMBIN TIME: 33.5 SEC (ref 9.3–12.4)
SODIUM SERPL-SCNC: 134 MMOL/L (ref 132–146)

## 2023-10-05 PROCEDURE — 82962 GLUCOSE BLOOD TEST: CPT

## 2023-10-05 PROCEDURE — 80048 BASIC METABOLIC PNL TOTAL CA: CPT

## 2023-10-05 PROCEDURE — 97530 THERAPEUTIC ACTIVITIES: CPT

## 2023-10-05 PROCEDURE — 93005 ELECTROCARDIOGRAM TRACING: CPT | Performed by: NURSE PRACTITIONER

## 2023-10-05 PROCEDURE — 97535 SELF CARE MNGMENT TRAINING: CPT

## 2023-10-05 PROCEDURE — 85610 PROTHROMBIN TIME: CPT

## 2023-10-05 PROCEDURE — 93010 ELECTROCARDIOGRAM REPORT: CPT | Performed by: INTERNAL MEDICINE

## 2023-10-05 PROCEDURE — 2700000000 HC OXYGEN THERAPY PER DAY

## 2023-10-05 PROCEDURE — 2580000003 HC RX 258: Performed by: INTERNAL MEDICINE

## 2023-10-05 PROCEDURE — 36415 COLL VENOUS BLD VENIPUNCTURE: CPT

## 2023-10-05 PROCEDURE — 99233 SBSQ HOSP IP/OBS HIGH 50: CPT | Performed by: INTERNAL MEDICINE

## 2023-10-05 PROCEDURE — 6370000000 HC RX 637 (ALT 250 FOR IP): Performed by: INTERNAL MEDICINE

## 2023-10-05 RX ORDER — MAGNESIUM OXIDE 400 MG/1
400 TABLET ORAL DAILY
Qty: 90 TABLET | Refills: 1 | Status: SHIPPED | OUTPATIENT
Start: 2023-10-05 | End: 2023-10-05 | Stop reason: SDUPTHER

## 2023-10-05 RX ORDER — WARFARIN SODIUM 2.5 MG/1
TABLET ORAL
Qty: 30 TABLET | Refills: 0 | Status: SHIPPED | OUTPATIENT
Start: 2023-10-05

## 2023-10-05 RX ORDER — ACETAMINOPHEN 325 MG/1
650 TABLET ORAL EVERY 6 HOURS PRN
Qty: 120 TABLET | Refills: 3 | COMMUNITY
Start: 2023-10-05

## 2023-10-05 RX ORDER — SOTALOL HYDROCHLORIDE 80 MG/1
80 TABLET ORAL 2 TIMES DAILY
Status: CANCELLED | OUTPATIENT
Start: 2023-10-05

## 2023-10-05 RX ORDER — HYDROCODONE BITARTRATE AND ACETAMINOPHEN 5; 325 MG/1; MG/1
1 TABLET ORAL EVERY 6 HOURS PRN
OUTPATIENT
Start: 2023-10-05

## 2023-10-05 RX ORDER — MAGNESIUM OXIDE 400 MG/1
400 TABLET ORAL DAILY
Qty: 90 TABLET | Refills: 0 | Status: SHIPPED | OUTPATIENT
Start: 2023-10-05

## 2023-10-05 RX ORDER — SOTALOL HYDROCHLORIDE 80 MG/1
40 TABLET ORAL 2 TIMES DAILY
Qty: 45 TABLET | Refills: 3 | Status: SHIPPED | OUTPATIENT
Start: 2023-10-05

## 2023-10-05 RX ORDER — WARFARIN SODIUM 5 MG/1
2.5 TABLET ORAL DAILY
OUTPATIENT
Start: 2023-10-06

## 2023-10-05 RX ORDER — SOTALOL HYDROCHLORIDE 80 MG/1
40 TABLET ORAL 2 TIMES DAILY
Status: DISCONTINUED | OUTPATIENT
Start: 2023-10-05 | End: 2023-10-05 | Stop reason: HOSPADM

## 2023-10-05 RX ADMIN — INSULIN LISPRO 2 UNITS: 100 INJECTION, SOLUTION INTRAVENOUS; SUBCUTANEOUS at 12:17

## 2023-10-05 RX ADMIN — ROSUVASTATIN CALCIUM 40 MG: 20 TABLET, FILM COATED ORAL at 08:06

## 2023-10-05 RX ADMIN — ACETAMINOPHEN 650 MG: 325 TABLET ORAL at 09:12

## 2023-10-05 RX ADMIN — HYDROCODONE BITARTRATE AND ACETAMINOPHEN 1 TABLET: 5; 325 TABLET ORAL at 06:04

## 2023-10-05 RX ADMIN — HYDROCODONE BITARTRATE AND ACETAMINOPHEN 1 TABLET: 5; 325 TABLET ORAL at 12:53

## 2023-10-05 RX ADMIN — SOTALOL HYDROCHLORIDE 80 MG: 80 TABLET ORAL at 06:04

## 2023-10-05 RX ADMIN — CITALOPRAM HYDROBROMIDE 20 MG: 20 TABLET ORAL at 08:07

## 2023-10-05 RX ADMIN — MUPIROCIN: 20 OINTMENT TOPICAL at 08:08

## 2023-10-05 RX ADMIN — Medication 400 MG: at 08:07

## 2023-10-05 RX ADMIN — METFORMIN HYDROCHLORIDE 500 MG: 500 TABLET ORAL at 08:07

## 2023-10-05 RX ADMIN — ASPIRIN 81 MG: 81 TABLET, COATED ORAL at 08:07

## 2023-10-05 RX ADMIN — ACETAMINOPHEN 650 MG: 325 TABLET ORAL at 02:55

## 2023-10-05 RX ADMIN — LEVOTHYROXINE SODIUM 25 MCG: 0.03 TABLET ORAL at 08:07

## 2023-10-05 RX ADMIN — INSULIN LISPRO 2 UNITS: 100 INJECTION, SOLUTION INTRAVENOUS; SUBCUTANEOUS at 08:06

## 2023-10-05 RX ADMIN — PANTOPRAZOLE SODIUM 40 MG: 40 TABLET, DELAYED RELEASE ORAL at 08:07

## 2023-10-05 RX ADMIN — SENNOSIDES AND DOCUSATE SODIUM 1 TABLET: 50; 8.6 TABLET ORAL at 08:07

## 2023-10-05 RX ADMIN — SODIUM CHLORIDE, PRESERVATIVE FREE 10 ML: 5 INJECTION INTRAVENOUS at 08:07

## 2023-10-05 RX ADMIN — METFORMIN HYDROCHLORIDE 500 MG: 500 TABLET ORAL at 16:59

## 2023-10-05 RX ADMIN — POLYETHYLENE GLYCOL 3350 17 G: 17 POWDER, FOR SOLUTION ORAL at 08:06

## 2023-10-05 RX ADMIN — BISACODYL 5 MG: 5 TABLET, COATED ORAL at 08:07

## 2023-10-05 ASSESSMENT — PAIN SCALES - GENERAL
PAINLEVEL_OUTOF10: 3
PAINLEVEL_OUTOF10: 3
PAINLEVEL_OUTOF10: 6
PAINLEVEL_OUTOF10: 4
PAINLEVEL_OUTOF10: 8
PAINLEVEL_OUTOF10: 3
PAINLEVEL_OUTOF10: 6

## 2023-10-05 ASSESSMENT — PAIN DESCRIPTION - LOCATION
LOCATION: BACK

## 2023-10-05 ASSESSMENT — PAIN DESCRIPTION - DESCRIPTORS
DESCRIPTORS: ACHING;DISCOMFORT;SORE
DESCRIPTORS: ACHING;SHOOTING;SORE
DESCRIPTORS: ACHING;DISCOMFORT;SHOOTING
DESCRIPTORS: ACHING;DISCOMFORT;SORE

## 2023-10-05 ASSESSMENT — PAIN DESCRIPTION - ORIENTATION
ORIENTATION: MID
ORIENTATION: MID;LOWER
ORIENTATION: MID;LOWER
ORIENTATION: LOWER

## 2023-10-05 NOTE — CARE COORDINATION
10/2/23  Transition of Care Update. Patient has been in and out of A-Fib. Patient is also s/p L1-S1 fusion and L4-S1 PLIF from 9/25. Patient is currently in normal sinus rhythm. EP is following and starting Sotalol. An ARU consult was placed and Dr. Mauricio Wilburn to eval.  Discharge goal is an ARU stay returning home with  after. Therapy evaluated with AM-PAC scores of 16/24 for both PT and OT. SW/Cm to follow.     Electronically signed by MELE Adan on 10/2/2023 at 10:14 AM
10/4/23  Transition of Care Update. Patient admitted for A-Fib and receiving Sotalol with EP following. Patient is a recent L1--S1 fusion and L4-S1 PLIF from 9/25 and neurosurgery following. Patient is planned to transfer to ARU for a rehab stay once Sotalol is complete. PT/OT saw with AM-PAC scores of 16/24 for both PT and OT yesterday. SW/CM to follow.     Electronically signed by MELE Richter on 10/4/2023 at 9:28 AM
10/5/23 Transition of Care Update. Patient admitted for A-Fib and receiving Sotalol with EP following. Patient is a recent L1--S1 fusion and L4-S1 PLIF from 9/25 with neurosurgery following. Patient is planned to transfer to ARU for a rehab stay once Sotalol is complete. Patients 6 dose of Sotalol is planned for today at 6pm with follow up EKG 2 hours later per nursing. ARU called and updated. ARU unable to accept until tomorrow. Updated PT/OT needed for ARU and requested. [facility] home care continues to follow with start of care Sun 10/8 should patient discharge home and not to ARU. SW/CM to follow. 12:30pm  Spoke with ARU who states patient is to high functioning for a rehab stay. Patient will discharge home with [facility]. [facility]  called and updated of discharge order and can start care Saturday and will move to Friday if able. Patient is requesting a walker with order obtained and [facility]  DME called with referal given.     Electronically signed by MELE Gonzáles on 10/5/2023 at 9:53 AM
Consult received. Dr. Vilchis Plan to eval for ARU.
Social Work/ Case Management Transition of Care Planning ([address], [address]):   Per report and chart review Pt is on 2/L nasal cannula. Pt's BP is 107/48. Pt on Sotalol. Consult was place for ARU, SW requested updated PT/OT evals per ARU request. Pt for EKG today. SW/CM to follow. [facility], [place]  10/3/2023      Update: Per Jeane at [facility], they can't take Pt until Thursday due to bed availability. She will need updated PT/OT on Thursday after last dose of Sotalol. Salvador Mcmahon is following.    [facility], [place]
shares the quality data associated with the providers was provided to:     Patient Representative Name:       The Patient and/or Patient Representative Agree with the Discharge Plan?   Yes    Maia Brown, [place]  Case Management Department  Ph: [phone] Fax:

## 2023-10-05 NOTE — PLAN OF CARE
Problem: Chronic Conditions and Co-morbidities  Goal: Patient's chronic conditions and co-morbidity symptoms are monitored and maintained or improved  10/4/2023 2152 by Jeannie Raines RN  Outcome: Progressing     Problem: Discharge Planning  Goal: Discharge to home or other facility with appropriate resources  10/4/2023 2152 by Jeannie Raines RN  Outcome: Progressing     Problem: Safety - Adult  Goal: Free from fall injury  10/4/2023 2152 by Jeannie Raines RN  Outcome: Progressing     Problem: Pain  Goal: Verbalizes/displays adequate comfort level or baseline comfort level  10/4/2023 2152 by Jeannie Raines RN  Outcome: Progressing

## 2023-10-05 NOTE — PATIENT CARE CONFERENCE
P Quality Flow/Interdisciplinary Rounds Progress Note        Quality Flow Rounds held on October 5, 2023    Disciplines Attending:  Bedside Nurse, , , and Nursing Unit Leadership    Jeffrey Ribera was admitted on 9/29/2023  3:22 AM    Anticipated Discharge Date:       Disposition:    Devon Score:  Devon Scale Score: 20    Readmission Risk              Risk of Unplanned Readmission:  12           Discussed patient goal for the day, patient clinical progression, and barriers to discharge.   The following Goal(s) of the Day/Commitment(s) have been identified:   increase activity      Kasia Miguel RN  October 5, 2023

## 2023-10-05 NOTE — PROGRESS NOTES
95% at rest on room air (if 88% or less, skip next steps)   92% while ambulating on room air     Elli Burt RN

## 2023-10-05 NOTE — PROGRESS NOTES
Occupational Therapy  OCCUPATIONAL THERAPY TREATMENT SESSION    [facility] [address], [place], [address] TREATMENT NOTE      Date:10/5/2023  Patient Name: Juliette Cleary  MRN: [id number]  : 1955  Room: [address] [place] [address]     Per OT Eval:       Evaluating OT: Monster Alonso OTR/L; [id number]        Referring Provider: Samm Goetz DO    Specific Provider Orders/Date: OT Eval and Treat 23        Diagnosis: Atrial fibrillation with rapid ventricular response     Surgery: None this admission     Pertinent Medical History:  has a past medical history of Age-related osteoporosis without current pathological fracture, Arthritis, Cataracts, bilateral, Colon polyps, Diabetes mellitus ([address]), DM type 2 (diabetes mellitus, type 2) ([address]), Facial basal cell cancer, Hyperlipidemia, Hypertension, Hypothyroidism, Migraines, Neuropathy, diabetic ([address]), Peripheral vascular disease ([address]), Positive colorectal cancer screening using Cologuard test, Psoriasiform dermatitis, Seasonal allergies, and Thyroid disease. 23 L1-S1 laminectomy and posterior fusion; L4-L5, L5-S1 posterior lumbar interbody fusion.      Recommended Adaptive Equipment: AE LB dressing/bathing/toileting PRN, extended tub bench      Precautions:  Fall Risk, spinal precautions, LSO, O2      Assessment of current deficits    [x] Functional mobility            [x]ADLs           [x] Strength                  []Cognition    [x] Functional transfers          [x] IADLs         [x] Safety Awareness   [x]Endurance    [x] Fine Coordination                         [x] Balance      [] Vision/perception   []Sensation      []Gross Motor Coordination             [] ROM           [] Delirium                   [] Motor Control      OT PLAN OF CARE   OT POC based on physician orders, patient diagnosis and results of clinical assessment     Frequency/Duration 3-5 days/wk for 2 weeks PRN Supervision/Setup  Encourage meals seated in chair Independent    Grooming Stand by Assist   To brush hear seated in bedside chair Min A  Standing at sink for various tasks  Noted fatigue as progressed Modified [place]    UB Dressing Minimal Assist   To don LSO seated EOB  Min A  Gown    Max A  Donned LSO (rolled L/R)  Modified [place]    LB Dressing Moderate Assist   To don RLE sock d/t increased LE pain - pt able to don LLE sock utilizing figure-4 technique Mod A  Donned/doffed B socks w/ AE training Modified [place]    Bathing Moderate Assist  Simulated seated EOB  Mod A  Per previous session Modified [place]    Toileting Minimal Assist   Pt able to complete hygiene care while seated however required assist for clothing management  Min A  Including hygiene task Modified [place]    Bed Mobility  Supine to sit: NT   Sit to supine: NT  Rolling: SBA  Supine>sit EOB: Min A Supine to sit: Independent   Sit to supine: Independent    Functional Transfers Sit to stand:CGA   Stand to sit:CGA  Stand pivot: CGA  Commode: CGA  Sit<>stand: SBA  Stand pivot: CGA w/ w/w    Sit to stand:Modified [place]    Stand to sit:Modified [place]   Stand pivot: Modified [place]   Commode: Modified [place]     Functional Mobility CGA  Use of ww within household distance  SBA>CGA w/ w/w  Household distance and bathroom mobility  CGA as progressed d/t fatigue   Modified [place] w/ use of Appropriate AD   Balance Sitting:     Static - Supervision     Dynamic - SBA  Standing: CGA w/ ww Sitting:     Static - Supervision     Dynamic - SBA  Standing: SBA><CGA w/ w/w Sitting:     Static:  Independent     Dynamic:  Independent  Standing: Modified [place]    Activity Tolerance Fair  Fair Good   Visual/  Perceptual Appears WFL          Safety Fair                    Fair   Good  during ADL completion following spinal precautions   Vitals HR 90s-100s throughout session.  BP seated in bedside chair at

## 2023-10-05 NOTE — PROGRESS NOTES
[address] [facility]-  Electrophysiology  Inpatient progress note  PATIENT: Damian Levin  MEDICAL RECORD NUMBER: [id number]  DATE OF SERVICE:  10/5/2023  ATTENDING ELECTROPHYSIOLOGIST: Shahrzad Can MD   PRIMARY ELECTROPHYSIOLOGIST: Shahrzad Can MD   REFERRING PHYSICIAN:  Elena Garcia DO  CHIEF COMPLAINT: Atrial Fibrillation with hypotension. HPI: This is a 80 y.o. female with a history of Paroxsymal atrial fibrillation noted in the post CABG period,  Coronary artery disease with prior CABG times 2 (LIMA-LAD, SVG to OM) 11/2022, left atrial appendage clip with the AtriCure clip, Mild mitral regurgitation, Hypertension, Type 2 diabetes milltus, Peripheral vascular disease with prior left carotid enterectomy 98/8546, prior ilica stent placement 12/2018, Hyperlipidemia,  Hypothyroidism, Prior heavy Tobacco use stopping in June 2023, COPD, Pulmonary Nodule, GERD, Lumbar stenosis S/p L1-S1 fusion, Migraines, Basal cell Ca, Psoriasiform dermatis, Cataracts. She was noted to have Atrial fibrillation post CABG in November 2022, seen by Dr. Amelia Toscano who recommended Eliquis, ASA PPI, and short term Amiodarone. In December 2022 Eliquis was found too expensive and she was transitioned to Warfarin, managed by the Anticoagulation clinic with the discontinuation of Amiodarone at that time. In January 2023 she was found to have Hypertension and was started on Toprol 25mg QD by Dr. Jimena Poe. Due to ongoing  back pain she was seen by Dr. Fenrando De Souza who then performed a L1-S1 laminectomy and posterior fusion of L4-L5 L5-S1 fusion on 09/25/2023 with Warfarin being stopped at that time. Dr. Amber Rowell was consulted on the 27th for post op respiratory insufficiency a repeat CTA showed no PE however an increased left upper lode nodule, she was weaned from 6L to 1L nasal cannula. She wwas then admitted to Acute Rehab on 09/28/2023.      On 09/29/2023 the patient had an RRT in acute [facility])     follows with Dr. Bacilio Romero yearly    Positive colorectal cancer screening using Cologuard test     Psoriasiform dermatitis     Seasonal allergies     Thyroid disease        Family History   Problem Relation Age of Onset    Cancer Mother         lung brain smoker    Heart Attack Father     Heart Disease Father         mi in his 52's     Other Father         pulmonary fibrosis    Heart Attack Brother         48    Diabetes Brother     Heart Attack Paternal Grandfather        Social History     Tobacco Use    Smoking status: Former     Packs/day: 0.50     Years: 40.00     Additional pack years: 0.00     Total pack years: 20.00     Types: Cigarettes     Start date: 5     Quit date: 2023     Years since quittin.3     Passive exposure: Past    Smokeless tobacco: Never    Tobacco comments:     Quit smoking 4 yrs.ago.    Substance Use Topics    Alcohol use: No       Current Facility-Administered Medications   Medication Dose Route Frequency Provider Last Rate Last Admin    sotalol (BETAPACE) tablet 40 mg  40 mg Oral BID Lennette Lefort, MD        warfarin (COUMADIN) tablet 2.5 mg  2.5 mg Oral Daily Malmer, Stark Mortimer, DO   2.5 mg at 10/04/23 1756    HYDROcodone-acetaminophen (NORCO) 5-325 MG per tablet 1 tablet  1 tablet Oral Q6H PRN Malmer, Stark Mortimer, DO   1 tablet at 10/05/23 1253    aspirin EC tablet 81 mg  81 mg Oral Daily Malmer, Stark Mortimer, DO   81 mg at 10/05/23 4888    magnesium oxide (MAG-OX) tablet 400 mg  400 mg Oral BID Lennette Lefort, MD   400 mg at 10/05/23 0807    acetaminophen (TYLENOL) tablet 650 mg  650 mg Oral Q6H PRN Malmer, Stark Mortimer, DO   650 mg at 10/05/23 0912    bisacodyl (DULCOLAX) EC tablet 5 mg  5 mg Oral Daily Malmer, Stark Mortimer, DO   5 mg at 10/05/23 1789    citalopram (CELEXA) tablet 20 mg  20 mg Oral Daily Malmer, Stark Mortimer, DO   20 mg at 10/05/23 4560    insulin lispro (HUMALOG) injection vial 0-4 Units  0-4 Units SubCUTAneous Nightly Malmer, Stark Mortimer, DO   4 Units at

## 2023-10-05 NOTE — PROGRESS NOTES
Physician Progress Note      PATIENTCaron Felty  SCOTT #:                  [id number]  :                       1955  ADMIT DATE:       2023 3:22 AM  DISCH DATE:  RESPONDING  PROVIDER #: [id number] DO          QUERY TEXT:    Patient admitted with Afib RVR and is maintained on coumadin. If possible,   please document in progress notes and discharge summary if you are evaluating   and/or treating any of the following: The medical record reflects the following:  Risk Factors: Afib  Clinical Indicators: per EP \"CHADS VASc 5 (age, gender, HTN, VASc, DM) -   warfarin has been held until today due to post surgical restrictions\"  Treatment: Coumadin    Thank you  Drake HERBERT, RN, CCDS  Clinical Documentation Improvement  Options provided:  -- Secondary hypercoagulable state in a patient with atrial fibrillation  -- Other - I will add my own diagnosis  -- Disagree - Not applicable / Not valid  -- Disagree - Clinically unable to determine / Unknown  -- Refer to Clinical Documentation Reviewer    PROVIDER RESPONSE TEXT:    This patient has secondary hypercoagulable state in a patient with atrial   fibrillation.     Query created by: Drake Acuna on  2:73 PM      Electronically signed by:  Maryann [facility]  10/5/2023 7:23 AM

## 2023-10-05 NOTE — PROGRESS NOTES
Hospital Medicine    Subjective:  pt alert conversive still requiring o2      Current Facility-Administered Medications:     warfarin (COUMADIN) tablet 2.5 mg, 2.5 mg, Oral, Daily, Kwesi Lynna Pushpaes, DO, 2.5 mg at 10/04/23 1756    HYDROcodone-acetaminophen (NORCO) 5-325 MG per tablet 1 tablet, 1 tablet, Oral, Q6H PRN, Shane, Mara Clarke, DO, 1 tablet at 10/05/23 0604    aspirin EC tablet 81 mg, 81 mg, Oral, Daily, MalmerKwesia Pushpaes, DO, 81 mg at 10/04/23 0841    magnesium oxide (MAG-OX) tablet 400 mg, 400 mg, Oral, BID, Eamon Anderson MD, 400 mg at 10/04/23 2023    sotalol (BETAPACE) tablet 80 mg, 80 mg, Oral, BID, Eamon Anderson MD, 80 mg at 10/05/23 0604    acetaminophen (TYLENOL) tablet 650 mg, 650 mg, Oral, Q6H PRN, Mara Lynn, DO, 650 mg at 10/05/23 0255    bisacodyl (DULCOLAX) EC tablet 5 mg, 5 mg, Oral, Daily, Mara Lynn, DO, 5 mg at 10/03/23 0936    citalopram (CELEXA) tablet 20 mg, 20 mg, Oral, Daily, Mara Lynn, DO, 20 mg at 10/04/23 0842    insulin lispro (HUMALOG) injection vial 0-4 Units, 0-4 Units, SubCUTAneous, Nightly, Mara Lynn, DO, 4 Units at 09/29/23 2101    insulin lispro (HUMALOG) injection vial 0-8 Units, 0-8 Units, SubCUTAneous, TID WC, Mara Lynn, DO, 2 Units at 10/04/23 1127    levothyroxine (SYNTHROID) tablet 25 mcg, 25 mcg, Oral, Daily, Mara Lynn, DO, 25 mcg at 10/04/23 7916    metFORMIN (GLUCOPHAGE) tablet 500 mg, 500 mg, Oral, BID WC, Mara Lynn, DO, 500 mg at 10/04/23 1633    mupirocin (BACTROBAN) 2 % ointment, , Each Nostril, BID, Mara Lynn DO, Given at 10/04/23 2024    pantoprazole (PROTONIX) tablet 40 mg, 40 mg, Oral, Daily, Mara Lynn, , 40 mg at 10/04/23 0843    polyethylene glycol (GLYCOLAX) packet 17 g, 17 g, Oral, Daily, Mara Lynn DO, 17 g at 10/03/23 0936    rosuvastatin (CRESTOR) tablet 40 mg, 40 mg, Oral, Daily, Mara Lynn, , 40 mg at 10/04/23 0842    sennosides-docusate sodium (SENOKOT-S)

## 2023-10-05 NOTE — PLAN OF CARE
Problem: Chronic Conditions and Co-morbidities  Goal: Patient's chronic conditions and co-morbidity symptoms are monitored and maintained or improved  Outcome: Progressing     Problem: Safety - Adult  Goal: Free from fall injury  Outcome: Progressing     Problem: Pain  Goal: Verbalizes/displays adequate comfort level or baseline comfort level  Outcome: Progressing     Problem: ABCDS Injury Assessment  Goal: Absence of physical injury  Outcome: Progressing     Problem: Skin/Tissue Integrity  Goal: Absence of new skin breakdown  Description: 1. Monitor for areas of redness and/or skin breakdown  2. Assess vascular access sites hourly  3. Every 4-6 hours minimum:  Change oxygen saturation probe site  4. Every 4-6 hours:  If on nasal continuous positive airway pressure, respiratory therapy assess nares and determine need for appliance change or resting period.   Outcome: Progressing

## 2023-10-05 NOTE — PROGRESS NOTES
foraminal stenosis 09/27/2023    Lumbar stenosis with neurogenic claudication 09/25/2023    Dextroscoliosis of lumbar spine 08/14/2023    Stenosis, spinal, lumbar 08/14/2023    Scoliosis 09/30/2022     Overview Note:     Added automatically from request for surgery [id number]      Lumbar spinal stenosis 09/30/2022     Overview Note:     Added automatically from request for surgery [id number]      Senile osteoporosis 02/22/2022     Overview Note:     Diagnosis added to problem list by [facility] based on transcribed order from Dr. Pennie Goodwin        Diabetes mellitus without complication ([address]) 63/45/9743    Lesion of true vocal cord     Tobacco abuse 03/03/2020    S/P carotid endarterectomy 12/17/2018    PVD (peripheral vascular disease) ([address]) 12/04/2018    Carotid artery stenosis, asymptomatic, bilateral 10/11/2018    Aortoiliac occlusive disease ([address]) 09/24/2018    PVD (peripheral vascular disease) with claudication ([address]) 09/24/2018    History of nuclear stress test 09/14/2018     Overview Note:     A.  Normal lexiscan 9/14/2018, normal EF      Mixed hyperlipidemia 09/14/2018     Overview Note:     Claims intolerance to high intensity statins      Asymptomatic bilateral carotid artery stenosis 09/10/2018       Past Medical History:   Diagnosis Date    Age-related osteoporosis without current pathological fracture 06/2021    Arthritis     Atrial fibrillation, transient ([address]) 09/28/2023    Cataracts, bilateral 10/'19    Colon polyps 08/05/2021    tubular adenoma    Diabetes mellitus ([address])     DM type 2 (diabetes mellitus, type 2) ([address])     Facial basal cell cancer 03/2022    Generalovic    Hyperlipidemia     Hypertension     Hypothyroidism     Migraines     rare    Neuropathy, diabetic ([address])     Peripheral vascular disease ([address])     follows with Dr. Vance Fan yearly    Positive colorectal cancer screening using Cologuard test     Psoriasiform dermatitis     Seasonal allergies     Thyroid disease        Family 10/02/23  0920 10/03/23  0554 10/04/23  0512    135 132   K 4.2 4.5 4.8   CL 96* 97* 95*   CO2 25 27 27   BUN 9 8 8   CREATININE 0.5 0.5 0.4*   CALCIUM 8.8 8.9 9.2      Lab Results   Component Value Date/Time    MG 1.9 09/29/2023 02:35 AM     No results for input(s): \"TSH\" in the last 72 hours. Recent Labs     10/03/23  0554 10/04/23  0630   INR 1.3 2.1       CT chest 09/27/2023:   No evidence of pulmonary embolism. Small right pleural effusion. 9 mm spiculated lesion left upper lobe this has increased in size compared to  03/17/2023 and worrisome for malignancy. Consideration for PET CT scan  correlation. Tissue diagnosis should also be considered. 8 mm round well-circumscribed nodule right lower lobe. Findings are  nonspecific. Telemetry: Sinus rhythm    EKG 10/4/2023 : Sinus bradycardia, QTc 460 ms      Echocardiogram 10/27/2022:   Technically difficult examination. Mild concentric left ventricular hypertrophy. Ejection fraction is visually estimated at 60 to 65%. Normal right ventricular size and function. Mild mitral regurgitation is present. Individual aortic valve leaflets are not clearly visualized. Mild aortic stenosis. Mild tricuspid regurgitation. Mild pulmonic regurgitation present. Cardiac Catheterization 10/06/2022:   CORONARY ANGIOGRAPHY:  1. Left Main:  The left main artery is a short vessel which is heavily  calcified distally with around 20% distal vessel narrowing. 2. LAD:  The left anterior descending artery appeared heavily calcified  at its origin. There was an eccentric 70% to 80% stenosis in the  proximal LAD just before the first septal  branch. After the  first diagonal branch in the mid LAD, there was 95% to 99% subtotal  occlusion. Distally, the LAD again had around 80% to 90% luminal  narrowing. The distal LAD was a small caliber vessel measuring around  1.5 mm in diameter.   The large diagonal branch had 70% to 80%

## 2023-10-05 NOTE — PROGRESS NOTES
Physical Therapy  Physical Therapy Treatment    Name: Joe Gomes  : 1955  MRN: [id number]      Date of Service: 10/5/2023    Evaluating PT:  Maryam Thapa PT, DPT    Room #:  2696/6498-B  Diagnosis:  Atrial fibrillation with rapid ventricular response (HCC) [I48.91]  PMHx/PSHx:  CAD, HTN, DM II, HLD, PVD, tobacco abuse  Procedure/Surgery:  s/p L4-L5, L5-S1 PLIF   Precautions:  spine, LSO, O2, fall risk, poor safety awareness   Equipment Owned: cane, ww  Equipment Needs:  TBD    SUBJECTIVE:    Pt lives with spouse in a 3 story home with no steps to enter. Bed/bath is on 1st floor. Pt ambulated with no AD PTA. OBJECTIVE:   Initial Evaluation  Date: 23 Treatment   Date: 10/5/23 Short Term/ Long Term   Goals   AM-PAC 6 Clicks /30    Was pt agreeable to Eval/treatment? yes yes    Does pt have pain? 7/10 BLE /10 back and R leg pain    Bed Mobility  Rolling: NT  Supine to sit: NT  Sit to supine: NT  Scooting: SBA Rolling: SBA  Supine to sit: Min A  Sit to supine: NT  Scooting: SBA Rolling: mod I  Supine to sit: mod I  Sit to supine: mod I  Scooting: mod I   Transfers Sit to stand: CGA  Stand to sit: CGA  Stand pivot: CGA with ww Sit to stand: SBA  Stand to sit: SBA  Stand pivot: CGA with [employer] Sit to stand: mod I  Stand to sit: mod I  Stand pivot: mod I with AAD   Ambulation    50'x2 with ww CGA 50'x2 with [employer] '+ with AAD mod I   Stair negotiation: ascended and descended  NT NT 3 steps with no handrail with AAD mod I     Strength/ROM:   BLE grossly 4/5  BLE AROM WFL    Balance:   Static Sitting: IND  Dynamic Sitting: Supervision  Static Standing: SBA with ww  Dynamic Standing: CGA with ww    Pt is A & O x 4. Pt is oriented to person, place, month/year, and situation. Sensation:  Pt reports numbness and tingling in BLE. Edema:  unremarkable    Vitals:  SpO2 and HR were stable during session  SpO2 during session 92-98% on 2L O2.     Patient education  Pt educated on role of PT,

## 2023-10-06 ENCOUNTER — TELEPHONE (OUTPATIENT)
Dept: NON INVASIVE DIAGNOSTICS | Age: 68
End: 2023-10-06

## 2023-10-06 ENCOUNTER — CARE COORDINATION (OUTPATIENT)
Dept: CARE COORDINATION | Age: 68
End: 2023-10-06

## 2023-10-06 ENCOUNTER — TELEPHONE (OUTPATIENT)
Dept: NEUROSURGERY | Age: 68
End: 2023-10-06

## 2023-10-06 DIAGNOSIS — Z98.1 S/P LUMBAR FUSION: Primary | ICD-10-CM

## 2023-10-06 DIAGNOSIS — I97.89 POSTOPERATIVE ATRIAL FIBRILLATION (HCC): Primary | ICD-10-CM

## 2023-10-06 DIAGNOSIS — I48.91 POSTOPERATIVE ATRIAL FIBRILLATION (HCC): Primary | ICD-10-CM

## 2023-10-06 PROCEDURE — 1111F DSCHRG MED/CURRENT MED MERGE: CPT | Performed by: FAMILY MEDICINE

## 2023-10-06 RX ORDER — DIAZEPAM 5 MG/1
5 TABLET ORAL EVERY 8 HOURS PRN
Qty: 30 TABLET | Refills: 0 | Status: SHIPPED | OUTPATIENT
Start: 2023-10-06 | End: 2023-10-16

## 2023-10-06 RX ORDER — OXYCODONE HYDROCHLORIDE 5 MG/1
5 TABLET ORAL EVERY 6 HOURS PRN
Qty: 28 TABLET | Refills: 0 | Status: SHIPPED | OUTPATIENT
Start: 2023-10-06 | End: 2023-10-13

## 2023-10-06 NOTE — TELEPHONE ENCOUNTER
Barbara's  called to say she went home from the hospital yesterday with no pain medications  and she needs sone [facility] to [facility]

## 2023-10-06 NOTE — TELEPHONE ENCOUNTER
Patient's  sent fax requesting pain meds for [facility]. Adelina sent a pe earlier this afternoon. Just wanted to make sure you have this.

## 2023-10-06 NOTE — CARE COORDINATION
Care Transitions Initial Follow Up Call    Call within 2 business days of discharge: Yes    Patient Current Location:  Home: [address] [place] [address] [address] [place] [address]    Care Transition Nurse contacted the patient by telephone to perform post hospital discharge assessment. Verified name and  with patient as identifiers. Provided introduction to self, and explanation of the Care Transition Nurse role. Patient: Dami Tomlinson Patient : 1955   MRN: <[id number]>  Reason for Admission: [facility] -23 AFIB w/RVR; S/P L1-S1 Laminectomy and Posterior Fusion of L4-L5 L5-S1 23  Discharge Date: 10/5/23 RARS: Readmission Risk Score: 17.9    Last Discharge Facility       Date Complaint Diagnosis Description Type Department Provider    23   Admission (Discharged) [facility] Conrado Schneider, DO          Was this an external facility discharge? No Discharge Facility: [facility]    Challenges to be reviewed by the provider   Additional needs identified to be addressed with provider: Yes  medications-Pt is having 8/10 constant pain jos lower back. Surgical area. Method of communication with provider: chart routing. Spoke with Pt for Initial Care Transition call. Pt admitted -23 for back surgery. Pt discharged to  Acute Rehab where she developed AFIB; Rapid response was called and Pt admitted [facility] unit -10/5/23. Pt was not accepted on ARU d/t Pt to High Functioning. Pt was discharged with [facility], to start 10/7/23. Pt complaining of 8/10 lower back pain that is not relieved by OTC Tylenol, or rest. Pt instructed to call Dr Jarad Aquino office to notify him. LPN will also route message to office requesting call to Pt. Pt states incision is covered and dressing C/D/I. Pt has Post Op scheduled 10/9/23 for wound check and staple removal. Pt family to transport. Pt uses walker when ambulating and wearing brace when up. Discussed falls precautions. Pt v/u.  Pt has

## 2023-10-06 NOTE — TELEPHONE ENCOUNTER
----- Message from Donnell Byrnes MD sent at 10/5/2023  1:37 PM EDT -----  Patient needs an ekg in 2 weeks, follow up with me in 6-8 weeks    MR

## 2023-10-09 ENCOUNTER — OFFICE VISIT (OUTPATIENT)
Dept: NEUROSURGERY | Age: 68
End: 2023-10-09
Payer: MEDICARE

## 2023-10-09 DIAGNOSIS — M54.16 LUMBAR RADICULOPATHY: Primary | ICD-10-CM

## 2023-10-09 DIAGNOSIS — Z98.1 S/P LUMBAR FUSION: ICD-10-CM

## 2023-10-09 PROCEDURE — 99212 OFFICE O/P EST SF 10 MIN: CPT

## 2023-10-09 PROCEDURE — 99024 POSTOP FOLLOW-UP VISIT: CPT | Performed by: PHYSICIAN ASSISTANT

## 2023-10-09 RX ORDER — GABAPENTIN 300 MG/1
300 CAPSULE ORAL 3 TIMES DAILY
Qty: 90 CAPSULE | Refills: 2 | Status: SHIPPED | OUTPATIENT
Start: 2023-10-09 | End: 2024-01-07

## 2023-10-09 NOTE — PROGRESS NOTES
Encounter for Staple Removal     Heidi Carney is a 80 y.o.  female  two weeks s/p L1-S1 fusion      Patient presents for staple removal.      Equipment: General staple removal kit, ChloraPrep, sterile gloves     Procedure: Pt was placed in the sitting position. Using a sterile technique, ChloraPrep was used to clean the incisional wound. The wound healing well without signs of infection. Staples were removed with no pain and no complications. Pt tolerated procedure well. Pts questions were answered and wound care instructions and restrictions were discussed. Pt is to return to neurosurgery clinic in 2 weeks for surgery follow-up or sooner if new issues or concerns arise. Patient having significant back pain. Will increase pain medication to oxycodone 10mg every 4 hours. Patient just got a refill of 5mg, Okay to take 2 tablets and we will refill sooner when needed oxycodone 10mg. Gabapentin sent to pharmacy.

## 2023-10-11 ENCOUNTER — TELEPHONE (OUTPATIENT)
Dept: NEUROSURGERY | Age: 68
End: 2023-10-11

## 2023-10-11 DIAGNOSIS — M54.16 LUMBAR RADICULOPATHY: Primary | ICD-10-CM

## 2023-10-11 DIAGNOSIS — M41.86 DEXTROSCOLIOSIS OF LUMBAR SPINE: ICD-10-CM

## 2023-10-11 DIAGNOSIS — Z98.1 S/P LUMBAR FUSION: ICD-10-CM

## 2023-10-11 RX ORDER — OXYCODONE HYDROCHLORIDE 10 MG/1
10 TABLET ORAL EVERY 4 HOURS PRN
Qty: 42 TABLET | Refills: 0 | Status: SHIPPED | OUTPATIENT
Start: 2023-10-11 | End: 2023-10-18

## 2023-10-11 NOTE — TELEPHONE ENCOUNTER
Patient was seen on 10.09.2023 and was advised that she could increase her medication oxycodone 5mg to 2 tablets @ every 4 hours to be an equivalent of 10mg. At this time patient is going to need a refill of medication to be sent to [facility] on [address] [facility].  Thank you

## 2023-10-13 ENCOUNTER — HOSPITAL ENCOUNTER (OUTPATIENT)
Dept: PHARMACY | Age: 68
Setting detail: THERAPIES SERIES
Discharge: HOME | End: 2023-10-13

## 2023-10-13 ENCOUNTER — CARE COORDINATION (OUTPATIENT)
Dept: CARE COORDINATION | Age: 68
End: 2023-10-13

## 2023-10-13 DIAGNOSIS — I97.89 POSTOPERATIVE ATRIAL FIBRILLATION (HCC): Primary | ICD-10-CM

## 2023-10-13 DIAGNOSIS — I48.91 POSTOPERATIVE ATRIAL FIBRILLATION (HCC): Primary | ICD-10-CM

## 2023-10-13 LAB — INR BLD: 1.1

## 2023-10-13 NOTE — CARE COORDINATION
9:40 AM Cherrie Linares MD [address] Transition Nurse reviewed medical action plan and red flags with patient and discussed any barriers to care and/or understanding of plan of care after discharge. Discussed appropriate site of care based on symptoms and resources available to patient including: PCP  Specialist  Urgent care clinics  Home health  When to call [address] . The patient agrees to contact the PCP office for questions related to their healthcare. Patients top risk factors for readmission: functional physical ability, medical condition-A-fib, recent back surgery, Htn, COPD, hld, DM, PVD, pulm nodule, CAD, multiple health system providers, polypharmacy, and utilization of services  Interventions to address risk factors: Scheduled appointment with PCP-F/u as advise, Scheduled appointment with Specialist-See above, and Reviewed and followed up on pending diagnostic tests and treatments-f/u INR check 10/18/23    Offered patient enrollment in the Remote Patient Monitoring (RPM) program for in-home monitoring: Patient declined. Care Transitions Subsequent and Final Call    Subsequent and Final Calls  Do you have any ongoing symptoms?: No  Have your medications changed?: Yes  Patient Reports: Oxycondone dose was increased from 5 mg to 10 mg; NS added Gabapentin at 10/9/23 f/u; Coumadin dose increased today to 5 mg daily d/t subtherapeutic INR (1.1)  Do you have any questions related to your medications?: No  Do you currently have any active services?: Yes  Are you currently active with any services?: Home Health  Identified Barriers: Impairment  Care Transitions Interventions     Other Services: Declined (Comment: Declined RPM 10/13/23)                           Other Interventions:             Care Transition Nurse provided contact information for future needs. Plan for follow-up call in 7-10 days based on severity of symptoms and risk factors.   Plan for next

## 2023-10-13 NOTE — PROGRESS NOTES
[address] (Medication Management)  [address] [place], [place], [address]  Phone: Ar: [facility]    Last med rec completed on: WILL REQUEST AFTER INTAKE COMPLETED      Assessment/Plan:  Warfarin Indication: atrial fibrillation      INR today is subtherapeutic at 1.1, goal 2-3.     Patient's current warfarin dose: 2.5 mg daily since discharge on 10/5     Warfarin dosing orders: Her MD was 7.5 mg Mon./Fri.; 5 mg all other days prior to her back surgery this past Sept., will increase to 5 mg daily    Orders given to: Venecia Contreras @ [phone]    Next INR:  10/18      Som Zamudio [facility] PharmD 10/13/2023 11:00 AM    For Pharmacy Admin Tracking Only    Intervention Detail: Dose Adjustment: 1, reason: Therapy Optimization  Total # of Interventions Recommended: 1  Total # of Interventions Accepted: 1  Time Spent (min): 10

## 2023-10-18 ENCOUNTER — HOSPITAL ENCOUNTER (OUTPATIENT)
Dept: PHARMACY | Age: 68
Setting detail: THERAPIES SERIES
Discharge: HOME | End: 2023-10-18

## 2023-10-18 DIAGNOSIS — I97.89 POSTOPERATIVE ATRIAL FIBRILLATION (HCC): Primary | ICD-10-CM

## 2023-10-18 DIAGNOSIS — I48.91 POSTOPERATIVE ATRIAL FIBRILLATION (HCC): Primary | ICD-10-CM

## 2023-10-18 LAB — INR BLD: 1.2

## 2023-10-18 NOTE — PROGRESS NOTES
[address] (Medication Management)  [address] [place], [place], [address]  Phone: Ar: [facility]    Last med rec completed on: REQUESTED TODAY      Assessment/Plan:  Warfarin Indication: atrial fibrillation      INR today is subherapeutic at 1.2, goal 2-3. Patient's current warfarin dose: 5 mg daily    Warfarin dosing orders: 10 mg today, 7.5 mg tomorrow (10/19), then 5 mg F/S/S    Orders given to: left  for [facility] @ [phone]    Next INR:  10/23      Cheryl Jules [facility] PharmD 10/18/2023 1:42 PM    Med list updated and [facility], RN, called back on 10/18 to confirm receipt of orders.    Cheryl Jules [facility], PharmD 10/19/2023 9:36 AM        For Pharmacy Admin Tracking Only    Intervention Detail: Dose Adjustment: 1, reason: Therapy Optimization  Total # of Interventions Recommended: 1  Total # of Interventions Accepted: 1  Time Spent (min): 10

## 2023-10-19 ENCOUNTER — NURSE ONLY (OUTPATIENT)
Dept: CARDIOLOGY CLINIC | Age: 68
End: 2023-10-19

## 2023-10-19 ENCOUNTER — CARE COORDINATION (OUTPATIENT)
Dept: CARE COORDINATION | Age: 68
End: 2023-10-19

## 2023-10-19 DIAGNOSIS — I48.0 PAF (PAROXYSMAL ATRIAL FIBRILLATION) (HCC): Primary | ICD-10-CM

## 2023-10-19 RX ORDER — FAMOTIDINE 20 MG/1
20 TABLET, FILM COATED ORAL DAILY
COMMUNITY

## 2023-10-19 RX ORDER — ROSUVASTATIN CALCIUM 20 MG/1
20 TABLET, COATED ORAL DAILY
COMMUNITY

## 2023-10-19 NOTE — PROGRESS NOTES
Here today for an EKG only per Dr Eric Ruiz. Meds & allergies reviewed. Dr Eric Ruiz will review EKG and her coordinator will call pt with any further instructions.

## 2023-10-19 NOTE — CARE COORDINATION
Care Transitions Follow Up Call    Patient Current Location:  Home: [address] [place] [address] [address] [place] [address]    Care Transition Nurse contacted the patient by telephone to follow up after admission on 23. Patient: Guilherme Bañuelos  Patient : 1955   MRN: [id number]  Reason for Admission: A-fib RVR  Discharge Date: 10/5/23 RARS: Readmission Risk Score: 17.9      Needs to be reviewed by the provider   Additional needs identified to be addressed with provider: No  none             Method of communication with provider: none. CTN called and spoke with the pt for a sub care transition call. Pt had recent back surgery (23 for lumbar fusion L1-S1). Pt then transferred to ARU from -23 with RRT for A-fib RVR. Pt was then readmitted back to the hospital on -10/5 for A-fib RVR. Pt states that she is doing much better this week. Pt reports post op pain well managed on current pain medication regimen with Oxycodone 10 mg and Gabapentin. Pt states that per 147   [facility] nurse, surgical incision looks \"good\" and is free of any s/sx infection. Incision is ROSANA and denies any drainage. She continues to wear her LSO brace and using her walker when up and ambulating. Denies any LE numbness/tingling. Reports to normal elimination patterns. Pt has a scheduled f/u with NS on 10/25/23. From a cardiac standpoint, pt states that she feels well. Denies any CP/chest discomfort, sob, palpitations, racing heartbeat, or lightheadedness/dizziness. She is monitoring her HRs at home states that they are averaging around the 60s with no accelerations noted. Pt states that she had a f/u EKG today per Dr. Jeanette Ramírez and states the office will call once Dr. Jeanette Ramírez reviews if there is any new recommendations/advice. Pt will f/u with Dr. Jeanette Ramírez in Dec.     Pt had INR drawn yesterday and is following with the [facility] for Coumadin dosing. 147   [facility] nurse draws labs and sends to clinic. Pt's INR yesterday subtherapeutic.

## 2023-10-23 ENCOUNTER — HOSPITAL ENCOUNTER (OUTPATIENT)
Dept: PHARMACY | Age: 68
Setting detail: THERAPIES SERIES
Discharge: HOME | End: 2023-10-23
Payer: MEDICARE

## 2023-10-23 DIAGNOSIS — I48.91 POSTOPERATIVE ATRIAL FIBRILLATION (HCC): Primary | ICD-10-CM

## 2023-10-23 DIAGNOSIS — I97.89 POSTOPERATIVE ATRIAL FIBRILLATION (HCC): Primary | ICD-10-CM

## 2023-10-23 LAB — INR BLD: 6

## 2023-10-23 NOTE — PROGRESS NOTES
[address] (Medication Management)  [address] [facility],[address], [address]  Phone: Fanyester: [facility] med rec completed on: 10/23/23        Assessment/Plan:  Warfarin Indication: atrial fibrillation      INR today is SUPRAtherapeutic at 6, goal 2-3. REPEAT WAS 6.3. Patient's current warfarin dose: 10 mg 10/18, 7.5 mg 10/19, then 5 mg daily    Warfarin dosing orders: hold x 2 days    Orders given to:  José Miguel Plunkett @ [phone]    Next INR:  10/25 @ [facility], discharged from [address] [place] [address] PharmD 10/23/2023 3:40 PM    For Pharmacy Admin Tracking Only    Intervention Detail: Dose Adjustment: 1, reason: Therapy De-escalation  Total # of Interventions Recommended: 1  Total # of Interventions Accepted: 1  Time Spent (min): 10

## 2023-10-25 ENCOUNTER — HOSPITAL ENCOUNTER (OUTPATIENT)
Age: 68
Discharge: HOME | End: 2023-10-27
Payer: MEDICARE

## 2023-10-25 ENCOUNTER — TELEPHONE (OUTPATIENT)
Dept: NEUROSURGERY | Age: 68
End: 2023-10-25

## 2023-10-25 ENCOUNTER — OFFICE VISIT (OUTPATIENT)
Dept: NEUROSURGERY | Age: 68
End: 2023-10-25
Payer: MEDICARE

## 2023-10-25 ENCOUNTER — APPOINTMENT (OUTPATIENT)
Dept: PHARMACY | Age: 68
End: 2023-10-25
Payer: MEDICARE

## 2023-10-25 ENCOUNTER — TELEPHONE (OUTPATIENT)
Dept: PHARMACY | Age: 68
End: 2023-10-25

## 2023-10-25 ENCOUNTER — HOSPITAL ENCOUNTER (OUTPATIENT)
Dept: GENERAL RADIOLOGY | Age: 68
Discharge: HOME | End: 2023-10-27
Payer: MEDICARE

## 2023-10-25 VITALS — DIASTOLIC BLOOD PRESSURE: 66 MMHG | HEART RATE: 77 BPM | OXYGEN SATURATION: 97 % | SYSTOLIC BLOOD PRESSURE: 187 MMHG

## 2023-10-25 DIAGNOSIS — Z98.1 S/P LUMBAR FUSION: ICD-10-CM

## 2023-10-25 DIAGNOSIS — M54.16 LUMBAR RADICULOPATHY: ICD-10-CM

## 2023-10-25 DIAGNOSIS — I97.89 POSTOPERATIVE ATRIAL FIBRILLATION (HCC): Primary | ICD-10-CM

## 2023-10-25 DIAGNOSIS — I48.91 POSTOPERATIVE ATRIAL FIBRILLATION (HCC): Primary | ICD-10-CM

## 2023-10-25 DIAGNOSIS — M54.16 LUMBAR RADICULOPATHY: Primary | ICD-10-CM

## 2023-10-25 DIAGNOSIS — M41.86 DEXTROSCOLIOSIS OF LUMBAR SPINE: ICD-10-CM

## 2023-10-25 PROCEDURE — 99024 POSTOP FOLLOW-UP VISIT: CPT | Performed by: PHYSICIAN ASSISTANT

## 2023-10-25 PROCEDURE — 72100 X-RAY EXAM L-S SPINE 2/3 VWS: CPT

## 2023-10-25 PROCEDURE — 99212 OFFICE O/P EST SF 10 MIN: CPT

## 2023-10-25 RX ORDER — OXYCODONE AND ACETAMINOPHEN 10; 325 MG/1; MG/1
1 TABLET ORAL EVERY 4 HOURS PRN
Qty: 42 TABLET | Refills: 0 | Status: SHIPPED | OUTPATIENT
Start: 2023-10-25 | End: 2023-11-01

## 2023-10-25 RX ORDER — HYDROCODONE BITARTRATE AND ACETAMINOPHEN 10; 325 MG/1; MG/1
1 TABLET ORAL EVERY 4 HOURS PRN
Qty: 42 TABLET | Refills: 0 | Status: SHIPPED
Start: 2023-10-25 | End: 2023-10-25 | Stop reason: RX

## 2023-10-25 NOTE — TELEPHONE ENCOUNTER
10/23/2023 @ 1:10 pm   - Ruma Camejo called and cancelled appt scheduled for today (10/23 @ 2:15 pm). - She stated she had just gotten back from the hospital.   - I thought that meant she was in the ER, but when I looked in her chart, I see she was there for other reasons.   - She was in tears on the phone, so I didn't want to push her. - I told her I would let the pharrmacist, Sam Vega,  know and we would follow up with her. Per Sam Vega,   - homecare discharged her Monday 10/23 and her INR was 6 that day. - See if she will come tomorrow (thur. 10/26). - Have her skip warfarin again today. 10/25 @ 3:41 pm   - Spoke with Ruma Camejo. - Scheduled appt for 10/26 @ 3:45 pm.   - She will SKIP warfarin dose tonight (10/25).      Electronically signed by Sanjuana Tolentino on 10/25/23 at 4:16 PM EDT

## 2023-10-25 NOTE — TELEPHONE ENCOUNTER
The new medication that Lacy sent in for her today the pharmacy called patient and said they can not get it   is there another medication she can have

## 2023-10-25 NOTE — PROGRESS NOTES
Post Operative Follow-up     This is a 80year old female who presents to the office for a 1 month follow-up s/p L1-S1 fusion and L4-S1 interbodies      Subjective: Patient states she continues to have significant low back pain. Leg pain improving, but does have spasms and tingling. Patient feels her oxycodone is making her nauseous. LSO and bone stimulator compliance. No issues with incision site. Lumbar XR reviewed. Physical Exam:              WDWN, no apparent distress              Non-labored breathing               Vitals Stable              Alert and oriented x3              CN 3-12 intact              PERRL              EOMI              KENNEY well              Motor strength symmetric              Sensation to LT intact bilaterally   Incision site healing well with no signs of infection                 Imaging: Lumbar XR 10/25/23: Stable alignment, stable fusion. No acute abnormalities noted. Final report pending. Assessment: This is a 80 y.o.  female presenting for a 1 month follow-up s/p L1-S1 fusion and L4-S1 interbodies     Plan:  -Pain control and expectations discussed  -Continue LSO brace and restrictions   -D/c oxycodone. Percocet sent to pharmacy  -OARRS report reviewed   -Follow-up in neurosurgery clinic in 2 months for 3 month follow up with repeat lumbar XR  -Call or return to neurosurgery office sooner if symptoms worsen or if new issues arise in the interim.     Electronically signed by Kaelyn Lim PA-C on 10/25/2023 at 5:20 PM

## 2023-10-26 ENCOUNTER — HOSPITAL ENCOUNTER (OUTPATIENT)
Dept: PHARMACY | Age: 68
Setting detail: THERAPIES SERIES
Discharge: HOME | End: 2023-10-26
Payer: MEDICARE

## 2023-10-26 VITALS
HEART RATE: 70 BPM | WEIGHT: 121.2 LBS | DIASTOLIC BLOOD PRESSURE: 73 MMHG | BODY MASS INDEX: 20.8 KG/M2 | SYSTOLIC BLOOD PRESSURE: 152 MMHG

## 2023-10-26 DIAGNOSIS — I48.91 POSTOPERATIVE ATRIAL FIBRILLATION (HCC): Primary | ICD-10-CM

## 2023-10-26 DIAGNOSIS — I97.89 POSTOPERATIVE ATRIAL FIBRILLATION (HCC): Primary | ICD-10-CM

## 2023-10-26 LAB — INTERNATIONAL NORMALIZATION RATIO, POC: 1.6

## 2023-10-26 PROCEDURE — 85610 PROTHROMBIN TIME: CPT

## 2023-10-26 PROCEDURE — 99212 OFFICE O/P EST SF 10 MIN: CPT

## 2023-10-26 NOTE — PROGRESS NOTES
[address] (Medication Management)  [address] [facility], [place], [address]  Phone: [phone]    Face-To-Face Visit  Patient Findings       Positives:  Change in medications (SHE IS ON PERCOCET FOR HER RECENT BACK SURGERY), Change in diet/appetite (SHE HAS LOST ABOUT 10 POUNDS SINCE HER SURGERY, APPETITE SLOWLY COMING BACK)    Negatives:  Signs/symptoms of thrombosis, Signs/symptoms of bleeding, Laboratory test error suspected, Change in health, Change in alcohol use, Change in activity, Upcoming invasive procedure, Emergency department visit, Upcoming dental procedure, Missed doses, Extra doses, Hospital admission, Bruising, Other complaints    Comments:  DR. Theresa Rowley IN EARLY NOV. (F/U PET SCANS)           Assessment/Plan:  Warfarin indication: atrial fibrillation      INR today is SUBtherapeutic at 1.6, goal is 2-3. INR IS DOWN FROM 6.3 ON 10/23 AFTER HOLDING WARFARIN THE PAST 3 DAYS. INR'S HAVE BEEN VERY LABILE AFTER HER BACK SURGERY. Warfarin Dose: RESUME WARFARIN AT A LOWER DOSE (12.5%) OF 5 MG DAILY    I asked Fernando Flowers to confirm warfarin strengths at home. I believe she has 5 mg and she was given 2.5 mg at hospital discharged. She thinks she may also have 7.5 mg tablets.      Follow Up: 1 week      Vinh Lujan [facility] PharmD 10/26/2023 4:00 PM    For Pharmacy Admin Tracking Only    Intervention Detail: Dose Adjustment: 1, reason: Therapy De-escalation  Total # of Interventions Recommended: 1  Total # of Interventions Accepted: 1  Time Spent (min): 15

## 2023-10-27 ENCOUNTER — HOSPITAL ENCOUNTER (OUTPATIENT)
Dept: PET IMAGING | Age: 68
End: 2023-10-27
Attending: INTERNAL MEDICINE
Payer: MEDICARE

## 2023-10-27 DIAGNOSIS — R91.1 LEFT UPPER LOBE PULMONARY NODULE: ICD-10-CM

## 2023-10-27 LAB — GLUCOSE BLD-MCNC: 118 MG/DL (ref 74–99)

## 2023-10-27 PROCEDURE — 82962 GLUCOSE BLOOD TEST: CPT

## 2023-10-27 PROCEDURE — A9552 F18 FDG: HCPCS | Performed by: RADIOLOGY

## 2023-10-27 PROCEDURE — 3430000000 HC RX DIAGNOSTIC RADIOPHARMACEUTICAL: Performed by: RADIOLOGY

## 2023-10-27 PROCEDURE — 78815 PET IMAGE W/CT SKULL-THIGH: CPT

## 2023-10-27 RX ORDER — FLUDEOXYGLUCOSE F 18 200 MCI/ML
15 INJECTION, SOLUTION INTRAVENOUS
Status: COMPLETED | OUTPATIENT
Start: 2023-10-27 | End: 2023-10-27

## 2023-10-27 RX ADMIN — FLUDEOXYGLUCOSE F 18 15 MILLICURIE: 200 INJECTION, SOLUTION INTRAVENOUS at 11:01

## 2023-10-28 ENCOUNTER — OFFICE VISIT (OUTPATIENT)
Dept: FAMILY MEDICINE CLINIC | Age: 68
End: 2023-10-28

## 2023-10-28 VITALS
SYSTOLIC BLOOD PRESSURE: 138 MMHG | HEIGHT: 64 IN | WEIGHT: 121 LBS | RESPIRATION RATE: 16 BRPM | OXYGEN SATURATION: 97 % | BODY MASS INDEX: 20.66 KG/M2 | TEMPERATURE: 97.4 F | DIASTOLIC BLOOD PRESSURE: 70 MMHG | HEART RATE: 71 BPM

## 2023-10-28 DIAGNOSIS — L03.119 CELLULITIS OF FOOT: Primary | ICD-10-CM

## 2023-10-28 RX ORDER — DOXYCYCLINE HYCLATE 100 MG/1
100 CAPSULE ORAL 2 TIMES DAILY
Qty: 20 CAPSULE | Refills: 0 | Status: SHIPPED | OUTPATIENT
Start: 2023-10-28 | End: 2023-11-07

## 2023-10-28 ASSESSMENT — ENCOUNTER SYMPTOMS
EYES NEGATIVE: 1
ALLERGIC/IMMUNOLOGIC NEGATIVE: 1
GASTROINTESTINAL NEGATIVE: 1
RESPIRATORY NEGATIVE: 1

## 2023-10-28 NOTE — PROGRESS NOTES
10/28/23     Alec Ramirez    : 1955 Sex: female   Age: 80 y.o. Chief Complaint   Patient presents with    Foot Swelling     L foot, red; back sx 23       Prior to Admission medications    Medication Sig Start Date End Date Taking? Authorizing Provider   doxycycline hyclate (VIBRAMYCIN) 100 MG capsule Take 1 capsule by mouth 2 times daily for 10 days 10/28/23 11/7/23 Yes Cleo Mukherjee, DO   oxyCODONE-acetaminophen (PERCOCET)  MG per tablet Take 1 tablet by mouth every 4 hours as needed for Pain for up to 7 days.  Intended supply: 7 days Max Daily Amount: 6 tablets 10/25/23 11/1/23 Yes Alex Marcano PA-C   rosuvastatin (CRESTOR) 20 MG tablet Take 1 tablet by mouth daily   Yes ProviderXimena MD   famotidine (PEPCID) 20 MG tablet Take 1 tablet by mouth daily   Yes ProviderXimena MD   sotalol (BETAPACE) 80 MG tablet Take 0.5 tablets by mouth 2 times daily 10/5/23  Yes Jose Gongora MD   acetaminophen (TYLENOL) 325 MG tablet Take 2 tablets by mouth every 6 hours as needed for Pain 10/5/23  Yes Cl Lorenz,    magnesium oxide (MAG-OX) 400 MG tablet Take 1 tablet by mouth daily 10/5/23  Yes Cammy CONNELL DO   warfarin (COUMADIN) 2.5 MG tablet Take one tablet by mouth once daily as directed by Anticoagulation Clinic 10/5/23  Yes Cammy CONNELL DO   citalopram (CELEXA) 20 MG tablet TAKE 1 TABLET BY MOUTH EVERY DAY 23  Yes Ciaran Garcia, DO   Insulin Pen Needle 32G X 4 MM MISC 1 each by Does not apply route in the morning, at noon, in the evening, and at bedtime 23  Yes INDIRA Gallo - CNS   insulin glargine, 1 unit dial, (TOUJEO SOLOSTAR) 300 UNIT/ML concentrated injection pen Inject 24 Units into the skin nightly 23  Yes INDIRA Gallo - CNS   HUMALOG KWIKPEN 100 UNIT/ML SOPN INJECT 10 units with meals plus mod dose ISS max dose per day 60 units 23  Yes Luther Frey, APRN - CNS   levothyroxine (SYNTHROID) 25

## 2023-10-30 ENCOUNTER — TELEPHONE (OUTPATIENT)
Dept: PHARMACY | Age: 68
End: 2023-10-30

## 2023-10-30 NOTE — TELEPHONE ENCOUNTER
No change needed to warfarin dose or visit date.    Thank you,   Miroslava Burns, [facility], PharmD 10/30/2023 10:23 AM

## 2023-10-30 NOTE — TELEPHONE ENCOUNTER
Called and spoke with patient. Stated, per pharmacist, no change needed to warfarin dose or visit date.      Electronically signed by Jeff Vega on 10/30/23 at 10:28 AM EDT

## 2023-11-01 ENCOUNTER — OFFICE VISIT (OUTPATIENT)
Dept: PRIMARY CARE CLINIC | Age: 68
End: 2023-11-01
Payer: MEDICARE

## 2023-11-01 VITALS
DIASTOLIC BLOOD PRESSURE: 64 MMHG | HEIGHT: 64 IN | HEART RATE: 69 BPM | BODY MASS INDEX: 20.49 KG/M2 | WEIGHT: 120 LBS | TEMPERATURE: 97 F | SYSTOLIC BLOOD PRESSURE: 100 MMHG | RESPIRATION RATE: 14 BRPM | OXYGEN SATURATION: 98 %

## 2023-11-01 DIAGNOSIS — M79.672 LEFT FOOT PAIN: Primary | ICD-10-CM

## 2023-11-01 DIAGNOSIS — I73.9 PVD (PERIPHERAL VASCULAR DISEASE) WITH CLAUDICATION (HCC): ICD-10-CM

## 2023-11-01 PROCEDURE — 1111F DSCHRG MED/CURRENT MED MERGE: CPT | Performed by: FAMILY MEDICINE

## 2023-11-01 PROCEDURE — 1123F ACP DISCUSS/DSCN MKR DOCD: CPT | Performed by: FAMILY MEDICINE

## 2023-11-01 PROCEDURE — 1090F PRES/ABSN URINE INCON ASSESS: CPT | Performed by: FAMILY MEDICINE

## 2023-11-01 PROCEDURE — 99212 OFFICE O/P EST SF 10 MIN: CPT | Performed by: FAMILY MEDICINE

## 2023-11-01 PROCEDURE — G8427 DOCREV CUR MEDS BY ELIG CLIN: HCPCS | Performed by: FAMILY MEDICINE

## 2023-11-01 PROCEDURE — G8484 FLU IMMUNIZE NO ADMIN: HCPCS | Performed by: FAMILY MEDICINE

## 2023-11-01 PROCEDURE — 3017F COLORECTAL CA SCREEN DOC REV: CPT | Performed by: FAMILY MEDICINE

## 2023-11-01 PROCEDURE — 1036F TOBACCO NON-USER: CPT | Performed by: FAMILY MEDICINE

## 2023-11-01 PROCEDURE — G8399 PT W/DXA RESULTS DOCUMENT: HCPCS | Performed by: FAMILY MEDICINE

## 2023-11-01 PROCEDURE — G8420 CALC BMI NORM PARAMETERS: HCPCS | Performed by: FAMILY MEDICINE

## 2023-11-01 NOTE — PROGRESS NOTES
Arslan Rascon, a female of 80 y.o. came to the office 11/1/2023. Patient Active Problem List   Diagnosis    Asymptomatic bilateral carotid artery stenosis    History of nuclear stress test    Mixed hyperlipidemia    Aortoiliac occlusive disease (HCC)    PVD (peripheral vascular disease) with claudication ([facility])    Carotid artery stenosis, asymptomatic, bilateral    PVD (peripheral vascular disease) ([facility])    S/P carotid endarterectomy    Tobacco abuse    Lesion of true vocal cord    Diabetes mellitus without complication (HCC)    Senile osteoporosis    Scoliosis    Lumbar spinal stenosis    CAD in native artery    Postoperative hypertension    Acute pulmonary insufficiency    Hyperglycemia    Status post aorto-coronary artery bypass graft    Postoperative atrial fibrillation ([address])    Type 2 diabetes mellitus with hyperglycemia    Hypothyroidism    Dietary noncompliance    Dextroscoliosis of lumbar spine    Stenosis, spinal, lumbar    Lumbar stenosis with neurogenic claudication    Lumbar foraminal stenosis    Abnormality of gait and mobility    Self-care deficit    Lumbar myelopathy ([facility])    Atrial fibrillation with rapid ventricular response ([facility])    NSTEMI (non-ST elevated myocardial infarction) ([address])    Cellulitis of foot          Foot Pain   The pain is present in the left foot. This is a new problem. The current episode started 1 to 4 weeks ago (2). There has been no history of extremity trauma (but recent back surgery 1 month ago. ). The quality of the pain is described as sharp. The pain is at a severity of 8/10. The pain is moderate. Associated symptoms include an inability to bear weight and a limited range of motion. Pertinent negatives include no fever. Treatments tried: doxycycline for 10 days. The treatment provided no relief.         Allergies   Allergen Reactions    Pcn [Penicillins] Hives and Swelling    Fosamax [Alendronate] Other (See Comments)     Nausea, vomiting    Lipitor [Atorvastatin]

## 2023-11-03 ENCOUNTER — HOSPITAL ENCOUNTER (OUTPATIENT)
Dept: PHARMACY | Age: 68
Setting detail: THERAPIES SERIES
Discharge: HOME | End: 2023-11-03
Payer: MEDICARE

## 2023-11-03 ENCOUNTER — TELEPHONE (OUTPATIENT)
Dept: PRIMARY CARE CLINIC | Age: 68
End: 2023-11-03

## 2023-11-03 VITALS
DIASTOLIC BLOOD PRESSURE: 67 MMHG | SYSTOLIC BLOOD PRESSURE: 117 MMHG | HEART RATE: 63 BPM | BODY MASS INDEX: 20.74 KG/M2 | WEIGHT: 120.8 LBS

## 2023-11-03 DIAGNOSIS — I97.89 POSTOPERATIVE ATRIAL FIBRILLATION (HCC): Primary | ICD-10-CM

## 2023-11-03 DIAGNOSIS — I48.91 POSTOPERATIVE ATRIAL FIBRILLATION (HCC): Primary | ICD-10-CM

## 2023-11-03 LAB — INTERNATIONAL NORMALIZATION RATIO, POC: 3.1

## 2023-11-03 PROCEDURE — 99212 OFFICE O/P EST SF 10 MIN: CPT

## 2023-11-03 PROCEDURE — 85610 PROTHROMBIN TIME: CPT

## 2023-11-03 NOTE — PROGRESS NOTES
[address] (Medication Management)  [address] [facility]  [address], [facility], [address]  Phone: [phone]    Face-To-Face Visit  Patient Findings       Positives:  Emergency department visit (URGENT CARE 10/28, WAS PUT ON ANTIBIOTICS), Change in medications (VOLTARAN GEL FOR FOOT, DOXYCYCLINE FOR FOOT), Change in diet/appetite (IMPROVING, STILL EATING A LOT OF SOUP, ATE WEDDING SOUP THIS WEEK)    Negatives:  Signs/symptoms of thrombosis, Signs/symptoms of bleeding, Laboratory test error suspected, Change in health, Change in alcohol use, Change in activity, Upcoming invasive procedure, Upcoming dental procedure, Missed doses, Extra doses, Hospital admission, Bruising, Other complaints    Comments:  11/7: Dr. Cam Prater            Assessment/Plan:  Warfarin indication: atrial fibrillation      INR today is SUPRAtherapeutic at 3.1, goal is 2-3    Warfarin Dose: 2.5MG TODAY THEN RESUME 5MG DAILY    Follow Up: 2 weeks    ANJUM Torres [facility] PharmD 11/3/2023 3:27 PM    For Pharmacy Admin Tracking Only    Intervention Detail:   Total # of Interventions Recommended:    Total # of Interventions Accepted:   Time Spent (min): 15

## 2023-11-03 NOTE — TELEPHONE ENCOUNTER
Fernando Flowers said her foot doesn't hurt as much today. Should she wait a couple more days?  Please advise

## 2023-11-06 ENCOUNTER — CARE COORDINATION (OUTPATIENT)
Dept: CARE COORDINATION | Age: 68
End: 2023-11-06

## 2023-11-06 ENCOUNTER — TELEPHONE (OUTPATIENT)
Dept: NEUROSURGERY | Age: 68
End: 2023-11-06

## 2023-11-06 DIAGNOSIS — Z98.1 S/P LUMBAR FUSION: ICD-10-CM

## 2023-11-06 DIAGNOSIS — M41.86 DEXTROSCOLIOSIS OF LUMBAR SPINE: ICD-10-CM

## 2023-11-06 DIAGNOSIS — M54.16 LUMBAR RADICULOPATHY: ICD-10-CM

## 2023-11-06 RX ORDER — OXYCODONE AND ACETAMINOPHEN 10; 325 MG/1; MG/1
1 TABLET ORAL EVERY 4 HOURS PRN
Qty: 42 TABLET | Refills: 0 | Status: SHIPPED | OUTPATIENT
Start: 2023-11-06 | End: 2023-11-13

## 2023-11-06 NOTE — CARE COORDINATION
Final Care Transitions Follow Up Call    Patient Current Location:  Home: [address] [place] [address] [address] [place] [address]    Care Transition Nurse contacted the patient by telephone to follow up after admission on 23. Patient: Haven Duval  Patient : 1955   MRN: [id number]  Reason for Admission: There are no discharge diagnoses documented for the most recent discharge. Discharge Date: 10/5/23 RARS: Readmission Risk Score: 17.9      Needs to be reviewed by the provider   Additional needs identified to be addressed with provider: No  none             Method of communication with provider: none. CTN called and spoke with the pt for a final care transition call. Pt had recent back surgery (23 for lumbar fusion L1-S1). Pt then transferred to ARU from -23 with RRT for A-fib RVR. Pt was then readmitted back to the hospital on -10/5 for A-fib RVR. Spoke with pt who states that she is progressively getting better as far as her post op recovery and pain. She still c/o post op back pain and pain that radiates down L thigh. She states that pain meds recently changed to Percocet by NS and she called the office today and was able to get more refills. She states that she is taking the pain med at least twice daily, first thing in the morning and then again at bedtime. She is doing home exercises given to her by PT which she states helps. Pt states that [facility] visits are completed now. Pt states that she uses a walker when outside or in a store, but states when she is in the house she does not require any ADs. Noted pt had recent walk-in care visit on 10/25/23 for suspected L foot cellulitis and was put on Doxycycline x10 days. Pt did f/u with her pcp on  and noted pcp thought possibly reddened area on dorsal side of foot r/t ?vascular issues. Pt was given a rx for Voltaren gel which she states is helping. She states that the redness is improving but has not resolved.  Denies any

## 2023-11-09 NOTE — PROGRESS NOTES
Patient was noted to be in a fib with rapid ventricular response. Nursing did initiate a rapid response team.  I spoke with the managing resident and the patient was transferred acutely to a monitored unit. She was discharged this date without being seen by me.      Sakina Dupree MD

## 2023-11-17 ENCOUNTER — HOSPITAL ENCOUNTER (OUTPATIENT)
Dept: PHARMACY | Age: 68
Setting detail: THERAPIES SERIES
Discharge: HOME | End: 2023-11-17
Payer: MEDICARE

## 2023-11-17 VITALS
WEIGHT: 118 LBS | BODY MASS INDEX: 20.25 KG/M2 | SYSTOLIC BLOOD PRESSURE: 138 MMHG | HEART RATE: 62 BPM | DIASTOLIC BLOOD PRESSURE: 54 MMHG

## 2023-11-17 DIAGNOSIS — I48.91 POSTOPERATIVE ATRIAL FIBRILLATION (HCC): Primary | ICD-10-CM

## 2023-11-17 DIAGNOSIS — I97.89 POSTOPERATIVE ATRIAL FIBRILLATION (HCC): Primary | ICD-10-CM

## 2023-11-17 LAB — INTERNATIONAL NORMALIZATION RATIO, POC: 3.7

## 2023-11-17 PROCEDURE — 85610 PROTHROMBIN TIME: CPT

## 2023-11-17 PROCEDURE — 99212 OFFICE O/P EST SF 10 MIN: CPT

## 2023-11-17 RX ORDER — OXYCODONE HYDROCHLORIDE AND ACETAMINOPHEN 5; 325 MG/1; MG/1
1 TABLET ORAL EVERY 4 HOURS PRN
COMMUNITY

## 2023-11-17 NOTE — PROGRESS NOTES
[address] (Medication Management)  [address] [place], [place], [address]  Phone: [phone]    Face-To-Face Visit  Patient Findings       Negatives:  Signs/symptoms of thrombosis, Signs/symptoms of bleeding, Laboratory test error suspected, Change in health, Change in alcohol use, Change in activity, Upcoming invasive procedure, Emergency department visit, Upcoming dental procedure, Missed doses, Extra doses, Change in medications, Change in diet/appetite, Hospital admission, Bruising, Other complaints           Assessment/Plan:  Warfarin indication: atrial fibrillation      INR today is SUPRAtherapeutic at 3.7, goal is 2-3. LAST INR WAS 3.1, WILL NEED TO ADJUST HER DOSE DOWNWARD.     Warfarin Dose: DECREASE 14% TO 2.5 MG EVERY MON./FRI.; 5 MG ALL OTHER DAYS    Follow Up: 2 weeks      Luis Mccain [facility] PharmD 11/17/2023 2:54 PM    For Pharmacy Admin Tracking Only    Intervention Detail: Dose Adjustment: 1, reason: Therapy De-escalation  Total # of Interventions Recommended: 1  Total # of Interventions Accepted: 1  Time Spent (min): 15

## 2023-12-01 ENCOUNTER — HOSPITAL ENCOUNTER (OUTPATIENT)
Dept: PHARMACY | Age: 68
Setting detail: THERAPIES SERIES
Discharge: HOME | End: 2023-12-01
Payer: MEDICARE

## 2023-12-01 VITALS
DIASTOLIC BLOOD PRESSURE: 67 MMHG | HEART RATE: 63 BPM | WEIGHT: 119.6 LBS | BODY MASS INDEX: 20.53 KG/M2 | SYSTOLIC BLOOD PRESSURE: 167 MMHG

## 2023-12-01 DIAGNOSIS — I97.89 POSTOPERATIVE ATRIAL FIBRILLATION (HCC): Primary | ICD-10-CM

## 2023-12-01 DIAGNOSIS — I48.91 POSTOPERATIVE ATRIAL FIBRILLATION (HCC): Primary | ICD-10-CM

## 2023-12-01 LAB — INTERNATIONAL NORMALIZATION RATIO, POC: 1.8

## 2023-12-01 PROCEDURE — 85610 PROTHROMBIN TIME: CPT

## 2023-12-01 PROCEDURE — 99212 OFFICE O/P EST SF 10 MIN: CPT

## 2023-12-01 NOTE — PROGRESS NOTES
[address] (Medication Management)  [address] [place], [place], [address]  Phone: [phone]    Face-To-Face Visit  Patient Findings       Positives:  Change in medications (DONE TAKING PERCOCET)    Negatives:  Signs/symptoms of thrombosis, Signs/symptoms of bleeding, Laboratory test error suspected, Change in health, Change in alcohol use, Change in activity, Upcoming invasive procedure, Emergency department visit, Upcoming dental procedure, Missed doses, Extra doses, Change in diet/appetite, Hospital admission, Bruising, Other complaints    Comments:  12/7: ENDO  12/18: [facility]   12/19: PCP           Assessment/Plan:  Warfarin indication: atrial fibrillation      INR today is SUBtherapeutic at 1.8, goal is 2-3. CAUSE UNKNOWN, DOSE DECREASED AT LAST VISIT    Warfarin Dose: INCREASE BY 8% TO 2.5MG MONDAY, 5MG ALL OTHER DAYS    Follow Up: 2 weeks      ANJUM Diane [facility] PharmD 12/1/2023 2:04 PM    For Pharmacy Admin Tracking Only    Intervention Detail:   Total # of Interventions Recommended:    Total # of Interventions Accepted:   Time Spent (min): 15

## 2023-12-04 ENCOUNTER — TELEPHONE (OUTPATIENT)
Dept: NEUROSURGERY | Age: 68
End: 2023-12-04

## 2023-12-04 NOTE — TELEPHONE ENCOUNTER
Patient would like to know if she is able to drive yet. She is scheduled for 12-19 but does not want to wait until her appt.

## 2023-12-06 NOTE — PROGRESS NOTES
[address] [facility] Department of Endocrinology Diabetes and Metabolism   [address] [facility] [address]   Phone: [phone]  Fax: [phone]       Date of admission: (Not on file)  Date of service: 12/7/2023  Admitting physician: No admitting provider for patient encounter. Primary Care Physician: Ellene Olszewski, DO  Consultant physician: Janelle Zavala APRN - CNP      Reason for the consultation:  Uncontrolled DM    History of Present Illness: The history is provided by the patient. Accuracy of the patient data is excellent    Dami Tomlinson is a very pleasant 80 y.o. old female with PMH uncontrolled DM, CAD, HLD seen today for follow up visit     The patient was recently admitted to the hospital and underwent CABG surgery on 11/2/2022      Lumbar fusion 9/25/2023    The patient was diagnosed with type 2 DM many years ago. Currently on  Toujeo to 22u daily, Humalog to 10u with meals + ss 2:50>150 ,Metformin 500 mg 1 tablet BID.      Checks BG 4 times per day   Uses jese   TIR 78%  Hyperglycemia 22%  Hypoglycemia 0%  Avg glucose 145  GMI 6.8%    Lab Results   Component Value Date/Time    LABA1C 7.2 12/07/2023 07:34 AM       Lab Results   Component Value Date/Time    LABA1C 7.2 12/07/2023 07:34 AM    LABA1C 9.0 06/26/2023 12:00 PM    LABA1C 7.9 03/21/2023 10:51 AM    LABA1C 9.7 10/28/2022 07:55 AM     Patient reported hypoglycemic episodes overnight and after dinner  The patient has been mindful of what has been eating and following diabetic diet as encouraged  I reviewed current medications and the patient has no issues with diabetes medications  Patient is up to date with eye exam   The patient  performs  own feet care  Microvascular complications:  No Retinopathy or Nephropathy  + Neuropathy   Macrovascular complications: + CAD (hx CABG) , NO PVD, No Stroke    No HX of pancreatitis  No Hx of MTC  No HX of gastroparesis   No HX of UTI/Mycotic infection          Past

## 2023-12-07 ENCOUNTER — OFFICE VISIT (OUTPATIENT)
Dept: ENDOCRINOLOGY | Age: 68
End: 2023-12-07

## 2023-12-07 VITALS
HEART RATE: 72 BPM | OXYGEN SATURATION: 99 % | RESPIRATION RATE: 18 BRPM | SYSTOLIC BLOOD PRESSURE: 159 MMHG | BODY MASS INDEX: 20.83 KG/M2 | HEIGHT: 64 IN | WEIGHT: 122 LBS | DIASTOLIC BLOOD PRESSURE: 76 MMHG

## 2023-12-07 DIAGNOSIS — E78.5 HYPERLIPIDEMIA, UNSPECIFIED HYPERLIPIDEMIA TYPE: ICD-10-CM

## 2023-12-07 DIAGNOSIS — E11.65 TYPE 2 DIABETES MELLITUS WITH HYPERGLYCEMIA, WITH LONG-TERM CURRENT USE OF INSULIN (HCC): Primary | ICD-10-CM

## 2023-12-07 DIAGNOSIS — Z79.4 TYPE 2 DIABETES MELLITUS WITH HYPERGLYCEMIA, WITH LONG-TERM CURRENT USE OF INSULIN (HCC): Primary | ICD-10-CM

## 2023-12-07 DIAGNOSIS — E03.9 PRIMARY HYPOTHYROIDISM: ICD-10-CM

## 2023-12-07 DIAGNOSIS — R80.9 MICROALBUMINURIA: ICD-10-CM

## 2023-12-07 LAB — HBA1C MFR BLD: 7.2 %

## 2023-12-15 ENCOUNTER — APPOINTMENT (OUTPATIENT)
Dept: PHARMACY | Age: 68
End: 2023-12-15
Payer: MEDICARE

## 2023-12-15 VITALS
WEIGHT: 121.8 LBS | DIASTOLIC BLOOD PRESSURE: 62 MMHG | BODY MASS INDEX: 20.91 KG/M2 | SYSTOLIC BLOOD PRESSURE: 114 MMHG | HEART RATE: 76 BPM

## 2023-12-15 DIAGNOSIS — I48.91 POSTOPERATIVE ATRIAL FIBRILLATION (HCC): Primary | ICD-10-CM

## 2023-12-15 DIAGNOSIS — I97.89 POSTOPERATIVE ATRIAL FIBRILLATION (HCC): Primary | ICD-10-CM

## 2023-12-15 LAB — INTERNATIONAL NORMALIZATION RATIO, POC: 1.4

## 2023-12-15 PROCEDURE — 99213 OFFICE O/P EST LOW 20 MIN: CPT

## 2023-12-15 PROCEDURE — 85610 PROTHROMBIN TIME: CPT

## 2023-12-15 RX ORDER — WARFARIN SODIUM 5 MG/1
TABLET ORAL
Qty: 90 TABLET | Refills: 3 | Status: SHIPPED | OUTPATIENT
Start: 2023-12-15

## 2023-12-15 NOTE — PROGRESS NOTES
[address] (Medication Management)  [address] [facility] - [facility], [place], [address]  Phone: [phone]    Face-To-Face Visit  Patient Findings       Positives:  Change in activity (ABLE TO START DRIVING AGAIN), Change in medications (TAKES APAP/IBU COMBO FOR PAIN 2X/DAY, ENSURED ON PEPCID DAILY; DECREASED INSULIN DOSES BY ENDO)    Negatives:  Signs/symptoms of thrombosis, Signs/symptoms of bleeding, Laboratory test error suspected, Change in health, Change in alcohol use, Upcoming invasive procedure, Emergency department visit, Upcoming dental procedure, Missed doses, Extra doses, Change in diet/appetite, Hospital admission, Bruising, Other complaints    Comments:  12/18: Kim [facility]  12/19: PCP  DR SAMANO IN [place]           Assessment/Plan:  Warfarin indication: atrial fibrillation       INR today is SUBtherapeutic at 1.4, goal is 2-3    Warfarin Dose: 7.5MG TODAY AND THEN INCREASE REGIMEN AGAIN BY 7.7% TO 5MG DAILY.  INR VERY LABILE POST-OP    Follow Up: 2 weeks      Poly Landers [facility] PharmD, [facility] 12/15/2023 3:36 PM    For Pharmacy Admin Tracking Only    Intervention Detail: Dose Adjustment: 1, reason: Therapy Optimization and Refill(s) Provided  Total # of Interventions Recommended: 1  Total # of Interventions Accepted: 1  Time Spent (min): 15

## 2023-12-28 ENCOUNTER — HOSPITAL ENCOUNTER (OUTPATIENT)
Dept: PHARMACY | Age: 68
Setting detail: THERAPIES SERIES
Discharge: HOME | End: 2023-12-28
Payer: MEDICARE

## 2023-12-28 VITALS
HEART RATE: 56 BPM | SYSTOLIC BLOOD PRESSURE: 163 MMHG | DIASTOLIC BLOOD PRESSURE: 65 MMHG | BODY MASS INDEX: 20.74 KG/M2 | WEIGHT: 120.8 LBS

## 2023-12-28 DIAGNOSIS — I97.89 POSTOPERATIVE ATRIAL FIBRILLATION (HCC): Primary | ICD-10-CM

## 2023-12-28 DIAGNOSIS — I48.91 POSTOPERATIVE ATRIAL FIBRILLATION (HCC): Primary | ICD-10-CM

## 2023-12-28 LAB — INTERNATIONAL NORMALIZATION RATIO, POC: 2.7

## 2023-12-28 PROCEDURE — 85610 PROTHROMBIN TIME: CPT

## 2023-12-28 NOTE — PROGRESS NOTES
[address] (Medication Management)  [address] [place], [place], [address]  Phone: [phone]    Face-To-Face Visit  Patient Findings       Positives:  Change in activity (STARTING P.T. JAN. 4TH)    Negatives:  Signs/symptoms of thrombosis, Signs/symptoms of bleeding, Laboratory test error suspected, Change in health, Change in alcohol use, Upcoming invasive procedure, Emergency department visit, Upcoming dental procedure, Missed doses, Extra doses, Change in medications, Change in diet/appetite, Hospital admission, Bruising, Other complaints           Assessment/Plan:  Warfarin indication: atrial fibrillation      INR today is therapeutic at 2.7, goal is 2-3.     Warfarin Dose: same (5 mg daily)    Follow Up: 3 weeks      Che Tenorio [facility] PharmD, BCACP 12/28/2023 9:00 AM    For Pharmacy Admin Tracking Only    Intervention Detail:   Total # of Interventions Recommended: 0  Total # of Interventions Accepted: 0  Time Spent (min): 15

## 2024-01-04 ENCOUNTER — EVALUATION (OUTPATIENT)
Dept: PHYSICAL THERAPY | Age: 69
End: 2024-01-04

## 2024-01-04 DIAGNOSIS — M54.16 LUMBAR RADICULOPATHY: Primary | ICD-10-CM

## 2024-01-04 NOTE — PROGRESS NOTES
[facility]   Phone: [phone]   Fax: [phone]      Physical Therapy Daily Treatment Note    Date: 2024  Patient Name: Barbara Pelletier  : 1955   MRN: [id number]  DOInjury: 23  DOSx: 23   Referring Provider: Lacy Randle PA-C  [address] [place] [address]     Medical Diagnosis:     M54.16 (ICD-10-CM) - Lumbar radiculopathy   M41.86 (ICD-10-CM) - Dextroscoliosis of lumbar spine   Z98.1 (ICD-10-CM) - S/P lumbar fusion       Outcome Measure:     S: pt c/o LBP & BLE thigh & lower leg pain    O: Please refer to PT Eval  Time 9456-5792     Visit  Repeat outcome measure at mid point and end.    Pain      Strength      Palpation      ROM      Modalities            Manual                  Stretch      Table slides      Wall Flexion      Wall ER stretch      Towel IR stretch      IR reaching behind back      Exercise      Exercises X30 SKTC  X30 supine lumbar rotation  X30 prone quadriceps stretch  X30 supine hamstring stretch  X30 supine lateral hamstring/IT band stretch  X30 medial hamstring stretch  X30 seated hamstring stretch with LE on & off floor  X30 wall gastroc stretch  X30 seated LE on mat hamstring stretch                                                                             Standing ABD            ROWS: H Functional activities  To aid in ROM and strength needed for reaching , lifting ,pushing and pulling at home/work    ROWS: M  \"    ROWS: L  \"    ER  \"    IR  \"                A:  Tolerated well.  Above added to written HEP.    P: Continue with rehab plan    Raudel Mcclellan, PT [facility] [id number]    Treatment Charges: Mins Units   Initial Evaluation 15 1   Re-Evaluation     Ther Exercise         TE 35 2   Manual Therapy     MT     Ther Activities        TA     Gait Training          GT     Neuro Re-education NR     Modalities     Non-Billable Service Time     Other     Total Time/Units 50 3     
[x] Demo  [x] Written  Comprehension of Education:  [x] Verbalizes understanding.  [x] Demonstrates understanding.  [] Needs Review.  [] Demonstrates/verbalizes understanding of HEP/Ed previously given.    Frequency:  2 days per week for 6 weeks    Patient understands diagnosis/prognosis and consents to treatment, plan and goals: [x] Yes    [] No     Thank you for the opportunity to work with your patient.  If you have questions or comments, please contact me at numbers listed above.    Electronically signed by: Raudel Mcclellan, PT [facility] [id number]         Please sign Physician's Certification and return to:  [facility] [address] [address]  Dept: [phone]  Dept Fax: [phone]  Loc: [phone]     Physician's Certification / Comments     Frequency/Duration 2 days per week for 6 weeks.   Certification period from 1/4/2024  to 3/4/2024.    I have reviewed the Plan of Care established for skilled therapy services and certify that the services are required and that they will be provided while the patient is under my care.    Physician's Comments/Revisions:               Physician's Printed Name:                                           Physician's Signature:                                                               Date:

## 2024-01-11 ENCOUNTER — TREATMENT (OUTPATIENT)
Dept: PHYSICAL THERAPY | Age: 69
End: 2024-01-11

## 2024-01-11 DIAGNOSIS — M54.16 LUMBAR RADICULOPATHY: Primary | ICD-10-CM

## 2024-01-11 NOTE — PROGRESS NOTES
[facility]   Phone: [phone]   Fax: [phone]      Physical Therapy Daily Treatment Note    Date: 2024  Patient Name: Barbara Pelletier  : 1955   MRN: [id number]  DOInjury: 23  DOSx: 23   Referring Provider: Luis Strange MD  [address] [place] [address] [place] [address]     Medical Diagnosis:     M54.16 (ICD-10-CM) - Lumbar radiculopathy   M41.86 (ICD-10-CM) - Dextroscoliosis of lumbar spine   Z98.1 (ICD-10-CM) - S/P lumbar fusion       Outcome Measure:     S: pt reports good compliance with HEP.    O: Please refer to PT Eval  Time 8321-0386     Visit  Repeat outcome measure at mid point and end.    Pain      Strength      Palpation      ROM      Modalities            Manual                  Stretch      Table slides      Wall Flexion      Wall ER stretch      Towel IR stretch      IR reaching behind back      Exercise          Exercises X20 supine SLR  X20 side-lying hip abd lime band  X20 side-lying clamshell lime band  X20 prone hip ext  X20 prone off mat table hip ext  X20 glute bridge  X20 glute bridge hip add ball squeeze  X20 glute bridge hip abd lime band  X20 band step-outs forward, lateral, & backward lime band  NR  TA                                                                     Standing ABD            ROWS: H Functional activities  To aid in ROM and strength needed for reaching , lifting ,pushing and pulling at home/work    ROWS: M  \"    ROWS: L  \"    ER  \"    IR  \"                A:  Tolerated well.  Above added to written HEP with lime band & instructions 24.  The pt experienced no abnormal difficulty or pain with today's exercises.  The pt initiated exercises to promote trunk stabilization, luis strength, & proper muscle activation.     P: Continue with rehab plan & progress to include quadruped exercises when appropriate.     Raudel Mcclellan, PT [facility] [id number]    Treatment Charges: Mins Units   Initial Evaluation

## 2024-01-16 DIAGNOSIS — I48.91 ATRIAL FIBRILLATION WITH RAPID VENTRICULAR RESPONSE (HCC): Primary | ICD-10-CM

## 2024-01-18 ENCOUNTER — TREATMENT (OUTPATIENT)
Dept: PHYSICAL THERAPY | Age: 69
End: 2024-01-18

## 2024-01-18 DIAGNOSIS — M54.16 LUMBAR RADICULOPATHY: Primary | ICD-10-CM

## 2024-01-18 NOTE — PROGRESS NOTES
[facility]   Phone: [phone]   Fax: [phone]      Physical Therapy Daily Treatment Note    Date: 2024  Patient Name: Barbara Pelletier  : 1955   MRN: [id number]  DOInjury: 23  DOSx: 23   Referring Provider: Luis Strange MD  [address] [place] [address] [place] [address]     Medical Diagnosis:     M54.16 (ICD-10-CM) - Lumbar radiculopathy   M41.86 (ICD-10-CM) - Dextroscoliosis of lumbar spine   Z98.1 (ICD-10-CM) - S/P lumbar fusion       Outcome Measure:     S: pt reports that she has noticed improved ability to walk & perform household activities since starting.     O: Please refer to PT Eval  Time 8946-3987     Visit  Repeat outcome measure at mid point and end.    Pain      Strength      Palpation      ROM      Modalities            Manual                  Stretch      Table slides      Wall Flexion      Wall ER stretch      Towel IR stretch      IR reaching behind back      Exercise          Exercises X20 supine SLR blue  X20 side-lying hip abd blue band  X20 side-lying clamshell blue band  X20 prone hip ext blue band  X20 glute bridge  X20 glute bridge hip add ball squeeze  X20 glute bridge hip abd blue band  X20 band step-outs forward, lateral, & backward blue band  X20 stand march blue band  X20 stand leg curl  X20 prone laterante UE & LE ext  X20 prone superman  NR  TA                                                                     Standing ABD            ROWS: H Functional activities  To aid in ROM and strength needed for reaching , lifting ,pushing and pulling at home/work    ROWS: M  \"    ROWS: L  \"    ER  \"    IR  \"                A:  Tolerated well.  Above added to written HEP with blue band & instructions 24.  The pt experienced no abnormal difficulty or pain with today's exercises.  The pt initiated exercises to promote trunk stabilization, luis strength, & proper muscle activation.     P: Continue with rehab plan &

## 2024-01-19 ENCOUNTER — HOSPITAL ENCOUNTER (OUTPATIENT)
Dept: PHARMACY | Age: 69
Setting detail: THERAPIES SERIES
Discharge: HOME | End: 2024-01-19
Payer: MEDICARE

## 2024-01-19 VITALS
WEIGHT: 124.8 LBS | BODY MASS INDEX: 21.42 KG/M2 | DIASTOLIC BLOOD PRESSURE: 67 MMHG | SYSTOLIC BLOOD PRESSURE: 168 MMHG | HEART RATE: 68 BPM

## 2024-01-19 DIAGNOSIS — I48.91 POSTOPERATIVE ATRIAL FIBRILLATION (HCC): Primary | ICD-10-CM

## 2024-01-19 DIAGNOSIS — I97.89 POSTOPERATIVE ATRIAL FIBRILLATION (HCC): Primary | ICD-10-CM

## 2024-01-19 LAB — INTERNATIONAL NORMALIZATION RATIO, POC: 1.4

## 2024-01-19 PROCEDURE — 85610 PROTHROMBIN TIME: CPT

## 2024-01-19 PROCEDURE — 99212 OFFICE O/P EST SF 10 MIN: CPT

## 2024-01-19 NOTE — PROGRESS NOTES
[facility] (Medication Management)  [address] [place], [place], [address]  Phone: [phone]    Face-To-Face Visit  Patient Findings       Positives:  Change in activity (SHE IS GOING TO PT FOR HER BACK)    Negatives:  Signs/symptoms of thrombosis, Signs/symptoms of bleeding, Laboratory test error suspected, Change in health, Change in alcohol use, Upcoming invasive procedure, Emergency department visit, Upcoming dental procedure, Missed doses, Extra doses, Change in medications, Change in diet/appetite, Hospital admission, Bruising, Other complaints    Comments:  DR. MONTESINOS 1/26           Assessment/Plan:  Warfarin indication: atrial fibrillation      INR today is SUBtherapeutic at 1.4, goal is 2-3. NO REASON FOR LOW INR.    Warfarin Dose: INCREASE WEEKLY DOSE 14% TO 7.5 MG EVERY MON./FRI.; 5 MG ALL OTHER DAYS    Follow Up: 1 week      Melisa Reyez RPH PharmD, BCACP 1/19/2024 9:06 AM    For Pharmacy Admin Tracking Only    Intervention Detail: Dose Adjustment: 1, reason: Therapy Optimization  Total # of Interventions Recommended: 1  Total # of Interventions Accepted: 1  Time Spent (min): 15

## 2024-01-25 ENCOUNTER — TREATMENT (OUTPATIENT)
Dept: PHYSICAL THERAPY | Age: 69
End: 2024-01-25

## 2024-01-25 DIAGNOSIS — M54.16 LUMBAR RADICULOPATHY: Primary | ICD-10-CM

## 2024-01-25 NOTE — PROGRESS NOTES
strength, & proper muscle activation.     P: Continue with rehab plan & progress to include quadruped exercises when appropriate.     Raudel Mcclellan, PT [facility] [address]    Treatment Charges: Mins Units   Initial Evaluation     Re-Evaluation     Ther Exercise         TE     Manual Therapy     MT     Ther Activities        TA 10 1   Gait Training          GT     Neuro Re-education NR 45 3   Modalities     Non-Billable Service Time     Other     Total Time/Units 55 4

## 2024-01-26 ENCOUNTER — OFFICE VISIT (OUTPATIENT)
Dept: NON INVASIVE DIAGNOSTICS | Age: 69
End: 2024-01-26

## 2024-01-26 ENCOUNTER — HOSPITAL ENCOUNTER (OUTPATIENT)
Dept: PHARMACY | Age: 69
Setting detail: THERAPIES SERIES
Discharge: HOME | End: 2024-01-26
Payer: MEDICARE

## 2024-01-26 VITALS
SYSTOLIC BLOOD PRESSURE: 153 MMHG | HEART RATE: 61 BPM | BODY MASS INDEX: 21.28 KG/M2 | DIASTOLIC BLOOD PRESSURE: 55 MMHG | WEIGHT: 124 LBS

## 2024-01-26 VITALS
HEIGHT: 64 IN | DIASTOLIC BLOOD PRESSURE: 60 MMHG | BODY MASS INDEX: 21.27 KG/M2 | SYSTOLIC BLOOD PRESSURE: 124 MMHG | WEIGHT: 124.6 LBS | HEART RATE: 60 BPM

## 2024-01-26 DIAGNOSIS — I97.89 POSTOPERATIVE ATRIAL FIBRILLATION (HCC): Primary | ICD-10-CM

## 2024-01-26 DIAGNOSIS — I48.91 ATRIAL FIBRILLATION, UNSPECIFIED TYPE (HCC): Primary | ICD-10-CM

## 2024-01-26 DIAGNOSIS — I48.91 POSTOPERATIVE ATRIAL FIBRILLATION (HCC): Primary | ICD-10-CM

## 2024-01-26 LAB — INTERNATIONAL NORMALIZATION RATIO, POC: 2.3

## 2024-01-26 PROCEDURE — 85610 PROTHROMBIN TIME: CPT

## 2024-01-26 PROCEDURE — 99211 OFF/OP EST MAY X REQ PHY/QHP: CPT

## 2024-01-26 NOTE — PROGRESS NOTES
[facility]- [facility] Electrophysiology  Outpatient progress note  PATIENT: Barbara Pelletier  MEDICAL RECORD NUMBER: [id number]  DATE OF SERVICE:  1/26/2024  ATTENDING ELECTROPHYSIOLOGIST: Cherise Ruano MD   REFERRING PHYSICIAN:  Anurag Garcia DO  CHIEF COMPLAINT: Atrial Fibrillation with hypotension.     HPI: This is a 69 y.o. female with a history of Paroxsymal atrial fibrillation noted in the post CABG period,  Coronary artery disease with prior CABG times 2 (LIMA-LAD, SVG to OM) 11/2022, left atrial appendage clip with the AtriCure clip, Mild mitral regurgitation, Hypertension, Type 2 diabetes milltus, Peripheral vascular disease with prior left carotid enterectomy 10/2018, prior ilica stent placement 12/2018, Hyperlipidemia,  Hypothyroidism, Prior heavy Tobacco use stopping in June 2023, COPD, Pulmonary Nodule, GERD, Lumbar stenosis S/p L1-S1 fusion, Migraines, Basal cell Ca, Psoriasiform dermatis, Cataracts.     She was noted to have Atrial fibrillation post CABG in November 2022, seen by Dr. García who recommended Eliquis, ASA PPI, and short term Amiodarone. In December 2022 Eliquis was found too expensive and she was transitioned to Warfarin, managed by the Anticoagulation clinic with the discontinuation of Amiodarone at that time.    In January 2023 she was found to have Hypertension and was started on Toprol 25mg QD by Dr. Rick.     Due to ongoing  back pain she was seen by Dr. Arndt who then performed a L1-S1 laminectomy and posterior fusion of L4-L5 L5-S1 fusion on 09/25/2023 with Warfarin being stopped at that time. Dr. Foreman was consulted on the 27th for post op respiratory insufficiency a repeat CTA showed no PE however an increased left upper lode nodule, she was weaned from 6L to 1L nasal cannula. She wwas then admitted to Acute Rehab on 09/28/2023.     On 09/29/2023 the patient had an RRT in acute rehab due to AF with RVR  140 bpm, with

## 2024-01-26 NOTE — PROGRESS NOTES
[facility] Anticoagulation Clinic (Medication Management)  [address] [place], [place], [address]  Phone: [phone]    Face-To-Face Visit  Patient Findings       Positives:  Change in activity (Continues with PT for her back, going well)    Negatives:  Signs/symptoms of thrombosis, Signs/symptoms of bleeding, Laboratory test error suspected, Change in health, Change in alcohol use, Upcoming invasive procedure, Emergency department visit, Upcoming dental procedure, Missed doses, Extra doses, Change in medications, Change in diet/appetite, Hospital admission, Bruising, Other complaints           Assessment/Plan:  Warfarin indication: atrial fibrillation      INR today is therapeutic at 2.3, goal is 2-3.    Warfarin Dose: same (7.5 mg every Mon./Fri.; 5 mg all other days)    Follow Up: 2 weeks      Melisa Reyez [facility] PharmD, BCACP 1/26/2024 9:59 AM    For Pharmacy Admin Tracking Only    Intervention Detail:   Total # of Interventions Recommended: 0  Total # of Interventions Accepted: 0  Time Spent (min): 10

## 2024-02-01 ENCOUNTER — TREATMENT (OUTPATIENT)
Dept: PHYSICAL THERAPY | Age: 69
End: 2024-02-01
Payer: MEDICARE

## 2024-02-01 DIAGNOSIS — M54.16 LUMBAR RADICULOPATHY: Primary | ICD-10-CM

## 2024-02-01 PROCEDURE — 97112 NEUROMUSCULAR REEDUCATION: CPT | Performed by: PHYSICAL THERAPIST

## 2024-02-01 NOTE — PROGRESS NOTES
[facility]   Phone: [phone]   Fax: [phone]      Physical Therapy Daily Treatment Note    Date: 2024  Patient Name: Barbara Pelletier  : 1955   MRN: [id number]  DOInjury: 23  DOSx: 23   Referring Provider: Luis Strange MD  [address] [place] [address] [place] [address]     Medical Diagnosis:     M54.16 (ICD-10-CM) - Lumbar radiculopathy   M41.86 (ICD-10-CM) - Dextroscoliosis of lumbar spine   Z98.1 (ICD-10-CM) - S/P lumbar fusion       Outcome Measure:     S: pt presents with no complaints today.     O: Please refer to PT Eval  Time 5536-4890     Visit  Repeat outcome measure at mid point and end.    Pain      Strength      Palpation      ROM      Modalities            Manual                  Stretch      Table slides      Wall Flexion      Wall ER stretch      Towel IR stretch      IR reaching behind back      Exercise          Exercises X20 forward, backward, & R & L lateral Dark X-band  X20 squats  X20 step-ups forward with R & LLE remaining on step 8''  X20 step-ups R & L Lateral 8''  X20 trunk rotation R & L blue band  X20 trunk rotation upward swing R & L blue band  X20 trunk rotation R & L chopping blue band  NR                                                                                                                     A:  Tolerated well.  The pt was pain free during the entire performance of PT gym exercise program.  The pt progressed with today's Tx session to perform x-band AMB, squats, step-ups, & band trunk rotation.    P: Continue with rehab plan & D/C to HEP following next scheduled Tx session.    Raudel Mcclellan, PT [facility] [id number]    Treatment Charges: Mins Units   Initial Evaluation     Re-Evaluation     Ther Exercise         TE     Manual Therapy     MT     Ther Activities        TA     Gait Training          GT     Neuro Re-education NR 45 3   Modalities     Non-Billable Service Time     Other     Total Time/Units 45 3

## 2024-02-02 DIAGNOSIS — E03.9 HYPOTHYROIDISM, UNSPECIFIED TYPE: ICD-10-CM

## 2024-02-02 RX ORDER — LEVOTHYROXINE SODIUM 0.03 MG/1
25 TABLET ORAL DAILY
Qty: 90 TABLET | Refills: 1 | Status: SHIPPED | OUTPATIENT
Start: 2024-02-02

## 2024-02-08 ENCOUNTER — TREATMENT (OUTPATIENT)
Dept: PHYSICAL THERAPY | Age: 69
End: 2024-02-08
Payer: MEDICARE

## 2024-02-08 DIAGNOSIS — M54.16 LUMBAR RADICULOPATHY: Primary | ICD-10-CM

## 2024-02-08 PROCEDURE — 97112 NEUROMUSCULAR REEDUCATION: CPT | Performed by: PHYSICAL THERAPIST

## 2024-02-08 NOTE — PROGRESS NOTES
[facility]   Phone: [phone]   Fax: [phone]      Physical Therapy Daily Treatment Note    Date: 2024  Patient Name: Barbara Pelletier  : 1955   MRN: [id number]  DOInjury: 23  DOSx: 23   Referring Provider: Luis Strange MD  [address] [place] [address] [place] [address]     Medical Diagnosis:     M54.16 (ICD-10-CM) - Lumbar radiculopathy   M41.86 (ICD-10-CM) - Dextroscoliosis of lumbar spine   Z98.1 (ICD-10-CM) - S/P lumbar fusion       Outcome Measure:     S: pt presents with no complaints today.     O: Please refer to PT Eval  Time 7425-8341     Visit  Repeat outcome measure at mid point and end.    Pain      Strength      Palpation      ROM      Modalities            Manual                  Stretch      Table slides      Wall Flexion      Wall ER stretch      Towel IR stretch      IR reaching behind back      Exercise            Exercises X20 side-lying hip abd purple band  X20 side-lying clamshell purple band  X20 prone hip ext purple band  X20 prone off mat table hip ext pruple band  X20 glute bridge  X20 glute bridge hip add ball squeeze  X20 glute bridge hip abd purple band  X20 prone laterante UE & LE ext  X20 prone superman  X20 quadruped alternate UE & LE ext  X20 quadruped hip abd  X20 quadruped hip ext kickback  NR                                                                                                                             A:  Tolerated well.  The pt was pain free during the entire performance of PT gym exercise program.  The pt progressed with today's Tx session to perform quadruped.    P: PT at this time will D/C the pt to HEP.    Raudel Mcclellan, PT [facility] [id number]    Treatment Charges: Mins Units   Initial Evaluation     Re-Evaluation     Ther Exercise         TE     Manual Therapy     MT     Ther Activities        TA     Gait Training          GT     Neuro Re-education NR 45 3   Modalities     Non-Billable Service Time

## 2024-02-08 NOTE — PROGRESS NOTES
[facility]   Phone: [phone]   Fax: [phone]    Discharge Summary        Date:  2024   Patient: Barbara Pelletier                : 1955                        MRN: [id number]  Referring Provider: Lacy Randle PA-C  [address] [id number]                            Medical Diagnosis:      S/P L1-S1 Fusion     M54.16 (ICD-10-CM) - Lumbar radiculopathy   M41.86 (ICD-10-CM) - Dextroscoliosis of lumbar spine   Z98.1 (ICD-10-CM) - S/P lumbar fusion       ATTENDANCE:  Patient attended 8 of 8 scheduled treatments from 24  to 24.  TREATMENTS RECEIVED:  Therapeutic activity, therapeutic exercise, neuromuscular reeducation, HEP, manual    INITIAL STATUS:  Observations: well nourished female     Inspection: demonstrates generalized pronated posture (forward head, protracted scapula, internally rotated shoulders, and flexed trunk and lower extremities)                               Gait: antalgic gait     Functional Strength: Fair Minus     Range of Motion: Moderate+ restrictions with BLE hamstring, gastroc, & quadriceps flexibility     Trunk: arom trunk rom measurements in % of WNL              Flexion:                       [] Normal   [x] Limited 25%              Extension:                   [] Normal   [x] Limited 25%               Right Rotation:            [] Normal   [x] Limited 25%              Left Rotation:               [] Normal   [x] Limited 25%              Right Side Bending:    [] Normal   [x] Limited 25%              Left Side Bending:      [] Normal   [x] Limited 25%     Lower Extremity:              Right:                           [] Normal   [x] Limited               Left:                             [] Normal   [x] Limited         Strength:                 Trunk:  3/5              R LE:   3/5              L LE:    3/5     Palpation: pt presents with tenderness at lumbar region multifidus      Sensation: intact to light touch

## 2024-02-09 ENCOUNTER — HOSPITAL ENCOUNTER (OUTPATIENT)
Dept: PHARMACY | Age: 69
Setting detail: THERAPIES SERIES
Discharge: HOME | End: 2024-02-09
Payer: MEDICARE

## 2024-02-09 VITALS
HEART RATE: 62 BPM | BODY MASS INDEX: 21.46 KG/M2 | DIASTOLIC BLOOD PRESSURE: 74 MMHG | WEIGHT: 125 LBS | SYSTOLIC BLOOD PRESSURE: 160 MMHG

## 2024-02-09 DIAGNOSIS — I48.91 POSTOPERATIVE ATRIAL FIBRILLATION (HCC): Primary | ICD-10-CM

## 2024-02-09 DIAGNOSIS — I97.89 POSTOPERATIVE ATRIAL FIBRILLATION (HCC): Primary | ICD-10-CM

## 2024-02-09 LAB — INTERNATIONAL NORMALIZATION RATIO, POC: 4.1

## 2024-02-09 PROCEDURE — 99212 OFFICE O/P EST SF 10 MIN: CPT

## 2024-02-09 PROCEDURE — 85610 PROTHROMBIN TIME: CPT

## 2024-02-09 NOTE — PROGRESS NOTES
[facility] Anticoagulation Clinic (Medication Management)  [address] [place], [place], [address]  Phone: [phone]    Face-To-Face Visit  Patient Findings       Positives:  Change in activity (FINISHED P.T. FOR HER BACK), Extra doses (SHE MAY HAVE DOUBLED A DOSE A DAY LAST WEEK)    Negatives:  Signs/symptoms of thrombosis, Signs/symptoms of bleeding, Laboratory test error suspected, Change in health, Change in alcohol use, Upcoming invasive procedure, Emergency department visit, Upcoming dental procedure, Missed doses, Change in medications, Change in diet/appetite, Hospital admission, Bruising, Other complaints    Comments:  CT SCAN NEXT WEEK (LUNGS)           Assessment/Plan:  Warfarin indication: atrial fibrillation      INR today is supratherapeutic at 4.1, goal is 2-3. She may have doubled a warfarin dose and has had little vitamin K.     Warfarin Dose: Hold today's dose then retry same regimen (7.5 mg every Mon./Fri.; 5 mg all other days)    Follow Up: 2 weeks      Melisa Reyez RPH PharmD, BCACP 2/9/2024 10:05 AM    For Pharmacy Admin Tracking Only    Intervention Detail: Dose Adjustment: 1, reason: Therapy De-escalation  Total # of Interventions Recommended: 1  Total # of Interventions Accepted: 1  Time Spent (min): 15

## 2024-02-13 ENCOUNTER — HOSPITAL ENCOUNTER (OUTPATIENT)
Dept: CT IMAGING | Age: 69
Discharge: HOME | End: 2024-02-15
Attending: INTERNAL MEDICINE
Payer: MEDICARE

## 2024-02-13 DIAGNOSIS — R91.1 PULMONARY NODULE, LEFT: ICD-10-CM

## 2024-02-13 PROCEDURE — 71250 CT THORAX DX C-: CPT

## 2024-02-23 ENCOUNTER — HOSPITAL ENCOUNTER (OUTPATIENT)
Dept: PHARMACY | Age: 69
Setting detail: THERAPIES SERIES
Discharge: HOME | End: 2024-02-23
Payer: MEDICARE

## 2024-02-23 VITALS
WEIGHT: 124.8 LBS | DIASTOLIC BLOOD PRESSURE: 64 MMHG | HEART RATE: 53 BPM | BODY MASS INDEX: 21.42 KG/M2 | SYSTOLIC BLOOD PRESSURE: 153 MMHG

## 2024-02-23 DIAGNOSIS — I48.91 POSTOPERATIVE ATRIAL FIBRILLATION (HCC): Primary | ICD-10-CM

## 2024-02-23 DIAGNOSIS — I97.89 POSTOPERATIVE ATRIAL FIBRILLATION (HCC): Primary | ICD-10-CM

## 2024-02-23 LAB — INTERNATIONAL NORMALIZATION RATIO, POC: 3.1

## 2024-02-23 PROCEDURE — 99211 OFF/OP EST MAY X REQ PHY/QHP: CPT

## 2024-02-23 PROCEDURE — 85610 PROTHROMBIN TIME: CPT

## 2024-02-23 NOTE — PROGRESS NOTES
[facility] Anticoagulation Clinic (Medication Management)  [address] [place], [place], [address]  Phone: [phone]    Face-To-Face Visit  Patient Findings       Negatives:  Signs/symptoms of thrombosis, Signs/symptoms of bleeding, Laboratory test error suspected, Change in health, Change in alcohol use, Change in activity, Upcoming invasive procedure, Emergency department visit, Upcoming dental procedure, Missed doses, Extra doses, Change in medications, Change in diet/appetite, Hospital admission, Bruising, Other complaints           Assessment/Plan:  Warfarin indication: atrial fibrillation      INR today is supratherapeutic at 3.1, goal is 2-3. INR close to goal, will retry same regimen.    Warfarin Dose: same (7.5 mg every Mon./Fri.; 5 mg all other days)    Follow Up: 3 weeks      Melisa Reyez [facility] PharmD, BCACP 2/23/2024 9:04 AM    For Pharmacy Admin Tracking Only    Intervention Detail:   Total # of Interventions Recommended: 0  Total # of Interventions Accepted: 0  Time Spent (min): 15

## 2024-02-27 ENCOUNTER — OFFICE VISIT (OUTPATIENT)
Dept: ENDOCRINOLOGY | Age: 69
End: 2024-02-27
Payer: MEDICARE

## 2024-02-27 VITALS
DIASTOLIC BLOOD PRESSURE: 49 MMHG | SYSTOLIC BLOOD PRESSURE: 150 MMHG | OXYGEN SATURATION: 99 % | RESPIRATION RATE: 18 BRPM | HEIGHT: 64 IN | BODY MASS INDEX: 21.85 KG/M2 | HEART RATE: 60 BPM | WEIGHT: 128 LBS

## 2024-02-27 DIAGNOSIS — R80.9 MICROALBUMINURIA: ICD-10-CM

## 2024-02-27 DIAGNOSIS — E03.9 PRIMARY HYPOTHYROIDISM: ICD-10-CM

## 2024-02-27 DIAGNOSIS — E11.65 TYPE 2 DIABETES MELLITUS WITH HYPERGLYCEMIA, WITH LONG-TERM CURRENT USE OF INSULIN (HCC): Primary | ICD-10-CM

## 2024-02-27 DIAGNOSIS — E78.5 HYPERLIPIDEMIA, UNSPECIFIED HYPERLIPIDEMIA TYPE: ICD-10-CM

## 2024-02-27 DIAGNOSIS — Z91.119 DIETARY NONCOMPLIANCE: ICD-10-CM

## 2024-02-27 DIAGNOSIS — Z79.4 TYPE 2 DIABETES MELLITUS WITH HYPERGLYCEMIA, WITH LONG-TERM CURRENT USE OF INSULIN (HCC): Primary | ICD-10-CM

## 2024-02-27 PROCEDURE — 1090F PRES/ABSN URINE INCON ASSESS: CPT | Performed by: CLINICAL NURSE SPECIALIST

## 2024-02-27 PROCEDURE — 3046F HEMOGLOBIN A1C LEVEL >9.0%: CPT | Performed by: CLINICAL NURSE SPECIALIST

## 2024-02-27 PROCEDURE — 95251 CONT GLUC MNTR ANALYSIS I&R: CPT | Performed by: CLINICAL NURSE SPECIALIST

## 2024-02-27 PROCEDURE — G8399 PT W/DXA RESULTS DOCUMENT: HCPCS | Performed by: CLINICAL NURSE SPECIALIST

## 2024-02-27 PROCEDURE — G8484 FLU IMMUNIZE NO ADMIN: HCPCS | Performed by: CLINICAL NURSE SPECIALIST

## 2024-02-27 PROCEDURE — 3017F COLORECTAL CA SCREEN DOC REV: CPT | Performed by: CLINICAL NURSE SPECIALIST

## 2024-02-27 PROCEDURE — 2022F DILAT RTA XM EVC RTNOPTHY: CPT | Performed by: CLINICAL NURSE SPECIALIST

## 2024-02-27 PROCEDURE — 1036F TOBACCO NON-USER: CPT | Performed by: CLINICAL NURSE SPECIALIST

## 2024-02-27 PROCEDURE — G8427 DOCREV CUR MEDS BY ELIG CLIN: HCPCS | Performed by: CLINICAL NURSE SPECIALIST

## 2024-02-27 PROCEDURE — 99214 OFFICE O/P EST MOD 30 MIN: CPT | Performed by: CLINICAL NURSE SPECIALIST

## 2024-02-27 PROCEDURE — G8420 CALC BMI NORM PARAMETERS: HCPCS | Performed by: CLINICAL NURSE SPECIALIST

## 2024-02-27 PROCEDURE — 1123F ACP DISCUSS/DSCN MKR DOCD: CPT | Performed by: CLINICAL NURSE SPECIALIST

## 2024-02-27 RX ORDER — LISINOPRIL 5 MG/1
5 TABLET ORAL DAILY
Qty: 90 TABLET | Refills: 1 | Status: SHIPPED | OUTPATIENT
Start: 2024-02-27

## 2024-02-27 NOTE — PROGRESS NOTES
[facility] Department of Endocrinology Diabetes and Metabolism   [address], [place], [place], [address]   Phone: [phone]  Fax: [phone]       Date of admission: (Not on file)  Date of service: 2/27/2024  Admitting physician: No admitting provider for patient encounter.   Primary Care Physician: Anurag Garcia DO  Consultant physician: INDIRA Israel - CNS      Reason for the consultation:  Uncontrolled DM    History of Present Illness:  The history is provided by the patient. Accuracy of the patient data is excellent    Barbara Pelletier is a very pleasant 69 y.o. old female with PMH uncontrolled DM, CAD, HLD seen today for follow up visit     The patient was recently admitted to the hospital and underwent CABG surgery on 11/2/2022      The patient was diagnosed with type 2 DM many years ago. Currently on Toujeo 22 u daily, Humalog 10u with meals + ss 1:50>150.  Metformin 500 mg 1 tablet BID.  the Patient has been eating consistent carbohydrate meals, self-blood glucose monitoring has been better recently. In addition, patient reports both macrovascular and  microvascular complications. The patient is not up to date with yearly diabetic eye exam.   Check BG 4 times per day   Uses jese   Ambulatory continuous glucose monitoring of interstitial tissue fluid via a subcutaneous sensor for a minimum of 72 hours; analysis, interpretation and report  Average sensor glucose  155  Time in range 72%  Hyperglycemia 27%  Hypoglycemia 1%    Lab Results   Component Value Date/Time    LABA1C 7.2 12/07/2023 07:34 AM       Lab Results   Component Value Date/Time    LABA1C 7.2 12/07/2023 07:34 AM    LABA1C 9.0 06/26/2023 12:00 PM    LABA1C 7.9 03/21/2023 10:51 AM    LABA1C 9.7 10/28/2022 07:55 AM       Past medical history:   Past Medical History:   Diagnosis Date    Age-related osteoporosis without current pathological fracture 06/2021    Arthritis     Atrial

## 2024-03-01 ENCOUNTER — OFFICE VISIT (OUTPATIENT)
Dept: ENT CLINIC | Age: 69
End: 2024-03-01
Payer: MEDICARE

## 2024-03-01 VITALS
HEIGHT: 64 IN | SYSTOLIC BLOOD PRESSURE: 137 MMHG | HEART RATE: 84 BPM | OXYGEN SATURATION: 98 % | DIASTOLIC BLOOD PRESSURE: 58 MMHG | WEIGHT: 125 LBS | BODY MASS INDEX: 21.34 KG/M2

## 2024-03-01 DIAGNOSIS — K13.79 MOUTH SORES: Primary | ICD-10-CM

## 2024-03-01 DIAGNOSIS — K14.8 TONGUE LESION: ICD-10-CM

## 2024-03-01 DIAGNOSIS — J30.1 SEASONAL ALLERGIC RHINITIS DUE TO POLLEN: ICD-10-CM

## 2024-03-01 DIAGNOSIS — K21.9 GASTROESOPHAGEAL REFLUX DISEASE WITHOUT ESOPHAGITIS: ICD-10-CM

## 2024-03-01 PROCEDURE — 99213 OFFICE O/P EST LOW 20 MIN: CPT | Performed by: OTOLARYNGOLOGY

## 2024-03-01 PROCEDURE — G8427 DOCREV CUR MEDS BY ELIG CLIN: HCPCS | Performed by: OTOLARYNGOLOGY

## 2024-03-01 PROCEDURE — 1123F ACP DISCUSS/DSCN MKR DOCD: CPT | Performed by: OTOLARYNGOLOGY

## 2024-03-01 PROCEDURE — G8484 FLU IMMUNIZE NO ADMIN: HCPCS | Performed by: OTOLARYNGOLOGY

## 2024-03-01 PROCEDURE — 1090F PRES/ABSN URINE INCON ASSESS: CPT | Performed by: OTOLARYNGOLOGY

## 2024-03-01 PROCEDURE — 3017F COLORECTAL CA SCREEN DOC REV: CPT | Performed by: OTOLARYNGOLOGY

## 2024-03-01 PROCEDURE — 1036F TOBACCO NON-USER: CPT | Performed by: OTOLARYNGOLOGY

## 2024-03-01 PROCEDURE — G8399 PT W/DXA RESULTS DOCUMENT: HCPCS | Performed by: OTOLARYNGOLOGY

## 2024-03-01 PROCEDURE — G8420 CALC BMI NORM PARAMETERS: HCPCS | Performed by: OTOLARYNGOLOGY

## 2024-03-01 RX ORDER — AZELASTINE 1 MG/ML
2 SPRAY, METERED NASAL 2 TIMES DAILY
Qty: 120 ML | Refills: 1 | Status: SHIPPED | OUTPATIENT
Start: 2024-03-01

## 2024-03-01 NOTE — PROGRESS NOTES
Subjective:      Patient ID:  Barbara Pelletier is a 69 y.o. female.    HPI:  Here for follow up on tongue lesion that has returned. Lesion of the anterior midline tongue was biopsied in 2023 and came back showing chronic inflammatory changes and a fungal organism. Also reports symptoms of acid reflux. No other symptoms today.    Past Medical History:   Diagnosis Date    Age-related osteoporosis without current pathological fracture 2021    Arthritis     Atrial fibrillation, transient (HCC) 2023    Cataracts, bilateral 10/'19    Colon polyps 2021    tubular adenoma    Diabetes mellitus (HCC)     DM type 2 (diabetes mellitus, type 2) (HCC)     Facial basal cell cancer 2022    [facility]    Hyperlipidemia     Hypertension     Hypothyroidism     Migraines     rare    Neuropathy, diabetic (HCC)     Peripheral vascular disease (HCC)     follows with Dr. Mack yearly    Positive colorectal cancer screening using Cologuard test     Psoriasiform dermatitis     Seasonal allergies     Thyroid disease      Past Surgical History:   Procedure Laterality Date    BACK SURGERY      L5-S1    CAROTID ENDARTERECTOMY Left 10/11/2018    [facility]    CARPAL TUNNEL RELEASE      bilat     SECTION      2    COLONOSCOPY N/A 2021    COLONOSCOPY WITH BIOPSY with tattooing performed by Taylor Hilario MD at [facility] ENDOSCOPY    CORONARY ARTERY BYPASS GRAFT N/A 2022    coronary artery bypass grafting x 2, ZACHARY performed by Clark Pickard DO at [facility] OR    HYSTERECTOMY (CERVIX STATUS UNKNOWN)      LARYNGOSCOPY N/A 2020    DIRECT LARYNGOSCOPYY AND CERVICAL ESOPHAGOSCOPY (PATHOLOGY PRESENT) performed by Raudel Hutchinson DO at [facility] OR    LEG SURGERY      remote- right     LUMBAR SPINE SURGERY N/A 2023    L1-S1 laminectomy and posterior fusion; L4-L5, L5-S1 posterior lumbar interbody fusion performed by Ginette Arndt MD at [facility] OR    OTHER SURGICAL HISTORY  2018

## 2024-03-04 ENCOUNTER — TELEPHONE (OUTPATIENT)
Dept: ENT CLINIC | Age: 69
End: 2024-03-04

## 2024-03-04 RX ORDER — LISINOPRIL 5 MG/1
5 TABLET ORAL DAILY
Qty: 90 TABLET | Refills: 1 | Status: SHIPPED | OUTPATIENT
Start: 2024-03-04

## 2024-03-04 NOTE — TELEPHONE ENCOUNTER
I spoke with patient letting her know I called Rx in for Astelin as her pharmacy's system was down on Friday when originally ordered.     Electronically signed by Adelina Delgadillo MA on 3/4/24 at 12:58 PM EST

## 2024-03-08 DIAGNOSIS — I65.23 BILATERAL CAROTID ARTERY STENOSIS: ICD-10-CM

## 2024-03-08 DIAGNOSIS — I73.9 PVD (PERIPHERAL VASCULAR DISEASE) (HCC): ICD-10-CM

## 2024-03-08 DIAGNOSIS — Z98.890 S/P CAROTID ENDARTERECTOMY: Primary | ICD-10-CM

## 2024-03-15 ENCOUNTER — HOSPITAL ENCOUNTER (OUTPATIENT)
Dept: PHARMACY | Age: 69
Setting detail: THERAPIES SERIES
Discharge: HOME | End: 2024-03-15
Payer: MEDICARE

## 2024-03-15 VITALS
BODY MASS INDEX: 21.56 KG/M2 | DIASTOLIC BLOOD PRESSURE: 68 MMHG | SYSTOLIC BLOOD PRESSURE: 120 MMHG | WEIGHT: 125.6 LBS | HEART RATE: 52 BPM

## 2024-03-15 DIAGNOSIS — I97.89 POSTOPERATIVE ATRIAL FIBRILLATION (HCC): Primary | ICD-10-CM

## 2024-03-15 DIAGNOSIS — I48.91 POSTOPERATIVE ATRIAL FIBRILLATION (HCC): Primary | ICD-10-CM

## 2024-03-15 LAB — INTERNATIONAL NORMALIZATION RATIO, POC: 2.9

## 2024-03-15 PROCEDURE — 85610 PROTHROMBIN TIME: CPT

## 2024-03-15 PROCEDURE — 99211 OFF/OP EST MAY X REQ PHY/QHP: CPT

## 2024-03-15 NOTE — PROGRESS NOTES
[facility] (Medication Management)  [address] [place], [address]  Phone: [phone]    Face-To-Face Visit  Patient Findings       Positives:  Change in medications (She is back on lisinopril 5 mg daily)    Negatives:  Signs/symptoms of thrombosis, Signs/symptoms of bleeding, Laboratory test error suspected, Change in health, Change in alcohol use, Change in activity, Upcoming invasive procedure, Emergency department visit, Upcoming dental procedure, Missed doses, Extra doses, Change in diet/appetite, Hospital admission, Bruising, Other complaints    Comments:  Dr. GAMA end of March           Assessment/Plan:  Warfarin indication: atrial fibrillation     INR today is therapeutic at 2.9, goal is 2-3.    Warfarin Dose: same (7.5 mg every Mon./Fri.; 5 mg all other days)    Follow Up: 4 weeks      Melisa Reyez RPH PharmD, BCACP 3/15/2024 9:22 AM    For Pharmacy Admin Tracking Only    Intervention Detail:   Total # of Interventions Recommended: 0  Total # of Interventions Accepted: 0  Time Spent (min): 15

## 2024-04-12 ENCOUNTER — HOSPITAL ENCOUNTER (OUTPATIENT)
Dept: PHARMACY | Age: 69
Setting detail: THERAPIES SERIES
Discharge: HOME | End: 2024-04-12
Payer: MEDICARE

## 2024-04-12 ENCOUNTER — HOSPITAL ENCOUNTER (OUTPATIENT)
Dept: CARDIOLOGY | Age: 69
End: 2024-04-12
Attending: SURGERY
Payer: MEDICARE

## 2024-04-12 VITALS
SYSTOLIC BLOOD PRESSURE: 133 MMHG | HEART RATE: 58 BPM | BODY MASS INDEX: 21.63 KG/M2 | WEIGHT: 126 LBS | DIASTOLIC BLOOD PRESSURE: 73 MMHG

## 2024-04-12 DIAGNOSIS — Z98.890 S/P CAROTID ENDARTERECTOMY: ICD-10-CM

## 2024-04-12 DIAGNOSIS — I97.89 POSTOPERATIVE ATRIAL FIBRILLATION (HCC): Primary | ICD-10-CM

## 2024-04-12 DIAGNOSIS — I73.9 PVD (PERIPHERAL VASCULAR DISEASE) (HCC): ICD-10-CM

## 2024-04-12 DIAGNOSIS — I48.91 POSTOPERATIVE ATRIAL FIBRILLATION (HCC): Primary | ICD-10-CM

## 2024-04-12 DIAGNOSIS — I65.23 BILATERAL CAROTID ARTERY STENOSIS: ICD-10-CM

## 2024-04-12 LAB
INTERNATIONAL NORMALIZATION RATIO, POC: 4.7
VAS LEFT ABI: 0.46
VAS LEFT ARM BP: 169 MMHG
VAS LEFT CCA DIST EDV: 23.3 CM/S
VAS LEFT CCA DIST PSV: 118.8 CM/S
VAS LEFT CCA PROX EDV: 23.3 CM/S
VAS LEFT CCA PROX PSV: 115.2 CM/S
VAS LEFT DORSALIS PEDIS BP: 77 MMHG
VAS LEFT ECA EDV: 6.7 CM/S
VAS LEFT ECA PSV: 114.9 CM/S
VAS LEFT ICA DIST EDV: 19.3 CM/S
VAS LEFT ICA DIST PSV: 77.4 CM/S
VAS LEFT ICA MID EDV: 19.3 CM/S
VAS LEFT ICA MID PSV: 90.8 CM/S
VAS LEFT ICA PROX EDV: 15.8 CM/S
VAS LEFT ICA PROX PSV: 86.9 CM/S
VAS LEFT ICA/CCA PSV: 0.8 NO UNITS
VAS LEFT PTA BP: 77 MMHG
VAS LEFT TBI: 0.24
VAS LEFT TOE PRESSURE: 41 MMHG
VAS LEFT VERTEBRAL EDV: 0 CM/S
VAS LEFT VERTEBRAL PSV: 37.1 CM/S
VAS RIGHT ABI: 0.59
VAS RIGHT ARM BP: 169 MMHG
VAS RIGHT CCA DIST EDV: 23.2 CM/S
VAS RIGHT CCA DIST PSV: 99.6 CM/S
VAS RIGHT CCA PROX EDV: 16.7 CM/S
VAS RIGHT CCA PROX PSV: 90.6 CM/S
VAS RIGHT DORSALIS PEDIS BP: 99 MMHG
VAS RIGHT ECA EDV: 31.9 CM/S
VAS RIGHT ECA PSV: 225.1 CM/S
VAS RIGHT ICA DIST EDV: 23.7 CM/S
VAS RIGHT ICA DIST PSV: 113 CM/S
VAS RIGHT ICA MID EDV: 20.4 CM/S
VAS RIGHT ICA MID PSV: 169.8 CM/S
VAS RIGHT ICA PROX EDV: 80.7 CM/S
VAS RIGHT ICA PROX PSV: 378.3 CM/S
VAS RIGHT ICA/CCA PSV: 3.8 NO UNITS
VAS RIGHT PTA BP: 90 MMHG
VAS RIGHT TBI: 0.31
VAS RIGHT TOE PRESSURE: 52 MMHG
VAS RIGHT VERTEBRAL EDV: 21.6 CM/S
VAS RIGHT VERTEBRAL PSV: 77.8 CM/S

## 2024-04-12 PROCEDURE — 85610 PROTHROMBIN TIME: CPT

## 2024-04-12 PROCEDURE — 93880 EXTRACRANIAL BILAT STUDY: CPT

## 2024-04-12 PROCEDURE — 93923 UPR/LXTR ART STDY 3+ LVLS: CPT

## 2024-04-12 PROCEDURE — 99212 OFFICE O/P EST SF 10 MIN: CPT

## 2024-04-12 NOTE — PROGRESS NOTES
[facility] Anticoagulation Clinic (Medication Management)  [address] [place], [place], [address]  Phone: [phone]    Face-To-Face Visit  Patient Findings       Positives:  Change in health (She fell at home, landed on hip, leg and shoulder; no head injury), Change in medications (Stopped azelastine 2/2 side effects)    Negatives:  Signs/symptoms of thrombosis, Signs/symptoms of bleeding, Laboratory test error suspected, Change in alcohol use, Change in activity, Upcoming invasive procedure, Emergency department visit, Upcoming dental procedure, Missed doses, Extra doses, Change in diet/appetite, Hospital admission, Bruising, Other complaints    Comments:  Dr. GAMA 4/15  Eye doc 4/17           Assessment/Plan:  Warfarin indication: atrial fibrillation      INR today is supratherapeutic at 4.7, goal is 2-3. No reason for high INR.     Warfarin Dose: Hold x 2 days then decrease weekly dose 6% (7.5 mg every Mon.; 5 mg all other days)    Follow Up: 2 weeks      Melisa Reyez OSWALDO PharmD, BCACP 4/12/2024 11:57 AM    For Pharmacy Admin Tracking Only    Intervention Detail: Dose Adjustment: 1, reason: Therapy De-escalation  Total # of Interventions Recommended: 1  Total # of Interventions Accepted: 1  Time Spent (min): 15

## 2024-04-15 ENCOUNTER — OFFICE VISIT (OUTPATIENT)
Dept: VASCULAR SURGERY | Age: 69
End: 2024-04-15

## 2024-04-15 VITALS — BODY MASS INDEX: 21.63 KG/M2 | WEIGHT: 126 LBS

## 2024-04-15 DIAGNOSIS — I65.23 CAROTID ARTERY STENOSIS, ASYMPTOMATIC, BILATERAL: ICD-10-CM

## 2024-04-15 DIAGNOSIS — I73.9 PVD (PERIPHERAL VASCULAR DISEASE) (HCC): Primary | ICD-10-CM

## 2024-04-15 NOTE — PROGRESS NOTES
Vascular Surgery Outpatient Followup    PCP : Anurag Garcia DO   ENT : Dr. Hutchinson    Previous procedures  10/11/18 L CEA for assx   12/4/18 Kissing iliac stents 8x59 ICAST  Plasty of the R EIA with 8x59 balloon     HISTORY OF PRESENT ILLNESS:    The patient is a 69 y.o. female who is here in regards to follow up of their PVD and her carotid stenosis.     The patient states that her symptoms of right leg pain remains resolved. She is able to walk about 1/2 mile without limitations.  At that point she starts to develop bilateral hip and thigh pain that radiates down her legs.  This started after her recent back surgery in 9/2023.  She denies lifestyle limiting claudication, rest pain, or tissue loss.    Pt states her  has been doing her nail care since her recent back surgery as she is not able to bend over.  About one month ago (3/15/24) he scraped her right 3rd toe while cutting her nails.  Since that time she has had a small wound on the toe.  It is still painful and she does not feel it has been healing.  She has not seen a podiatrist in more than 12 years.        She denies sxs of stroke, tia, slurred speech, focal weakness.    She started having headaches in 12/2019. She has known issues with her sinuses and has had two sinus surgeries in the past.  She follows with Dr. Hutchinson for this and swallowing issues. The headache symptoms improved with use of nasal spray, more intermittent now.    She underwent CABGx 2 in 11/2022 with Dr. Pickard.  She is off plavix and is on coumadin.      She is not smoking.     Past Medical History:        Diagnosis Date    Age-related osteoporosis without current pathological fracture 06/2021    Arthritis     Atrial fibrillation, transient (HCC) 09/28/2023    Cataracts, bilateral 10/'19    Colon polyps 08/05/2021    tubular adenoma    Diabetes mellitus (HCC)     DM type 2 (diabetes mellitus, type 2) (HCC)     Facial basal cell cancer 03/2022    [facility]

## 2024-04-17 LAB
DIABETIC RETINOPATHY: NEGATIVE
DIABETIC RETINOPATHY: NORMAL

## 2024-04-23 PROBLEM — J43.1 PANLOBULAR EMPHYSEMA (HCC): Status: ACTIVE | Noted: 2024-04-23

## 2024-04-26 ENCOUNTER — HOSPITAL ENCOUNTER (OUTPATIENT)
Dept: PHARMACY | Age: 69
Setting detail: THERAPIES SERIES
Discharge: HOME | End: 2024-04-26
Payer: MEDICARE

## 2024-04-26 VITALS
BODY MASS INDEX: 21.22 KG/M2 | DIASTOLIC BLOOD PRESSURE: 54 MMHG | HEART RATE: 53 BPM | SYSTOLIC BLOOD PRESSURE: 143 MMHG | WEIGHT: 123.6 LBS

## 2024-04-26 DIAGNOSIS — I97.89 POSTOPERATIVE ATRIAL FIBRILLATION (HCC): Primary | ICD-10-CM

## 2024-04-26 DIAGNOSIS — I48.91 POSTOPERATIVE ATRIAL FIBRILLATION (HCC): Primary | ICD-10-CM

## 2024-04-26 LAB — INTERNATIONAL NORMALIZATION RATIO, POC: 2.5

## 2024-04-26 PROCEDURE — 85610 PROTHROMBIN TIME: CPT

## 2024-04-26 PROCEDURE — 99211 OFF/OP EST MAY X REQ PHY/QHP: CPT

## 2024-04-26 NOTE — PROGRESS NOTES
[facility]tico[facility] (Medication Management)  [address] [place], [address]  Phone: [phone]    Face-To-Face Visit  Patient Findings       Positives:  Change in health (She fell a week ago while bending over to pick something up.  She did not hit her head, hurt her rib and shoulder.), Change in medications (Using topical mupirocin for her toe (had it lanced at the podiatrist))    Negatives:  Signs/symptoms of thrombosis, Signs/symptoms of bleeding, Laboratory test error suspected, Change in alcohol use, Change in activity, Upcoming invasive procedure, Emergency department visit, Upcoming dental procedure, Missed doses, Extra doses, Change in diet/appetite, Hospital admission, Bruising, Other complaints           Assessment/Plan:  Warfarin indication: atrial fibrillation=      INR today is therapeutic at 2.5, goal is 2-3.    Warfarin Dose: same (7.5 mg every Mon.; 5 mg all other days)    Follow Up: 3 weeks      Melisa Reyez OSWALDO PharmD, BCACP 4/26/2024 9:31 AM    For Pharmacy Admin Tracking Only    Intervention Detail:   Total # of Interventions Recommended: 0  Total # of Interventions Accepted: 0  Time Spent (min): 15

## 2024-04-27 ENCOUNTER — HOSPITAL ENCOUNTER (EMERGENCY)
Age: 69
Discharge: HOME | End: 2024-04-27
Payer: MEDICARE

## 2024-04-27 ENCOUNTER — APPOINTMENT (OUTPATIENT)
Dept: GENERAL RADIOLOGY | Age: 69
End: 2024-04-27
Payer: MEDICARE

## 2024-04-27 ENCOUNTER — APPOINTMENT (OUTPATIENT)
Dept: CT IMAGING | Age: 69
End: 2024-04-27
Payer: MEDICARE

## 2024-04-27 VITALS
TEMPERATURE: 99.3 F | OXYGEN SATURATION: 97 % | DIASTOLIC BLOOD PRESSURE: 55 MMHG | RESPIRATION RATE: 14 BRPM | SYSTOLIC BLOOD PRESSURE: 163 MMHG | HEART RATE: 69 BPM

## 2024-04-27 DIAGNOSIS — Z79.01 ANTICOAGULATED ON COUMADIN: ICD-10-CM

## 2024-04-27 DIAGNOSIS — S22.41XA CLOSED FRACTURE OF MULTIPLE RIBS OF RIGHT SIDE, INITIAL ENCOUNTER: Primary | ICD-10-CM

## 2024-04-27 DIAGNOSIS — R07.81 RIB PAIN ON RIGHT SIDE: ICD-10-CM
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
## 2024-04-27 LAB
ALBUMIN SERPL-MCNC: 4.5 G/DL (ref 3.5–5.2)
ALP SERPL-CCNC: 114 U/L (ref 35–104)
ALT SERPL-CCNC: 14 U/L (ref 0–32)
ANION GAP SERPL CALCULATED.3IONS-SCNC: 10 MMOL/L (ref 7–16)
AST SERPL-CCNC: 15 U/L (ref 0–31)
BASOPHILS # BLD: 0.01 K/UL (ref 0–0.2)
BASOPHILS NFR BLD: 0 % (ref 0–2)
BILIRUB SERPL-MCNC: 0.3 MG/DL (ref 0–1.2)
BUN SERPL-MCNC: 11 MG/DL (ref 6–23)
CALCIUM SERPL-MCNC: 10.4 MG/DL (ref 8.6–10.2)
CHLORIDE SERPL-SCNC: 97 MMOL/L (ref 98–107)
CO2 SERPL-SCNC: 25 MMOL/L (ref 22–29)
CREAT SERPL-MCNC: 0.5 MG/DL (ref 0.5–1)
EOSINOPHIL # BLD: 0.03 K/UL (ref 0.05–0.5)
EOSINOPHILS RELATIVE PERCENT: 0 % (ref 0–6)
ERYTHROCYTE [DISTWIDTH] IN BLOOD BY AUTOMATED COUNT: 15.5 % (ref 11.5–15)
GFR SERPL CREATININE-BSD FRML MDRD: >90 ML/MIN/1.73M2
GLUCOSE SERPL-MCNC: 305 MG/DL (ref 74–99)
HCT VFR BLD AUTO: 42.2 % (ref 34–48)
HGB BLD-MCNC: 13.5 G/DL (ref 11.5–15.5)
IMM GRANULOCYTES # BLD AUTO: <0.03 K/UL (ref 0–0.58)
IMM GRANULOCYTES NFR BLD: 0 % (ref 0–5)
INR PPP: 3.7
LYMPHOCYTES NFR BLD: 1.11 K/UL (ref 1.5–4)
LYMPHOCYTES RELATIVE PERCENT: 14 % (ref 20–42)
MCH RBC QN AUTO: 28.3 PG (ref 26–35)
MCHC RBC AUTO-ENTMCNC: 32 G/DL (ref 32–34.5)
MCV RBC AUTO: 88.5 FL (ref 80–99.9)
MONOCYTES NFR BLD: 0.44 K/UL (ref 0.1–0.95)
MONOCYTES NFR BLD: 6 % (ref 2–12)
NEUTROPHILS NFR BLD: 80 % (ref 43–80)
NEUTS SEG NFR BLD: 6.29 K/UL (ref 1.8–7.3)
PLATELET # BLD AUTO: 206 K/UL (ref 130–450)
PMV BLD AUTO: 10.1 FL (ref 7–12)
POTASSIUM SERPL-SCNC: 4.8 MMOL/L (ref 3.5–5)
PROT SERPL-MCNC: 7.3 G/DL (ref 6.4–8.3)
PROTHROMBIN TIME: 40.3 SEC (ref 9.3–12.4)
RBC # BLD AUTO: 4.77 M/UL (ref 3.5–5.5)
SODIUM SERPL-SCNC: 132 MMOL/L (ref 132–146)
WBC OTHER # BLD: 7.9 K/UL (ref 4.5–11.5)

## 2024-04-27 PROCEDURE — 73030 X-RAY EXAM OF SHOULDER: CPT

## 2024-04-27 PROCEDURE — 6360000004 HC RX CONTRAST MEDICATION: Performed by: RADIOLOGY

## 2024-04-27 PROCEDURE — 6370000000 HC RX 637 (ALT 250 FOR IP): Performed by: PHYSICIAN ASSISTANT

## 2024-04-27 PROCEDURE — 99285 EMERGENCY DEPT VISIT HI MDM: CPT

## 2024-04-27 PROCEDURE — 85025 COMPLETE CBC W/AUTO DIFF WBC: CPT

## 2024-04-27 PROCEDURE — 80053 COMPREHEN METABOLIC PANEL: CPT

## 2024-04-27 PROCEDURE — 85610 PROTHROMBIN TIME: CPT

## 2024-04-27 PROCEDURE — 71260 CT THORAX DX C+: CPT

## 2024-04-27 RX ORDER — BACLOFEN 10 MG/1
10 TABLET ORAL 2 TIMES DAILY
Qty: 20 TABLET | Refills: 0 | Status: SHIPPED | OUTPATIENT
Start: 2024-04-27

## 2024-04-27 RX ORDER — HYDROCODONE BITARTRATE AND ACETAMINOPHEN 5; 325 MG/1; MG/1
1 TABLET ORAL EVERY 6 HOURS PRN
Qty: 12 TABLET | Refills: 0 | Status: SHIPPED | OUTPATIENT
Start: 2024-04-27 | End: 2024-04-30

## 2024-04-27 RX ORDER — HYDROCODONE BITARTRATE AND ACETAMINOPHEN 5; 325 MG/1; MG/1
1 TABLET ORAL ONCE
Status: COMPLETED | OUTPATIENT
Start: 2024-04-27 | End: 2024-04-27

## 2024-04-27 RX ORDER — LIDOCAINE 50 MG/G
1 PATCH TOPICAL EVERY 24 HOURS
Qty: 10 PATCH | Refills: 0 | Status: SHIPPED | OUTPATIENT
Start: 2024-04-27 | End: 2024-05-07

## 2024-04-27 RX ADMIN — HYDROCODONE BITARTRATE AND ACETAMINOPHEN 1 TABLET: 5; 325 TABLET ORAL at 08:45

## 2024-04-27 RX ADMIN — IOPAMIDOL 75 ML: 755 INJECTION, SOLUTION INTRAVENOUS at 10:03

## 2024-04-27 ASSESSMENT — PAIN DESCRIPTION - DESCRIPTORS
DESCRIPTORS: THROBBING
DESCRIPTORS: SHOOTING;SHARP

## 2024-04-27 ASSESSMENT — PAIN SCALES - GENERAL
PAINLEVEL_OUTOF10: 9
PAINLEVEL_OUTOF10: 9

## 2024-04-27 ASSESSMENT — PAIN DESCRIPTION - FREQUENCY: FREQUENCY: CONTINUOUS

## 2024-04-27 ASSESSMENT — PAIN DESCRIPTION - ORIENTATION
ORIENTATION: RIGHT
ORIENTATION: RIGHT

## 2024-04-27 ASSESSMENT — PAIN DESCRIPTION - PAIN TYPE: TYPE: ACUTE PAIN

## 2024-04-27 ASSESSMENT — PAIN - FUNCTIONAL ASSESSMENT: PAIN_FUNCTIONAL_ASSESSMENT: 0-10

## 2024-04-27 ASSESSMENT — PAIN DESCRIPTION - ONSET: ONSET: SUDDEN

## 2024-04-27 ASSESSMENT — LIFESTYLE VARIABLES
HOW OFTEN DO YOU HAVE A DRINK CONTAINING ALCOHOL: MONTHLY OR LESS
HOW MANY STANDARD DRINKS CONTAINING ALCOHOL DO YOU HAVE ON A TYPICAL DAY: 1 OR 2

## 2024-04-27 ASSESSMENT — PAIN DESCRIPTION - LOCATION
LOCATION: RIB CAGE
LOCATION: RIB CAGE

## 2024-04-27 NOTE — ED PROVIDER NOTES
Independent JOSH Visit.     Department of Emergency Medicine   ED  Provider Note  Admit Date/Time: 4/27/2024  8:14 AM  ED Bed: 31/31     MRN: [id number]  CHIEF COMPLAINT:   Rib Pain (injury) (Right sided rib pain. States fell 7 days ago.)       HISTORY OF PRESENT ILLNESS:      Barbara Pelletier is a 69 y.o. female who presents to the ED for right-sided rib pain.  Patient states she tripped and fell 7 days ago.  She fell on her right side.  She adamantly denies hitting her head or having any loss of consciousness.  She does take Coumadin daily.  Patient states she had her INR checked yesterday and it was 2.5.  Patient denies any new injury or trauma.  She states the pain in her left ribs worsened yesterday.  She is denying any abdominal pain, nausea, vomiting, headache, dizziness, vision changes, neck pain, back pain, or difficulty with ambulation or balance.  She does report a history of open heart surgery last year.  She also has a history of A-fib.  She is alert and oriented x 3 and in no apparent distress at this exam.  Patient is nontoxic-appearing.  She is 97% on room air with unlabored breathing.  She denies cough, fever, or rash.    PCP: Anurag Garcia DO    REVIEW OF SYSTEMS:   Pertinent positives and negatives are stated within HPI, all other systems reviewed and are negative.    Past Medical History:   Past Medical History:   Diagnosis Date    Age-related osteoporosis without current pathological fracture 06/2021    Arthritis     Atrial fibrillation, transient (HCC) 09/28/2023    Cataracts, bilateral 10/'19    Colon polyps 08/05/2021    tubular adenoma    Diabetes mellitus (HCC)     DM type 2 (diabetes mellitus, type 2) (HCC)     [facility]    Hyperlipidemia     Hypertension     Hypothyroidism     Migraines     rare    Neuropathy, diabetic (HCC)     Peripheral vascular disease (HCC)     follows with Dr. Mack yearly    Positive colorectal cancer screening

## 2024-04-29 LAB
VAS LEFT ABI: 0.46
VAS LEFT ARM BP: 169 MMHG
VAS LEFT CCA DIST EDV: 23.3 CM/S
VAS LEFT CCA DIST PSV: 118.8 CM/S
VAS LEFT CCA PROX EDV: 23.3 CM/S
VAS LEFT CCA PROX PSV: 115.2 CM/S
VAS LEFT DORSALIS PEDIS BP: 77 MMHG
VAS LEFT ECA EDV: 6.7 CM/S
VAS LEFT ECA PSV: 114.9 CM/S
VAS LEFT ICA DIST EDV: 19.3 CM/S
VAS LEFT ICA DIST PSV: 77.4 CM/S
VAS LEFT ICA MID EDV: 19.3 CM/S
VAS LEFT ICA MID PSV: 90.8 CM/S
VAS LEFT ICA PROX EDV: 15.8 CM/S
VAS LEFT ICA PROX PSV: 86.9 CM/S
VAS LEFT ICA/CCA PSV: 0.8 NO UNITS
VAS LEFT PTA BP: 77 MMHG
VAS LEFT TBI: 0.24
VAS LEFT TOE PRESSURE: 41 MMHG
VAS LEFT VERTEBRAL EDV: 0 CM/S
VAS LEFT VERTEBRAL PSV: 37.1 CM/S
VAS RIGHT ABI: 0.59
VAS RIGHT ARM BP: 169 MMHG
VAS RIGHT CCA DIST EDV: 23.2 CM/S
VAS RIGHT CCA DIST PSV: 99.6 CM/S
VAS RIGHT CCA PROX EDV: 16.7 CM/S
VAS RIGHT CCA PROX PSV: 90.6 CM/S
VAS RIGHT DORSALIS PEDIS BP: 99 MMHG
VAS RIGHT ECA EDV: 31.9 CM/S
VAS RIGHT ECA PSV: 225.1 CM/S
VAS RIGHT ICA DIST EDV: 23.7 CM/S
VAS RIGHT ICA DIST PSV: 113 CM/S
VAS RIGHT ICA MID EDV: 20.4 CM/S
VAS RIGHT ICA MID PSV: 169.8 CM/S
VAS RIGHT ICA PROX EDV: 80.7 CM/S
VAS RIGHT ICA PROX PSV: 378.3 CM/S
VAS RIGHT ICA/CCA PSV: 3.8 NO UNITS
VAS RIGHT PTA BP: 90 MMHG
VAS RIGHT TBI: 0.31
VAS RIGHT TOE PRESSURE: 52 MMHG
VAS RIGHT VERTEBRAL EDV: 21.6 CM/S
VAS RIGHT VERTEBRAL PSV: 77.8 CM/S

## 2024-05-01 ENCOUNTER — OFFICE VISIT (OUTPATIENT)
Dept: PRIMARY CARE CLINIC | Age: 69
End: 2024-05-01

## 2024-05-01 VITALS
OXYGEN SATURATION: 99 % | RESPIRATION RATE: 18 BRPM | SYSTOLIC BLOOD PRESSURE: 138 MMHG | HEART RATE: 77 BPM | BODY MASS INDEX: 21.34 KG/M2 | TEMPERATURE: 97.9 F | DIASTOLIC BLOOD PRESSURE: 72 MMHG | WEIGHT: 125 LBS | HEIGHT: 64 IN

## 2024-05-01 DIAGNOSIS — E11.65 TYPE 2 DIABETES MELLITUS WITH HYPERGLYCEMIA, WITH LONG-TERM CURRENT USE OF INSULIN (HCC): ICD-10-CM

## 2024-05-01 DIAGNOSIS — S22.41XD CLOSED FRACTURE OF MULTIPLE RIBS OF RIGHT SIDE WITH ROUTINE HEALING, SUBSEQUENT ENCOUNTER: Primary | ICD-10-CM

## 2024-05-01 DIAGNOSIS — F34.1 DYSTHYMIA: ICD-10-CM

## 2024-05-01 DIAGNOSIS — Z79.4 TYPE 2 DIABETES MELLITUS WITH HYPERGLYCEMIA, WITH LONG-TERM CURRENT USE OF INSULIN (HCC): ICD-10-CM

## 2024-05-01 RX ORDER — CITALOPRAM 20 MG/1
TABLET ORAL
Qty: 90 TABLET | Refills: 1 | Status: SHIPPED | OUTPATIENT
Start: 2024-05-01

## 2024-05-01 ASSESSMENT — ENCOUNTER SYMPTOMS
SHORTNESS OF BREATH: 0
CHEST TIGHTNESS: 0

## 2024-05-01 ASSESSMENT — PATIENT HEALTH QUESTIONNAIRE - PHQ9
5. POOR APPETITE OR OVEREATING: NOT AT ALL
SUM OF ALL RESPONSES TO PHQ QUESTIONS 1-9: 4
2. FEELING DOWN, DEPRESSED OR HOPELESS: MORE THAN HALF THE DAYS
10. IF YOU CHECKED OFF ANY PROBLEMS, HOW DIFFICULT HAVE THESE PROBLEMS MADE IT FOR YOU TO DO YOUR WORK, TAKE CARE OF THINGS AT HOME, OR GET ALONG WITH OTHER PEOPLE: SOMEWHAT DIFFICULT
SUM OF ALL RESPONSES TO PHQ QUESTIONS 1-9: 4
7. TROUBLE CONCENTRATING ON THINGS, SUCH AS READING THE NEWSPAPER OR WATCHING TELEVISION: NOT AT ALL
9. THOUGHTS THAT YOU WOULD BE BETTER OFF DEAD, OR OF HURTING YOURSELF: NOT AT ALL
3. TROUBLE FALLING OR STAYING ASLEEP: NOT AT ALL
SUM OF ALL RESPONSES TO PHQ QUESTIONS 1-9: 4
8. MOVING OR SPEAKING SO SLOWLY THAT OTHER PEOPLE COULD HAVE NOTICED. OR THE OPPOSITE, BEING SO FIGETY OR RESTLESS THAT YOU HAVE BEEN MOVING AROUND A LOT MORE THAN USUAL: NOT AT ALL
SUM OF ALL RESPONSES TO PHQ9 QUESTIONS 1 & 2: 4
6. FEELING BAD ABOUT YOURSELF - OR THAT YOU ARE A FAILURE OR HAVE LET YOURSELF OR YOUR FAMILY DOWN: NOT AT ALL
1. LITTLE INTEREST OR PLEASURE IN DOING THINGS: MORE THAN HALF THE DAYS
SUM OF ALL RESPONSES TO PHQ QUESTIONS 1-9: 4
4. FEELING TIRED OR HAVING LITTLE ENERGY: NOT AT ALL

## 2024-05-01 NOTE — PROGRESS NOTES
and Palpitations    Fosamax [Alendronate] Other (See Comments)     Nausea, vomiting    Lipitor [Atorvastatin] Myalgia     Myalgia not allergic , approved to take by provider       Current Outpatient Medications on File Prior to Visit   Medication Sig Dispense Refill    lidocaine (LIDODERM) 5 % Place 1 patch onto the skin every 24 hours for 10 days 12 hours on, 12 hours off. 10 patch 0    rosuvastatin (CRESTOR) 40 MG tablet Take 1 tablet by mouth daily      lisinopril (PRINIVIL;ZESTRIL) 5 MG tablet Take 1 tablet by mouth daily 90 tablet 1    levothyroxine (SYNTHROID) 25 MCG tablet Take 1 tablet by mouth daily 90 tablet 1    warfarin (COUMADIN) 5 MG tablet Take one tablet by mouth once daily as directed by Anticoagulation Clinic 90 tablet 3    Continuous Blood Gluc Sensor (FREESTYLE YADIRA 2 SENSOR) MISC Change sensor every 14 days 6 each 5    famotidine (PEPCID) 20 MG tablet Take 1 tablet by mouth daily      sotalol (BETAPACE) 80 MG tablet Take 0.5 tablets by mouth 2 times daily 45 tablet 3    magnesium oxide (MAG-OX) 400 MG tablet Take 1 tablet by mouth daily 90 tablet 0    Insulin Pen Needle 32G X 4 MM MISC 1 each by Does not apply route in the morning, at noon, in the evening, and at bedtime 200 each 5    insulin glargine, 1 unit dial, (TOUJEO SOLOSTAR) 300 UNIT/ML concentrated injection pen Inject 24 Units into the skin nightly (Patient taking differently: Inject 20 Units into the skin nightly) 3 Adjustable Dose Pre-filled Pen Syringe 5    HUMALOG KWIKPEN 100 UNIT/ML SOPN INJECT 10 units with meals plus mod dose ISS max dose per day 60 units 5 Adjustable Dose Pre-filled Pen Syringe 5    Ixekizumab (TALTZ SC) Inject into the skin every 30 days For psoriasis - 15th of every month      aspirin 81 MG tablet Take 1 tablet by mouth daily      baclofen (LIORESAL) 10 MG tablet Take 1 tablet by mouth 2 times daily (Patient not taking: Reported on 5/1/2024) 20 tablet 0     No current facility-administered medications on

## 2024-05-05 DIAGNOSIS — Z79.4 TYPE 2 DIABETES MELLITUS WITH HYPERGLYCEMIA, WITH LONG-TERM CURRENT USE OF INSULIN (HCC): ICD-10-CM

## 2024-05-05 DIAGNOSIS — E11.65 TYPE 2 DIABETES MELLITUS WITH HYPERGLYCEMIA, WITH LONG-TERM CURRENT USE OF INSULIN (HCC): ICD-10-CM

## 2024-05-06 ENCOUNTER — TELEPHONE (OUTPATIENT)
Dept: PRIMARY CARE CLINIC | Age: 69
End: 2024-05-06

## 2024-05-06 DIAGNOSIS — Z12.31 BREAST CANCER SCREENING BY MAMMOGRAM: Primary | ICD-10-CM

## 2024-05-06 RX ORDER — INSULIN LISPRO 100 [IU]/ML
INJECTION, SOLUTION INTRAVENOUS; SUBCUTANEOUS
Qty: 15 ML | Refills: 0 | Status: SHIPPED | OUTPATIENT
Start: 2024-05-06

## 2024-05-13 DIAGNOSIS — S22.41XD CLOSED FRACTURE OF MULTIPLE RIBS OF RIGHT SIDE WITH ROUTINE HEALING, SUBSEQUENT ENCOUNTER: Primary | ICD-10-CM

## 2024-05-13 RX ORDER — MAGNESIUM OXIDE 400 MG/1
400 TABLET ORAL DAILY
Qty: 90 TABLET | Refills: 1 | Status: SHIPPED | OUTPATIENT
Start: 2024-05-13

## 2024-05-13 RX ORDER — LIDOCAINE 50 MG/G
1 PATCH TOPICAL EVERY 24 HOURS
Qty: 10 PATCH | Refills: 0 | Status: SHIPPED | OUTPATIENT
Start: 2024-05-13 | End: 2024-05-23

## 2024-05-17 ENCOUNTER — HOSPITAL ENCOUNTER (OUTPATIENT)
Dept: PHARMACY | Age: 69
Setting detail: THERAPIES SERIES
Discharge: HOME | End: 2024-05-17
Payer: MEDICARE

## 2024-05-17 VITALS — DIASTOLIC BLOOD PRESSURE: 74 MMHG | HEART RATE: 54 BPM | SYSTOLIC BLOOD PRESSURE: 146 MMHG

## 2024-05-17 DIAGNOSIS — I97.89 POSTOPERATIVE ATRIAL FIBRILLATION (HCC): Primary | ICD-10-CM

## 2024-05-17 DIAGNOSIS — I48.91 POSTOPERATIVE ATRIAL FIBRILLATION (HCC): Primary | ICD-10-CM

## 2024-05-17 LAB — INTERNATIONAL NORMALIZATION RATIO, POC: 3.5

## 2024-05-17 PROCEDURE — 99212 OFFICE O/P EST SF 10 MIN: CPT

## 2024-05-17 PROCEDURE — 85610 PROTHROMBIN TIME: CPT

## 2024-05-17 NOTE — PROGRESS NOTES
[facility] Anticoagulation Clinic (Medication Management)  [address] [place], [place], [address]  Phone: [phone]    Face-To-Face Visit  Patient Findings       Positives:  Emergency department visit (She went to ER the end of April 2/2 fall - fractured ribs), Change in medications (She has 2 infected toes, using topical antbx ointments)    Negatives:  Signs/symptoms of thrombosis, Signs/symptoms of bleeding, Laboratory test error suspected, Change in health, Change in alcohol use, Change in activity, Upcoming invasive procedure, Upcoming dental procedure, Missed doses, Extra doses, Change in diet/appetite, Hospital admission, Bruising, Other complaints           Assessment/Plan:  Warfarin indication: atrial fibrillation      INR today is supratherapeutic at 3.5, goal is 2-3.  INR may be elevated 2/2 pain (ribs and toes).    Warfarin Dose: Hold today's dose then resume same regimen (7.5 mg every Mon.; 5 mg all other days)    Follow Up: 2 weeks      Melisa Reyez RPH PharmD, BCACP 5/17/2024 9:06 AM    For Pharmacy Admin Tracking Only    Intervention Detail: Dose Adjustment: 1, reason: Therapy De-escalation  Total # of Interventions Recommended: 1  Total # of Interventions Accepted: 1  Time Spent (min): 15

## 2024-05-22 ENCOUNTER — HOSPITAL ENCOUNTER (OUTPATIENT)
Dept: MAMMOGRAPHY | Age: 69
Discharge: HOME | End: 2024-05-24
Payer: MEDICARE

## 2024-05-22 VITALS — HEIGHT: 64 IN | BODY MASS INDEX: 21.34 KG/M2 | WEIGHT: 125 LBS

## 2024-05-22 DIAGNOSIS — Z12.31 BREAST CANCER SCREENING BY MAMMOGRAM: ICD-10-CM

## 2024-05-22 PROCEDURE — 77063 BREAST TOMOSYNTHESIS BI: CPT

## 2024-05-23 RX ORDER — SOTALOL HYDROCHLORIDE 80 MG/1
40 TABLET ORAL 2 TIMES DAILY
Qty: 45 TABLET | Refills: 3 | Status: SHIPPED | OUTPATIENT
Start: 2024-05-23

## 2024-05-31 ENCOUNTER — APPOINTMENT (OUTPATIENT)
Dept: PHARMACY | Age: 69
End: 2024-05-31
Payer: MEDICARE

## 2024-05-31 VITALS
WEIGHT: 125 LBS | DIASTOLIC BLOOD PRESSURE: 69 MMHG | BODY MASS INDEX: 21.46 KG/M2 | SYSTOLIC BLOOD PRESSURE: 158 MMHG | HEART RATE: 56 BPM

## 2024-05-31 DIAGNOSIS — I97.89 POSTOPERATIVE ATRIAL FIBRILLATION (HCC): Primary | ICD-10-CM

## 2024-05-31 DIAGNOSIS — I48.91 POSTOPERATIVE ATRIAL FIBRILLATION (HCC): Primary | ICD-10-CM

## 2024-05-31 PROCEDURE — 85610 PROTHROMBIN TIME: CPT

## 2024-05-31 PROCEDURE — 99211 OFF/OP EST MAY X REQ PHY/QHP: CPT

## 2024-05-31 NOTE — PROGRESS NOTES
[facility] Anticoagulation Clinic (Medication Management)  [address] [place], [address]  Phone: [phone]    Face-To-Face Visit  Patient Findings       Positives:  Change in health (Her toes are getting better, still with some pain, to see podiatry next week)    Negatives:  Signs/symptoms of thrombosis, Signs/symptoms of bleeding, Laboratory test error suspected, Change in alcohol use, Change in activity, Upcoming invasive procedure, Emergency department visit, Upcoming dental procedure, Missed doses, Extra doses, Change in medications, Change in diet/appetite, Hospital admission, Bruising, Other complaints           Assessment/Plan:  Warfarin indication: atrial fibrillation      INR today is therapeutic at 2.6, goal is 2-3.    Warfarin Dose: same (7.5 mg every Monday; 5 mg all other days)    Follow Up: 3 weeks      Melisa Reyez [facility] PharmD, BCACP 5/31/2024 9:36 AM    For Pharmacy Admin Tracking Only    Intervention Detail:   Total # of Interventions Recommended: 0  Total # of Interventions Accepted: 0  Time Spent (min): 15

## 2024-06-16 ENCOUNTER — APPOINTMENT (OUTPATIENT)
Dept: CT IMAGING | Age: 69
End: 2024-06-16
Payer: MEDICARE

## 2024-06-16 ENCOUNTER — APPOINTMENT (OUTPATIENT)
Dept: GENERAL RADIOLOGY | Age: 69
End: 2024-06-16
Payer: MEDICARE

## 2024-06-16 ENCOUNTER — HOSPITAL ENCOUNTER (EMERGENCY)
Age: 69
Discharge: HOME | End: 2024-06-16
Payer: MEDICARE

## 2024-06-16 VITALS
WEIGHT: 123 LBS | SYSTOLIC BLOOD PRESSURE: 137 MMHG | BODY MASS INDEX: 21 KG/M2 | RESPIRATION RATE: 16 BRPM | HEART RATE: 69 BPM | TEMPERATURE: 98.4 F | OXYGEN SATURATION: 98 % | HEIGHT: 64 IN | DIASTOLIC BLOOD PRESSURE: 62 MMHG

## 2024-06-16 DIAGNOSIS — Z98.1 S/P LUMBAR FUSION: ICD-10-CM

## 2024-06-16 DIAGNOSIS — S46.911A SHOULDER STRAIN, RIGHT, INITIAL ENCOUNTER: ICD-10-CM

## 2024-06-16 DIAGNOSIS — S82.034A CLOSED NONDISPLACED TRANSVERSE FRACTURE OF RIGHT PATELLA, INITIAL ENCOUNTER: Primary | ICD-10-CM

## 2024-06-16 DIAGNOSIS — R79.1 SUPRATHERAPEUTIC INR: ICD-10-CM

## 2024-06-16 DIAGNOSIS — S09.90XA CLOSED HEAD INJURY, INITIAL ENCOUNTER: ICD-10-CM

## 2024-06-16 DIAGNOSIS — S00.83XA FACIAL CONTUSION, INITIAL ENCOUNTER: ICD-10-CM

## 2024-06-16 DIAGNOSIS — M41.86 DEXTROSCOLIOSIS OF LUMBAR SPINE: ICD-10-CM

## 2024-06-16 DIAGNOSIS — M54.16 LUMBAR RADICULOPATHY: ICD-10-CM

## 2024-06-16 DIAGNOSIS — S00.11XA PERIORBITAL ECCHYMOSIS OF RIGHT EYE, INITIAL ENCOUNTER: ICD-10-CM

## 2024-06-16 LAB
INR PPP: 5.4
PROTHROMBIN TIME: 60.8 SEC (ref 9.3–12.4)

## 2024-06-16 PROCEDURE — 85610 PROTHROMBIN TIME: CPT

## 2024-06-16 PROCEDURE — 73562 X-RAY EXAM OF KNEE 3: CPT

## 2024-06-16 PROCEDURE — 73080 X-RAY EXAM OF ELBOW: CPT

## 2024-06-16 PROCEDURE — 70450 CT HEAD/BRAIN W/O DYE: CPT

## 2024-06-16 PROCEDURE — 72100 X-RAY EXAM L-S SPINE 2/3 VWS: CPT

## 2024-06-16 PROCEDURE — 6370000000 HC RX 637 (ALT 250 FOR IP): Performed by: NURSE PRACTITIONER

## 2024-06-16 PROCEDURE — 73030 X-RAY EXAM OF SHOULDER: CPT

## 2024-06-16 PROCEDURE — 73502 X-RAY EXAM HIP UNI 2-3 VIEWS: CPT

## 2024-06-16 PROCEDURE — 73060 X-RAY EXAM OF HUMERUS: CPT

## 2024-06-16 PROCEDURE — 73110 X-RAY EXAM OF WRIST: CPT

## 2024-06-16 PROCEDURE — 99284 EMERGENCY DEPT VISIT MOD MDM: CPT

## 2024-06-16 PROCEDURE — 72125 CT NECK SPINE W/O DYE: CPT

## 2024-06-16 RX ORDER — HYDROCODONE BITARTRATE AND ACETAMINOPHEN 5; 325 MG/1; MG/1
1 TABLET ORAL ONCE
Status: COMPLETED | OUTPATIENT
Start: 2024-06-16 | End: 2024-06-16

## 2024-06-16 RX ORDER — HYDROCODONE BITARTRATE AND ACETAMINOPHEN 5; 325 MG/1; MG/1
1 TABLET ORAL EVERY 6 HOURS PRN
Qty: 12 TABLET | Refills: 0 | Status: SHIPPED | OUTPATIENT
Start: 2024-06-16 | End: 2024-06-19

## 2024-06-16 RX ADMIN — HYDROCODONE BITARTRATE AND ACETAMINOPHEN 1 TABLET: 5; 325 TABLET ORAL at 19:40

## 2024-06-16 ASSESSMENT — PAIN SCALES - GENERAL
PAINLEVEL_OUTOF10: 10
PAINLEVEL_OUTOF10: 10

## 2024-06-16 ASSESSMENT — PAIN - FUNCTIONAL ASSESSMENT: PAIN_FUNCTIONAL_ASSESSMENT: 0-10

## 2024-06-17 ENCOUNTER — TELEPHONE (OUTPATIENT)
Dept: PHARMACY | Age: 69
End: 2024-06-17

## 2024-06-17 NOTE — ED PROVIDER NOTES
Independent JOSH Visit.      [facility]  Department of Emergency Medicine   ED  Encounter Note  Admit Date/RoomTime: 2024  7:56 PM  ED Room: 32/    NAME: Barbara Pelletier  : 1955  MRN: [id number]     Chief Complaint:  Fall (Mechanical fall approximately 4:30 today, tripped over step while watering flowers, landing on concrete.  Denies LOC. + blood thinners (warfarin).  Right side of face, arm, hip and knee pain.)    History of Present Illness       Barbara Pelletier is a 69 y.o. old female who presents to the emergency department by private vehicle, for a mechanical fall which occured 2 hour(s) prior to arrival. She reportedly stumbled while working in her garden and fell onto her right side on to cement patio steps while at home prior to incident with complaints of right knee pain, right shoulder, elbow and wrist pain, right hip pain which is resolved, and hit right side of head.    The patients tetanus status is up to date.   Since onset the symptoms have been persistent.  Her pain is aggraveated by walking and raising her right arm and relieved by ice and rest of injured area.  She denies any loss of consciousness, confusion, dizziness, neck pain, chest pain, abdominal pain, numbness, weakness, nausea, or vomiting.  She takes Warfarin.     ROS   Pertinent positives and negatives are stated within HPI, all other systems reviewed and are negative.    Past Medical History:  has a past medical history of ADHD (attention deficit hyperactivity disorder), Age-related osteoporosis without current pathological fracture, Anticoagulant long-term use, Arthritis, Atrial fibrillation (HCC), Atrial fibrillation, transient (HCC), Carotid artery stenosis, Cataracts, bilateral, Chronic back pain, Colon polyps, Diabetes mellitus (HCC), DM type 2 (diabetes mellitus, type 2) (HCC), Facial basal cell cancer, Hyperlipidemia, Hypertension, Hypothyroidism, Migraines, Neuropathy, Neuropathy, diabetic

## 2024-06-17 NOTE — TELEPHONE ENCOUNTER
Barbara called and left message on  requesting call back. Stated she fell last night and her INR was 5.7 in the ER. She was instructed to call and talk to Melisa to see what to take the rest of the week. [employer] @ [phone].    Returned call and spoke with Barbara. Per pharmacist, Melisa Reyez, patient was in ER last night.  INR was 5.4 (goal 2-3). She was instructed to hold her warfarin yesterday (6/16) and today (6/17).  Confirmed this with Barbara (as pharmacist agrees with the plan).  Also, she may resume warfarin tomorrow (6/18) at same regimen (7.5 mg Monday and 5 mg all other days) and she should keep her appt for 6/21 at [facility].     Electronically signed by Karen Gregory on 6/17/24 at 9:15 AM EDT

## 2024-06-21 ENCOUNTER — HOSPITAL ENCOUNTER (OUTPATIENT)
Dept: PHARMACY | Age: 69
Setting detail: THERAPIES SERIES
Discharge: HOME | End: 2024-06-21
Payer: MEDICARE

## 2024-06-21 VITALS — HEART RATE: 80 BPM | DIASTOLIC BLOOD PRESSURE: 68 MMHG | SYSTOLIC BLOOD PRESSURE: 139 MMHG

## 2024-06-21 DIAGNOSIS — I48.91 POSTOPERATIVE ATRIAL FIBRILLATION (HCC): Primary | ICD-10-CM

## 2024-06-21 DIAGNOSIS — I97.89 POSTOPERATIVE ATRIAL FIBRILLATION (HCC): Primary | ICD-10-CM

## 2024-06-21 LAB — INTERNATIONAL NORMALIZATION RATIO, POC: 2.4

## 2024-06-21 PROCEDURE — 99211 OFF/OP EST MAY X REQ PHY/QHP: CPT

## 2024-06-21 PROCEDURE — 85610 PROTHROMBIN TIME: CPT

## 2024-06-21 RX ORDER — HYDROCODONE BITARTRATE AND ACETAMINOPHEN 5; 325 MG/1; MG/1
1 TABLET ORAL EVERY 6 HOURS PRN
COMMUNITY
Start: 2024-06-19

## 2024-06-21 NOTE — PROGRESS NOTES
[facility] Anticoagulation Clinic (Medication Management)  [address] [place], [address]  Phone: [phone]    Face-To-Face Visit  Patient Findings       Positives:  Emergency department visit (6/16: Fell onto cement watering flowers, hairline fracture on right knee cap), Change in medications (Prescribed Norco for knee fracture), Change in diet/appetite (Appetite decreased this week), Bruising (Significant bruising on her face and arm from the fall)    Negatives:  Signs/symptoms of thrombosis, Signs/symptoms of bleeding, Laboratory test error suspected, Change in health, Change in alcohol use, Change in activity, Upcoming invasive procedure, Upcoming dental procedure, Missed doses, Extra doses, Hospital admission, Other complaints    Comments:  7/8: Orthopedic surgeon           Assessment/Plan:  Warfarin indication: atrial fibrillation      INR today is therapeutic at 2.4, goal is 2-3.    Warfarin Dose: same (7.5 mg every Mon.; 5 mg all other days)    Follow Up: 2 weeks      Melisa Reyez RPH PharmD, BCACP 6/21/2024 9:11 AM    For Pharmacy Admin Tracking Only    Intervention Detail:   Total # of Interventions Recommended: 0  Total # of Interventions Accepted: 0  Time Spent (min): 15

## 2024-06-30 SDOH — HEALTH STABILITY: PHYSICAL HEALTH: ON AVERAGE, HOW MANY MINUTES DO YOU ENGAGE IN EXERCISE AT THIS LEVEL?: 0 MIN

## 2024-06-30 SDOH — ECONOMIC STABILITY: FOOD INSECURITY: WITHIN THE PAST 12 MONTHS, THE FOOD YOU BOUGHT JUST DIDN'T LAST AND YOU DIDN'T HAVE MONEY TO GET MORE.: NEVER TRUE

## 2024-06-30 SDOH — ECONOMIC STABILITY: TRANSPORTATION INSECURITY
IN THE PAST 12 MONTHS, HAS LACK OF TRANSPORTATION KEPT YOU FROM MEETINGS, WORK, OR FROM GETTING THINGS NEEDED FOR DAILY LIVING?: NO

## 2024-06-30 SDOH — ECONOMIC STABILITY: FOOD INSECURITY: WITHIN THE PAST 12 MONTHS, YOU WORRIED THAT YOUR FOOD WOULD RUN OUT BEFORE YOU GOT MONEY TO BUY MORE.: NEVER TRUE

## 2024-06-30 SDOH — HEALTH STABILITY: PHYSICAL HEALTH: ON AVERAGE, HOW MANY DAYS PER WEEK DO YOU ENGAGE IN MODERATE TO STRENUOUS EXERCISE (LIKE A BRISK WALK)?: 0 DAYS

## 2024-06-30 SDOH — ECONOMIC STABILITY: INCOME INSECURITY: HOW HARD IS IT FOR YOU TO PAY FOR THE VERY BASICS LIKE FOOD, HOUSING, MEDICAL CARE, AND HEATING?: NOT HARD AT ALL

## 2024-06-30 ASSESSMENT — PATIENT HEALTH QUESTIONNAIRE - PHQ9
8. MOVING OR SPEAKING SO SLOWLY THAT OTHER PEOPLE COULD HAVE NOTICED. OR THE OPPOSITE, BEING SO FIGETY OR RESTLESS THAT YOU HAVE BEEN MOVING AROUND A LOT MORE THAN USUAL: NOT AT ALL
SUM OF ALL RESPONSES TO PHQ QUESTIONS 1-9: 11
7. TROUBLE CONCENTRATING ON THINGS, SUCH AS READING THE NEWSPAPER OR WATCHING TELEVISION: NOT AT ALL
2. FEELING DOWN, DEPRESSED OR HOPELESS: NEARLY EVERY DAY
10. IF YOU CHECKED OFF ANY PROBLEMS, HOW DIFFICULT HAVE THESE PROBLEMS MADE IT FOR YOU TO DO YOUR WORK, TAKE CARE OF THINGS AT HOME, OR GET ALONG WITH OTHER PEOPLE: SOMEWHAT DIFFICULT
4. FEELING TIRED OR HAVING LITTLE ENERGY: NEARLY EVERY DAY
9. THOUGHTS THAT YOU WOULD BE BETTER OFF DEAD, OR OF HURTING YOURSELF: NOT AT ALL
SUM OF ALL RESPONSES TO PHQ QUESTIONS 1-9: 11
SUM OF ALL RESPONSES TO PHQ QUESTIONS 1-9: 11
3. TROUBLE FALLING OR STAYING ASLEEP: SEVERAL DAYS
5. POOR APPETITE OR OVEREATING: SEVERAL DAYS
6. FEELING BAD ABOUT YOURSELF - OR THAT YOU ARE A FAILURE OR HAVE LET YOURSELF OR YOUR FAMILY DOWN: SEVERAL DAYS
SUM OF ALL RESPONSES TO PHQ QUESTIONS 1-9: 11
1. LITTLE INTEREST OR PLEASURE IN DOING THINGS: MORE THAN HALF THE DAYS
SUM OF ALL RESPONSES TO PHQ9 QUESTIONS 1 & 2: 5

## 2024-06-30 ASSESSMENT — LIFESTYLE VARIABLES
HOW OFTEN DO YOU HAVE A DRINK CONTAINING ALCOHOL: NEVER
HOW OFTEN DO YOU HAVE SIX OR MORE DRINKS ON ONE OCCASION: 1
HOW MANY STANDARD DRINKS CONTAINING ALCOHOL DO YOU HAVE ON A TYPICAL DAY: PATIENT DOES NOT DRINK
HOW MANY STANDARD DRINKS CONTAINING ALCOHOL DO YOU HAVE ON A TYPICAL DAY: 0
HOW OFTEN DO YOU HAVE A DRINK CONTAINING ALCOHOL: 1

## 2024-07-03 ENCOUNTER — OFFICE VISIT (OUTPATIENT)
Dept: PRIMARY CARE CLINIC | Age: 69
End: 2024-07-03

## 2024-07-03 VITALS
HEIGHT: 64 IN | SYSTOLIC BLOOD PRESSURE: 132 MMHG | HEART RATE: 83 BPM | BODY MASS INDEX: 20.66 KG/M2 | OXYGEN SATURATION: 98 % | WEIGHT: 121 LBS | DIASTOLIC BLOOD PRESSURE: 70 MMHG | TEMPERATURE: 97.8 F

## 2024-07-03 DIAGNOSIS — Z87.891 PERSONAL HISTORY OF TOBACCO USE: ICD-10-CM

## 2024-07-03 DIAGNOSIS — G95.9 LUMBAR MYELOPATHY (HCC): ICD-10-CM

## 2024-07-03 DIAGNOSIS — J43.1 PANLOBULAR EMPHYSEMA (HCC): ICD-10-CM

## 2024-07-03 DIAGNOSIS — Z00.00 MEDICARE ANNUAL WELLNESS VISIT, SUBSEQUENT: Primary | ICD-10-CM

## 2024-07-03 DIAGNOSIS — I74.09 AORTOILIAC OCCLUSIVE DISEASE (HCC): ICD-10-CM

## 2024-07-03 RX ORDER — HYDROCODONE BITARTRATE AND ACETAMINOPHEN 5; 325 MG/1; MG/1
1 TABLET ORAL EVERY 6 HOURS PRN
Status: CANCELLED | OUTPATIENT
Start: 2024-07-03

## 2024-07-03 NOTE — PATIENT INSTRUCTIONS
or whether to treat it.  Some lung cancers found on CT scans are harmless and would not have caused a problem if they had not been found through screening. But because doctors can't tell which ones will turn out to be harmless, most will be treated. This means that you may get treatment--including surgery, radiation, or chemotherapy--that you don't need.  There is a risk of damage to cells or tissue from being exposed to radiation, including the small amounts used in CTs, X-rays, and other medical tests. Over time, exposure to radiation may cause cancer and other health problems. But in most cases, the risk of getting cancer from being exposed to small amounts of radiation is low. It's not a reason to avoid these tests for most people.  What are the benefits of screening?  Your scan may be normal (negative).  For some people who are at higher risk, screening lowers the chance of dying of lung cancer. How much and how long you smoked helps to determine your risk level. Screening can find some cancers early, when treatment may be more likely to work.  What happens after screening?  The results of your CT scan will be sent to your doctor. Someone from your care team will explain the results of your scan and answer any questions you may have. If you need any follow-up, he or she will help you understand what to do next.  After a lung cancer screening, you can go back to your usual activities right away.  A lung cancer screening test can't tell if you have lung cancer. If your results are positive, your doctor can't tell whether an abnormal finding is a harmless nodule, cancer, or something else without doing more tests.  What can you do to help prevent lung cancer?  Some lung cancers can't be prevented. But if you smoke, quitting smoking is the best step you can take to prevent lung cancer. If you want to quit, your doctor can recommend medicines or other ways to help.  Follow-up care is a key part of your treatment and

## 2024-07-03 NOTE — PROGRESS NOTES
Medicare Annual Wellness Visit    Barbara Pelletier is here for Medicare AWV and Fall (Happened a couple weeks ago, knee and arm injury )    Assessment & Plan   Medicare annual wellness visit, subsequent  Lumbar myelopathy (HCC)  Aortoiliac occlusive disease (HCC)  Panlobular emphysema (HCC)  Personal history of tobacco use  -     NM VISIT TO DISCUSS LUNG CA SCREEN W LDCT  - quit smoking   - see specialists  - stop Levothyroxine    Recommendations for Preventive Services Due: see orders and patient instructions/AVS.  Recommended screening schedule for the next 5-10 years is provided to the patient in written form: see Patient Instructions/AVS.     Return in 4 months (on 11/3/2024) for Medicare Annual Wellness Visit in 1 year, or for acute problem.     Subjective   The following acute and/or chronic problems were also addressed today:    Gen: wants off some meds if possible  Emphysema: seeing lung drSunita Smoking again  Vascular disease : stable but seeing podiatry for toe.   Low back: doing ok had x ray after fall.   MS: dealing with patellar fracture with Dr. Grace. Has appt soon.    Patient's complete Health Risk Assessment and screening values have been reviewed and are found in Flowsheets. The following problems were reviewed today and where indicated follow up appointments were made and/or referrals ordered.    Positive Risk Factor Screenings with Interventions:    Fall Risk:  Do you feel unsteady or are you worried about falling? : (!) yes  2 or more falls in past year?: (!) yes  Fall with injury in past year?: (!) yes     Interventions:    Reviewed medications, home hazards, visual acuity, and co-morbidities that can increase risk for falls     Depression:  PHQ-2 Score: 5  PHQ-9 Total Score: 11    Interpretation:  5-9 mild   10-14 moderate   15-19 moderately severe   20-27 severe   Interventions:  Patient comments: very depressed since fracture knee because she can't do much.  Can't drive       Controlled

## 2024-07-05 ENCOUNTER — HOSPITAL ENCOUNTER (OUTPATIENT)
Dept: PHARMACY | Age: 69
Setting detail: THERAPIES SERIES
Discharge: HOME | End: 2024-07-05
Payer: MEDICARE

## 2024-07-05 VITALS
HEART RATE: 57 BPM | WEIGHT: 122.6 LBS | DIASTOLIC BLOOD PRESSURE: 62 MMHG | SYSTOLIC BLOOD PRESSURE: 129 MMHG | BODY MASS INDEX: 21.04 KG/M2

## 2024-07-05 DIAGNOSIS — I48.91 POSTOPERATIVE ATRIAL FIBRILLATION (HCC): Primary | ICD-10-CM

## 2024-07-05 DIAGNOSIS — I97.89 POSTOPERATIVE ATRIAL FIBRILLATION (HCC): Primary | ICD-10-CM

## 2024-07-05 LAB
INTERNATIONAL NORMALIZATION RATIO, POC: 1.2
PROTHROMBIN TIME, POC: NORMAL

## 2024-07-05 PROCEDURE — 99212 OFFICE O/P EST SF 10 MIN: CPT | Performed by: PHARMACIST

## 2024-07-05 PROCEDURE — 85610 PROTHROMBIN TIME: CPT | Performed by: PHARMACIST

## 2024-07-05 NOTE — PROGRESS NOTES
[facility] Anticoagulation Clinic (Medication Management)  [address] [place], [address]  Phone: [phone]    Face-To-Face Visit  Patient Findings       Positives:  Change in medications (STOPPED LEVOTHYROXINE & NORCO), Bruising (STILL SOME BRUISING FROM FALL)    Negatives:  Signs/symptoms of thrombosis, Signs/symptoms of bleeding, Laboratory test error suspected, Change in health, Change in alcohol use, Change in activity, Upcoming invasive procedure, Emergency department visit, Upcoming dental procedure, Missed doses, Extra doses, Change in diet/appetite, Hospital admission, Other complaints    Comments:  ORTHOPEDIC SURGEON 7/8  CARDIO 7/9           Assessment/Plan:  Warfarin indication: atrial fibrillation       INR today is SUBtherapeutic at 1.2, goal is 2-3, PATIENT NOW ADMITS TO EATING MORE VITAMIN K, ADAMANTLY DENIES ANY MISSED DOSES, LEVOTHYROXINE STOPPED BY PCP BUT PATIENT JUST STOPPED TAKING IT YESTERDAY    Warfarin Dose: TAKE A BOOSTER DOSE OF 7.5 MG X 2 DAYS THEN RESUME REGIMEN OF 5 MG DAILY EXCEPT 7.5 MG ON MONDAY    Follow Up: 5 days      Kerry Garza RPH PharmD 7/5/2024 9:59 AM    For Pharmacy Admin Tracking Only    Intervention Detail: Dose Adjustment: 1, reason: Therapy Optimization  Total # of Interventions Recommended: 1  Total # of Interventions Accepted: 1  Time Spent (min): 15

## 2024-07-09 ENCOUNTER — OFFICE VISIT (OUTPATIENT)
Dept: CARDIOLOGY CLINIC | Age: 69
End: 2024-07-09
Payer: MEDICARE

## 2024-07-09 VITALS
WEIGHT: 121.6 LBS | DIASTOLIC BLOOD PRESSURE: 64 MMHG | RESPIRATION RATE: 20 BRPM | HEIGHT: 64 IN | SYSTOLIC BLOOD PRESSURE: 122 MMHG | BODY MASS INDEX: 20.76 KG/M2 | OXYGEN SATURATION: 97 % | HEART RATE: 55 BPM

## 2024-07-09 DIAGNOSIS — I73.9 PVD (PERIPHERAL VASCULAR DISEASE) (HCC): ICD-10-CM

## 2024-07-09 DIAGNOSIS — Z95.1 HX OF CABG: Primary | ICD-10-CM

## 2024-07-09 DIAGNOSIS — I48.91 POSTOPERATIVE ATRIAL FIBRILLATION (HCC): ICD-10-CM

## 2024-07-09 DIAGNOSIS — Z98.890 S/P CAROTID ENDARTERECTOMY: ICD-10-CM

## 2024-07-09 DIAGNOSIS — I97.89 POSTOPERATIVE ATRIAL FIBRILLATION (HCC): ICD-10-CM

## 2024-07-09 PROCEDURE — 1090F PRES/ABSN URINE INCON ASSESS: CPT | Performed by: INTERNAL MEDICINE

## 2024-07-09 PROCEDURE — 93000 ELECTROCARDIOGRAM COMPLETE: CPT | Performed by: INTERNAL MEDICINE

## 2024-07-09 PROCEDURE — G8420 CALC BMI NORM PARAMETERS: HCPCS | Performed by: INTERNAL MEDICINE

## 2024-07-09 PROCEDURE — G8427 DOCREV CUR MEDS BY ELIG CLIN: HCPCS | Performed by: INTERNAL MEDICINE

## 2024-07-09 PROCEDURE — 1123F ACP DISCUSS/DSCN MKR DOCD: CPT | Performed by: INTERNAL MEDICINE

## 2024-07-09 PROCEDURE — 1036F TOBACCO NON-USER: CPT | Performed by: INTERNAL MEDICINE

## 2024-07-09 PROCEDURE — G8399 PT W/DXA RESULTS DOCUMENT: HCPCS | Performed by: INTERNAL MEDICINE

## 2024-07-09 PROCEDURE — 3017F COLORECTAL CA SCREEN DOC REV: CPT | Performed by: INTERNAL MEDICINE

## 2024-07-09 PROCEDURE — 99214 OFFICE O/P EST MOD 30 MIN: CPT | Performed by: INTERNAL MEDICINE

## 2024-07-09 NOTE — PROGRESS NOTES
rate and rhythm.  No lifts or thrills on palpitation.  Auscultation with normal S1-S2 in intensity and splitting.  Grade 2/6 early peaking systolic ejection murmur heard best the right sternal border without radiation. Right carotid bruit.  Abdominal aorta not enlarged.  Femoral arteries without bruits.  Pedal pulses 1+.  No edema.    ABDOMEN: Soft without hepatic or splenic enlargement.  No tenderness.    MUSCULOSKELETAL: No kyphosis or scoliosis of the back.  Good muscle strength and tone.  No muscle atrophy.  Normal gait and ability to undergo exercise stress testing.  EXTREMITIES: No clubbing or cyanosis.    SKIN: No Xanthomas or ulcerations.  NEUROLOGIC: Oriented to time, place and person.  Normal mood and affect.    LYMPHATIC:  No palpable neck or supraclavicular nodes.  No splenomegaly.     EKG: the EKG tracing was reviewed and found to reveal: Normal sinus rhythm.   ms.  No change compared to prior tracing.      ASSESSMENT:                                                     ORDERS:       Diagnosis Orders   1. Hx of CABG - s/p AtriCure  EKG 12 Lead      2. S/P carotid endarterectomy        3. PVD (peripheral vascular disease) (HCC)        4. Postoperative atrial fibrillation (HCC)          Above assessment cardiac issues stable.    PLAN:   See above orders.   Medication reconciliation completed.  Labs reviewed: LDL 34  Testing reviewed: EF 60% mild AS  Same cardiac medications.    Follow-up office visit in 6 months.

## 2024-07-10 ENCOUNTER — HOSPITAL ENCOUNTER (OUTPATIENT)
Dept: PHARMACY | Age: 69
Setting detail: THERAPIES SERIES
Discharge: HOME | End: 2024-07-10
Payer: MEDICARE

## 2024-07-10 VITALS — SYSTOLIC BLOOD PRESSURE: 125 MMHG | HEART RATE: 61 BPM | DIASTOLIC BLOOD PRESSURE: 61 MMHG

## 2024-07-10 DIAGNOSIS — I97.89 POSTOPERATIVE ATRIAL FIBRILLATION (HCC): Primary | ICD-10-CM

## 2024-07-10 DIAGNOSIS — I48.91 POSTOPERATIVE ATRIAL FIBRILLATION (HCC): Primary | ICD-10-CM

## 2024-07-10 LAB
INTERNATIONAL NORMALIZATION RATIO, POC: 3
PROTHROMBIN TIME, POC: NORMAL

## 2024-07-10 PROCEDURE — 85610 PROTHROMBIN TIME: CPT

## 2024-07-10 PROCEDURE — 99211 OFF/OP EST MAY X REQ PHY/QHP: CPT

## 2024-07-10 RX ORDER — HYDROCODONE BITARTRATE AND ACETAMINOPHEN 5; 325 MG/1; MG/1
1 TABLET ORAL EVERY 6 HOURS PRN
COMMUNITY
Start: 2024-07-08

## 2024-07-10 NOTE — PROGRESS NOTES
[facility] (Medication Management)  [address] [place], [place], [address]  Phone: [phone]    Face-To-Face Visit  Patient Findings       Negatives:  Signs/symptoms of thrombosis, Signs/symptoms of bleeding, Laboratory test error suspected, Change in health, Change in alcohol use, Change in activity, Upcoming invasive procedure, Emergency department visit, Upcoming dental procedure, Missed doses, Extra doses, Change in medications, Change in diet/appetite, Hospital admission, Bruising, Other complaints           Assessment/Plan:  Warfarin indication: atrial fibrillation      INR today is therapeutic at 3, goal is 2-3.    Warfarin Dose: same (7.5 mg every Mon.; 5 mg all other days)    Follow Up: 2 weeks      Melisa Reyez [facility] PharmD, BCACP 7/10/2024 4:09 PM    For Pharmacy Admin Tracking Only    Intervention Detail:   Total # of Interventions Recommended: 0  Total # of Interventions Accepted: 0  Time Spent (min): 15

## 2024-07-26 ENCOUNTER — HOSPITAL ENCOUNTER (OUTPATIENT)
Dept: PHARMACY | Age: 69
Setting detail: THERAPIES SERIES
Discharge: HOME | End: 2024-07-26
Payer: MEDICARE

## 2024-07-26 VITALS
WEIGHT: 119.2 LBS | HEART RATE: 66 BPM | SYSTOLIC BLOOD PRESSURE: 154 MMHG | BODY MASS INDEX: 20.46 KG/M2 | DIASTOLIC BLOOD PRESSURE: 70 MMHG

## 2024-07-26 DIAGNOSIS — I48.91 POSTOPERATIVE ATRIAL FIBRILLATION (HCC): Primary | ICD-10-CM

## 2024-07-26 DIAGNOSIS — I97.89 POSTOPERATIVE ATRIAL FIBRILLATION (HCC): Primary | ICD-10-CM

## 2024-07-26 LAB
INTERNATIONAL NORMALIZATION RATIO, POC: 2.4
PROTHROMBIN TIME, POC: NORMAL

## 2024-07-26 PROCEDURE — 85610 PROTHROMBIN TIME: CPT

## 2024-07-26 RX ORDER — IBUPROFEN 200 MG
400 TABLET ORAL 2 TIMES DAILY
COMMUNITY

## 2024-07-26 NOTE — PROGRESS NOTES
[facility] (Medication Management)  [address] [place], [place], [address]  Phone: [phone]    Face-To-Face Visit  Patient Findings       Positives:  Change in medications (Taking ibuprofen 400 mg bid, I advised her against this, I told her to discuss with Dr. Grace (having knee and shoulder pain))    Negatives:  Signs/symptoms of thrombosis, Signs/symptoms of bleeding, Laboratory test error suspected, Change in health, Change in alcohol use, Change in activity, Upcoming invasive procedure, Emergency department visit, Upcoming dental procedure, Missed doses, Extra doses, Change in diet/appetite, Hospital admission, Bruising, Other complaints    Comments:  Dr. Grace next week (re: knee pain)           Assessment/Plan:  Warfarin indication: atrial fibrillation      INR today is therapeutic at 2.4, goal is 2-3.    Warfarin Dose: same (7.5 mg every Mon.; 5 mg all other days)    Follow Up: 4 weeks      Melisa Reyez RPH PharmD, BCACP 7/26/2024 8:48 AM    For Pharmacy Admin Tracking Only    Intervention Detail:   Total # of Interventions Recommended: 0  Total # of Interventions Accepted: 0  Time Spent (min): 15

## 2024-08-23 ENCOUNTER — ANTI-COAG VISIT (OUTPATIENT)
Age: 69
End: 2024-08-23
Payer: MEDICARE

## 2024-08-23 VITALS
WEIGHT: 121.8 LBS | BODY MASS INDEX: 20.91 KG/M2 | SYSTOLIC BLOOD PRESSURE: 161 MMHG | HEART RATE: 51 BPM | DIASTOLIC BLOOD PRESSURE: 68 MMHG

## 2024-08-23 DIAGNOSIS — I48.91 POSTOPERATIVE ATRIAL FIBRILLATION (HCC): Primary | ICD-10-CM

## 2024-08-23 DIAGNOSIS — I97.89 POSTOPERATIVE ATRIAL FIBRILLATION (HCC): Primary | ICD-10-CM

## 2024-08-23 LAB
INTERNATIONAL NORMALIZATION RATIO, POC: 3.1
PROTHROMBIN TIME, POC: 0

## 2024-08-23 PROCEDURE — 85610 PROTHROMBIN TIME: CPT

## 2024-08-23 PROCEDURE — 99211 OFF/OP EST MAY X REQ PHY/QHP: CPT

## 2024-08-23 NOTE — PROGRESS NOTES
[facility] (Medication Management)  [address] [place], [place], [address]  Phone: [phone]    Face-To-Face Visit  Patient Findings       Positives:  Change in activity (Therapy for her shoulder), Change in medications (She did take Norco for pain, now out of it, tried APAP/IBU combo product, but it's not helping)    Negatives:  Signs/symptoms of thrombosis, Signs/symptoms of bleeding, Laboratory test error suspected, Change in health, Change in alcohol use, Upcoming invasive procedure, Emergency department visit, Upcoming dental procedure, Missed doses, Extra doses, Change in diet/appetite, Hospital admission, Bruising, Other complaints           Assessment/Plan:  Warfarin indication: atrial fibrillation      INR today is slightly supratherapeutic at 3.1, goal is 2-3.    Warfarin Dose: same (7.5 mg every Monday; 5 mg all other days) - encouraged her to add a little more vitamin K into her diet    Follow Up: 4 weeks      Melisa Reyez RPH PharmD, BCACP 8/23/2024 9:19 AM    For Pharmacy Admin Tracking Only    Intervention Detail:   Total # of Interventions Recommended: 0  Total # of Interventions Accepted: 0  Time Spent (min): 15

## 2024-09-06 ENCOUNTER — OFFICE VISIT (OUTPATIENT)
Dept: ENT CLINIC | Age: 69
End: 2024-09-06
Payer: MEDICARE

## 2024-09-06 VITALS
DIASTOLIC BLOOD PRESSURE: 49 MMHG | HEART RATE: 69 BPM | SYSTOLIC BLOOD PRESSURE: 115 MMHG | HEIGHT: 64 IN | WEIGHT: 122 LBS | RESPIRATION RATE: 20 BRPM | BODY MASS INDEX: 20.83 KG/M2 | TEMPERATURE: 98.2 F | OXYGEN SATURATION: 95 %

## 2024-09-06 DIAGNOSIS — K21.9 GASTROESOPHAGEAL REFLUX DISEASE WITHOUT ESOPHAGITIS: ICD-10-CM

## 2024-09-06 DIAGNOSIS — J30.1 SEASONAL ALLERGIC RHINITIS DUE TO POLLEN: ICD-10-CM

## 2024-09-06 DIAGNOSIS — K14.8 TONGUE LESION: ICD-10-CM

## 2024-09-06 DIAGNOSIS — K13.79 MOUTH SORES: Primary | ICD-10-CM

## 2024-09-06 PROBLEM — F33.9 MAJOR DEPRESSIVE DISORDER, RECURRENT, UNSPECIFIED (HCC): Status: ACTIVE | Noted: 2024-09-06

## 2024-09-06 PROBLEM — F33.0 MAJOR DEPRESSIVE DISORDER, RECURRENT, MILD (HCC): Status: ACTIVE | Noted: 2024-09-06

## 2024-09-06 PROBLEM — F33.1 MAJOR DEPRESSIVE DISORDER, RECURRENT, MODERATE (HCC): Status: ACTIVE | Noted: 2024-09-06

## 2024-09-06 PROCEDURE — G8427 DOCREV CUR MEDS BY ELIG CLIN: HCPCS | Performed by: OTOLARYNGOLOGY

## 2024-09-06 PROCEDURE — 1036F TOBACCO NON-USER: CPT | Performed by: OTOLARYNGOLOGY

## 2024-09-06 PROCEDURE — 1123F ACP DISCUSS/DSCN MKR DOCD: CPT | Performed by: OTOLARYNGOLOGY

## 2024-09-06 PROCEDURE — 3017F COLORECTAL CA SCREEN DOC REV: CPT | Performed by: OTOLARYNGOLOGY

## 2024-09-06 PROCEDURE — G8399 PT W/DXA RESULTS DOCUMENT: HCPCS | Performed by: OTOLARYNGOLOGY

## 2024-09-06 PROCEDURE — G8420 CALC BMI NORM PARAMETERS: HCPCS | Performed by: OTOLARYNGOLOGY

## 2024-09-06 PROCEDURE — 99213 OFFICE O/P EST LOW 20 MIN: CPT | Performed by: OTOLARYNGOLOGY

## 2024-09-06 PROCEDURE — 1090F PRES/ABSN URINE INCON ASSESS: CPT | Performed by: OTOLARYNGOLOGY

## 2024-09-06 ASSESSMENT — ENCOUNTER SYMPTOMS
SINUS PRESSURE: 0
BACK PAIN: 0
SORE THROAT: 0
VOMITING: 0
SHORTNESS OF BREATH: 0
ALLERGIC/IMMUNOLOGIC NEGATIVE: 1
EYE PAIN: 0
RHINORRHEA: 0
COUGH: 0
DIARRHEA: 0
EYE DISCHARGE: 0

## 2024-09-06 NOTE — PROGRESS NOTES
Subjective:      Patient ID:  Barbara Pelletier is a 69 y.o. female.    HPI:    Patient presents today for Tongue lesion. Condition has been present for 6 month(s). Pt has had issues with falling recently.      Pt has been getting allergy sx recently too.       Past Medical History:   Diagnosis Date    ADHD (attention deficit hyperactivity disorder)     Age-related osteoporosis without current pathological fracture 2021    Anticoagulant long-term use [id number]    Arthritis     Atrial fibrillation (HCC) [id number]    Atrial fibrillation, transient (HCC) 2023    Carotid artery stenosis     Cataracts, bilateral 10/'19    Chronic back pain     Colon polyps 2021    tubular adenoma    Diabetes mellitus (HCC)     DM type 2 (diabetes mellitus, type 2) (HCC)     [facility]    Hyperlipidemia     Hypertension     Hypothyroidism     Migraines     rare    Neuropathy     Neuropathy, diabetic (HCC)     Peripheral vascular disease (HCC)     follows with Dr. Mack yearly    Positive colorectal cancer screening using Cologuard test     Psoriasiform dermatitis     Seasonal allergies     Thyroid disease      Past Surgical History:   Procedure Laterality Date    BACK SURGERY      L5-S1    CARDIAC SURGERY  [id number]    CAROTID ENDARTERECTOMY Left 10/11/2018    [facility]    CARPAL TUNNEL RELEASE      bilat     SECTION      2    COLONOSCOPY N/A 2021    COLONOSCOPY WITH BIOPSY with tattooing performed by Taylor Hilario MD at [facility] ENDOSCOPY    CORONARY ARTERY BYPASS GRAFT N/A 2022    coronary artery bypass grafting x 2, ZACHARY performed by Clark Pickard DO at [facility] OR    HYSTERECTOMY (CERVIX STATUS UNKNOWN)      HYSTERECTOMY, TOTAL ABDOMINAL (CERVIX REMOVED)      HYSTERECTOMY, VAGINAL      LARYNGOSCOPY N/A 2020    DIRECT LARYNGOSCOPYY AND CERVICAL ESOPHAGOSCOPY (PATHOLOGY PRESENT) performed by Raudel Hutchinson DO at [facility] OR    LEG SURGERY

## 2024-09-13 RX ORDER — LISINOPRIL 5 MG/1
5 TABLET ORAL DAILY
Qty: 90 TABLET | Refills: 1 | Status: SHIPPED | OUTPATIENT
Start: 2024-09-13

## 2024-09-17 DIAGNOSIS — Z98.1 S/P LUMBAR FUSION: Primary | ICD-10-CM

## 2024-09-20 ENCOUNTER — ANTI-COAG VISIT (OUTPATIENT)
Age: 69
End: 2024-09-20
Payer: MEDICARE

## 2024-09-20 VITALS
HEART RATE: 56 BPM | SYSTOLIC BLOOD PRESSURE: 137 MMHG | DIASTOLIC BLOOD PRESSURE: 54 MMHG | BODY MASS INDEX: 21.08 KG/M2 | WEIGHT: 122.8 LBS

## 2024-09-20 DIAGNOSIS — I97.89 POSTOPERATIVE ATRIAL FIBRILLATION (HCC): Primary | ICD-10-CM

## 2024-09-20 DIAGNOSIS — I48.91 POSTOPERATIVE ATRIAL FIBRILLATION (HCC): Primary | ICD-10-CM

## 2024-09-20 LAB
INTERNATIONAL NORMALIZATION RATIO, POC: 2.4
PROTHROMBIN TIME, POC: 0

## 2024-09-20 PROCEDURE — 85610 PROTHROMBIN TIME: CPT

## 2024-09-20 PROCEDURE — 99211 OFF/OP EST MAY X REQ PHY/QHP: CPT

## 2024-09-20 RX ORDER — IBUPROFEN 200 MG
400 TABLET ORAL 2 TIMES DAILY PRN
COMMUNITY

## 2024-09-25 ENCOUNTER — HOSPITAL ENCOUNTER (OUTPATIENT)
Age: 69
Discharge: HOME | End: 2024-09-25

## 2024-09-25 ENCOUNTER — HOSPITAL ENCOUNTER (OUTPATIENT)
Dept: GENERAL RADIOLOGY | Age: 69
Discharge: HOME | End: 2024-09-27
Payer: MEDICARE

## 2024-09-25 ENCOUNTER — OFFICE VISIT (OUTPATIENT)
Dept: NEUROSURGERY | Age: 69
End: 2024-09-25
Payer: MEDICARE

## 2024-09-25 VITALS — RESPIRATION RATE: 18 BRPM | WEIGHT: 122 LBS | HEIGHT: 64 IN | BODY MASS INDEX: 20.83 KG/M2

## 2024-09-25 DIAGNOSIS — Z98.1 S/P LUMBAR FUSION: ICD-10-CM

## 2024-09-25 DIAGNOSIS — M48.062 LUMBAR STENOSIS WITH NEUROGENIC CLAUDICATION: Primary | ICD-10-CM

## 2024-09-25 DIAGNOSIS — M41.86 DEXTROSCOLIOSIS OF LUMBAR SPINE: ICD-10-CM

## 2024-09-25 PROCEDURE — G8399 PT W/DXA RESULTS DOCUMENT: HCPCS | Performed by: PHYSICIAN ASSISTANT

## 2024-09-25 PROCEDURE — G8427 DOCREV CUR MEDS BY ELIG CLIN: HCPCS | Performed by: PHYSICIAN ASSISTANT

## 2024-09-25 PROCEDURE — 1090F PRES/ABSN URINE INCON ASSESS: CPT | Performed by: PHYSICIAN ASSISTANT

## 2024-09-25 PROCEDURE — G8420 CALC BMI NORM PARAMETERS: HCPCS | Performed by: PHYSICIAN ASSISTANT

## 2024-09-25 PROCEDURE — 3017F COLORECTAL CA SCREEN DOC REV: CPT | Performed by: PHYSICIAN ASSISTANT

## 2024-09-25 PROCEDURE — 1123F ACP DISCUSS/DSCN MKR DOCD: CPT | Performed by: PHYSICIAN ASSISTANT

## 2024-09-25 PROCEDURE — 99213 OFFICE O/P EST LOW 20 MIN: CPT | Performed by: PHYSICIAN ASSISTANT

## 2024-09-25 PROCEDURE — 72100 X-RAY EXAM L-S SPINE 2/3 VWS: CPT

## 2024-09-25 PROCEDURE — 4004F PT TOBACCO SCREEN RCVD TLK: CPT | Performed by: PHYSICIAN ASSISTANT

## 2024-10-18 ENCOUNTER — ANTI-COAG VISIT (OUTPATIENT)
Age: 69
End: 2024-10-18
Payer: MEDICARE

## 2024-10-18 VITALS
SYSTOLIC BLOOD PRESSURE: 130 MMHG | HEART RATE: 58 BPM | DIASTOLIC BLOOD PRESSURE: 71 MMHG | WEIGHT: 129 LBS | BODY MASS INDEX: 22.14 KG/M2

## 2024-10-18 DIAGNOSIS — I97.89 POSTOPERATIVE ATRIAL FIBRILLATION (HCC): Primary | ICD-10-CM

## 2024-10-18 DIAGNOSIS — I48.91 POSTOPERATIVE ATRIAL FIBRILLATION (HCC): Primary | ICD-10-CM

## 2024-10-18 LAB
INTERNATIONAL NORMALIZATION RATIO, POC: 3.8
PROTHROMBIN TIME, POC: 0

## 2024-10-18 PROCEDURE — 99212 OFFICE O/P EST SF 10 MIN: CPT

## 2024-10-18 PROCEDURE — 85610 PROTHROMBIN TIME: CPT

## 2024-10-18 NOTE — PROGRESS NOTES
[facility] (Medication Management)  [address] [place], [address]  Phone: [phone]    Face-To-Face Visit  Patient Findings       Positives:  Change in health (Sinus drainage), Change in diet/appetite    Negatives:  Signs/symptoms of thrombosis, Signs/symptoms of bleeding, Laboratory test error suspected, Change in alcohol use, Upcoming invasive procedure, Emergency department visit, Upcoming dental procedure, Missed doses, Extra doses, Change in medications (Still taking NSAIDs), Hospital admission, Bruising           Assessment/Plan:  Warfarin indication: atrial fibrillation     INR today is supratherapeutic at 3.8, goal is 2.0-3.0. Unknown reason for high INR.     Warfarin Dose: HOLD today then resume the same (7.5 mg every Monday; 5 mg all other days)     Follow Up: 2 weeks    Electronically signed by Pinky Flores RPH, Pharm.D. 10/18/2024 10:40 AM   Ext: [phone]      For Pharmacy Admin Tracking Only    Intervention Detail: Dose Adjustment: 1, reason: Therapy De-escalation  Total # of Interventions Recommended: 1  Total # of Interventions Accepted: 1  Time Spent (min): 10

## 2024-10-24 DIAGNOSIS — Z79.4 TYPE 2 DIABETES MELLITUS WITH HYPERGLYCEMIA, WITH LONG-TERM CURRENT USE OF INSULIN (HCC): ICD-10-CM

## 2024-10-24 DIAGNOSIS — E11.65 TYPE 2 DIABETES MELLITUS WITH HYPERGLYCEMIA, WITH LONG-TERM CURRENT USE OF INSULIN (HCC): ICD-10-CM

## 2024-10-24 NOTE — TELEPHONE ENCOUNTER
Name of Medication(s) Requested:  Requested Prescriptions     Pending Prescriptions Disp Refills    metFORMIN (GLUCOPHAGE) 500 MG tablet [Pharmacy Med Name: metFORMIN HCl Oral Tablet 500 MG] 180 tablet 1     Sig: TAKE ONE TABLET BY MOUTH TWO TIMES A DAY WITH MEALS       Medication is on current medication list Yes    Dosage and directions were verified? Yes    Quantity verified: 90 day supply     Pharmacy Verified?  Yes    Last Appointment:  7/3/2024    Future appts:  Future Appointments   Date Time Provider Department Center   10/30/2024  8:00 AM Varun Varner APRN - CNS BDM ENDO [facility]   11/1/2024  9:30 AM SEB MEDICATION MGMT SEB MED MGMT [place] HOD   11/4/2024 11:00 AM Anurag Garcia DO [facility] DEP   11/12/2024  8:30 AM SE CT SCAN 3 SEYZ CT SE Rad/Car   11/20/2024  8:00 AM SCHEDULE, AFL PULMONARY FUNCTION [facility] PULMONAR   1/9/2025  9:00 AM Mariano Rick MD [place] Card [facility]   1/10/2025 11:45 AM Raudel Hutchinson DO [place] [facility]   4/21/2025  9:45 AM SEY YNG VAS US 1 SEYZ CARDIO [facility] Rad/Car   4/21/2025 10:15 AM SEY YNG VAS US 1 SEYZ CARDIO SE Rad/Car   4/21/2025 10:45 AM Jim Mack MD VASC/MED [facility]        (If no appt send self scheduling link. .REFILLAPPT)  Scheduling request sent?     [] Yes  [x] No    Does patient need updated?  [] Yes  [x] No

## 2024-10-30 ENCOUNTER — OFFICE VISIT (OUTPATIENT)
Dept: ENDOCRINOLOGY | Age: 69
End: 2024-10-30
Payer: MEDICARE

## 2024-10-30 VITALS
DIASTOLIC BLOOD PRESSURE: 78 MMHG | SYSTOLIC BLOOD PRESSURE: 147 MMHG | HEIGHT: 64 IN | OXYGEN SATURATION: 97 % | HEART RATE: 59 BPM | BODY MASS INDEX: 22.02 KG/M2 | WEIGHT: 129 LBS

## 2024-10-30 DIAGNOSIS — R80.9 MICROALBUMINURIA: ICD-10-CM

## 2024-10-30 DIAGNOSIS — E11.65 TYPE 2 DIABETES MELLITUS WITH HYPERGLYCEMIA, WITH LONG-TERM CURRENT USE OF INSULIN (HCC): Primary | ICD-10-CM

## 2024-10-30 DIAGNOSIS — E03.9 PRIMARY HYPOTHYROIDISM: ICD-10-CM

## 2024-10-30 DIAGNOSIS — Z79.4 TYPE 2 DIABETES MELLITUS WITH HYPERGLYCEMIA, WITH LONG-TERM CURRENT USE OF INSULIN (HCC): Primary | ICD-10-CM

## 2024-10-30 DIAGNOSIS — Z91.119 DIETARY NONCOMPLIANCE: ICD-10-CM

## 2024-10-30 LAB — HBA1C MFR BLD: 8 %

## 2024-10-30 PROCEDURE — 3052F HG A1C>EQUAL 8.0%<EQUAL 9.0%: CPT | Performed by: CLINICAL NURSE SPECIALIST

## 2024-10-30 PROCEDURE — 2022F DILAT RTA XM EVC RTNOPTHY: CPT | Performed by: CLINICAL NURSE SPECIALIST

## 2024-10-30 PROCEDURE — 1123F ACP DISCUSS/DSCN MKR DOCD: CPT | Performed by: CLINICAL NURSE SPECIALIST

## 2024-10-30 PROCEDURE — 1159F MED LIST DOCD IN RCRD: CPT | Performed by: CLINICAL NURSE SPECIALIST

## 2024-10-30 PROCEDURE — G8420 CALC BMI NORM PARAMETERS: HCPCS | Performed by: CLINICAL NURSE SPECIALIST

## 2024-10-30 PROCEDURE — 3017F COLORECTAL CA SCREEN DOC REV: CPT | Performed by: CLINICAL NURSE SPECIALIST

## 2024-10-30 PROCEDURE — 1090F PRES/ABSN URINE INCON ASSESS: CPT | Performed by: CLINICAL NURSE SPECIALIST

## 2024-10-30 PROCEDURE — 95251 CONT GLUC MNTR ANALYSIS I&R: CPT | Performed by: CLINICAL NURSE SPECIALIST

## 2024-10-30 PROCEDURE — 83036 HEMOGLOBIN GLYCOSYLATED A1C: CPT | Performed by: CLINICAL NURSE SPECIALIST

## 2024-10-30 PROCEDURE — G8399 PT W/DXA RESULTS DOCUMENT: HCPCS | Performed by: CLINICAL NURSE SPECIALIST

## 2024-10-30 PROCEDURE — G8484 FLU IMMUNIZE NO ADMIN: HCPCS | Performed by: CLINICAL NURSE SPECIALIST

## 2024-10-30 PROCEDURE — G8427 DOCREV CUR MEDS BY ELIG CLIN: HCPCS | Performed by: CLINICAL NURSE SPECIALIST

## 2024-10-30 PROCEDURE — 99214 OFFICE O/P EST MOD 30 MIN: CPT | Performed by: CLINICAL NURSE SPECIALIST

## 2024-10-30 PROCEDURE — 4004F PT TOBACCO SCREEN RCVD TLK: CPT | Performed by: CLINICAL NURSE SPECIALIST

## 2024-10-30 RX ORDER — INSULIN LISPRO 100 [IU]/ML
INJECTION, SOLUTION INTRAVENOUS; SUBCUTANEOUS
Qty: 15 ML | Refills: 0
Start: 2024-10-30

## 2024-10-30 RX ORDER — INSULIN GLARGINE 300 U/ML
22 INJECTION, SOLUTION SUBCUTANEOUS NIGHTLY
Qty: 10 ADJUSTABLE DOSE PRE-FILLED PEN SYRINGE | Refills: 1 | Status: SHIPPED | OUTPATIENT
Start: 2024-10-30

## 2024-10-30 NOTE — PROGRESS NOTES
[facility] Department of Endocrinology Diabetes and Metabolism   [address], [place], [address]   Phone: [phone]  Fax: [phone]       Date of admission: (Not on file)  Date of service: 10/30/2024  Admitting physician: No admitting provider for patient encounter.   Primary Care Physician: Anurag Garcia DO  Consultant physician: INDIRA Israel - CNS      Reason for the consultation:  Uncontrolled DM    History of Present Illness:  The history is provided by the patient. Accuracy of the patient data is excellent    Barbara Pelletier is a very pleasant 69 y.o. old female with PMH uncontrolled DM, CAD, HLD seen today for follow up visit     The patient was recently admitted to the hospital and underwent CABG surgery on 11/2/2022      The patient was diagnosed with type 2 DM many years ago. Currently on Toujeo 20 u daily, Humalog 10u with meals + ss 1:50>150.  Metformin 500 mg 1 tablet BID.  the Patient has been eating consistent carbohydrate meals, self-blood glucose monitoring has been better recently. In addition, patient reports both macrovascular and  microvascular complications. The patient is not up to date with yearly diabetic eye exam.   Check BG 4 times per day   Uses jese   Ambulatory continuous glucose monitoring of interstitial tissue fluid via a subcutaneous sensor for a minimum of 72 hours; analysis, interpretation and report  Average sensor glucose  149  Time in range 76%  Hyperglycemia 22%  Hypoglycemia 2%    Lab Results   Component Value Date/Time    LABA1C 8.0 10/30/2024 08:05 AM       Lab Results   Component Value Date/Time    LABA1C 8.0 10/30/2024 08:05 AM    LABA1C 7.2 12/07/2023 07:34 AM    LABA1C 9.0 06/26/2023 12:00 PM    LABA1C 7.9 03/21/2023 10:51 AM       Past medical history:   Past Medical History:   Diagnosis Date    ADHD (attention deficit hyperactivity disorder)     Age-related osteoporosis without current

## 2024-11-01 ENCOUNTER — ANTI-COAG VISIT (OUTPATIENT)
Age: 69
End: 2024-11-01
Payer: MEDICARE

## 2024-11-01 VITALS
HEART RATE: 63 BPM | DIASTOLIC BLOOD PRESSURE: 58 MMHG | SYSTOLIC BLOOD PRESSURE: 159 MMHG | WEIGHT: 128 LBS | BODY MASS INDEX: 21.97 KG/M2

## 2024-11-01 DIAGNOSIS — I97.89 POSTOPERATIVE ATRIAL FIBRILLATION (HCC): Primary | ICD-10-CM

## 2024-11-01 DIAGNOSIS — I48.91 POSTOPERATIVE ATRIAL FIBRILLATION (HCC): Primary | ICD-10-CM

## 2024-11-01 LAB
INTERNATIONAL NORMALIZATION RATIO, POC: 2.6
PROTHROMBIN TIME, POC: 0

## 2024-11-01 PROCEDURE — 99211 OFF/OP EST MAY X REQ PHY/QHP: CPT

## 2024-11-01 PROCEDURE — 85610 PROTHROMBIN TIME: CPT

## 2024-11-01 NOTE — PROGRESS NOTES
[facility] (Medication Management)  [address] [place], [place], [address]  Phone: [phone]    Face-To-Face Visit  Patient Findings       Positives:  Change in health (MRI 10/31/24 - RESULTS PENDING - patient to call us if she needs surgery or will get another steroid injection)    Negatives:  Signs/symptoms of thrombosis, Signs/symptoms of bleeding, Laboratory test error suspected, Change in alcohol use, Change in activity, Upcoming invasive procedure, Emergency department visit, Upcoming dental procedure, Missed doses, Extra doses, Change in medications, Change in diet/appetite, Hospital admission, Bruising, Other complaints           Assessment/Plan:  Warfarin indication: atrial fibrillation       INR today is therapeutic at 2.6, goal is 2.0-3.0; patient feels past elevated INR was related to steroid injection and subsequent dietary changes but this is not happening currently    Warfarin Dose: same (7.5 mg every Mon; 5 mg all other days)    Follow Up: 4 weeks      Magda Bob RPH PharmD, BCCCP, BCEMP 11/1/2024 9:41 AM    For Pharmacy Admin Tracking Only    Intervention Detail:   Total # of Interventions Recommended: 0  Total # of Interventions Accepted: 0  Time Spent (min): 15

## 2024-11-04 ENCOUNTER — OFFICE VISIT (OUTPATIENT)
Dept: PRIMARY CARE CLINIC | Age: 69
End: 2024-11-04

## 2024-11-04 VITALS
WEIGHT: 128 LBS | DIASTOLIC BLOOD PRESSURE: 70 MMHG | OXYGEN SATURATION: 98 % | BODY MASS INDEX: 21.97 KG/M2 | HEART RATE: 75 BPM | SYSTOLIC BLOOD PRESSURE: 110 MMHG | TEMPERATURE: 97 F

## 2024-11-04 DIAGNOSIS — F34.1 DYSTHYMIA: ICD-10-CM

## 2024-11-04 DIAGNOSIS — F33.0 MILD EPISODE OF RECURRENT MAJOR DEPRESSIVE DISORDER (HCC): ICD-10-CM

## 2024-11-04 PROBLEM — F33.1 MAJOR DEPRESSIVE DISORDER, RECURRENT, MODERATE (HCC): Status: RESOLVED | Noted: 2024-09-06 | Resolved: 2024-11-04

## 2024-11-04 RX ORDER — CITALOPRAM HYDROBROMIDE 20 MG/1
30 TABLET ORAL DAILY
Qty: 135 TABLET | Refills: 1 | Status: SHIPPED
Start: 2024-11-04 | End: 2024-11-05 | Stop reason: SDUPTHER

## 2024-11-04 NOTE — PROGRESS NOTES
Barbara Pelletier, a female of 69 y.o. came to the office 11/4/2024.     Patient Active Problem List   Diagnosis    Asymptomatic bilateral carotid artery stenosis    History of nuclear stress test    Mixed hyperlipidemia    Aortoiliac occlusive disease (HCC)    PVD (peripheral vascular disease) with claudication ([facility])    Carotid artery stenosis, asymptomatic, bilateral    PVD (peripheral vascular disease) ([facility])    S/P carotid endarterectomy    Tobacco abuse    Lesion of true vocal cord    Diabetes mellitus without complication ([facility])    Senile osteoporosis    Scoliosis    Lumbar spinal stenosis    CAD in native artery    Postoperative hypertension    Acute pulmonary insufficiency    Hyperglycemia    Status post aorto-coronary artery bypass graft    Postoperative atrial fibrillation ([facility])    Type 2 diabetes mellitus with hyperglycemia    Hypothyroidism    Dietary noncompliance    Dextroscoliosis of lumbar spine    Stenosis, spinal, lumbar    Lumbar stenosis with neurogenic claudication    Lumbar foraminal stenosis    Abnormality of gait and mobility    Self-care deficit    Lumbar myelopathy ([facility])    Atrial fibrillation with rapid ventricular response ([facility])    NSTEMI (non-ST elevated myocardial infarction) ([facility])    Cellulitis of foot    Panlobular emphysema ([facility])    Major depressive disorder, recurrent, mild    Major depressive disorder, recurrent, unspecified    Tongue lesion    Seasonal allergic rhinitis due to pollen    Gastroesophageal reflux disease without esophagitis          Depression  Visit Type: follow-up  Patient presents with the following symptoms: depressed mood, irritability and psychomotor agitation.  Patient is not experiencing: decreased concentration, hypersomnia, insomnia and nervousness/anxiety.       - having trouble with her son who hasn't been treated well by his father.         dm: hs insulin increased fro from 20 to 22 units and mealtime decreased from 10 to 8 due to high sugars through

## 2024-11-05 DIAGNOSIS — F34.1 DYSTHYMIA: ICD-10-CM

## 2024-11-05 RX ORDER — CITALOPRAM HYDROBROMIDE 20 MG/1
30 TABLET ORAL DAILY
Qty: 135 TABLET | Refills: 1 | Status: SHIPPED | OUTPATIENT
Start: 2024-11-05

## 2024-11-12 ENCOUNTER — HOSPITAL ENCOUNTER (OUTPATIENT)
Dept: CT IMAGING | Age: 69
Discharge: HOME | End: 2024-11-14
Attending: INTERNAL MEDICINE
Payer: MEDICARE

## 2024-11-12 DIAGNOSIS — R91.8 PULMONARY NODULES: ICD-10-CM

## 2024-11-12 PROCEDURE — 71250 CT THORAX DX C-: CPT

## 2024-11-27 ENCOUNTER — ANTI-COAG VISIT (OUTPATIENT)
Age: 69
End: 2024-11-27
Payer: MEDICARE

## 2024-11-27 VITALS
SYSTOLIC BLOOD PRESSURE: 178 MMHG | WEIGHT: 129.2 LBS | HEART RATE: 59 BPM | DIASTOLIC BLOOD PRESSURE: 77 MMHG | BODY MASS INDEX: 22.18 KG/M2

## 2024-11-27 DIAGNOSIS — I97.89 POSTOPERATIVE ATRIAL FIBRILLATION (HCC): Primary | ICD-10-CM

## 2024-11-27 DIAGNOSIS — I48.91 POSTOPERATIVE ATRIAL FIBRILLATION (HCC): Primary | ICD-10-CM

## 2024-11-27 LAB
INTERNATIONAL NORMALIZATION RATIO, POC: 4.1
PROTHROMBIN TIME, POC: 0

## 2024-11-27 PROCEDURE — 99212 OFFICE O/P EST SF 10 MIN: CPT

## 2024-11-27 PROCEDURE — 85610 PROTHROMBIN TIME: CPT

## 2024-11-27 NOTE — PROGRESS NOTES
[facility] (Medication Management)  [address] [place], [address]  Phone: [phone]    Face-To-Face Visit  Patient Findings       Positives:  Change in medications (She was prescribed Breo, but cannot afford so she is not using it, only using albuterol), Change in diet/appetite (Diet slightly increased with vitamin K foods)    Negatives:  Signs/symptoms of thrombosis, Signs/symptoms of bleeding, Laboratory test error suspected, Change in health, Change in alcohol use, Change in activity, Upcoming invasive procedure, Emergency department visit, Upcoming dental procedure, Missed doses, Extra doses, Hospital admission, Bruising, Other complaints    Comments:  Dr. Nunez next week (re: torn rotator cuff)           Assessment/Plan:  Warfarin indication: atrial fibrillation      INR today is supratherapeutic at 4.1, goal is 2-3.  No reason for high INR. INR was in range at last visit but was 3.8 the visit prior to.    Warfarin Dose: Hold today's dose then decrease 7% - 5 mg daily     Follow Up:  Recheck INR in 2 week(s).      Melisa Reyez [facility] PharmD, BCACP 11/27/2024 9:37 AM    For Pharmacy Admin Tracking Only    Intervention Detail: Dose Adjustment: 1, reason: Therapy De-escalation  Total # of Interventions Recommended: 1  Total # of Interventions Accepted: 1  Time Spent (min): 15

## 2024-12-09 RX ORDER — SOTALOL HYDROCHLORIDE 80 MG/1
40 TABLET ORAL 2 TIMES DAILY
Qty: 45 TABLET | Refills: 0 | Status: SHIPPED | OUTPATIENT
Start: 2024-12-09

## 2024-12-11 ENCOUNTER — HOSPITAL ENCOUNTER (OUTPATIENT)
Age: 69
Discharge: HOME | End: 2024-12-11
Payer: MEDICARE

## 2024-12-11 DIAGNOSIS — E83.52 HYPERCALCEMIA: Primary | ICD-10-CM

## 2024-12-11 DIAGNOSIS — E11.65 TYPE 2 DIABETES MELLITUS WITH HYPERGLYCEMIA, WITH LONG-TERM CURRENT USE OF INSULIN (HCC): ICD-10-CM

## 2024-12-11 DIAGNOSIS — Z79.4 TYPE 2 DIABETES MELLITUS WITH HYPERGLYCEMIA, WITH LONG-TERM CURRENT USE OF INSULIN (HCC): ICD-10-CM

## 2024-12-11 DIAGNOSIS — E03.9 PRIMARY HYPOTHYROIDISM: ICD-10-CM

## 2024-12-11 LAB
ALBUMIN SERPL-MCNC: 4.3 G/DL (ref 3.5–5.2)
ALP SERPL-CCNC: 83 U/L (ref 35–104)
ALT SERPL-CCNC: 20 U/L (ref 0–32)
ANION GAP SERPL CALCULATED.3IONS-SCNC: 10 MMOL/L (ref 7–16)
AST SERPL-CCNC: 21 U/L (ref 0–31)
BILIRUB SERPL-MCNC: 0.3 MG/DL (ref 0–1.2)
BUN SERPL-MCNC: 14 MG/DL (ref 6–23)
CALCIUM SERPL-MCNC: 11.4 MG/DL (ref 8.6–10.2)
CHLORIDE SERPL-SCNC: 101 MMOL/L (ref 98–107)
CHOLEST SERPL-MCNC: 106 MG/DL
CO2 SERPL-SCNC: 26 MMOL/L (ref 22–29)
CREAT SERPL-MCNC: 0.7 MG/DL (ref 0.5–1)
GFR, ESTIMATED: >90 ML/MIN/1.73M2
GLUCOSE SERPL-MCNC: 146 MG/DL (ref 74–99)
HDLC SERPL-MCNC: 45 MG/DL
LDLC SERPL CALC-MCNC: 26 MG/DL
POTASSIUM SERPL-SCNC: 4.9 MMOL/L (ref 3.5–5)
PROT SERPL-MCNC: 7.2 G/DL (ref 6.4–8.3)
SODIUM SERPL-SCNC: 137 MMOL/L (ref 132–146)
T4 FREE SERPL-MCNC: 1.3 NG/DL (ref 0.9–1.7)
TRIGL SERPL-MCNC: 174 MG/DL
TSH SERPL DL<=0.05 MIU/L-ACNC: 3.26 UIU/ML (ref 0.27–4.2)
VLDLC SERPL CALC-MCNC: 35 MG/DL

## 2024-12-11 PROCEDURE — 84443 ASSAY THYROID STIM HORMONE: CPT

## 2024-12-11 PROCEDURE — 80061 LIPID PANEL: CPT

## 2024-12-11 PROCEDURE — 36415 COLL VENOUS BLD VENIPUNCTURE: CPT

## 2024-12-11 PROCEDURE — 80053 COMPREHEN METABOLIC PANEL: CPT

## 2024-12-11 PROCEDURE — 84439 ASSAY OF FREE THYROXINE: CPT

## 2024-12-12 ENCOUNTER — TELEPHONE (OUTPATIENT)
Dept: ENDOCRINOLOGY | Age: 69
End: 2024-12-12

## 2024-12-12 ENCOUNTER — ANTI-COAG VISIT (OUTPATIENT)
Age: 69
End: 2024-12-12
Payer: MEDICARE

## 2024-12-12 VITALS
SYSTOLIC BLOOD PRESSURE: 144 MMHG | DIASTOLIC BLOOD PRESSURE: 72 MMHG | HEART RATE: 62 BPM | WEIGHT: 126 LBS | BODY MASS INDEX: 21.63 KG/M2

## 2024-12-12 DIAGNOSIS — I48.91 POSTOPERATIVE ATRIAL FIBRILLATION (HCC): Primary | ICD-10-CM

## 2024-12-12 DIAGNOSIS — I97.89 POSTOPERATIVE ATRIAL FIBRILLATION (HCC): Primary | ICD-10-CM

## 2024-12-12 LAB
INTERNATIONAL NORMALIZATION RATIO, POC: 4.8
PROTHROMBIN TIME, POC: 0

## 2024-12-12 PROCEDURE — 85610 PROTHROMBIN TIME: CPT

## 2024-12-12 PROCEDURE — 99212 OFFICE O/P EST SF 10 MIN: CPT

## 2024-12-12 NOTE — TELEPHONE ENCOUNTER
Spoke with Barbara she states she is not taking any calcium that she is aware of denies taking anything OTC     She will get labs drawn

## 2024-12-12 NOTE — TELEPHONE ENCOUNTER
----- Message from Varun GRAVES sent at 12/11/2024  2:36 PM EST -----  Please call patient and inform her I have reviewed labs.  Thyroid function is stable.  Calcium level was elevated. Please inquire if patient is taking calcium supplementation?  I would like patient to obtain PTH, 25 vitamin D, and ionized calcium labs ordered

## 2024-12-12 NOTE — PROGRESS NOTES
[facility] (Medication Management)  [address] [place], [place], [address]  Phone: [phone]    Face-To-Face Visit  Patient Findings       Positives:  Change in health (She saw Dr. Nunez regarding torn ligaments in her shoulder, he won't do surgery, she will get a 2nd opinion. Her calcium level was high, will have repeat labs.)    Negatives:  Signs/symptoms of thrombosis, Signs/symptoms of bleeding, Laboratory test error suspected, Change in alcohol use, Change in activity, Upcoming invasive procedure, Emergency department visit, Upcoming dental procedure, Missed doses, Extra doses, Change in medications, Change in diet/appetite, Hospital admission, Bruising, Other complaints           Assessment/Plan:  Warfarin indication: atrial fibrillation      INR today is supratherapeutic at 4.8, goal is 2-3. INR was 4.1 at last visit and weekly MD was reduced, unsure why INR remains high.    Warfarin Dose: Hold today's dose then decrease weekly regimen 14% - 2.5 mg every Mon/Fri.; 5 mg all other days    Follow Up:  Recheck INR in 2 week(s).      Melisa Reyez [facility] PharmD, BCACP 12/12/2024 9:23 AM    For Pharmacy Admin Tracking Only    Intervention Detail: Dose Adjustment: 1, reason: Therapy De-escalation  Total # of Interventions Recommended: 1  Total # of Interventions Accepted: 1  Time Spent (min): 15

## 2024-12-17 ENCOUNTER — TELEPHONE (OUTPATIENT)
Dept: ENDOCRINOLOGY | Age: 69
End: 2024-12-17

## 2024-12-17 ENCOUNTER — HOSPITAL ENCOUNTER (OUTPATIENT)
Age: 69
Discharge: HOME | End: 2024-12-17
Payer: MEDICARE

## 2024-12-17 DIAGNOSIS — E83.52 HYPERCALCEMIA: ICD-10-CM

## 2024-12-17 DIAGNOSIS — E55.9 VITAMIN D DEFICIENCY: Primary | ICD-10-CM

## 2024-12-17 LAB
ALBUMIN SERPL-MCNC: 4.4 G/DL (ref 3.5–5.2)
ALP SERPL-CCNC: 92 U/L (ref 35–104)
ALT SERPL-CCNC: 18 U/L (ref 0–32)
ANION GAP SERPL CALCULATED.3IONS-SCNC: 9 MMOL/L (ref 7–16)
AST SERPL-CCNC: 18 U/L (ref 0–31)
BILIRUB SERPL-MCNC: 0.3 MG/DL (ref 0–1.2)
BUN SERPL-MCNC: 11 MG/DL (ref 6–23)
CA-I BLD-SCNC: 1.42 MMOL/L (ref 1.15–1.33)
CALCIUM SERPL-MCNC: 11 MG/DL (ref 8.6–10.2)
CHLORIDE SERPL-SCNC: 101 MMOL/L (ref 98–107)
CO2 SERPL-SCNC: 28 MMOL/L (ref 22–29)
CREAT SERPL-MCNC: 0.6 MG/DL (ref 0.5–1)
GFR, ESTIMATED: >90 ML/MIN/1.73M2
GLUCOSE SERPL-MCNC: 162 MG/DL (ref 74–99)
POTASSIUM SERPL-SCNC: 5 MMOL/L (ref 3.5–5)
PROT SERPL-MCNC: 7.4 G/DL (ref 6.4–8.3)
PTH-INTACT SERPL-MCNC: 86.1 PG/ML (ref 15–65)
SODIUM SERPL-SCNC: 138 MMOL/L (ref 132–146)

## 2024-12-17 PROCEDURE — 36415 COLL VENOUS BLD VENIPUNCTURE: CPT

## 2024-12-17 PROCEDURE — 83970 ASSAY OF PARATHORMONE: CPT

## 2024-12-17 PROCEDURE — 80053 COMPREHEN METABOLIC PANEL: CPT

## 2024-12-17 PROCEDURE — 82330 ASSAY OF CALCIUM: CPT

## 2024-12-17 NOTE — TELEPHONE ENCOUNTER
----- Message from Varun GRAVES sent at 12/17/2024 11:57 AM EST -----  Please call patient and inform her calcium is elevated but less than 1 upper limit of normal.  I am awaiting 1 other test which is pending.

## 2024-12-17 NOTE — TELEPHONE ENCOUNTER
----- Message from Varun GRAVES sent at 12/17/2024 11:57 AM EST -----  Also please see if lab can add on 25, vitamin D level

## 2024-12-20 ENCOUNTER — OFFICE VISIT (OUTPATIENT)
Dept: FAMILY MEDICINE CLINIC | Age: 69
End: 2024-12-20

## 2024-12-20 VITALS
DIASTOLIC BLOOD PRESSURE: 78 MMHG | RESPIRATION RATE: 18 BRPM | WEIGHT: 126 LBS | HEART RATE: 80 BPM | BODY MASS INDEX: 21.63 KG/M2 | TEMPERATURE: 97.1 F | OXYGEN SATURATION: 99 % | SYSTOLIC BLOOD PRESSURE: 142 MMHG

## 2024-12-20 DIAGNOSIS — M79.671 PAIN OF RIGHT HEEL: ICD-10-CM

## 2024-12-20 DIAGNOSIS — L03.119 CELLULITIS OF FOOT: Primary | ICD-10-CM

## 2024-12-20 RX ORDER — CLINDAMYCIN HYDROCHLORIDE 300 MG/1
300 CAPSULE ORAL 3 TIMES DAILY
Qty: 30 CAPSULE | Refills: 0 | Status: SHIPPED | OUTPATIENT
Start: 2024-12-20 | End: 2024-12-30

## 2024-12-20 ASSESSMENT — ENCOUNTER SYMPTOMS
NAUSEA: 0
SORE THROAT: 0
ABDOMINAL DISTENTION: 0
DIARRHEA: 0
SHORTNESS OF BREATH: 0
EYE PAIN: 0
VOMITING: 0
EYE DISCHARGE: 0
BACK PAIN: 0
SINUS PRESSURE: 0
EYE REDNESS: 0
COUGH: 0
WHEEZING: 0

## 2024-12-20 NOTE — PROGRESS NOTES
Chief Complaint:   Foot Pain (Right heel)      History of Present Illness   HPI:  Barbara Pelletier is a 69 y.o. female who [facility] R heel tender, red, cracking    Prior to Visit Medications    Medication Sig Taking? Authorizing Provider   sotalol (BETAPACE) 80 MG tablet Take 0.5 tablets by mouth 2 times daily Yes Cherise Ruano MD   albuterol sulfate HFA (PROVENTIL;VENTOLIN;PROAIR) 108 (90 Base) MCG/ACT inhaler Inhale 2 puffs into the lungs every 6 hours as needed for Wheezing Yes Zenobia Ellington MD   citalopram (CELEXA) 20 MG tablet Take 1.5 tablets by mouth daily Yes Anurag Garcia DO   insulin glargine, 1 unit dial, (TOUJEO SOLOSTAR) 300 UNIT/ML concentrated injection pen Inject 22 Units into the skin nightly Yes Varun Varner APRN - CNS   Insulin Pen Needle 32G X 4 MM MISC 1 each by Does not apply route in the morning, at noon, in the evening, and at bedtime Yes Varun Varner APRN - CNS   HUMALOG KWIKPEN 100 UNIT/ML SOPN INJECT 8 UNITS SUBCUTANEOUSLY THREE TIMES A DAY BEFORE MEALS Yes Varun Varner APRN - CNS   metFORMIN (GLUCOPHAGE) 500 MG tablet TAKE ONE TABLET BY MOUTH TWO TIMES A DAY WITH MEALS Yes Anurag Garcia DO   ibuprofen (ADVIL;MOTRIN) 200 MG tablet Take 2 tablets by mouth 2 times daily as needed for Pain Yes Ximena Mullen MD   lisinopril (PRINIVIL;ZESTRIL) 5 MG tablet Take 1 tablet by mouth daily Yes Varun Varner APRN - CNS   magnesium oxide (MAG-OX) 400 MG tablet Take 1 tablet by mouth daily Yes Cherise Ruano MD   warfarin (COUMADIN) 5 MG tablet Take one tablet by mouth once daily as directed by Anticoagulation Clinic Yes Jaylen Jones MD   Continuous Blood Gluc Sensor (FREESTYLE YADIRA 2 SENSOR) MISC Change sensor every 14 days Yes Lynda Sinclair APRN - CNP   famotidine (PEPCID) 20 MG tablet Take 1 tablet by mouth daily Yes ProviderXimena MD   Ixekizumab (TALTZ SC) Inject into the skin every 30 days For

## 2024-12-27 ENCOUNTER — ANTI-COAG VISIT (OUTPATIENT)
Age: 69
End: 2024-12-27
Payer: MEDICARE

## 2024-12-27 VITALS
SYSTOLIC BLOOD PRESSURE: 169 MMHG | BODY MASS INDEX: 21.87 KG/M2 | HEART RATE: 53 BPM | WEIGHT: 127.4 LBS | DIASTOLIC BLOOD PRESSURE: 61 MMHG

## 2024-12-27 DIAGNOSIS — I97.89 POSTOPERATIVE ATRIAL FIBRILLATION (HCC): Primary | ICD-10-CM

## 2024-12-27 DIAGNOSIS — I48.91 POSTOPERATIVE ATRIAL FIBRILLATION (HCC): Primary | ICD-10-CM

## 2024-12-27 LAB
INTERNATIONAL NORMALIZATION RATIO, POC: 1.6
PROTHROMBIN TIME, POC: 0

## 2024-12-27 PROCEDURE — 99213 OFFICE O/P EST LOW 20 MIN: CPT

## 2024-12-27 PROCEDURE — 85610 PROTHROMBIN TIME: CPT

## 2024-12-27 RX ORDER — WARFARIN SODIUM 5 MG/1
TABLET ORAL
Qty: 90 TABLET | Refills: 3 | Status: SHIPPED | OUTPATIENT
Start: 2024-12-27

## 2024-12-27 NOTE — PROGRESS NOTES
[facility] (Medication Management)  [address] [place], [place], [address]  Phone: [phone]    Face-To-Face Visit  Patient Findings       Positives:  Change in health (HAS 3 TEARS IN SHOULDER & IN A LOT OF PAIN BUT INOPERABLE), Change in medications (STARTED CLINDAMYCIN FOR CELLULITIS (CRACKED FOOT, AND THEN CAT SCRATCHED HER LEG) X10 DAYS TOTAL (STARTED A WEEK AGO))    Negatives:  Signs/symptoms of thrombosis, Signs/symptoms of bleeding, Laboratory test error suspected, Change in alcohol use, Change in activity, Upcoming invasive procedure, Emergency department visit, Upcoming dental procedure, Missed doses, Extra doses, Change in diet/appetite, Hospital admission, Bruising, Other complaints    Comments:  PODIATRY 1/2 - F/U FOR FOOT  DR BRITTNEY SANDOVAL             Assessment/Plan:  Warfarin indication: atrial fibrillation       INR today is SUBtherapeutic at 1.6, goal is 2-3    Warfarin Dose: INCR DOSE BY 8% TO 2.5MG MON, 5MG ALL OTHER DAYS    Follow Up:  Recheck INR in 2 week(s).    BLOOD PRESSURE ON HIGHER SIDE- TOLD HER TO F/U WITH  REGARDING BP      Johanna Guaman, [facility] PharmD, BCACP 12/27/2024 10:27 AM    For Pharmacy Admin Tracking Only    Intervention Detail: Dose Adjustment: 2, reason: Therapy Optimization and Refill(s) Provided  Total # of Interventions Recommended: 2  Total # of Interventions Accepted: 2  Time Spent (min): 20

## 2024-12-30 ENCOUNTER — TELEPHONE (OUTPATIENT)
Dept: ENDOCRINOLOGY | Age: 69
End: 2024-12-30

## 2024-12-30 DIAGNOSIS — E21.3 HYPERPARATHYROIDISM (HCC): Primary | ICD-10-CM

## 2024-12-30 NOTE — TELEPHONE ENCOUNTER
Please inform patient I would like her to obtain 24-hour urine calcium and creatinine.  PTH is elevated most likely related to primary hyperparathyroidism.  I would like patient to obtain 24-hour urine calcium along with vitamin D level.  Once those labs are back we will discuss with patient

## 2024-12-30 NOTE — TELEPHONE ENCOUNTER
They told me they could add on but it was not completed. I will call pt and see if she would be willing to go get done. What should I tell her about the PTH level?

## 2024-12-30 NOTE — TELEPHONE ENCOUNTER
----- Message from Varun GRAVES sent at 12/23/2024  9:16 AM EST -----  I do not see lab added on vitamin D.  Please see if this was done  Advised patient to keep hydrated and drink plenty of water   Inpatient Radiology Pre-procedure Note    History of Present Illness:  Jihan Zamudio is a 52 y.o. female with KESSLER cirrhosis who presents for Thoracentesis.     Admission H&P reviewed.  Past Medical History:   Diagnosis Date    Cirrhosis     Esophageal varices 2018    Small with no banding     Essential hypertension 2018    Fatty liver 2018    GERD (gastroesophageal reflux disease)     Hx of colonic polyps 2018    On colonoscopy     Hypertension     Hypothyroidism 2018    Kidney stones     Morbid obesity 2018    Lap band with subsequent release    KADEEM (obstructive sleep apnea) 2018    Osteoarthritis 2018    Pulmonary nodule 2018    Vitamin D deficiency 2018     Past Surgical History:   Procedure Laterality Date     SECTION      TIMES 2     CHOLECYSTECTOMY      LAPAROSCOPIC     CYSTOSCOPY W/ STONE MANIPULATION      kidney stone removal    EGD (ESOPHAGOGASTRODUODENOSCOPY) N/A 2019    Performed by Davian Hernández MD at [facility] ENDO (2ND FLR)    LAPAROSCOPIC GASTRIC BANDING  2006    removal     LIVER BIOPSY  2017    KESSLER with bridging 17    TRANSPLANT, LIVER N/A 2019    Performed by Clemente Brown Jr., MD at [facility] OR 2ND FLR       Review of Systems:   As documented in primary team H&P    Home Meds:   Prior to Admission medications    Medication Sig Start Date End Date Taking? Authorizing Provider   acetaminophen (TYLENOL) 500 MG tablet Take 1,000 mg by mouth daily as needed for Pain (headache and bodyaches).   Yes Historical Provider, MD   cholecalciferol, vitamin D3, (VITAMIN D3) 2,000 unit Cap Take 1 capsule by mouth once daily.   Yes Historical Provider, MD   ergocalciferol (ERGOCALCIFEROL) 50,000 unit Cap Take 50,000 Units by mouth every 7 days. Monday   Yes Historical Provider, MD   fexofenadine (ALLEGRA) 180 MG tablet Take 180 mg by mouth daily as needed.   Yes Historical Provider, MD    furosemide (LASIX) 80 MG tablet Take 1 tablet (80 mg total) by mouth 2 (two) times daily. 5/2/19 5/1/20 Yes Maritza Messina MD   levothyroxine (SYNTHROID) 112 MCG tablet Take 112 mcg by mouth once daily.   Yes Historical Provider, MD   lidocaine (LIDODERM) 5 % Place 1 patch onto the skin every 24 hours. Remove & Discard patch within 12 hours  As needed or as directed by MD   Yes Historical Provider, MD   pantoprazole (PROTONIX) 40 MG tablet Take 1 tablet (40 mg total) by mouth once daily. 4/24/19 7/23/19 Yes Jaret Armando MD   rifAXIMin (XIFAXAN) 550 mg Tab Take 550 mg by mouth 2 (two) times daily.    Yes Historical Provider, MD   spironolactone (ALDACTONE) 100 MG tablet Take 2 tablets (200 mg total) by mouth once daily.  Patient taking differently: Take 100 mg by mouth 2 (two) times daily.  5/3/19 5/2/20 Yes Maritza Messina MD   zinc sulfate (ZINCATE) 220 (50) mg capsule Take 1 capsule (220 mg total) by mouth once daily. 5/3/19   Maritza Messina MD     Scheduled Meds:    lactulose  5 g Oral Daily    levothyroxine  112 mcg Oral Daily    miconazole NITRATE 2 %   Topical (Top) BID    pantoprazole  40 mg Oral Daily    rifAXImin  550 mg Oral BID     Continuous Infusions:    sodium chloride 0.9%       PRN Meds:sodium chloride, acetaminophen, vancomycin (VANCOCIN) IVPB **AND** ciprofloxacin, cyclobenzaprine, dextrose 50%, dextrose 50%, glucagon (human recombinant), glucose, glucose, magnesium sulfate IVPB, ondansetron, phenyleph-min oil-petrolatum, simethicone, sodium chloride 0.9%, sodium phosphate IVPB, sodium phosphate IVPB, sodium phosphate IVPB, trazodone  Anticoagulants/Antiplatelets: no anticoagulation    Allergies:   Review of patient's allergies indicates:   Allergen Reactions    Metformin Rash    Pcn [penicillins] Other (See Comments)     Unsure of reaction, states it was as a child. Tolerated rocephin at osh     Sedation Hx: have not been any systemic reactions    Labs:  Recent Labs   Lab  06/05/19  0642   INR 2.2*       Recent Labs   Lab 06/05/19  0642   WBC 1.85*   HGB 7.7*   HCT 22.8*   MCV 92   PLT 55*      Recent Labs   Lab 06/04/19  0438  06/05/19  0642   GLU 95  95   < > 89  89   *  130*   < > 132*  132*   K 3.7  3.7   < > 3.8  3.8     100   < > 100  100   CO2 24  24   < > 24  24   BUN 12  12   < > 13  13   CREATININE 0.7  0.7   < > 0.7  0.7   CALCIUM 8.8  8.8   < > 8.7  8.7   MG 1.8  1.8   < > 1.7  1.7   ALT 30  30  --  30   AST 71*  71*  --  72*   ALBUMIN 3.2*  3.2*   < > 3.0*  3.0*   BILITOT 15.0*  15.0*  --  15.0*   BILIDIR 4.4*  --   --     < > = values in this interval not displayed.         Vitals:  Temp: 98.9 °F (37.2 °C) (06/05/19 0832)  Pulse: 95 (06/05/19 1038)  Resp: (!) 28 (06/05/19 0832)  BP: (!) 118/59 (06/05/19 1038)  SpO2: 95 % (06/05/19 1038)     Physical Exam:  ASA: 3  Mallampati: 2    General: no acute distress  Mental Status: alert and oriented to person, place and time  HEENT: normocephalic, atraumatic  Chest: unlabored breathing  Heart: regular heart rate  Abdomen: nondistended  Extremity: moves all extremities    Plan:     Image guided Thoracentesis.   Sedation Plan: local anesthesia     Stephanie Nielsen MD   PGY-2 Radiology

## 2024-12-31 NOTE — TELEPHONE ENCOUNTER
Pt notified PTH is elevated most likely related to primary hyperparathyroidism. Obtain 24-hour urine calcium along with vitamin D level. Once those labs are back we will discuss further.

## 2025-01-05 NOTE — PROGRESS NOTES
[facility]- [facility] [place]-Electrophysiology  Outpatient progress note  PATIENT: Barbara Pelletier  MEDICAL RECORD NUMBER: [id number]  DATE OF SERVICE:  1/7/2025  ATTENDING ELECTROPHYSIOLOGIST: Cherise Ruano MD   REFERRING PHYSICIAN:  Anurag Garcia DO  CHIEF COMPLAINT: Atrial Fibrillation with hypotension.     HPI: This is a 70 y.o. female with a history of Paroxsymal atrial fibrillation noted in the post CABG period,  Coronary artery disease with prior CABG times 2 (LIMA-LAD, SVG to OM) 11/2022, left atrial appendage clip with the AtriCure clip, Mild mitral regurgitation, Hypertension, Type 2 diabetes milltus, Peripheral vascular disease with prior left carotid enterectomy 10/2018, prior ilica stent placement 12/2018, Hyperlipidemia,  Hypothyroidism, Prior heavy Tobacco use stopping in June 2023, COPD, Pulmonary Nodule, GERD, Lumbar stenosis S/p L1-S1 fusion, Migraines, Basal cell Ca, Psoriasiform dermatis, Cataracts.     She was noted to have Atrial fibrillation post CABG in November 2022, seen by Dr. García who recommended Eliquis, ASA PPI, and short term Amiodarone. In December 2022 Eliquis was found too expensive and she was transitioned to Warfarin, managed by the Anticoagulation clinic with the discontinuation of Amiodarone at that time.    In January 2023 she was found to have Hypertension and was started on Toprol 25mg QD by Dr. Rick.     Due to ongoing  back pain she was seen by Dr. Arndt who then performed a L1-S1 laminectomy and posterior fusion of L4-L5 L5-S1 fusion on 09/25/2023 with Warfarin being stopped at that time. Dr. Foreman was consulted on the 27th for post op respiratory insufficiency a repeat CTA showed no PE however an increased left upper lode nodule, she was weaned from 6L to 1L nasal cannula. She wwas then admitted to Acute Rehab on 09/28/2023.     On 09/29/2023 the patient had an RRT in acute rehab due to AF with RVR  140 bpm, with associated

## 2025-01-07 ENCOUNTER — TELEPHONE (OUTPATIENT)
Dept: CARDIOLOGY | Age: 70
End: 2025-01-07

## 2025-01-07 ENCOUNTER — OFFICE VISIT (OUTPATIENT)
Dept: NON INVASIVE DIAGNOSTICS | Age: 70
End: 2025-01-07
Payer: MEDICARE

## 2025-01-07 VITALS
HEIGHT: 64 IN | RESPIRATION RATE: 18 BRPM | DIASTOLIC BLOOD PRESSURE: 68 MMHG | WEIGHT: 127 LBS | HEART RATE: 67 BPM | BODY MASS INDEX: 21.68 KG/M2 | SYSTOLIC BLOOD PRESSURE: 124 MMHG

## 2025-01-07 DIAGNOSIS — I35.0 NONRHEUMATIC AORTIC VALVE STENOSIS: Primary | ICD-10-CM

## 2025-01-07 DIAGNOSIS — I48.91 ATRIAL FIBRILLATION, UNSPECIFIED TYPE (HCC): ICD-10-CM

## 2025-01-07 PROCEDURE — G8427 DOCREV CUR MEDS BY ELIG CLIN: HCPCS | Performed by: INTERNAL MEDICINE

## 2025-01-07 PROCEDURE — G8399 PT W/DXA RESULTS DOCUMENT: HCPCS | Performed by: INTERNAL MEDICINE

## 2025-01-07 PROCEDURE — 4004F PT TOBACCO SCREEN RCVD TLK: CPT | Performed by: INTERNAL MEDICINE

## 2025-01-07 PROCEDURE — 1123F ACP DISCUSS/DSCN MKR DOCD: CPT | Performed by: INTERNAL MEDICINE

## 2025-01-07 PROCEDURE — 1090F PRES/ABSN URINE INCON ASSESS: CPT | Performed by: INTERNAL MEDICINE

## 2025-01-07 PROCEDURE — 3017F COLORECTAL CA SCREEN DOC REV: CPT | Performed by: INTERNAL MEDICINE

## 2025-01-07 PROCEDURE — G8420 CALC BMI NORM PARAMETERS: HCPCS | Performed by: INTERNAL MEDICINE

## 2025-01-07 PROCEDURE — 99214 OFFICE O/P EST MOD 30 MIN: CPT | Performed by: INTERNAL MEDICINE

## 2025-01-07 PROCEDURE — 93000 ELECTROCARDIOGRAM COMPLETE: CPT | Performed by: INTERNAL MEDICINE

## 2025-01-07 PROCEDURE — M1308 PR FLU IMMUNIZE NO ADMIN: HCPCS | Performed by: INTERNAL MEDICINE

## 2025-01-07 PROCEDURE — 1159F MED LIST DOCD IN RCRD: CPT | Performed by: INTERNAL MEDICINE

## 2025-01-07 PROCEDURE — 1160F RVW MEDS BY RX/DR IN RCRD: CPT | Performed by: INTERNAL MEDICINE

## 2025-01-07 RX ORDER — SOTALOL HYDROCHLORIDE 80 MG/1
40 TABLET ORAL 2 TIMES DAILY
Qty: 90 TABLET | Refills: 3 | Status: SHIPPED | OUTPATIENT
Start: 2025-01-07

## 2025-01-07 NOTE — TELEPHONE ENCOUNTER
Called patient and l/m to schedule echo    Electronically signed by Lily Mcneal on 1/7/2025 at 10:10 AM

## 2025-01-08 ENCOUNTER — HOSPITAL ENCOUNTER (OUTPATIENT)
Dept: CARDIOLOGY | Age: 70
Discharge: HOME | End: 2025-01-10
Attending: INTERNAL MEDICINE
Payer: MEDICARE

## 2025-01-08 VITALS
SYSTOLIC BLOOD PRESSURE: 124 MMHG | HEIGHT: 64 IN | WEIGHT: 127 LBS | BODY MASS INDEX: 21.68 KG/M2 | DIASTOLIC BLOOD PRESSURE: 68 MMHG

## 2025-01-08 DIAGNOSIS — I35.0 NONRHEUMATIC AORTIC VALVE STENOSIS: ICD-10-CM

## 2025-01-08 LAB
ECHO AO ASC DIAM: 3.2 CM
ECHO AO ASCENDING AORTA INDEX: 1.99 CM/M2
ECHO AV AREA PEAK VELOCITY: 2.2 CM2
ECHO AV AREA VTI: 2.3 CM2
ECHO AV AREA/BSA PEAK VELOCITY: 1.4 CM2/M2
ECHO AV AREA/BSA VTI: 1.4 CM2/M2
ECHO AV CUSP MM: 2 CM
ECHO AV MEAN GRADIENT: 3 MMHG
ECHO AV MEAN VELOCITY: 0.8 M/S
ECHO AV PEAK GRADIENT: 8 MMHG
ECHO AV PEAK VELOCITY: 1.4 M/S
ECHO AV VELOCITY RATIO: 0.71
ECHO AV VTI: 32 CM
ECHO BSA: 1.61 M2
ECHO EST RA PRESSURE: 3 MMHG
ECHO LA DIAMETER INDEX: 2.05 CM/M2
ECHO LA DIAMETER: 3.3 CM
ECHO LA VOL A-L A2C: 37 ML (ref 22–52)
ECHO LA VOL A-L A4C: 24 ML (ref 22–52)
ECHO LA VOL MOD A2C: 35 ML (ref 22–52)
ECHO LA VOL MOD A4C: 23 ML (ref 22–52)
ECHO LA VOLUME AREA LENGTH: 32 ML
ECHO LA VOLUME INDEX A-L A2C: 23 ML/M2 (ref 16–34)
ECHO LA VOLUME INDEX A-L A4C: 15 ML/M2 (ref 16–34)
ECHO LA VOLUME INDEX AREA LENGTH: 20 ML/M2 (ref 16–34)
ECHO LA VOLUME INDEX MOD A2C: 22 ML/M2 (ref 16–34)
ECHO LA VOLUME INDEX MOD A4C: 14 ML/M2 (ref 16–34)
ECHO LV EF PHYSICIAN: 60 %
ECHO LV FRACTIONAL SHORTENING: 32 % (ref 28–44)
ECHO LV INTERNAL DIMENSION DIASTOLE INDEX: 3.29 CM/M2
ECHO LV INTERNAL DIMENSION DIASTOLIC: 5.3 CM (ref 3.9–5.3)
ECHO LV INTERNAL DIMENSION SYSTOLIC INDEX: 2.24 CM/M2
ECHO LV INTERNAL DIMENSION SYSTOLIC: 3.6 CM
ECHO LV IVSD: 0.7 CM (ref 0.6–0.9)
ECHO LV IVSS: 1.2 CM
ECHO LV MASS 2D: 127 G (ref 67–162)
ECHO LV MASS INDEX 2D: 78.9 G/M2 (ref 43–95)
ECHO LV POSTERIOR WALL DIASTOLIC: 0.7 CM (ref 0.6–0.9)
ECHO LV POSTERIOR WALL SYSTOLIC: 1.2 CM
ECHO LV RELATIVE WALL THICKNESS RATIO: 0.26
ECHO LVOT AREA: 3.1 CM2
ECHO LVOT AV VTI INDEX: 0.75
ECHO LVOT DIAM: 2 CM
ECHO LVOT MEAN GRADIENT: 2 MMHG
ECHO LVOT PEAK GRADIENT: 4 MMHG
ECHO LVOT PEAK VELOCITY: 1 M/S
ECHO LVOT STROKE VOLUME INDEX: 46.6 ML/M2
ECHO LVOT SV: 75 ML
ECHO LVOT VTI: 23.9 CM
ECHO MV A VELOCITY: 0.56 M/S
ECHO MV AREA PHT: 3.1 CM2
ECHO MV AREA VTI: 2.5 CM2
ECHO MV E DECELERATION TIME (DT): 217.6 MS
ECHO MV E VELOCITY: 0.98 M/S
ECHO MV E/A RATIO: 1.75
ECHO MV LVOT VTI INDEX: 1.27
ECHO MV MAX VELOCITY: 1 M/S
ECHO MV MEAN GRADIENT: 1 MMHG
ECHO MV MEAN VELOCITY: 0.5 M/S
ECHO MV PEAK GRADIENT: 4 MMHG
ECHO MV PRESSURE HALF TIME (PHT): 71.5 MS
ECHO MV VTI: 30.4 CM
ECHO PV MAX VELOCITY: 0.7 M/S
ECHO PV MEAN GRADIENT: 1 MMHG
ECHO PV MEAN VELOCITY: 0.5 M/S
ECHO PV PEAK GRADIENT: 2 MMHG
ECHO PV VTI: 14.4 CM
ECHO PVEIN A DURATION: 78.4 MS
ECHO PVEIN A VELOCITY: 0.2 M/S
ECHO PVEIN PEAK D VELOCITY: 0.6 M/S
ECHO PVEIN PEAK S VELOCITY: 0.5 M/S
ECHO PVEIN S/D RATIO: 0.8
ECHO RIGHT VENTRICULAR SYSTOLIC PRESSURE (RVSP): 29 MMHG
ECHO TV REGURGITANT MAX VELOCITY: 2.55 M/S
ECHO TV REGURGITANT PEAK GRADIENT: 26 MMHG

## 2025-01-08 PROCEDURE — 93306 TTE W/DOPPLER COMPLETE: CPT | Performed by: INTERNAL MEDICINE

## 2025-01-08 PROCEDURE — 93306 TTE W/DOPPLER COMPLETE: CPT

## 2025-01-09 ENCOUNTER — OFFICE VISIT (OUTPATIENT)
Dept: CARDIOLOGY CLINIC | Age: 70
End: 2025-01-09
Payer: MEDICARE

## 2025-01-09 VITALS
DIASTOLIC BLOOD PRESSURE: 64 MMHG | TEMPERATURE: 96.8 F | OXYGEN SATURATION: 96 % | BODY MASS INDEX: 22.09 KG/M2 | WEIGHT: 129.4 LBS | SYSTOLIC BLOOD PRESSURE: 122 MMHG | HEIGHT: 64 IN | HEART RATE: 56 BPM | RESPIRATION RATE: 14 BRPM

## 2025-01-09 DIAGNOSIS — I25.10 CORONARY ARTERY DISEASE INVOLVING NATIVE CORONARY ARTERY OF NATIVE HEART WITHOUT ANGINA PECTORIS: Primary | ICD-10-CM

## 2025-01-09 DIAGNOSIS — Z98.890 S/P CAROTID ENDARTERECTOMY: ICD-10-CM

## 2025-01-09 DIAGNOSIS — Z95.1 HX OF CABG: ICD-10-CM

## 2025-01-09 DIAGNOSIS — I97.89 POSTOPERATIVE ATRIAL FIBRILLATION (HCC): ICD-10-CM

## 2025-01-09 DIAGNOSIS — I74.09 AORTOILIAC OCCLUSIVE DISEASE (HCC): ICD-10-CM

## 2025-01-09 DIAGNOSIS — I48.91 POSTOPERATIVE ATRIAL FIBRILLATION (HCC): ICD-10-CM

## 2025-01-09 PROCEDURE — 99214 OFFICE O/P EST MOD 30 MIN: CPT | Performed by: INTERNAL MEDICINE

## 2025-01-09 PROCEDURE — G8420 CALC BMI NORM PARAMETERS: HCPCS | Performed by: INTERNAL MEDICINE

## 2025-01-09 PROCEDURE — 3017F COLORECTAL CA SCREEN DOC REV: CPT | Performed by: INTERNAL MEDICINE

## 2025-01-09 PROCEDURE — G8427 DOCREV CUR MEDS BY ELIG CLIN: HCPCS | Performed by: INTERNAL MEDICINE

## 2025-01-09 PROCEDURE — 93000 ELECTROCARDIOGRAM COMPLETE: CPT | Performed by: INTERNAL MEDICINE

## 2025-01-09 PROCEDURE — 1123F ACP DISCUSS/DSCN MKR DOCD: CPT | Performed by: INTERNAL MEDICINE

## 2025-01-09 PROCEDURE — G8399 PT W/DXA RESULTS DOCUMENT: HCPCS | Performed by: INTERNAL MEDICINE

## 2025-01-09 PROCEDURE — 1159F MED LIST DOCD IN RCRD: CPT | Performed by: INTERNAL MEDICINE

## 2025-01-09 PROCEDURE — 1090F PRES/ABSN URINE INCON ASSESS: CPT | Performed by: INTERNAL MEDICINE

## 2025-01-09 PROCEDURE — 4004F PT TOBACCO SCREEN RCVD TLK: CPT | Performed by: INTERNAL MEDICINE

## 2025-01-09 PROCEDURE — 1160F RVW MEDS BY RX/DR IN RCRD: CPT | Performed by: INTERNAL MEDICINE

## 2025-01-10 ENCOUNTER — OFFICE VISIT (OUTPATIENT)
Dept: ENT CLINIC | Age: 70
End: 2025-01-10
Payer: MEDICARE

## 2025-01-10 VITALS
TEMPERATURE: 97.2 F | HEIGHT: 64 IN | SYSTOLIC BLOOD PRESSURE: 133 MMHG | WEIGHT: 127 LBS | OXYGEN SATURATION: 97 % | BODY MASS INDEX: 21.68 KG/M2 | DIASTOLIC BLOOD PRESSURE: 71 MMHG | HEART RATE: 80 BPM | RESPIRATION RATE: 16 BRPM

## 2025-01-10 DIAGNOSIS — K13.79 MOUTH SORES: Primary | ICD-10-CM

## 2025-01-10 DIAGNOSIS — K14.8 TONGUE LESION: ICD-10-CM

## 2025-01-10 DIAGNOSIS — J30.1 SEASONAL ALLERGIC RHINITIS DUE TO POLLEN: ICD-10-CM

## 2025-01-10 PROCEDURE — 99213 OFFICE O/P EST LOW 20 MIN: CPT | Performed by: OTOLARYNGOLOGY

## 2025-01-10 PROCEDURE — 1123F ACP DISCUSS/DSCN MKR DOCD: CPT | Performed by: OTOLARYNGOLOGY

## 2025-01-10 NOTE — PROGRESS NOTES
Subjective:      Patient ID:  Barbara Pelletier is a 70 y.o. female.    HPI:    Patient here for follow up of tongue lesion and throat. Patient recently on antibiotic and reports this helped area on tongue. No bleeding or drainage from the area. No new areas of concern reported.  Denied dysphagia, pain, or globus sensation.  Patient also seen for allergic rhinitis, no longer using flonase as she feels okay without it. Would like to discuss her ear today. Having sensation of foreign body with itch bilaterally ears with positional changes. Denied dizziness, pain, changes to hearing, popping.     Lesion of the anterior midline tongue was biopsied in 2023 and came back showing chronic inflammatory changes and a fungal organism.      Past Medical History:   Diagnosis Date    ADHD (attention deficit hyperactivity disorder)     Age-related osteoporosis without current pathological fracture 2021    Anticoagulant long-term use [id number]    Arthritis     Atrial fibrillation (HCC) 828814    Atrial fibrillation, transient (HCC) 2023    Carotid artery stenosis     Cataracts, bilateral 10/'19    Chronic back pain     Colon polyps 2021    tubular adenoma    Diabetes mellitus (HCC)     DM type 2 (diabetes mellitus, type 2) (HCC)     Facial basal cell cancer 2022    [facility]    Hyperlipidemia     Hypertension     Hypothyroidism     Migraines     rare    Neuropathy     Neuropathy, diabetic (HCC)     Peripheral vascular disease (HCC)     follows with Dr. Mack yearly    Positive colorectal cancer screening using Cologuard test     Psoriasiform dermatitis     Seasonal allergies     Thyroid disease      Past Surgical History:   Procedure Laterality Date    BACK SURGERY      L5-S1    CARDIAC SURGERY  [id number]    CAROTID ENDARTERECTOMY Left 10/11/2018    [facility]    CARPAL TUNNEL RELEASE      bilat     SECTION      2    COLONOSCOPY N/A 2021    COLONOSCOPY WITH BIOPSY with tattooing

## 2025-01-13 ENCOUNTER — ANTI-COAG VISIT (OUTPATIENT)
Age: 70
End: 2025-01-13
Payer: MEDICARE

## 2025-01-13 VITALS
SYSTOLIC BLOOD PRESSURE: 134 MMHG | DIASTOLIC BLOOD PRESSURE: 70 MMHG | HEART RATE: 61 BPM | WEIGHT: 126.8 LBS | BODY MASS INDEX: 21.77 KG/M2

## 2025-01-13 DIAGNOSIS — I48.91 POSTOPERATIVE ATRIAL FIBRILLATION (HCC): Primary | ICD-10-CM

## 2025-01-13 DIAGNOSIS — I48.91 ATRIAL FIBRILLATION WITH RAPID VENTRICULAR RESPONSE (HCC): Primary | ICD-10-CM

## 2025-01-13 DIAGNOSIS — I97.89 POSTOPERATIVE ATRIAL FIBRILLATION (HCC): Primary | ICD-10-CM

## 2025-01-13 LAB
INTERNATIONAL NORMALIZATION RATIO, POC: 1.5
PROTHROMBIN TIME, POC: 0

## 2025-01-13 PROCEDURE — 85610 PROTHROMBIN TIME: CPT

## 2025-01-13 PROCEDURE — 99212 OFFICE O/P EST SF 10 MIN: CPT

## 2025-01-13 RX ORDER — MUPIROCIN 20 MG/G
OINTMENT TOPICAL 2 TIMES DAILY
COMMUNITY

## 2025-01-13 RX ORDER — FLUTICASONE PROPIONATE 50 MCG
1 SPRAY, SUSPENSION (ML) NASAL DAILY
COMMUNITY

## 2025-01-13 NOTE — PROGRESS NOTES
[facility] Anticoagulation Clinic (Medication Management)  [address] [place], [address]  Phone: [phone]    Face-To-Face Visit  Patient Findings       Positives:  Change in health (She plans to get a 2nd opinion for her torn ligaments in her right shoulder), Missed doses (She missed one dose last week (either 1/7 or 1/8)), Change in medications (ENT re-prescribed Flonase; she is using topical mupirocin for her feet/toes (cracked))    Negatives:  Signs/symptoms of thrombosis, Signs/symptoms of bleeding, Laboratory test error suspected, Change in alcohol use, Change in activity, Upcoming invasive procedure, Emergency department visit, Upcoming dental procedure, Extra doses, Change in diet/appetite, Hospital admission, Bruising, Other complaints    Comments:  Podiatry f/u in 2 weeks           Assessment/Plan:  Warfarin indication: atrial fibrillation      INR today is subtherapeutic at 1.5, goal is 2-3. Although she missed a warfarin dose in the past week, I don't believe the one missed dose from 5-6 days ago explains low INR today.    Warfarin Dose: Increase warfarin to 5 mg daily starting today; this is about an 8% increase    Follow Up:  Recheck INR in 2 week(s).      Melisa Reyez [facility] PharmD, BCACP 1/13/2025 10:56 AM    For Pharmacy Admin Tracking Only    Intervention Detail: Dose Adjustment: 1, reason: Therapy Optimization  Total # of Interventions Recommended: 1  Total # of Interventions Accepted: 1  Time Spent (min): 15

## 2025-01-24 ENCOUNTER — ANTI-COAG VISIT (OUTPATIENT)
Age: 70
End: 2025-01-24
Payer: MEDICARE

## 2025-01-24 VITALS
SYSTOLIC BLOOD PRESSURE: 198 MMHG | BODY MASS INDEX: 22.42 KG/M2 | HEART RATE: 62 BPM | WEIGHT: 130.6 LBS | DIASTOLIC BLOOD PRESSURE: 70 MMHG

## 2025-01-24 DIAGNOSIS — I48.91 POSTOPERATIVE ATRIAL FIBRILLATION (HCC): Primary | ICD-10-CM

## 2025-01-24 DIAGNOSIS — I97.89 POSTOPERATIVE ATRIAL FIBRILLATION (HCC): Primary | ICD-10-CM

## 2025-01-24 LAB
INR BLD: 1.8
PROTIME: NORMAL

## 2025-01-24 PROCEDURE — 85610 PROTHROMBIN TIME: CPT

## 2025-01-24 PROCEDURE — 99212 OFFICE O/P EST SF 10 MIN: CPT

## 2025-01-24 NOTE — PROGRESS NOTES
[facility] Anticoagulation Clinic (Medication Management)  [address] [place], [address]  Phone: [phone]    Face-To-Face Visit  Patient Findings       Positives:  Change in health (Feet continue to hurt and swell; Pt is seeing podiatrist in Feb; Pain affecting BP advised pt to monitor at home)    Negatives:  Signs/symptoms of thrombosis, Signs/symptoms of bleeding, Laboratory test error suspected, Change in alcohol use, Change in activity, Upcoming invasive procedure, Emergency department visit, Upcoming dental procedure, Missed doses, Extra doses, Change in medications, Change in diet/appetite, Hospital admission, Bruising, Other complaints           Assessment/Plan:  Warfarin indication: atrial fibrillation       INR today is Subtherapeutic at 1.8, goal is 2-3, no known cause for low, increased weekly dose last time, but remained low    Warfarin Dose: Increase weekly dose to 7.5 mg every Fri; 5 mg all other days; Increase of 7.1% for a total of 37.5 mg per week.     Follow Up:  Recheck INR in 2 week(s).    Electronically signed by Lacy Jackson on 1/24/2025 at 9:51 AM     For Pharmacy Admin Tracking Only    Intervention Detail: Dose Adjustment: 1, reason: Therapy Optimization  Total # of Interventions Recommended: 1  Total # of Interventions Accepted: 1  Time Spent (min): 15

## 2025-02-03 ENCOUNTER — HOSPITAL ENCOUNTER (OUTPATIENT)
Age: 70
Discharge: HOME | End: 2025-02-03
Payer: MEDICARE

## 2025-02-03 DIAGNOSIS — E55.9 VITAMIN D DEFICIENCY: ICD-10-CM

## 2025-02-03 LAB — 25(OH)D3 SERPL-MCNC: 6.5 NG/ML (ref 30–100)

## 2025-02-03 PROCEDURE — 82306 VITAMIN D 25 HYDROXY: CPT

## 2025-02-03 PROCEDURE — 36415 COLL VENOUS BLD VENIPUNCTURE: CPT

## 2025-02-03 RX ORDER — ERGOCALCIFEROL 1.25 MG/1
50000 CAPSULE ORAL WEEKLY
Qty: 12 CAPSULE | Refills: 0 | Status: SHIPPED | OUTPATIENT
Start: 2025-02-03

## 2025-02-04 ENCOUNTER — TELEPHONE (OUTPATIENT)
Dept: ENDOCRINOLOGY | Age: 70
End: 2025-02-04

## 2025-02-04 NOTE — TELEPHONE ENCOUNTER
----- Message from Varun GRAVES sent at 2/3/2025 12:43 PM EST -----  Please call patient and inform her vitamin D is extremely low.  I would like patient to start Drisdol 50,000 IU 1 tablet weekly for 12 weeks.  Prescription sent.  Patient can then take vitamin D 2000 IU daily over-the-counter thereafter

## 2025-02-04 NOTE — TELEPHONE ENCOUNTER
Pt notified vitamin D is extremely low. Start Drisdol 50,000 IU 1 tablet weekly for 12 weeks. Prescription sent. Then take vitamin D 2000 IU daily OTC after

## 2025-02-05 ENCOUNTER — HOSPITAL ENCOUNTER (OUTPATIENT)
Age: 70
Setting detail: SPECIMEN
Discharge: HOME | End: 2025-02-05
Payer: MEDICARE

## 2025-02-05 DIAGNOSIS — E21.3 HYPERPARATHYROIDISM (HCC): ICD-10-CM

## 2025-02-05 LAB
CALCIUM, URINE: 95 MG/24 H (ref 100–300)
COLLECT DURATION TIME SPEC: 24 H
COLLECT DURATION TIME SPEC: 24 H
CREATINE 24H UR-MRATE: 706 MG/24 H (ref 720–1510)
SPECIMEN VOL UR: 2000 ML
SPECIMEN VOL UR: 2000 ML

## 2025-02-05 PROCEDURE — 82340 ASSAY OF CALCIUM IN URINE: CPT

## 2025-02-05 PROCEDURE — 82570 ASSAY OF URINE CREATININE: CPT

## 2025-02-06 ENCOUNTER — TELEPHONE (OUTPATIENT)
Dept: ENDOCRINOLOGY | Age: 70
End: 2025-02-06

## 2025-02-06 NOTE — TELEPHONE ENCOUNTER
----- Message from Varun GRAVES sent at 2/5/2025 11:51 AM EST -----  24-hour urine calcium is low.  Will continue to observe.  Will discuss further at next office visit

## 2025-02-07 ENCOUNTER — ANTI-COAG VISIT (OUTPATIENT)
Age: 70
End: 2025-02-07
Payer: MEDICARE

## 2025-02-07 VITALS
SYSTOLIC BLOOD PRESSURE: 184 MMHG | BODY MASS INDEX: 22.76 KG/M2 | DIASTOLIC BLOOD PRESSURE: 64 MMHG | HEART RATE: 58 BPM | WEIGHT: 132.6 LBS

## 2025-02-07 DIAGNOSIS — I97.89 POSTOPERATIVE ATRIAL FIBRILLATION (HCC): Primary | ICD-10-CM

## 2025-02-07 DIAGNOSIS — I48.91 POSTOPERATIVE ATRIAL FIBRILLATION (HCC): Primary | ICD-10-CM

## 2025-02-07 LAB
INTERNATIONAL NORMALIZATION RATIO, POC: 2.4
PROTHROMBIN TIME, POC: 0

## 2025-02-07 PROCEDURE — 99211 OFF/OP EST MAY X REQ PHY/QHP: CPT

## 2025-02-07 PROCEDURE — 85610 PROTHROMBIN TIME: CPT

## 2025-02-07 NOTE — PROGRESS NOTES
[facility] Anticoagulation Clinic (Medication Management)  [address] [place], [address]  Phone: [phone]    Face-To-Face Visit  Patient Findings       Positives:  Change in medications (She was started on vitamin D 50,000 units weekly)    Negatives:  Signs/symptoms of thrombosis, Signs/symptoms of bleeding, Laboratory test error suspected, Change in health, Change in alcohol use, Change in activity, Upcoming invasive procedure, Emergency department visit, Upcoming dental procedure, Missed doses, Extra doses, Change in diet/appetite, Hospital admission, Bruising, Other complaints           Assessment/Plan:  Warfarin indication: atrial fibrillation      INR today is therapeutic at 2.4, goal is 2-3.    Warfarin Dose: same (7.5 mg every Fri.; 5 mg all other days)    Follow Up:  Recheck INR in 3 week(s).      Melisa Reyez [facility] PharmD, BCACP 2/7/2025 10:16 AM    For Pharmacy Admin Tracking Only    Intervention Detail:   Total # of Interventions Recommended: 0  Total # of Interventions Accepted: 0  Time Spent (min): 15

## 2025-02-28 ENCOUNTER — ANTI-COAG VISIT (OUTPATIENT)
Age: 70
End: 2025-02-28
Payer: MEDICARE

## 2025-02-28 VITALS
HEART RATE: 56 BPM | SYSTOLIC BLOOD PRESSURE: 168 MMHG | BODY MASS INDEX: 22.62 KG/M2 | WEIGHT: 131.8 LBS | DIASTOLIC BLOOD PRESSURE: 72 MMHG

## 2025-02-28 DIAGNOSIS — I97.89 POSTOPERATIVE ATRIAL FIBRILLATION (HCC): Primary | ICD-10-CM

## 2025-02-28 DIAGNOSIS — I48.91 POSTOPERATIVE ATRIAL FIBRILLATION (HCC): Primary | ICD-10-CM

## 2025-02-28 LAB
INTERNATIONAL NORMALIZATION RATIO, POC: 2.3
PROTHROMBIN TIME, POC: 0

## 2025-02-28 PROCEDURE — 85610 PROTHROMBIN TIME: CPT

## 2025-02-28 PROCEDURE — 99211 OFF/OP EST MAY X REQ PHY/QHP: CPT

## 2025-02-28 NOTE — PROGRESS NOTES
[facility] Anticoagulation Clinic (Medication Management)  [address] [place], [address]  Phone: [phone]    Face-To-Face Visit  Patient Findings       Negatives:  Signs/symptoms of thrombosis, Signs/symptoms of bleeding, Laboratory test error suspected, Change in health, Change in alcohol use, Change in activity, Upcoming invasive procedure, Emergency department visit, Upcoming dental procedure, Missed doses, Extra doses, Change in medications, Change in diet/appetite, Hospital admission, Bruising, Other complaints           Assessment/Plan:  Warfarin indication: atrial fibrillation      INR today is therapeutic at 2.3, goal is 2-3.    Warfarin Dose: same (7.5 mg every Fri.; 5 mg all other days)     Follow Up:  Recheck INR in 4 week(s).      Melisa Reyez [facility] PharmD, BCACP 2/28/2025 9:19 AM    For Pharmacy Admin Tracking Only    Intervention Detail:   Total # of Interventions Recommended: 0  Total # of Interventions Accepted: 0  Time Spent (min): 15

## 2025-03-06 ENCOUNTER — OFFICE VISIT (OUTPATIENT)
Dept: PRIMARY CARE CLINIC | Age: 70
End: 2025-03-06

## 2025-03-06 VITALS
DIASTOLIC BLOOD PRESSURE: 84 MMHG | HEART RATE: 82 BPM | WEIGHT: 132 LBS | TEMPERATURE: 97 F | OXYGEN SATURATION: 98 % | SYSTOLIC BLOOD PRESSURE: 136 MMHG | BODY MASS INDEX: 22.66 KG/M2

## 2025-03-06 DIAGNOSIS — E11.29 TYPE 2 DIABETES MELLITUS WITH OTHER DIABETIC KIDNEY COMPLICATION (HCC): ICD-10-CM

## 2025-03-06 DIAGNOSIS — H92.03 OTALGIA OF BOTH EARS: Primary | ICD-10-CM

## 2025-03-06 DIAGNOSIS — J44.89 ASTHMA WITH COPD (CHRONIC OBSTRUCTIVE PULMONARY DISEASE) (HCC): ICD-10-CM

## 2025-03-06 DIAGNOSIS — G95.9 LUMBAR MYELOPATHY (HCC): ICD-10-CM

## 2025-03-06 SDOH — ECONOMIC STABILITY: FOOD INSECURITY: WITHIN THE PAST 12 MONTHS, YOU WORRIED THAT YOUR FOOD WOULD RUN OUT BEFORE YOU GOT MONEY TO BUY MORE.: NEVER TRUE

## 2025-03-06 SDOH — ECONOMIC STABILITY: FOOD INSECURITY: WITHIN THE PAST 12 MONTHS, THE FOOD YOU BOUGHT JUST DIDN'T LAST AND YOU DIDN'T HAVE MONEY TO GET MORE.: NEVER TRUE

## 2025-03-06 ASSESSMENT — PATIENT HEALTH QUESTIONNAIRE - PHQ9
7. TROUBLE CONCENTRATING ON THINGS, SUCH AS READING THE NEWSPAPER OR WATCHING TELEVISION: NOT AT ALL
3. TROUBLE FALLING OR STAYING ASLEEP: NOT AT ALL
10. IF YOU CHECKED OFF ANY PROBLEMS, HOW DIFFICULT HAVE THESE PROBLEMS MADE IT FOR YOU TO DO YOUR WORK, TAKE CARE OF THINGS AT HOME, OR GET ALONG WITH OTHER PEOPLE: NOT DIFFICULT AT ALL
SUM OF ALL RESPONSES TO PHQ QUESTIONS 1-9: 0
SUM OF ALL RESPONSES TO PHQ QUESTIONS 1-9: 0
4. FEELING TIRED OR HAVING LITTLE ENERGY: NOT AT ALL
6. FEELING BAD ABOUT YOURSELF - OR THAT YOU ARE A FAILURE OR HAVE LET YOURSELF OR YOUR FAMILY DOWN: NOT AT ALL
8. MOVING OR SPEAKING SO SLOWLY THAT OTHER PEOPLE COULD HAVE NOTICED. OR THE OPPOSITE, BEING SO FIGETY OR RESTLESS THAT YOU HAVE BEEN MOVING AROUND A LOT MORE THAN USUAL: NOT AT ALL
2. FEELING DOWN, DEPRESSED OR HOPELESS: NOT AT ALL
SUM OF ALL RESPONSES TO PHQ QUESTIONS 1-9: 0
5. POOR APPETITE OR OVEREATING: NOT AT ALL
SUM OF ALL RESPONSES TO PHQ QUESTIONS 1-9: 0
1. LITTLE INTEREST OR PLEASURE IN DOING THINGS: NOT AT ALL
9. THOUGHTS THAT YOU WOULD BE BETTER OFF DEAD, OR OF HURTING YOURSELF: NOT AT ALL

## 2025-03-06 ASSESSMENT — ENCOUNTER SYMPTOMS
RHINORRHEA: 1
COUGH: 1
EYE DISCHARGE: 1

## 2025-03-06 NOTE — PROGRESS NOTES
tablet by mouth daily 90 tablet 1    Continuous Blood Gluc Sensor (FREESTYLE YADIRA 2 SENSOR) MISC Change sensor every 14 days 6 each 5    famotidine (PEPCID) 20 MG tablet Take 1 tablet by mouth daily      Ixekizumab (TALTZ SC) Inject into the skin every 30 days For psoriasis - 15th of every month      aspirin 81 MG tablet Take 1 tablet by mouth daily       No current facility-administered medications on file prior to visit.       Review of Systems   Constitutional:  Negative for fever.   HENT:  Positive for congestion, ear pain and rhinorrhea (occas). Negative for ear discharge and hearing loss.    Eyes:  Positive for discharge (when cough).   Respiratory:  Positive for cough.      other review of systems reviewed and are negative    OBJECTIVE:  /84   Pulse 82   Temp 97 °F (36.1 °C)   Wt 59.9 kg (132 lb)   SpO2 98%   BMI 22.66 kg/m²      Physical Exam  Constitutional:       General: She is not in acute distress.  HENT:      Right Ear: Tympanic membrane normal.      Left Ear: Tympanic membrane normal.      Nose: No mucosal edema or rhinorrhea.      Right Sinus: No maxillary sinus tenderness or frontal sinus tenderness.      Left Sinus: No maxillary sinus tenderness or frontal sinus tenderness.      Comments: Small ear canals.     Mouth/Throat:      Pharynx: Uvula midline. No oropharyngeal exudate or posterior oropharyngeal erythema.   Cardiovascular:      Rate and Rhythm: Normal rate and regular rhythm.   Pulmonary:      Effort: Pulmonary effort is normal.      Breath sounds: Normal breath sounds.   Musculoskeletal:      Cervical back: Neck supple.   Lymphadenopathy:      Cervical: No cervical adenopathy.         ASSESSMENT AND PLAN:    Barbara was seen today for ear pain.    Diagnoses and all orders for this visit:    Otalgia of both ears    Type 2 diabetes mellitus with other diabetic kidney complication (HCC)    Lumbar myelopathy (HCC)    Asthma with COPD (chronic obstructive pulmonary disease) ([facility])    -

## 2025-03-17 ENCOUNTER — OFFICE VISIT (OUTPATIENT)
Dept: ENDOCRINOLOGY | Age: 70
End: 2025-03-17
Payer: MEDICARE

## 2025-03-17 VITALS
DIASTOLIC BLOOD PRESSURE: 77 MMHG | WEIGHT: 132 LBS | HEIGHT: 64 IN | HEART RATE: 69 BPM | OXYGEN SATURATION: 97 % | SYSTOLIC BLOOD PRESSURE: 174 MMHG | BODY MASS INDEX: 22.53 KG/M2

## 2025-03-17 DIAGNOSIS — E03.9 PRIMARY HYPOTHYROIDISM: ICD-10-CM

## 2025-03-17 DIAGNOSIS — Z91.119 DIETARY NONCOMPLIANCE: ICD-10-CM

## 2025-03-17 DIAGNOSIS — E11.65 TYPE 2 DIABETES MELLITUS WITH HYPERGLYCEMIA, WITH LONG-TERM CURRENT USE OF INSULIN (HCC): Primary | ICD-10-CM

## 2025-03-17 DIAGNOSIS — E21.0 PRIMARY HYPERPARATHYROIDISM: ICD-10-CM

## 2025-03-17 DIAGNOSIS — E55.9 VITAMIN D DEFICIENCY: ICD-10-CM

## 2025-03-17 DIAGNOSIS — Z79.4 TYPE 2 DIABETES MELLITUS WITH HYPERGLYCEMIA, WITH LONG-TERM CURRENT USE OF INSULIN (HCC): Primary | ICD-10-CM

## 2025-03-17 LAB — HBA1C MFR BLD: 7.8 %

## 2025-03-17 PROCEDURE — G8428 CUR MEDS NOT DOCUMENT: HCPCS | Performed by: CLINICAL NURSE SPECIALIST

## 2025-03-17 PROCEDURE — 95251 CONT GLUC MNTR ANALYSIS I&R: CPT | Performed by: CLINICAL NURSE SPECIALIST

## 2025-03-17 PROCEDURE — 3051F HG A1C>EQUAL 7.0%<8.0%: CPT | Performed by: CLINICAL NURSE SPECIALIST

## 2025-03-17 PROCEDURE — 2022F DILAT RTA XM EVC RTNOPTHY: CPT | Performed by: CLINICAL NURSE SPECIALIST

## 2025-03-17 PROCEDURE — 4004F PT TOBACCO SCREEN RCVD TLK: CPT | Performed by: CLINICAL NURSE SPECIALIST

## 2025-03-17 PROCEDURE — 3017F COLORECTAL CA SCREEN DOC REV: CPT | Performed by: CLINICAL NURSE SPECIALIST

## 2025-03-17 PROCEDURE — 83036 HEMOGLOBIN GLYCOSYLATED A1C: CPT | Performed by: CLINICAL NURSE SPECIALIST

## 2025-03-17 PROCEDURE — G8399 PT W/DXA RESULTS DOCUMENT: HCPCS | Performed by: CLINICAL NURSE SPECIALIST

## 2025-03-17 PROCEDURE — G8420 CALC BMI NORM PARAMETERS: HCPCS | Performed by: CLINICAL NURSE SPECIALIST

## 2025-03-17 PROCEDURE — 1090F PRES/ABSN URINE INCON ASSESS: CPT | Performed by: CLINICAL NURSE SPECIALIST

## 2025-03-17 PROCEDURE — 99214 OFFICE O/P EST MOD 30 MIN: CPT | Performed by: CLINICAL NURSE SPECIALIST

## 2025-03-17 PROCEDURE — 1123F ACP DISCUSS/DSCN MKR DOCD: CPT | Performed by: CLINICAL NURSE SPECIALIST

## 2025-03-17 RX ORDER — LISINOPRIL 5 MG/1
5 TABLET ORAL DAILY
Qty: 90 TABLET | Refills: 1 | Status: SHIPPED | OUTPATIENT
Start: 2025-03-17

## 2025-03-17 RX ORDER — INSULIN GLARGINE 300 U/ML
22 INJECTION, SOLUTION SUBCUTANEOUS NIGHTLY
Qty: 10 ADJUSTABLE DOSE PRE-FILLED PEN SYRINGE | Refills: 1 | Status: SHIPPED | OUTPATIENT
Start: 2025-03-17

## 2025-03-17 NOTE — PROGRESS NOTES
tenderness,  Pulm: good equal air entry no added sounds  CVS: S1 + S2. S/p CABG  Abd: soft lax, no tenderness  Skin: warm, no lesions, no rash. No open wounds, no ulcers   Neuro: CN intact, sensation decreased bilateral , muscle power normal  Psych: normal mood, and affect    Review of Laboratory Data:  I personally reviewed the following labs:   Lab Results   Component Value Date/Time    WBC 7.9 04/27/2024 08:40 AM    RBC 4.77 04/27/2024 08:40 AM    HGB 13.5 04/27/2024 08:40 AM    HGB 9.3 (L) 11/02/2022 10:36 AM    HCT 42.2 04/27/2024 08:40 AM    HCT 27.0 (L) 11/02/2022 10:36 AM    MCV 88.5 04/27/2024 08:40 AM    MCH 28.3 04/27/2024 08:40 AM    MCHC 32.0 04/27/2024 08:40 AM    RDW 15.5 (H) 04/27/2024 08:40 AM     04/27/2024 08:40 AM    MPV 10.1 04/27/2024 08:40 AM      Lab Results   Component Value Date/Time     12/17/2024 08:40 AM    K 5.0 12/17/2024 08:40 AM    K 4.5 12/04/2018 08:10 AM    CO2 28 12/17/2024 08:40 AM    BUN 11 12/17/2024 08:40 AM    CREATININE 0.6 12/17/2024 08:40 AM    CALCIUM 11.0 (H) 12/17/2024 08:40 AM    LABGLOM >90 12/17/2024 08:40 AM    LABGLOM >90 04/27/2024 08:40 AM    GFRAA >60 10/05/2022 09:30 AM      Lab Results   Component Value Date/Time    TSH 3.26 12/11/2024 09:12 AM    T4FREE 1.3 12/11/2024 09:12 AM     Lab Results   Component Value Date/Time    LABA1C 7.8 03/17/2025 08:26 AM    GLUCOSE 162 12/17/2024 08:40 AM    GLUCOSE 157 07/21/2011 04:06 PM     Lab Results   Component Value Date/Time    LABA1C 7.8 03/17/2025 08:26 AM    LABA1C 8.0 10/30/2024 08:05 AM    LABA1C 7.2 12/07/2023 07:34 AM     Lab Results   Component Value Date/Time    TRIG 174 12/11/2024 09:12 AM    HDL 45 12/11/2024 09:12 AM    CHOL 106 12/11/2024 09:12 AM     Lab Results   Component Value Date/Time    VITD25 6.5 02/03/2025 08:33 AM       ASSESSMENT & PLAN   Barbara Pelletier, a 70 y.o.-old female seen today for inpatient diabetes management     Diabetes Mellitus type 2  Patient diabetes variable.

## 2025-03-28 ENCOUNTER — HOSPITAL ENCOUNTER (OUTPATIENT)
Dept: ULTRASOUND IMAGING | Age: 70
Discharge: HOME | End: 2025-03-30
Payer: MEDICARE

## 2025-03-28 ENCOUNTER — ANTI-COAG VISIT (OUTPATIENT)
Age: 70
End: 2025-03-28
Payer: MEDICARE

## 2025-03-28 VITALS
WEIGHT: 134.2 LBS | BODY MASS INDEX: 23.04 KG/M2 | HEART RATE: 58 BPM | SYSTOLIC BLOOD PRESSURE: 184 MMHG | DIASTOLIC BLOOD PRESSURE: 63 MMHG

## 2025-03-28 DIAGNOSIS — I97.89 POSTOPERATIVE ATRIAL FIBRILLATION (HCC): Primary | ICD-10-CM

## 2025-03-28 DIAGNOSIS — E21.0 PRIMARY HYPERPARATHYROIDISM: ICD-10-CM

## 2025-03-28 DIAGNOSIS — I48.91 POSTOPERATIVE ATRIAL FIBRILLATION (HCC): Primary | ICD-10-CM

## 2025-03-28 LAB
INTERNATIONAL NORMALIZATION RATIO, POC: 2.6
PROTHROMBIN TIME, POC: 0

## 2025-03-28 PROCEDURE — 99211 OFF/OP EST MAY X REQ PHY/QHP: CPT

## 2025-03-28 PROCEDURE — 85610 PROTHROMBIN TIME: CPT

## 2025-03-28 PROCEDURE — 76536 US EXAM OF HEAD AND NECK: CPT

## 2025-03-28 NOTE — PROGRESS NOTES
[facility] Anticoagulation Clinic (Medication Management)  [address] [place], [place], [address]  Phone: [phone]    Face-To-Face Visit  Patient Findings       Positives:  Change in health (having pain in her ear), Change in medications (took Sudafed for 10 days for ear pain, did not help)    Negatives:  Signs/symptoms of thrombosis, Signs/symptoms of bleeding, Laboratory test error suspected, Change in alcohol use, Change in activity, Upcoming invasive procedure, Emergency department visit, Upcoming dental procedure, Missed doses, Extra doses, Change in diet/appetite, Hospital admission, Bruising, Other complaints           Assessment/Plan:  Warfarin indication: atrial fibrillation       INR today is therapeutic at 2.6, goal is 2-3.     Warfarin Dose: same (7.5 mg every Fri.; 5 mg all other days)      Follow Up:  Recheck INR in 4 week(s).        Melisa Reyez RPH PharmD, BCACP 2/28/2025 9:19 AM     For Pharmacy Admin Tracking Only     Intervention Detail:   Total # of Interventions Recommended: 0  Total # of Interventions Accepted: 0  Time Spent (min): 15

## 2025-03-31 ENCOUNTER — RESULTS FOLLOW-UP (OUTPATIENT)
Dept: ENDOCRINOLOGY | Age: 70
End: 2025-03-31

## 2025-04-02 ENCOUNTER — TELEPHONE (OUTPATIENT)
Dept: ENDOCRINOLOGY | Age: 70
End: 2025-04-02

## 2025-04-02 NOTE — TELEPHONE ENCOUNTER
Please call patient and inform her parathyroid ultrasound showed possible left parathyroid adenoma.  Will await other testing that was previously ordered     LM for the patient to either call the office or review in [facility]

## 2025-04-03 ENCOUNTER — HOSPITAL ENCOUNTER (OUTPATIENT)
Age: 70
Discharge: HOME | End: 2025-04-03
Payer: MEDICARE

## 2025-04-03 ENCOUNTER — RESULTS FOLLOW-UP (OUTPATIENT)
Dept: ENDOCRINOLOGY | Age: 70
End: 2025-04-03

## 2025-04-03 DIAGNOSIS — E03.9 PRIMARY HYPOTHYROIDISM: ICD-10-CM

## 2025-04-03 DIAGNOSIS — E55.9 VITAMIN D DEFICIENCY: ICD-10-CM

## 2025-04-03 DIAGNOSIS — E21.0 PRIMARY HYPERPARATHYROIDISM: ICD-10-CM

## 2025-04-03 LAB
25(OH)D3 SERPL-MCNC: 27.8 NG/ML (ref 30–100)
ALBUMIN SERPL-MCNC: 4.2 G/DL (ref 3.5–5.2)
ALP SERPL-CCNC: 100 U/L (ref 35–104)
ALT SERPL-CCNC: 17 U/L (ref 0–32)
ANION GAP SERPL CALCULATED.3IONS-SCNC: 15 MMOL/L (ref 7–16)
AST SERPL-CCNC: 20 U/L (ref 0–31)
BILIRUB SERPL-MCNC: 0.2 MG/DL (ref 0–1.2)
BUN SERPL-MCNC: 9 MG/DL (ref 6–23)
CALCIUM SERPL-MCNC: 10.4 MG/DL (ref 8.6–10.2)
CHLORIDE SERPL-SCNC: 96 MMOL/L (ref 98–107)
CO2 SERPL-SCNC: 22 MMOL/L (ref 22–29)
CREAT SERPL-MCNC: 0.5 MG/DL (ref 0.5–1)
GFR, ESTIMATED: >90 ML/MIN/1.73M2
GLUCOSE SERPL-MCNC: 182 MG/DL (ref 74–99)
POTASSIUM SERPL-SCNC: 4.8 MMOL/L (ref 3.5–5)
PROT SERPL-MCNC: 6.9 G/DL (ref 6.4–8.3)
PTH-INTACT SERPL-MCNC: 73.7 PG/ML (ref 15–65)
SODIUM SERPL-SCNC: 133 MMOL/L (ref 132–146)
T4 FREE SERPL-MCNC: 1.1 NG/DL (ref 0.9–1.7)
TSH SERPL DL<=0.05 MIU/L-ACNC: 5 UIU/ML (ref 0.27–4.2)

## 2025-04-03 PROCEDURE — 83970 ASSAY OF PARATHORMONE: CPT

## 2025-04-03 PROCEDURE — 84443 ASSAY THYROID STIM HORMONE: CPT

## 2025-04-03 PROCEDURE — 82306 VITAMIN D 25 HYDROXY: CPT

## 2025-04-03 PROCEDURE — 80053 COMPREHEN METABOLIC PANEL: CPT

## 2025-04-03 PROCEDURE — 36415 COLL VENOUS BLD VENIPUNCTURE: CPT

## 2025-04-03 PROCEDURE — 84439 ASSAY OF FREE THYROXINE: CPT

## 2025-04-04 DIAGNOSIS — I65.23 ASYMPTOMATIC BILATERAL CAROTID ARTERY STENOSIS: Primary | ICD-10-CM

## 2025-04-04 DIAGNOSIS — I73.9 PVD (PERIPHERAL VASCULAR DISEASE) WITH CLAUDICATION: ICD-10-CM

## 2025-04-19 DIAGNOSIS — Z79.4 TYPE 2 DIABETES MELLITUS WITH HYPERGLYCEMIA, WITH LONG-TERM CURRENT USE OF INSULIN (HCC): ICD-10-CM

## 2025-04-19 DIAGNOSIS — E11.65 TYPE 2 DIABETES MELLITUS WITH HYPERGLYCEMIA, WITH LONG-TERM CURRENT USE OF INSULIN (HCC): ICD-10-CM

## 2025-04-21 ENCOUNTER — HOSPITAL ENCOUNTER (OUTPATIENT)
Dept: CARDIOLOGY | Age: 70
Discharge: HOME | End: 2025-04-23
Attending: SURGERY
Payer: MEDICARE

## 2025-04-21 ENCOUNTER — OFFICE VISIT (OUTPATIENT)
Dept: VASCULAR SURGERY | Age: 70
End: 2025-04-21
Payer: MEDICARE

## 2025-04-21 DIAGNOSIS — I65.23 ASYMPTOMATIC BILATERAL CAROTID ARTERY STENOSIS: ICD-10-CM

## 2025-04-21 DIAGNOSIS — I73.9 PVD (PERIPHERAL VASCULAR DISEASE) WITH CLAUDICATION: ICD-10-CM

## 2025-04-21 DIAGNOSIS — I65.23 CAROTID ARTERY STENOSIS, ASYMPTOMATIC, BILATERAL: Primary | ICD-10-CM

## 2025-04-21 LAB
VAS LEFT ARM BP: 187 MMHG
VAS LEFT CCA DIST EDV: 25.4 CM/S
VAS LEFT CCA DIST PSV: 111.2 CM/S
VAS LEFT CCA PROX EDV: 25.4 CM/S
VAS LEFT CCA PROX PSV: 123.9 CM/S
VAS LEFT DORSALIS PEDIS BP: 113 MMHG
VAS LEFT ECA EDV: 16.2 CM/S
VAS LEFT ECA PSV: 130.8 CM/S
VAS LEFT ICA DIST EDV: 32.5 CM/S
VAS LEFT ICA DIST PSV: 121.4 CM/S
VAS LEFT ICA MID EDV: 29 CM/S
VAS LEFT ICA MID PSV: 120.3 CM/S
VAS LEFT ICA PROX EDV: 16.2 CM/S
VAS LEFT ICA PROX PSV: 98.4 CM/S
VAS LEFT ICA/CCA PSV: 1 NO UNITS
VAS LEFT LOW THIGH PRESSURE: 68 MMHG
VAS LEFT PTA BP: 300 MMHG
VAS LEFT TBI: 0.3
VAS LEFT TOE PRESSURE: 56 MMHG
VAS LEFT VERTEBRAL EDV: 0 CM/S
VAS LEFT VERTEBRAL PSV: 29.4 CM/S
VAS RIGHT ARM BP: 182 MMHG
VAS RIGHT CCA DIST EDV: 18 CM/S
VAS RIGHT CCA DIST PSV: 76.3 CM/S
VAS RIGHT CCA PROX EDV: 16.7 CM/S
VAS RIGHT CCA PROX PSV: 72.4 CM/S
VAS RIGHT DORSALIS PEDIS BP: 300 MMHG
VAS RIGHT ECA EDV: 49.6 CM/S
VAS RIGHT ECA PSV: 350.2 CM/S
VAS RIGHT ICA DIST EDV: 24.4 CM/S
VAS RIGHT ICA DIST PSV: 88.1 CM/S
VAS RIGHT ICA MID EDV: 38.1 CM/S
VAS RIGHT ICA MID PSV: 92.9 CM/S
VAS RIGHT ICA PROX EDV: 65.9 CM/S
VAS RIGHT ICA PROX PSV: 217.2 CM/S
VAS RIGHT ICA/CCA PSV: 2.8 NO UNITS
VAS RIGHT PTA BP: 300 MMHG
VAS RIGHT TBI: 0.26
VAS RIGHT TOE PRESSURE: 48 MMHG
VAS RIGHT VERTEBRAL EDV: 20.7 CM/S
VAS RIGHT VERTEBRAL PSV: 70.9 CM/S

## 2025-04-21 PROCEDURE — 1123F ACP DISCUSS/DSCN MKR DOCD: CPT | Performed by: PHYSICIAN ASSISTANT

## 2025-04-21 PROCEDURE — 4004F PT TOBACCO SCREEN RCVD TLK: CPT | Performed by: PHYSICIAN ASSISTANT

## 2025-04-21 PROCEDURE — G8399 PT W/DXA RESULTS DOCUMENT: HCPCS | Performed by: PHYSICIAN ASSISTANT

## 2025-04-21 PROCEDURE — 93923 UPR/LXTR ART STDY 3+ LVLS: CPT

## 2025-04-21 PROCEDURE — 1159F MED LIST DOCD IN RCRD: CPT | Performed by: PHYSICIAN ASSISTANT

## 2025-04-21 PROCEDURE — G8420 CALC BMI NORM PARAMETERS: HCPCS | Performed by: PHYSICIAN ASSISTANT

## 2025-04-21 PROCEDURE — 1090F PRES/ABSN URINE INCON ASSESS: CPT | Performed by: PHYSICIAN ASSISTANT

## 2025-04-21 PROCEDURE — 99214 OFFICE O/P EST MOD 30 MIN: CPT | Performed by: PHYSICIAN ASSISTANT

## 2025-04-21 PROCEDURE — 93880 EXTRACRANIAL BILAT STUDY: CPT

## 2025-04-21 PROCEDURE — G8427 DOCREV CUR MEDS BY ELIG CLIN: HCPCS | Performed by: PHYSICIAN ASSISTANT

## 2025-04-21 PROCEDURE — 3017F COLORECTAL CA SCREEN DOC REV: CPT | Performed by: PHYSICIAN ASSISTANT

## 2025-04-21 NOTE — TELEPHONE ENCOUNTER
Patients last appointment 3/6/2025.  Patients next scheduled appointment   Future Appointments   Date Time Provider Department Center   4/21/2025 10:15 AM GUICHO YROSENDO VAS US 1 SEYZ CARDIO SE Rad/Car   4/21/2025 10:30 AM Jim Mack MD VASC/MED [facility]   4/25/2025  9:30 AM SEB MEDICATION MGMT SEB MED MGMT [place] [facility]   7/7/2025  9:20 AM Lynda Sinclair APRN - CNP BDM ENDO [facility]   7/8/2025  9:00 AM Anurag Garcia DO [place]  BS ECC DEP   7/25/2025 11:00 AM Raudel Hutchinson DO [place] [facility]NT [facility]   1/13/2026  8:20 AM Mariano Rick MD [place] Card [facility]

## 2025-04-21 NOTE — PROGRESS NOTES
Vascular Surgery Outpatient Followup    PCP : Anurag Garcia DO   ENT : Dr. Hutchinson    Previous procedures  10/11/18 L CEA for assx   12/4/18 Kissing iliac stents 8x59 ICAST  Plasty of the R EIA with 8x59 balloon     HISTORY OF PRESENT ILLNESS:    The patient is a 70 y.o. female who is here in regards to follow up of their PVD and her carotid stenosis.     The patient states over the last year she has developed slowly worsening bilateral leg weakness which is inhibiting the distance she can walk. She states she is able to walk ~30 feet before she has to rest for a few minutes and then can walk again. This is about the length of her driveway. She denies rest pain in her feet. She did develop wounds to multiple tips of her toes in 1/2025 and has been following with Dr Villasenor q2w regarding. She is uncertain how the wounds developed. She has significant neuropathy to her feet 2/2 DM. She states the wounds on the left foot healed but she continues to have a wound to the plantar aspect of the right great toe and distal right 4th toe. She is applying abx ointment to them and covering with gauze and she states they are improving. Denies fevers, chills or signs of infection. Her next appt with Dr Villasenor is 4/29/25.    She has history of lumbar spine surgery in 9/2023 w Dr Arndt.    She denies sxs of stroke, tia, slurred speech, focal weakness.    She underwent CABGx 2 in 11/2022 with Dr. Pickard.  She is off plavix and is on coumadin for a fib.      She is smoking a couple cigarettes a day but states they do not have nicotine in them and she thinks she can quit.    Past Medical History:        Diagnosis Date    ADHD (attention deficit hyperactivity disorder)     Age-related osteoporosis without current pathological fracture 06/2021    Anticoagulant long-term use [id number]    Arthritis     Atrial fibrillation (HCC) 801415    Atrial fibrillation, transient (HCC) 09/28/2023    Carotid artery stenosis 2021    Cataracts,

## 2025-04-25 ENCOUNTER — ANTI-COAG VISIT (OUTPATIENT)
Age: 70
End: 2025-04-25
Payer: MEDICARE

## 2025-04-25 VITALS
DIASTOLIC BLOOD PRESSURE: 67 MMHG | WEIGHT: 134.4 LBS | SYSTOLIC BLOOD PRESSURE: 139 MMHG | HEART RATE: 55 BPM | BODY MASS INDEX: 23.07 KG/M2

## 2025-04-25 DIAGNOSIS — I48.91 POSTOPERATIVE ATRIAL FIBRILLATION (HCC): Primary | ICD-10-CM

## 2025-04-25 DIAGNOSIS — I97.89 POSTOPERATIVE ATRIAL FIBRILLATION (HCC): Primary | ICD-10-CM

## 2025-04-25 LAB
INTERNATIONAL NORMALIZATION RATIO, POC: 3.6
PROTHROMBIN TIME, POC: 0

## 2025-04-25 PROCEDURE — 85610 PROTHROMBIN TIME: CPT

## 2025-04-25 PROCEDURE — 99212 OFFICE O/P EST SF 10 MIN: CPT

## 2025-04-25 NOTE — PROGRESS NOTES
[facility] (Medication Management)  [address] [place], [place], [address]  Phone: [phone]    Face-To-Face Visit  Patient Findings       Positives:  Change in health (endocrinologist monitoring benign tumor on parathyroid, gained about 13 pounds in last two months), Missed doses (missed one dose on 3/30), Change in medications (has been taking ibuprofen twice daily as needed, explained it can thin the blood), Change in diet/appetite (no greens), Bruising (bruising from the fall but it is improving)    Negatives:  Signs/symptoms of thrombosis, Signs/symptoms of bleeding, Laboratory test error suspected, Change in alcohol use, Change in activity, Upcoming invasive procedure, Emergency department visit, Upcoming dental procedure, Extra doses, Hospital admission, Other complaints    Comments:  Fell outside on left side on 4/12, did not hit her head  Aware must go to ER if she does hit her head  Follow up with Dr. SAMANO in two weeks for leg circulation issues             Assessment/Plan:  Warfarin indication: atrial fibrillation       INR today is supratherapeutic at 3.6 , goal is 2-3.  This may be due to ibuprofen use which I counseled on stopping and less Vitamin K in her diet than usual.     Warfarin Dose: hold dose today (7.5 mg) then same (7.5 mg every Fri.; 5 mg all other days)      Follow Up:  Recheck INR in 2 week(s).    Mary Ellen Ibarra [facility]  4/25/2025 9:46 AM    For Pharmacy Admin Tracking Only    Intervention Detail: Dose Adjustment: 1, reason: Therapy De-escalation  Total # of Interventions Recommended: 1  Total # of Interventions Accepted: 1  Time Spent (min): 30

## 2025-04-29 LAB
VAS LEFT CCA DIST EDV: 25.4 CM/S
VAS LEFT CCA DIST PSV: 111.2 CM/S
VAS LEFT CCA PROX EDV: 25.4 CM/S
VAS LEFT CCA PROX PSV: 123.9 CM/S
VAS LEFT ECA EDV: 16.2 CM/S
VAS LEFT ECA PSV: 130.8 CM/S
VAS LEFT ICA DIST EDV: 32.5 CM/S
VAS LEFT ICA DIST PSV: 121.4 CM/S
VAS LEFT ICA MID EDV: 29 CM/S
VAS LEFT ICA MID PSV: 120.3 CM/S
VAS LEFT ICA PROX EDV: 16.2 CM/S
VAS LEFT ICA PROX PSV: 98.4 CM/S
VAS LEFT ICA/CCA PSV: 1 NO UNITS
VAS LEFT VERTEBRAL EDV: 0 CM/S
VAS LEFT VERTEBRAL PSV: 29.4 CM/S
VAS RIGHT CCA DIST EDV: 18 CM/S
VAS RIGHT CCA DIST PSV: 76.3 CM/S
VAS RIGHT CCA PROX EDV: 16.7 CM/S
VAS RIGHT CCA PROX PSV: 72.4 CM/S
VAS RIGHT ECA EDV: 49.6 CM/S
VAS RIGHT ECA PSV: 350.2 CM/S
VAS RIGHT ICA DIST EDV: 24.4 CM/S
VAS RIGHT ICA DIST PSV: 88.1 CM/S
VAS RIGHT ICA MID EDV: 38.1 CM/S
VAS RIGHT ICA MID PSV: 92.9 CM/S
VAS RIGHT ICA PROX EDV: 65.9 CM/S
VAS RIGHT ICA PROX PSV: 217.2 CM/S
VAS RIGHT ICA/CCA PSV: 2.8 NO UNITS
VAS RIGHT VERTEBRAL EDV: 20.7 CM/S
VAS RIGHT VERTEBRAL PSV: 70.9 CM/S

## 2025-05-09 ENCOUNTER — ANTI-COAG VISIT (OUTPATIENT)
Age: 70
End: 2025-05-09
Payer: MEDICARE

## 2025-05-09 VITALS
DIASTOLIC BLOOD PRESSURE: 68 MMHG | SYSTOLIC BLOOD PRESSURE: 173 MMHG | HEART RATE: 61 BPM | BODY MASS INDEX: 22.52 KG/M2 | WEIGHT: 131.2 LBS

## 2025-05-09 DIAGNOSIS — I48.91 POSTOPERATIVE ATRIAL FIBRILLATION (HCC): Primary | ICD-10-CM

## 2025-05-09 DIAGNOSIS — I97.89 POSTOPERATIVE ATRIAL FIBRILLATION (HCC): Primary | ICD-10-CM

## 2025-05-09 LAB
INTERNATIONAL NORMALIZATION RATIO, POC: 2.6
PROTHROMBIN TIME, POC: 0

## 2025-05-09 PROCEDURE — 99211 OFF/OP EST MAY X REQ PHY/QHP: CPT

## 2025-05-09 PROCEDURE — 85610 PROTHROMBIN TIME: CPT

## 2025-05-09 NOTE — PROGRESS NOTES
[facility] (Medication Management)  [address] [place], [address]  Phone: [phone]    Face-To-Face Visit  Patient Findings       Positives:  Change in health (She remains very sore from her fall a few weeks back), Change in medications (She is taking ibuprofen for pain, she is on famotidine)    Negatives:  Signs/symptoms of thrombosis, Signs/symptoms of bleeding, Laboratory test error suspected, Change in alcohol use, Change in activity, Upcoming invasive procedure, Emergency department visit, Upcoming dental procedure, Missed doses, Extra doses, Change in diet/appetite, Hospital admission, Bruising, Other complaints           Assessment/Plan:  Warfarin indication: atrial fibrillation      INR today is therapeutic at 2.6, goal is 2-3.    Warfarin Dose: same (7.5 mg every Friday; 5 mg all other days)    Follow Up:  Recheck INR in 4 week(s).    She will monitor BP at home since it was elevated today.    Melisa Reyez [facility] PharmD, BCACP 5/9/2025 9:04 AM    For Pharmacy Admin Tracking Only    Intervention Detail:   Total # of Interventions Recommended: 0  Total # of Interventions Accepted: 0  Time Spent (min): 15

## 2025-05-19 ENCOUNTER — TELEPHONE (OUTPATIENT)
Dept: VASCULAR SURGERY | Age: 70
End: 2025-05-19

## 2025-05-19 ENCOUNTER — OFFICE VISIT (OUTPATIENT)
Dept: VASCULAR SURGERY | Age: 70
End: 2025-05-19
Payer: MEDICARE

## 2025-05-19 DIAGNOSIS — I73.9 PVD (PERIPHERAL VASCULAR DISEASE): ICD-10-CM

## 2025-05-19 DIAGNOSIS — I65.23 CAROTID ARTERY STENOSIS, ASYMPTOMATIC, BILATERAL: Primary | ICD-10-CM

## 2025-05-19 PROCEDURE — 99214 OFFICE O/P EST MOD 30 MIN: CPT | Performed by: SURGERY

## 2025-05-19 PROCEDURE — 1090F PRES/ABSN URINE INCON ASSESS: CPT | Performed by: SURGERY

## 2025-05-19 PROCEDURE — 1123F ACP DISCUSS/DSCN MKR DOCD: CPT | Performed by: SURGERY

## 2025-05-19 PROCEDURE — G8427 DOCREV CUR MEDS BY ELIG CLIN: HCPCS | Performed by: SURGERY

## 2025-05-19 PROCEDURE — 1159F MED LIST DOCD IN RCRD: CPT | Performed by: SURGERY

## 2025-05-19 PROCEDURE — 4004F PT TOBACCO SCREEN RCVD TLK: CPT | Performed by: SURGERY

## 2025-05-19 PROCEDURE — G8420 CALC BMI NORM PARAMETERS: HCPCS | Performed by: SURGERY

## 2025-05-19 PROCEDURE — G8399 PT W/DXA RESULTS DOCUMENT: HCPCS | Performed by: SURGERY

## 2025-05-19 PROCEDURE — 3017F COLORECTAL CA SCREEN DOC REV: CPT | Performed by: SURGERY

## 2025-05-19 NOTE — TELEPHONE ENCOUNTER
Scheduled abdominal aortogram possible intervention with Dr. Mack 6/3/25 at 11:30 a.m.  Pt was notified and instructed to report to [address] registration at 9:30 a.m, be NPO after midnight the night before except heart and/or BP meds the morning of with sips of water, hold Coumadin x 5 days prior, hold metformin day of and 2 days after and must have transportation.

## 2025-05-19 NOTE — TELEPHONE ENCOUNTER
Message left on pt's voicemail for a return call to schedule an abdominal aortogram with Dr. Mack.

## 2025-05-28 ENCOUNTER — TELEPHONE (OUTPATIENT)
Age: 70
End: 2025-05-28

## 2025-05-28 DIAGNOSIS — I97.89 POSTOPERATIVE ATRIAL FIBRILLATION (HCC): Primary | ICD-10-CM

## 2025-05-28 DIAGNOSIS — I48.91 POSTOPERATIVE ATRIAL FIBRILLATION (HCC): Primary | ICD-10-CM

## 2025-05-28 NOTE — TELEPHONE ENCOUNTER
Called and spoke with patient. She stated she stopped her warfarin on Sunday 5/25/25, as she stated she was instructed to, for her procedure scheduled 6/3/25.    Rescheduled appt @ [facility] for 10-14 days after procedure, (Monday 6/16 @ 9 am). Resume same warfarin dose of  7.5 mg (5 mg x 1.5) every Fri; 5 mg (5 mg x 1) all other days.    Electronically signed by Karen Gregory on 5/28/25 at 11:32 AM EDT

## 2025-05-28 NOTE — TELEPHONE ENCOUNTER
Patient scheduled for abdominal aortogram w/ possible intervention on 6/3/25 with Dr. BLISS.  She was instructed to hold warfarin 5 days prior to (CHADSVASC 5).     Reschedule ACC appt for 10-14 days after procedure; same warfarin dose, 7.5 mg (5 mg x 1.5) every Fri; 5 mg (5 mg x 1) all other days.     Thank you,   Melisa Reyez [facility], PharmD, BCACP, 5/28/2025 10:46 AM

## 2025-05-28 NOTE — TELEPHONE ENCOUNTER
Called and spoke with patient. Went over the orders from Dr. SAMANO: \"Scheduled abdominal aortogram possible intervention with Dr. Mack 6/3/25 at 11:30 a.m. Pt was notified and instructed to report to [address] floor registration at 9:30 a.m, be NPO after midnight the night before except heart and/or BP meds the morning of with sips of water, hold Coumadin x 5 days prior, hold metformin day of and 2 days after and must have transportation.\".    Per pharmacist's instructions, she should take warfarin today (5/28/25) and start holding tomorrow (5/29, 5/30, 5/31, 6/1, and 6/2 for a total of 5 days prior to). Patient agreed she will follow.    Electronically signed by Karen Gregory on 5/28/25 at 12:53 PM EDT

## 2025-05-28 NOTE — TELEPHONE ENCOUNTER
Patient called and left message on VM (5/27) to say she had to stop taking her warfarin because she is having surgery next Tuesday (6/3).    Patient is currently scheduled @ [facility] for Friday 6/6 @ 8:45 am.    Please advise. Thank you.    Electronically signed by Karen Gregory on 5/28/25 at 10:32 AM EDT

## 2025-05-28 NOTE — TELEPHONE ENCOUNTER
She should not have held her warfarin for so long, Dr. SAMANO's orders were \"Scheduled abdominal aortogram possible intervention with Dr. Mack 6/3/25 at 11:30 a.m. Pt was notified and instructed to report to [address] registration at 9:30 a.m, be NPO after midnight the night before except heart and/or BP meds the morning of with sips of water, hold Coumadin x 5 days prior, hold metformin day of and 2 days after and must have transportation.\"     Pt should take warfarin today (5/28/25) and start holding tomorrow (5/29, 5/30, 5/31, 6/1, and 6/2 for a total of 5 days prior to).     Thank you,   Melisa Reyez [facility], PharmD, BCACP, 5/28/2025 11:38 AM

## 2025-06-03 ENCOUNTER — HOSPITAL ENCOUNTER (OUTPATIENT)
Age: 70
Discharge: HOME | End: 2025-06-03
Attending: SURGERY | Admitting: SURGERY
Payer: MEDICARE

## 2025-06-03 VITALS
WEIGHT: 131 LBS | BODY MASS INDEX: 22.49 KG/M2 | TEMPERATURE: 97.7 F | RESPIRATION RATE: 18 BRPM | HEART RATE: 63 BPM | SYSTOLIC BLOOD PRESSURE: 140 MMHG | OXYGEN SATURATION: 95 % | DIASTOLIC BLOOD PRESSURE: 68 MMHG

## 2025-06-03 DIAGNOSIS — I73.9 PERIPHERAL VASCULAR DISEASE, UNSPECIFIED: ICD-10-CM

## 2025-06-03 LAB
ABO + RH BLD: NORMAL
ANION GAP SERPL CALCULATED.3IONS-SCNC: 10 MMOL/L (ref 7–16)
ARM BAND NUMBER: NORMAL
BLOOD BANK SAMPLE EXPIRATION: NORMAL
BLOOD GROUP ANTIBODIES SERPL: NEGATIVE
BUN SERPL-MCNC: 10 MG/DL (ref 8–23)
CALCIUM SERPL-MCNC: 10.4 MG/DL (ref 8.8–10.2)
CHLORIDE SERPL-SCNC: 98 MMOL/L (ref 98–107)
CO2 SERPL-SCNC: 26 MMOL/L (ref 22–29)
CREAT SERPL-MCNC: 0.5 MG/DL (ref 0.5–1)
ERYTHROCYTE [DISTWIDTH] IN BLOOD BY AUTOMATED COUNT: 15.5 % (ref 11.5–15)
GFR, ESTIMATED: >90 ML/MIN/1.73M2
GLUCOSE SERPL-MCNC: 206 MG/DL (ref 74–99)
HCT VFR BLD AUTO: 43.7 % (ref 34–48)
HGB BLD-MCNC: 14.2 G/DL (ref 11.5–15.5)
INR PPP: 1
MCH RBC QN AUTO: 29.5 PG (ref 26–35)
MCHC RBC AUTO-ENTMCNC: 32.5 G/DL (ref 32–34.5)
MCV RBC AUTO: 90.9 FL (ref 80–99.9)
PLATELET # BLD AUTO: 277 K/UL (ref 130–450)
PMV BLD AUTO: 10.3 FL (ref 7–12)
POTASSIUM SERPL-SCNC: 4.7 MMOL/L (ref 3.5–5.1)
PROTHROMBIN TIME: 10.4 SEC (ref 9.3–12.4)
RBC # BLD AUTO: 4.81 M/UL (ref 3.5–5.5)
SODIUM SERPL-SCNC: 134 MMOL/L (ref 136–145)
WBC OTHER # BLD: 7.2 K/UL (ref 4.5–11.5)

## 2025-06-03 PROCEDURE — 85027 COMPLETE CBC AUTOMATED: CPT

## 2025-06-03 PROCEDURE — 85610 PROTHROMBIN TIME: CPT

## 2025-06-03 PROCEDURE — 7100000011 HC PHASE II RECOVERY - ADDTL 15 MIN: Performed by: SURGERY

## 2025-06-03 PROCEDURE — 75774 ARTERY X-RAY EACH VESSEL: CPT | Performed by: SURGERY

## 2025-06-03 PROCEDURE — 37224 PR REVSC OPN/PRG FEM/POP W/ANGIOPLASTY UNI: CPT | Performed by: SURGERY

## 2025-06-03 PROCEDURE — 6360000002 HC RX W HCPCS: Performed by: SURGERY

## 2025-06-03 PROCEDURE — 76937 US GUIDE VASCULAR ACCESS: CPT | Performed by: SURGERY

## 2025-06-03 PROCEDURE — 6360000004 HC RX CONTRAST MEDICATION: Performed by: SURGERY

## 2025-06-03 PROCEDURE — C2623 CATH, TRANSLUMIN, DRUG-COAT: HCPCS | Performed by: SURGERY

## 2025-06-03 PROCEDURE — C1769 GUIDE WIRE: HCPCS | Performed by: SURGERY

## 2025-06-03 PROCEDURE — C1725 CATH, TRANSLUMIN NON-LASER: HCPCS | Performed by: SURGERY

## 2025-06-03 PROCEDURE — 37224 HC FEM POP TERRITORY PLASTY: CPT | Performed by: SURGERY

## 2025-06-03 PROCEDURE — 86900 BLOOD TYPING SEROLOGIC ABO: CPT

## 2025-06-03 PROCEDURE — C1894 INTRO/SHEATH, NON-LASER: HCPCS | Performed by: SURGERY

## 2025-06-03 PROCEDURE — 2500000003 HC RX 250 WO HCPCS

## 2025-06-03 PROCEDURE — 86850 RBC ANTIBODY SCREEN: CPT

## 2025-06-03 PROCEDURE — 6370000000 HC RX 637 (ALT 250 FOR IP): Performed by: SURGERY

## 2025-06-03 PROCEDURE — 80048 BASIC METABOLIC PNL TOTAL CA: CPT

## 2025-06-03 PROCEDURE — C1887 CATHETER, GUIDING: HCPCS | Performed by: SURGERY

## 2025-06-03 PROCEDURE — 86901 BLOOD TYPING SEROLOGIC RH(D): CPT

## 2025-06-03 PROCEDURE — 7100000010 HC PHASE II RECOVERY - FIRST 15 MIN: Performed by: SURGERY

## 2025-06-03 PROCEDURE — 75710 ARTERY X-RAYS ARM/LEG: CPT | Performed by: SURGERY

## 2025-06-03 PROCEDURE — 6360000002 HC RX W HCPCS

## 2025-06-03 PROCEDURE — 2709999900 HC NON-CHARGEABLE SUPPLY: Performed by: SURGERY

## 2025-06-03 RX ORDER — SODIUM CHLORIDE 0.9 % (FLUSH) 0.9 %
5-40 SYRINGE (ML) INJECTION PRN
Status: DISCONTINUED | OUTPATIENT
Start: 2025-06-03 | End: 2025-06-03 | Stop reason: HOSPADM

## 2025-06-03 RX ORDER — SODIUM CHLORIDE 9 MG/ML
INJECTION, SOLUTION INTRAVENOUS CONTINUOUS
Status: DISCONTINUED | OUTPATIENT
Start: 2025-06-03 | End: 2025-06-03 | Stop reason: HOSPADM

## 2025-06-03 RX ORDER — MIDAZOLAM HYDROCHLORIDE 1 MG/ML
INJECTION, SOLUTION INTRAMUSCULAR; INTRAVENOUS PRN
Status: DISCONTINUED | OUTPATIENT
Start: 2025-06-03 | End: 2025-06-03 | Stop reason: HOSPADM

## 2025-06-03 RX ORDER — CLOPIDOGREL BISULFATE 75 MG/1
TABLET ORAL PRN
Status: DISCONTINUED | OUTPATIENT
Start: 2025-06-03 | End: 2025-06-03 | Stop reason: HOSPADM

## 2025-06-03 RX ORDER — FENTANYL CITRATE 50 UG/ML
INJECTION, SOLUTION INTRAMUSCULAR; INTRAVENOUS PRN
Status: DISCONTINUED | OUTPATIENT
Start: 2025-06-03 | End: 2025-06-03 | Stop reason: HOSPADM

## 2025-06-03 RX ORDER — IOPAMIDOL 612 MG/ML
INJECTION, SOLUTION INTRAVASCULAR PRN
Status: DISCONTINUED | OUTPATIENT
Start: 2025-06-03 | End: 2025-06-03 | Stop reason: HOSPADM

## 2025-06-03 RX ORDER — SODIUM CHLORIDE 9 MG/ML
INJECTION, SOLUTION INTRAVENOUS PRN
Status: DISCONTINUED | OUTPATIENT
Start: 2025-06-03 | End: 2025-06-03 | Stop reason: HOSPADM

## 2025-06-03 RX ORDER — SODIUM CHLORIDE 0.9 % (FLUSH) 0.9 %
5-40 SYRINGE (ML) INJECTION EVERY 12 HOURS SCHEDULED
Status: DISCONTINUED | OUTPATIENT
Start: 2025-06-03 | End: 2025-06-03 | Stop reason: HOSPADM

## 2025-06-03 RX ORDER — HEPARIN SODIUM 1000 [USP'U]/ML
INJECTION, SOLUTION INTRAVENOUS; SUBCUTANEOUS PRN
Status: DISCONTINUED | OUTPATIENT
Start: 2025-06-03 | End: 2025-06-03 | Stop reason: HOSPADM

## 2025-06-03 RX ORDER — PROTAMINE SULFATE 10 MG/ML
INJECTION, SOLUTION INTRAVENOUS PRN
Status: DISCONTINUED | OUTPATIENT
Start: 2025-06-03 | End: 2025-06-03 | Stop reason: HOSPADM

## 2025-06-03 RX ADMIN — CEFAZOLIN 2000 MG: 1 INJECTION, POWDER, FOR SOLUTION INTRAMUSCULAR; INTRAVENOUS at 12:09

## 2025-06-03 NOTE — OP NOTE
Cardiovascular Lab Procedure Report    Barbara Pelletier  1955    Date : 6/3/2025  Surgeon: Jim Mack M.D.  Pre-procedure Diagnosis: R LE tissue loss  Post-procedure Diagnosis: Same  Procedure:      Right  common femoral artery access   with US guidance antegrade    Angiogram with catheter in Right   common femoral, popliteal artery   [address] Right SFA, Popliteal plasty   Pre dilation with 4x150 Sybil, 4x150 DCB impact   Anesthesia: Local with IV sedation  Assistants: Cath Lab Staff  Estimated Blood Loss: Minimal  Complications: none  Findings: Abdominal aorta :   Right Right s/p Intervention   Superficial Femoral Art Occluded proximally Patent some residual dz   AK Popliteal Art Distal recon patent patent   BK Popliteal Art patent patent   Anterior Tibial Art patent patent   Tibioperoneal Trunk patent patent   Peroneal Art patent patent   Posterior Tibial Art patent patent     Procedure Details :  There was not previous CTA  or catheter based diagnostic imaging preformed prior to today's procedure.  Timeout preformed identifying pt and procedure. Groins prepped and draped in sterile fashion. Patient given sedation as needed throughout the case.     Right common femoral artery was noted to be patent and was accessed under ultrasound guidance after infiltrating with local in antegrade fashion.  Image was documented and saved.  Micropuncture placed, exchanged out for 5 fr sheath.     Right lower extremity angiogram done. Right sfa pop occlusion.      Patient was given heparin bolus and re dosed as needed throughout the case .  0.35 Trail blazer catheter and advantage glide wire used to traverse stenosis.   The catheter was placed in the below knee popliteal artery and angiogram was preformed to further evaluate the tibial arteries and runoff.       The sfa pop lesion was treated with plasty 4x150 Sybil.  Post dilation with a 4x150 DCB impact balloon was completed.  There was evidence of some residual

## 2025-06-03 NOTE — H&P
Vascular Surgery History & Physical Exam    Chief Complaint: Peripheral vascular disease, R LE tissue loss    HISTORY OF PRESENT ILLNESS:    The patient is a 70 y.o. female who presents to the hospital for elective arteriogram with possible intervention.  The patient has a history of peripheral vascular disease and R LE tissue loss.      ROS : All others Negative if blank [], Positive if [x]  General   [] Fevers   [] Chills   [] Weight Loss   Skin   [x] Tissue Loss   Eyes   [] Wears Glasses/Contacts   [] Vision Changes   Respiratory    [] Shortness of breath   Cardiovascular   [] Chest Pain   [] Shortness of breath with exertion   Gastrointestinal   [] Abdominal Pain     Past Medical History:   Diagnosis Date    ADHD (attention deficit hyperactivity disorder)     Age-related osteoporosis without current pathological fracture 2021    Anticoagulant long-term use [id number]    Arthritis     Atrial fibrillation (HCC) 1120    Atrial fibrillation, transient (HCC) 2023    Carotid artery stenosis     Cataracts, bilateral 10/'19    Chronic back pain     Colon polyps 2021    tubular adenoma    Diabetes mellitus (HCC)     DM type 2 (diabetes mellitus, type 2) (HCC)     Facial basal cell cancer 2022    Generalovich    Hyperlipidemia     Hypertension     Hypothyroidism     Migraines     rare    Neuropathy     Neuropathy, diabetic (HCC)     Peripheral vascular disease     follows with Dr. Mack yearly    Positive colorectal cancer screening using Cologuard test     Psoriasiform dermatitis     Seasonal allergies     Thyroid disease      Past Surgical History:   Procedure Laterality Date    BACK SURGERY      L5-S1    CARDIAC SURGERY  [id number]    CAROTID ENDARTERECTOMY Left 10/11/2018    [facility]    CARPAL TUNNEL RELEASE      bilat     SECTION      2    COLONOSCOPY N/A 2021    COLONOSCOPY WITH BIOPSY with tattooing performed by Taylor Hilario MD at [facility] ENDOSCOPY    CORONARY ARTERY

## 2025-06-03 NOTE — DISCHARGE INSTRUCTIONS
Discharge Instructions for Lower extremity angiogram    Call Dr. Mack's office [phone] for follow-up appointment.    Resume coumadin 6/4/2025  Resume metformin 6/5/2025    Groin Care  - keep clean and dry  - ok to shower or sponge bath  - ok to clean site with lukewarm water and mild soap  - use a soft wash cloth to gently wipe the incision area  - do not scrub the incision areas  - no swimming or baths    Home Care    Follow these guidelines after surgery:   Rest. Try to move as tolerated. A mix of rest and light activity improves healing.   The incision area may be sore for a few days. To minimize pain and soreness:   Take pain medicine as directed.   Avoid strenuous activity and heavy lifting.       Diet    You can return to your regular diet. You may work with a dietician who will help you follow a heart-healthy diet.   Physical Activity    You will feel sore after the surgery. Try to walk steadily within two weeks. You may be able to return to normal activities within 1-3 weeks. While recovering, you will need to avoid strenuous activities, like heavy lifting.   Ask your doctor when you will be able to return to work.    Do not drive unless your doctor has given you permission to do so.    Medications    Your doctor may recommend:   Over-the-counter or prescription pain medicine   Aspirin or a cholesterol-lowering drug to prevent complications   If you had to stop medicines before the procedure, ask your doctor when you can start again. Medicines that may have been stopped include:   Anti-inflammatory drugs (eg, aspirin, ibuprofen)   Blood thinners, like warfarin (Coumadin)   Clopidogrel (Plavix)   When taking medicines:   Take your medicine as directed. Do not change the amount or the schedule.   Do not stop taking them without talking to your doctor.   Do not share them.   Know what results and side effects to look for. Report them to your doctor.   Some drugs can be dangerous when mixed. Talk to

## 2025-06-04 ENCOUNTER — TELEPHONE (OUTPATIENT)
Dept: PRIMARY CARE CLINIC | Age: 70
End: 2025-06-04

## 2025-06-04 NOTE — TELEPHONE ENCOUNTER
Follow up from Hospital Initial Follow Up Call    Outreach made within 2 business days of discharge: Yes    Patient: Barbara Pelletier Patient : 1955   MRN: [id number]  Reason for Admission: Peripheral vascular disease   Discharge Date: 6/3/25       Spoke with: Barbara     Discharge department/facility: [facility] Interactive Patient Contact:  Was patient able to fill all prescriptions: Yes  Was patient instructed to bring all medications to the follow-up visit: Yes  Is patient taking all medications as directed in the discharge summary? Yes  Does patient understand their discharge instructions: Yes  Does patient have questions or concerns that need addressed prior to 7-14 day follow up office visit: no    Additional needs identified to be addressed with provider  No needs identified             Scheduled appointment with PCP within 7-14 days    Follow Up  Future Appointments   Date Time Provider Department Center   2025  9:00 AM SEB MEDICATION MGMT [facility] MED [facility]   2025  2:30 PM Jim Mack MD VASC/MED [facility]   2025  9:20 AM Lynda Sinclair APRN - CNP BDM ENDO [facility]   2025  9:00 AM Anurag Garcia DO [facility] DEP   2025 11:00 AM Raudel Hutchinson DO [place] [facility]   2026  8:20 AM Mariano Rick MD [facility]       WINDY LEON MA

## 2025-06-04 NOTE — PROGRESS NOTES
Extended Stay Recovery Discharge Documentation    Final procedure site check completed, stable for discharge- Yes  Ambulated without issue post recovery completion, gait steady.   Peripheral IV sites removed (see IV Flowsheet) and site asymptomatic- Yes  Patient dressed self without assistance.   Discharge instructions reviewed with Patient and verbalized understanding.   Patient discharged Home with spouse at PM  Monitor cleaned and placed back in nurses' station- Yes          
Nurse to nurse report called to CSU, RNAyana Crawford.  Bed not clean at this time  
Patient transported to CSU via cart  
Reminded patient of scheduled procedure on 6/3.  Instructions given.    
no

## 2025-06-10 LAB
VAS LEFT ARM BP: 187 MMHG
VAS LEFT DORSALIS PEDIS BP: 113 MMHG
VAS LEFT LOW THIGH PRESSURE: 68 MMHG
VAS LEFT PTA BP: 300 MMHG
VAS LEFT TBI: 0.3
VAS LEFT TOE PRESSURE: 56 MMHG
VAS RIGHT ARM BP: 182 MMHG
VAS RIGHT DORSALIS PEDIS BP: 300 MMHG
VAS RIGHT PTA BP: 300 MMHG
VAS RIGHT TBI: 0.26
VAS RIGHT TOE PRESSURE: 48 MMHG

## 2025-06-16 ENCOUNTER — ANTI-COAG VISIT (OUTPATIENT)
Age: 70
End: 2025-06-16
Payer: MEDICARE

## 2025-06-16 VITALS — SYSTOLIC BLOOD PRESSURE: 143 MMHG | DIASTOLIC BLOOD PRESSURE: 69 MMHG | HEART RATE: 61 BPM

## 2025-06-16 DIAGNOSIS — I97.89 POSTOPERATIVE ATRIAL FIBRILLATION (HCC): Primary | ICD-10-CM

## 2025-06-16 DIAGNOSIS — I48.91 POSTOPERATIVE ATRIAL FIBRILLATION (HCC): Primary | ICD-10-CM

## 2025-06-16 LAB
INTERNATIONAL NORMALIZATION RATIO, POC: 3.7
PROTHROMBIN TIME, POC: 0

## 2025-06-16 PROCEDURE — 85610 PROTHROMBIN TIME: CPT

## 2025-06-16 PROCEDURE — 99212 OFFICE O/P EST SF 10 MIN: CPT

## 2025-06-16 NOTE — PROGRESS NOTES
[facility] (Medication Management)  [address] [place], [place], [address]  Phone: [phone]    Face-To-Face Visit  Patient Findings       Positives:  Change in medications (Has been taking less IBU than normal), Bruising (Excessive brusing from the procedure is resolving)    Negatives:  Signs/symptoms of thrombosis, Signs/symptoms of bleeding, Laboratory test error suspected, Change in health, Change in alcohol use, Change in activity, Upcoming invasive procedure, Emergency department visit, Upcoming dental procedure, Missed doses, Extra doses, Change in diet/appetite, Hospital admission, Other complaints    Comments:  Aortogram completed           Assessment/Plan:  Warfarin indication: atrial fibrillation       INR today is therapeutic at 2.6, goal is 2-3.     Warfarin Dose: same (7.5 mg every Friday; 5 mg all other days)     Follow Up:  Recheck INR in 3 week(s).     Continues to monitor BP at home since it was elevated today.    Electronically signed by Pinky Flores RPH, PharmD, BCPS 6/16/2025 9:13 AM     For Pharmacy Admin Tracking Only    Intervention Detail:   Total # of Interventions Recommended: 1  Total # of Interventions Accepted: 1  Time Spent (min): 15

## 2025-06-30 ENCOUNTER — OFFICE VISIT (OUTPATIENT)
Dept: VASCULAR SURGERY | Age: 70
End: 2025-06-30
Payer: MEDICARE

## 2025-06-30 DIAGNOSIS — I73.9 PVD (PERIPHERAL VASCULAR DISEASE): Primary | ICD-10-CM

## 2025-06-30 PROCEDURE — 1090F PRES/ABSN URINE INCON ASSESS: CPT | Performed by: NURSE PRACTITIONER

## 2025-06-30 PROCEDURE — 1159F MED LIST DOCD IN RCRD: CPT | Performed by: NURSE PRACTITIONER

## 2025-06-30 PROCEDURE — G8420 CALC BMI NORM PARAMETERS: HCPCS | Performed by: NURSE PRACTITIONER

## 2025-06-30 PROCEDURE — 4004F PT TOBACCO SCREEN RCVD TLK: CPT | Performed by: NURSE PRACTITIONER

## 2025-06-30 PROCEDURE — 1123F ACP DISCUSS/DSCN MKR DOCD: CPT | Performed by: NURSE PRACTITIONER

## 2025-06-30 PROCEDURE — G8399 PT W/DXA RESULTS DOCUMENT: HCPCS | Performed by: NURSE PRACTITIONER

## 2025-06-30 PROCEDURE — 99212 OFFICE O/P EST SF 10 MIN: CPT | Performed by: NURSE PRACTITIONER

## 2025-06-30 PROCEDURE — G8427 DOCREV CUR MEDS BY ELIG CLIN: HCPCS | Performed by: NURSE PRACTITIONER

## 2025-06-30 PROCEDURE — 3017F COLORECTAL CA SCREEN DOC REV: CPT | Performed by: NURSE PRACTITIONER

## 2025-06-30 NOTE — PROGRESS NOTES
6/30/2025    Barbara Pelletier  1955    PCP : Anurag Garcia DO   ENT : Dr. Hutchinson  Podiatry : Dr KLEBER Villasenor     Previous procedures  10/11/18 L CEA for assx   12/4/18 Kissing iliac stents 8x59 ICAST  Plasty of the R EIA with 8x59 balloon   6/3/25 R SFA, popliteal plasty        Patient returns for post operative evaluation. It was done for R LE tissue loss and currently patient has had improvement in this issue.     The patient denies any unexpected problems since the procedure. She continues to smoke.      Physical Exam:    Gen Awake and Alert  [facility] RRR   Right LE   - The femoral puncture site is soft without evidence of infection or hematoma     - wounds are present to 4th toe, improved           Right      Left   Femoral 2 2     Dorsalis Pedis Strongly biphasic No signal   Posterior Tibial Strongly biphasic monophasic     A/P S/P R SFA, popliteal plasty for R LE tissue loss  Continue Medical management with coumadin  pt is a diabetic - emphasized importance of well controlled blood sugars  Discussed with pt tobacco use and significant relationship to PVD and potential to cause increased problems post intervention   pt currently is  a smoker  Walking Program  Emphasized importance of foot care, and to call if develops any wounds or ulcerations, changes in appearance or symptoms  I reviewed with the patient that normal activities can be resumed as tolerated  Return in 2 months for follow-up office visit  Arterial studies ordered     Pt seen and plan reviewed with Dr. Mack.       INDIRA Sen - CNP

## 2025-07-01 SDOH — HEALTH STABILITY: PHYSICAL HEALTH: ON AVERAGE, HOW MANY MINUTES DO YOU ENGAGE IN EXERCISE AT THIS LEVEL?: 0 MIN

## 2025-07-01 SDOH — HEALTH STABILITY: PHYSICAL HEALTH: ON AVERAGE, HOW MANY DAYS PER WEEK DO YOU ENGAGE IN MODERATE TO STRENUOUS EXERCISE (LIKE A BRISK WALK)?: 0 DAYS

## 2025-07-01 ASSESSMENT — PATIENT HEALTH QUESTIONNAIRE - PHQ9
SUM OF ALL RESPONSES TO PHQ QUESTIONS 1-9: 1
1. LITTLE INTEREST OR PLEASURE IN DOING THINGS: NOT AT ALL
SUM OF ALL RESPONSES TO PHQ QUESTIONS 1-9: 1
2. FEELING DOWN, DEPRESSED OR HOPELESS: SEVERAL DAYS
SUM OF ALL RESPONSES TO PHQ QUESTIONS 1-9: 1
SUM OF ALL RESPONSES TO PHQ QUESTIONS 1-9: 1

## 2025-07-01 ASSESSMENT — LIFESTYLE VARIABLES
HOW MANY STANDARD DRINKS CONTAINING ALCOHOL DO YOU HAVE ON A TYPICAL DAY: 0
HOW OFTEN DO YOU HAVE SIX OR MORE DRINKS ON ONE OCCASION: 1
HOW OFTEN DO YOU HAVE A DRINK CONTAINING ALCOHOL: 1
HOW OFTEN DO YOU HAVE A DRINK CONTAINING ALCOHOL: NEVER
HOW MANY STANDARD DRINKS CONTAINING ALCOHOL DO YOU HAVE ON A TYPICAL DAY: PATIENT DOES NOT DRINK

## 2025-07-07 ENCOUNTER — OFFICE VISIT (OUTPATIENT)
Dept: ENDOCRINOLOGY | Age: 70
End: 2025-07-07
Payer: MEDICARE

## 2025-07-07 ENCOUNTER — ANTI-COAG VISIT (OUTPATIENT)
Age: 70
End: 2025-07-07
Payer: MEDICARE

## 2025-07-07 VITALS
HEART RATE: 59 BPM | OXYGEN SATURATION: 98 % | BODY MASS INDEX: 22.47 KG/M2 | TEMPERATURE: 98.6 F | DIASTOLIC BLOOD PRESSURE: 71 MMHG | HEIGHT: 64 IN | WEIGHT: 131.6 LBS | SYSTOLIC BLOOD PRESSURE: 142 MMHG | RESPIRATION RATE: 20 BRPM

## 2025-07-07 VITALS
WEIGHT: 131 LBS | SYSTOLIC BLOOD PRESSURE: 150 MMHG | BODY MASS INDEX: 22.49 KG/M2 | HEART RATE: 58 BPM | DIASTOLIC BLOOD PRESSURE: 70 MMHG

## 2025-07-07 DIAGNOSIS — I97.89 POSTOPERATIVE ATRIAL FIBRILLATION (HCC): Primary | ICD-10-CM

## 2025-07-07 DIAGNOSIS — E55.9 VITAMIN D DEFICIENCY: ICD-10-CM

## 2025-07-07 DIAGNOSIS — E11.65 TYPE 2 DIABETES MELLITUS WITH HYPERGLYCEMIA, WITH LONG-TERM CURRENT USE OF INSULIN (HCC): Primary | ICD-10-CM

## 2025-07-07 DIAGNOSIS — E21.0 PRIMARY HYPERPARATHYROIDISM: ICD-10-CM

## 2025-07-07 DIAGNOSIS — E03.9 PRIMARY HYPOTHYROIDISM: ICD-10-CM

## 2025-07-07 DIAGNOSIS — Z79.4 TYPE 2 DIABETES MELLITUS WITH HYPERGLYCEMIA, WITH LONG-TERM CURRENT USE OF INSULIN (HCC): Primary | ICD-10-CM

## 2025-07-07 DIAGNOSIS — Z13.820 ENCOUNTER FOR SCREENING FOR OSTEOPOROSIS: ICD-10-CM

## 2025-07-07 DIAGNOSIS — I48.91 POSTOPERATIVE ATRIAL FIBRILLATION (HCC): Primary | ICD-10-CM

## 2025-07-07 LAB
HBA1C MFR BLD: 8.4 %
INTERNATIONAL NORMALIZATION RATIO, POC: 3.6
PROTHROMBIN TIME, POC: 0

## 2025-07-07 PROCEDURE — 95251 CONT GLUC MNTR ANALYSIS I&R: CPT | Performed by: FAMILY MEDICINE

## 2025-07-07 PROCEDURE — G8399 PT W/DXA RESULTS DOCUMENT: HCPCS | Performed by: FAMILY MEDICINE

## 2025-07-07 PROCEDURE — 2022F DILAT RTA XM EVC RTNOPTHY: CPT | Performed by: FAMILY MEDICINE

## 2025-07-07 PROCEDURE — 99212 OFFICE O/P EST SF 10 MIN: CPT

## 2025-07-07 PROCEDURE — 83036 HEMOGLOBIN GLYCOSYLATED A1C: CPT | Performed by: FAMILY MEDICINE

## 2025-07-07 PROCEDURE — 1159F MED LIST DOCD IN RCRD: CPT | Performed by: FAMILY MEDICINE

## 2025-07-07 PROCEDURE — G8420 CALC BMI NORM PARAMETERS: HCPCS | Performed by: FAMILY MEDICINE

## 2025-07-07 PROCEDURE — 1123F ACP DISCUSS/DSCN MKR DOCD: CPT | Performed by: FAMILY MEDICINE

## 2025-07-07 PROCEDURE — 1090F PRES/ABSN URINE INCON ASSESS: CPT | Performed by: FAMILY MEDICINE

## 2025-07-07 PROCEDURE — 3052F HG A1C>EQUAL 8.0%<EQUAL 9.0%: CPT | Performed by: FAMILY MEDICINE

## 2025-07-07 PROCEDURE — G8427 DOCREV CUR MEDS BY ELIG CLIN: HCPCS | Performed by: FAMILY MEDICINE

## 2025-07-07 PROCEDURE — 4004F PT TOBACCO SCREEN RCVD TLK: CPT | Performed by: FAMILY MEDICINE

## 2025-07-07 PROCEDURE — 99214 OFFICE O/P EST MOD 30 MIN: CPT | Performed by: FAMILY MEDICINE

## 2025-07-07 PROCEDURE — 3017F COLORECTAL CA SCREEN DOC REV: CPT | Performed by: FAMILY MEDICINE

## 2025-07-07 PROCEDURE — 85610 PROTHROMBIN TIME: CPT

## 2025-07-07 RX ORDER — KETOROLAC TROMETHAMINE 30 MG/ML
INJECTION, SOLUTION INTRAMUSCULAR; INTRAVENOUS
Qty: 1 EACH | Refills: 0 | Status: SHIPPED | OUTPATIENT
Start: 2025-07-07

## 2025-07-07 RX ORDER — HYDROCHLOROTHIAZIDE 12.5 MG/1
CAPSULE ORAL
Qty: 6 EACH | Refills: 3 | Status: SHIPPED | OUTPATIENT
Start: 2025-07-07

## 2025-07-07 NOTE — PROGRESS NOTES
[facility] (Medication Management)  [address] [place], [place], [address]  Phone: [phone]    Face-To-Face Visit  Patient Findings       Negatives:  Signs/symptoms of thrombosis, Signs/symptoms of bleeding, Laboratory test error suspected, Change in health, Change in alcohol use, Change in activity, Upcoming invasive procedure, Emergency department visit, Upcoming dental procedure, Missed doses, Extra doses, Change in medications, Change in diet/appetite, Hospital admission, Bruising, Other complaints           Assessment/Plan:  Warfarin indication: atrial fibrillation       INR today is supratherapeutic at 3.6, goal is 2-3. Upon further discussion she reports less vitamin K in her diet.      Warfarin Dose: Hold today's dose then retry same (7.5 mg every Friday; 5 mg all other days) - she will add some vitamin K into her diet     Follow Up:  Recheck INR in 2 week(s).    Melisa Reyez RPH PharmD, BCACP 7/7/2025 8:49 AM    For Pharmacy Admin Tracking Only    Intervention Detail: Dose Adjustment: 1, reason: Therapy De-escalation  Total # of Interventions Recommended: 1  Total # of Interventions Accepted: 1  Time Spent (min): 15

## 2025-07-07 NOTE — PROGRESS NOTES
[facility]  [facility] Department of Endocrinology Diabetes and Metabolism   [address], [place], [address]   Phone: [phone]  Fax: [phone]       Date of admission: (Not on file)  Date of service: 7/7/2025  Admitting physician: No admitting provider for patient encounter.   Primary Care Physician: Anurag Garcia DO  Consultant physician: INDIRA Aiken CNP      Reason for the consultation:  Uncontrolled DM    History of Present Illness:  The history is provided by the patient. Accuracy of the patient data is excellent    Barbara Pelletier is a very pleasant 70 y.o. old female with PMH uncontrolled DM, CAD, HLD seen today for follow up visit     The patient was recently admitted to the hospital and underwent CABG surgery on 11/2/2022      The patient was diagnosed with type 2 DM many years ago. Currently on Toujeo 22 u daily, Humalog 8/8/9u with meals + ss 2:50>150.  Metformin 500mg 1 tablet BID.      Patient has been eating consistent carbohydrate meals.  Patient reports both macrovascular and  microvascular complications. The patient is  up to date with yearly diabetic eye exam.       Uses jese-missing many checks.  Some days with no data.  Most days only with data in the morning.    Ambulatory continuous glucose monitoring of interstitial tissue fluid via a subcutaneous sensor for a minimum of 72 hours; analysis, interpretation and report  Average sensor glucose  145  Time in range 86%  Hyperglycemia 14%  Hypoglycemia 0%    Lab Results   Component Value Date/Time    LABA1C 7.8 03/17/2025 08:26 AM       Lab Results   Component Value Date/Time    LABA1C 7.8 03/17/2025 08:26 AM    LABA1C 8.0 10/30/2024 08:05 AM    LABA1C 7.2 12/07/2023 07:34 AM    LABA1C 9.0 06/26/2023 12:00 PM       Past medical history:   Past Medical History:   Diagnosis Date    ADHD (attention deficit hyperactivity disorder)     Age-related osteoporosis without current pathological

## 2025-07-08 ENCOUNTER — OFFICE VISIT (OUTPATIENT)
Dept: PRIMARY CARE CLINIC | Age: 70
End: 2025-07-08

## 2025-07-08 VITALS
DIASTOLIC BLOOD PRESSURE: 72 MMHG | BODY MASS INDEX: 23.39 KG/M2 | RESPIRATION RATE: 17 BRPM | TEMPERATURE: 97 F | HEART RATE: 59 BPM | WEIGHT: 137 LBS | OXYGEN SATURATION: 98 % | SYSTOLIC BLOOD PRESSURE: 128 MMHG | HEIGHT: 64 IN

## 2025-07-08 DIAGNOSIS — Z00.00 MEDICARE ANNUAL WELLNESS VISIT, SUBSEQUENT: Primary | ICD-10-CM

## 2025-07-08 NOTE — PROGRESS NOTES
Medicare Annual Wellness Visit    Barbara Pelletier is here for Medicare AWV (Was advised at her apt was advised she has a small tumor on her parathyroid - will be sent for bone density /Right rotator cuff has 3 tares /Also has a infection on her right big toe and second to the last toe )    Assessment & Plan   Medicare annual wellness visit, subsequent  - continue care with specialists  - quit smoking  - refuses colonoscopy   - continue meds     Return for Medicare Annual Wellness Visit in 1 year, as needed, or for acute problem.     Subjective   The following acute and/or chronic problems were also addressed today:    Patient's complete Health Risk Assessment and screening values have been reviewed and are found in Flowsheets. The following problems were reviewed today and where indicated follow up appointments were made and/or referrals ordered.    Positive Risk Factor Screenings with Interventions:    Fall Risk:  Do you feel unsteady or are you worried about falling? : (!) (Patient-Rptd) yes  2 or more falls in past year?: (!) (Patient-Rptd) yes  Fall with injury in past year?: (!) (Patient-Rptd) yes     Interventions:    Reviewed medications, home hazards, visual acuity, and co-morbidities that can increase risk for falls            General HRA Questions:  Select all that apply: (!) (Patient-Rptd) New or Increased Pain  Interventions - Pain:  Patient comments: hurts everywhere      Inactivity:  On average, how many days per week do you engage in moderate to strenuous exercise (like a brisk walk)?: (Patient-Rptd) 0 days (!) Abnormal  On average, how many minutes do you engage in exercise at this level?: (Patient-Rptd) 0 min  Interventions:  Recommendations: walking 3-4 days a wk for 30 min's       Hearing Screen:  Do you or your family notice any trouble with your hearing that hasn't been managed with hearing aids?: (!) (Patient-Rptd) Yes    Interventions:  Patient comments: thinks it's ok. Refuses eval

## 2025-07-16 ENCOUNTER — HOSPITAL ENCOUNTER (OUTPATIENT)
Dept: MAMMOGRAPHY | Age: 70
Discharge: HOME | End: 2025-07-18
Payer: MEDICARE

## 2025-07-16 DIAGNOSIS — E21.0 PRIMARY HYPERPARATHYROIDISM: ICD-10-CM

## 2025-07-16 DIAGNOSIS — Z13.820 ENCOUNTER FOR SCREENING FOR OSTEOPOROSIS: ICD-10-CM

## 2025-07-16 PROCEDURE — 77080 DXA BONE DENSITY AXIAL: CPT

## 2025-07-17 ENCOUNTER — RESULTS FOLLOW-UP (OUTPATIENT)
Dept: ENDOCRINOLOGY | Age: 70
End: 2025-07-17

## 2025-07-17 DIAGNOSIS — E21.0 PRIMARY HYPERPARATHYROIDISM: Primary | ICD-10-CM

## 2025-07-17 DIAGNOSIS — Z79.4 TYPE 2 DIABETES MELLITUS WITH HYPERGLYCEMIA, WITH LONG-TERM CURRENT USE OF INSULIN (HCC): ICD-10-CM

## 2025-07-17 DIAGNOSIS — E11.65 TYPE 2 DIABETES MELLITUS WITH HYPERGLYCEMIA, WITH LONG-TERM CURRENT USE OF INSULIN (HCC): ICD-10-CM

## 2025-07-17 DIAGNOSIS — M81.0 AGE-RELATED OSTEOPOROSIS WITHOUT CURRENT PATHOLOGICAL FRACTURE: ICD-10-CM

## 2025-07-17 NOTE — TELEPHONE ENCOUNTER
Patients last appointment 7/8/2025.  Patients next scheduled appointment   Future Appointments   Date Time Provider Department Center   7/25/2025 10:00 AM SEB MEDICATION MGMT SEB MED MGMT [place] HOD   7/25/2025 11:00 AM Raudel Hutchinson DO [place] [facility]   10/1/2025  9:15 AM GUICHO KELLEY VAS US 1 SEYZ CARDIO [facility] Rad/Car   10/6/2025  9:15 AM Jim Mack MD VASC/MED [facility]   12/4/2025  8:40 AM Varun Varner APRN - CNS BDM ENDO [facility]   1/13/2026  8:20 AM Mariano Rick MD [place] [facility]   7/9/2026  9:00 AM Anurag Garcia DO [facility] DEP

## 2025-07-22 RX ORDER — ALENDRONATE SODIUM 70 MG/1
70 TABLET ORAL
Qty: 4 TABLET | Refills: 3 | Status: SHIPPED | OUTPATIENT
Start: 2025-07-22

## 2025-07-23 ENCOUNTER — OFFICE VISIT (OUTPATIENT)
Dept: ENT CLINIC | Age: 70
End: 2025-07-23
Payer: MEDICARE

## 2025-07-23 VITALS
SYSTOLIC BLOOD PRESSURE: 158 MMHG | RESPIRATION RATE: 15 BRPM | DIASTOLIC BLOOD PRESSURE: 53 MMHG | OXYGEN SATURATION: 97 % | HEART RATE: 59 BPM | BODY MASS INDEX: 22.4 KG/M2 | WEIGHT: 131.2 LBS | HEIGHT: 64 IN

## 2025-07-23 DIAGNOSIS — E83.52 HYPERCALCEMIA: Primary | ICD-10-CM

## 2025-07-23 DIAGNOSIS — K13.79 MOUTH SORES: ICD-10-CM

## 2025-07-23 DIAGNOSIS — M80.00XA AGE-RELATED OSTEOPOROSIS WITH CURRENT PATHOLOGICAL FRACTURE, INITIAL ENCOUNTER: ICD-10-CM

## 2025-07-23 PROCEDURE — 4004F PT TOBACCO SCREEN RCVD TLK: CPT | Performed by: OTOLARYNGOLOGY

## 2025-07-23 PROCEDURE — G8420 CALC BMI NORM PARAMETERS: HCPCS | Performed by: OTOLARYNGOLOGY

## 2025-07-23 PROCEDURE — 1160F RVW MEDS BY RX/DR IN RCRD: CPT | Performed by: OTOLARYNGOLOGY

## 2025-07-23 PROCEDURE — 99213 OFFICE O/P EST LOW 20 MIN: CPT | Performed by: OTOLARYNGOLOGY

## 2025-07-23 PROCEDURE — 1159F MED LIST DOCD IN RCRD: CPT | Performed by: OTOLARYNGOLOGY

## 2025-07-23 PROCEDURE — G8399 PT W/DXA RESULTS DOCUMENT: HCPCS | Performed by: OTOLARYNGOLOGY

## 2025-07-23 PROCEDURE — 3017F COLORECTAL CA SCREEN DOC REV: CPT | Performed by: OTOLARYNGOLOGY

## 2025-07-23 PROCEDURE — 1090F PRES/ABSN URINE INCON ASSESS: CPT | Performed by: OTOLARYNGOLOGY

## 2025-07-23 PROCEDURE — 1123F ACP DISCUSS/DSCN MKR DOCD: CPT | Performed by: OTOLARYNGOLOGY

## 2025-07-23 PROCEDURE — G8427 DOCREV CUR MEDS BY ELIG CLIN: HCPCS | Performed by: OTOLARYNGOLOGY

## 2025-07-23 ASSESSMENT — ENCOUNTER SYMPTOMS
EYE PAIN: 0
DIARRHEA: 0
ALLERGIC/IMMUNOLOGIC NEGATIVE: 1
EYE DISCHARGE: 0
VOMITING: 0
COUGH: 0
SORE THROAT: 0
SINUS PRESSURE: 0
RHINORRHEA: 0
SHORTNESS OF BREATH: 0
BACK PAIN: 0

## 2025-07-23 NOTE — PROGRESS NOTES
Subjective:      Patient ID:  Barbara Pelletier is a 70 y.o. female.    HPI:    Patient presents today for possible parathyroid issue. Condition has been present for 3 month(s). Pt has mild PTH and calcium elevation and osteoporosis.  Also had US parathyroid which might be parathyroid adenoma      Past Medical History:   Diagnosis Date    ADHD (attention deficit hyperactivity disorder)     Age-related osteoporosis without current pathological fracture 2021    Anticoagulant long-term use [id number]    Arthritis     Atrial fibrillation (HCC) 846952    Atrial fibrillation, transient (HCC) 2023    CAD (coronary artery disease)     Carotid artery stenosis     Cataracts, bilateral 10/'19    Chronic back pain     Colon polyps 2021    tubular adenoma    Diabetes mellitus (HCC)     DM type 2 (diabetes mellitus, type 2) (HCC)     Facial basal cell cancer 2022    [facility]    Hyperlipidemia     Hypertension     Hypothyroidism     Migraines     rare    Neuropathy     Neuropathy, diabetic (HCC)     Peripheral vascular disease     follows with Dr. Mack yearly    Positive colorectal cancer screening using Cologuard test     Psoriasiform dermatitis     Seasonal allergies     Thyroid disease      Past Surgical History:   Procedure Laterality Date    BACK SURGERY      L5-S1    CARDIAC SURGERY  [id number]    CAROTID ENDARTERECTOMY Left 10/11/2018    [facility]    CAROTID STENT PLACEMENT      CARPAL TUNNEL RELEASE      bilat     SECTION      2    COLONOSCOPY N/A 2021    COLONOSCOPY WITH BIOPSY with tattooing performed by Taylor Hilario MD at [facility] ENDOSCOPY    CORONARY ARTERY BYPASS GRAFT N/A 2022    coronary artery bypass grafting x 2, ZACHARY performed by Clark Pickard DO at [facility]    HYSTERECTOMY (CERVIX STATUS UNKNOWN)      HYSTERECTOMY, TOTAL ABDOMINAL (CERVIX REMOVED)      HYSTERECTOMY, VAGINAL      INVASIVE VASCULAR N/A 2025    Aortagram abdominal performed by

## 2025-07-25 ENCOUNTER — ANTI-COAG VISIT (OUTPATIENT)
Age: 70
End: 2025-07-25
Payer: MEDICARE

## 2025-07-25 VITALS — SYSTOLIC BLOOD PRESSURE: 151 MMHG | DIASTOLIC BLOOD PRESSURE: 54 MMHG | HEART RATE: 52 BPM

## 2025-07-25 DIAGNOSIS — I97.89 POSTOPERATIVE ATRIAL FIBRILLATION (HCC): Primary | ICD-10-CM

## 2025-07-25 DIAGNOSIS — I48.91 POSTOPERATIVE ATRIAL FIBRILLATION (HCC): Primary | ICD-10-CM

## 2025-07-25 LAB
INTERNATIONAL NORMALIZATION RATIO, POC: 2.7
PROTHROMBIN TIME, POC: 0

## 2025-07-25 PROCEDURE — 85610 PROTHROMBIN TIME: CPT

## 2025-07-25 PROCEDURE — 99211 OFF/OP EST MAY X REQ PHY/QHP: CPT

## 2025-07-25 NOTE — PROGRESS NOTES
[facility] (Medication Management)  [address] [place], [address]  Phone: [phone]    Face-To-Face Visit  Patient Findings       Positives:  Signs/symptoms of bleeding (Patient noticed some blood in her ear and nose; evaluated by ENT. Has since resolved.), Upcoming invasive procedure (Being evaluated for possible removal of parathyroid tumors.), Change in diet/appetite (Vitmain K intake has been better)    Negatives:  Signs/symptoms of thrombosis, Laboratory test error suspected, Change in health, Change in alcohol use, Change in activity, Emergency department visit, Upcoming dental procedure, Missed doses, Extra doses, Change in medications, Hospital admission, Bruising, Other complaints           Assessment/Plan:  Warfarin indication: atrial fibrillation       INR today is therapeutic at 2.7, goal is 2-3. Upon further discussion she reports less vitamin K in her diet.      Warfarin Dose: Continue the same (7.5 mg every Friday; 5 mg all other days)      Follow Up:  Recheck INR in 4 week(s).    Electronically signed by Pinky Flores RPH, PharmD, BCPS 7/25/2025 10:11 AM       For Pharmacy Admin Tracking Only    Intervention Detail:   Total # of Interventions Recommended: 0  Total # of Interventions Accepted: 0  Time Spent (min): 15

## 2025-07-31 DIAGNOSIS — F34.1 DYSTHYMIA: ICD-10-CM

## 2025-07-31 RX ORDER — CITALOPRAM HYDROBROMIDE 20 MG/1
30 TABLET ORAL DAILY
Qty: 135 TABLET | Refills: 1 | Status: SHIPPED | OUTPATIENT
Start: 2025-07-31

## 2025-08-04 ENCOUNTER — HOSPITAL ENCOUNTER (OUTPATIENT)
Dept: NUCLEAR MEDICINE | Age: 70
Discharge: HOME | End: 2025-08-06
Attending: OTOLARYNGOLOGY
Payer: MEDICARE

## 2025-08-04 DIAGNOSIS — E83.52 HYPERCALCEMIA: ICD-10-CM

## 2025-08-04 PROCEDURE — 78071 PARATHYRD PLANAR W/WO SUBTRJ: CPT

## 2025-08-04 PROCEDURE — 3430000000 HC RX DIAGNOSTIC RADIOPHARMACEUTICAL: Performed by: RADIOLOGY

## 2025-08-04 PROCEDURE — A9500 TC99M SESTAMIBI: HCPCS | Performed by: RADIOLOGY

## 2025-08-04 RX ORDER — TETRAKIS(2-METHOXYISOBUTYLISOCYANIDE)COPPER(I) TETRAFLUOROBORATE 1 MG/ML
24.3 INJECTION, POWDER, LYOPHILIZED, FOR SOLUTION INTRAVENOUS
Status: COMPLETED | OUTPATIENT
Start: 2025-08-04 | End: 2025-08-04

## 2025-08-04 RX ADMIN — Medication 24.3 MILLICURIE: at 10:39

## 2025-08-22 ENCOUNTER — ANTI-COAG VISIT (OUTPATIENT)
Age: 70
End: 2025-08-22
Payer: MEDICARE

## 2025-08-22 VITALS
DIASTOLIC BLOOD PRESSURE: 66 MMHG | HEART RATE: 56 BPM | WEIGHT: 131.6 LBS | SYSTOLIC BLOOD PRESSURE: 184 MMHG | BODY MASS INDEX: 22.59 KG/M2

## 2025-08-22 DIAGNOSIS — I48.91 POSTOPERATIVE ATRIAL FIBRILLATION (HCC): Primary | ICD-10-CM

## 2025-08-22 DIAGNOSIS — I97.89 POSTOPERATIVE ATRIAL FIBRILLATION (HCC): Primary | ICD-10-CM

## 2025-08-22 LAB
INTERNATIONAL NORMALIZATION RATIO, POC: 3
PROTHROMBIN TIME, POC: NORMAL

## 2025-08-22 PROCEDURE — 99211 OFF/OP EST MAY X REQ PHY/QHP: CPT

## 2025-08-22 PROCEDURE — 85610 PROTHROMBIN TIME: CPT

## (undated) DEVICE — PATIENT RETURN ELECTRODE, SINGLE-USE, CONTACT QUALITY MONITORING, ADULT, WITH 9FT CORD, FOR PATIENTS WEIGING OVER 33LBS. (15KG): Brand: MEGADYNE

## (undated) DEVICE — DRAIN SURG SGL COLL PT TB FOR ATS BG OASIS

## (undated) DEVICE — GLOVE ORANGE PI 8   MSG9080

## (undated) DEVICE — RADIFOCUS GLIDEWIRE ADVANTAGE GUIDEWIRE: Brand: GLIDEWIRE ADVANTAGE

## (undated) DEVICE — ENDOTRACH TUBE 7060450 LASER SHLD 7.5MM: Brand: LASER-SHIELD®

## (undated) DEVICE — CATHETER,URETHRAL,REDRUBBER,STRL,18FR: Brand: MEDLINE

## (undated) DEVICE — SYRINGE 20ML LL S/C 50

## (undated) DEVICE — SET INSTR ART 1

## (undated) DEVICE — GOWN,SIRUS,FABRNF,XL,20/CS: Brand: MEDLINE

## (undated) DEVICE — TAPE ADH W2INXL10YD WHT PAPR GENTLE BRTH FLX COMFORTABLE

## (undated) DEVICE — MICROPUNCTURE INTRODUCER SET SILHOUETTE TRANSITIONLESS PUSH-PLUS DESIGN - STIFFENED CANNULA WITH STAINLESS STEEL WIRE GUIDE: Brand: MICROPUNCTURE

## (undated) DEVICE — CATHETER SCLERO L240CM NDL 25GA L4MM SHTH DIA2.3MM CNTRST

## (undated) DEVICE — SOLUTION IV 1000ML 140MEQ/L SOD 5MEQ/L K 3MEQ/L MG 27MEQ/L

## (undated) DEVICE — GLOVE SURG 8.5 PF POLYMER WHT STRL SIGN LTX ESSENTIAL LTX

## (undated) DEVICE — GLOVE SURG SZ 65 THK91MIL LTX FREE SYN POLYISOPRENE

## (undated) DEVICE — TOWEL,OR,DSP,ST,BLUE,STD,6/PK,12PK/CS: Brand: MEDLINE

## (undated) DEVICE — TUBING SUCT 12FR MAL ALUM SHFT FN CAP VENT UNIV CONN W/ OBT

## (undated) DEVICE — CLIP INT M L GRN TI TRNSVRS GRV CHEVRON SHP W/ PRECIS TIP

## (undated) DEVICE — Device

## (undated) DEVICE — CONNECTOR DRNGE W3/8-0.5XH3/16XL3/16IN 2:1 SIL CARD STR

## (undated) DEVICE — TOTAL TRAY, 16FR 10ML SIL FOLEY, URN: Brand: MEDLINE

## (undated) DEVICE — BONE CEMENT C01B HV-R WITH MIXER  US: Brand: KYPHON® HV-R® BONE CEMENT AND KYPHON® MIXER PACK

## (undated) DEVICE — PICO 7 10CM X 30CM: Brand: PICO™ 7

## (undated) DEVICE — E-Z CLEAN, NON-STICK, PTFE COATED, ELECTROSURGICAL BLADE ELECTRODE, MODIFIED EXTENDED INSULATION, 6.5 INCH (16.5 CM): Brand: MEGADYNE

## (undated) DEVICE — SYRINGE MED 50ML LUERLOCK TIP

## (undated) DEVICE — FORCEPS BX L240CM JAW DIA2.4MM ORNG L CAP W/ NDL DISP RAD

## (undated) DEVICE — JACKSON TABLE POSITIONER KIT: Brand: MEDLINE INDUSTRIES, INC.

## (undated) DEVICE — CANNULA NSL CANN NSL L25FT TBNG AD O2 SUP SFT UC

## (undated) DEVICE — 6 FOOT DISPOSABLE EXTENSION CABLE WITH SAFE CONNECT / SCREW-DOWN

## (undated) DEVICE — ALCOHOL RUBBING ISO 16OZ 70%

## (undated) DEVICE — CODMAN® SURGICAL PATTIES 1/2" X 1/2" (1.27CM X 1.27CM): Brand: CODMAN®

## (undated) DEVICE — DOUBLE BASIN SET: Brand: MEDLINE INDUSTRIES, INC.

## (undated) DEVICE — BLOWER COR ART L16.5CM PLAS SHFT MAL W/ MIST IV SET AXIUS

## (undated) DEVICE — CATHETER URETH 22FR L16IN LTX INTMIT ROB MOD BARDX

## (undated) DEVICE — Z INACTIVE USE 2535480 CLIP LIG M BLU TI HRT SHP WIRE HORZ 180 PER BX

## (undated) DEVICE — GLOVE ORANGE PI 7 1/2   MSG9075

## (undated) DEVICE — PACK OPEN HRT DRP

## (undated) DEVICE — SET SURG INSTR ART III

## (undated) DEVICE — TOWEL,OR,DSP,ST,WHITE,DLX,4/PK,20PK/CS: Brand: MEDLINE

## (undated) DEVICE — SOLUTION IV IRRIG POUR BRL 0.9% SODIUM CHL 2F7124

## (undated) DEVICE — STANDARD HYPODERMIC NEEDLE,POLYPROPYLENE HUB: Brand: MONOJECT

## (undated) DEVICE — GLOVE SURG SZ 85 L12IN FNGR THK79MIL GRN LTX FREE

## (undated) DEVICE — CATHETER IN.PACT 018 4.0 MM X 150 MM X 130 CM

## (undated) DEVICE — INTENDED FOR TISSUE SEPARATION, AND OTHER PROCEDURES THAT REQUIRE A SHARP SURGICAL BLADE TO PUNCTURE OR CUT.: Brand: BARD-PARKER ® STAINLESS STEEL BLADES

## (undated) DEVICE — GARMENT,MEDLINE,DVT,INT,CALF,MED, GEN2: Brand: MEDLINE

## (undated) DEVICE — BONE FILLER DEVICE F04B SIZE 3

## (undated) DEVICE — GRADUATE TRIANG MEASURE 1000ML BLK PRNT

## (undated) DEVICE — 3M™ TEGADERM™ CHG DRESSING 25/CARTON 4 CARTONS/CASE 1657: Brand: TEGADERM™

## (undated) DEVICE — SURGICAL PROCEDURE PACK VASC MAJ CUST

## (undated) DEVICE — 5.0MM PRECISION ROUND

## (undated) DEVICE — TAPE ADH W2INXL10YD PLAS TRNSPAR H2O RESIST HYPOALRG CURAD

## (undated) DEVICE — 1 ML TUBERCULIN SYRINGE REGULAR TIP: Brand: MONOJECT

## (undated) DEVICE — KIT EVAC 400CC DIA1/8IN H PAT 12.5IN 3 SPR RND SHP PVC DRN

## (undated) DEVICE — NEEDLE HYPO 26GA L0.625IN TAN POLYPR HUB S STL REG BVL STR

## (undated) DEVICE — GOWN,SIRUS,FABRNF,L,20/CS: Brand: MEDLINE

## (undated) DEVICE — SKIN AFFIX SURG ADHESIVE 72/CS 0.55ML: Brand: MEDLINE

## (undated) DEVICE — BLADE CLIPPER GEN PURP NS

## (undated) DEVICE — DRESSING QUIKCLOT FEMORAL INTERVENTIONAL 1.5X1.5 IN

## (undated) DEVICE — Z DISCONTINUED PER MEDLINE USE 2425483 TAPE UMB L30IN DIA1/8IN WHT COT NONABSORBABLE W/O NDL FOR

## (undated) DEVICE — CLOTH SURG PREP PREOPERATIVE CHLORHEXIDINE GLUC 2% READYPREP

## (undated) DEVICE — PINNACLE INTRODUCER SHEATH: Brand: PINNACLE

## (undated) DEVICE — TUBING, SUCTION, 3/16" X 12', STRAIGHT: Brand: MEDLINE

## (undated) DEVICE — Device: Brand: SPOT EX ENDOSCOPIC TATTOO

## (undated) DEVICE — ELECTRODE PT RET AD L9FT HI MOIST COND ADH HYDRGEL CORDED

## (undated) DEVICE — CANNULA INJ L2.5IN BLNT TIP 3MM CLR BODY W/ 1 W VLV DLP

## (undated) DEVICE — SURGICAL PROCEDURE PACK EENT CUST

## (undated) DEVICE — SYRINGE MED 10ML LUERLOCK TIP W/O SFTY DISP

## (undated) DEVICE — 3M(TM) MEDIPORE(TM) +PAD SOFT CLOTH ADHESIVE WOUND DRESSING 3570: Brand: 3M™ MEDIPORE™

## (undated) DEVICE — ENDOTRACH TUBE 7060300 LASER SHLD 6MM: Brand: LASER-SHIELD®

## (undated) DEVICE — 3M™ IOBAN™ 2 ANTIMICROBIAL INCISE DRAPE 6640EZ: Brand: IOBAN™ 2

## (undated) DEVICE — SPONGE,PEANUT,XRAY,ST,SM,3/8",5/CARD: Brand: MEDLINE INDUSTRIES, INC.

## (undated) DEVICE — CATHETER ANGIO MOD HK 2 0.035 IN AD 5 FRX80 CM IMPRESS

## (undated) DEVICE — BOWL ASSY BM210 DUAL BLADE DISPOSABLE: Brand: MIDAS REX™

## (undated) DEVICE — SHEET, T, LAPAROTOMY, STERILE: Brand: MEDLINE

## (undated) DEVICE — BASIC SINGLE BASIN 1-LF: Brand: MEDLINE INDUSTRIES, INC.

## (undated) DEVICE — LOOP VES W25MM THK1MM MAXI RED SIL FLD REPELLENT 100 PER

## (undated) DEVICE — OPEN HEART: Brand: MEDLINE INDUSTRIES, INC.

## (undated) DEVICE — GRADUATE

## (undated) DEVICE — PREMIUM WET SKIN PREP TRAY: Brand: MEDLINE INDUSTRIES, INC.

## (undated) DEVICE — GLOVE SURG SZ 6.5 L11.2IN FNGR THK9.8MIL STRW LTX POLYMER

## (undated) DEVICE — INFLATION DEVICE KIT: Brand: ENCORE™ 26 ADVANTAGE KIT

## (undated) DEVICE — CLIP INT SM TI EZ LD LIG SYS WECK HORZ

## (undated) DEVICE — 1.5L THIN WALL CAN: Brand: CRD

## (undated) DEVICE — SPONGE GZ W4XL4IN RAYON POLY FILL CVR W/ NONWOVEN FAB

## (undated) DEVICE — MARKER SURG PASS SPHR NDI

## (undated) DEVICE — NEEDLE SPNL L3.5IN PNK HUB S STL REG WALL FIT STYL W/ QNCKE

## (undated) DEVICE — SET SURG BASIN OPEN HEART NO  1 REUSABLE

## (undated) DEVICE — DRAIN SURG L3/8-1/2IN DIA3/16IN SIL CARD CONN 1:1 BLAK

## (undated) DEVICE — SYRINGE MED 20ML STD CLR PLAS LUERSLIP TIP N CTRL DISP

## (undated) DEVICE — LABEL MED CARD SURG 4 IN PANEL STRL

## (undated) DEVICE — LABEL MED 4 IN SURG PANEL W/ PEN STRL

## (undated) DEVICE — LUMBAR LAMINECTOMY: Brand: MEDLINE INDUSTRIES, INC.

## (undated) DEVICE — SET FORCEP MICROFRANCE LARYNGEAL

## (undated) DEVICE — SHEET,DRAPE,40X58,STERILE: Brand: MEDLINE

## (undated) DEVICE — AGENT HEMSTAT W4XL8IN OXIDIZED REGENERATED CELOS ABSRB

## (undated) DEVICE — SET SURG BASIN MAYO REUSABLE

## (undated) DEVICE — SURGICAL BRA: Brand: DEROYAL

## (undated) DEVICE — SOLUTION IV 500ML 0.9% SOD CHL INJ USP CONT EXCEL

## (undated) DEVICE — 3M™ IOBAN™ 2 ANTIMICROBIAL INCISE DRAPE 6650EZ: Brand: IOBAN™ 2

## (undated) DEVICE — KIT,ANTI FOG,W/SPONGE & FLUID,SOFT PACK: Brand: MEDLINE

## (undated) DEVICE — DRAPE,REIN 53X77,STERILE: Brand: MEDLINE

## (undated) DEVICE — TTL1LYR 16FR10ML 100%SIL TMPST TR: Brand: MEDLINE

## (undated) DEVICE — DRAIN CHN 19FR L0.25MM DIA6.3MM SIL RND HUBLESS FULL FLUT

## (undated) DEVICE — INSUFFLATION TUBING SET WITH FILTER, FUNNEL CONNECTOR AND LUER LOCK: Brand: JOSNOE MEDICAL INC

## (undated) DEVICE — GLOVE SURG SZ 6 THK91MIL LTX FREE SYN POLYISOPRENE ANTI

## (undated) DEVICE — GLOVE ORANGE PI 7   MSG9070

## (undated) DEVICE — LANCET AORTOTOMY 3.3X10MM

## (undated) DEVICE — MARKER,SKIN,WI/RULER AND LABELS: Brand: MEDLINE

## (undated) DEVICE — TRAILBLAZER L65CM MRK BND SPC 50MM 0.035IN CROSSCOAT

## (undated) DEVICE — SPHERE EYE 1 MRK GUIDANCE PASS STEALTHSTATION 1PK/EA

## (undated) DEVICE — 9F PRUITT F3 OUTLYING SHUNT WITH T-PORT: Brand: PRUITT F3 CAROTID SHUNT

## (undated) DEVICE — SOLUTION IV 50ML 0.9% SOD CHL PLAS CONT USP VIAFLX

## (undated) DEVICE — Device: Brand: CLEANCUT ROTATING AORTIC PUNCH

## (undated) DEVICE — BLADE OPHTH 180DEG CUT SURF BLU STR SHRP DBL BVL GRINDLESS

## (undated) DEVICE — 4-PORT MANIFOLD: Brand: NEPTUNE 2

## (undated) DEVICE — CATHETER ETER IV 20GA L1IN POLYUR STR RADPQ INTROCAN SFTY

## (undated) DEVICE — MAGNETIC INSTR DRAPE 20X16: Brand: MEDLINE INDUSTRIES, INC.

## (undated) DEVICE — GAUZE,SPONGE,4"X4",16PLY,XRAY,STRL,LF: Brand: MEDLINE

## (undated) DEVICE — CLAMP INSERT: Brand: STEALTH® CLAMP INSERT

## (undated) DEVICE — BLADE ES L6IN ELASTOMERIC COAT EXT DURABLE BEND UPTO 90DEG

## (undated) DEVICE — HOLDERS JAKO

## (undated) DEVICE — CANNULATED DRIVER SHAFT, CREO FENESTRATED: Brand: CREO

## (undated) DEVICE — FILTER PAPER CASSETTE BIOP PLSTC PNK HNGD

## (undated) DEVICE — HYPODERMIC SAFETY NEEDLE: Brand: MAGELLAN

## (undated) DEVICE — APPLICATOR PREP 26ML 0.7% IOD POVACRYLEX 74% ISO ALC ST

## (undated) DEVICE — ADHESIVE SKIN CLOSURE TOP 36 CC HI VISC DERMBND MINI

## (undated) DEVICE — BATTERY PACK FOR VARISPEED: Brand: STRYKER VARISPEED